# Patient Record
Sex: FEMALE | Race: WHITE | Employment: PART TIME | URBAN - NONMETROPOLITAN AREA
[De-identification: names, ages, dates, MRNs, and addresses within clinical notes are randomized per-mention and may not be internally consistent; named-entity substitution may affect disease eponyms.]

---

## 2017-01-12 RX ORDER — B-COMPLEX WITH VITAMIN C
TABLET ORAL
Qty: 60 TABLET | Refills: 6 | Status: SHIPPED | OUTPATIENT
Start: 2017-01-12 | End: 2017-06-27 | Stop reason: SDUPTHER

## 2017-01-13 ENCOUNTER — TELEPHONE (OUTPATIENT)
Dept: FAMILY MEDICINE CLINIC | Age: 47
End: 2017-01-13

## 2017-01-13 DIAGNOSIS — M25.531 WRIST PAIN, CHRONIC, RIGHT: Primary | ICD-10-CM

## 2017-01-13 DIAGNOSIS — M25.431 WRIST SWELLING, RIGHT: ICD-10-CM

## 2017-01-13 DIAGNOSIS — G89.29 WRIST PAIN, CHRONIC, RIGHT: Primary | ICD-10-CM

## 2017-01-17 ENCOUNTER — TELEPHONE (OUTPATIENT)
Dept: FAMILY MEDICINE CLINIC | Age: 47
End: 2017-01-17

## 2017-02-07 ENCOUNTER — PROCEDURE VISIT (OUTPATIENT)
Dept: PHYSICAL MEDICINE AND REHAB | Age: 47
End: 2017-02-07

## 2017-02-07 DIAGNOSIS — M54.12 CERVICAL RADICULOPATHY: ICD-10-CM

## 2017-02-07 DIAGNOSIS — R20.0 BILATERAL HAND NUMBNESS: ICD-10-CM

## 2017-02-07 DIAGNOSIS — G56.01 RIGHT CARPAL TUNNEL SYNDROME: Primary | ICD-10-CM

## 2017-02-07 PROCEDURE — 95886 MUSC TEST DONE W/N TEST COMP: CPT | Performed by: PSYCHIATRY & NEUROLOGY

## 2017-02-07 PROCEDURE — 95911 NRV CNDJ TEST 9-10 STUDIES: CPT | Performed by: PSYCHIATRY & NEUROLOGY

## 2017-03-30 ENCOUNTER — NURSE TRIAGE (OUTPATIENT)
Dept: ADMINISTRATIVE | Age: 47
End: 2017-03-30

## 2017-05-23 DIAGNOSIS — E78.00 PURE HYPERCHOLESTEROLEMIA: ICD-10-CM

## 2017-05-23 DIAGNOSIS — E03.9 ACQUIRED HYPOTHYROIDISM: ICD-10-CM

## 2017-05-24 RX ORDER — LEVOTHYROXINE SODIUM 0.1 MG/1
TABLET ORAL
Qty: 90 TABLET | Refills: 1 | Status: SHIPPED | OUTPATIENT
Start: 2017-05-24 | End: 2017-06-27 | Stop reason: SDUPTHER

## 2017-05-24 RX ORDER — SIMVASTATIN 40 MG
TABLET ORAL
Qty: 90 TABLET | Refills: 1 | Status: SHIPPED | OUTPATIENT
Start: 2017-05-24 | End: 2017-06-27 | Stop reason: SDUPTHER

## 2017-06-27 DIAGNOSIS — E03.9 ACQUIRED HYPOTHYROIDISM: ICD-10-CM

## 2017-06-27 DIAGNOSIS — E78.00 PURE HYPERCHOLESTEROLEMIA: ICD-10-CM

## 2017-06-27 DIAGNOSIS — D64.9 ANEMIA, UNSPECIFIED TYPE: ICD-10-CM

## 2017-06-27 RX ORDER — SIMVASTATIN 40 MG
TABLET ORAL
Qty: 90 TABLET | Refills: 1 | Status: SHIPPED | OUTPATIENT
Start: 2017-06-27 | End: 2017-12-27 | Stop reason: SDUPTHER

## 2017-06-27 RX ORDER — B-COMPLEX WITH VITAMIN C
TABLET ORAL
Qty: 30 TABLET | Refills: 6 | Status: SHIPPED | OUTPATIENT
Start: 2017-06-27 | End: 2017-12-27 | Stop reason: SDUPTHER

## 2017-06-27 RX ORDER — FERROUS SULFATE 325(65) MG
325 TABLET ORAL DAILY
Qty: 90 TABLET | Refills: 1 | Status: SHIPPED | OUTPATIENT
Start: 2017-06-27 | End: 2018-01-02 | Stop reason: SDUPTHER

## 2017-06-27 RX ORDER — LEVOTHYROXINE SODIUM 0.1 MG/1
TABLET ORAL
Qty: 90 TABLET | Refills: 1 | Status: SHIPPED | OUTPATIENT
Start: 2017-06-27 | End: 2017-12-27 | Stop reason: SDUPTHER

## 2017-07-16 ENCOUNTER — HOSPITAL ENCOUNTER (EMERGENCY)
Age: 47
Discharge: HOME | End: 2017-07-17
Payer: COMMERCIAL

## 2017-07-16 VITALS
RESPIRATION RATE: 15 BRPM | TEMPERATURE: 98.3 F | SYSTOLIC BLOOD PRESSURE: 138 MMHG | WEIGHT: 170 LBS | OXYGEN SATURATION: 99 % | DIASTOLIC BLOOD PRESSURE: 85 MMHG | HEIGHT: 66 IN | BODY MASS INDEX: 27.32 KG/M2 | HEART RATE: 71 BPM

## 2017-07-16 DIAGNOSIS — N39.0 URINARY TRACT INFECTION WITHOUT HEMATURIA, SITE UNSPECIFIED: ICD-10-CM

## 2017-07-16 DIAGNOSIS — M54.10 RADICULOPATHY, UNSPECIFIED SPINAL REGION: Primary | ICD-10-CM

## 2017-07-16 LAB
ALBUMIN SERPL-MCNC: 3.8 G/DL (ref 3.5–5.1)
ALP BLD-CCNC: 74 U/L (ref 38–126)
ALT SERPL-CCNC: 17 U/L (ref 11–66)
ANION GAP SERPL CALCULATED.3IONS-SCNC: 15 MEQ/L (ref 8–16)
ANISOCYTOSIS: ABNORMAL
AST SERPL-CCNC: 27 U/L (ref 5–40)
BACTERIA: ABNORMAL /HPF
BASOPHILS # BLD: 0.6 %
BASOPHILS ABSOLUTE: 0 THOU/MM3 (ref 0–0.1)
BILIRUB SERPL-MCNC: 0.2 MG/DL (ref 0.3–1.2)
BILIRUBIN URINE: NEGATIVE
BLOOD, URINE: NEGATIVE
BUN BLDV-MCNC: 22 MG/DL (ref 7–22)
CALCIUM SERPL-MCNC: 9.3 MG/DL (ref 8.5–10.5)
CASTS 2: ABNORMAL /LPF
CASTS UA: ABNORMAL /LPF
CHARACTER, URINE: ABNORMAL
CHLORIDE BLD-SCNC: 103 MEQ/L (ref 98–111)
CO2: 27 MEQ/L (ref 23–33)
COLOR: YELLOW
CREAT SERPL-MCNC: 1.3 MG/DL (ref 0.4–1.2)
CRYSTALS, UA: ABNORMAL
EOSINOPHIL # BLD: 1.3 %
EOSINOPHILS ABSOLUTE: 0.1 THOU/MM3 (ref 0–0.4)
EPITHELIAL CELLS, UA: ABNORMAL /HPF
GFR SERPL CREATININE-BSD FRML MDRD: 44 ML/MIN/1.73M2
GLUCOSE BLD-MCNC: 121 MG/DL (ref 70–108)
GLUCOSE URINE: NEGATIVE MG/DL
HCT VFR BLD CALC: 33.6 % (ref 37–47)
HEMOGLOBIN: 11.2 GM/DL (ref 12–16)
KETONES, URINE: NEGATIVE
LEUKOCYTE ESTERASE, URINE: ABNORMAL
LIPASE: 39.7 U/L (ref 5.6–51.3)
LYMPHOCYTES # BLD: 29.8 %
LYMPHOCYTES ABSOLUTE: 2.3 THOU/MM3 (ref 1–4.8)
MAGNESIUM: 1.8 MG/DL (ref 1.6–2.4)
MCH RBC QN AUTO: 28.2 PG (ref 27–31)
MCHC RBC AUTO-ENTMCNC: 33.2 GM/DL (ref 33–37)
MCV RBC AUTO: 84.9 FL (ref 81–99)
MISCELLANEOUS 2: ABNORMAL
MONOCYTES # BLD: 8.7 %
MONOCYTES ABSOLUTE: 0.7 THOU/MM3 (ref 0.4–1.3)
NITRITE, URINE: NEGATIVE
NUCLEATED RED BLOOD CELLS: 0 /100 WBC
PDW BLD-RTO: 14.7 % (ref 11.5–14.5)
PH UA: 6
PLATELET # BLD: 126 THOU/MM3 (ref 130–400)
PMV BLD AUTO: 10.4 MCM (ref 7.4–10.4)
POTASSIUM SERPL-SCNC: 4 MEQ/L (ref 3.5–5.2)
PROTEIN UA: 30
RBC # BLD: 3.96 MILL/MM3 (ref 4.2–5.4)
RBC # BLD: NORMAL 10*6/UL
RBC URINE: ABNORMAL /HPF
RENAL EPITHELIAL, UA: ABNORMAL
SEG NEUTROPHILS: 59.6 %
SEGMENTED NEUTROPHILS ABSOLUTE COUNT: 4.6 THOU/MM3 (ref 1.8–7.7)
SODIUM BLD-SCNC: 145 MEQ/L (ref 135–145)
SPECIFIC GRAVITY, URINE: 1.02 (ref 1–1.03)
TOTAL PROTEIN: 7.1 G/DL (ref 6.1–8)
UROBILINOGEN, URINE: 0.2 EU/DL
WBC # BLD: 7.8 THOU/MM3 (ref 4.8–10.8)
WBC UA: > 100 /HPF
YEAST: ABNORMAL

## 2017-07-16 PROCEDURE — 80050 GENERAL HEALTH PANEL: CPT

## 2017-07-16 PROCEDURE — 83735 ASSAY OF MAGNESIUM: CPT

## 2017-07-16 PROCEDURE — 83690 ASSAY OF LIPASE: CPT

## 2017-07-16 PROCEDURE — 99283 EMERGENCY DEPT VISIT LOW MDM: CPT

## 2017-07-16 PROCEDURE — 87086 URINE CULTURE/COLONY COUNT: CPT

## 2017-07-16 PROCEDURE — 81001 URINALYSIS AUTO W/SCOPE: CPT

## 2017-07-16 PROCEDURE — 36415 COLL VENOUS BLD VENIPUNCTURE: CPT

## 2017-07-16 ASSESSMENT — PAIN DESCRIPTION - ORIENTATION: ORIENTATION: LEFT

## 2017-07-16 ASSESSMENT — PAIN DESCRIPTION - LOCATION: LOCATION: ABDOMEN;LEG

## 2017-07-16 ASSESSMENT — PAIN SCALES - GENERAL: PAINLEVEL_OUTOF10: 9

## 2017-07-17 LAB — TSH SERPL DL<=0.05 MIU/L-ACNC: 6.1 UIU/ML (ref 0.4–4.2)

## 2017-07-17 PROCEDURE — 96372 THER/PROPH/DIAG INJ SC/IM: CPT

## 2017-07-17 PROCEDURE — 6360000002 HC RX W HCPCS: Performed by: NURSE PRACTITIONER

## 2017-07-17 RX ORDER — NITROFURANTOIN 25; 75 MG/1; MG/1
100 CAPSULE ORAL 2 TIMES DAILY
Qty: 14 CAPSULE | Refills: 0 | Status: SHIPPED | OUTPATIENT
Start: 2017-07-17 | End: 2017-07-24

## 2017-07-17 RX ORDER — ORPHENADRINE CITRATE 30 MG/ML
60 INJECTION INTRAMUSCULAR; INTRAVENOUS ONCE
Status: COMPLETED | OUTPATIENT
Start: 2017-07-17 | End: 2017-07-17

## 2017-07-17 RX ORDER — KETOROLAC TROMETHAMINE 30 MG/ML
30 INJECTION, SOLUTION INTRAMUSCULAR; INTRAVENOUS ONCE
Status: COMPLETED | OUTPATIENT
Start: 2017-07-17 | End: 2017-07-17

## 2017-07-17 RX ORDER — METHYLPREDNISOLONE 4 MG/1
TABLET ORAL
Qty: 1 KIT | Refills: 0 | Status: SHIPPED | OUTPATIENT
Start: 2017-07-17 | End: 2017-07-23

## 2017-07-17 RX ADMIN — ORPHENADRINE CITRATE 60 MG: 30 INJECTION INTRAMUSCULAR; INTRAVENOUS at 01:17

## 2017-07-17 RX ADMIN — KETOROLAC TROMETHAMINE 30 MG: 30 INJECTION, SOLUTION INTRAMUSCULAR at 01:18

## 2017-07-17 ASSESSMENT — ENCOUNTER SYMPTOMS
SORE THROAT: 0
DIARRHEA: 0
SHORTNESS OF BREATH: 0
BACK PAIN: 0
VOMITING: 0
EYE DISCHARGE: 0
WHEEZING: 0
ABDOMINAL PAIN: 1
COUGH: 0
RHINORRHEA: 0
EYE PAIN: 0
NAUSEA: 0

## 2017-07-17 ASSESSMENT — PAIN SCALES - GENERAL: PAINLEVEL_OUTOF10: 6

## 2017-07-18 LAB
ORGANISM: ABNORMAL
URINE CULTURE REFLEX: ABNORMAL

## 2017-08-30 ENCOUNTER — OFFICE VISIT (OUTPATIENT)
Dept: FAMILY MEDICINE CLINIC | Age: 47
End: 2017-08-30
Payer: COMMERCIAL

## 2017-08-30 VITALS
WEIGHT: 170.8 LBS | TEMPERATURE: 98.1 F | DIASTOLIC BLOOD PRESSURE: 64 MMHG | SYSTOLIC BLOOD PRESSURE: 116 MMHG | HEIGHT: 66 IN | RESPIRATION RATE: 12 BRPM | HEART RATE: 80 BPM | BODY MASS INDEX: 27.45 KG/M2

## 2017-08-30 DIAGNOSIS — M54.2 NECK PAIN: ICD-10-CM

## 2017-08-30 DIAGNOSIS — M54.40 BACK PAIN OF LUMBAR REGION WITH SCIATICA: Primary | ICD-10-CM

## 2017-08-30 DIAGNOSIS — M79.602 LEFT ARM PAIN: ICD-10-CM

## 2017-08-30 DIAGNOSIS — R82.998 LEUKOCYTES IN URINE: ICD-10-CM

## 2017-08-30 LAB
BILIRUBIN, POC: ABNORMAL
BLOOD URINE, POC: ABNORMAL
CLARITY, POC: ABNORMAL
COLOR, POC: YELLOW
GLUCOSE URINE, POC: NEGATIVE
KETONES, POC: ABNORMAL
LEUKOCYTE EST, POC: ABNORMAL
NITRITE, POC: NEGATIVE
PH, POC: 5
PROTEIN, POC: ABNORMAL
SPECIFIC GRAVITY, POC: 1.02
UROBILINOGEN, POC: ABNORMAL

## 2017-08-30 PROCEDURE — 99214 OFFICE O/P EST MOD 30 MIN: CPT | Performed by: NURSE PRACTITIONER

## 2017-08-30 PROCEDURE — 81002 URINALYSIS NONAUTO W/O SCOPE: CPT | Performed by: NURSE PRACTITIONER

## 2017-08-30 RX ORDER — ACETAMINOPHEN 500 MG
1000 TABLET ORAL EVERY 6 HOURS PRN
Qty: 120 TABLET | Refills: 0 | Status: SHIPPED | OUTPATIENT
Start: 2017-08-30 | End: 2017-10-31 | Stop reason: SDUPTHER

## 2017-08-30 RX ORDER — PREDNISONE 20 MG/1
TABLET ORAL
Qty: 18 TABLET | Refills: 0 | Status: SHIPPED | OUTPATIENT
Start: 2017-08-30 | End: 2017-12-27 | Stop reason: ALTCHOICE

## 2017-08-30 ASSESSMENT — PATIENT HEALTH QUESTIONNAIRE - PHQ9
SUM OF ALL RESPONSES TO PHQ9 QUESTIONS 1 & 2: 0
2. FEELING DOWN, DEPRESSED OR HOPELESS: 0
SUM OF ALL RESPONSES TO PHQ QUESTIONS 1-9: 0
1. LITTLE INTEREST OR PLEASURE IN DOING THINGS: 0

## 2017-09-09 ENCOUNTER — HOSPITAL ENCOUNTER (EMERGENCY)
Age: 47
Discharge: HOME | End: 2017-09-09
Payer: COMMERCIAL

## 2017-09-09 VITALS
HEART RATE: 73 BPM | HEIGHT: 66 IN | WEIGHT: 170 LBS | RESPIRATION RATE: 12 BRPM | OXYGEN SATURATION: 97 % | TEMPERATURE: 97.9 F | BODY MASS INDEX: 27.32 KG/M2 | DIASTOLIC BLOOD PRESSURE: 83 MMHG | SYSTOLIC BLOOD PRESSURE: 121 MMHG

## 2017-09-09 DIAGNOSIS — K52.9 GASTROENTERITIS: ICD-10-CM

## 2017-09-09 LAB
ALBUMIN SERPL-MCNC: 3.7 G/DL (ref 3.5–5.1)
ALP BLD-CCNC: 69 U/L (ref 38–126)
ALT SERPL-CCNC: 17 U/L (ref 11–66)
ANION GAP SERPL CALCULATED.3IONS-SCNC: 9 MEQ/L (ref 8–16)
ANISOCYTOSIS: ABNORMAL
AST SERPL-CCNC: 18 U/L (ref 5–40)
BACTERIA: ABNORMAL /HPF
BASOPHILS # BLD: 0.2 %
BASOPHILS ABSOLUTE: 0 THOU/MM3 (ref 0–0.1)
BILIRUB SERPL-MCNC: 0.3 MG/DL (ref 0.3–1.2)
BILIRUBIN URINE: NEGATIVE
BLOOD, URINE: NEGATIVE
BUN BLDV-MCNC: 17 MG/DL (ref 7–22)
CALCIUM SERPL-MCNC: 9.8 MG/DL (ref 8.5–10.5)
CASTS 2: ABNORMAL /LPF
CASTS UA: ABNORMAL /LPF
CHARACTER, URINE: CLEAR
CHLORIDE BLD-SCNC: 105 MEQ/L (ref 98–111)
CO2: 29 MEQ/L (ref 23–33)
COLOR: YELLOW
CREAT SERPL-MCNC: 0.9 MG/DL (ref 0.4–1.2)
CRYSTALS, UA: ABNORMAL
EOSINOPHIL # BLD: 0.3 %
EOSINOPHILS ABSOLUTE: 0 THOU/MM3 (ref 0–0.4)
EPITHELIAL CELLS, UA: ABNORMAL /HPF
GFR SERPL CREATININE-BSD FRML MDRD: 68 ML/MIN/1.73M2
GLUCOSE BLD-MCNC: 100 MG/DL (ref 70–108)
GLUCOSE URINE: NEGATIVE MG/DL
HCT VFR BLD CALC: 33.2 % (ref 37–47)
HEMOGLOBIN: 10.9 GM/DL (ref 12–16)
KETONES, URINE: NEGATIVE
LEUKOCYTE ESTERASE, URINE: ABNORMAL
LYMPHOCYTES # BLD: 6.7 %
LYMPHOCYTES ABSOLUTE: 0.8 THOU/MM3 (ref 1–4.8)
MCH RBC QN AUTO: 28.2 PG (ref 27–31)
MCHC RBC AUTO-ENTMCNC: 32.7 GM/DL (ref 33–37)
MCV RBC AUTO: 86.5 FL (ref 81–99)
MISCELLANEOUS 2: ABNORMAL
MONOCYTES # BLD: 3.6 %
MONOCYTES ABSOLUTE: 0.4 THOU/MM3 (ref 0.4–1.3)
NITRITE, URINE: NEGATIVE
NUCLEATED RED BLOOD CELLS: 0 /100 WBC
OSMOLALITY CALCULATION: 286.6 MOSMOL/KG (ref 275–300)
PDW BLD-RTO: 14.6 % (ref 11.5–14.5)
PH UA: 6
PLATELET # BLD: 143 THOU/MM3 (ref 130–400)
PMV BLD AUTO: 10.6 MCM (ref 7.4–10.4)
POTASSIUM SERPL-SCNC: 4 MEQ/L (ref 3.5–5.2)
PROTEIN UA: 100
RBC # BLD: 3.84 MILL/MM3 (ref 4.2–5.4)
RBC # BLD: NORMAL 10*6/UL
RBC URINE: ABNORMAL /HPF
RENAL EPITHELIAL, UA: ABNORMAL
SEG NEUTROPHILS: 89.2 %
SEGMENTED NEUTROPHILS ABSOLUTE COUNT: 10.5 THOU/MM3 (ref 1.8–7.7)
SODIUM BLD-SCNC: 143 MEQ/L (ref 135–145)
SPECIFIC GRAVITY, URINE: 1.02 (ref 1–1.03)
TOTAL PROTEIN: 6.1 G/DL (ref 6.1–8)
UROBILINOGEN, URINE: 0.2 EU/DL
WBC # BLD: 11.8 THOU/MM3 (ref 4.8–10.8)
WBC UA: ABNORMAL /HPF
YEAST: ABNORMAL

## 2017-09-09 PROCEDURE — 96361 HYDRATE IV INFUSION ADD-ON: CPT

## 2017-09-09 PROCEDURE — 80053 COMPREHEN METABOLIC PANEL: CPT

## 2017-09-09 PROCEDURE — 85025 COMPLETE CBC W/AUTO DIFF WBC: CPT

## 2017-09-09 PROCEDURE — 36415 COLL VENOUS BLD VENIPUNCTURE: CPT

## 2017-09-09 PROCEDURE — 96374 THER/PROPH/DIAG INJ IV PUSH: CPT

## 2017-09-09 PROCEDURE — 87086 URINE CULTURE/COLONY COUNT: CPT

## 2017-09-09 PROCEDURE — 81001 URINALYSIS AUTO W/SCOPE: CPT

## 2017-09-09 PROCEDURE — 2580000003 HC RX 258: Performed by: PHYSICIAN ASSISTANT

## 2017-09-09 PROCEDURE — 6360000002 HC RX W HCPCS: Performed by: PHYSICIAN ASSISTANT

## 2017-09-09 PROCEDURE — 99283 EMERGENCY DEPT VISIT LOW MDM: CPT

## 2017-09-09 RX ORDER — 0.9 % SODIUM CHLORIDE 0.9 %
1000 INTRAVENOUS SOLUTION INTRAVENOUS ONCE
Status: COMPLETED | OUTPATIENT
Start: 2017-09-09 | End: 2017-09-09

## 2017-09-09 RX ORDER — ONDANSETRON 2 MG/ML
4 INJECTION INTRAMUSCULAR; INTRAVENOUS ONCE
Status: COMPLETED | OUTPATIENT
Start: 2017-09-09 | End: 2017-09-09

## 2017-09-09 RX ORDER — ONDANSETRON 4 MG/1
4 TABLET, ORALLY DISINTEGRATING ORAL EVERY 8 HOURS PRN
Qty: 10 TABLET | Refills: 0 | Status: SHIPPED | OUTPATIENT
Start: 2017-09-09 | End: 2017-12-27 | Stop reason: ALTCHOICE

## 2017-09-09 RX ADMIN — SODIUM CHLORIDE 1000 ML: 9 INJECTION, SOLUTION INTRAVENOUS at 15:18

## 2017-09-09 RX ADMIN — ONDANSETRON 4 MG: 2 INJECTION INTRAMUSCULAR; INTRAVENOUS at 15:18

## 2017-09-09 ASSESSMENT — PAIN SCALES - GENERAL
PAINLEVEL_OUTOF10: 6
PAINLEVEL_OUTOF10: 8
PAINLEVEL_OUTOF10: 7

## 2017-09-09 ASSESSMENT — PAIN DESCRIPTION - LOCATION
LOCATION: ABDOMEN

## 2017-09-09 ASSESSMENT — ENCOUNTER SYMPTOMS
SHORTNESS OF BREATH: 0
EYE PAIN: 0
EYE DISCHARGE: 0
ABDOMINAL PAIN: 1
WHEEZING: 0
VOMITING: 1
NAUSEA: 0
RHINORRHEA: 0
COUGH: 0
BACK PAIN: 0
SORE THROAT: 0
DIARRHEA: 1

## 2017-09-09 ASSESSMENT — PAIN DESCRIPTION - DESCRIPTORS
DESCRIPTORS: ACHING;CRAMPING
DESCRIPTORS: ACHING;CRAMPING;DISCOMFORT
DESCRIPTORS: ACHING;CRAMPING;DISCOMFORT

## 2017-09-10 LAB
ORGANISM: ABNORMAL
URINE CULTURE REFLEX: ABNORMAL

## 2017-09-12 ENCOUNTER — HOSPITAL ENCOUNTER (OUTPATIENT)
Dept: MRI IMAGING | Age: 47
Discharge: HOME | End: 2017-09-12
Payer: COMMERCIAL

## 2017-09-12 DIAGNOSIS — M79.602 LEFT ARM PAIN: ICD-10-CM

## 2017-09-12 DIAGNOSIS — M54.2 NECK PAIN: ICD-10-CM

## 2017-09-12 PROCEDURE — 72141 MRI NECK SPINE W/O DYE: CPT

## 2017-09-13 ENCOUNTER — TELEPHONE (OUTPATIENT)
Dept: FAMILY MEDICINE CLINIC | Age: 47
End: 2017-09-13

## 2017-09-13 DIAGNOSIS — G89.29 CHRONIC MIDLINE LOW BACK PAIN WITHOUT SCIATICA: Primary | ICD-10-CM

## 2017-09-13 DIAGNOSIS — M51.36 BULGING LUMBAR DISC: ICD-10-CM

## 2017-09-13 DIAGNOSIS — M54.50 CHRONIC MIDLINE LOW BACK PAIN WITHOUT SCIATICA: Primary | ICD-10-CM

## 2017-09-13 DIAGNOSIS — M48.061 FORAMINAL STENOSIS OF LUMBAR REGION: ICD-10-CM

## 2017-09-27 ENCOUNTER — OFFICE VISIT (OUTPATIENT)
Dept: FAMILY MEDICINE CLINIC | Age: 47
End: 2017-09-27
Payer: COMMERCIAL

## 2017-09-27 ENCOUNTER — TELEPHONE (OUTPATIENT)
Dept: FAMILY MEDICINE CLINIC | Age: 47
End: 2017-09-27

## 2017-09-27 VITALS
TEMPERATURE: 98.1 F | BODY MASS INDEX: 26.87 KG/M2 | SYSTOLIC BLOOD PRESSURE: 124 MMHG | HEIGHT: 66 IN | WEIGHT: 167.2 LBS | RESPIRATION RATE: 12 BRPM | DIASTOLIC BLOOD PRESSURE: 68 MMHG | HEART RATE: 88 BPM

## 2017-09-27 DIAGNOSIS — T14.8XXA BITE BY ANIMAL: ICD-10-CM

## 2017-09-27 DIAGNOSIS — M54.6 CHRONIC MIDLINE THORACIC BACK PAIN: ICD-10-CM

## 2017-09-27 DIAGNOSIS — M48.02 CERVICAL STENOSIS OF SPINAL CANAL: ICD-10-CM

## 2017-09-27 DIAGNOSIS — G89.29 CHRONIC MIDLINE THORACIC BACK PAIN: ICD-10-CM

## 2017-09-27 DIAGNOSIS — M54.50 CHRONIC BILATERAL LOW BACK PAIN WITHOUT SCIATICA: Primary | ICD-10-CM

## 2017-09-27 DIAGNOSIS — L29.9 ITCHING: ICD-10-CM

## 2017-09-27 DIAGNOSIS — G89.29 CHRONIC BILATERAL LOW BACK PAIN WITHOUT SCIATICA: Primary | ICD-10-CM

## 2017-09-27 PROCEDURE — 99214 OFFICE O/P EST MOD 30 MIN: CPT | Performed by: NURSE PRACTITIONER

## 2017-09-27 RX ORDER — HYDROXYZINE HYDROCHLORIDE 25 MG/1
TABLET, FILM COATED ORAL
Qty: 30 TABLET | Refills: 0 | Status: SHIPPED | OUTPATIENT
Start: 2017-09-27 | End: 2019-02-19

## 2017-09-27 RX ORDER — HYDROXYZINE HYDROCHLORIDE 25 MG/1
TABLET, FILM COATED ORAL
Qty: 30 TABLET | Refills: 0 | Status: SHIPPED | OUTPATIENT
Start: 2017-09-27 | End: 2017-09-27 | Stop reason: SDUPTHER

## 2017-09-27 ASSESSMENT — ENCOUNTER SYMPTOMS
RESPIRATORY NEGATIVE: 1
COLOR CHANGE: 1
BACK PAIN: 1

## 2017-10-12 ENCOUNTER — HOSPITAL ENCOUNTER (OUTPATIENT)
Dept: GENERAL RADIOLOGY | Age: 47
Discharge: HOME | End: 2017-10-12
Payer: COMMERCIAL

## 2017-10-12 ENCOUNTER — TELEPHONE (OUTPATIENT)
Dept: FAMILY MEDICINE CLINIC | Age: 47
End: 2017-10-12

## 2017-10-12 ENCOUNTER — HOSPITAL ENCOUNTER (OUTPATIENT)
Age: 47
Discharge: HOME | End: 2017-10-12
Payer: COMMERCIAL

## 2017-10-12 DIAGNOSIS — M54.50 CHRONIC MIDLINE LOW BACK PAIN WITHOUT SCIATICA: ICD-10-CM

## 2017-10-12 DIAGNOSIS — M54.6 ACUTE MIDLINE THORACIC BACK PAIN: Primary | ICD-10-CM

## 2017-10-12 DIAGNOSIS — G89.29 CHRONIC MIDLINE LOW BACK PAIN WITHOUT SCIATICA: ICD-10-CM

## 2017-10-12 DIAGNOSIS — M54.50 ACUTE MIDLINE LOW BACK PAIN WITHOUT SCIATICA: ICD-10-CM

## 2017-10-12 DIAGNOSIS — M54.6 CHRONIC MIDLINE THORACIC BACK PAIN: ICD-10-CM

## 2017-10-12 DIAGNOSIS — M54.6 CHRONIC MIDLINE THORACIC BACK PAIN: Primary | ICD-10-CM

## 2017-10-12 DIAGNOSIS — G89.29 CHRONIC MIDLINE THORACIC BACK PAIN: Primary | ICD-10-CM

## 2017-10-12 DIAGNOSIS — G89.29 CHRONIC MIDLINE THORACIC BACK PAIN: ICD-10-CM

## 2017-10-12 PROCEDURE — 72100 X-RAY EXAM L-S SPINE 2/3 VWS: CPT

## 2017-10-12 PROCEDURE — 72072 X-RAY EXAM THORAC SPINE 3VWS: CPT

## 2017-10-12 NOTE — TELEPHONE ENCOUNTER
Patient informed, verbally understood. Pt states she will try the PT for a week to see if it works for her.

## 2017-10-12 NOTE — TELEPHONE ENCOUNTER
----- Message from Dawson Moreno CNP sent at 10/12/2017  3:06 PM EDT -----  Please let pt know her x-ray identified mild scoliosis and moderate degenerative facet arthropathy, these are new findings since her last x-ray, her thoracic x-ray identified some mild scoliosis also. IF tylenol and motrin are not helping she can start physical therapy to see if the muscle strengthening exercises help improve her pain and if that does not help we can consider an MRI. I will order the therapy if she is interested.

## 2017-10-19 ENCOUNTER — TELEPHONE (OUTPATIENT)
Dept: FAMILY MEDICINE CLINIC | Age: 47
End: 2017-10-19

## 2017-10-19 NOTE — TELEPHONE ENCOUNTER
AMB REFERRAL TO PHYSICAL THERAPY/[facility] PT dept has tried to reach patient three times to schedule for PT. Each time there wasn't an answer to their calls and there isn't any voice mail set up. Called patient's emergency contact (mother Fabby Malin) @ [phone] and left a general message asking her to pass a message on and have the patient call our office. When she calls, please let her know that PT has been trying to reach her and have her call [phone] to schedule if a transfer to them doesn't work. Thank you.

## 2017-10-31 DIAGNOSIS — M54.2 NECK PAIN: ICD-10-CM

## 2017-10-31 DIAGNOSIS — M54.40 BACK PAIN OF LUMBAR REGION WITH SCIATICA: ICD-10-CM

## 2017-10-31 DIAGNOSIS — M79.602 LEFT ARM PAIN: ICD-10-CM

## 2017-10-31 RX ORDER — ACETAMINOPHEN 500 MG
1000 TABLET ORAL EVERY 6 HOURS PRN
Qty: 120 TABLET | Refills: 0 | Status: SHIPPED | OUTPATIENT
Start: 2017-10-31 | End: 2017-12-11 | Stop reason: SDUPTHER

## 2017-10-31 NOTE — TELEPHONE ENCOUNTER
Flvaio Chavez called requesting a refill on the following medications:  Requested Prescriptions     Pending Prescriptions Disp Refills    acetaminophen (APAP EXTRA STRENGTH) 500 MG tablet 120 tablet 0     Sig: Take 2 tablets by mouth every 6 hours as needed for Pain     Pharmacy verified:  .pv      Date of last visit: 9/27/17  Date of next visit (if applicable): 07/02/2337        Date of last fill and quantity (to be completed by clinical staff)  Pharmacy name: [facility] [address]

## 2017-11-15 ENCOUNTER — TELEPHONE (OUTPATIENT)
Dept: FAMILY MEDICINE CLINIC | Age: 47
End: 2017-11-15

## 2017-12-11 DIAGNOSIS — M54.40 BACK PAIN OF LUMBAR REGION WITH SCIATICA: ICD-10-CM

## 2017-12-11 DIAGNOSIS — M54.2 NECK PAIN: ICD-10-CM

## 2017-12-11 DIAGNOSIS — M79.602 LEFT ARM PAIN: ICD-10-CM

## 2017-12-11 NOTE — TELEPHONE ENCOUNTER
Naveed Madrigal called requesting a refill on the following medications:  Requested Prescriptions     Pending Prescriptions Disp Refills    acetaminophen (APAP EXTRA STRENGTH) 500 MG tablet 120 tablet 0     Sig: Take 2 tablets by mouth every 6 hours as needed for Pain     Pharmacy verified:  .pv      Date of last visit: 9/27/17  Date of next visit (if applicable): 51/56/4709

## 2017-12-12 RX ORDER — ACETAMINOPHEN 500 MG
1000 TABLET ORAL EVERY 6 HOURS PRN
Qty: 120 TABLET | Refills: 0 | Status: SHIPPED | OUTPATIENT
Start: 2017-12-12 | End: 2017-12-27 | Stop reason: SDUPTHER

## 2017-12-27 ENCOUNTER — OFFICE VISIT (OUTPATIENT)
Dept: FAMILY MEDICINE CLINIC | Age: 47
End: 2017-12-27
Payer: COMMERCIAL

## 2017-12-27 VITALS
TEMPERATURE: 98.1 F | WEIGHT: 178 LBS | DIASTOLIC BLOOD PRESSURE: 70 MMHG | HEIGHT: 67 IN | BODY MASS INDEX: 27.94 KG/M2 | SYSTOLIC BLOOD PRESSURE: 110 MMHG | RESPIRATION RATE: 14 BRPM | HEART RATE: 53 BPM

## 2017-12-27 DIAGNOSIS — G56.01 CARPAL TUNNEL SYNDROME OF RIGHT WRIST: Primary | ICD-10-CM

## 2017-12-27 DIAGNOSIS — M54.40 BACK PAIN OF LUMBAR REGION WITH SCIATICA: ICD-10-CM

## 2017-12-27 DIAGNOSIS — E03.9 ACQUIRED HYPOTHYROIDISM: ICD-10-CM

## 2017-12-27 DIAGNOSIS — Z23 IMMUNIZATION DUE: ICD-10-CM

## 2017-12-27 DIAGNOSIS — E78.00 PURE HYPERCHOLESTEROLEMIA: ICD-10-CM

## 2017-12-27 DIAGNOSIS — Z23 NEEDS FLU SHOT: ICD-10-CM

## 2017-12-27 PROCEDURE — G8484 FLU IMMUNIZE NO ADMIN: HCPCS | Performed by: NURSE PRACTITIONER

## 2017-12-27 PROCEDURE — 90732 PPSV23 VACC 2 YRS+ SUBQ/IM: CPT | Performed by: NURSE PRACTITIONER

## 2017-12-27 PROCEDURE — 90686 IIV4 VACC NO PRSV 0.5 ML IM: CPT | Performed by: NURSE PRACTITIONER

## 2017-12-27 PROCEDURE — 90472 IMMUNIZATION ADMIN EACH ADD: CPT | Performed by: NURSE PRACTITIONER

## 2017-12-27 PROCEDURE — 90471 IMMUNIZATION ADMIN: CPT | Performed by: NURSE PRACTITIONER

## 2017-12-27 PROCEDURE — G8427 DOCREV CUR MEDS BY ELIG CLIN: HCPCS | Performed by: NURSE PRACTITIONER

## 2017-12-27 PROCEDURE — 1036F TOBACCO NON-USER: CPT | Performed by: NURSE PRACTITIONER

## 2017-12-27 PROCEDURE — G8419 CALC BMI OUT NRM PARAM NOF/U: HCPCS | Performed by: NURSE PRACTITIONER

## 2017-12-27 PROCEDURE — 99214 OFFICE O/P EST MOD 30 MIN: CPT | Performed by: NURSE PRACTITIONER

## 2017-12-27 RX ORDER — SIMVASTATIN 40 MG
TABLET ORAL
Qty: 90 TABLET | Refills: 1 | Status: SHIPPED | OUTPATIENT
Start: 2017-12-27 | End: 2018-06-12 | Stop reason: SDUPTHER

## 2017-12-27 RX ORDER — B-COMPLEX WITH VITAMIN C
TABLET ORAL
Qty: 90 TABLET | Refills: 3 | Status: SHIPPED | OUTPATIENT
Start: 2017-12-27 | End: 2018-06-12 | Stop reason: SDUPTHER

## 2017-12-27 RX ORDER — LEVOTHYROXINE SODIUM 0.1 MG/1
TABLET ORAL
Qty: 90 TABLET | Refills: 1 | Status: SHIPPED | OUTPATIENT
Start: 2017-12-27 | End: 2018-06-12 | Stop reason: SDUPTHER

## 2017-12-27 RX ORDER — ACETAMINOPHEN 500 MG
1000 TABLET ORAL EVERY 6 HOURS PRN
Qty: 120 TABLET | Refills: 0 | Status: SHIPPED | OUTPATIENT
Start: 2017-12-27 | End: 2018-03-29 | Stop reason: SDUPTHER

## 2017-12-27 RX ORDER — GABAPENTIN 100 MG/1
100 CAPSULE ORAL 3 TIMES DAILY
Qty: 90 CAPSULE | Refills: 3 | Status: SHIPPED | OUTPATIENT
Start: 2017-12-27 | End: 2018-02-13 | Stop reason: SDUPTHER

## 2017-12-27 ASSESSMENT — ENCOUNTER SYMPTOMS
ABDOMINAL DISTENTION: 0
RESPIRATORY NEGATIVE: 1
BACK PAIN: 1
ABDOMINAL PAIN: 0

## 2017-12-27 NOTE — PROGRESS NOTES
Visit Information    Have you changed or started any medications since your last visit including any over-the-counter medicines, vitamins, or herbal medicines? no   Are you having any side effects from any of your medications? -  no  Have you stopped taking any of your medications? Is so, why? -  no    Have you seen any other physician or provider since your last visit? Yes - Records Obtained  Have you had any other diagnostic tests since your last visit? No  Have you been seen in the emergency room and/or had an admission to a hospital since we last saw you? No  Have you had your routine dental cleaning in the past 6 months? no    Have you activated your [employer] account? If not, what are your barriers? Yes     Patient Care Team:  Chrystie Harada, CNP as PCP - General (Family Medicine)    Medical History Review  Past Medical, Family, and Social History     Defer to provider.
3 YRS AND OLDER, IM, PF, PREFILL SYR OR SDV, 0.5ML (FLUZONE QUADV, PF)    6. Immunization due    - Pneumococcal polysaccharide vaccine 23-valent greater than or equal to 3yo subcutaneous/IM      Return in about 6 months (around 6/27/2018) for med refill and check up. Reccommended tobacco cessation options including pharmacologic methods, counseled great than 3 minutes during this visit:  Yes  []  No  []       Patient given educational materials - see patient instructions. Discussed use, benefit, and side effects of prescribed medications. All patient questions answered. Pt voiced understanding. Reviewed health maintenance. Instructed to continue current medications, diet and exercise. Patient agreed with treatment plan. Follow up as directed.        Electronically signed by Lizzette Greenberg CNP on 12/27/2017 at 2:15 PM

## 2017-12-27 NOTE — PATIENT INSTRUCTIONS
You may receive a survey about your visit with us today. The feedback from our patients helps us identify what is working well and where the service to all patients can be enhanced. Thank you! Patient Education        Carpal Tunnel Syndrome: Exercises  Your Care Instructions  Here are some examples of typical rehabilitation exercises for your condition. Start each exercise slowly. Ease off the exercise if you start to have pain. Your doctor or your physical or occupational therapist will tell you when you can start these exercises and which ones will work best for you. Warm-up stretches  When you no longer have pain or numbness, you can do exercises to help prevent carpal tunnel syndrome from coming back. Do not do any stretch or movement that is uncomfortable or painful. 1. Rotate your wrist up, down, and from side to side. Repeat 4 times. 2. Stretch your fingers far apart. Relax them, and then stretch them again. Repeat 4 times. 3. Stretch your thumb by pulling it back gently, holding it, and then releasing it. Repeat 4 times. How to do the exercises  Prayer stretch    1. Start with your palms together in front of your chest just below your chin. 2. Slowly lower your hands toward your waistline, keeping your hands close to your stomach and your palms together until you feel a mild to moderate stretch under your forearms. 3. Hold for at least 15 to 30 seconds. Repeat 2 to 4 times. Wrist flexor stretch    1. Extend your arm in front of you with your palm up. 2. Bend your wrist, pointing your hand toward the floor. 3. With your other hand, gently bend your wrist farther until you feel a mild to moderate stretch in your forearm. 4. Hold for at least 15 to 30 seconds. Repeat 2 to 4 times. Wrist extensor stretch    1. Repeat steps 1 through 4 of the stretch above, but begin with your extended hand palm down. Follow-up care is a key part of your treatment and safety.  Be sure to make and go to all appointments, and call your doctor if you are having problems. It's also a good idea to know your test results and keep a list of the medicines you take. Where can you learn more? Go to [email or web address] and sign in to your [employer] account. Enter V077 in the [facility] box to learn more about \"Carpal Tunnel Syndrome: Exercises. \"     If you do not have an account, please click on the \"Sign Up Now\" link. Current as of: March 21, 2017  Content Version: 11.4  © 3470-7158 Healthwise, Incorporated. Care instructions adapted under license by [facility] ([facility]). If you have questions about a medical condition or this instruction, always ask your healthcare professional. [address] any warranty or liability for your use of this information.

## 2018-01-02 DIAGNOSIS — D64.9 ANEMIA, UNSPECIFIED TYPE: ICD-10-CM

## 2018-01-02 RX ORDER — FERROUS SULFATE 325(65) MG
325 TABLET ORAL DAILY
Qty: 90 TABLET | Refills: 1 | Status: SHIPPED | OUTPATIENT
Start: 2018-01-02 | End: 2018-06-12 | Stop reason: SDUPTHER

## 2018-01-03 ENCOUNTER — TELEPHONE (OUTPATIENT)
Dept: FAMILY MEDICINE CLINIC | Age: 48
End: 2018-01-03

## 2018-01-08 ENCOUNTER — TELEPHONE (OUTPATIENT)
Dept: NEPHROLOGY | Age: 48
End: 2018-01-08

## 2018-01-08 ENCOUNTER — TELEPHONE (OUTPATIENT)
Dept: FAMILY MEDICINE CLINIC | Age: 48
End: 2018-01-08

## 2018-01-08 DIAGNOSIS — E03.9 HYPOTHYROIDISM, UNSPECIFIED TYPE: ICD-10-CM

## 2018-01-08 DIAGNOSIS — E78.2 MIXED HYPERLIPIDEMIA: ICD-10-CM

## 2018-01-08 DIAGNOSIS — R80.8 OTHER PROTEINURIA: ICD-10-CM

## 2018-01-08 DIAGNOSIS — E78.5 DYSLIPIDEMIA: ICD-10-CM

## 2018-01-08 DIAGNOSIS — N18.30 CKD (CHRONIC KIDNEY DISEASE), STAGE III (HCC): Primary | ICD-10-CM

## 2018-01-08 RX ORDER — IBUPROFEN 400 MG/1
400 TABLET ORAL 2 TIMES DAILY
Qty: 90 TABLET | Refills: 0 | Status: SHIPPED | OUTPATIENT
Start: 2018-01-08 | End: 2018-05-01

## 2018-01-08 NOTE — TELEPHONE ENCOUNTER
Tamera Eric called today. She said that she moved and forgot about her appointment. She just found her orders from 2016 since she moved. I am going to create new orders she asked for me to fax them to Med lab across the road from [place] [address] [facility]. I created the orders and faxed them for the patient.

## 2018-01-19 LAB
BASOPHILS ABSOLUTE: NORMAL /ΜL
BASOPHILS RELATIVE PERCENT: NORMAL %
BUN BLDV-MCNC: 21 MG/DL
CALCIUM SERPL-MCNC: 10 MG/DL
CHLORIDE BLD-SCNC: 100 MMOL/L
CO2: 27 MMOL/L
CREAT SERPL-MCNC: 1.2 MG/DL
EOSINOPHILS ABSOLUTE: NORMAL /ΜL
EOSINOPHILS RELATIVE PERCENT: NORMAL %
GFR CALCULATED: 49
GLUCOSE BLD-MCNC: 81 MG/DL
HCT VFR BLD CALC: NORMAL % (ref 36–46)
HEMOGLOBIN: NORMAL G/DL (ref 12–16)
LYMPHOCYTES ABSOLUTE: NORMAL /ΜL
LYMPHOCYTES RELATIVE PERCENT: NORMAL %
MCH RBC QN AUTO: NORMAL PG
MCHC RBC AUTO-ENTMCNC: NORMAL G/DL
MCV RBC AUTO: NORMAL FL
MONOCYTES ABSOLUTE: NORMAL /ΜL
MONOCYTES RELATIVE PERCENT: NORMAL %
NEUTROPHILS ABSOLUTE: NORMAL /ΜL
NEUTROPHILS RELATIVE PERCENT: NORMAL %
PLATELET # BLD: NORMAL K/ΜL
PMV BLD AUTO: NORMAL FL
POTASSIUM SERPL-SCNC: 4.9 MMOL/L
RBC # BLD: NORMAL 10^6/ΜL
SODIUM BLD-SCNC: 141 MMOL/L
WBC # BLD: NORMAL 10^3/ML

## 2018-01-30 ENCOUNTER — OFFICE VISIT (OUTPATIENT)
Dept: NEPHROLOGY | Age: 48
End: 2018-01-30
Payer: COMMERCIAL

## 2018-01-30 VITALS — SYSTOLIC BLOOD PRESSURE: 100 MMHG | BODY MASS INDEX: 29.73 KG/M2 | WEIGHT: 187 LBS | DIASTOLIC BLOOD PRESSURE: 76 MMHG

## 2018-01-30 DIAGNOSIS — N18.30 CKD (CHRONIC KIDNEY DISEASE), STAGE III (HCC): Primary | ICD-10-CM

## 2018-01-30 DIAGNOSIS — R80.1 PERSISTENT PROTEINURIA: ICD-10-CM

## 2018-01-30 DIAGNOSIS — M32.19 OTHER SYSTEMIC LUPUS ERYTHEMATOSUS WITH OTHER ORGAN INVOLVEMENT (HCC): ICD-10-CM

## 2018-01-30 PROCEDURE — G8484 FLU IMMUNIZE NO ADMIN: HCPCS | Performed by: INTERNAL MEDICINE

## 2018-01-30 PROCEDURE — G8419 CALC BMI OUT NRM PARAM NOF/U: HCPCS | Performed by: INTERNAL MEDICINE

## 2018-01-30 PROCEDURE — 1036F TOBACCO NON-USER: CPT | Performed by: INTERNAL MEDICINE

## 2018-01-30 PROCEDURE — 99213 OFFICE O/P EST LOW 20 MIN: CPT | Performed by: INTERNAL MEDICINE

## 2018-01-30 PROCEDURE — G8427 DOCREV CUR MEDS BY ELIG CLIN: HCPCS | Performed by: INTERNAL MEDICINE

## 2018-02-13 ENCOUNTER — TELEPHONE (OUTPATIENT)
Dept: FAMILY MEDICINE CLINIC | Age: 48
End: 2018-02-13

## 2018-02-13 DIAGNOSIS — M54.40 BACK PAIN OF LUMBAR REGION WITH SCIATICA: ICD-10-CM

## 2018-02-13 RX ORDER — METHYLPREDNISOLONE 4 MG/1
TABLET ORAL
Qty: 1 KIT | Refills: 0 | Status: SHIPPED | OUTPATIENT
Start: 2018-02-13 | End: 2018-02-19

## 2018-02-13 RX ORDER — GABAPENTIN 300 MG/1
300 CAPSULE ORAL 3 TIMES DAILY
Qty: 90 CAPSULE | Refills: 5 | Status: SHIPPED | OUTPATIENT
Start: 2018-02-13 | End: 2018-06-12 | Stop reason: SDUPTHER

## 2018-02-13 NOTE — TELEPHONE ENCOUNTER
Patient called in asking for a nurse or Gopal to call her back. She said she was seen by Dr Jeyson Denton yesterday and he told her she has arthritis in her back that is causing her leg pain, and he is going to have her get some kind of steroid injection in her back next week sometime. She said the medication that Gopal has her on for her leg pain is not helping. She said she can barely walk 4 blocks to the bus and her legs are hurting her, she is out of breath, and her fingers swell. She wanted to know what else Gopal could advise or recommend for her to do. Please call her at [phone], and if she is not at that number, try [phone].

## 2018-02-13 NOTE — TELEPHONE ENCOUNTER
Pt's  son Temi Alvarado answered the phone (not on signed hipaa) he will let pt know to call our office.

## 2018-02-14 NOTE — TELEPHONE ENCOUNTER
Pt states yes to  [facility]ances[facility]  ( [address]  )  Pt  Made appointment to be evaluated  For  SOB w exercise

## 2018-02-15 RX ORDER — METHYLPREDNISOLONE 4 MG/1
TABLET ORAL
Qty: 1 KIT | Refills: 0 | Status: SHIPPED | OUTPATIENT
Start: 2018-02-15 | End: 2018-02-21

## 2018-02-21 ENCOUNTER — OFFICE VISIT (OUTPATIENT)
Dept: FAMILY MEDICINE CLINIC | Age: 48
End: 2018-02-21
Payer: COMMERCIAL

## 2018-02-21 VITALS
RESPIRATION RATE: 14 BRPM | DIASTOLIC BLOOD PRESSURE: 74 MMHG | OXYGEN SATURATION: 96 % | HEIGHT: 67 IN | SYSTOLIC BLOOD PRESSURE: 96 MMHG | TEMPERATURE: 98.2 F | WEIGHT: 184 LBS | HEART RATE: 72 BPM | BODY MASS INDEX: 28.88 KG/M2

## 2018-02-21 DIAGNOSIS — M54.42 CHRONIC BILATERAL LOW BACK PAIN WITH LEFT-SIDED SCIATICA: ICD-10-CM

## 2018-02-21 DIAGNOSIS — R06.02 SOB (SHORTNESS OF BREATH) ON EXERTION: Primary | ICD-10-CM

## 2018-02-21 DIAGNOSIS — G89.29 CHRONIC BILATERAL LOW BACK PAIN WITH LEFT-SIDED SCIATICA: ICD-10-CM

## 2018-02-21 PROCEDURE — G8419 CALC BMI OUT NRM PARAM NOF/U: HCPCS | Performed by: NURSE PRACTITIONER

## 2018-02-21 PROCEDURE — G8484 FLU IMMUNIZE NO ADMIN: HCPCS | Performed by: NURSE PRACTITIONER

## 2018-02-21 PROCEDURE — G8427 DOCREV CUR MEDS BY ELIG CLIN: HCPCS | Performed by: NURSE PRACTITIONER

## 2018-02-21 PROCEDURE — 99213 OFFICE O/P EST LOW 20 MIN: CPT | Performed by: NURSE PRACTITIONER

## 2018-02-21 PROCEDURE — 1036F TOBACCO NON-USER: CPT | Performed by: NURSE PRACTITIONER

## 2018-02-21 NOTE — PROGRESS NOTES
SUBJECTIVE:  Samson Osuna is a 41 y.o. y/o female that presents with Shortness of Breath (Pt is c/o shortness of breath when she walks. She states that this has been going on for years, but has been getting worse recently. ) and Other (Pt is c/o throbbing pain in both her legs. She sataes that this in now in her arms and hands. Dx with arthritis in her back by Dr. Jesika Remy. )  . Pt also has chronic low back pain with radiation down her left leg and it is currently being treated by orthopedics Jesika Remy and she has been referred to pain management     Shortness of Breath  Quality of Symptoms - pt states when she walks 4 blocks she gets sob  Inciting event prior to symptom onset? No  Symptoms have been present for 3 week(s). Symptoms are intermittent  Known Provocation? Yes - physical activity  Known palliation? Yes - rest  Cough? No  Wheezing? No  Chest Pain, Presycnope or Palpitations? No  Fever or Increased sputum production? No  Treatment tried and response - nothing, she did perform a 6 minute walk test today and her oxygen saturation remained at 96-98%. Previous problems with breathing (asthma, COPD)-  No    Review of Systems  Constitutional:   Negative for  chills, fever and changes in weight  Respiratory ROS: negative for - cough, hemoptysis, pleuritic pain, sputum changes or wheezing  Cardiovascular ROS: negative for - chest pain, edema, irregular heartbeat or palpitations    OBJECTIVE:  BP 96/74 (Site: Right Arm, Position: Sitting)   Pulse 72   Temp 98.2 °F (36.8 °C) (Oral)   Resp 14   Ht 5' 6.5\" (1.689 m)   Wt 184 lb (83.5 kg)   SpO2 96% Comment: This was her sat after doing a 6 minute walk test  BMI 29.25 kg/m²   General appearance: alert, well appearing, and in no distress. CVS exam: normal rate, regular rhythm, normal S1, S2, no murmurs, rubs, clicks or gallops. Chest: clear to auscultation, no wheezes, rales or rhonchi, symmetric air entry.   Abdominal exam: soft, nontender, nondistended, no masses or organomegaly. Extremities:  No clubbing, cyanosis or edema of the lower extremities      ASSESSMENT & PLAN  Tyrese Guzman was seen today for shortness of breath and other. Diagnoses and all orders for this visit:    SOB (shortness of breath) on exertion  -     FULL PFT STUDY WITH PRE AND POST; Future  -     [address]97 - MA NONINVASV OXYGEN SATUR;SINGLE    Chronic bilateral low back pain with left-sided sciatica    Continue with orthopedics and continue with pain management referral that was placed by orthopedics. I do not see any reason due to breathing why pt can not continue to work 2 hours a day. Note given for her to continue work if orthopedics feels she needs to be off work that is up to them to follow up with. Return if symptoms worsen or fail to improve.

## 2018-02-21 NOTE — LETTER
[address]  [address] [address]  Phone: [phone]  Fax: [phone]    Baldemar Dunne CNP        February 21, 2018     Patient: Sania Lawson   YOB: 1970   Date of Visit: 2/21/2018       To Whom It May Concern: It is my medical opinion that Franck Velez may return to full duty immediately with no restrictions. If you have any questions or concerns, please don't hesitate to call.     Sincerely,        Baldemar Dunne CNP

## 2018-02-22 ENCOUNTER — TELEPHONE (OUTPATIENT)
Dept: FAMILY MEDICINE CLINIC | Age: 48
End: 2018-02-22

## 2018-03-12 ENCOUNTER — TELEPHONE (OUTPATIENT)
Dept: FAMILY MEDICINE CLINIC | Age: 48
End: 2018-03-12

## 2018-03-12 NOTE — TELEPHONE ENCOUNTER
Pt was contacted to find out when she had her testing completed & she stated she had it done late in the afternoon on Friday. There are no results in the pt's media tab. The pt was told we've not received the results yet & will call her as soon as we get them from [facility]. Pt expressed understanding of the info given.

## 2018-03-14 ENCOUNTER — HOSPITAL ENCOUNTER (EMERGENCY)
Age: 48
Discharge: HOME | End: 2018-03-14
Payer: COMMERCIAL

## 2018-03-14 VITALS
SYSTOLIC BLOOD PRESSURE: 122 MMHG | WEIGHT: 178 LBS | TEMPERATURE: 98 F | HEART RATE: 62 BPM | DIASTOLIC BLOOD PRESSURE: 68 MMHG | RESPIRATION RATE: 16 BRPM | HEIGHT: 66 IN | BODY MASS INDEX: 28.61 KG/M2 | OXYGEN SATURATION: 99 %

## 2018-03-14 DIAGNOSIS — G89.29 ACUTE EXACERBATION OF CHRONIC LOW BACK PAIN: Primary | ICD-10-CM

## 2018-03-14 DIAGNOSIS — M54.50 ACUTE EXACERBATION OF CHRONIC LOW BACK PAIN: Primary | ICD-10-CM

## 2018-03-14 PROCEDURE — 96372 THER/PROPH/DIAG INJ SC/IM: CPT

## 2018-03-14 PROCEDURE — 6360000002 HC RX W HCPCS: Performed by: NURSE PRACTITIONER

## 2018-03-14 PROCEDURE — 99282 EMERGENCY DEPT VISIT SF MDM: CPT

## 2018-03-14 RX ORDER — ORPHENADRINE CITRATE 30 MG/ML
60 INJECTION INTRAMUSCULAR; INTRAVENOUS ONCE
Status: COMPLETED | OUTPATIENT
Start: 2018-03-14 | End: 2018-03-14

## 2018-03-14 RX ORDER — METHYLPREDNISOLONE 4 MG/1
TABLET ORAL
Qty: 1 KIT | Refills: 0 | Status: SHIPPED | OUTPATIENT
Start: 2018-03-14 | End: 2018-03-20

## 2018-03-14 RX ORDER — KETOROLAC TROMETHAMINE 30 MG/ML
30 INJECTION, SOLUTION INTRAMUSCULAR; INTRAVENOUS ONCE
Status: COMPLETED | OUTPATIENT
Start: 2018-03-14 | End: 2018-03-14

## 2018-03-14 RX ADMIN — KETOROLAC TROMETHAMINE 30 MG: 30 INJECTION, SOLUTION INTRAMUSCULAR at 15:34

## 2018-03-14 RX ADMIN — ORPHENADRINE CITRATE 60 MG: 30 INJECTION INTRAMUSCULAR; INTRAVENOUS at 15:34

## 2018-03-14 ASSESSMENT — PAIN DESCRIPTION - FREQUENCY: FREQUENCY: INTERMITTENT

## 2018-03-14 ASSESSMENT — ENCOUNTER SYMPTOMS
EYE DISCHARGE: 0
DIARRHEA: 0
SHORTNESS OF BREATH: 0
EYE PAIN: 0
NAUSEA: 0
COUGH: 0
BACK PAIN: 1
RHINORRHEA: 0
SORE THROAT: 0
VOMITING: 0
ABDOMINAL PAIN: 0
WHEEZING: 0

## 2018-03-14 ASSESSMENT — PAIN DESCRIPTION - PAIN TYPE: TYPE: ACUTE PAIN

## 2018-03-14 ASSESSMENT — PAIN SCALES - GENERAL: PAINLEVEL_OUTOF10: 9

## 2018-03-14 ASSESSMENT — PAIN DESCRIPTION - ORIENTATION: ORIENTATION: LOWER

## 2018-03-14 ASSESSMENT — PAIN DESCRIPTION - LOCATION: LOCATION: BACK

## 2018-03-14 NOTE — ED PROVIDER NOTES
syncope, weakness, light-headedness and headaches. Hematological: Negative for adenopathy. Psychiatric/Behavioral: Negative for confusion, dysphoric mood and suicidal ideas. The patient is not nervous/anxious. PAST MEDICAL HISTORY    has a past medical history of Anemia; CKD (chronic kidney disease), stage III; Dyslipidemia; Hematuria; Hyperlipidemia; Hypertension; Hyperuricemia; Hypothyroidism; Kidney disease; Lupus; Lupus nephritis ([address] Proteinuria; Seizures ([address] and Systemic lupus erythematosus ([address] SURGICAL HISTORY      has a past surgical history that includes knee surgery. CURRENT MEDICATIONS       Previous Medications    ACETAMINOPHEN (APAP EXTRA STRENGTH) 500 MG TABLET    Take 2 tablets by mouth every 6 hours as needed for Pain    CALCIUM CARBONATE-VITAMIN D (OYSTER SHELL CALCIUM/D) 500-200 MG-UNIT TABS    take 1 tablet once daily    FERROUS SULFATE 325 (65 FE) MG TABLET    Take 1 tablet by mouth daily    GABAPENTIN (NEURONTIN) 300 MG CAPSULE    Take 1 capsule by mouth 3 times daily for 28 days. HYDROXYZINE (ATARAX) 25 MG TABLET    1 po tid Do not take while driving    IBUPROFEN (ADVIL;MOTRIN) 400 MG TABLET    Take 1 tablet by mouth 2 times daily    LEVOTHYROXINE (SYNTHROID) 100 MCG TABLET    take 1 tablet by mouth once daily    MISC. DEVICES MISC    1 each by Does not apply route once as needed (pain) One exercise ball M54.4    SIMVASTATIN (ZOCOR) 40 MG TABLET    take 1 tablet by mouth at bedtime       ALLERGIES     is allergic to codeine. FAMILY HISTORY     indicated that her mother is alive. She indicated that her father is . family history includes Diabetes in her father and mother; Heart Disease in her mother; Thyroid Disease in her mother. SOCIAL HISTORY      reports that she quit smoking about 4 years ago. Her smoking use included Cigarettes. She has never used smokeless tobacco. She reports that she does not drink alcohol or use drugs.     PHYSICAL EXAM INITIAL VITALS:  height is 5' 6\" (1.676 m) and weight is 178 lb (80.7 kg). Her oral temperature is 98 °F (36.7 °C). Her blood pressure is 122/68 and her pulse is 62. Her oxygen saturation is 99%. Physical Exam   Constitutional: She is oriented to person, place, and time. She appears well-developed and well-nourished. HENT:   Head: Normocephalic and atraumatic. Right Ear: External ear normal.   Left Ear: External ear normal.   Eyes: Conjunctivae are normal. Right eye exhibits no discharge. Left eye exhibits no discharge. No scleral icterus. Neck: Normal range of motion. Neck supple. No JVD present. Cardiovascular: Normal rate and regular rhythm. Pulmonary/Chest: Effort normal. No stridor. No respiratory distress. Abdominal: Soft. She exhibits no distension. Musculoskeletal: Normal range of motion. She exhibits no edema. Lumbar back: She exhibits tenderness (paraspinal ) and pain. She exhibits no swelling, no edema, no deformity and no spasm. No decrease sensation in either lower extremity. Neurological: She is alert and oriented to person, place, and time. She exhibits normal muscle tone. GCS eye subscore is 4. GCS verbal subscore is 5. GCS motor subscore is 6. Skin: Skin is warm and dry. She is not diaphoretic. No erythema. Psychiatric: She has a normal mood and affect. Her behavior is normal.   Nursing note and vitals reviewed. DIFFERENTIAL DIAGNOSIS:   Arthritis, chronic back pain, do not suspect spinal hematoma or abscess    DIAGNOSTIC RESULTS     EKG: All EKG's are interpreted by the Emergency Department Physician who either signs or Co-signs this chart in the absence of a cardiologist.  none    RADIOLOGY: non-plain film images(s) such as CT, Ultrasound and MRI are read by the radiologist.  Plain radiographic images are visualized and preliminarily interpreted by the emergency physician unless otherwise stated below.   No orders to display       LABS:   Labs Reviewed - [place], [address] [address], 3/14/18 3:39 PM    Provider:  I personally performed the services described in the documentation, reviewed and edited the documentation which was dictated to the scribe in my presence, and it accurately records my words and actions.     Jalen Brown, APRN 03/14/18 3:39 PM    [address] [place] [address], APRN  03/14/18 [id number]

## 2018-03-23 ENCOUNTER — TELEPHONE (OUTPATIENT)
Dept: FAMILY MEDICINE CLINIC | Age: 48
End: 2018-03-23

## 2018-03-23 NOTE — TELEPHONE ENCOUNTER
Spoke with pt to schedule an appt to be evaluated. Pt states she is not able to come in today to be seen. Pt states she will go to urgent care to evaluated later this evening.

## 2018-03-23 NOTE — TELEPHONE ENCOUNTER
Patient has diarrhea, runny nose and cough. Symptoms started last night. Patient is asking if she can get something or what to take over the counter.     Pharmacy: [facility]

## 2018-03-29 ENCOUNTER — TELEPHONE (OUTPATIENT)
Dept: FAMILY MEDICINE CLINIC | Age: 48
End: 2018-03-29

## 2018-03-29 DIAGNOSIS — M54.40 BACK PAIN OF LUMBAR REGION WITH SCIATICA: ICD-10-CM

## 2018-03-29 RX ORDER — ACETAMINOPHEN 500 MG
1000 TABLET ORAL EVERY 6 HOURS PRN
Qty: 120 TABLET | Refills: 0 | Status: SHIPPED | OUTPATIENT
Start: 2018-03-29 | End: 2018-04-19 | Stop reason: SDUPTHER

## 2018-04-03 ENCOUNTER — OFFICE VISIT (OUTPATIENT)
Dept: PHYSICAL MEDICINE AND REHAB | Age: 48
End: 2018-04-03
Payer: COMMERCIAL

## 2018-04-03 ENCOUNTER — TELEPHONE (OUTPATIENT)
Dept: PHYSICAL MEDICINE AND REHAB | Age: 48
End: 2018-04-03

## 2018-04-03 VITALS
WEIGHT: 187.4 LBS | DIASTOLIC BLOOD PRESSURE: 77 MMHG | BODY MASS INDEX: 30.12 KG/M2 | HEART RATE: 64 BPM | HEIGHT: 66 IN | SYSTOLIC BLOOD PRESSURE: 128 MMHG

## 2018-04-03 DIAGNOSIS — M46.1 SI (SACROILIAC) JOINT INFLAMMATION (HCC): ICD-10-CM

## 2018-04-03 DIAGNOSIS — M47.816 SPONDYLOSIS OF LUMBAR REGION WITHOUT MYELOPATHY OR RADICULOPATHY: Primary | ICD-10-CM

## 2018-04-03 DIAGNOSIS — G89.4 CHRONIC PAIN SYNDROME: ICD-10-CM

## 2018-04-03 PROCEDURE — G8427 DOCREV CUR MEDS BY ELIG CLIN: HCPCS | Performed by: NURSE PRACTITIONER

## 2018-04-03 PROCEDURE — 99244 OFF/OP CNSLTJ NEW/EST MOD 40: CPT | Performed by: NURSE PRACTITIONER

## 2018-04-03 PROCEDURE — G8417 CALC BMI ABV UP PARAM F/U: HCPCS | Performed by: NURSE PRACTITIONER

## 2018-04-03 RX ORDER — TIZANIDINE 2 MG/1
2 TABLET ORAL 2 TIMES DAILY PRN
Qty: 60 TABLET | Refills: 0 | Status: SHIPPED | OUTPATIENT
Start: 2018-04-03 | End: 2018-04-23 | Stop reason: SDUPTHER

## 2018-04-03 RX ORDER — HYDROCODONE BITARTRATE AND ACETAMINOPHEN 5; 325 MG/1; MG/1
1 TABLET ORAL 2 TIMES DAILY PRN
Qty: 30 TABLET | Refills: 0 | Status: SHIPPED | OUTPATIENT
Start: 2018-04-03 | End: 2018-04-16 | Stop reason: SDUPTHER

## 2018-04-03 ASSESSMENT — ENCOUNTER SYMPTOMS
SHORTNESS OF BREATH: 0
ABDOMINAL PAIN: 0
PHOTOPHOBIA: 0
DIARRHEA: 0
CHEST TIGHTNESS: 0
SORE THROAT: 0
VOMITING: 0
CONSTIPATION: 0
COLOR CHANGE: 0
EYE PAIN: 0
BACK PAIN: 1
WHEEZING: 0
NAUSEA: 0
COUGH: 0
SINUS PRESSURE: 0
RHINORRHEA: 0

## 2018-04-04 RX ORDER — TRAMADOL HYDROCHLORIDE 50 MG/1
50 TABLET ORAL EVERY 8 HOURS PRN
Qty: 42 TABLET | Refills: 0 | Status: SHIPPED | OUTPATIENT
Start: 2018-04-04 | End: 2018-04-18

## 2018-04-11 ENCOUNTER — TELEPHONE (OUTPATIENT)
Dept: PHYSICAL MEDICINE AND REHAB | Age: 48
End: 2018-04-11

## 2018-04-16 ENCOUNTER — TELEPHONE (OUTPATIENT)
Dept: PHYSICAL MEDICINE AND REHAB | Age: 48
End: 2018-04-16

## 2018-04-16 ENCOUNTER — OFFICE VISIT (OUTPATIENT)
Dept: FAMILY MEDICINE CLINIC | Age: 48
End: 2018-04-16
Payer: COMMERCIAL

## 2018-04-16 VITALS
BODY MASS INDEX: 30.05 KG/M2 | WEIGHT: 187 LBS | HEART RATE: 72 BPM | RESPIRATION RATE: 16 BRPM | TEMPERATURE: 98.6 F | DIASTOLIC BLOOD PRESSURE: 72 MMHG | HEIGHT: 66 IN | SYSTOLIC BLOOD PRESSURE: 142 MMHG

## 2018-04-16 DIAGNOSIS — M47.816 SPONDYLOSIS OF LUMBAR REGION WITHOUT MYELOPATHY OR RADICULOPATHY: ICD-10-CM

## 2018-04-16 DIAGNOSIS — M25.612 DECREASED ROM OF LEFT SHOULDER: ICD-10-CM

## 2018-04-16 DIAGNOSIS — M46.1 SI (SACROILIAC) JOINT INFLAMMATION (HCC): ICD-10-CM

## 2018-04-16 DIAGNOSIS — G89.4 CHRONIC PAIN SYNDROME: ICD-10-CM

## 2018-04-16 DIAGNOSIS — M79.602 LEFT ARM PAIN: Primary | ICD-10-CM

## 2018-04-16 PROCEDURE — 99213 OFFICE O/P EST LOW 20 MIN: CPT | Performed by: NURSE PRACTITIONER

## 2018-04-16 PROCEDURE — 1036F TOBACCO NON-USER: CPT | Performed by: NURSE PRACTITIONER

## 2018-04-16 PROCEDURE — G8427 DOCREV CUR MEDS BY ELIG CLIN: HCPCS | Performed by: NURSE PRACTITIONER

## 2018-04-16 PROCEDURE — G8417 CALC BMI ABV UP PARAM F/U: HCPCS | Performed by: NURSE PRACTITIONER

## 2018-04-16 RX ORDER — PREDNISONE 20 MG/1
TABLET ORAL
Qty: 18 TABLET | Refills: 0 | Status: SHIPPED | OUTPATIENT
Start: 2018-04-16 | End: 2018-05-01 | Stop reason: ALTCHOICE

## 2018-04-16 RX ORDER — HYDROCODONE BITARTRATE AND ACETAMINOPHEN 5; 325 MG/1; MG/1
1 TABLET ORAL 2 TIMES DAILY PRN
Qty: 60 TABLET | Refills: 0 | Status: SHIPPED | OUTPATIENT
Start: 2018-04-17 | End: 2018-04-17 | Stop reason: SDUPTHER

## 2018-04-17 ENCOUNTER — TELEPHONE (OUTPATIENT)
Dept: PHYSICAL MEDICINE AND REHAB | Age: 48
End: 2018-04-17

## 2018-04-17 DIAGNOSIS — G89.4 CHRONIC PAIN SYNDROME: ICD-10-CM

## 2018-04-17 DIAGNOSIS — M47.816 SPONDYLOSIS OF LUMBAR REGION WITHOUT MYELOPATHY OR RADICULOPATHY: ICD-10-CM

## 2018-04-17 DIAGNOSIS — M46.1 SI (SACROILIAC) JOINT INFLAMMATION (HCC): ICD-10-CM

## 2018-04-17 RX ORDER — HYDROCODONE BITARTRATE AND ACETAMINOPHEN 5; 325 MG/1; MG/1
1 TABLET ORAL 2 TIMES DAILY PRN
Qty: 60 TABLET | Refills: 0 | Status: SHIPPED | OUTPATIENT
Start: 2018-04-17 | End: 2018-05-15 | Stop reason: SDUPTHER

## 2018-04-19 DIAGNOSIS — M54.40 BACK PAIN OF LUMBAR REGION WITH SCIATICA: ICD-10-CM

## 2018-04-20 RX ORDER — ACETAMINOPHEN 500 MG
TABLET ORAL
Qty: 120 TABLET | Refills: 0 | Status: SHIPPED | OUTPATIENT
Start: 2018-04-20 | End: 2018-06-12 | Stop reason: SDUPTHER

## 2018-04-23 DIAGNOSIS — G89.4 CHRONIC PAIN SYNDROME: ICD-10-CM

## 2018-04-23 DIAGNOSIS — M46.1 SI (SACROILIAC) JOINT INFLAMMATION (HCC): ICD-10-CM

## 2018-04-23 DIAGNOSIS — M47.816 SPONDYLOSIS OF LUMBAR REGION WITHOUT MYELOPATHY OR RADICULOPATHY: ICD-10-CM

## 2018-04-24 RX ORDER — TIZANIDINE 2 MG/1
2 TABLET ORAL 2 TIMES DAILY PRN
Qty: 60 TABLET | Refills: 0 | Status: SHIPPED | OUTPATIENT
Start: 2018-04-24 | End: 2018-05-01 | Stop reason: ALTCHOICE

## 2018-04-26 ENCOUNTER — TELEPHONE (OUTPATIENT)
Dept: FAMILY MEDICINE CLINIC | Age: 48
End: 2018-04-26

## 2018-05-07 ENCOUNTER — HOSPITAL ENCOUNTER (OUTPATIENT)
Age: 48
Setting detail: OUTPATIENT SURGERY
Discharge: HOME | End: 2018-05-07
Attending: PAIN MEDICINE | Admitting: PAIN MEDICINE
Payer: COMMERCIAL

## 2018-05-07 ENCOUNTER — APPOINTMENT (OUTPATIENT)
Dept: GENERAL RADIOLOGY | Age: 48
End: 2018-05-07
Attending: PAIN MEDICINE
Payer: COMMERCIAL

## 2018-05-07 ENCOUNTER — ANESTHESIA (OUTPATIENT)
Dept: OPERATING ROOM | Age: 48
End: 2018-05-07
Payer: COMMERCIAL

## 2018-05-07 ENCOUNTER — ANESTHESIA EVENT (OUTPATIENT)
Dept: OPERATING ROOM | Age: 48
End: 2018-05-07
Payer: COMMERCIAL

## 2018-05-07 VITALS
SYSTOLIC BLOOD PRESSURE: 108 MMHG | DIASTOLIC BLOOD PRESSURE: 72 MMHG | HEART RATE: 71 BPM | BODY MASS INDEX: 30.22 KG/M2 | OXYGEN SATURATION: 96 % | TEMPERATURE: 97.5 F | HEIGHT: 66 IN | WEIGHT: 188 LBS | RESPIRATION RATE: 16 BRPM

## 2018-05-07 VITALS
RESPIRATION RATE: 13 BRPM | SYSTOLIC BLOOD PRESSURE: 109 MMHG | DIASTOLIC BLOOD PRESSURE: 67 MMHG | OXYGEN SATURATION: 98 %

## 2018-05-07 PROCEDURE — 3209999900 FLUORO FOR SURGICAL PROCEDURES

## 2018-05-07 PROCEDURE — 6360000002 HC RX W HCPCS: Performed by: PAIN MEDICINE

## 2018-05-07 PROCEDURE — 3700000000 HC ANESTHESIA ATTENDED CARE: Performed by: PAIN MEDICINE

## 2018-05-07 PROCEDURE — 3600000056 HC PAIN LEVEL 4 BASE: Performed by: PAIN MEDICINE

## 2018-05-07 PROCEDURE — 64494 INJ PARAVERT F JNT L/S 2 LEV: CPT | Performed by: PAIN MEDICINE

## 2018-05-07 PROCEDURE — 7100000011 HC PHASE II RECOVERY - ADDTL 15 MIN: Performed by: PAIN MEDICINE

## 2018-05-07 PROCEDURE — 7100000010 HC PHASE II RECOVERY - FIRST 15 MIN: Performed by: PAIN MEDICINE

## 2018-05-07 PROCEDURE — 2500000003 HC RX 250 WO HCPCS: Performed by: PAIN MEDICINE

## 2018-05-07 PROCEDURE — 64493 INJ PARAVERT F JNT L/S 1 LEV: CPT | Performed by: PAIN MEDICINE

## 2018-05-07 RX ORDER — LIDOCAINE HYDROCHLORIDE 10 MG/ML
INJECTION, SOLUTION INFILTRATION; PERINEURAL PRN
Status: DISCONTINUED | OUTPATIENT
Start: 2018-05-07 | End: 2018-05-07 | Stop reason: HOSPADM

## 2018-05-07 RX ORDER — BETAMETHASONE SODIUM PHOSPHATE AND BETAMETHASONE ACETATE 3; 3 MG/ML; MG/ML
INJECTION, SUSPENSION INTRA-ARTICULAR; INTRALESIONAL; INTRAMUSCULAR; SOFT TISSUE PRN
Status: DISCONTINUED | OUTPATIENT
Start: 2018-05-07 | End: 2018-05-07 | Stop reason: HOSPADM

## 2018-05-07 RX ORDER — BUPIVACAINE HYDROCHLORIDE 2.5 MG/ML
INJECTION, SOLUTION EPIDURAL; INFILTRATION; INTRACAUDAL PRN
Status: DISCONTINUED | OUTPATIENT
Start: 2018-05-07 | End: 2018-05-07 | Stop reason: HOSPADM

## 2018-05-07 ASSESSMENT — PULMONARY FUNCTION TESTS
PIF_VALUE: 0

## 2018-05-07 ASSESSMENT — PAIN SCALES - GENERAL: PAINLEVEL_OUTOF10: 0

## 2018-05-07 ASSESSMENT — PAIN - FUNCTIONAL ASSESSMENT: PAIN_FUNCTIONAL_ASSESSMENT: 0-10

## 2018-05-07 ASSESSMENT — PAIN DESCRIPTION - DESCRIPTORS: DESCRIPTORS: ACHING;THROBBING

## 2018-05-08 DIAGNOSIS — G89.4 CHRONIC PAIN SYNDROME: Primary | ICD-10-CM

## 2018-05-08 RX ORDER — TIZANIDINE 2 MG/1
2 TABLET ORAL 2 TIMES DAILY PRN
Qty: 60 TABLET | Refills: 0 | Status: SHIPPED | OUTPATIENT
Start: 2018-05-08 | End: 2018-09-11 | Stop reason: DRUGHIGH

## 2018-05-15 DIAGNOSIS — G89.4 CHRONIC PAIN SYNDROME: ICD-10-CM

## 2018-05-15 DIAGNOSIS — M47.816 SPONDYLOSIS OF LUMBAR REGION WITHOUT MYELOPATHY OR RADICULOPATHY: ICD-10-CM

## 2018-05-15 DIAGNOSIS — M46.1 SI (SACROILIAC) JOINT INFLAMMATION (HCC): ICD-10-CM

## 2018-05-15 RX ORDER — HYDROCODONE BITARTRATE AND ACETAMINOPHEN 5; 325 MG/1; MG/1
1 TABLET ORAL 2 TIMES DAILY PRN
Qty: 60 TABLET | Refills: 0 | Status: SHIPPED | OUTPATIENT
Start: 2018-05-17 | End: 2018-06-12 | Stop reason: SDUPTHER

## 2018-05-15 RX ORDER — HYDROCODONE BITARTRATE AND ACETAMINOPHEN 5; 325 MG/1; MG/1
1 TABLET ORAL 2 TIMES DAILY PRN
Qty: 60 TABLET | Refills: 0 | Status: SHIPPED | OUTPATIENT
Start: 2018-05-17 | End: 2018-05-15 | Stop reason: SDUPTHER

## 2018-05-25 ENCOUNTER — HOSPITAL ENCOUNTER (EMERGENCY)
Age: 48
Discharge: HOME | End: 2018-05-26
Attending: EMERGENCY MEDICINE
Payer: COMMERCIAL

## 2018-05-25 DIAGNOSIS — N28.9 RENAL INSUFFICIENCY: Primary | ICD-10-CM

## 2018-05-25 DIAGNOSIS — N18.30 STAGE 3 CHRONIC KIDNEY DISEASE (HCC): ICD-10-CM

## 2018-05-25 LAB
ANION GAP SERPL CALCULATED.3IONS-SCNC: 15 MEQ/L (ref 8–16)
ANISOCYTOSIS: ABNORMAL
BASOPHILS # BLD: 0.4 %
BASOPHILS ABSOLUTE: 0 THOU/MM3 (ref 0–0.1)
BUN BLDV-MCNC: 25 MG/DL (ref 7–22)
CALCIUM SERPL-MCNC: 9.7 MG/DL (ref 8.5–10.5)
CHLORIDE BLD-SCNC: 103 MEQ/L (ref 98–111)
CO2: 22 MEQ/L (ref 23–33)
CREAT SERPL-MCNC: 2.1 MG/DL (ref 0.4–1.2)
EKG ATRIAL RATE: 69 BPM
EKG P AXIS: 53 DEGREES
EKG P-R INTERVAL: 160 MS
EKG Q-T INTERVAL: 410 MS
EKG QRS DURATION: 84 MS
EKG QTC CALCULATION (BAZETT): 439 MS
EKG R AXIS: 5 DEGREES
EKG T AXIS: 38 DEGREES
EKG VENTRICULAR RATE: 69 BPM
EOSINOPHIL # BLD: 0.4 %
EOSINOPHILS ABSOLUTE: 0 THOU/MM3 (ref 0–0.4)
GFR SERPL CREATININE-BSD FRML MDRD: 25 ML/MIN/1.73M2
GLUCOSE BLD-MCNC: 128 MG/DL (ref 70–108)
HCT VFR BLD CALC: 36.9 % (ref 37–47)
HEMOGLOBIN: 12 GM/DL (ref 12–16)
LYMPHOCYTES # BLD: 15.8 %
LYMPHOCYTES ABSOLUTE: 1.7 THOU/MM3 (ref 1–4.8)
MCH RBC QN AUTO: 28.1 PG (ref 27–31)
MCHC RBC AUTO-ENTMCNC: 32.6 GM/DL (ref 33–37)
MCV RBC AUTO: 86.2 FL (ref 81–99)
MONOCYTES # BLD: 6.8 %
MONOCYTES ABSOLUTE: 0.7 THOU/MM3 (ref 0.4–1.3)
NUCLEATED RED BLOOD CELLS: 0 /100 WBC
OSMOLALITY CALCULATION: 285.4 MOSMOL/KG (ref 275–300)
PDW BLD-RTO: 14.6 % (ref 11.5–14.5)
PLATELET # BLD: 181 THOU/MM3 (ref 130–400)
PMV BLD AUTO: 10 FL (ref 7.4–10.4)
POTASSIUM SERPL-SCNC: 3.9 MEQ/L (ref 3.5–5.2)
RBC # BLD: 4.28 MILL/MM3 (ref 4.2–5.4)
SEG NEUTROPHILS: 76.6 %
SEGMENTED NEUTROPHILS ABSOLUTE COUNT: 8.3 THOU/MM3 (ref 1.8–7.7)
SODIUM BLD-SCNC: 140 MEQ/L (ref 135–145)
TROPONIN T: < 0.01 NG/ML
WBC # BLD: 10.9 THOU/MM3 (ref 4.8–10.8)

## 2018-05-25 PROCEDURE — 99284 EMERGENCY DEPT VISIT MOD MDM: CPT

## 2018-05-25 PROCEDURE — 93005 ELECTROCARDIOGRAM TRACING: CPT | Performed by: EMERGENCY MEDICINE

## 2018-05-25 PROCEDURE — 80048 BASIC METABOLIC PNL TOTAL CA: CPT

## 2018-05-25 PROCEDURE — 2580000003 HC RX 258: Performed by: EMERGENCY MEDICINE

## 2018-05-25 PROCEDURE — 84484 ASSAY OF TROPONIN QUANT: CPT

## 2018-05-25 PROCEDURE — 36415 COLL VENOUS BLD VENIPUNCTURE: CPT

## 2018-05-25 PROCEDURE — 85025 COMPLETE CBC W/AUTO DIFF WBC: CPT

## 2018-05-25 RX ORDER — ONDANSETRON 2 MG/ML
4 INJECTION INTRAMUSCULAR; INTRAVENOUS ONCE
Status: COMPLETED | OUTPATIENT
Start: 2018-05-26 | End: 2018-05-26

## 2018-05-25 RX ORDER — 0.9 % SODIUM CHLORIDE 0.9 %
1000 INTRAVENOUS SOLUTION INTRAVENOUS ONCE
Status: COMPLETED | OUTPATIENT
Start: 2018-05-25 | End: 2018-05-26

## 2018-05-25 RX ADMIN — SODIUM CHLORIDE 1000 ML: 9 INJECTION, SOLUTION INTRAVENOUS at 22:50

## 2018-05-25 ASSESSMENT — PAIN DESCRIPTION - LOCATION
LOCATION: BACK
LOCATION: BACK

## 2018-05-25 ASSESSMENT — PAIN SCALES - GENERAL
PAINLEVEL_OUTOF10: 7
PAINLEVEL_OUTOF10: 8

## 2018-05-25 ASSESSMENT — PAIN DESCRIPTION - ORIENTATION
ORIENTATION: LOWER
ORIENTATION: LOWER

## 2018-05-25 ASSESSMENT — PAIN DESCRIPTION - DESCRIPTORS
DESCRIPTORS: ACHING
DESCRIPTORS: ACHING

## 2018-05-25 ASSESSMENT — PAIN DESCRIPTION - ONSET
ONSET: ON-GOING
ONSET: ON-GOING

## 2018-05-25 ASSESSMENT — PAIN DESCRIPTION - FREQUENCY
FREQUENCY: CONTINUOUS
FREQUENCY: CONTINUOUS

## 2018-05-25 ASSESSMENT — PAIN DESCRIPTION - PROGRESSION
CLINICAL_PROGRESSION: NOT CHANGED
CLINICAL_PROGRESSION: NOT CHANGED

## 2018-05-25 ASSESSMENT — PAIN DESCRIPTION - PAIN TYPE
TYPE: CHRONIC PAIN
TYPE: CHRONIC PAIN

## 2018-05-26 VITALS
OXYGEN SATURATION: 98 % | HEIGHT: 66 IN | BODY MASS INDEX: 27.97 KG/M2 | DIASTOLIC BLOOD PRESSURE: 78 MMHG | SYSTOLIC BLOOD PRESSURE: 121 MMHG | TEMPERATURE: 97.9 F | RESPIRATION RATE: 14 BRPM | WEIGHT: 174 LBS | HEART RATE: 69 BPM

## 2018-05-26 PROCEDURE — 6360000002 HC RX W HCPCS: Performed by: EMERGENCY MEDICINE

## 2018-05-26 PROCEDURE — 96374 THER/PROPH/DIAG INJ IV PUSH: CPT

## 2018-05-26 PROCEDURE — 93010 ELECTROCARDIOGRAM REPORT: CPT | Performed by: INTERNAL MEDICINE

## 2018-05-26 RX ORDER — ONDANSETRON 4 MG/1
4 TABLET, ORALLY DISINTEGRATING ORAL EVERY 8 HOURS PRN
Qty: 20 TABLET | Refills: 0 | Status: SHIPPED | OUTPATIENT
Start: 2018-05-26 | End: 2018-07-27 | Stop reason: SDUPTHER

## 2018-05-26 RX ADMIN — ONDANSETRON 4 MG: 2 INJECTION INTRAMUSCULAR; INTRAVENOUS at 00:28

## 2018-05-26 ASSESSMENT — ENCOUNTER SYMPTOMS
CONSTIPATION: 0
BLOOD IN STOOL: 0
BACK PAIN: 1
CHEST TIGHTNESS: 0
SHORTNESS OF BREATH: 0
SORE THROAT: 0
RHINORRHEA: 0
WHEEZING: 0
NAUSEA: 1
VOMITING: 1
DIARRHEA: 0
ABDOMINAL PAIN: 0
COUGH: 0

## 2018-06-07 ENCOUNTER — TELEPHONE (OUTPATIENT)
Dept: FAMILY MEDICINE CLINIC | Age: 48
End: 2018-06-07

## 2018-06-12 ENCOUNTER — OFFICE VISIT (OUTPATIENT)
Dept: PHYSICAL MEDICINE AND REHAB | Age: 48
End: 2018-06-12
Payer: COMMERCIAL

## 2018-06-12 ENCOUNTER — OFFICE VISIT (OUTPATIENT)
Dept: FAMILY MEDICINE CLINIC | Age: 48
End: 2018-06-12
Payer: COMMERCIAL

## 2018-06-12 VITALS
HEART RATE: 73 BPM | DIASTOLIC BLOOD PRESSURE: 62 MMHG | TEMPERATURE: 98.3 F | HEIGHT: 66 IN | WEIGHT: 187.8 LBS | BODY MASS INDEX: 30.18 KG/M2 | RESPIRATION RATE: 14 BRPM | SYSTOLIC BLOOD PRESSURE: 112 MMHG

## 2018-06-12 VITALS
WEIGHT: 173.94 LBS | HEIGHT: 66 IN | HEART RATE: 66 BPM | SYSTOLIC BLOOD PRESSURE: 108 MMHG | DIASTOLIC BLOOD PRESSURE: 70 MMHG | BODY MASS INDEX: 27.95 KG/M2

## 2018-06-12 DIAGNOSIS — M48.061 SPINAL STENOSIS OF LUMBAR REGION WITHOUT NEUROGENIC CLAUDICATION: ICD-10-CM

## 2018-06-12 DIAGNOSIS — R79.9 ELEVATED BUN: Primary | ICD-10-CM

## 2018-06-12 DIAGNOSIS — M46.1 BILATERAL SACROILIITIS (HCC): ICD-10-CM

## 2018-06-12 DIAGNOSIS — M46.1 SI (SACROILIAC) JOINT INFLAMMATION (HCC): ICD-10-CM

## 2018-06-12 DIAGNOSIS — M54.16 LUMBAR RADICULITIS: Primary | ICD-10-CM

## 2018-06-12 DIAGNOSIS — M54.12 CERVICAL RADICULOPATHY: ICD-10-CM

## 2018-06-12 DIAGNOSIS — G89.4 CHRONIC PAIN SYNDROME: ICD-10-CM

## 2018-06-12 DIAGNOSIS — R79.89 ELEVATED SERUM CREATININE: ICD-10-CM

## 2018-06-12 DIAGNOSIS — D64.9 ANEMIA, UNSPECIFIED TYPE: ICD-10-CM

## 2018-06-12 DIAGNOSIS — M54.40 BACK PAIN OF LUMBAR REGION WITH SCIATICA: ICD-10-CM

## 2018-06-12 DIAGNOSIS — M47.816 SPONDYLOSIS OF LUMBAR REGION WITHOUT MYELOPATHY OR RADICULOPATHY: ICD-10-CM

## 2018-06-12 DIAGNOSIS — E78.00 PURE HYPERCHOLESTEROLEMIA: ICD-10-CM

## 2018-06-12 DIAGNOSIS — E03.9 ACQUIRED HYPOTHYROIDISM: ICD-10-CM

## 2018-06-12 PROCEDURE — G8417 CALC BMI ABV UP PARAM F/U: HCPCS | Performed by: NURSE PRACTITIONER

## 2018-06-12 PROCEDURE — 4004F PT TOBACCO SCREEN RCVD TLK: CPT | Performed by: NURSE PRACTITIONER

## 2018-06-12 PROCEDURE — G8427 DOCREV CUR MEDS BY ELIG CLIN: HCPCS | Performed by: NURSE PRACTITIONER

## 2018-06-12 PROCEDURE — 99213 OFFICE O/P EST LOW 20 MIN: CPT | Performed by: NURSE PRACTITIONER

## 2018-06-12 RX ORDER — SIMVASTATIN 40 MG
TABLET ORAL
Qty: 90 TABLET | Refills: 1 | Status: SHIPPED | OUTPATIENT
Start: 2018-06-12 | End: 2018-09-12 | Stop reason: SDUPTHER

## 2018-06-12 RX ORDER — HYDROXYZINE HYDROCHLORIDE 25 MG/1
TABLET, FILM COATED ORAL
Qty: 30 TABLET | Refills: 0 | Status: CANCELLED | OUTPATIENT
Start: 2018-06-12

## 2018-06-12 RX ORDER — HYDROCODONE BITARTRATE AND ACETAMINOPHEN 5; 325 MG/1; MG/1
1 TABLET ORAL 2 TIMES DAILY PRN
Qty: 60 TABLET | Refills: 0 | Status: SHIPPED | OUTPATIENT
Start: 2018-06-16 | End: 2018-07-16 | Stop reason: SDUPTHER

## 2018-06-12 RX ORDER — LEVOTHYROXINE SODIUM 0.1 MG/1
TABLET ORAL
Qty: 90 TABLET | Refills: 1 | Status: SHIPPED | OUTPATIENT
Start: 2018-06-12 | End: 2018-09-12 | Stop reason: SDUPTHER

## 2018-06-12 RX ORDER — B-COMPLEX WITH VITAMIN C
TABLET ORAL
Qty: 90 TABLET | Refills: 3 | Status: SHIPPED | OUTPATIENT
Start: 2018-06-12 | End: 2018-09-12 | Stop reason: SDUPTHER

## 2018-06-12 RX ORDER — FERROUS SULFATE 325(65) MG
325 TABLET ORAL DAILY
Qty: 90 TABLET | Refills: 1 | Status: SHIPPED | OUTPATIENT
Start: 2018-06-12 | End: 2018-09-12 | Stop reason: SDUPTHER

## 2018-06-12 RX ORDER — GABAPENTIN 300 MG/1
300 CAPSULE ORAL 2 TIMES DAILY
Qty: 60 CAPSULE | Refills: 2 | Status: SHIPPED | OUTPATIENT
Start: 2018-06-12 | End: 2018-09-12 | Stop reason: SDUPTHER

## 2018-06-12 RX ORDER — ACETAMINOPHEN 500 MG
TABLET ORAL
Qty: 120 TABLET | Refills: 2 | Status: SHIPPED | OUTPATIENT
Start: 2018-06-12 | End: 2018-09-28 | Stop reason: SDUPTHER

## 2018-06-12 ASSESSMENT — ENCOUNTER SYMPTOMS
RESPIRATORY NEGATIVE: 1
BACK PAIN: 1
ABDOMINAL DISTENTION: 0
BACK PAIN: 1

## 2018-06-13 ENCOUNTER — TELEPHONE (OUTPATIENT)
Dept: FAMILY MEDICINE CLINIC | Age: 48
End: 2018-06-13

## 2018-06-20 DIAGNOSIS — D64.9 ANEMIA, UNSPECIFIED TYPE: ICD-10-CM

## 2018-06-21 ENCOUNTER — TELEPHONE (OUTPATIENT)
Dept: PHYSICAL MEDICINE AND REHAB | Age: 48
End: 2018-06-21

## 2018-06-21 ENCOUNTER — TELEPHONE (OUTPATIENT)
Dept: FAMILY MEDICINE CLINIC | Age: 48
End: 2018-06-21

## 2018-06-21 LAB
ANION GAP SERPL CALCULATED.3IONS-SCNC: 10 MMOL/L (ref 4–12)
BUN BLDV-MCNC: 18 MG/DL (ref 7–20)
CALCIUM SERPL-MCNC: 9.9 MG/DL (ref 8.8–10.5)
CHLORIDE BLD-SCNC: 106 MEQ/L (ref 101–111)
CO2: 25 MEQ/L (ref 21–32)
CREAT SERPL-MCNC: 1.18 MG/DL (ref 0.6–1.3)
CREATININE CLEARANCE: 50
GLUCOSE: 105 MG/DL (ref 70–110)
POTASSIUM SERPL-SCNC: 4.1 MEQ/L (ref 3.6–5)
SODIUM BLD-SCNC: 141 MEQ/L (ref 135–145)

## 2018-06-21 RX ORDER — LANOLIN ALCOHOL/MO/W.PET/CERES
CREAM (GRAM) TOPICAL
Qty: 90 TABLET | Refills: 1 | OUTPATIENT
Start: 2018-06-21

## 2018-07-02 ENCOUNTER — TELEPHONE (OUTPATIENT)
Dept: OPERATING ROOM | Age: 48
End: 2018-07-02

## 2018-07-06 ENCOUNTER — TELEPHONE (OUTPATIENT)
Dept: FAMILY MEDICINE CLINIC | Age: 48
End: 2018-07-06

## 2018-07-06 ENCOUNTER — OFFICE VISIT (OUTPATIENT)
Dept: FAMILY MEDICINE CLINIC | Age: 48
End: 2018-07-06
Payer: COMMERCIAL

## 2018-07-06 VITALS
RESPIRATION RATE: 16 BRPM | BODY MASS INDEX: 29.77 KG/M2 | TEMPERATURE: 98.2 F | WEIGHT: 185.2 LBS | HEIGHT: 66 IN | HEART RATE: 64 BPM | DIASTOLIC BLOOD PRESSURE: 58 MMHG | SYSTOLIC BLOOD PRESSURE: 94 MMHG

## 2018-07-06 DIAGNOSIS — J01.90 ACUTE RHINOSINUSITIS: Primary | ICD-10-CM

## 2018-07-06 PROCEDURE — G8417 CALC BMI ABV UP PARAM F/U: HCPCS | Performed by: FAMILY MEDICINE

## 2018-07-06 PROCEDURE — 4004F PT TOBACCO SCREEN RCVD TLK: CPT | Performed by: FAMILY MEDICINE

## 2018-07-06 PROCEDURE — 99213 OFFICE O/P EST LOW 20 MIN: CPT | Performed by: FAMILY MEDICINE

## 2018-07-06 PROCEDURE — G8427 DOCREV CUR MEDS BY ELIG CLIN: HCPCS | Performed by: FAMILY MEDICINE

## 2018-07-06 RX ORDER — BENZONATATE 100 MG/1
CAPSULE ORAL
Qty: 60 CAPSULE | Refills: 0 | Status: SHIPPED | OUTPATIENT
Start: 2018-07-06 | End: 2018-07-16

## 2018-07-06 RX ORDER — FLUTICASONE PROPIONATE 50 MCG
1 SPRAY, SUSPENSION (ML) NASAL DAILY
Qty: 1 BOTTLE | Refills: 0 | Status: SHIPPED | OUTPATIENT
Start: 2018-07-06 | End: 2018-10-23

## 2018-07-06 RX ORDER — AMOXICILLIN AND CLAVULANATE POTASSIUM 875; 125 MG/1; MG/1
1 TABLET, FILM COATED ORAL 2 TIMES DAILY
Qty: 20 TABLET | Refills: 0 | Status: SHIPPED | OUTPATIENT
Start: 2018-07-06 | End: 2018-07-16

## 2018-07-06 NOTE — PROGRESS NOTES
Chief Complaint   Patient presents with    URI       History obtained from the patient. SUBJECTIVE:  Cathy Manzo is a 41 y.o. female that presents today for      URI type sxs: 1 wk of runny nose, cough and congestion. No sore throat. Fever - No  Runny nose or congestion -  Yes   Cough -  Yes  Sore throat -  No  Headache, fatigue, joint pains, muscle aches -  No  Double Sickening - Yes  Shortness of breath/Wheezing? -  No  Nausea/Vomiting/Diarrhea? No  Sick contacts - Yes  Maxillary Tooth Pain -  Yes  Treatment tried and response - otc meds, no better      Current Outpatient Prescriptions   Medication Sig Dispense Refill    amoxicillin-clavulanate (AUGMENTIN) 875-125 MG per tablet Take 1 tablet by mouth 2 times daily for 10 days 20 tablet 0    benzonatate (TESSALON PERLES) 100 MG capsule Take 1 to 2 tablets by mouth every 8 hours as needed for cough. 60 capsule 0    fluticasone (FLONASE) 50 MCG/ACT nasal spray 1 spray by Nasal route daily for 14 days 1 Bottle 0    HYDROcodone-acetaminophen (NORCO) 5-325 MG per tablet Take 1 tablet by mouth 2 times daily as needed for Pain for up to 30 days. . 60 tablet 0    simvastatin (ZOCOR) 40 MG tablet take 1 tablet by mouth at bedtime 90 tablet 1    acetaminophen (MAPAP) 500 MG tablet take 2 tablets by mouth every 6 hours if needed for pain 120 tablet 2    levothyroxine (SYNTHROID) 100 MCG tablet take 1 tablet by mouth once daily 90 tablet 1    ferrous sulfate 325 (65 Fe) MG tablet Take 1 tablet by mouth daily 90 tablet 1    Calcium Carbonate-Vitamin D (OYSTER SHELL CALCIUM/D) 500-200 MG-UNIT TABS take 1 tablet once daily 90 tablet 3    gabapentin (NEURONTIN) 300 MG capsule Take 1 capsule by mouth 2 times daily for 90 days. . 60 capsule 2    diclofenac sodium (VOLTAREN) 1 % GEL Apply 4 g topically 4 times daily 5 Tube 0    ondansetron (ZOFRAN ODT) 4 MG disintegrating tablet Take 1 tablet by mouth every 8 hours as needed for Nausea 20 tablet 0   

## 2018-07-06 NOTE — PROGRESS NOTES
Visit Information    Have you changed or started any medications since your last visit including any over-the-counter medicines, vitamins, or herbal medicines? no   Are you having any side effects from any of your medications? -  no  Have you stopped taking any of your medications? Is so, why? -  no    Have you seen any other physician or provider since your last visit? No  Have you had any other diagnostic tests since your last visit? No  Have you been seen in the emergency room and/or had an admission to a hospital since we last saw you? No  Have you had your routine dental cleaning in the past 6 months? no    Have you activated your [employer] account? If not, what are your barriers?  Yes     Patient Care Team:  MACKENZIE Salcido CNP as PCP - General (Family Medicine)  MACKENZIE Salcido CNP as PCP - S Attributed Provider    Medical History Review  Past Medical, Family, and Social History reviewed and does not contribute to the patient presenting condition    Health Maintenance   Topic Date Due    Diabetes screen  09/30/2012    TSH testing  07/16/2018    Flu vaccine (1) 09/01/2018    Cervical cancer screen  12/28/2018    Potassium monitoring  06/21/2019    Creatinine monitoring  06/21/2019    Lipid screen  07/02/2020    DTaP/Tdap/Td vaccine (2 - Td) 07/08/2023    Pneumococcal med risk  Completed    HIV screen  Addressed

## 2018-07-16 DIAGNOSIS — G89.4 CHRONIC PAIN SYNDROME: ICD-10-CM

## 2018-07-16 DIAGNOSIS — M46.1 SI (SACROILIAC) JOINT INFLAMMATION (HCC): ICD-10-CM

## 2018-07-16 DIAGNOSIS — M47.816 SPONDYLOSIS OF LUMBAR REGION WITHOUT MYELOPATHY OR RADICULOPATHY: ICD-10-CM

## 2018-07-16 RX ORDER — HYDROCODONE BITARTRATE AND ACETAMINOPHEN 5; 325 MG/1; MG/1
1 TABLET ORAL 2 TIMES DAILY PRN
Qty: 60 TABLET | Refills: 0 | Status: SHIPPED | OUTPATIENT
Start: 2018-07-16 | End: 2018-08-15

## 2018-07-16 NOTE — TELEPHONE ENCOUNTER
OARRS reviewed. UDS: + Gabapentin and Butalbital. OV note says last dose [place] was night before UDS. Last seen: 4/3/2018.  Follow-up: procedures

## 2018-07-19 ENCOUNTER — ANESTHESIA (OUTPATIENT)
Dept: OPERATING ROOM | Age: 48
End: 2018-07-19
Payer: COMMERCIAL

## 2018-07-19 ENCOUNTER — HOSPITAL ENCOUNTER (OUTPATIENT)
Age: 48
Setting detail: OUTPATIENT SURGERY
Discharge: HOME | End: 2018-07-19
Attending: PAIN MEDICINE | Admitting: PAIN MEDICINE
Payer: COMMERCIAL

## 2018-07-19 ENCOUNTER — APPOINTMENT (OUTPATIENT)
Dept: GENERAL RADIOLOGY | Age: 48
End: 2018-07-19
Attending: PAIN MEDICINE
Payer: COMMERCIAL

## 2018-07-19 ENCOUNTER — ANESTHESIA EVENT (OUTPATIENT)
Dept: OPERATING ROOM | Age: 48
End: 2018-07-19
Payer: COMMERCIAL

## 2018-07-19 VITALS
SYSTOLIC BLOOD PRESSURE: 102 MMHG | WEIGHT: 179.6 LBS | HEART RATE: 71 BPM | DIASTOLIC BLOOD PRESSURE: 61 MMHG | HEIGHT: 66 IN | OXYGEN SATURATION: 94 % | BODY MASS INDEX: 28.87 KG/M2 | RESPIRATION RATE: 15 BRPM | TEMPERATURE: 97.4 F

## 2018-07-19 VITALS
RESPIRATION RATE: 7 BRPM | SYSTOLIC BLOOD PRESSURE: 113 MMHG | DIASTOLIC BLOOD PRESSURE: 54 MMHG | OXYGEN SATURATION: 92 %

## 2018-07-19 LAB — PREGNANCY, URINE: NEGATIVE

## 2018-07-19 PROCEDURE — 3600000054 HC PAIN LEVEL 3 BASE: Performed by: PAIN MEDICINE

## 2018-07-19 PROCEDURE — 6360000004 HC RX CONTRAST MEDICATION: Performed by: PAIN MEDICINE

## 2018-07-19 PROCEDURE — 7100000011 HC PHASE II RECOVERY - ADDTL 15 MIN: Performed by: PAIN MEDICINE

## 2018-07-19 PROCEDURE — 81025 URINE PREGNANCY TEST: CPT

## 2018-07-19 PROCEDURE — 6360000002 HC RX W HCPCS: Performed by: NURSE ANESTHETIST, CERTIFIED REGISTERED

## 2018-07-19 PROCEDURE — 7100000010 HC PHASE II RECOVERY - FIRST 15 MIN: Performed by: PAIN MEDICINE

## 2018-07-19 PROCEDURE — 6360000002 HC RX W HCPCS: Performed by: PAIN MEDICINE

## 2018-07-19 PROCEDURE — 3209999900 FLUORO FOR SURGICAL PROCEDURES

## 2018-07-19 PROCEDURE — 2580000003 HC RX 258: Performed by: PAIN MEDICINE

## 2018-07-19 PROCEDURE — 2500000003 HC RX 250 WO HCPCS: Performed by: PAIN MEDICINE

## 2018-07-19 PROCEDURE — 6360000002 HC RX W HCPCS: Performed by: ANESTHESIOLOGY

## 2018-07-19 PROCEDURE — 2709999900 HC NON-CHARGEABLE SUPPLY: Performed by: PAIN MEDICINE

## 2018-07-19 PROCEDURE — 62323 NJX INTERLAMINAR LMBR/SAC: CPT | Performed by: PAIN MEDICINE

## 2018-07-19 PROCEDURE — 3700000000 HC ANESTHESIA ATTENDED CARE: Performed by: PAIN MEDICINE

## 2018-07-19 RX ORDER — ONDANSETRON 2 MG/ML
4 INJECTION INTRAMUSCULAR; INTRAVENOUS ONCE
Status: COMPLETED | OUTPATIENT
Start: 2018-07-19 | End: 2018-07-19

## 2018-07-19 RX ORDER — DEXAMETHASONE SODIUM PHOSPHATE 4 MG/ML
INJECTION, SOLUTION INTRA-ARTICULAR; INTRALESIONAL; INTRAMUSCULAR; INTRAVENOUS; SOFT TISSUE PRN
Status: DISCONTINUED | OUTPATIENT
Start: 2018-07-19 | End: 2018-07-19 | Stop reason: HOSPADM

## 2018-07-19 RX ORDER — FENTANYL CITRATE 50 UG/ML
INJECTION, SOLUTION INTRAMUSCULAR; INTRAVENOUS PRN
Status: DISCONTINUED | OUTPATIENT
Start: 2018-07-19 | End: 2018-07-19 | Stop reason: SDUPTHER

## 2018-07-19 RX ORDER — PROPOFOL 10 MG/ML
INJECTION, EMULSION INTRAVENOUS PRN
Status: DISCONTINUED | OUTPATIENT
Start: 2018-07-19 | End: 2018-07-19 | Stop reason: SDUPTHER

## 2018-07-19 RX ORDER — 0.9 % SODIUM CHLORIDE 0.9 %
VIAL (ML) INJECTION PRN
Status: DISCONTINUED | OUTPATIENT
Start: 2018-07-19 | End: 2018-07-19 | Stop reason: HOSPADM

## 2018-07-19 RX ORDER — LIDOCAINE HYDROCHLORIDE 10 MG/ML
INJECTION, SOLUTION INFILTRATION; PERINEURAL PRN
Status: DISCONTINUED | OUTPATIENT
Start: 2018-07-19 | End: 2018-07-19 | Stop reason: HOSPADM

## 2018-07-19 RX ADMIN — PROPOFOL 60 MG: 10 INJECTION, EMULSION INTRAVENOUS at 14:05

## 2018-07-19 RX ADMIN — FENTANYL CITRATE 100 MCG: 50 INJECTION INTRAMUSCULAR; INTRAVENOUS at 14:03

## 2018-07-19 RX ADMIN — ONDANSETRON 4 MG: 2 INJECTION INTRAMUSCULAR; INTRAVENOUS at 13:38

## 2018-07-19 ASSESSMENT — PAIN SCALES - GENERAL: PAINLEVEL_OUTOF10: 0

## 2018-07-19 ASSESSMENT — PAIN - FUNCTIONAL ASSESSMENT: PAIN_FUNCTIONAL_ASSESSMENT: 0-10

## 2018-07-19 ASSESSMENT — PAIN DESCRIPTION - DESCRIPTORS: DESCRIPTORS: ACHING

## 2018-07-19 NOTE — PROGRESS NOTES
Resting quietly in recliner with feet elevated and call light in reach. Pt c/o nausea x 1 day-Dr. Ning Bower ordered zofran IV preop.

## 2018-07-19 NOTE — ANESTHESIA PRE PROCEDURE
(pain) One exercise ball M54.4 12/27/17 12/27/17  MACKENZIE Whittington CNP   hydrOXYzine (ATARAX) 25 MG tablet 1 po tid Do not take while driving 2/77/14   MACKENZIE Whittington CNP       Current medications:    Current Facility-Administered Medications   Medication Dose Route Frequency Provider Last Rate Last Dose    ondansetron (ZOFRAN) injection 4 mg  4 mg Intravenous Once Ny Raines MD           Allergies: Allergies   Allergen Reactions    Codeine Hives and Nausea And Vomiting       Problem List:    Patient Active Problem List   Diagnosis Code    Hyperlipidemia E78.5    Hypothyroid E03.9    Systemic lupus erythematosus ([facility] [address] M32.9    Hematuria     Proteinuria R80.9    Anemia D64.9    Lupus nephritis ([facility] [address] M32.14    CKD (chronic kidney disease), stage III N18.3    Dyslipidemia E78.5    Hyperuricemia E79.0    Lumbar spondylosis M47.816       Past Medical History:        Diagnosis Date    Anemia     CKD (chronic kidney disease), stage III     Dyslipidemia     Hematuria     Hyperlipidemia     Hypertension     Hyperuricemia     Hypothyroidism     Kidney disease     as a result from lupus    Lupus     Lupus nephritis (HCC)     Proteinuria     Seizures (HCC)     Systemic lupus erythematosus (HCC)        Past Surgical History:        Procedure Laterality Date    NERVE BLOCK Bilateral     LUMBAR FACET    [place] INJ DX/THER AGNT PARAVERT FACET JOINT, LUMBAR/SAC, 2ND LEVEL Bilateral 5/7/2018    LUMBAR FACET  RAMBO [email or web address] Bilateral performed by Carlene Wilkins MD at [address] [facility]       Social History:    Social History   Substance Use Topics    Smoking status: Current Some Day Smoker     Types: Cigarettes    Smokeless tobacco: Never Used      Comment: 1 a day    Alcohol use 0.0 oz/week      Comment: occasionally, couple times a year                                Ready to quit: Not Answered  Counseling given: Not Answered      Vital Signs (Current):  There were no vitals filed for this visit. BP Readings from Last 3 Encounters:   07/06/18 (!) 94/58   06/12/18 112/62   06/12/18 108/70       NPO Status:                                                                                 BMI:   Wt Readings from Last 3 Encounters:   07/06/18 185 lb 3.2 oz (84 kg)   06/12/18 187 lb 12.8 oz (85.2 kg)   06/12/18 173 lb 15.1 oz (78.9 kg)     There is no height or weight on file to calculate BMI.    CBC:   Lab Results   Component Value Date    WBC 10.9 05/25/2018    RBC 4.28 05/25/2018    HGB 12.0 05/25/2018    HCT 36.9 05/25/2018    MCV 86.2 05/25/2018    RDW 14.6 05/25/2018     05/25/2018       CMP:   Lab Results   Component Value Date     06/21/2018    K 4.1 06/21/2018     06/21/2018    CO2 25 06/21/2018    BUN 18 06/21/2018    CREATININE 1.18 06/21/2018    AGRATIO 1.9 01/18/2015    LABGLOM 25 05/25/2018    GLUCOSE 105 06/21/2018    PROT 6.1 09/09/2017    CALCIUM 9.9 06/21/2018    BILITOT 0.3 09/09/2017    ALKPHOS 69 09/09/2017    AST 18 09/09/2017    ALT 17 09/09/2017       POC Tests: No results for input(s): POCGLU, POCNA, POCK, POCCL, POCBUN, POCHEMO, POCHCT in the last 72 hours.     Coags: No results found for: PROTIME, INR, APTT    HCG (If Applicable):   Lab Results   Component Value Date    PREGSERUM NEGATIVE 11/28/2015        ABGs: No results found for: PHART, PO2ART, RBN3AXD, FYM4XPF, BEART, Q0HXSDOD     Type & Screen (If Applicable):  No results found for: LABABO, LABRH    Anesthesia Evaluation    Airway: Mallampati: II  TM distance: >3 FB   Neck ROM: full  Mouth opening: > = 3 FB Dental:          Pulmonary: breath sounds clear to auscultation                             Cardiovascular:    (+) hypertension:,         Rhythm: regular                      Neuro/Psych:               GI/Hepatic/Renal:             Endo/Other:    (+) hypothyroidism: arthritis:., .                 Abdominal:           Vascular:

## 2018-07-19 NOTE — PROGRESS NOTES
1411 Pt to phase 2 recovery per cart. Pt awake. Denies pain. Bandaid on lower back dry and intact. 1415 Pt taking offered snack. [phone] Pt phoned son to pick her up. Pt fully alert and denies complaints. 1450 IV discontinued. Pt dressed with staff at bedside. Pt alert and steady. 12 Pt's son present. Discharge instructions reviewed with pt and son. Both voice understanding. 1 Pt discharged ambulatory with staff to car driven by son. Pt alert and steady.

## 2018-07-19 NOTE — PROGRESS NOTES
1345 Pt c/o redness and itchiness right upper anterior forearm shortly after IV Zofran. Dr. Soo Hernandez called to bedside. No redness noted at insertion site of IV (which is on hand). Dr. Soo Hernandez ordered IV site to be moved and cold compress to right forearm. Dr. Soo Hernandez suspicious of contact dermatitis. Pt states she has had Zofran in past and has never had adverse reactions. [facility] started. No redness or edema noted at site.

## 2018-07-19 NOTE — H&P
[address] [address]  History and Physical Update    Pt Name: Keaton Macario  MRN: [id number]  YOB: 1970  Date of evaluation: 7/19/2018      I have examined the patient and reviewed the H&P/Consult and there are no changes to the patient or plans.         Electronically signed by Faisal Khan MD on 7/19/2018 at 1:12 PM

## 2018-07-27 ENCOUNTER — OFFICE VISIT (OUTPATIENT)
Dept: FAMILY MEDICINE CLINIC | Age: 48
End: 2018-07-27
Payer: COMMERCIAL

## 2018-07-27 ENCOUNTER — TELEPHONE (OUTPATIENT)
Dept: FAMILY MEDICINE CLINIC | Age: 48
End: 2018-07-27

## 2018-07-27 VITALS
DIASTOLIC BLOOD PRESSURE: 86 MMHG | RESPIRATION RATE: 16 BRPM | HEART RATE: 63 BPM | TEMPERATURE: 98.3 F | WEIGHT: 185 LBS | BODY MASS INDEX: 29.86 KG/M2 | SYSTOLIC BLOOD PRESSURE: 138 MMHG

## 2018-07-27 DIAGNOSIS — K04.7 DENTAL INFECTION: Primary | ICD-10-CM

## 2018-07-27 PROCEDURE — G8427 DOCREV CUR MEDS BY ELIG CLIN: HCPCS | Performed by: NURSE PRACTITIONER

## 2018-07-27 PROCEDURE — 4004F PT TOBACCO SCREEN RCVD TLK: CPT | Performed by: NURSE PRACTITIONER

## 2018-07-27 PROCEDURE — 99213 OFFICE O/P EST LOW 20 MIN: CPT | Performed by: NURSE PRACTITIONER

## 2018-07-27 PROCEDURE — G8417 CALC BMI ABV UP PARAM F/U: HCPCS | Performed by: NURSE PRACTITIONER

## 2018-07-27 RX ORDER — ONDANSETRON 4 MG/1
4 TABLET, FILM COATED ORAL EVERY 8 HOURS PRN
Qty: 15 TABLET | Refills: 0 | Status: SHIPPED | OUTPATIENT
Start: 2018-07-27 | End: 2019-10-22 | Stop reason: SDUPTHER

## 2018-07-27 RX ORDER — ONDANSETRON 4 MG/1
4 TABLET, ORALLY DISINTEGRATING ORAL EVERY 8 HOURS PRN
Qty: 20 TABLET | Refills: 0 | Status: SHIPPED | OUTPATIENT
Start: 2018-07-27 | End: 2019-02-19 | Stop reason: SDUPTHER

## 2018-07-27 RX ORDER — AMOXICILLIN AND CLAVULANATE POTASSIUM 875; 125 MG/1; MG/1
1 TABLET, FILM COATED ORAL 2 TIMES DAILY
Qty: 20 TABLET | Refills: 0 | Status: SHIPPED | OUTPATIENT
Start: 2018-07-27 | End: 2018-08-06

## 2018-07-27 NOTE — PROGRESS NOTES
Sangita Ac is a 41 y.o. female that presents for Dental Pain (C/o lower Left back tooth pain. States she was up all night in pain. Symptoms started last night. Also notes it is making her sick to her stomach. States she feels like her jaw is swollen too.  )        HPI:    Dental Pain  Started last night in the left lower jaw, she denies any fever. She hasn't called her dentist yet. She was up all night in pain, she has tried tylenol/Norco and Oragel which didn't help. She has had some nausea with it as well. I have reviewed the patient's past medical history, past surgical history, allergies, medications, social and family history and I have made updates where appropriate.     Past Medical History:   Diagnosis Date    Anemia     CKD (chronic kidney disease), stage III     Dyslipidemia     Hematuria     Hyperlipidemia     Hypertension     Hyperuricemia     Hypothyroidism     Kidney disease     as a result from lupus    Lupus     Lupus nephritis (HCC)     Proteinuria     Seizures (HCC)     Systemic lupus erythematosus (HCC)        Past Surgical History:   Procedure Laterality Date    NERVE BLOCK Bilateral     LUMBAR FACET    [place] INJ DX/THER AGNT PARAVERT FACET JOINT, LUMBAR/SAC, 2ND LEVEL Bilateral 5/7/2018    LUMBAR FACET  [email or web address] Bilateral performed by Idalia Miranda MD at [address]    IN [address] DX/THER SBST INTRLMNR LMBR/SAC W/IMG GDN N/A 7/19/2018    LUMBAR INTER LAMINAR GIGI LESI @L4 performed by Idalia Miranda MD at [address]       Social History   Substance Use Topics    Smoking status: Current Some Day Smoker     Types: Cigarettes    Smokeless tobacco: Never Used      Comment: 1 a day    Alcohol use 0.0 oz/week      Comment: occasionally, couple times a year       Family History   Problem Relation Age of Onset    Diabetes Mother     Heart Disease Mother     Thyroid Disease Mother     Diabetes Father          ROS  Review of Systems    Constitutional: Negative for Fever, Chills, Fatigue  Cardiovascular:  Negative for Chest Pain, Palpitations  Respiratory:  Negative for Cough, Wheezing, Shortness of breath  Gastrointestinal:  Nausea, Vomiting, Diarrhea, Constipation, Blood in stool  Genitourinary:  Negative for Difficulty or painful urination,Change in frequency, Urgency  Skin:  Negative for Color change, Rash, Itching, Wound  Psychiatric:  Negative for Anxiety, Depression, Suicidal ideation  Musculoskeletal:  Negative for Joint pain, Back pain, Gait problems  Neurological:  Negative for Dizziness, Headaches      PHYSICAL EXAM:  /86 (Site: Right Arm, Position: Sitting, Cuff Size: Medium Adult)   Pulse 63   Temp 98.3 °F (36.8 °C) (Oral)   Resp 16   Wt 185 lb (83.9 kg)   BMI 29.86 kg/m²     General Appearance: well developed and well- nourished, in no acute distress  Head: normocephalic and atraumatic  Eyes: pupils equal, round, and reactive to light,  conjunctivae and eye lids without erythema  ENT: oropharynx normal, dentition is poor,  Molar #17 broken with mild gingival erythema and tenderness, no drainage  Neck: supple and non-tender without mass, no thyromegaly or thyroid nodules, no cervical lymphadenopathy  Pulmonary/Chest: clear to auscultation bilaterally- no wheezes, rales or rhonchi, normal air movement, no respiratory distress or retractions  Cardiovascular: normal rate, regular rhythm, normal S1 and S2, no murmurs, rubs, clicks, or gallops, distal pulses intact  Abdomen: soft, non-tender, non-distended, normal bowel sounds,  no rebound or guarding, no masses or hernias noted, no hepatospleenomegaly  Extremities: no cyanosis, clubbing or edema of the lower extremities  Musculoskeletal: No joint swelling or gross deformity   Neuro:  Alert, 5/5 strength globally and symmetrically  Psych:  Normal affect without evidence of depression or anxiety, insight and judgement are appropriate, memory appears intact  Skin: warm

## 2018-08-06 ENCOUNTER — OFFICE VISIT (OUTPATIENT)
Dept: PHYSICAL MEDICINE AND REHAB | Age: 48
End: 2018-08-06
Payer: COMMERCIAL

## 2018-08-06 VITALS
HEIGHT: 66 IN | HEART RATE: 66 BPM | SYSTOLIC BLOOD PRESSURE: 108 MMHG | BODY MASS INDEX: 28.61 KG/M2 | WEIGHT: 178 LBS | DIASTOLIC BLOOD PRESSURE: 72 MMHG

## 2018-08-06 DIAGNOSIS — G89.4 CHRONIC PAIN SYNDROME: ICD-10-CM

## 2018-08-06 DIAGNOSIS — M46.1 SI (SACROILIAC) JOINT INFLAMMATION (HCC): ICD-10-CM

## 2018-08-06 DIAGNOSIS — M47.816 LUMBAR SPONDYLOSIS: ICD-10-CM

## 2018-08-06 DIAGNOSIS — M48.062 LUMBAR STENOSIS WITH NEUROGENIC CLAUDICATION: Primary | ICD-10-CM

## 2018-08-06 DIAGNOSIS — M54.16 LUMBAR RADICULOPATHY: ICD-10-CM

## 2018-08-06 DIAGNOSIS — M54.16 LUMBAR RADICULITIS: ICD-10-CM

## 2018-08-06 PROCEDURE — 99213 OFFICE O/P EST LOW 20 MIN: CPT | Performed by: NURSE PRACTITIONER

## 2018-08-06 ASSESSMENT — ENCOUNTER SYMPTOMS
NAUSEA: 0
DIARRHEA: 0
VOMITING: 0
CONSTIPATION: 0
BACK PAIN: 1

## 2018-08-06 NOTE — PROGRESS NOTES
[facility]  [address] [facility] [address]  Dept: [phone]  Dept Fax: [phone]  Loc: [phone]    Visit Date: 8/6/2018    Functionality Assessment/Goals Worksheet     On a scale of 0 (Does not Interfere) to 10 (Completely Interferes)     1. Which number describes how during the past week pain has interfered with       the following:  A. General Activity:  6  B. Mood: 8  C. Walking Ability:  4  D. Normal Work (Includes both work outside the home and housework):  8  E. Relations with Other People:   5  F. Sleep:   8  G. Enjoyment of Life:   1    2. Patient Prefers to Take their Pain Medications:     []  On a regular basis   []  Only when necessary    []  Does not take pain medications    3. What are the Patient's Goals/Expectations for Visiting Pain Management? []  Learn about my pain    []  Receive Medication   []  Physical Therapy     []  Treat Depression   []  Receive Injections    []  Treat Sleep   []  Deal with Anxiety and Stress   []  Treat Opoid Dependence/Addiction   [x]  Other:      HPI:   Fernanda Larson is a 41 y.o. female is here today for    Chief Complaint: Low back pain    HPI     Patient here for follow up after ELLIOT L4 on 7/19/18 with Dr. Governor Cruz. Prior to the procedure patient with low back, bilateral buttock pain, and left leg pain. Patient feels that procedure has gotten good relief in the low back from the injection: 50% in the low back but none in the left leg. Patient states that pain mostly bothers her if she bends over or sleeps on her stomach, but overall improved. Discussed with patient about repeating epidural     Medications reviewed. Patient denies  side effects with medications. Patient states she is  taking medications as prescribed. She denies receiving pain medications from other sources. She denies any ER visits since last visit.   Pain scale with out pain medications is 8/10. Pain scale with pain medications is  4/10. Last dose of Norco was today - previous dose prior to today was yesterday  Last dose of gabapentin was last night   Drug screen reviewed from 6/1218 and was not detected of Norco  Pill count was not completed today and was educated on bringing in medication    The patient is allergic to codeine. Past Medical History  Maryjo Ku  has a past medical history of Anemia; CKD (chronic kidney disease), stage III; Dyslipidemia; Hematuria; Hyperlipidemia; Hypertension; Hyperuricemia; Hypothyroidism; Kidney disease; Lupus; Lupus nephritis ([facility] [address] Proteinuria; Seizures ([facility] [address] and Systemic lupus erythematosus ([facility] [address] Past Surgical History  The patient  has a past surgical history that includes Nerve Block (Bilateral); pr inj dx/ther agnt paravert facet joint, lumbar/sac, 2nd level (Bilateral, 5/7/2018); and pr njx dx/ther sbst intrlmnr lmbr/sac w/img gdn (N/A, 7/19/2018). Family History  This patient's family history includes Diabetes in her father and mother; Heart Disease in her mother; Thyroid Disease in her mother. Social History  Maryjo Ku  reports that she has been smoking Cigarettes. She has never used smokeless tobacco. She reports that she drinks alcohol. She reports that she does not use drugs. Medications    Current Outpatient Prescriptions:     ondansetron (ZOFRAN ODT) 4 MG disintegrating tablet, Take 1 tablet by mouth every 8 hours as needed for Nausea, Disp: 20 tablet, Rfl: 0    amoxicillin-clavulanate (AUGMENTIN) 875-125 MG per tablet, Take 1 tablet by mouth 2 times daily for 10 days, Disp: 20 tablet, Rfl: 0    ondansetron (ZOFRAN) 4 MG tablet, Take 1 tablet by mouth every 8 hours as needed for Nausea or Vomiting, Disp: 15 tablet, Rfl: 0    HYDROcodone-acetaminophen (NORCO) 5-325 MG per tablet, Take 1 tablet by mouth 2 times daily as needed for Pain for up to 30 days. ., Disp: 60 tablet, Rfl: 0    simvastatin (ZOCOR) 40 MG tablet, take No murmur heard. Pulmonary/Chest: Breath sounds normal.   Abdominal: Soft. Bowel sounds are normal. She exhibits no distension. There is no tenderness. Musculoskeletal: She exhibits no edema. Left hip: She exhibits tenderness. She exhibits normal range of motion and normal strength. Lumbar back: She exhibits decreased range of motion and tenderness. She exhibits no pain and no spasm. Back:         Legs:  Neurological: She is alert and oriented to person, place, and time. Skin: Skin is warm and dry. No rash noted. She is not diaphoretic. No erythema. Psychiatric: She has a normal mood and affect. Her behavior is normal. Judgment and thought content normal.     VONDA test: positive bilateral  Yeomans test: positive bilateral  Gaenslen test: positive bilateral     Assessment:     1. Lumbar stenosis with neurogenic claudication    2. Chronic pain syndrome    3. Lumbar radiculitis    4. Lumbar radiculopathy    5. Lumbar spondylosis    6. SI (sacroiliac) joint inflammation ([facility] [address]            Plan:      · OARRS reviewed. · Discussed long term side effects of medications. · Discussed tolerance, dependency and addiction. · Previous UDS reviewed  · UDS preformed today for compliance. · Patient told can not receive any pain medications from any other source. · Discussed with patient that they may not be pain free. · No evidence of abuse, diversion or aberrant behavior. Medications and/or procedures to improve function and quality of life- patient understanding with this and that may not be pain free  Testing: None further at this time  Procedures:  TFESI L5 on the LEFT  Discussed with patient about risks with procedure including infection, reaction to medication, increased pain, or bleeding. Medications: will need UDS to detect Norco in order to proceed      Meds. Prescribed:   No orders of the defined types were placed in this encounter.       Return for TFESI L5 on the LEFT or preferred

## 2018-08-13 ENCOUNTER — TELEPHONE (OUTPATIENT)
Dept: PHYSICAL MEDICINE AND REHAB | Age: 48
End: 2018-08-13

## 2018-08-16 NOTE — TELEPHONE ENCOUNTER
Valorie called from [employer])- had cpt code for epidural, office note states TFESI.  for her that is it the TFESI code 40482 that needs authorized.

## 2018-08-17 DIAGNOSIS — M47.816 SPONDYLOSIS OF LUMBAR REGION WITHOUT MYELOPATHY OR RADICULOPATHY: Primary | ICD-10-CM

## 2018-08-17 RX ORDER — HYDROCODONE BITARTRATE AND ACETAMINOPHEN 5; 325 MG/1; MG/1
1 TABLET ORAL 2 TIMES DAILY PRN
Qty: 60 TABLET | Refills: 0 | Status: SHIPPED | OUTPATIENT
Start: 2018-08-17 | End: 2018-09-14 | Stop reason: SDUPTHER

## 2018-08-21 ENCOUNTER — TELEPHONE (OUTPATIENT)
Dept: PHYSICAL MEDICINE AND REHAB | Age: 48
End: 2018-08-21

## 2018-08-24 ENCOUNTER — OFFICE VISIT (OUTPATIENT)
Dept: FAMILY MEDICINE CLINIC | Age: 48
End: 2018-08-24
Payer: COMMERCIAL

## 2018-08-24 VITALS
SYSTOLIC BLOOD PRESSURE: 110 MMHG | TEMPERATURE: 98.4 F | BODY MASS INDEX: 30.47 KG/M2 | HEART RATE: 78 BPM | RESPIRATION RATE: 16 BRPM | WEIGHT: 189.6 LBS | DIASTOLIC BLOOD PRESSURE: 62 MMHG | HEIGHT: 66 IN

## 2018-08-24 DIAGNOSIS — J06.9 ACUTE UPPER RESPIRATORY INFECTION: Primary | ICD-10-CM

## 2018-08-24 PROCEDURE — G8427 DOCREV CUR MEDS BY ELIG CLIN: HCPCS | Performed by: NURSE PRACTITIONER

## 2018-08-24 PROCEDURE — 4004F PT TOBACCO SCREEN RCVD TLK: CPT | Performed by: NURSE PRACTITIONER

## 2018-08-24 PROCEDURE — G8417 CALC BMI ABV UP PARAM F/U: HCPCS | Performed by: NURSE PRACTITIONER

## 2018-08-24 PROCEDURE — 99213 OFFICE O/P EST LOW 20 MIN: CPT | Performed by: NURSE PRACTITIONER

## 2018-08-24 RX ORDER — AMOXICILLIN 875 MG/1
875 TABLET, COATED ORAL 2 TIMES DAILY
Qty: 20 TABLET | Refills: 0 | Status: SHIPPED | OUTPATIENT
Start: 2018-08-24 | End: 2018-09-03

## 2018-08-24 NOTE — PROGRESS NOTES
SUBJECTIVE:  Sirena Flowers is a 41 y.o. y/o female that presents with Cough (nasal congestion, sore throat started yesterday, worse today)      HPI:      Symptoms have been present for 1 day(s). Symptoms are unchanged since they initially started. Fever? No  Runny nose or congestion? Yes   Cough? Yes - tickle in throat, cough is nonproductive  Sore throat? Yes  Headache, fatigue, joint pains, muscle aches? Yes - some headaches  Shortness of breath/Wheezing? No  Nausea/Vomiting/Diarrhea? Yes - post tussive emesis  Double Sickening? No  Sick contacts? Yes - son    Patient has tried nasal spray (flonase) without improvement. Past Medical History:   Diagnosis Date    Anemia     CKD (chronic kidney disease), stage III     Dyslipidemia     Hematuria     Hyperlipidemia     Hypertension     Hyperuricemia     Hypothyroidism     Kidney disease     as a result from lupus    Lupus     Lupus nephritis (HCC)     Proteinuria     Seizures (HCC)     Systemic lupus erythematosus ([facility] [address]        Social History     Social History    Marital status:      Spouse name: N/A    Number of children: N/A    Years of education: N/A     Occupational History    Not on file.      Social History Main Topics    Smoking status: Current Some Day Smoker     Types: Cigarettes    Smokeless tobacco: Never Used      Comment: 1 a day    Alcohol use 0.0 oz/week      Comment: occasionally, couple times a year    Drug use: No    Sexual activity: Not Currently     Other Topics Concern    Not on file     Social History Narrative    No narrative on file       Family History   Problem Relation Age of Onset    Diabetes Mother     Heart Disease Mother     Thyroid Disease Mother     Diabetes Father            OBJECTIVE:  /62   Pulse 78   Temp 98.4 °F (36.9 °C) (Oral)   Resp 16   Ht 5' 6\" (1.676 m)   Wt 189 lb 9.6 oz (86 kg)   BMI 30.60 kg/m²   General appearance: alert, well appearing, and in no

## 2018-08-27 ENCOUNTER — TELEPHONE (OUTPATIENT)
Dept: FAMILY MEDICINE CLINIC | Age: 48
End: 2018-08-27

## 2018-08-27 RX ORDER — PSEUDOEPHEDRINE HYDROCHLORIDE 30 MG/1
30 TABLET ORAL 2 TIMES DAILY
Qty: 30 TABLET | Refills: 1 | Status: SHIPPED | OUTPATIENT
Start: 2018-08-27 | End: 2018-09-11

## 2018-08-29 ENCOUNTER — TELEPHONE (OUTPATIENT)
Dept: FAMILY MEDICINE CLINIC | Age: 48
End: 2018-08-29

## 2018-08-29 DIAGNOSIS — B85.2 LICE INFESTATION: Primary | ICD-10-CM

## 2018-08-29 RX ORDER — PERMETHRIN 1 %-0.25 %
1 COMBINATION PACKAGE (ML) MISCELLANEOUS ONCE
Qty: 325 ML | Refills: 1 | Status: SHIPPED | OUTPATIENT
Start: 2018-08-29 | End: 2018-08-29

## 2018-08-30 ENCOUNTER — TELEPHONE (OUTPATIENT)
Dept: FAMILY MEDICINE CLINIC | Age: 48
End: 2018-08-30

## 2018-08-30 DIAGNOSIS — B85.2 LICE: Primary | ICD-10-CM

## 2018-09-11 ENCOUNTER — TELEPHONE (OUTPATIENT)
Dept: PHYSICAL MEDICINE AND REHAB | Age: 48
End: 2018-09-11

## 2018-09-11 RX ORDER — TIZANIDINE 2 MG/1
2 TABLET ORAL EVERY 8 HOURS PRN
Qty: 90 TABLET | Refills: 0 | Status: SHIPPED | OUTPATIENT
Start: 2018-09-11 | End: 2018-10-11

## 2018-09-11 NOTE — TELEPHONE ENCOUNTER
Sent in Tizanidine 2mg every 8 hours PRN. Also patient can increase her gabapentin to 300mg TID. If she needs a refill for the gabapentin early, we can send that in also.

## 2018-09-11 NOTE — TELEPHONE ENCOUNTER
Pt. Calling stating the pain is shooting into her shoulder blades and neck. She did not do anything to aggravate it that she knows of.  Pt. States in between the Norco 5-325mg BID she is taking Tylenol from PCP. She is out of the Tizanidine 2 mg BID we prescribed in May, never called or asked for refill. She states she doesn't feel that will help with this pain although it did help a little when she took it.  Please advise

## 2018-09-12 ENCOUNTER — NURSE ONLY (OUTPATIENT)
Dept: LAB | Age: 48
End: 2018-09-12

## 2018-09-12 ENCOUNTER — OFFICE VISIT (OUTPATIENT)
Dept: FAMILY MEDICINE CLINIC | Age: 48
End: 2018-09-12
Payer: COMMERCIAL

## 2018-09-12 VITALS
TEMPERATURE: 97.7 F | HEIGHT: 67 IN | HEART RATE: 70 BPM | BODY MASS INDEX: 29.19 KG/M2 | WEIGHT: 186 LBS | SYSTOLIC BLOOD PRESSURE: 118 MMHG | RESPIRATION RATE: 10 BRPM | DIASTOLIC BLOOD PRESSURE: 62 MMHG

## 2018-09-12 DIAGNOSIS — M54.40 BACK PAIN OF LUMBAR REGION WITH SCIATICA: ICD-10-CM

## 2018-09-12 DIAGNOSIS — E03.9 ACQUIRED HYPOTHYROIDISM: ICD-10-CM

## 2018-09-12 DIAGNOSIS — Z23 NEEDS FLU SHOT: ICD-10-CM

## 2018-09-12 DIAGNOSIS — E78.00 PURE HYPERCHOLESTEROLEMIA: Primary | ICD-10-CM

## 2018-09-12 DIAGNOSIS — R25.9 MIXED ACTION AND RESTING TREMOR: ICD-10-CM

## 2018-09-12 DIAGNOSIS — R20.2 RIGHT HAND PARESTHESIA: ICD-10-CM

## 2018-09-12 DIAGNOSIS — M79.641 RIGHT HAND PAIN: ICD-10-CM

## 2018-09-12 DIAGNOSIS — D64.9 ANEMIA, UNSPECIFIED TYPE: ICD-10-CM

## 2018-09-12 LAB
T4 FREE: 1.1 NG/DL (ref 0.93–1.76)
TSH SERPL DL<=0.05 MIU/L-ACNC: 6.89 UIU/ML (ref 0.4–4.2)

## 2018-09-12 PROCEDURE — 90686 IIV4 VACC NO PRSV 0.5 ML IM: CPT | Performed by: NURSE PRACTITIONER

## 2018-09-12 PROCEDURE — G8427 DOCREV CUR MEDS BY ELIG CLIN: HCPCS | Performed by: NURSE PRACTITIONER

## 2018-09-12 PROCEDURE — 4004F PT TOBACCO SCREEN RCVD TLK: CPT | Performed by: NURSE PRACTITIONER

## 2018-09-12 PROCEDURE — 99214 OFFICE O/P EST MOD 30 MIN: CPT | Performed by: NURSE PRACTITIONER

## 2018-09-12 PROCEDURE — G8417 CALC BMI ABV UP PARAM F/U: HCPCS | Performed by: NURSE PRACTITIONER

## 2018-09-12 PROCEDURE — 90471 IMMUNIZATION ADMIN: CPT | Performed by: NURSE PRACTITIONER

## 2018-09-12 RX ORDER — SIMVASTATIN 40 MG
TABLET ORAL
Qty: 90 TABLET | Refills: 1 | Status: SHIPPED | OUTPATIENT
Start: 2018-09-12 | End: 2019-03-12 | Stop reason: SDUPTHER

## 2018-09-12 RX ORDER — B-COMPLEX WITH VITAMIN C
TABLET ORAL
Qty: 90 TABLET | Refills: 3 | Status: SHIPPED | OUTPATIENT
Start: 2018-09-12 | End: 2019-07-08 | Stop reason: SDUPTHER

## 2018-09-12 RX ORDER — LEVOTHYROXINE SODIUM 0.1 MG/1
TABLET ORAL
Qty: 90 TABLET | Refills: 1 | Status: SHIPPED | OUTPATIENT
Start: 2018-09-12 | End: 2018-09-13 | Stop reason: SDUPTHER

## 2018-09-12 RX ORDER — GABAPENTIN 300 MG/1
300 CAPSULE ORAL 3 TIMES DAILY
Qty: 90 CAPSULE | Refills: 2 | Status: SHIPPED | OUTPATIENT
Start: 2018-09-12 | End: 2018-10-02 | Stop reason: SDUPTHER

## 2018-09-12 RX ORDER — FERROUS SULFATE 325(65) MG
325 TABLET ORAL DAILY
Qty: 90 TABLET | Refills: 1 | Status: SHIPPED | OUTPATIENT
Start: 2018-09-12 | End: 2019-03-12 | Stop reason: SDUPTHER

## 2018-09-12 ASSESSMENT — PATIENT HEALTH QUESTIONNAIRE - PHQ9
SUM OF ALL RESPONSES TO PHQ9 QUESTIONS 1 & 2: 0
SUM OF ALL RESPONSES TO PHQ QUESTIONS 1-9: 0
2. FEELING DOWN, DEPRESSED OR HOPELESS: 0
SUM OF ALL RESPONSES TO PHQ QUESTIONS 1-9: 0
1. LITTLE INTEREST OR PLEASURE IN DOING THINGS: 0

## 2018-09-12 NOTE — PROGRESS NOTES
Visit Information    Have you changed or started any medications since your last visit including any over-the-counter medicines, vitamins, or herbal medicines? no   Are you having any side effects from any of your medications? -  no  Have you stopped taking any of your medications? Is so, why? -  no    Have you seen any other physician or provider since your last visit? Yes - Records Obtained  Have you had any other diagnostic tests since your last visit? No  Have you been seen in the emergency room and/or had an admission to a hospital since we last saw you? No  Have you had your routine dental cleaning in the past 6 months? no    Have you activated your [employer] account? If not, what are your barriers?  Yes     Patient Care Team:  MACKENZIE Larose CNP as PCP - General (Family Medicine)  MACKENZIE Larose CNP as PCP - S Attributed Provider    Medical History Review  Past Medical, Family, and Social History reviewed and does contribute to the patient presenting condition    Health Maintenance   Topic Date Due    Diabetes screen  09/30/2012    TSH testing  07/16/2018    Flu vaccine (1) 09/01/2018    Cervical cancer screen  12/28/2018    Potassium monitoring  06/21/2019    Creatinine monitoring  06/21/2019    Lipid screen  07/02/2020    DTaP/Tdap/Td vaccine (2 - Td) 07/08/2023    Pneumococcal med risk  Completed    HIV screen  Addressed

## 2018-09-12 NOTE — PROGRESS NOTES
Zeenat Garcia is a 41 y.o. female that present for   Chief Complaint   Patient presents with    Follow-up     3 month f/u chronic medical conditions, flu vaccine     Arm Pain     R arm throbbing 2-3 days ago, pt thinks that it may be from using  at work, pain also in R wrist, no other sxs     Tremors     pt states that she has been having ongoing hand tremors for years but they were every once in a while, pt states that the tremors are getting worse and more frequent, no specific onset, pts mother has tremors too but no formal diagnosis          Subjective  Patient has chronic pain of the lumbar area and right lower arm. Present for: approximately 6 months  Current Medications:  neurontin 300 mg tid  Pain control: not adequate  Escalation of dosage recently? yes - today we will increase to tid    Evidence for abuse or diversion? no   Tobacco use?: no    Seen specialist or pain clinic?: yes    Pain contract: yes    Denies head tremors, denies ataxic gait or confusion. Continues to take her cholesterol and thyroid meds. She is current with pain management for lumbar pain. I did review her chart she had a cervical MRI in September of 2017 which did identify some left sided cervical stenosis but denies left arm or hand pain, she was referred to physical therapy for this but never went. States her right hand pain will at times wake her up at night. She continues to take iron for her anemia.        OBJECTIVE:  /62   Pulse 70   Temp 97.7 °F (36.5 °C) (Oral)   Resp 10   Ht 5' 6.5\" (1.689 m)   Wt 186 lb (84.4 kg)   BMI 29.57 kg/m²   Physical Examination: General appearance - alert, well appearing, and in no distress  Neck - supple, no significant adenopathy  Chest - clear to auscultation, no wheezes, rales or rhonchi, symmetric air entry  Heart - normal rate, regular rhythm, normal S1, S2, no murmurs, rubs, clicks or gallops  Musculoskeletal - abnormal exam of right hand, reveals wrist tenderness with palpation, pt also had rest tremors after I evaluated her and she was not having any rest or action tremors. When I started asking her about it her hand started to tremor. States this happens to her mother also. Extremities - peripheral pulses normal, no pedal edema, no clubbing or cyanosis, abnormal exam of right hand without + tinnels but + phalens. Right hand grasp strong, rest tremors noted after I started asking her about it otherwise her hand did not tremor at all during the visit. ASSESSMENT & PLAN  Rosa Ko was seen today for follow-up, arm pain and tremors. Diagnoses and all orders for this visit:    Pure hypercholesterolemia  -     simvastatin (ZOCOR) 40 MG tablet; take 1 tablet by mouth at bedtime  Low fat low cholesterol diet  Acquired hypothyroidism  -     levothyroxine (SYNTHROID) 100 MCG tablet; take 1 tablet by mouth once daily  -     TSH; Future  -     T4, Free; Future    Anemia, unspecified type  -     ferrous sulfate 325 (65 Fe) MG tablet; Take 1 tablet by mouth daily    Back pain of lumbar region with sciatica  -     gabapentin (NEURONTIN) 300 MG capsule; Take 1 capsule by mouth 3 times daily for 90 days. .    Right hand pain  -     gabapentin (NEURONTIN) 300 MG capsule; Take 1 capsule by mouth 3 times daily for 90 days. [facility] - Martin Noyola MD    Mixed action and resting tremor  -     [facility] - Martin Noyola MD    Right hand paresthesia  -     [facility] - Martin Noyola MD    Needs flu shot    Other orders  -     Calcium Carbonate-Vitamin D (OYSTER SHELL CALCIUM/D) 500-200 MG-UNIT TABS; take 1 tablet once daily  -     INFLUENZA, QUADV, 3 YRS AND OLDER, IM, PF, PREFILL SYR OR SDV, 0.5ML (FLUZONE QUADV, PF)    Controlled substances monitoring: possible medication side effects, risk of tolerance and/or dependence, and alternative treatments discussed, no signs of potential drug

## 2018-09-13 ENCOUNTER — TELEPHONE (OUTPATIENT)
Dept: FAMILY MEDICINE CLINIC | Age: 48
End: 2018-09-13

## 2018-09-13 DIAGNOSIS — E03.9 ACQUIRED HYPOTHYROIDISM: ICD-10-CM

## 2018-09-13 RX ORDER — LEVOTHYROXINE SODIUM 112 UG/1
TABLET ORAL
Qty: 90 TABLET | Refills: 1 | Status: SHIPPED | OUTPATIENT
Start: 2018-09-13 | End: 2018-12-12 | Stop reason: SDUPTHER

## 2018-09-14 ENCOUNTER — TELEPHONE (OUTPATIENT)
Dept: FAMILY MEDICINE CLINIC | Age: 48
End: 2018-09-14

## 2018-09-14 DIAGNOSIS — M47.816 SPONDYLOSIS OF LUMBAR REGION WITHOUT MYELOPATHY OR RADICULOPATHY: ICD-10-CM

## 2018-09-14 RX ORDER — HYDROCODONE BITARTRATE AND ACETAMINOPHEN 5; 325 MG/1; MG/1
1 TABLET ORAL 2 TIMES DAILY PRN
Qty: 60 TABLET | Refills: 0 | Status: SHIPPED | OUTPATIENT
Start: 2018-09-20 | End: 2018-10-17 | Stop reason: SDUPTHER

## 2018-09-14 NOTE — TELEPHONE ENCOUNTER
Patient was seen by Gopal on 9/12/18, and she said Gopal talked about changing one of her medications (she wasn't sure what) and she wanted to know what it was going to be changed to. Please call her to advise.

## 2018-09-18 ENCOUNTER — NURSE TRIAGE (OUTPATIENT)
Dept: ADMINISTRATIVE | Age: 48
End: 2018-09-18

## 2018-09-19 NOTE — TELEPHONE ENCOUNTER
Message from Ethel Hamilton sent at 9/18/2018  7:55 PM EDT     Summary: tooth pain    Has had a toothache for 3 days.  Clover tooth is cracked and the pain is radiating into ear.  Please advise.

## 2018-09-25 ENCOUNTER — APPOINTMENT (OUTPATIENT)
Dept: GENERAL RADIOLOGY | Age: 48
End: 2018-09-25
Attending: PAIN MEDICINE
Payer: COMMERCIAL

## 2018-09-25 ENCOUNTER — ANESTHESIA (OUTPATIENT)
Dept: OPERATING ROOM | Age: 48
End: 2018-09-25
Payer: COMMERCIAL

## 2018-09-25 ENCOUNTER — ANESTHESIA EVENT (OUTPATIENT)
Dept: OPERATING ROOM | Age: 48
End: 2018-09-25
Payer: COMMERCIAL

## 2018-09-25 ENCOUNTER — HOSPITAL ENCOUNTER (OUTPATIENT)
Age: 48
Setting detail: OUTPATIENT SURGERY
Discharge: HOME | End: 2018-09-25
Attending: PAIN MEDICINE | Admitting: PAIN MEDICINE
Payer: COMMERCIAL

## 2018-09-25 VITALS
OXYGEN SATURATION: 96 % | TEMPERATURE: 97.9 F | HEIGHT: 66 IN | BODY MASS INDEX: 29.73 KG/M2 | WEIGHT: 185 LBS | SYSTOLIC BLOOD PRESSURE: 141 MMHG | RESPIRATION RATE: 16 BRPM | DIASTOLIC BLOOD PRESSURE: 81 MMHG | HEART RATE: 72 BPM

## 2018-09-25 VITALS
OXYGEN SATURATION: 100 % | SYSTOLIC BLOOD PRESSURE: 150 MMHG | RESPIRATION RATE: 14 BRPM | DIASTOLIC BLOOD PRESSURE: 91 MMHG

## 2018-09-25 PROCEDURE — 64483 NJX AA&/STRD TFRM EPI L/S 1: CPT | Performed by: PAIN MEDICINE

## 2018-09-25 PROCEDURE — 2500000003 HC RX 250 WO HCPCS: Performed by: PAIN MEDICINE

## 2018-09-25 PROCEDURE — 7100000010 HC PHASE II RECOVERY - FIRST 15 MIN: Performed by: PAIN MEDICINE

## 2018-09-25 PROCEDURE — 2709999900 HC NON-CHARGEABLE SUPPLY: Performed by: PAIN MEDICINE

## 2018-09-25 PROCEDURE — 6360000002 HC RX W HCPCS: Performed by: PAIN MEDICINE

## 2018-09-25 PROCEDURE — 6360000004 HC RX CONTRAST MEDICATION: Performed by: PAIN MEDICINE

## 2018-09-25 PROCEDURE — 3600000054 HC PAIN LEVEL 3 BASE: Performed by: PAIN MEDICINE

## 2018-09-25 PROCEDURE — 7100000011 HC PHASE II RECOVERY - ADDTL 15 MIN: Performed by: PAIN MEDICINE

## 2018-09-25 PROCEDURE — 3700000000 HC ANESTHESIA ATTENDED CARE: Performed by: PAIN MEDICINE

## 2018-09-25 PROCEDURE — 3209999900 FLUORO FOR SURGICAL PROCEDURES

## 2018-09-25 PROCEDURE — 6360000002 HC RX W HCPCS: Performed by: NURSE ANESTHETIST, CERTIFIED REGISTERED

## 2018-09-25 RX ORDER — LIDOCAINE HYDROCHLORIDE 10 MG/ML
INJECTION, SOLUTION INFILTRATION; PERINEURAL PRN
Status: DISCONTINUED | OUTPATIENT
Start: 2018-09-25 | End: 2018-09-25 | Stop reason: HOSPADM

## 2018-09-25 RX ORDER — FENTANYL CITRATE 50 UG/ML
INJECTION, SOLUTION INTRAMUSCULAR; INTRAVENOUS PRN
Status: DISCONTINUED | OUTPATIENT
Start: 2018-09-25 | End: 2018-09-25 | Stop reason: SDUPTHER

## 2018-09-25 RX ORDER — BUPIVACAINE HYDROCHLORIDE 2.5 MG/ML
INJECTION, SOLUTION EPIDURAL; INFILTRATION; INTRACAUDAL PRN
Status: DISCONTINUED | OUTPATIENT
Start: 2018-09-25 | End: 2018-09-25 | Stop reason: HOSPADM

## 2018-09-25 RX ORDER — DEXAMETHASONE SODIUM PHOSPHATE 4 MG/ML
INJECTION, SOLUTION INTRA-ARTICULAR; INTRALESIONAL; INTRAMUSCULAR; INTRAVENOUS; SOFT TISSUE PRN
Status: DISCONTINUED | OUTPATIENT
Start: 2018-09-25 | End: 2018-09-25 | Stop reason: HOSPADM

## 2018-09-25 RX ADMIN — FENTANYL CITRATE 50 MCG: 50 INJECTION INTRAMUSCULAR; INTRAVENOUS at 14:45

## 2018-09-25 RX ADMIN — FENTANYL CITRATE 50 MCG: 50 INJECTION INTRAMUSCULAR; INTRAVENOUS at 14:50

## 2018-09-25 ASSESSMENT — PAIN - FUNCTIONAL ASSESSMENT: PAIN_FUNCTIONAL_ASSESSMENT: 0-10

## 2018-09-25 ASSESSMENT — PULMONARY FUNCTION TESTS
PIF_VALUE: 0

## 2018-09-25 ASSESSMENT — PAIN SCALES - GENERAL: PAINLEVEL_OUTOF10: 0

## 2018-09-25 NOTE — OP NOTE
Pre-Procedure Note    Patient Name: Darrick Gillette   YOB: 1970  Medical Record Number: [id number]  Date: 8/20/2018       Indication:  Lower back and LLE pain    Consent: On file. Vital Signs:   Vitals:    09/25/18 1422   BP: (!) 152/83   Pulse: 61   Resp: 16   Temp: 97.6 °F (36.4 °C)   SpO2: 98%       Past Medical History:   has a past medical history of Anemia; CKD (chronic kidney disease), stage III; Dyslipidemia; Hematuria; Hyperlipidemia; Hypertension; Hyperuricemia; Hypothyroidism; Kidney disease; Lupus; Lupus nephritis ([facility] [address] Proteinuria; Seizures ([facility] [address] and Systemic lupus erythematosus ([facility] [address] Past Surgical History:   has a past surgical history that includes Nerve Block (Bilateral); pr inj dx/ther agnt paravert facet joint, lumbar/sac, 2nd level (Bilateral, 5/7/2018); and pr njx dx/ther sbst intrlmnr lmbr/sac w/img gdn (N/A, 7/19/2018). Pre-Sedation Documentation and Exam:   Vital signs have been reviewed (see flow sheet for vitals). Sedation/ Anesthesia Plan:   MAC    Patient is an appropriate candidate for plan of sedation: yes    Preoperative Diagnosis: L-radiculopathy, L-spinal stenosis    Post-Op Dx: as above    Procedure Performed:  Transforaminal lumbar epidural steroid injection at the levels of L5 on the Left side under fluoroscopic guidance# 1. Indication for the Procedure: The patient failed conservative management  for pain in the low back radiating to lower extremities. As the patient is not responding to conservative management and pain is interfering with activities of daily living, we decided to proceed with transforaminal lumbar epidural steroid injection as symptoms are mostly following the nerve root distribution. The procedure and the risks were discussed with the patient and informed consent was obtained. Procedure: Left     A meaningful communication was kept up with the patient throughout   the procedure. The patient is placed in prone position.

## 2018-09-25 NOTE — ANESTHESIA PRE PROCEDURE
(ATARAX) 25 MG tablet 1 po tid Do not take while driving 0/85/08   MACKENZIE Larose CNP       Current medications:    No current facility-administered medications for this encounter. Allergies:     Allergies   Allergen Reactions    Codeine Hives and Nausea And Vomiting       Problem List:    Patient Active Problem List   Diagnosis Code    Hyperlipidemia E78.5    Hypothyroid E03.9    Systemic lupus erythematosus ([address] M32.9    Hematuria     Proteinuria R80.9    Anemia D64.9    Lupus nephritis ([facility] [address] M32.14    CKD (chronic kidney disease), stage III N18.3    Dyslipidemia E78.5    Hyperuricemia E79.0    Lumbar spondylosis M47.816    Lumbar radiculitis M54.16    Spinal stenosis of lumbar region with neurogenic claudication M48.062       Past Medical History:        Diagnosis Date    Anemia     CKD (chronic kidney disease), stage III     Dyslipidemia     Hematuria     Hyperlipidemia     Hypertension     Hyperuricemia     Hypothyroidism     Kidney disease     as a result from lupus    Lupus     Lupus nephritis (HCC)     Proteinuria     Seizures (HCC)     Systemic lupus erythematosus (HCC)        Past Surgical History:        Procedure Laterality Date    NERVE BLOCK Bilateral     LUMBAR FACET    CO INJ DX/THER AGNT PARAVERT FACET JOINT, LUMBAR/SAC, 2ND LEVEL Bilateral 5/7/2018    LUMBAR FACET  [email or web address] Bilateral performed by Bonnie Connolly MD at [address] [facility] DX/THER SBST INTRLMNR LMBR/SAC W/IMG GDN N/A 7/19/2018    LUMBAR INTER LAMINAR GIGI LESI @L4 performed by Bonnie Connolly MD at [address] [place] [address]       Social History:    Social History   Substance Use Topics    Smoking status: Current Some Day Smoker     Types: Cigarettes    Smokeless tobacco: Never Used      Comment: 1 a day    Alcohol use 0.0 oz/week      Comment: occasionally, couple times a year                                Ready to quit: Not Answered  Counseling given: Not Answered      Vital Signs (Current):   Vitals:    09/25/18 1422   BP: (!) 152/83   Pulse: 61   Resp: 16   Temp: 97.6 °F (36.4 °C)   TempSrc: Temporal   SpO2: 98%   Weight: 185 lb (83.9 kg)   Height: 5' 6\" (1.676 m)                                              BP Readings from Last 3 Encounters:   09/25/18 (!) 152/83   09/12/18 118/62   08/24/18 110/62       NPO Status: Time of last liquid consumption: 1000 (sip of water with med)                        Time of last solid consumption: 2100                        Date of last liquid consumption: 09/25/18                        Date of last solid food consumption: 09/24/18    BMI:   Wt Readings from Last 3 Encounters:   09/25/18 185 lb (83.9 kg)   09/12/18 186 lb (84.4 kg)   08/24/18 189 lb 9.6 oz (86 kg)     Body mass index is 29.86 kg/m². CBC:   Lab Results   Component Value Date    WBC 10.9 05/25/2018    RBC 4.28 05/25/2018    HGB 12.0 05/25/2018    HCT 36.9 05/25/2018    MCV 86.2 05/25/2018    RDW 14.6 05/25/2018     05/25/2018       CMP:   Lab Results   Component Value Date     06/21/2018    K 4.1 06/21/2018     06/21/2018    CO2 25 06/21/2018    BUN 18 06/21/2018    CREATININE 1.18 06/21/2018    AGRATIO 1.9 01/18/2015    LABGLOM 25 05/25/2018    GLUCOSE 105 06/21/2018    PROT 6.1 09/09/2017    CALCIUM 9.9 06/21/2018    BILITOT 0.3 09/09/2017    ALKPHOS 69 09/09/2017    AST 18 09/09/2017    ALT 17 09/09/2017       POC Tests: No results for input(s): POCGLU, POCNA, POCK, POCCL, POCBUN, POCHEMO, POCHCT in the last 72 hours.     Coags: No results found for: PROTIME, INR, APTT    HCG (If Applicable):   Lab Results   Component Value Date    PREGTESTUR negative 07/19/2018    PREGSERUM NEGATIVE 11/28/2015        ABGs: No results found for: PHART, PO2ART, CXU0VAQ, JME3KQS, BEART, M4KFFAAY     Type & Screen (If Applicable):  No results found for: LABABO, [address]    Anesthesia Evaluation  Patient summary reviewed  Airway: Mallampati: II  TM distance: >3

## 2018-09-28 DIAGNOSIS — M54.40 BACK PAIN OF LUMBAR REGION WITH SCIATICA: ICD-10-CM

## 2018-10-01 ENCOUNTER — TELEPHONE (OUTPATIENT)
Dept: FAMILY MEDICINE CLINIC | Age: 48
End: 2018-10-01

## 2018-10-01 DIAGNOSIS — J30.89 NON-SEASONAL ALLERGIC RHINITIS DUE TO OTHER ALLERGIC TRIGGER: Primary | ICD-10-CM

## 2018-10-01 RX ORDER — LORATADINE 10 MG/1
10 TABLET ORAL DAILY
Qty: 30 TABLET | Refills: 3 | Status: SHIPPED | OUTPATIENT
Start: 2018-10-01 | End: 2018-12-12 | Stop reason: SDUPTHER

## 2018-10-01 RX ORDER — FLUTICASONE PROPIONATE 50 MCG
1 SPRAY, SUSPENSION (ML) NASAL DAILY
Qty: 1 BOTTLE | Refills: 3 | Status: SHIPPED | OUTPATIENT
Start: 2018-10-01 | End: 2018-10-23

## 2018-10-01 RX ORDER — ACETAMINOPHEN 500 MG
TABLET ORAL
Qty: 120 TABLET | Refills: 0 | Status: SHIPPED | OUTPATIENT
Start: 2018-10-01 | End: 2018-11-19 | Stop reason: SDUPTHER

## 2018-10-02 ENCOUNTER — OFFICE VISIT (OUTPATIENT)
Dept: FAMILY MEDICINE CLINIC | Age: 48
End: 2018-10-02
Payer: COMMERCIAL

## 2018-10-02 ENCOUNTER — TELEPHONE (OUTPATIENT)
Dept: FAMILY MEDICINE CLINIC | Age: 48
End: 2018-10-02

## 2018-10-02 VITALS
WEIGHT: 189.6 LBS | HEART RATE: 89 BPM | TEMPERATURE: 98.6 F | SYSTOLIC BLOOD PRESSURE: 124 MMHG | BODY MASS INDEX: 30.47 KG/M2 | RESPIRATION RATE: 16 BRPM | HEIGHT: 66 IN | DIASTOLIC BLOOD PRESSURE: 72 MMHG

## 2018-10-02 DIAGNOSIS — M79.641 RIGHT HAND PAIN: ICD-10-CM

## 2018-10-02 DIAGNOSIS — M54.2 NECK PAIN: Primary | ICD-10-CM

## 2018-10-02 DIAGNOSIS — M54.40 BACK PAIN OF LUMBAR REGION WITH SCIATICA: ICD-10-CM

## 2018-10-02 PROCEDURE — G8417 CALC BMI ABV UP PARAM F/U: HCPCS | Performed by: NURSE PRACTITIONER

## 2018-10-02 PROCEDURE — 4004F PT TOBACCO SCREEN RCVD TLK: CPT | Performed by: NURSE PRACTITIONER

## 2018-10-02 PROCEDURE — 99213 OFFICE O/P EST LOW 20 MIN: CPT | Performed by: NURSE PRACTITIONER

## 2018-10-02 PROCEDURE — G8427 DOCREV CUR MEDS BY ELIG CLIN: HCPCS | Performed by: NURSE PRACTITIONER

## 2018-10-02 PROCEDURE — G8482 FLU IMMUNIZE ORDER/ADMIN: HCPCS | Performed by: NURSE PRACTITIONER

## 2018-10-02 RX ORDER — GABAPENTIN 300 MG/1
300 CAPSULE ORAL 3 TIMES DAILY
Qty: 90 CAPSULE | Refills: 2 | Status: SHIPPED | OUTPATIENT
Start: 2018-10-02 | End: 2019-03-12 | Stop reason: SDUPTHER

## 2018-10-02 RX ORDER — LIDOCAINE 5% 5 G/100G
1 CREAM TOPICAL 2 TIMES DAILY
Qty: 30 G | Refills: 2 | Status: SHIPPED | OUTPATIENT
Start: 2018-10-02 | End: 2018-12-30

## 2018-10-02 NOTE — PROGRESS NOTES
capsule; Take 1 capsule by mouth 3 times daily for 90 days. .  -     Lidocaine 5 % CREA; Apply 1 applicator topically 2 times daily    Right hand pain  -     gabapentin (NEURONTIN) 300 MG capsule; Take 1 capsule by mouth 3 times daily for 90 days. .        Return if symptoms worsen or fail to improve. Bay Teran received counseling on the following healthy behaviors: medication adherence  Reviewed prior labs and health maintenance. Continue current medications, diet and exercise. Discussed use, benefit, and side effects of prescribed medications. Barriers to medication compliance addressed. Patient given educational materials - see patient instructions. All patient questions answered. Patient voiced understanding.

## 2018-10-05 ENCOUNTER — TELEPHONE (OUTPATIENT)
Dept: FAMILY MEDICINE CLINIC | Age: 48
End: 2018-10-05

## 2018-10-05 DIAGNOSIS — M54.2 NECK PAIN: Primary | ICD-10-CM

## 2018-10-05 RX ORDER — DIBUCAINE 0.28 G/28G
OINTMENT TOPICAL
Qty: 56.7 G | Refills: 2 | Status: SHIPPED | OUTPATIENT
Start: 2018-10-05 | End: 2018-10-15

## 2018-10-05 NOTE — TELEPHONE ENCOUNTER
P. A.  DENIAL LIDOCAINE  CREAM 5% :  Not preferred drug , must try and fail at least 2 of the following  LIDOCAINE CREAM ( 3% OR 4% ) , LIDOCAINE-PRILOCAINE CREAM , DIBUCAINE OINTMENT , LIDOCAINE GEL 2% ( generic when possible )

## 2018-10-17 DIAGNOSIS — M47.816 SPONDYLOSIS OF LUMBAR REGION WITHOUT MYELOPATHY OR RADICULOPATHY: ICD-10-CM

## 2018-10-17 RX ORDER — HYDROCODONE BITARTRATE AND ACETAMINOPHEN 5; 325 MG/1; MG/1
1 TABLET ORAL 2 TIMES DAILY PRN
Qty: 60 TABLET | Refills: 0 | Status: SHIPPED | OUTPATIENT
Start: 2018-10-20 | End: 2018-11-19 | Stop reason: SDUPTHER

## 2018-10-23 ENCOUNTER — PROCEDURE VISIT (OUTPATIENT)
Dept: NEUROLOGY | Age: 48
End: 2018-10-23
Payer: COMMERCIAL

## 2018-10-23 ENCOUNTER — OFFICE VISIT (OUTPATIENT)
Dept: PHYSICAL MEDICINE AND REHAB | Age: 48
End: 2018-10-23
Payer: COMMERCIAL

## 2018-10-23 VITALS
WEIGHT: 189.6 LBS | HEIGHT: 66 IN | BODY MASS INDEX: 30.47 KG/M2 | SYSTOLIC BLOOD PRESSURE: 116 MMHG | HEART RATE: 66 BPM | DIASTOLIC BLOOD PRESSURE: 73 MMHG

## 2018-10-23 DIAGNOSIS — M54.16 LUMBAR RADICULOPATHY: Primary | ICD-10-CM

## 2018-10-23 DIAGNOSIS — M48.062 LUMBAR STENOSIS WITH NEUROGENIC CLAUDICATION: ICD-10-CM

## 2018-10-23 DIAGNOSIS — G56.01 RIGHT CARPAL TUNNEL SYNDROME: Primary | ICD-10-CM

## 2018-10-23 DIAGNOSIS — G89.4 CHRONIC PAIN SYNDROME: ICD-10-CM

## 2018-10-23 DIAGNOSIS — R20.0 NUMBNESS OF RIGHT HAND: ICD-10-CM

## 2018-10-23 PROCEDURE — 95909 NRV CNDJ TST 5-6 STUDIES: CPT | Performed by: PSYCHIATRY & NEUROLOGY

## 2018-10-23 PROCEDURE — 99213 OFFICE O/P EST LOW 20 MIN: CPT | Performed by: NURSE PRACTITIONER

## 2018-10-23 PROCEDURE — 95886 MUSC TEST DONE W/N TEST COMP: CPT | Performed by: PSYCHIATRY & NEUROLOGY

## 2018-10-23 ASSESSMENT — ENCOUNTER SYMPTOMS
CONSTIPATION: 0
DIARRHEA: 0
BACK PAIN: 1
VOMITING: 0
NAUSEA: 0

## 2018-10-30 ENCOUNTER — TELEPHONE (OUTPATIENT)
Dept: FAMILY MEDICINE CLINIC | Age: 48
End: 2018-10-30

## 2018-10-30 ENCOUNTER — TELEPHONE (OUTPATIENT)
Dept: PHYSICAL MEDICINE AND REHAB | Age: 48
End: 2018-10-30

## 2018-11-13 ENCOUNTER — TELEPHONE (OUTPATIENT)
Dept: OPERATING ROOM | Age: 48
End: 2018-11-13

## 2018-11-19 DIAGNOSIS — M47.816 SPONDYLOSIS OF LUMBAR REGION WITHOUT MYELOPATHY OR RADICULOPATHY: ICD-10-CM

## 2018-11-19 DIAGNOSIS — M54.40 BACK PAIN OF LUMBAR REGION WITH SCIATICA: ICD-10-CM

## 2018-11-19 RX ORDER — ACETAMINOPHEN 500 MG
TABLET ORAL
Qty: 120 TABLET | Refills: 0 | Status: SHIPPED | OUTPATIENT
Start: 2018-11-19 | End: 2019-01-24 | Stop reason: SDUPTHER

## 2018-11-19 RX ORDER — HYDROCODONE BITARTRATE AND ACETAMINOPHEN 5; 325 MG/1; MG/1
1 TABLET ORAL 2 TIMES DAILY PRN
Qty: 60 TABLET | Refills: 0 | Status: SHIPPED | OUTPATIENT
Start: 2018-11-19 | End: 2018-12-18 | Stop reason: SDUPTHER

## 2018-11-19 RX ORDER — HYDROCODONE BITARTRATE AND ACETAMINOPHEN 5; 325 MG/1; MG/1
1 TABLET ORAL 2 TIMES DAILY PRN
Qty: 60 TABLET | Refills: 0 | Status: SHIPPED | OUTPATIENT
Start: 2018-11-19 | End: 2018-11-19 | Stop reason: SDUPTHER

## 2018-11-19 NOTE — TELEPHONE ENCOUNTER
Rx needs sent to [facility] on [address]. Canceled at [facility].  Thanks

## 2018-11-21 ENCOUNTER — TELEPHONE (OUTPATIENT)
Dept: PHYSICAL MEDICINE AND REHAB | Age: 48
End: 2018-11-21

## 2018-11-26 DIAGNOSIS — M54.40 BACK PAIN OF LUMBAR REGION WITH SCIATICA: ICD-10-CM

## 2018-11-26 RX ORDER — ACETAMINOPHEN 500 MG
TABLET ORAL
Qty: 120 TABLET | Refills: 0 | OUTPATIENT
Start: 2018-11-26

## 2018-11-27 ENCOUNTER — TELEPHONE (OUTPATIENT)
Dept: FAMILY MEDICINE CLINIC | Age: 48
End: 2018-11-27

## 2018-11-27 NOTE — TELEPHONE ENCOUNTER
Patient is wanting to know if you can send a letter to [employer] in regards to Berenice Joy getting her electric turned back on. It needs to say that she would need electricity because she would get sick and possibly end up in the hospital.    Please advise patient.

## 2018-11-28 ENCOUNTER — TELEPHONE (OUTPATIENT)
Dept: FAMILY MEDICINE CLINIC | Age: 48
End: 2018-11-28

## 2018-11-28 DIAGNOSIS — D64.9 ANEMIA, UNSPECIFIED TYPE: ICD-10-CM

## 2018-11-28 RX ORDER — LANOLIN ALCOHOL/MO/W.PET/CERES
CREAM (GRAM) TOPICAL
Qty: 90 TABLET | Refills: 1 | Status: SHIPPED | OUTPATIENT
Start: 2018-11-28 | End: 2019-02-19 | Stop reason: SDUPTHER

## 2018-11-28 NOTE — TELEPHONE ENCOUNTER
She is to be on the 112 mcg dose have her call her pharmacy and cancel the 100 mcg dose so it does not keep coming up as  A refill.

## 2018-12-04 ENCOUNTER — PREP FOR PROCEDURE (OUTPATIENT)
Dept: PHYSICAL MEDICINE AND REHAB | Age: 48
End: 2018-12-04

## 2018-12-04 NOTE — H&P
Sonny Rahman is a 57 y.o. female is here today for     Chief Complaint: Low back pain          The patientis allergic to codeine. Past Medical History  Andrea Pepe  has a past medical history of Anemia; CKD (chronic kidney disease), stage III ([address] Dyslipidemia; Hematuria; Hyperlipidemia; Hypertension; Hyperuricemia; Hypothyroidism; Kidney disease; Lupus; Lupus nephritis ([address] Proteinuria; Seizures ([address] and Systemic lupus erythematosus ([facility] [address] Past Surgical History  The patient  has a past surgical history that includes Nerve Block (Bilateral); pr inj dx/ther agnt paravert facet joint, lumbar/sac, 2nd level (Bilateral, 5/7/2018); pr njx dx/ther sbst intrlmnr lmbr/sac w/img gdn (N/A, 7/19/2018); and pr inject anes/steroid foramen lumbar/sacral w img guide ,1 level (Left, 9/25/2018). Family History  This patient's family history includes Diabetes in her father and mother; Heart Disease in her mother; Parkinsonism in her mother; Thyroid Disease in her mother. Social History  Andrea Pepe  reports that she has been smoking Cigarettes. She has never used smokeless tobacco. She reports that she drinks alcohol. She reports that she does not use drugs. Medications    Current Medication      Current Outpatient Prescriptions:     HYDROcodone-acetaminophen (NORCO) 5-325 MG per tablet, Take 1 tablet by mouth 2 times daily as needed for Pain for up to 30 days. ., Disp: 60 tablet, Rfl: 0    gabapentin (NEURONTIN) 300 MG capsule, Take 1 capsule by mouth 3 times daily for 90 days. ., Disp: 90 capsule, Rfl: 2    Lidocaine 5 % CREA, Apply 1 applicator topically 2 times daily, Disp: 30 g, Rfl: 2    RA ACETAMINOPHEN EX  MG tablet, take 2 tablets by mouth every 6 hours if needed for pain, Disp: 120 tablet, Rfl: 0    loratadine (CLARITIN) 10 MG tablet, Take 1 tablet by mouth daily, Disp: 30 tablet, Rfl: 3    levothyroxine (SYNTHROID) 112 MCG tablet, take 1 tablet by mouth once daily, Disp: 90 tablet, Rfl: 1    simvastatin (ZOCOR) 40 MG tablet, take 1 tablet by mouth at bedtime, Disp: 90 tablet, Rfl: 1    ferrous sulfate 325 (65 Fe) MG tablet, Take 1 tablet by mouth daily, Disp: 90 tablet, Rfl: 1    Calcium Carbonate-Vitamin D (OYSTER SHELL CALCIUM/D) 500-200 MG-UNIT TABS, take 1 tablet once daily, Disp: 90 tablet, Rfl: 3    ondansetron (ZOFRAN ODT) 4 MG disintegrating tablet, Take 1 tablet by mouth every 8 hours as needed for Nausea, Disp: 20 tablet, Rfl: 0    ondansetron (ZOFRAN) 4 MG tablet, Take 1 tablet by mouth every 8 hours as needed for Nausea or Vomiting, Disp: 15 tablet, Rfl: 0    hydrOXYzine (ATARAX) 25 MG tablet, 1 po tid Do not take while driving, Disp: 30 tablet, Rfl: 0    Misc. Devices MISC, 1 each by Does not apply route once as needed (pain) One exercise ball M54.4, Disp: 1 Device, Rfl: 0        Subjective:      Review of Systems   Constitutional: Positive for activity change and fatigue. Negative for diaphoresis and fever. Gastrointestinal: Negative for constipation, diarrhea, nausea and vomiting. Musculoskeletal: Positive for arthralgias, back pain, gait problem and myalgias. Neurological: Positive for seizures. Negative for dizziness, light-headedness and numbness. Objective:      Vitals                              Weight: 189 lb 9.5 oz (86 kg)   Height: 5' 5.98\" (1.676 m)            Physical Exam   Constitutional: She is oriented to person, place, and time. She appears well-developed and well-nourished. No distress. HENT:   Head: Normocephalic and atraumatic. Eyes: Right eye exhibits no discharge. Left eye exhibits no discharge. Neck: Normal range of motion. Neck supple. No tracheal deviation present. No thyromegaly present. Pulmonary/Chest: Effort normal. No respiratory distress. Musculoskeletal: She exhibits no edema. Left hip: She exhibits tenderness. She exhibits normal range of motion and normal strength.         Lumbar back: She exhibits decreased range of motion and tenderness. She exhibits no pain and no spasm. Back:         Legs:  Neurological: She is alert and oriented to person, place, and time. Skin: Skin is warm and dry. No rash noted. She is not diaphoretic. No erythema. Psychiatric: She has a normal mood and affect. Her behavior is normal. Judgment and thought content normal.      Assessment:      1. Lumbar radiculopathy    2. Lumbar stenosis with neurogenic claudication    3.  Chronic pain syndrome        Review of Systems    Physical Exam      Plan:  TFESI L5 LEFT #2      MACKENZIE Yancey - CNP  12/4/2018

## 2018-12-10 ENCOUNTER — TELEPHONE (OUTPATIENT)
Dept: FAMILY MEDICINE CLINIC | Age: 48
End: 2018-12-10

## 2018-12-12 ENCOUNTER — OFFICE VISIT (OUTPATIENT)
Dept: FAMILY MEDICINE CLINIC | Age: 48
End: 2018-12-12
Payer: COMMERCIAL

## 2018-12-12 VITALS
WEIGHT: 189 LBS | DIASTOLIC BLOOD PRESSURE: 66 MMHG | SYSTOLIC BLOOD PRESSURE: 108 MMHG | RESPIRATION RATE: 14 BRPM | TEMPERATURE: 98.1 F | BODY MASS INDEX: 29.66 KG/M2 | HEART RATE: 64 BPM | HEIGHT: 67 IN

## 2018-12-12 DIAGNOSIS — J30.89 NON-SEASONAL ALLERGIC RHINITIS DUE TO OTHER ALLERGIC TRIGGER: ICD-10-CM

## 2018-12-12 DIAGNOSIS — E03.9 ACQUIRED HYPOTHYROIDISM: ICD-10-CM

## 2018-12-12 DIAGNOSIS — M79.641 RIGHT HAND PAIN: ICD-10-CM

## 2018-12-12 DIAGNOSIS — M54.40 BACK PAIN OF LUMBAR REGION WITH SCIATICA: Primary | ICD-10-CM

## 2018-12-12 PROCEDURE — G8417 CALC BMI ABV UP PARAM F/U: HCPCS | Performed by: NURSE PRACTITIONER

## 2018-12-12 PROCEDURE — G8427 DOCREV CUR MEDS BY ELIG CLIN: HCPCS | Performed by: NURSE PRACTITIONER

## 2018-12-12 PROCEDURE — 4004F PT TOBACCO SCREEN RCVD TLK: CPT | Performed by: NURSE PRACTITIONER

## 2018-12-12 PROCEDURE — G8482 FLU IMMUNIZE ORDER/ADMIN: HCPCS | Performed by: NURSE PRACTITIONER

## 2018-12-12 PROCEDURE — 99214 OFFICE O/P EST MOD 30 MIN: CPT | Performed by: NURSE PRACTITIONER

## 2018-12-12 RX ORDER — LEVOTHYROXINE SODIUM 112 UG/1
TABLET ORAL
Qty: 90 TABLET | Refills: 1 | Status: SHIPPED | OUTPATIENT
Start: 2018-12-12 | End: 2019-10-22 | Stop reason: SDUPTHER

## 2018-12-12 RX ORDER — LORATADINE 10 MG/1
10 TABLET ORAL DAILY
Qty: 90 TABLET | Refills: 1 | Status: SHIPPED | OUTPATIENT
Start: 2018-12-12 | End: 2019-05-01 | Stop reason: SDUPTHER

## 2018-12-13 ENCOUNTER — HOSPITAL ENCOUNTER (OUTPATIENT)
Age: 48
Setting detail: OUTPATIENT SURGERY
Discharge: HOME | End: 2018-12-13
Attending: PAIN MEDICINE | Admitting: PAIN MEDICINE
Payer: COMMERCIAL

## 2018-12-13 ENCOUNTER — ANESTHESIA (OUTPATIENT)
Dept: OPERATING ROOM | Age: 48
End: 2018-12-13
Payer: COMMERCIAL

## 2018-12-13 ENCOUNTER — ANESTHESIA EVENT (OUTPATIENT)
Dept: OPERATING ROOM | Age: 48
End: 2018-12-13
Payer: COMMERCIAL

## 2018-12-13 ENCOUNTER — APPOINTMENT (OUTPATIENT)
Dept: GENERAL RADIOLOGY | Age: 48
End: 2018-12-13
Attending: PAIN MEDICINE
Payer: COMMERCIAL

## 2018-12-13 VITALS
TEMPERATURE: 99.6 F | OXYGEN SATURATION: 93 % | RESPIRATION RATE: 16 BRPM | SYSTOLIC BLOOD PRESSURE: 110 MMHG | DIASTOLIC BLOOD PRESSURE: 64 MMHG | HEART RATE: 74 BPM | WEIGHT: 185 LBS | HEIGHT: 66 IN | BODY MASS INDEX: 29.73 KG/M2

## 2018-12-13 VITALS
RESPIRATION RATE: 8 BRPM | SYSTOLIC BLOOD PRESSURE: 129 MMHG | OXYGEN SATURATION: 100 % | DIASTOLIC BLOOD PRESSURE: 77 MMHG

## 2018-12-13 LAB — PREGNANCY, URINE: NEGATIVE

## 2018-12-13 PROCEDURE — 6360000004 HC RX CONTRAST MEDICATION: Performed by: PAIN MEDICINE

## 2018-12-13 PROCEDURE — 7100000011 HC PHASE II RECOVERY - ADDTL 15 MIN: Performed by: PAIN MEDICINE

## 2018-12-13 PROCEDURE — 7100000010 HC PHASE II RECOVERY - FIRST 15 MIN: Performed by: PAIN MEDICINE

## 2018-12-13 PROCEDURE — 2580000003 HC RX 258: Performed by: PAIN MEDICINE

## 2018-12-13 PROCEDURE — 3700000000 HC ANESTHESIA ATTENDED CARE: Performed by: PAIN MEDICINE

## 2018-12-13 PROCEDURE — 64483 NJX AA&/STRD TFRM EPI L/S 1: CPT | Performed by: PAIN MEDICINE

## 2018-12-13 PROCEDURE — 3600000054 HC PAIN LEVEL 3 BASE: Performed by: PAIN MEDICINE

## 2018-12-13 PROCEDURE — 2500000003 HC RX 250 WO HCPCS: Performed by: PAIN MEDICINE

## 2018-12-13 PROCEDURE — 2709999900 HC NON-CHARGEABLE SUPPLY: Performed by: PAIN MEDICINE

## 2018-12-13 PROCEDURE — 6360000002 HC RX W HCPCS: Performed by: NURSE ANESTHETIST, CERTIFIED REGISTERED

## 2018-12-13 PROCEDURE — 6360000002 HC RX W HCPCS: Performed by: PAIN MEDICINE

## 2018-12-13 PROCEDURE — 81025 URINE PREGNANCY TEST: CPT

## 2018-12-13 PROCEDURE — 3209999900 FLUORO FOR SURGICAL PROCEDURES

## 2018-12-13 PROCEDURE — 2580000003 HC RX 258: Performed by: NURSE ANESTHETIST, CERTIFIED REGISTERED

## 2018-12-13 RX ORDER — ROPIVACAINE HYDROCHLORIDE 2 MG/ML
INJECTION, SOLUTION EPIDURAL; INFILTRATION; PERINEURAL PRN
Status: DISCONTINUED | OUTPATIENT
Start: 2018-12-13 | End: 2018-12-13 | Stop reason: HOSPADM

## 2018-12-13 RX ORDER — LIDOCAINE HYDROCHLORIDE 10 MG/ML
INJECTION, SOLUTION INFILTRATION; PERINEURAL PRN
Status: DISCONTINUED | OUTPATIENT
Start: 2018-12-13 | End: 2018-12-13 | Stop reason: HOSPADM

## 2018-12-13 RX ORDER — SODIUM CHLORIDE 9 MG/ML
INJECTION, SOLUTION INTRAVENOUS CONTINUOUS PRN
Status: DISCONTINUED | OUTPATIENT
Start: 2018-12-13 | End: 2018-12-13 | Stop reason: SDUPTHER

## 2018-12-13 RX ORDER — ONDANSETRON 2 MG/ML
INJECTION INTRAMUSCULAR; INTRAVENOUS PRN
Status: DISCONTINUED | OUTPATIENT
Start: 2018-12-13 | End: 2018-12-13 | Stop reason: SDUPTHER

## 2018-12-13 RX ORDER — FENTANYL CITRATE 50 UG/ML
INJECTION, SOLUTION INTRAMUSCULAR; INTRAVENOUS PRN
Status: DISCONTINUED | OUTPATIENT
Start: 2018-12-13 | End: 2018-12-13 | Stop reason: SDUPTHER

## 2018-12-13 RX ORDER — DEXAMETHASONE SODIUM PHOSPHATE 4 MG/ML
INJECTION, SOLUTION INTRA-ARTICULAR; INTRALESIONAL; INTRAMUSCULAR; INTRAVENOUS; SOFT TISSUE PRN
Status: DISCONTINUED | OUTPATIENT
Start: 2018-12-13 | End: 2018-12-13 | Stop reason: HOSPADM

## 2018-12-13 RX ORDER — SODIUM CHLORIDE 9 MG/ML
INJECTION, SOLUTION INTRAVENOUS CONTINUOUS
Status: DISCONTINUED | OUTPATIENT
Start: 2018-12-13 | End: 2018-12-13 | Stop reason: HOSPADM

## 2018-12-13 RX ADMIN — SODIUM CHLORIDE: 9 INJECTION, SOLUTION INTRAVENOUS at 14:58

## 2018-12-13 RX ADMIN — FENTANYL CITRATE 50 MCG: 50 INJECTION INTRAMUSCULAR; INTRAVENOUS at 15:04

## 2018-12-13 RX ADMIN — SODIUM CHLORIDE: 9 INJECTION, SOLUTION INTRAVENOUS at 14:41

## 2018-12-13 RX ADMIN — ONDANSETRON HYDROCHLORIDE 4 MG: 4 INJECTION, SOLUTION INTRAMUSCULAR; INTRAVENOUS at 15:00

## 2018-12-13 RX ADMIN — FENTANYL CITRATE 50 MCG: 50 INJECTION INTRAMUSCULAR; INTRAVENOUS at 15:00

## 2018-12-13 ASSESSMENT — PULMONARY FUNCTION TESTS
PIF_VALUE: 0

## 2018-12-13 ASSESSMENT — PAIN SCALES - GENERAL: PAINLEVEL_OUTOF10: 0

## 2018-12-13 ASSESSMENT — PAIN DESCRIPTION - DESCRIPTORS: DESCRIPTORS: CONSTANT

## 2018-12-13 ASSESSMENT — PAIN - FUNCTIONAL ASSESSMENT: PAIN_FUNCTIONAL_ASSESSMENT: 0-10

## 2018-12-13 NOTE — OP NOTE
up with the patient throughout   the procedure. The patient is placed in prone position. The skin over the back was prepped and draped in sterile manner. Then the skin and deep tissues just above the tip of the transverse process of L-5 vertebra on Left side were infiltrated with about 3 ml of 1% lidocaine. The /#22-gauge, 3.5 inch spinal needle was inserted through the skin wheal  and under fluoroscopy guidance was directed such that the tip of the needle lies in the neuroforamina at about the 6 o'clock position under the pedicle of the L-5 vertebra. This was confirmed with AP and lateral views of the fluoroscopy. Then after negative aspiration a total of 2 ml of Omnipaque-180 was injected through the needle and spread of the contrast in the epidural space as well as along the nerve root was observed. Then after negative aspiration a total of 6 mg of Dexamethasone and 2 ml of 0.2%  Ropivacaine MPF was injected through the needle. The needle is removed and a Band-Aid was placed over the needle  insertion site. The patient's vital signs remained stable and the patient tolerated the procedure well. The patient was discharged home in stable condition and will be followed in the pain clinic in the next few weeks for further planning.       Electronically signed by Jaleel Aguirre MD on 12/13/2018 at 3:07 PM

## 2018-12-13 NOTE — PROGRESS NOTES
1506: Patient to phase 2 recovery room via cart. Patient is awake and alert. Report received from surgical RN, Nisha Diana. Patient's vitals obtained, see charting. 1507: Patient is denying pain and nausea at this time. Patient is also denying numbness/tingling in her lower extremities. 1510: Patient's pedal push/pull is strong and equal bilateral.   1512: Offered drink and snack provided to the patient. Bed is in the lowest position, call light is within reach and patient's belongings returned to the patient. 1518: Patient's friend is at the bedside. 1525: Patient is resting in bed, no needs or complaints at this time. Patient stating she feels like she can't curl her left toes- will continue to monitor. 32 61 16: More orange juice given. Friend remains at the bedside. 1556: Patient is continuing to deny numbness/tingling in her lower extremities. Patient stating she feels like her feet are \"awake\" now and feel back to \"normal\". Patient's pedal push/pull is strong and equal bilateral.   1557: Discharge instructions given and explained to the patient and the patient's friend, both verbalized understanding. 1559: IV removed at this time, no complications and dressing applied. 1604: Redness noted on patient's lower chest when she was getting dressed. It does not itch and no hives noted. Patient educated to keep an eye on it, she verbalized understanding. 1611: Patient discharged home in stable condition with her friend.

## 2018-12-14 ENCOUNTER — TELEPHONE (OUTPATIENT)
Dept: PHYSICAL MEDICINE AND REHAB | Age: 48
End: 2018-12-14

## 2018-12-14 NOTE — TELEPHONE ENCOUNTER
Should reside with time. Does patient have the capability of checking her blood pressure?  If symptoms get worse and are very bother some to the patient, especially over the weekend when we are not in  then She should be seen at an urgent care

## 2018-12-16 ENCOUNTER — HOSPITAL ENCOUNTER (EMERGENCY)
Age: 48
Discharge: HOME | End: 2018-12-16
Payer: COMMERCIAL

## 2018-12-16 VITALS
HEART RATE: 91 BPM | HEIGHT: 66 IN | DIASTOLIC BLOOD PRESSURE: 75 MMHG | OXYGEN SATURATION: 99 % | SYSTOLIC BLOOD PRESSURE: 132 MMHG | WEIGHT: 185 LBS | BODY MASS INDEX: 29.73 KG/M2 | TEMPERATURE: 98.2 F | RESPIRATION RATE: 16 BRPM

## 2018-12-16 DIAGNOSIS — J06.9 VIRAL URI: Primary | ICD-10-CM

## 2018-12-16 LAB
FLU A ANTIGEN: NEGATIVE
FLU B ANTIGEN: NEGATIVE
GROUP A STREP CULTURE, REFLEX: NEGATIVE
REFLEX THROAT C + S: NORMAL

## 2018-12-16 PROCEDURE — 87070 CULTURE OTHR SPECIMN AEROBIC: CPT

## 2018-12-16 PROCEDURE — 99283 EMERGENCY DEPT VISIT LOW MDM: CPT

## 2018-12-16 PROCEDURE — 87804 INFLUENZA ASSAY W/OPTIC: CPT

## 2018-12-16 PROCEDURE — 87880 STREP A ASSAY W/OPTIC: CPT

## 2018-12-16 RX ORDER — BENZONATATE 100 MG/1
100 CAPSULE ORAL 3 TIMES DAILY PRN
Qty: 30 CAPSULE | Refills: 0 | Status: SHIPPED | OUTPATIENT
Start: 2018-12-16 | End: 2018-12-23

## 2018-12-16 ASSESSMENT — ENCOUNTER SYMPTOMS
DIARRHEA: 0
SHORTNESS OF BREATH: 0
COUGH: 0
RHINORRHEA: 1
VOMITING: 0
TROUBLE SWALLOWING: 1
VOICE CHANGE: 1
ABDOMINAL PAIN: 0
EYE PAIN: 0
BACK PAIN: 0
EYE DISCHARGE: 0
WHEEZING: 0
SORE THROAT: 1
NAUSEA: 0

## 2018-12-16 ASSESSMENT — PAIN SCALES - GENERAL: PAINLEVEL_OUTOF10: 7

## 2018-12-16 NOTE — ED PROVIDER NOTES
[facility]  eMERGENCY dEPARTMENT eNCOUnter          [address] [facility]       Chief Complaint   Patient presents with    Pharyngitis    Nasal Congestion    Cough       Nurses Notes reviewed and I agree except as noted in the HPI. HISTORY OF PRESENT ILLNESS    Irene Car is a 57 y.o. female who presents to the Emergency Department for the evaluation of sore throat and cough onset 1 week ago. Patient admits to rhinorrhea, congestion, neck pain, pain with swallowing, voice change, and chills. Denies fever or itching or pain in the ears. She has tried OTC cough medications and drinking hot liquids with little relief. There are no other complaints at this time. Location/Symptom: cough  Timing/Onset: 1 week   Context/Setting: n/a  Quality: n/a  Duration: n/a  Modifying Factors: n/a  Severity: 7/10    REVIEW OF SYSTEMS     Review of Systems   Constitutional: Positive for chills. Negative for appetite change, fatigue and fever. HENT: Positive for congestion, rhinorrhea, sore throat, trouble swallowing (pain) and voice change. Negative for ear pain. Eyes: Negative for pain, discharge and visual disturbance. Respiratory: Negative for cough, shortness of breath and wheezing. Cardiovascular: Negative for chest pain, palpitations and leg swelling. Gastrointestinal: Negative for abdominal pain, diarrhea, nausea and vomiting. Genitourinary: Negative for difficulty urinating, dysuria, hematuria and vaginal discharge. Musculoskeletal: Positive for neck pain. Negative for arthralgias, back pain and joint swelling. Skin: Negative for pallor and rash. Neurological: Negative for dizziness, syncope, weakness, light-headedness, numbness and headaches. Hematological: Negative for adenopathy. Psychiatric/Behavioral: Negative for confusion and suicidal ideas. The patient is not nervous/anxious.          PAST MEDICAL HISTORY    has a past medical history of Anemia; CKD (chronic kidney adenopathy. Neurological: She is alert and oriented to person, place, and time. She exhibits normal muscle tone. Coordination normal.   Skin: Skin is warm and dry. No rash noted. She is not diaphoretic. Nursing note and vitals reviewed. DIFFERENTIAL DIAGNOSIS:   Viral illness, strep, influenza    DIAGNOSTIC RESULTS     EKG: All EKG's are interpreted by the Emergency Department Physician who either signs or Co-signs this chart in the absence of a cardiologist.    None     RADIOLOGY: non-plain film images(s) such as CT, Ultrasound and MRI are readby theradiologist.    No orders to display         LABS:   Labs Reviewed   RAPID INFLUENZA A/B ANTIGENS   THROAT CULTURE    Narrative:     Source: Specimen not received       Site:           Current Antibiotics:   GROUP A STREP, REFLEX       EMERGENCYDEPARTMENTCOURSE:   Vitals:    Vitals:    12/16/18 1738   BP: 132/75   Pulse: 91   Resp: 16   Temp: 98.2 °F (36.8 °C)   TempSrc: Oral   SpO2: 99%   Weight: 185 lb (83.9 kg)   Height: 5' 6\" (1.676 m)     Patient was seen history physical exam was performed. See disposition below    MDM:  The patient was seen within the ED today for the evaluation of cough and sore throat. The patient arrived in no acute distress and in stable condition. Within the department, I observed the patient's vital signs to be within acceptable range. On exam, I appreciated mucosal edema, oropharyngeal erythema, and cervical adenopathy. Laboratory work was reassuring. I observed the patient's condition to remain stable during the duration of her stay. I explained my proposed course of treatment to the patient, who was amenable to my decision, and I answered all questions that were asked. She was discharged home in stable condition with prescriptions for benzonatate, and the patient will return to the ED if her symptoms become more severe in nature or otherwise change. I advised the patient to follow-up with PCP as needed.  I also discussed

## 2018-12-17 ENCOUNTER — TELEPHONE (OUTPATIENT)
Dept: FAMILY MEDICINE CLINIC | Age: 48
End: 2018-12-17

## 2018-12-17 ENCOUNTER — NURSE TRIAGE (OUTPATIENT)
Dept: ADMINISTRATIVE | Age: 48
End: 2018-12-17

## 2018-12-17 NOTE — TELEPHONE ENCOUNTER
your cough is due to asthma or COPD  Reduce swelling of the airways  Will physical activity be limited? Your cough may interfere with some of your activities. What changes to diet are needed? Some people feel milk products worsen their sputum production and worsen their cough. There is no proof that this is true. There is no need to reduce milk intake. Honey has been shown to be as effective as the leading over-the-counter (OTC) drug for calming coughs. Use 1/2 to 1 teaspoon (2.5 mL to 5 mL) of honey as needed. It can thin the secretions and loosen the cough. Never give honey to a child under the age of 3. What can be done to prevent this health problem? Wash your hands often with soap and water for at least 20 seconds, especially after coughing or sneezing. Alcohol-based hand sanitizers also work to kill the virus. If you are sick, cover your mouth and nose with tissue when you cough or sneeze. You can also cough into your elbow. Throw away tissues in the trash and wash your hands after touching used tissues. Clean items and surfaces you most often touch like door handles, remotes, phones, or toys. Wipe them with a disinfectant. This can help reduce the spread of infection. Do not get too close (kissing, hugging) to people who are sick. Do not share towels or hankies with anyone who is sick. Stay away from crowded places. Get a flu shot each year. When do I need to call the doctor? Signs of infection. These include a fever of 100.4°F (38°C) or higher, chills, very bad sore throat, ear or sinus pain, cough, more mucus or change in color of mucus.   Wheezing, chest tightness, lips turn blue with coughing, or other problems with breathing  If you have violent coughing episodes  If you are losing weight (but not trying to) and have night sweats  If you have numbness, tingling, or swelling in your face, lips, tongue, or throat  If you have swelling in your legs  You are not feeling better in 2 to 3

## 2018-12-18 DIAGNOSIS — M47.816 SPONDYLOSIS OF LUMBAR REGION WITHOUT MYELOPATHY OR RADICULOPATHY: ICD-10-CM

## 2018-12-18 LAB — THROAT/NOSE CULTURE: NORMAL

## 2018-12-18 RX ORDER — HYDROCODONE BITARTRATE AND ACETAMINOPHEN 5; 325 MG/1; MG/1
1 TABLET ORAL 2 TIMES DAILY PRN
Qty: 60 TABLET | Refills: 0 | Status: SHIPPED | OUTPATIENT
Start: 2018-12-18 | End: 2018-12-18 | Stop reason: SDUPTHER

## 2018-12-18 NOTE — TELEPHONE ENCOUNTER
Wrong pharmacy, canceled at original. Can you please send to [facility].  Thanks

## 2018-12-19 RX ORDER — HYDROCODONE BITARTRATE AND ACETAMINOPHEN 5; 325 MG/1; MG/1
1 TABLET ORAL 2 TIMES DAILY PRN
Qty: 60 TABLET | Refills: 0 | Status: SHIPPED | OUTPATIENT
Start: 2018-12-19 | End: 2019-01-15 | Stop reason: SDUPTHER

## 2018-12-20 ENCOUNTER — TELEPHONE (OUTPATIENT)
Dept: PHYSICAL MEDICINE AND REHAB | Age: 48
End: 2018-12-20

## 2018-12-30 ENCOUNTER — HOSPITAL ENCOUNTER (EMERGENCY)
Dept: GENERAL RADIOLOGY | Age: 48
Discharge: HOME | End: 2018-12-30
Payer: COMMERCIAL

## 2018-12-30 ENCOUNTER — HOSPITAL ENCOUNTER (EMERGENCY)
Age: 48
Discharge: HOME | End: 2018-12-30
Payer: COMMERCIAL

## 2018-12-30 VITALS
TEMPERATURE: 98.2 F | BODY MASS INDEX: 30.05 KG/M2 | WEIGHT: 187 LBS | DIASTOLIC BLOOD PRESSURE: 71 MMHG | OXYGEN SATURATION: 97 % | SYSTOLIC BLOOD PRESSURE: 128 MMHG | RESPIRATION RATE: 16 BRPM | HEART RATE: 69 BPM | HEIGHT: 66 IN

## 2018-12-30 DIAGNOSIS — S22.31XA CLOSED FRACTURE OF ONE RIB OF RIGHT SIDE, INITIAL ENCOUNTER: ICD-10-CM

## 2018-12-30 DIAGNOSIS — S20.211A CONTUSION OF RIB ON RIGHT SIDE, INITIAL ENCOUNTER: Primary | ICD-10-CM

## 2018-12-30 PROCEDURE — 71100 X-RAY EXAM RIBS UNI 2 VIEWS: CPT

## 2018-12-30 PROCEDURE — 99213 OFFICE O/P EST LOW 20 MIN: CPT

## 2018-12-30 PROCEDURE — 99213 OFFICE O/P EST LOW 20 MIN: CPT | Performed by: NURSE PRACTITIONER

## 2018-12-30 RX ORDER — CYCLOBENZAPRINE HYDROCHLORIDE 7.5 MG/1
7.5 TABLET, FILM COATED ORAL 3 TIMES DAILY PRN
Qty: 30 TABLET | Refills: 0 | Status: SHIPPED | OUTPATIENT
Start: 2018-12-30 | End: 2019-01-09

## 2018-12-30 ASSESSMENT — ENCOUNTER SYMPTOMS: BACK PAIN: 0

## 2018-12-30 ASSESSMENT — PAIN SCALES - GENERAL: PAINLEVEL_OUTOF10: 8

## 2018-12-30 ASSESSMENT — PAIN DESCRIPTION - LOCATION: LOCATION: RIB CAGE

## 2018-12-30 ASSESSMENT — PAIN DESCRIPTION - ORIENTATION: ORIENTATION: RIGHT

## 2019-01-15 ENCOUNTER — OFFICE VISIT (OUTPATIENT)
Dept: PHYSICAL MEDICINE AND REHAB | Age: 49
End: 2019-01-15
Payer: COMMERCIAL

## 2019-01-15 ENCOUNTER — HOSPITAL ENCOUNTER (OUTPATIENT)
Age: 49
Discharge: HOME | End: 2019-01-15
Payer: COMMERCIAL

## 2019-01-15 VITALS
HEART RATE: 67 BPM | HEIGHT: 66 IN | SYSTOLIC BLOOD PRESSURE: 131 MMHG | WEIGHT: 187 LBS | DIASTOLIC BLOOD PRESSURE: 78 MMHG | BODY MASS INDEX: 30.05 KG/M2

## 2019-01-15 DIAGNOSIS — M46.1 BILATERAL SACROILIITIS (HCC): ICD-10-CM

## 2019-01-15 DIAGNOSIS — R80.1 PERSISTENT PROTEINURIA: ICD-10-CM

## 2019-01-15 DIAGNOSIS — G89.4 CHRONIC PAIN SYNDROME: ICD-10-CM

## 2019-01-15 DIAGNOSIS — M54.16 LUMBAR RADICULITIS: ICD-10-CM

## 2019-01-15 DIAGNOSIS — M54.16 LUMBAR RADICULOPATHY: Primary | ICD-10-CM

## 2019-01-15 DIAGNOSIS — M48.062 LUMBAR STENOSIS WITH NEUROGENIC CLAUDICATION: ICD-10-CM

## 2019-01-15 DIAGNOSIS — N18.30 CKD (CHRONIC KIDNEY DISEASE), STAGE III (HCC): ICD-10-CM

## 2019-01-15 DIAGNOSIS — M47.816 SPONDYLOSIS OF LUMBAR REGION WITHOUT MYELOPATHY OR RADICULOPATHY: ICD-10-CM

## 2019-01-15 LAB
ANION GAP SERPL CALCULATED.3IONS-SCNC: 9 MEQ/L (ref 8–16)
BUN BLDV-MCNC: 18 MG/DL (ref 7–22)
CALCIUM SERPL-MCNC: 9.6 MG/DL (ref 8.5–10.5)
CHLORIDE BLD-SCNC: 101 MEQ/L (ref 98–111)
CO2: 28 MEQ/L (ref 23–33)
CREAT SERPL-MCNC: 1.2 MG/DL (ref 0.4–1.2)
CREATININE URINE: 171.4 MG/DL
GFR SERPL CREATININE-BSD FRML MDRD: 48 ML/MIN/1.73M2
GLUCOSE BLD-MCNC: 85 MG/DL (ref 70–108)
POTASSIUM SERPL-SCNC: 4.7 MEQ/L (ref 3.5–5.2)
PROT/CREAT RATIO, UR: 0.74
PROTEIN, URINE: 126.4 MG/DL
SODIUM BLD-SCNC: 138 MEQ/L (ref 135–145)

## 2019-01-15 PROCEDURE — 80048 BASIC METABOLIC PNL TOTAL CA: CPT

## 2019-01-15 PROCEDURE — 82570 ASSAY OF URINE CREATININE: CPT

## 2019-01-15 PROCEDURE — 36415 COLL VENOUS BLD VENIPUNCTURE: CPT

## 2019-01-15 PROCEDURE — 84156 ASSAY OF PROTEIN URINE: CPT

## 2019-01-15 PROCEDURE — 99213 OFFICE O/P EST LOW 20 MIN: CPT | Performed by: NURSE PRACTITIONER

## 2019-01-15 RX ORDER — HYDROCODONE BITARTRATE AND ACETAMINOPHEN 5; 325 MG/1; MG/1
1 TABLET ORAL 2 TIMES DAILY PRN
Qty: 60 TABLET | Refills: 0 | Status: SHIPPED | OUTPATIENT
Start: 2019-01-20 | End: 2019-02-18 | Stop reason: SDUPTHER

## 2019-01-15 ASSESSMENT — ENCOUNTER SYMPTOMS
CONSTIPATION: 0
BACK PAIN: 1
NAUSEA: 0
VOMITING: 0
DIARRHEA: 0

## 2019-01-18 ENCOUNTER — TELEPHONE (OUTPATIENT)
Dept: PHYSICAL MEDICINE AND REHAB | Age: 49
End: 2019-01-18

## 2019-01-24 DIAGNOSIS — M54.40 BACK PAIN OF LUMBAR REGION WITH SCIATICA: ICD-10-CM

## 2019-01-24 RX ORDER — CYCLOBENZAPRINE HCL 5 MG
5 TABLET ORAL EVERY 8 HOURS PRN
OUTPATIENT
Start: 2019-01-24

## 2019-01-24 RX ORDER — ACETAMINOPHEN 500 MG
TABLET ORAL
Qty: 120 TABLET | Refills: 2 | Status: SHIPPED | OUTPATIENT
Start: 2019-01-24 | End: 2019-06-20 | Stop reason: SDUPTHER

## 2019-01-28 ENCOUNTER — TELEPHONE (OUTPATIENT)
Dept: PHYSICAL MEDICINE AND REHAB | Age: 49
End: 2019-01-28

## 2019-02-07 ENCOUNTER — TELEPHONE (OUTPATIENT)
Dept: OPERATING ROOM | Age: 49
End: 2019-02-07

## 2019-02-18 DIAGNOSIS — M47.816 SPONDYLOSIS OF LUMBAR REGION WITHOUT MYELOPATHY OR RADICULOPATHY: ICD-10-CM

## 2019-02-19 ENCOUNTER — TELEPHONE (OUTPATIENT)
Dept: FAMILY MEDICINE CLINIC | Age: 49
End: 2019-02-19

## 2019-02-19 ENCOUNTER — OFFICE VISIT (OUTPATIENT)
Dept: NEPHROLOGY | Age: 49
End: 2019-02-19
Payer: COMMERCIAL

## 2019-02-19 VITALS
DIASTOLIC BLOOD PRESSURE: 88 MMHG | HEART RATE: 67 BPM | BODY MASS INDEX: 30.89 KG/M2 | SYSTOLIC BLOOD PRESSURE: 141 MMHG | WEIGHT: 191.4 LBS | OXYGEN SATURATION: 98 %

## 2019-02-19 DIAGNOSIS — M32.9 SYSTEMIC LUPUS ERYTHEMATOSUS, UNSPECIFIED SLE TYPE, UNSPECIFIED ORGAN INVOLVEMENT STATUS (HCC): Primary | ICD-10-CM

## 2019-02-19 DIAGNOSIS — R80.1 PERSISTENT PROTEINURIA: ICD-10-CM

## 2019-02-19 DIAGNOSIS — N18.30 CKD (CHRONIC KIDNEY DISEASE), STAGE III (HCC): Primary | ICD-10-CM

## 2019-02-19 PROCEDURE — G8427 DOCREV CUR MEDS BY ELIG CLIN: HCPCS | Performed by: INTERNAL MEDICINE

## 2019-02-19 PROCEDURE — 99213 OFFICE O/P EST LOW 20 MIN: CPT | Performed by: INTERNAL MEDICINE

## 2019-02-19 PROCEDURE — G8482 FLU IMMUNIZE ORDER/ADMIN: HCPCS | Performed by: INTERNAL MEDICINE

## 2019-02-19 PROCEDURE — G8417 CALC BMI ABV UP PARAM F/U: HCPCS | Performed by: INTERNAL MEDICINE

## 2019-02-19 PROCEDURE — 1036F TOBACCO NON-USER: CPT | Performed by: INTERNAL MEDICINE

## 2019-02-19 RX ORDER — HYDROCODONE BITARTRATE AND ACETAMINOPHEN 5; 325 MG/1; MG/1
1 TABLET ORAL 2 TIMES DAILY PRN
Qty: 60 TABLET | Refills: 0 | Status: SHIPPED | OUTPATIENT
Start: 2019-02-19 | End: 2019-03-15 | Stop reason: SDUPTHER

## 2019-02-25 ENCOUNTER — PREP FOR PROCEDURE (OUTPATIENT)
Dept: PHYSICAL MEDICINE AND REHAB | Age: 49
End: 2019-02-25

## 2019-03-11 ENCOUNTER — HOSPITAL ENCOUNTER (OUTPATIENT)
Age: 49
Setting detail: OUTPATIENT SURGERY
Discharge: HOME | End: 2019-03-11
Attending: PAIN MEDICINE | Admitting: PAIN MEDICINE
Payer: COMMERCIAL

## 2019-03-11 ENCOUNTER — ANESTHESIA EVENT (OUTPATIENT)
Dept: OPERATING ROOM | Age: 49
End: 2019-03-11
Payer: COMMERCIAL

## 2019-03-11 ENCOUNTER — APPOINTMENT (OUTPATIENT)
Dept: GENERAL RADIOLOGY | Age: 49
End: 2019-03-11
Attending: PAIN MEDICINE
Payer: COMMERCIAL

## 2019-03-11 ENCOUNTER — ANESTHESIA (OUTPATIENT)
Dept: OPERATING ROOM | Age: 49
End: 2019-03-11
Payer: COMMERCIAL

## 2019-03-11 VITALS
RESPIRATION RATE: 15 BRPM | WEIGHT: 191 LBS | HEART RATE: 68 BPM | BODY MASS INDEX: 30.7 KG/M2 | SYSTOLIC BLOOD PRESSURE: 101 MMHG | DIASTOLIC BLOOD PRESSURE: 63 MMHG | HEIGHT: 66 IN | OXYGEN SATURATION: 94 % | TEMPERATURE: 98.5 F

## 2019-03-11 VITALS
RESPIRATION RATE: 9 BRPM | DIASTOLIC BLOOD PRESSURE: 62 MMHG | SYSTOLIC BLOOD PRESSURE: 116 MMHG | OXYGEN SATURATION: 99 %

## 2019-03-11 LAB — PREGNANCY, URINE: NEGATIVE

## 2019-03-11 PROCEDURE — 6360000004 HC RX CONTRAST MEDICATION: Performed by: PAIN MEDICINE

## 2019-03-11 PROCEDURE — 81025 URINE PREGNANCY TEST: CPT

## 2019-03-11 PROCEDURE — 7100000011 HC PHASE II RECOVERY - ADDTL 15 MIN: Performed by: PAIN MEDICINE

## 2019-03-11 PROCEDURE — 7100000010 HC PHASE II RECOVERY - FIRST 15 MIN: Performed by: PAIN MEDICINE

## 2019-03-11 PROCEDURE — 3209999900 FLUORO FOR SURGICAL PROCEDURES

## 2019-03-11 PROCEDURE — 2580000003 HC RX 258: Performed by: PAIN MEDICINE

## 2019-03-11 PROCEDURE — 2709999900 HC NON-CHARGEABLE SUPPLY: Performed by: PAIN MEDICINE

## 2019-03-11 PROCEDURE — 3600000054 HC PAIN LEVEL 3 BASE: Performed by: PAIN MEDICINE

## 2019-03-11 PROCEDURE — 62323 NJX INTERLAMINAR LMBR/SAC: CPT | Performed by: PAIN MEDICINE

## 2019-03-11 PROCEDURE — 6360000002 HC RX W HCPCS: Performed by: NURSE ANESTHETIST, CERTIFIED REGISTERED

## 2019-03-11 PROCEDURE — 3700000000 HC ANESTHESIA ATTENDED CARE: Performed by: PAIN MEDICINE

## 2019-03-11 PROCEDURE — 2500000003 HC RX 250 WO HCPCS: Performed by: PAIN MEDICINE

## 2019-03-11 PROCEDURE — 6360000002 HC RX W HCPCS: Performed by: PAIN MEDICINE

## 2019-03-11 RX ORDER — PROPOFOL 10 MG/ML
INJECTION, EMULSION INTRAVENOUS PRN
Status: DISCONTINUED | OUTPATIENT
Start: 2019-03-11 | End: 2019-03-11 | Stop reason: SDUPTHER

## 2019-03-11 RX ORDER — 0.9 % SODIUM CHLORIDE 0.9 %
VIAL (ML) INJECTION PRN
Status: DISCONTINUED | OUTPATIENT
Start: 2019-03-11 | End: 2019-03-11 | Stop reason: ALTCHOICE

## 2019-03-11 RX ORDER — DEXAMETHASONE SODIUM PHOSPHATE 4 MG/ML
INJECTION, SOLUTION INTRA-ARTICULAR; INTRALESIONAL; INTRAMUSCULAR; INTRAVENOUS; SOFT TISSUE PRN
Status: DISCONTINUED | OUTPATIENT
Start: 2019-03-11 | End: 2019-03-11 | Stop reason: ALTCHOICE

## 2019-03-11 RX ORDER — LIDOCAINE HYDROCHLORIDE 10 MG/ML
INJECTION, SOLUTION INFILTRATION; PERINEURAL PRN
Status: DISCONTINUED | OUTPATIENT
Start: 2019-03-11 | End: 2019-03-11 | Stop reason: ALTCHOICE

## 2019-03-11 RX ADMIN — PROPOFOL 70 MG: 10 INJECTION, EMULSION INTRAVENOUS at 14:13

## 2019-03-11 ASSESSMENT — PULMONARY FUNCTION TESTS
PIF_VALUE: 0

## 2019-03-11 ASSESSMENT — PAIN SCALES - GENERAL: PAINLEVEL_OUTOF10: 0

## 2019-03-11 ASSESSMENT — PAIN DESCRIPTION - DESCRIPTORS: DESCRIPTORS: ACHING;CONSTANT;DISCOMFORT

## 2019-03-11 ASSESSMENT — PAIN - FUNCTIONAL ASSESSMENT: PAIN_FUNCTIONAL_ASSESSMENT: 0-10

## 2019-03-12 ENCOUNTER — OFFICE VISIT (OUTPATIENT)
Dept: FAMILY MEDICINE CLINIC | Age: 49
End: 2019-03-12
Payer: COMMERCIAL

## 2019-03-12 VITALS
HEART RATE: 66 BPM | RESPIRATION RATE: 14 BRPM | BODY MASS INDEX: 31.18 KG/M2 | WEIGHT: 194 LBS | TEMPERATURE: 98.2 F | SYSTOLIC BLOOD PRESSURE: 120 MMHG | DIASTOLIC BLOOD PRESSURE: 80 MMHG | HEIGHT: 66 IN

## 2019-03-12 DIAGNOSIS — E78.00 PURE HYPERCHOLESTEROLEMIA: ICD-10-CM

## 2019-03-12 DIAGNOSIS — D64.9 ANEMIA, UNSPECIFIED TYPE: ICD-10-CM

## 2019-03-12 DIAGNOSIS — M32.19 OTHER SYSTEMIC LUPUS ERYTHEMATOSUS WITH OTHER ORGAN INVOLVEMENT (HCC): ICD-10-CM

## 2019-03-12 DIAGNOSIS — M32.14 LUPUS NEPHRITIS (HCC): ICD-10-CM

## 2019-03-12 DIAGNOSIS — M79.641 RIGHT HAND PAIN: ICD-10-CM

## 2019-03-12 DIAGNOSIS — M54.40 BACK PAIN OF LUMBAR REGION WITH SCIATICA: ICD-10-CM

## 2019-03-12 PROCEDURE — G8417 CALC BMI ABV UP PARAM F/U: HCPCS | Performed by: NURSE PRACTITIONER

## 2019-03-12 PROCEDURE — G8427 DOCREV CUR MEDS BY ELIG CLIN: HCPCS | Performed by: NURSE PRACTITIONER

## 2019-03-12 PROCEDURE — 1036F TOBACCO NON-USER: CPT | Performed by: NURSE PRACTITIONER

## 2019-03-12 PROCEDURE — 99213 OFFICE O/P EST LOW 20 MIN: CPT | Performed by: NURSE PRACTITIONER

## 2019-03-12 PROCEDURE — G8482 FLU IMMUNIZE ORDER/ADMIN: HCPCS | Performed by: NURSE PRACTITIONER

## 2019-03-12 RX ORDER — BENZONATATE 100 MG/1
100-200 CAPSULE ORAL 3 TIMES DAILY PRN
Qty: 60 CAPSULE | Refills: 0 | Status: SHIPPED | OUTPATIENT
Start: 2019-03-12 | End: 2019-03-19

## 2019-03-12 RX ORDER — FERROUS SULFATE 325(65) MG
325 TABLET ORAL DAILY
Qty: 90 TABLET | Refills: 3 | Status: SHIPPED | OUTPATIENT
Start: 2019-03-12 | End: 2019-10-22 | Stop reason: SDUPTHER

## 2019-03-12 RX ORDER — SIMVASTATIN 40 MG
TABLET ORAL
Qty: 90 TABLET | Refills: 3 | Status: SHIPPED | OUTPATIENT
Start: 2019-03-12 | End: 2019-10-22 | Stop reason: SDUPTHER

## 2019-03-12 RX ORDER — GABAPENTIN 300 MG/1
300 CAPSULE ORAL DAILY
Qty: 90 CAPSULE | Refills: 1 | Status: SHIPPED | OUTPATIENT
Start: 2019-03-12 | End: 2019-07-22 | Stop reason: SDUPTHER

## 2019-03-12 ASSESSMENT — ENCOUNTER SYMPTOMS
RHINORRHEA: 1
ABDOMINAL DISTENTION: 0
BACK PAIN: 1
ABDOMINAL PAIN: 0
RESPIRATORY NEGATIVE: 1

## 2019-03-12 ASSESSMENT — PATIENT HEALTH QUESTIONNAIRE - PHQ9
SUM OF ALL RESPONSES TO PHQ9 QUESTIONS 1 & 2: 0
SUM OF ALL RESPONSES TO PHQ QUESTIONS 1-9: 0
SUM OF ALL RESPONSES TO PHQ QUESTIONS 1-9: 0
1. LITTLE INTEREST OR PLEASURE IN DOING THINGS: 0
2. FEELING DOWN, DEPRESSED OR HOPELESS: 0

## 2019-03-15 ENCOUNTER — TELEPHONE (OUTPATIENT)
Dept: FAMILY MEDICINE CLINIC | Age: 49
End: 2019-03-15

## 2019-03-15 DIAGNOSIS — M47.816 SPONDYLOSIS OF LUMBAR REGION WITHOUT MYELOPATHY OR RADICULOPATHY: ICD-10-CM

## 2019-03-15 RX ORDER — HYDROCODONE BITARTRATE AND ACETAMINOPHEN 5; 325 MG/1; MG/1
1 TABLET ORAL 2 TIMES DAILY PRN
Qty: 60 TABLET | Refills: 0 | Status: SHIPPED | OUTPATIENT
Start: 2019-03-21 | End: 2019-04-18 | Stop reason: SDUPTHER

## 2019-03-23 ENCOUNTER — HOSPITAL ENCOUNTER (EMERGENCY)
Age: 49
Discharge: HOME | End: 2019-03-24
Payer: COMMERCIAL

## 2019-03-23 ENCOUNTER — APPOINTMENT (OUTPATIENT)
Dept: GENERAL RADIOLOGY | Age: 49
End: 2019-03-23
Payer: COMMERCIAL

## 2019-03-23 VITALS
RESPIRATION RATE: 18 BRPM | HEART RATE: 90 BPM | TEMPERATURE: 98.4 F | HEIGHT: 66 IN | WEIGHT: 189 LBS | SYSTOLIC BLOOD PRESSURE: 144 MMHG | DIASTOLIC BLOOD PRESSURE: 81 MMHG | BODY MASS INDEX: 30.37 KG/M2 | OXYGEN SATURATION: 95 %

## 2019-03-23 DIAGNOSIS — S93.104A TOE DISLOCATION, RIGHT, INITIAL ENCOUNTER: ICD-10-CM

## 2019-03-23 DIAGNOSIS — S92.524A CLOSED NONDISPLACED FRACTURE OF MIDDLE PHALANX OF LESSER TOE OF RIGHT FOOT, INITIAL ENCOUNTER: Primary | ICD-10-CM

## 2019-03-23 PROCEDURE — 2709999900 HC NON-CHARGEABLE SUPPLY

## 2019-03-23 PROCEDURE — 73660 X-RAY EXAM OF TOE(S): CPT

## 2019-03-23 PROCEDURE — 99283 EMERGENCY DEPT VISIT LOW MDM: CPT

## 2019-03-23 PROCEDURE — 73630 X-RAY EXAM OF FOOT: CPT

## 2019-03-23 PROCEDURE — 6370000000 HC RX 637 (ALT 250 FOR IP): Performed by: NURSE PRACTITIONER

## 2019-03-23 RX ORDER — OXYCODONE HYDROCHLORIDE AND ACETAMINOPHEN 5; 325 MG/1; MG/1
1 TABLET ORAL ONCE
Status: COMPLETED | OUTPATIENT
Start: 2019-03-23 | End: 2019-03-23

## 2019-03-23 RX ADMIN — OXYCODONE AND ACETAMINOPHEN 1 TABLET: 5; 325 TABLET ORAL at 22:06

## 2019-03-23 ASSESSMENT — ENCOUNTER SYMPTOMS
SORE THROAT: 0
NAUSEA: 0
SHORTNESS OF BREATH: 0
EYE DISCHARGE: 0
VOMITING: 0
WHEEZING: 0
EYE PAIN: 0
COUGH: 0
BACK PAIN: 0
ABDOMINAL PAIN: 0
DIARRHEA: 0
RHINORRHEA: 0

## 2019-03-23 ASSESSMENT — PAIN DESCRIPTION - FREQUENCY: FREQUENCY: CONTINUOUS

## 2019-03-23 ASSESSMENT — PAIN DESCRIPTION - PAIN TYPE: TYPE: ACUTE PAIN

## 2019-03-23 ASSESSMENT — PAIN DESCRIPTION - PROGRESSION: CLINICAL_PROGRESSION: NOT CHANGED

## 2019-03-23 ASSESSMENT — PAIN SCALES - GENERAL: PAINLEVEL_OUTOF10: 9

## 2019-03-23 ASSESSMENT — PAIN DESCRIPTION - LOCATION: LOCATION: TOE (COMMENT WHICH ONE)

## 2019-03-23 ASSESSMENT — PAIN DESCRIPTION - ORIENTATION: ORIENTATION: RIGHT

## 2019-03-24 RX ORDER — NAPROXEN 500 MG/1
500 TABLET ORAL 2 TIMES DAILY WITH MEALS
Qty: 60 TABLET | Refills: 0 | Status: SHIPPED | OUTPATIENT
Start: 2019-03-24 | End: 2019-10-22 | Stop reason: ALTCHOICE

## 2019-03-25 ENCOUNTER — TELEPHONE (OUTPATIENT)
Dept: NEPHROLOGY | Age: 49
End: 2019-03-25

## 2019-04-08 ENCOUNTER — OFFICE VISIT (OUTPATIENT)
Dept: PHYSICAL MEDICINE AND REHAB | Age: 49
End: 2019-04-08
Payer: COMMERCIAL

## 2019-04-08 VITALS
HEIGHT: 66 IN | WEIGHT: 189 LBS | HEART RATE: 73 BPM | DIASTOLIC BLOOD PRESSURE: 61 MMHG | SYSTOLIC BLOOD PRESSURE: 116 MMHG | BODY MASS INDEX: 30.37 KG/M2

## 2019-04-08 DIAGNOSIS — M54.16 LUMBAR RADICULOPATHY: ICD-10-CM

## 2019-04-08 DIAGNOSIS — G89.29 CHRONIC BILATERAL THORACIC BACK PAIN: ICD-10-CM

## 2019-04-08 DIAGNOSIS — M54.6 CHRONIC BILATERAL THORACIC BACK PAIN: ICD-10-CM

## 2019-04-08 DIAGNOSIS — M47.816 SPONDYLOSIS OF LUMBAR REGION WITHOUT MYELOPATHY OR RADICULOPATHY: ICD-10-CM

## 2019-04-08 DIAGNOSIS — M54.2 NECK PAIN: ICD-10-CM

## 2019-04-08 DIAGNOSIS — M46.1 SI (SACROILIAC) JOINT INFLAMMATION (HCC): ICD-10-CM

## 2019-04-08 DIAGNOSIS — G89.4 CHRONIC PAIN SYNDROME: ICD-10-CM

## 2019-04-08 DIAGNOSIS — M48.062 LUMBAR STENOSIS WITH NEUROGENIC CLAUDICATION: Primary | ICD-10-CM

## 2019-04-08 DIAGNOSIS — M46.1 BILATERAL SACROILIITIS (HCC): ICD-10-CM

## 2019-04-08 PROCEDURE — G8417 CALC BMI ABV UP PARAM F/U: HCPCS | Performed by: NURSE PRACTITIONER

## 2019-04-08 PROCEDURE — G8427 DOCREV CUR MEDS BY ELIG CLIN: HCPCS | Performed by: NURSE PRACTITIONER

## 2019-04-08 PROCEDURE — 99214 OFFICE O/P EST MOD 30 MIN: CPT | Performed by: NURSE PRACTITIONER

## 2019-04-08 PROCEDURE — 1036F TOBACCO NON-USER: CPT | Performed by: NURSE PRACTITIONER

## 2019-04-08 ASSESSMENT — ENCOUNTER SYMPTOMS
WHEEZING: 0
SHORTNESS OF BREATH: 0
EYE PAIN: 0
SINUS PRESSURE: 0
COLOR CHANGE: 0
VOMITING: 0
PHOTOPHOBIA: 0
RHINORRHEA: 0
CONSTIPATION: 0
ABDOMINAL PAIN: 0
SORE THROAT: 0
COUGH: 0
NAUSEA: 0
BACK PAIN: 1
CHEST TIGHTNESS: 0
DIARRHEA: 0

## 2019-04-08 NOTE — PROGRESS NOTES
[address] [facility]  [address] [facility] [address].  Dept: [phone]  Dept Fax: [phone]: [phone]    Visit Date: 4/8/2019    Functionality Assessment/Goals Worksheet     On a scale of 0 (Does not Interfere) to 10 (Completely Interferes)     1. Which number describes how during the past week pain has interfered with       the following:  A. General Activity:  5  B. Mood: 8  C. Walking Ability:  8  D. Normal Work (Includes both work outside the home and housework):  5  E. Relations with Other People:   6  F. Sleep:   7  G. Enjoyment of Life:   6    2. Patient Prefers to Take their Pain Medications:     [x]  On a regular basis   []  Only when necessary    []  Does not take pain medications    3. What are the Patient's Goals/Expectations for Visiting Pain Management? []  Learn about my pain    []  Receive Medication   []  Physical Therapy     []  Treat Depression   []  Receive Injections    []  Treat Sleep   []  Deal with Anxiety and Stress   []  Treat Opoid Dependence/Addiction   [x]  Other:Find out what is wrong. HPI:   Leeann Timmons is a 57 y.o. female is here today for    Chief Complaint: Low back pain  neck thoracic pain SI pain    HPI   F/U LESI L4 3/11/2019 states mary beth received about 50% pain relief for 3 days only. The low back pain came back with  any blending lifting and long term standing to cook or do dishes. She continues to have low back pain radiates into bilateral SI areas,  States the pain radiates up into her upper lumbar and lower thoracic area, she also has neck pain with headaches. She c/o BUE radicular s/s only at times and only last briefly. She also has Lupus. She has had TFLESI Left L5 states she continues to have 50% pain relief or benefit. Her leg pain is not constant but only about 3-4 times a week and relieves quickly with changing positions or laying down.    She has had Lumbar Facet MBB in past with 10% pain relief. Medications reviewed. Patient denies side effects with medications. Patient states she is taking medications as prescribed. Shedenies receiving pain medications from other sources. She complains of any ER visits since last visit. broken toes right     Pain scale with out pain medications or at its worst is 7/10. Pain scale with pain medications or at its best is 4/10. Last dose of  Collins  was today  Drug screen reviewed from  [facility]  and was appropriate  Pill count was completed today and patient was educated on bringing in medications  Patient does not have naloxone available at home. Patient has required use of naloxone at home since last office visit. The patientis allergic to codeine. Past Medical History  Pennie Leonardo  has a past medical history of Anemia, CKD (chronic kidney disease), stage III ([address] Dyslipidemia, Hematuria, Hyperlipidemia, Hypertension, Hyperuricemia, Hypothyroidism, Kidney disease, Lupus, Lupus nephritis ([address] Proteinuria, Seizures ([address] and Systemic lupus erythematosus ([address] Past Surgical History  The patient  has a past surgical history that includes Nerve Block (Bilateral); pr inj dx/ther agnt paravert facet joint, lumbar/sac, 2nd level (Bilateral, 5/7/2018); pr njx dx/ther sbst intrlmnr lmbr/sac w/img gdn (N/A, 7/19/2018); pr inject anes/steroid foramen lumbar/sacral w img guide ,1 level (Left, 9/25/2018); Lumbar spine surgery (Left, 12/13/2018); and lumbar nerve block (N/A, 3/11/2019). Family History  This patient's family history includes Diabetes in her father and mother; Heart Disease in her mother; Parkinsonism in her mother; Thyroid Disease in her mother. Social History  Pennie Leonardo  reports that she has quit smoking. Her smoking use included cigarettes. She has never used smokeless tobacco. She reports that she drinks alcohol. She reports that she does not use drugs.     Medications    Current Outpatient Medications:     naproxen (NAPROSYN) 500 MG tablet, Take 1 tablet by mouth 2 times daily (with meals), Disp: 60 tablet, Rfl: 0    HYDROcodone-acetaminophen (NORCO) 5-325 MG per tablet, Take 1 tablet by mouth 2 times daily as needed for Pain for up to 30 days. , Disp: 60 tablet, Rfl: 0    simvastatin (ZOCOR) 40 MG tablet, take 1 tablet by mouth at bedtime, Disp: 90 tablet, Rfl: 3    ferrous sulfate 325 (65 Fe) MG tablet, Take 1 tablet by mouth daily, Disp: 90 tablet, Rfl: 3    gabapentin (NEURONTIN) 300 MG capsule, Take 1 capsule by mouth daily for 182 days. , Disp: 90 capsule, Rfl: 1    acetaminophen (RA ACETAMINOPHEN EX ST) 500 MG tablet, take 2 tablets by mouth every 6 hours if needed for pain, Disp: 120 tablet, Rfl: 2    loratadine (CLARITIN) 10 MG tablet, Take 1 tablet by mouth daily, Disp: 90 tablet, Rfl: 1    levothyroxine (SYNTHROID) 112 MCG tablet, take 1 tablet by mouth once daily, Disp: 90 tablet, Rfl: 1    Calcium Carbonate-Vitamin D (OYSTER SHELL CALCIUM/D) 500-200 MG-UNIT TABS, take 1 tablet once daily, Disp: 90 tablet, Rfl: 3    ondansetron (ZOFRAN) 4 MG tablet, Take 1 tablet by mouth every 8 hours as needed for Nausea or Vomiting, Disp: 15 tablet, Rfl: 0    Subjective:      Review of Systems   Constitutional: Negative for activity change, appetite change, chills, diaphoresis, fatigue, fever and unexpected weight change. HENT: Negative for congestion, ear pain, hearing loss, mouth sores, nosebleeds, rhinorrhea, sinus pressure and sore throat. Eyes: Negative for photophobia, pain and visual disturbance. Respiratory: Negative for cough, chest tightness, shortness of breath and wheezing. Cardiovascular: Negative for chest pain and palpitations. HTN CAD   Gastrointestinal: Negative for abdominal pain, constipation, diarrhea, nausea and vomiting. Endocrine: Negative for cold intolerance, heat intolerance, polydipsia, polyphagia and polyuria.         Thyroid issues   Genitourinary: Negative for decreased urine volume, difficulty urinating, frequency and hematuria. CKD   Musculoskeletal: Positive for arthralgias, back pain, myalgias, neck pain and neck stiffness. Negative for gait problem. Skin: Negative for color change and rash. Allergic/Immunologic: Negative for food allergies and immunocompromised state. Neurological: Positive for weakness, numbness and headaches. Negative for dizziness, tremors, seizures, syncope, facial asymmetry, speech difficulty and light-headedness. H/o seizures,no seizures in 16 years. H/O Lupus   Hematological: Does not bruise/bleed easily. Psychiatric/Behavioral: Negative for agitation, behavioral problems, confusion, decreased concentration, dysphoric mood, hallucinations, self-injury, sleep disturbance and suicidal ideas. The patient is not nervous/anxious and is not hyperactive. Objective:     Vitals:    04/08/19 1521   BP: 116/61   Site: Left Upper Arm   Position: Sitting   Cuff Size: Medium Adult   Pulse: 73   Weight: 189 lb (85.7 kg)   Height: 5' 6\" (1.676 m)       Physical Exam   Constitutional: She is oriented to person, place, and time. She appears well-developed and well-nourished. No distress. HENT:   Head: Normocephalic and atraumatic. Right Ear: External ear normal.   Left Ear: External ear normal.   Nose: Nose normal.   Mouth/Throat: Oropharynx is clear and moist. No oropharyngeal exudate. Eyes: Pupils are equal, round, and reactive to light. Conjunctivae and EOM are normal. Right eye exhibits no discharge. Left eye exhibits no discharge. No scleral icterus. Neck: Normal range of motion and full passive range of motion without pain. Neck supple. No muscular tenderness present. No neck rigidity. No edema, no erythema and normal range of motion present. No thyromegaly present. Cardiovascular: Normal rate, regular rhythm, normal heart sounds and intact distal pulses. Exam reveals no gallop and no friction rub. No murmur heard. Pulmonary/Chest: Effort normal and breath sounds normal. No respiratory distress. She has no wheezes. She has no rales. She exhibits no tenderness. Abdominal: Soft. Bowel sounds are normal. She exhibits no distension. There is no tenderness. There is no rebound and no guarding. Musculoskeletal: She exhibits tenderness. Right shoulder: Normal.        Left shoulder: She exhibits tenderness, pain and spasm. Right hip: Normal.        Left hip: Normal.        Right knee: Normal.        Left knee: Normal.        Right ankle: Tenderness. Cervical back: She exhibits decreased range of motion, tenderness, bony tenderness, pain and spasm. Thoracic back: She exhibits tenderness. Lumbar back: She exhibits decreased range of motion, tenderness, bony tenderness, pain and spasm. Back:         Right foot: There is decreased range of motion, tenderness, bony tenderness and swelling. 2 right broken toes has walker shoe on    Neurological: She is alert and oriented to person, place, and time. She has normal strength. She is not disoriented. She displays no atrophy. No cranial nerve deficit or sensory deficit. She exhibits normal muscle tone. She displays a negative Romberg sign. Coordination and gait normal. She displays no Babinski's sign on the right side. Reflex Scores:       Tricep reflexes are 2+ on the right side and 2+ on the left side. Bicep reflexes are 2+ on the right side and 2+ on the left side. Brachioradialis reflexes are 2+ on the right side and 2+ on the left side. Patellar reflexes are 2+ on the right side and 2+ on the left side. Achilles reflexes are 2+ on the right side and 2+ on the left side. BUE 5/5 BUE  5/5   Skin: Skin is warm. No rash noted. She is not diaphoretic. No erythema. No pallor. Psychiatric: She has a normal mood and affect.  Her behavior is normal. Judgment and thought content normal. Her mood appears not anxious. Her affect is not angry, not blunt, not labile and not inappropriate. Her speech is not rapid and/or pressured, not delayed, not tangential and not slurred. She is not agitated, not aggressive, not hyperactive, not slowed, not withdrawn, not actively hallucinating and not combative. Thought content is not paranoid and not delusional. Cognition and memory are not impaired. She does not express impulsivity or inappropriate judgment. She does not exhibit a depressed mood. She expresses no homicidal and no suicidal ideation. She expresses no suicidal plans and no homicidal plans. She is communicative. She exhibits normal recent memory and normal remote memory. She is attentive. Nursing note and vitals reviewed. VONDA test: positive bilateral  Yeomans test: positive bilateral  Gaenslen test:positive bilateral     Assessment:     1. Lumbar stenosis with neurogenic claudication    2. Spondylosis of lumbar region without myelopathy or radiculopathy    3. Lumbar radiculopathy    4. Bilateral sacroiliitis (HCC)    5. SI (sacroiliac) joint inflammation ([address]    6. Chronic bilateral thoracic back pain    7. Neck pain    8. Chronic pain syndrome            Plan:      · OARRS reviewed. Current MED:10  · Patient was offered naloxone for home. · Discussed long term side effects of medications, tolerance, dependency and addiction. · Previous UDS reviewed  · UDS preformed today for compliance. · Patient told can not receive any pain medications from any other source. · No evidence of abuse, diversion or aberrant behavior.  Medications and/or procedures to improve function and quality of life- patient understanding with this and that may not be pain free   Discussed with patient about safe storage of medications at home   Discussed possible weaning of medication dosing dependent on treatment/procedure results.     Testing: reviewed Lumbar MRI Cervical and thoracic   Procedures: LESI@ L3 #1   Discussed with patient about risks with procedure including infection, reaction to medication, increased pain, or bleeding.  Medications:Continue Norco Naproxen Neurontin   Minimal relief from LESI L4 and Lumbar Facet MBB   Continued 50% pain relief from TFLESI L5 left.  Discussed cervical procedures next   Has BLAKE and they don't cover SI RFA, may try SI therapeutic blocks if need      Meds. Prescribed:   No orders of the defined types were placed in this encounter. Return for LESI @L3 #1, Follow up after procedure, Follow up pain medications.          Electronically signed by MACKENZIE Norris CNP on4/8/2019 at 4:12 PM

## 2019-04-11 ENCOUNTER — PREP FOR PROCEDURE (OUTPATIENT)
Dept: PHYSICAL MEDICINE AND REHAB | Age: 49
End: 2019-04-11

## 2019-04-18 DIAGNOSIS — M47.816 SPONDYLOSIS OF LUMBAR REGION WITHOUT MYELOPATHY OR RADICULOPATHY: ICD-10-CM

## 2019-04-18 RX ORDER — HYDROCODONE BITARTRATE AND ACETAMINOPHEN 5; 325 MG/1; MG/1
1 TABLET ORAL 2 TIMES DAILY PRN
Qty: 60 TABLET | Refills: 0 | Status: SHIPPED | OUTPATIENT
Start: 2019-04-20 | End: 2019-05-21 | Stop reason: SDUPTHER

## 2019-04-18 NOTE — TELEPHONE ENCOUNTER
OARRS reviewed. UDS: + for  Gabapentin and norco. Narcan offered: offered  Last seen: 4/8/2019.  Follow-up: 5/13/2019

## 2019-04-22 ENCOUNTER — TELEPHONE (OUTPATIENT)
Dept: FAMILY MEDICINE CLINIC | Age: 49
End: 2019-04-22

## 2019-04-24 ENCOUNTER — TELEPHONE (OUTPATIENT)
Dept: PHYSICAL MEDICINE AND REHAB | Age: 49
End: 2019-04-24

## 2019-04-24 ENCOUNTER — OFFICE VISIT (OUTPATIENT)
Dept: FAMILY MEDICINE CLINIC | Age: 49
End: 2019-04-24
Payer: COMMERCIAL

## 2019-04-24 VITALS
DIASTOLIC BLOOD PRESSURE: 68 MMHG | TEMPERATURE: 98.2 F | HEART RATE: 72 BPM | HEIGHT: 66 IN | BODY MASS INDEX: 30.53 KG/M2 | RESPIRATION RATE: 14 BRPM | WEIGHT: 190 LBS | SYSTOLIC BLOOD PRESSURE: 102 MMHG

## 2019-04-24 DIAGNOSIS — Z12.39 SCREENING FOR BREAST CANCER: ICD-10-CM

## 2019-04-24 DIAGNOSIS — L30.9 DERMATITIS: ICD-10-CM

## 2019-04-24 DIAGNOSIS — Z12.4 PAPANICOLAOU SMEAR: Primary | ICD-10-CM

## 2019-04-24 PROCEDURE — 99396 PREV VISIT EST AGE 40-64: CPT | Performed by: NURSE PRACTITIONER

## 2019-04-24 RX ORDER — DIAPER,BRIEF,INFANT-TODD,DISP
EACH MISCELLANEOUS
Qty: 1 TUBE | Refills: 1 | Status: SHIPPED | OUTPATIENT
Start: 2019-04-24 | End: 2019-05-01

## 2019-04-24 NOTE — PROGRESS NOTES
Marcellus Whittaker is a 57 y.o. female for   Chief Complaint   Patient presents with    Other     PAP        HPI:    No LMP recorded. (Menstrual status: Other - See Notes). Menses occurs every pt is menopausal  Patient Gynecological History No Yes Not Asked comment          History of Abnormal Pap [x]                     []                     []                  Last pap: years ago   Has received HPV vaccine [x]                     []                     []                     Currently sexually active [x]                     []                     []                  post menopausal status   History of Sexually Transmitted Disease [x]            []                     []                     History of Abnormal Mammograms [x]            []                     []                   Last mammo: 2015     Last Dexa Scan: n/a  Last Colonoscopy: n/a  Urinary Incontinence? No  Dysuria? No  Vaginal itching or Dryness? No  Vaginal discharge? No    Pt has noticed three red itchy dots on her chest, it started today. She has not had nay new foods, has never had these before, no plant exposure noted. Social History     Socioeconomic History    Marital status:      Spouse name: Not on file    Number of children: Not on file    Years of education: Not on file    Highest education level: Not on file   Occupational History    Not on file   Social Needs    Financial resource strain: Not on file    Food insecurity:     Worry: Not on file     Inability: Not on file    Transportation needs:     Medical: Not on file     Non-medical: Not on file   Tobacco Use    Smoking status: Former Smoker     Types: Cigarettes    Smokeless tobacco: Never Used    Tobacco comment: 1 a day   Substance and Sexual Activity    Alcohol use:  Yes     Alcohol/week: 0.0 oz     Comment: occasionally, couple times a year    Drug use: No    Sexual activity: Not on file   Lifestyle    Physical activity:     Days per week: Not on file Minutes per session: Not on file    Stress: Not on file   Relationships    Social connections:     Talks on phone: Not on file     Gets together: Not on file     Attends Congregational service: Not on file     Active member of club or organization: Not on file     Attends meetings of clubs or organizations: Not on file     Relationship status: Not on file    Intimate partner violence:     Fear of current or ex partner: Not on file     Emotionally abused: Not on file     Physically abused: Not on file     Forced sexual activity: Not on file   Other Topics Concern    Not on file   Social History Narrative    Not on file     Social History     Substance and Sexual Activity   Sexual Activity Not on file       OBJECTIVE:  /68   Pulse 72   Temp 98.2 °F (36.8 °C) (Oral)   Resp 14   Ht 5' 6\" (1.676 m)   Wt 190 lb (86.2 kg)   BMI 30.67 kg/m²   Physical Examination: General appearance - alert, well appearing, and in no distress  Chest - clear to auscultation, no wheezes, rales or rhonchi, symmetric air entry  Heart - normal rate, regular rhythm, normal S1, S2, no murmurs, rubs, clicks or gallops  Abdomen - soft, nontender, nondistended, no masses or organomegaly  Extremities - peripheral pulses normal, no pedal edema, no clubbing or cyanosis    Chest with three round raised erythemic circles, 3 mm in diameter, no central clearing. pruitic  Psych:  Affect appropriate. Thought process is normal without evidence of depression or psychosis. Good insight and appropriae interaction. Cognition and memory appear to be intact.   Breasts - breasts appear normal, no suspicious masses, no skin or nipple changes or axillary nodes, no axillary lymphadenopathy, risk and benefit of breast self-exam was discussed  Pelvic - normal external genitalia, vulva, vagina, cervix, uterus and adnexa, VULVA: normal appearing vulva with no masses, tenderness or lesions, VAGINA: normal appearing vagina with normal color and discharge, no lesions, PELVIC FLOOR EXAM: no cystocele, rectocele or prolapse noted, CERVIX: normal appearing cervix without discharge or lesions, friable small amount of bleeding noted, no discharge noted, UTERUS: uterus is normal size, shape, consistency and nontender, ADNEXA: normal adnexa in size, nontender and no masses, no masses, PAP: Pap smear done today, thin-prep method      ASSESSMENT & PLAN  Nasra Aldridge was seen today for other. Diagnoses and all orders for this visit:    Papanicolaou smear  -     PAP SMEAR    Screening for breast cancer  -     RAYRAY DIGITAL SCREEN W CAD BILATERAL; Future    Dermatitis    Other orders  -     hydrocortisone (ALA-MARLYS) 1 % cream; Apply topically 2 times daily. Will call with results of pap when done. Return in about 6 months (around 10/24/2019) for check up. Follow-up: Will notify patient of pap smear results by phone. Return PRN or 1 year for annual well woman exam.    Counseling was provided today regarding the following topics: Healthy eating habits, Regular exercise, substance abuse and healthy sleep habits. Breast CA Risk Assessment Calculator - https://www.jimmy.org/  FRAX Calculator - MapSeats.co.uk  Pap Guidelines - [employer]KidsF[employer].co.za. [email or web address].pdf    Nasra Aldridge received counseling on the following healthy behaviors: medication adherence  Reviewed prior labs and health maintenance. Continue current medications, diet and exercise. Discussed use, benefit, and side effects of prescribed medications. Barriers to medication compliance addressed. Patient given educational materials - see patient instructions. All patient questions answered. Patient voiced understanding.

## 2019-04-24 NOTE — PROGRESS NOTES
Have you changed or started any medications since your last visit including any over-the-counter medicines, vitamins, or herbal medicines? no   Are you having any side effects from any of your medications? -  no  Have you stopped taking any of your medications? Is so, why? -  no    Have you seen any other physician or provider since your last visit? No  Have you had any other diagnostic tests since your last visit? No  Have you been seen in the emergency room and/or had an admission to a hospital since we last saw you? No  Have you had your routine dental cleaning in the past 6 months? no    Have you activated your W[employer] account? If not, what are your barriers? Yes     Patient Care Team:  MACKENZIE Ferrari CNP as PCP - General (Family Medicine)  MACKENZIE Ferrari CNP as PCP - S Attributed Provider    Medical History Review  Past Medical, Family, and Social History     Defer to provider.

## 2019-04-24 NOTE — PATIENT INSTRUCTIONS
Patient Education        Pap Test: Care Instructions  Your Care Instructions    The Pap test (also called a Pap smear) is a screening test for cancer of the cervix, which is the lower part of the uterus that opens into the vagina. The test can help your doctor find early changes in the cells that could lead to cancer. The sample of cells taken during your test has been sent to a lab so that an expert can look at the cells. It usually takes a week or two to get the results back. Follow-up care is a key part of your treatment and safety. Be sure to make and go to all appointments, and call your doctor if you are having problems. It's also a good idea to know your test results and keep a list of the medicines you take. What do the results mean? · A normal result means that the test did not find any abnormal cells in the sample. · An abnormal result can mean many things. Most of these are not cancer. The results of your test may be abnormal because:  ? You have an infection of the vagina or cervix, such as a yeast infection. ? You have an IUD (intrauterine device for birth control). ? You have low estrogen levels after menopause that are causing the cells to change. ? You have cell changes that may be a sign of precancer or cancer. The results are ranked based on how serious the changes might be. There are many other reasons why you might not get a normal result. If the results were abnormal, you may need to get another test within a few weeks or months. If the results show changes that could be a sign of cancer, you may need a test called a colposcopy, which provides a more complete view of the cervix. Sometimes the lab cannot use the sample because it does not contain enough cells or was not preserved well. If so, you may need to have the test again. This is not common, but it does happen from time to time. When should you call for help?   Watch closely for changes in your health, and be sure to contact your doctor if:    · You have vaginal bleeding or pain for more than 2 days after the test. It is normal to have a small amount of bleeding for a day or two after the test.   Where can you learn more? Go to [email or web address] and sign in to your [employer] account. Enter E974 in the [employer] box to learn more about \"Pap Test: Care Instructions. \"     If you do not have an account, please click on the \"Sign Up Now\" link. Current as of: March 27, 2018  Content Version: 11.9  © 1250-1195 [employer]. Care instructions adapted under license by [employer] [facility] ([facility]). If you have questions about a medical condition or this instruction, always ask your healthcare professional. [address] any warranty or liability for your use of this information. Patient Education        Well Visit, Ages 25 to 48: Care Instructions  Your Care Instructions    Physical exams can help you stay healthy. Your doctor has checked your overall health and may have suggested ways to take good care of yourself. He or she also may have recommended tests. At home, you can help prevent illness with healthy eating, regular exercise, and other steps. Follow-up care is a key part of your treatment and safety. Be sure to make and go to all appointments, and call your doctor if you are having problems. It's also a good idea to know your test results and keep a list of the medicines you take. How can you care for yourself at home? · Reach and stay at a healthy weight. This will lower your risk for many problems, such as obesity, diabetes, heart disease, and high blood pressure. · Get at least 30 minutes of physical activity on most days of the week. Walking is a good choice. You also may want to do other activities, such as running, swimming, cycling, or playing tennis or team sports. Discuss any changes in your exercise program with your doctor.   · Do not smoke or allow others to factors. Risk factors include whether you already had a precancerous polyp removed from your colon or whether your parent, brother, sister, or child has had colon cancer. For women  · Breast exam and mammogram. Talk to your doctor about when you should have a clinical breast exam and a mammogram. Medical experts differ on whether and how often women under 50 should have these tests. Your doctor can help you decide what is right for you. · Pap test and pelvic exam. Begin Pap tests at age 24. A Pap test is the best way to find cervical cancer. The test often is part of a pelvic exam. Ask how often to have this test.  · Tests for sexually transmitted infections (STIs). Ask whether you should have tests for STIs. You may be at risk if you have sex with more than one person, especially if your partners do not wear condoms. For men  · Tests for sexually transmitted infections (STIs). Ask whether you should have tests for STIs. You may be at risk if you have sex with more than one person, especially if you do not wear a condom. · Testicular cancer exam. Ask your doctor whether you should check your testicles regularly. · Prostate exam. Talk to your doctor about whether you should have a blood test (called a PSA test) for prostate cancer. Experts differ on whether and when men should have this test. Some experts suggest it if you are older than 39 and are -American or have a father or brother who got prostate cancer when he was younger than 72. When should you call for help? Watch closely for changes in your health, and be sure to contact your doctor if you have any problems or symptoms that concern you. Where can you learn more? Go to https://[email or web address].[email or web address] org and sign in to your [employer] account. Enter P072 in the [employer] box to learn more about \"Well Visit, Ages 25 to 48: Care Instructions. \"     If you do not have an account, please click on the \"Sign Up Now\" link.   Current as of: March 28, 2018  Content Version: 11.9  © 1775-4933 [employer]. Care instructions adapted under license by [facility] ([facility]). If you have questions about a medical condition or this instruction, always ask your healthcare professional. [address] any warranty or liability for your use of this information.

## 2019-05-01 ENCOUNTER — TELEPHONE (OUTPATIENT)
Dept: FAMILY MEDICINE CLINIC | Age: 49
End: 2019-05-01

## 2019-05-01 DIAGNOSIS — J30.89 NON-SEASONAL ALLERGIC RHINITIS DUE TO OTHER ALLERGIC TRIGGER: ICD-10-CM

## 2019-05-01 LAB — CYTOLOGY THIN PREP PAP: NORMAL

## 2019-05-01 RX ORDER — LORATADINE 10 MG/1
10 TABLET ORAL DAILY
Qty: 90 TABLET | Refills: 3 | Status: ON HOLD | OUTPATIENT
Start: 2019-05-01 | End: 2019-05-21

## 2019-05-01 RX ORDER — ONDANSETRON 4 MG/1
4 TABLET, FILM COATED ORAL DAILY PRN
Qty: 30 TABLET | Refills: 0 | Status: SHIPPED | OUTPATIENT
Start: 2019-05-01 | End: 2019-06-17

## 2019-05-01 RX ORDER — BENZONATATE 100 MG/1
100-200 CAPSULE ORAL 3 TIMES DAILY PRN
Qty: 60 CAPSULE | Refills: 0 | Status: SHIPPED | OUTPATIENT
Start: 2019-05-01 | End: 2019-05-08

## 2019-05-01 NOTE — TELEPHONE ENCOUNTER
Pt returned our call & states she is completely out of the [address], med has been pended. Pt is also now asking for a \"coughing pill\" she states Gopal had given her in the past but the pt does not know what the name of the med was. Please advise.

## 2019-05-01 NOTE — TELEPHONE ENCOUNTER
Patient is calling stating she is coughing, runny nose, sore throat, this started on 4/30/19, OTC meds nothing at this time. She is asking for something to be phoned in to [facility].      DOLV: 4/24/19    Please advise to patient at [phone]

## 2019-05-02 ENCOUNTER — TELEPHONE (OUTPATIENT)
Dept: FAMILY MEDICINE CLINIC | Age: 49
End: 2019-05-02

## 2019-05-02 NOTE — TELEPHONE ENCOUNTER
----- Message from MACKENZIE Peraza CNP sent at 5/2/2019  3:10 PM EDT -----  Please let pt know her pap was normal it can be repeated in 3 months

## 2019-05-14 ENCOUNTER — PREP FOR PROCEDURE (OUTPATIENT)
Dept: PHYSICAL MEDICINE AND REHAB | Age: 49
End: 2019-05-14

## 2019-05-14 NOTE — H&P (VIEW-ONLY)
Roger Archuleta is an 57 y.o. } female. Chief Complaint: Low back pain leg pain      The patientis allergic to codeine.     Past Medical History  Roque Silverio  has a past medical history of Anemia, CKD (chronic kidney disease), stage III ([address] Dyslipidemia, Hematuria, Hyperlipidemia, Hypertension, Hyperuricemia, Hypothyroidism, Kidney disease, Lupus, Lupus nephritis ([address] Proteinuria, Seizures ([address] and Systemic lupus erythematosus ([address]    Past Surgical History  The patient  has a past surgical history that includes Nerve Block (Bilateral); pr inj dx/ther agnt paravert facet joint, lumbar/sac, 2nd level (Bilateral, 5/7/2018); pr njx dx/ther sbst intrlmnr lmbr/sac w/img gdn (N/A, 7/19/2018); pr inject anes/steroid foramen lumbar/sacral w img guide ,1 level (Left, 9/25/2018); Lumbar spine surgery (Left, 12/13/2018); and lumbar nerve block (N/A, 3/11/2019).    Family History  This patient's family history includes Diabetes in her father and mother; Heart Disease in her mother; Parkinsonism in her mother; Thyroid Disease in her mother.     Social History  Roque Silverio  reports that she has quit smoking. Her smoking use included cigarettes. She has never used smokeless tobacco. She reports that she drinks alcohol. She reports that she does not use drugs.     Medications    Current Medication      Current Outpatient Medications:     naproxen (NAPROSYN) 500 MG tablet, Take 1 tablet by mouth 2 times daily (with meals), Disp: 60 tablet, Rfl: 0    HYDROcodone-acetaminophen (NORCO) 5-325 MG per tablet, Take 1 tablet by mouth 2 times daily as needed for Pain for up to 30 days. , Disp: 60 tablet, Rfl: 0    simvastatin (ZOCOR) 40 MG tablet, take 1 tablet by mouth at bedtime, Disp: 90 tablet, Rfl: 3    ferrous sulfate 325 (65 Fe) MG tablet, Take 1 tablet by mouth daily, Disp: 90 tablet, Rfl: 3    gabapentin (NEURONTIN) 300 MG capsule, Take 1 capsule by mouth daily for 182 days. , Disp: 90 capsule, Rfl: 1    acetaminophen (RA easily. Psychiatric/Behavioral: Negative for agitation, behavioral problems, confusion, decreased concentration, dysphoric mood, hallucinations, self-injury, sleep disturbance and suicidal ideas. The patient is not nervous/anxious and is not hyperactive.          Objective:      Vitals   Weight: 189 lb (85.7 kg)   Height: 5' 6\" (1.676 m)            Physical Exam   Constitutional: She is oriented to person, place, and time. She appears well-developed and well-nourished. No distress. HENT:   Head: Normocephalic and atraumatic. Right Ear: External ear normal.   Left Ear: External ear normal.   Nose: Nose normal.   Mouth/Throat: Oropharynx is clear and moist. No oropharyngeal exudate. Eyes: Pupils are equal, round, and reactive to light. Conjunctivae and EOM are normal. Right eye exhibits no discharge. Left eye exhibits no discharge. No scleral icterus. Neck: Normal range of motion and full passive range of motion without pain. Neck supple. No muscular tenderness present. No neck rigidity. No edema, no erythema and normal range of motion present. No thyromegaly present. Cardiovascular: Normal rate, regular rhythm, normal heart sounds and intact distal pulses. Exam reveals no gallop and no friction rub. No murmur heard. Pulmonary/Chest: Effort normal and breath sounds normal. No respiratory distress. She has no wheezes. She has no rales. She exhibits no tenderness. Abdominal: Soft. Bowel sounds are normal. She exhibits no distension. There is no tenderness. There is no rebound and no guarding. Musculoskeletal: She exhibits tenderness. Right shoulder: Normal.        Left shoulder: She exhibits tenderness, pain and spasm. Right hip: Normal.        Left hip: Normal.        Right knee: Normal.        Left knee: Normal.        Right ankle: Tenderness. Cervical back: She exhibits decreased range of motion, tenderness, bony tenderness, pain and spasm.         Thoracic back: She exhibits tenderness. Lumbar back: She exhibits decreased range of motion, tenderness, bony tenderness, pain and spasm. Back:         Right foot: There is decreased range of motion, tenderness, bony tenderness and swelling. 2 right broken toes has walker shoe on    Neurological: She is alert and oriented to person, place, and time. She has normal strength. She is not disoriented. She displays no atrophy. No cranial nerve deficit or sensory deficit. She exhibits normal muscle tone. She displays a negative Romberg sign. Coordination and gait normal. She displays no Babinski's sign on the right side. Reflex Scores:       Tricep reflexes are 2+ on the right side and 2+ on the left side. Bicep reflexes are 2+ on the right side and 2+ on the left side. Brachioradialis reflexes are 2+ on the right side and 2+ on the left side. Patellar reflexes are 2+ on the right side and 2+ on the left side. Achilles reflexes are 2+ on the right side and 2+ on the left side. BUE 5/5 BUE  5/5   Skin: Skin is warm. No rash noted. She is not diaphoretic. No erythema. No pallor. Psychiatric: She has a normal mood and affect. Her behavior is normal. Judgment and thought content normal. Her mood appears not anxious. Her affect is not angry, not blunt, not labile and not inappropriate. Her speech is not rapid and/or pressured, not delayed, not tangential and not slurred. She is not agitated, not aggressive, not hyperactive, not slowed, not withdrawn, not actively hallucinating and not combative. Thought content is not paranoid and not delusional. Cognition and memory are not impaired. She does not express impulsivity or inappropriate judgment. She does not exhibit a depressed mood. She expresses no homicidal and no suicidal ideation. She expresses no suicidal plans and no homicidal plans. She is communicative. She exhibits normal recent memory and normal remote memory. She is attentive.    Nursing note and vitals reviewed.     VONDA test: positive bilateral  Yeomans test: positive bilateral  Gaenslen test:positive bilateral  Assessment:      1. Lumbar stenosis with neurogenic claudication    2. Spondylosis of lumbar region without myelopathy or radiculopathy    3. Lumbar radiculopathy    4. Bilateral sacroiliitis (HCC)    5. SI (sacroiliac) joint inflammation ([address]    6. Chronic bilateral thoracic back pain    7. Neck pain    8.  Chronic pain syndrome            Review of Systems    Physical Exam        Plan:  JUANITA @ #1,        Chaparro Ash, APRN - CNP  5/14/2019

## 2019-05-20 ENCOUNTER — TELEPHONE (OUTPATIENT)
Dept: OPERATING ROOM | Age: 49
End: 2019-05-20

## 2019-05-20 DIAGNOSIS — M47.816 SPONDYLOSIS OF LUMBAR REGION WITHOUT MYELOPATHY OR RADICULOPATHY: ICD-10-CM

## 2019-05-20 NOTE — TELEPHONE ENCOUNTER
John Bhandari called requesting a refill on the following medications:  Requested Prescriptions     Pending Prescriptions Disp Refills    HYDROcodone-acetaminophen (NORCO) 5-325 MG per tablet 60 tablet 0     Sig: Take 1 tablet by mouth 2 times daily as needed for Pain for up to 30 days.      Pharmacy verified:  .pv    [facility]    Date of last visit: 4/8/19  Date of next visit (if applicable): Visit date not found

## 2019-05-20 NOTE — TELEPHONE ENCOUNTER
Pt called and asking about procedure, called pt back. No answer, Left message for patient to call the office.

## 2019-05-21 ENCOUNTER — APPOINTMENT (OUTPATIENT)
Dept: GENERAL RADIOLOGY | Age: 49
End: 2019-05-21
Attending: PAIN MEDICINE
Payer: COMMERCIAL

## 2019-05-21 ENCOUNTER — ANESTHESIA (OUTPATIENT)
Dept: OPERATING ROOM | Age: 49
End: 2019-05-21
Payer: COMMERCIAL

## 2019-05-21 ENCOUNTER — ANESTHESIA EVENT (OUTPATIENT)
Dept: OPERATING ROOM | Age: 49
End: 2019-05-21
Payer: COMMERCIAL

## 2019-05-21 ENCOUNTER — HOSPITAL ENCOUNTER (OUTPATIENT)
Age: 49
Setting detail: OUTPATIENT SURGERY
Discharge: HOME | End: 2019-05-21
Attending: PAIN MEDICINE | Admitting: PAIN MEDICINE
Payer: COMMERCIAL

## 2019-05-21 VITALS
RESPIRATION RATE: 14 BRPM | DIASTOLIC BLOOD PRESSURE: 59 MMHG | OXYGEN SATURATION: 99 % | SYSTOLIC BLOOD PRESSURE: 108 MMHG

## 2019-05-21 VITALS
HEART RATE: 82 BPM | TEMPERATURE: 98.4 F | HEIGHT: 66 IN | OXYGEN SATURATION: 94 % | RESPIRATION RATE: 16 BRPM | DIASTOLIC BLOOD PRESSURE: 63 MMHG | BODY MASS INDEX: 30.31 KG/M2 | SYSTOLIC BLOOD PRESSURE: 103 MMHG | WEIGHT: 188.6 LBS

## 2019-05-21 PROCEDURE — 2580000003 HC RX 258: Performed by: PAIN MEDICINE

## 2019-05-21 PROCEDURE — 7100000011 HC PHASE II RECOVERY - ADDTL 15 MIN: Performed by: PAIN MEDICINE

## 2019-05-21 PROCEDURE — 2709999900 HC NON-CHARGEABLE SUPPLY: Performed by: PAIN MEDICINE

## 2019-05-21 PROCEDURE — 2500000003 HC RX 250 WO HCPCS: Performed by: PAIN MEDICINE

## 2019-05-21 PROCEDURE — 3700000000 HC ANESTHESIA ATTENDED CARE: Performed by: PAIN MEDICINE

## 2019-05-21 PROCEDURE — 7100000010 HC PHASE II RECOVERY - FIRST 15 MIN: Performed by: PAIN MEDICINE

## 2019-05-21 PROCEDURE — 6360000004 HC RX CONTRAST MEDICATION: Performed by: PAIN MEDICINE

## 2019-05-21 PROCEDURE — 62323 NJX INTERLAMINAR LMBR/SAC: CPT | Performed by: PAIN MEDICINE

## 2019-05-21 PROCEDURE — 6360000002 HC RX W HCPCS: Performed by: PAIN MEDICINE

## 2019-05-21 PROCEDURE — 3600000054 HC PAIN LEVEL 3 BASE: Performed by: PAIN MEDICINE

## 2019-05-21 PROCEDURE — 6360000002 HC RX W HCPCS: Performed by: NURSE ANESTHETIST, CERTIFIED REGISTERED

## 2019-05-21 PROCEDURE — 3209999900 FLUORO FOR SURGICAL PROCEDURES

## 2019-05-21 PROCEDURE — 2500000003 HC RX 250 WO HCPCS: Performed by: NURSE ANESTHETIST, CERTIFIED REGISTERED

## 2019-05-21 RX ORDER — LIDOCAINE HYDROCHLORIDE 10 MG/ML
INJECTION, SOLUTION INFILTRATION; PERINEURAL PRN
Status: DISCONTINUED | OUTPATIENT
Start: 2019-05-21 | End: 2019-05-21 | Stop reason: ALTCHOICE

## 2019-05-21 RX ORDER — LIDOCAINE HYDROCHLORIDE 20 MG/ML
INJECTION, SOLUTION EPIDURAL; INFILTRATION; INTRACAUDAL; PERINEURAL PRN
Status: DISCONTINUED | OUTPATIENT
Start: 2019-05-21 | End: 2019-05-21 | Stop reason: SDUPTHER

## 2019-05-21 RX ORDER — HYDROCODONE BITARTRATE AND ACETAMINOPHEN 5; 325 MG/1; MG/1
1 TABLET ORAL 2 TIMES DAILY PRN
Qty: 60 TABLET | Refills: 0 | Status: SHIPPED | OUTPATIENT
Start: 2019-05-21 | End: 2019-06-17 | Stop reason: SDUPTHER

## 2019-05-21 RX ORDER — DEXAMETHASONE SODIUM PHOSPHATE 4 MG/ML
INJECTION, SOLUTION INTRA-ARTICULAR; INTRALESIONAL; INTRAMUSCULAR; INTRAVENOUS; SOFT TISSUE PRN
Status: DISCONTINUED | OUTPATIENT
Start: 2019-05-21 | End: 2019-05-21 | Stop reason: ALTCHOICE

## 2019-05-21 RX ORDER — 0.9 % SODIUM CHLORIDE 0.9 %
VIAL (ML) INJECTION PRN
Status: DISCONTINUED | OUTPATIENT
Start: 2019-05-21 | End: 2019-05-21 | Stop reason: ALTCHOICE

## 2019-05-21 RX ORDER — PROPOFOL 10 MG/ML
INJECTION, EMULSION INTRAVENOUS PRN
Status: DISCONTINUED | OUTPATIENT
Start: 2019-05-21 | End: 2019-05-21 | Stop reason: SDUPTHER

## 2019-05-21 RX ADMIN — PROPOFOL 70 MG: 10 INJECTION, EMULSION INTRAVENOUS at 13:47

## 2019-05-21 RX ADMIN — PROPOFOL 30 MG: 10 INJECTION, EMULSION INTRAVENOUS at 13:49

## 2019-05-21 RX ADMIN — LIDOCAINE HYDROCHLORIDE 20 MG: 20 INJECTION, SOLUTION EPIDURAL; INFILTRATION; INTRACAUDAL; PERINEURAL at 13:47

## 2019-05-21 ASSESSMENT — PULMONARY FUNCTION TESTS
PIF_VALUE: 0

## 2019-05-21 ASSESSMENT — PAIN - FUNCTIONAL ASSESSMENT: PAIN_FUNCTIONAL_ASSESSMENT: 0-10

## 2019-05-21 ASSESSMENT — PAIN DESCRIPTION - DESCRIPTORS: DESCRIPTORS: ACHING

## 2019-05-21 NOTE — INTERVAL H&P NOTE
H&P Update    Patient's History and Physical from May 14, 2019 was reviewed. Patient examined. There has been no change.     Niesha Lyle

## 2019-05-21 NOTE — ANESTHESIA POSTPROCEDURE EVALUATION
Department of Anesthesiology  Postprocedure Note    Patient: Rylee Low  MRN: [id number]  YOB: 1970  Date of evaluation: 5/21/2019  Time:  2:18 PM     Procedure Summary     Date:  05/21/19 Room / Location:  [facility] OR 03 / [address] [place]    Anesthesia Start:  1343 Anesthesia Stop:  4863    Procedure:  LESI at L3 #1 (N/A ) Diagnosis:  (lumbar stenosis)    Surgeon:  Grayson Hendrix MD Responsible Provider:  Leo Ribeiro DO    Anesthesia Type:  MAC ASA Status:  2          Anesthesia Type: MAC    Paulina Phase I:      Paulina Phase II: Paulina Score: 9    Last vitals: Reviewed and per EMR flowsheets. Anesthesia Post Evaluation    Comments: Patricia Ross 60  POST-ANESTHESIA NOTE       Name:  Rylee Low                                         Age:  57 y.o. MRN:  [id number]      Last Vitals:  /63   Pulse 82   Temp 98.4 °F (36.9 °C) (Temporal)   Resp 16   Ht 5' 6\" (1.676 m)   Wt 188 lb 9.6 oz (85.5 kg)   SpO2 94%   BMI 30.44 kg/m²   Patient Vitals in the past 4 hrs:  05/21/19 1354, BP:103/63, Temp:98.4 °F (36.9 °C), Temp src:Temporal, Pulse:82, Resp:16, SpO2:94 %  05/21/19 1313, BP:130/74, Temp:98.2 °F (36.8 °C), Temp src:Temporal, Pulse:77, Resp:16, SpO2:96 %, Height:5' 6\" (1.676 m), Weight:188 lb 9.6 oz (85.5 kg)    Level of Consciousness:  Awake    Respiratory:  Stable    Oxygen Saturation:  Stable    Cardiovascular:  Stable    Hydration:  Adequate    PONV:  Stable    Post-op Pain:  Adequate analgesia    Post-op Assessment:  No apparent anesthetic complications    Additional Follow-Up / Treatment / Comment:  None    Dequan Pantoja DO  May 21, 2019   2:18 PM

## 2019-05-21 NOTE — ANESTHESIA PRE PROCEDURE
Department of Anesthesiology  Preprocedure Note       Name:  Erwin Zimmerman   Age:  57 y.o.  :  1970                                          MRN:  [id number]         Date:  2019      Surgeon: Colton Clark):  Dl Ashton MD    Procedure: LESI at L3 #1 (N/A )    Medications prior to admission:   Prior to Admission medications    Medication Sig Start Date End Date Taking? Authorizing Provider   ondansetron (ZOFRAN) 4 MG tablet Take 1 tablet by mouth daily as needed for Nausea or Vomiting 19   [place] [place], MACKENZIE - CNP   loratadine (CLARITIN) 10 MG tablet Take 1 tablet by mouth daily 19   [employer] [place], MACKENZIE - CNP   naproxen (NAPROSYN) 500 MG tablet Take 1 tablet by mouth 2 times daily (with meals) 3/24/19   MACKENZIE Moreno - CNP   simvastatin (ZOCOR) 40 MG tablet take 1 tablet by mouth at bedtime 3/12/19   [employer] [place], MACKENZIE - CNP   ferrous sulfate 325 (65 Fe) MG tablet Take 1 tablet by mouth daily 3/12/19   [employer] [place], MACKENZIE - CNP   gabapentin (NEURONTIN) 300 MG capsule Take 1 capsule by mouth daily for 182 days. 3/12/19 9/10/19  [place] CNP   acetaminophen (RA ACETAMINOPHEN EX ST) 500 MG tablet take 2 tablets by mouth every 6 hours if needed for pain 19   [employer] [place], MACKENZIE - CNP   levothyroxine (SYNTHROID) 112 MCG tablet take 1 tablet by mouth once daily 18   [place] CNP   Calcium Carbonate-Vitamin D (OYSTER SHELL CALCIUM/D) 500-200 MG-UNIT TABS take 1 tablet once daily 18   [place] CNP   ondansetron (ZOFRAN) 4 MG tablet Take 1 tablet by mouth every 8 hours as needed for Nausea or Vomiting 18   MACKENZIE Robert CNP       Current medications:    No current facility-administered medications for this encounter. Allergies:     Allergies   Allergen Reactions    Codeine Hives and Nausea And Vomiting       Problem List:    Patient Active Problem List   Diagnosis Code    Hyperlipidemia E78.5    Hypothyroid E03.9    Systemic lupus erythematosus (HCC) M32.9    Hematuria     Proteinuria R80.9    Anemia D64.9    CKD (chronic kidney disease), stage III ([facility]) N18.3    Dyslipidemia E78.5    Hyperuricemia E79.0    Lumbar spondylosis M47.816    Lumbar radiculopathy M54.16    Spinal stenosis of lumbar region with neurogenic claudication M48.062    Spinal stenosis of lumbar region without neurogenic claudication M48.061       Past Medical History:        Diagnosis Date    Anemia     CKD (chronic kidney disease), stage III ([facility])     Dyslipidemia     Hematuria     Hyperlipidemia     Hypertension     Hyperuricemia     Hypothyroidism     Kidney disease     as a result from lupus    Lupus     Lupus nephritis ([facility])     Proteinuria     Se[facility]     Systemic lupus erythematosus ([facility]        Past Surgical History:        Procedure Laterality Date    LUMBAR NERVE BLOCK N/A 3/11/2019    LESI at L4 performed by Deep Leblanc MD at [address] Left 12/13/2018    LUMBAR TRANSFORAMINAL TFESI L5 LEFT #2, performed by Deep Leblanc MD at [address] [place] [address] Bilateral     LUMBAR FACET    GA INJ DX/THER AGNT PARAVERT FACET JOINT, LUMBAR/SAC, 2ND LEVEL Bilateral 5/7/2018    LUMBAR FACET  MBB [email or web address] Bilateral performed by Deep Leblanc MD at [facility]/STEROID FORAMEN LUMBAR/SACRAL W IMG GUIDE ,1 LEVEL Left 9/25/2018    LUMBAR TRANSFORAMINAL TFESI L5 on the LEFT performed by Deep Leblanc MD at [address] [facility] DX/THER SBST INTRLMNR LMBR/SAC W/IMG GDN N/A 7/19/2018    LUMBAR INTER LAMINAR GIGI LESI @L4 performed by Deep Leblanc MD at [address] [place] [address]       Social History:    Social History     Tobacco Use    Smoking status: Former Smoker     Types: Cigarettes    Smokeless tobacco: Never Used    Tobacco comment: 1 a day   Substance Use Topics    Alcohol use:  Yes Alcohol/week: 0.0 oz     Comment: occasionally, couple times a year                                Counseling given: Not Answered  Comment: 1 a day      Vital Signs (Current):   Vitals:    05/21/19 1313   BP: 130/74   Pulse: 77   Resp: 16   Temp: 98.2 °F (36.8 °C)   TempSrc: Temporal   SpO2: 96%   Weight: 188 lb 9.6 oz (85.5 kg)   Height: 5' 6\" (1.676 m)                                              BP Readings from Last 3 Encounters:   05/21/19 130/74   04/24/19 102/68   04/08/19 116/61       NPO Status: Time of last liquid consumption: 1100(sip)                        Time of last solid consumption: 2100                        Date of last liquid consumption: 05/21/19                        Date of last solid food consumption: 05/20/19    BMI:   Wt Readings from Last 3 Encounters:   05/21/19 188 lb 9.6 oz (85.5 kg)   04/24/19 190 lb (86.2 kg)   04/08/19 189 lb (85.7 kg)     Body mass index is 30.44 kg/m². CBC:   Lab Results   Component Value Date    WBC 10.9 05/25/2018    RBC 4.28 05/25/2018    HGB 12.0 05/25/2018    HCT 36.9 05/25/2018    MCV 86.2 05/25/2018    RDW 14.6 05/25/2018     05/25/2018       CMP:   Lab Results   Component Value Date     01/15/2019    K 4.7 01/15/2019     01/15/2019    CO2 28 01/15/2019    BUN 18 01/15/2019    CREATININE 1.2 01/15/2019    AGRATIO 1.9 01/18/2015    LABGLOM 48 01/15/2019    GLUCOSE 85 01/15/2019    GLUCOSE 105 06/21/2018    PROT 6.1 09/09/2017    CALCIUM 9.6 01/15/2019    BILITOT 0.3 09/09/2017    ALKPHOS 69 09/09/2017    AST 18 09/09/2017    ALT 17 09/09/2017       POC Tests: No results for input(s): POCGLU, POCNA, POCK, POCCL, POCBUN, POCHEMO, POCHCT in the last 72 hours.     Coags: No results found for: PROTIME, INR, APTT    HCG (If Applicable):   Lab Results   Component Value Date    PREGTESTUR negative 03/11/2019    PREGSERUM NEGATIVE 11/28/2015        ABGs: No results found for: PHART, PO2ART, LSP6TQJ, EHX7HZO, BEART, M1VAEPNF     Type & Screen (If Applicable):  No results found for: LABABO, [address]    Anesthesia Evaluation  Patient summary reviewed  Airway: Mallampati: III  TM distance: >3 FB   Neck ROM: full  Mouth opening: > = 3 FB Dental:          Pulmonary:                              Cardiovascular:    (+) hypertension:,                   Neuro/Psych:   (+) neuromuscular disease:,             GI/Hepatic/Renal:             Endo/Other:    (+) hypothyroidism: arthritis:., .                 Abdominal:           Vascular:                                        Anesthesia Plan      MAC     ASA 2       Induction: intravenous. Anesthetic plan and risks discussed with patient. Plan discussed with CRNA. Maggi Gates.  DO Scarlett   5/21/2019

## 2019-05-21 NOTE — OP NOTE
Pre-Procedure Note    Patient Name: Fouzia Wright   YOB: 1970  Medical Record Number: [id number]  Date: 05/21/2019    Indication:  Lower back and leg pain  Consent: On file. Vital Signs:   Vitals:    05/21/19 1313   BP: 130/74   Pulse: 77   Resp: 16   Temp: 98.2 °F (36.8 °C)   SpO2: 96%       Past Medical History:   has a past medical history of Anemia, CKD (chronic kidney disease), stage III ([address] Dyslipidemia, Hematuria, Hyperlipidemia, Hypertension, Hyperuricemia, Hypothyroidism, Kidney disease, Lupus, Lupus nephritis ([address] Proteinuria, Seizures ([address] and Systemic lupus erythematosus ([address] Past Surgical History:   has a past surgical history that includes Nerve Block (Bilateral); pr inj dx/ther agnt paravert facet joint, lumbar/sac, 2nd level (Bilateral, 5/7/2018); pr njx dx/ther sbst intrlmnr lmbr/sac w/img gdn (N/A, 7/19/2018); pr inject anes/steroid foramen lumbar/sacral w img guide ,1 level (Left, 9/25/2018); Lumbar spine surgery (Left, 12/13/2018); and lumbar nerve block (N/A, 3/11/2019). Pre-Sedation Documentation and Exam:   Vital signs have been reviewed (see flow sheet for vitals). Sedation/Anesthesia Plan:   MAC    Medications Planned:   Per Anesthesia. Patient is an appropriate candidate for plan of sedation: yes    Preoperative Diagnosis:  L-radiculopathy, L-spinal stenosis    Post-Op Dx: as above    Procedure Performed:  Lumbar epidural steroid injection under fluoroscopy guidance  # 2. Indication for the Procedure: The patient failed conservative management  for pain in the low back radiating to lower extremities. The patient is undergoing lumbar epidural steroid injections and the last procedure gave 50% relief. As the patient is not responding to conservative management and it is interfering with activities of daily living we decided to proceed with lumbar epidural steroid injection.  The procedure and risks were discussed with the patient and an informed consent was obtained    Procedure: The patient is placed in prone position. Skin over the back was prepped and draped in sterile manner. Then using fluoroscopy the L3 interspace was observed and the skin and deep tissues in the  paramedian area were infiltrated with 3 ml of 1% lidocaine. The #20-gauge, 3-1/2 inch Tuohy needle was inserted through the skin wheal and the epidural space was identified using loss of resistance technique . This was confirmed with AP and lateral views using fluoroscopy after injecting about 2 ml of Omnipaque-180 and observing the spread of the contrast in the epidural space. Then after negative aspiration a total of 6 mg of dexamethasone  with 3 ml of normal saline was injected into the epidural space. The needle is removed and a Band-Aid was placed over the needle insertion site. No paresthesia. Patient's vital signs remained stable and the patient tolerated the procedure well. The patient will be discharged home in stable condition and will be followed in the office in the next few weeks for further planning.     Electronically signed by Altaf Live MD

## 2019-05-21 NOTE — PROGRESS NOTES
1354- Pt to PACU sleepy on arrival woke up easily, VSS, pt breathing deeply on room air, snack given family at bedside. 1413- IV removed  1414- Pt given discharge instructions pt verbalized understanding, pt up to get dressed  [phone] 20- Pt discharged home.

## 2019-06-12 ENCOUNTER — TELEPHONE (OUTPATIENT)
Dept: FAMILY MEDICINE CLINIC | Age: 49
End: 2019-06-12

## 2019-06-12 DIAGNOSIS — H61.20 IMPACTED CERUMEN, UNSPECIFIED LATERALITY: Primary | ICD-10-CM

## 2019-06-16 ENCOUNTER — HOSPITAL ENCOUNTER (EMERGENCY)
Age: 49
Discharge: HOME | End: 2019-06-16
Attending: EMERGENCY MEDICINE
Payer: COMMERCIAL

## 2019-06-16 VITALS
TEMPERATURE: 98 F | SYSTOLIC BLOOD PRESSURE: 105 MMHG | RESPIRATION RATE: 20 BRPM | HEART RATE: 68 BPM | DIASTOLIC BLOOD PRESSURE: 79 MMHG | OXYGEN SATURATION: 96 % | BODY MASS INDEX: 31.47 KG/M2 | WEIGHT: 195 LBS

## 2019-06-16 DIAGNOSIS — H61.23 BILATERAL IMPACTED CERUMEN: Primary | ICD-10-CM

## 2019-06-16 PROCEDURE — 99282 EMERGENCY DEPT VISIT SF MDM: CPT

## 2019-06-16 PROCEDURE — 69210 REMOVE IMPACTED EAR WAX UNI: CPT

## 2019-06-16 ASSESSMENT — PAIN DESCRIPTION - PAIN TYPE: TYPE: ACUTE PAIN

## 2019-06-16 ASSESSMENT — ENCOUNTER SYMPTOMS
COUGH: 0
RHINORRHEA: 0
EYE DISCHARGE: 0
WHEEZING: 0
SORE THROAT: 0
EYE PAIN: 0
NAUSEA: 0
VOMITING: 0
BACK PAIN: 0
ABDOMINAL PAIN: 0
SHORTNESS OF BREATH: 0
DIARRHEA: 0

## 2019-06-16 ASSESSMENT — PAIN DESCRIPTION - LOCATION: LOCATION: EAR

## 2019-06-16 ASSESSMENT — PAIN DESCRIPTION - ORIENTATION: ORIENTATION: RIGHT

## 2019-06-16 ASSESSMENT — PAIN SCALES - GENERAL: PAINLEVEL_OUTOF10: 2

## 2019-06-16 NOTE — ED PROVIDER NOTES
[id number] [address]   eMERGENCY dEPARTMENT eNCOUnter        [facility]    Chief Complaint   Patient presents with    Cerumen Impaction     right side       Nurses Notes reviewed and I agree except as noted in the HPI. HPI    Mercedes Silva is a 57 y.o. female who presents for evaluation of a cerumen impaction. She states to have attempted to clean out her ears approximately five days ago when she started to experienced hearing loss causing her to go to her pcp who prescribed her Carbamide peroxide drops. Patient denies any relief and came to the ED for further evaluation. She denies any fever, chills, otalgia, congestion, sinus pressure, sinus pain, or pharyngitis. The patient reports no additional symptoms or complaints at this time. REVIEW OFSYSTEMS    Review of Systems   Constitutional: Negative for appetite change, chills, fatigue and fever. HENT: Positive for hearing loss. Negative for congestion, ear pain, rhinorrhea and sore throat. Cerumen impaction   Eyes: Negative for pain, discharge and visual disturbance. Respiratory: Negative for cough, shortness of breath and wheezing. Cardiovascular: Negative for chest pain, palpitations and leg swelling. Gastrointestinal: Negative for abdominal pain, diarrhea, nausea and vomiting. Genitourinary: Negative for difficulty urinating, dysuria, hematuria and vaginal discharge. Musculoskeletal: Negative for arthralgias, back pain, joint swelling and neck pain. Skin: Negative for pallor and rash. Neurological: Negative for dizziness, syncope, weakness, light-headedness, numbness and headaches. Hematological: Negative for adenopathy. Psychiatric/Behavioral: Negative for confusion and suicidal ideas. The patient is not nervous/anxious.         PASTMEDICAL HISTORY     has a past medical history of Anemia, CKD (chronic kidney disease), stage III ([facility] Dyslipidemia, Hematuria, Hyperlipidemia, R-1Normal      Calcium Carbonate-Vitamin D (OYSTER SHELL CALCIUM/D) 500-200 MG-UNIT TABS take 1 tablet once daily, Disp-90 tablet, R-3Normal      !! ondansetron (ZOFRAN) 4 MG tablet Take 1 tablet by mouth every 8 hours as needed for Nausea or Vomiting, Disp-15 tablet, R-0Normal       !! - Potential duplicate medications found. Please discuss with provider. ALLERGIES    is allergic to codeine. FAMILY HISTORY    indicated that her mother is alive. She indicated that her father is . She indicated that her maternal cousin is alive. family history includes Diabetes in her father and mother; Heart Disease in her mother; Lupus in her maternal cousin; Parkinsonism in her mother; Thyroid Disease in her mother. SOCIALHISTORY     reports that she has quit smoking. Her smoking use included cigarettes. She has never used smokeless tobacco. She reports that she drinks alcohol. She reports that she does not use drugs. PHYSICAL EXAM      INITIAL VITALS: /79   Pulse 68   Temp 98 °F (36.7 °C) (Oral)   Resp 20   Wt 195 lb (88.5 kg)   SpO2 96%   BMI 31.47 kg/m²  Estimated body mass index is 31.47 kg/m² as calculated from the following:    Height as of 19: 5' 6\" (1.676 m). Weight as of this encounter: 195 lb (88.5 kg). Physical Exam   Constitutional: She is oriented to person, place, and time. She appears well-developed and well-nourished. HENT:   Head: Normocephalic and atraumatic. Right Ear: External ear normal.   Left Ear: External ear normal.   Patient presented with bilateral cerumen impactions which was more severe within the right ear. Eyes: Conjunctivae are normal. Right eye exhibits no discharge. Left eye exhibits no discharge. No scleral icterus. Neck: Normal range of motion. Neck supple. No JVD present. Pulmonary/Chest: Effort normal. No stridor. No respiratory distress. Abdominal: Soft. She exhibits no distension. Musculoskeletal: Normal range of motion.  She

## 2019-06-16 NOTE — ED TRIAGE NOTES
Pt to ER with cerumen impaction, more on right side than left. Per pt, Pt had visited her PCP and was prescribed ear drops. However, pt states it does not seem to be helping her and now it has started causing her limited hearing and some discomfort in right ear. Pt resting ion cot, respirations easy and unlabored. Call light within reach.

## 2019-06-17 ENCOUNTER — OFFICE VISIT (OUTPATIENT)
Dept: RHEUMATOLOGY | Age: 49
End: 2019-06-17
Payer: COMMERCIAL

## 2019-06-17 ENCOUNTER — HOSPITAL ENCOUNTER (OUTPATIENT)
Dept: GENERAL RADIOLOGY | Age: 49
Discharge: HOME | End: 2019-06-17
Payer: COMMERCIAL

## 2019-06-17 ENCOUNTER — TELEPHONE (OUTPATIENT)
Dept: PHYSICAL MEDICINE AND REHAB | Age: 49
End: 2019-06-17

## 2019-06-17 ENCOUNTER — TELEPHONE (OUTPATIENT)
Dept: RHEUMATOLOGY | Age: 49
End: 2019-06-17

## 2019-06-17 ENCOUNTER — NURSE ONLY (OUTPATIENT)
Dept: LAB | Age: 49
End: 2019-06-17

## 2019-06-17 ENCOUNTER — HOSPITAL ENCOUNTER (OUTPATIENT)
Age: 49
Discharge: HOME | End: 2019-06-17
Payer: COMMERCIAL

## 2019-06-17 VITALS
DIASTOLIC BLOOD PRESSURE: 74 MMHG | HEIGHT: 66 IN | BODY MASS INDEX: 30.37 KG/M2 | OXYGEN SATURATION: 99 % | WEIGHT: 189 LBS | SYSTOLIC BLOOD PRESSURE: 98 MMHG | HEART RATE: 67 BPM

## 2019-06-17 DIAGNOSIS — M32.14 OTHER SYSTEMIC LUPUS ERYTHEMATOSUS WITH GLOMERULAR DISEASE (HCC): ICD-10-CM

## 2019-06-17 DIAGNOSIS — M25.50 POLYARTHRALGIA: ICD-10-CM

## 2019-06-17 DIAGNOSIS — M32.14 OTHER SYSTEMIC LUPUS ERYTHEMATOSUS WITH GLOMERULAR DISEASE (HCC): Primary | ICD-10-CM

## 2019-06-17 DIAGNOSIS — M47.816 SPONDYLOSIS OF LUMBAR REGION WITHOUT MYELOPATHY OR RADICULOPATHY: ICD-10-CM

## 2019-06-17 DIAGNOSIS — R07.81 PLEURODYNIA: ICD-10-CM

## 2019-06-17 LAB
BACTERIA: ABNORMAL
BILIRUBIN URINE: NEGATIVE
BLOOD, URINE: NEGATIVE
CASTS: ABNORMAL /LPF
CASTS: ABNORMAL /LPF
CHARACTER, URINE: CLEAR
COLOR: YELLOW
CREATININE URINE: 265.4 MG/DL
CRYSTALS: ABNORMAL
EPITHELIAL CELLS, UA: ABNORMAL /HPF
GLUCOSE, URINE: NEGATIVE MG/DL
HAV IGM SER IA-ACNC: NEGATIVE
HEPATITIS B CORE IGM ANTIBODY: NEGATIVE
HEPATITIS B SURFACE ANTIGEN: NEGATIVE
HEPATITIS C ANTIBODY: NEGATIVE
KETONES, URINE: NEGATIVE
LEUKOCYTE ESTERASE, URINE: ABNORMAL
MISCELLANEOUS LAB TEST RESULT: ABNORMAL
NITRITE, URINE: NEGATIVE
PH UA: 5 (ref 5–9)
PROT/CREAT RATIO, UR: 0.56
PROTEIN UA: 100 MG/DL
PROTEIN, URINE: 147.6 MG/DL
RBC URINE: ABNORMAL /HPF
RENAL EPITHELIAL, UA: ABNORMAL
RHEUMATOID FACTOR: 11 IU/ML (ref 0–13)
SPECIFIC GRAVITY UA: 1.02 (ref 1–1.03)
UROBILINOGEN, URINE: 0.2 EU/DL (ref 0–1)
WBC UA: ABNORMAL /HPF
YEAST: ABNORMAL

## 2019-06-17 PROCEDURE — 99244 OFF/OP CNSLTJ NEW/EST MOD 40: CPT | Performed by: INTERNAL MEDICINE

## 2019-06-17 PROCEDURE — G8417 CALC BMI ABV UP PARAM F/U: HCPCS | Performed by: INTERNAL MEDICINE

## 2019-06-17 PROCEDURE — 73130 X-RAY EXAM OF HAND: CPT

## 2019-06-17 PROCEDURE — 71046 X-RAY EXAM CHEST 2 VIEWS: CPT

## 2019-06-17 PROCEDURE — G8427 DOCREV CUR MEDS BY ELIG CLIN: HCPCS | Performed by: INTERNAL MEDICINE

## 2019-06-17 RX ORDER — HYDROCODONE BITARTRATE AND ACETAMINOPHEN 5; 325 MG/1; MG/1
1 TABLET ORAL 2 TIMES DAILY PRN
Qty: 60 TABLET | Refills: 0 | Status: SHIPPED | OUTPATIENT
Start: 2019-06-20 | End: 2019-07-18 | Stop reason: SDUPTHER

## 2019-06-17 RX ORDER — HYDROXYCHLOROQUINE SULFATE 200 MG/1
200 TABLET, FILM COATED ORAL 2 TIMES DAILY
Qty: 60 TABLET | Refills: 2 | Status: SHIPPED | OUTPATIENT
Start: 2019-06-17 | End: 2019-08-28 | Stop reason: SDUPTHER

## 2019-06-17 RX ORDER — PREDNISONE 1 MG/1
TABLET ORAL
Qty: 40 TABLET | Refills: 1 | Status: SHIPPED | OUTPATIENT
Start: 2019-06-17 | End: 2019-07-10 | Stop reason: SDUPTHER

## 2019-06-17 ASSESSMENT — JOINT PAIN
TOTAL NUMBER OF TENDER JOINTS: 10
TOTAL NUMBER OF SWOLLEN JOINTS: 1

## 2019-06-17 ASSESSMENT — ENCOUNTER SYMPTOMS
SHORTNESS OF BREATH: 1
COUGH: 0
VOMITING: 0
NAUSEA: 0
EYE REDNESS: 0
CONSTIPATION: 0
EYE PAIN: 0
DIARRHEA: 0
WHEEZING: 0

## 2019-06-17 NOTE — TELEPHONE ENCOUNTER
Phoned patient and reviewed.  She denies vaginal pain \"its just around my leg there\" advised to monitor for now and follow up at her next visit

## 2019-06-17 NOTE — PROGRESS NOTES
[facility]       Date Of Service: 6/17/2019  Provider: Ce Farrell DO    Name: Cheryl Souza   MRN: [id number]    Chief Complaint(s)      Chief Complaint   Patient presents with   Abigail eKith     systemic lupus        History of Present illness (HPI)    Cheryl Souza   is a(n)48 y.o. female with a hx of Systemic lupus with history of class IV lupus nephritis, chronic kidney disease stage III, chronic low back pain with lumbar radiculopathy and lumbar spinal stenosis with neurogenic claudication, h/o siezures lasting around 2009,  referred by Dr. Naye Live, APRN - CNP  for evaluation of her systemic lupus. 30-year-old white female was diagnosed with SLE in 2001. In November 2003, she underwent kidney biopsy which revealed diffuse proliferative lupus nephritis with some segmental necrotizing glomerular lesions (class IV) and intracapillary foam cells ([id number] from [facility]). She was treated with Cytoxan and was on low dose prednisone maintenance. In October 2007, she had nausea, vomiting, while on ARB and ACE inhibitors. She was started on one dose of Cytoxan then she developed nausea and vomting and her serum creatinine ce to 3.4 and then to 4.0 at the time of the biopsy. C4 was 64, C4: 15.  4+ proteinuria, but her 24 hr urine collection revealed only 1.1 g. 20-29 RBC/HPF. Platelet number 376. BELLE positive, dsDNA positive., ANCA (+p-ANCA , Neg MPO, Pr-3) . H/o joint swelling and pain. Was seeing Rheumatologist Dr. Lara Huitron but  lost to follow up. Report prior tx - prednisone, Cytoxna. Low back pain with rediculopathy managed by pain management currently     The patient reports pain affecting her Rt wrist, Right hand, b/l ankles, back. Pain up to 8/10. Constant in the Back, Rt hand, Rt wrist. + shooting pain down the left legs intermittently occurring. + Rt hand / wrist numbness intermittently occurring. Timing:afternoon-evenings. Aggravating factors: weather. Back-- prolonged bending, forward flexion. Ankles -- wt bearing  Alleviating factors: norco, hot bath, gabapentin 300mg daily. Current therapy: as below. No current joint swelling. AM stiffness of fingers lasting at least 60 min, relief with movement. +  Gelling. intemrittent pleuritic chest pain for years. + shaking of right hand with eating.       -denies Photosenstivity, Rash, dry mouth/dry eyes, oral/nasal sores, Raynaud's, digital ulcerations, skin tightening, renal disease,foamy urination, hematuria, sz's, blood clots,  pre-term labor loss, AIHA,leukpenia/lymphopenia, thrombocytopenia, hair loss,  enthesitis, dactylitis, nail changes, hx of STD,  personal or family history of Psoriatic arthritis, psoriasis, ank spond,       Cancer screening:not up to date   Fmhx: maternal cousin with systemic lupus     Review of Systems    Review of Systems   Constitutional: Positive for fatigue. Negative for diaphoresis and fever. HENT: Negative for congestion, hearing loss, mouth sores and nosebleeds. Eyes: Negative for pain, redness and visual disturbance. Respiratory: Positive for shortness of breath. Negative for cough and wheezing. Cardiovascular: Positive for chest pain (mid-sternal, intermittent, occasional pleuritic). Gastrointestinal: Negative for constipation, diarrhea, nausea and vomiting. Endocrine: Positive for polydipsia and polyuria. Negative for cold intolerance, heat intolerance and polyphagia. Genitourinary: Negative for decreased urine volume, difficulty urinating, dyspareunia, frequency, genital sores, hematuria, vaginal bleeding and vaginal discharge. Musculoskeletal: Positive for myalgias. Skin: Negative for rash. Neurological: Positive for weakness (Rt hand, right wrist) and numbness (Rt hand and wrist. ). Negative for dizziness and headaches. Hematological: Does not bruise/bleed easily. Psychiatric/Behavioral: Negative for sleep disturbance. The patient is not nervous/anxious.               PAST MEDICAL HISTORY      Past Medical History:   Diagnosis Date    Anemia     CKD (chronic [facility] disease), stage III ([facility]     Dyslipidemia     Hematuria     Hyperlipidemia     Hypertension     Hyperuricemia     Hypothyroidism     Kidney disease     as a result from lupus    Lupus ([facility]     Lupus nephritis ([facility]     Proteinuria     Seizures ([facility]     Systemic lupus erythematosus ([facility]        PAST SURGICAL HISTORY      Past Surgical History:   Procedure Laterality Date    LUMBAR NERVE BLOCK N/A 3/11/2019    LESI at L4 performed by Vasile Smith MD at [facility] N/A 5/21/2019    LESI at L3 #1 performed by Vasile Smith MD at [address] Left 12/13/2018    LUMBAR TRANSFORAMINAL TFESI L5 LEFT #2, performed by Vasile Smith MD at [address] [place] [address] Bilateral     LUMBAR FACET    WI INJ DX/THER AGNT PARAVERT FACET JOINT, LUMBAR/SAC, 2ND LEVEL Bilateral 5/7/2018    LUMBAR FACET  [email or web address] [facility]ilateral [facility]EN LUMBAR/SACRAL W IMG GUIDE ,1 LEVEL Left 9/25/2018    LUMBAR TRANSFORAMINAL TFESI L5 on the LEFT performed by Vasile Smith MD at [address] [place] [address] DX/THER SBST INTRLMNR LMBR/SAC W/IMG GDN N/A 7/19/2018    LUMBAR INTER LAMINAR GIGI LESI @L4 performed by Vasile Smith MD at [address] [place] [address]      Family History   Problem Relation Age of Onset    Diabetes Mother     Heart Disease Mother     Thyroid Disease Mother     Parkinsonism Mother     Diabetes Father        SOCIAL HISTORY      Social History     Tobacco History     Smoking Status  Former Smoker Smoking Tobacco Type  Cigarettes    Smokeless Tobacco Use  Never Used    Tobacco Comment  1 a day          Alcohol History     Alcohol Use Status  Yes Drinks/Week  0 Standard drinks or equivalent per week Amount  0.0 oz alcohol/wk Comment  occasionally, couple times a year          Drug Use     Drug Use Status  No          Sexual Activity     Sexually Active  Not Asked                ALLERGIES     Allergies   Allergen Reactions    Codeine Hives and Nausea And Vomiting       CURRENT MEDICATIONS      Current Outpatient Medications   Medication Sig Dispense Refill    HYDROcodone-acetaminophen (NORCO) 5-325 MG per tablet Take 1 tablet by mouth 2 times daily as needed for Pain for up to 30 days. 60 tablet 0    naproxen (NAPROSYN) 500 MG tablet Take 1 tablet by mouth 2 times daily (with meals) 60 tablet 0    simvastatin (ZOCOR) 40 MG tablet take 1 tablet by mouth at bedtime 90 tablet 3    ferrous sulfate 325 (65 Fe) MG tablet Take 1 tablet by mouth daily 90 tablet 3    gabapentin (NEURONTIN) 300 MG capsule Take 1 capsule by mouth daily for 182 days. 90 capsule 1    acetaminophen (RA ACETAMINOPHEN EX ST) 500 MG tablet take 2 tablets by mouth every 6 hours if needed for pain 120 tablet 2    levothyroxine (SYNTHROID) 112 MCG tablet take 1 tablet by mouth once daily 90 tablet 1    Calcium Carbonate-Vitamin D (OYSTER SHELL CALCIUM/D) 500-200 MG-UNIT TABS take 1 tablet once daily 90 tablet 3    ondansetron (ZOFRAN) 4 MG tablet Take 1 tablet by mouth every 8 hours as needed for Nausea or Vomiting 15 tablet 0    carbamide peroxide (DEBROX) 6.5 % otic solution Place 5 drops in ear(s) 2 times daily 1 Bottle 0    levothyroxine (SYNTHROID) 100 MCG tablet take 1 tablet by mouth once daily 90 tablet 3     No current facility-administered medications for this visit.         PHYSICAL EXAMINATION / OBJECTIVE     Objective:  BP 98/74 (Site: Left Upper Arm, Position: Sitting, Cuff Size: Medium Adult)   Pulse 67   Ht 5' 5.98\" (1.676 m)   Wt 189 lb (85.7 kg)   SpO2 99%   BMI 30.52 kg/m²     Physical Exam   Constitutional: She is oriented to person, place, and time. She appears well-developed and well-nourished. No distress. HENT:   Head: Normocephalic and atraumatic. Oral sores on posterior buccal mucosa  Scabbed right nasal septal sore   Eyes: Conjunctivae and EOM are normal. No scleral icterus. Neck: Normal range of motion. Neck supple. Cardiovascular: Normal rate, regular rhythm and normal heart sounds. Pulmonary/Chest: Effort normal and breath sounds normal. No respiratory distress. She has no wheezes. She has no rales. Lymphadenopathy:     She has no cervical adenopathy. Neurological: She is alert and oriented to person, place, and time. Skin: Skin is warm and dry. She is not diaphoretic. Erythematous small sub-cm papular lesions (~ 3)   Elevated red patch along lateral corner of each eye. Mild onycholysis in hands. Psychiatric: She has a normal mood and affect. Her behavior is normal.       Musculoskeletal:    Normal gait. Strength 5/5 in biceps, triceps, hips, knees,      Upper extremities:   Shoulders: Non-tender, No swelling , No Deformities and intact ROM  Elbows:  . Non-tender, No swelling , No Deformities and intact ROM + tinel bilat  Wrists Non-tender, No swelling , No Deformities and intact ROM + tinel bilat. Hands: + tender, + swelling , z-line Deformities bilateral thumbs and intact ROM    Lower extremities:  Hips: Non-tender, No swelling , No Deformities and intact ROM  Knees :Non-tender, No swelling , No Deformities and intact ROM  Ankles: Non-tender, No swelling , No Deformities and intact ROM  Feet:Non-tender, No swelling , No Deformities and intact ROM    Spine:     C-spine: intact ROM, Non-tender, no swelling.    no pain to palpation, negative SLR test, intact ROM    Physical Exam     Tenderness:   Right hand: 1st MCP, 2nd MCP, 3rd MCP, 4th MCP, 5th MCP, 2nd DIP, 3rd DIP, 4th DIP and 5th DIP  Left hand: 1st MCP, 2nd MCP, 3rd MCP, 4th MCP, 5th MCP, 2nd DIP, 3rd DIP, 4th DIP and 5th DIP    Swelling:   Right hand: 3rd PIP    ROBERTO-28 tender joint count: 10  ROBERTO-28 swollen joint count: 1      RAPID3 Composite Score MDHAQ (0-10) + Patient pain VAS (0-10): + Patient global assessment VAS (0-10):     6/17/2019 --- RAPID 3:  +  +  =      Remission: <3  Low Disease Activity: <6  Moderate Disease Activity: >=6 and <=12  High Disease Activity: >12    LABS        CBC  Lab Results   Component Value Date    WBC 10.9 05/25/2018    RBC 4.28 05/25/2018    HGB 12.0 05/25/2018    HCT 36.9 05/25/2018    MCV 86.2 05/25/2018    MCH 28.1 05/25/2018    MCHC 32.6 05/25/2018    RDW 14.6 05/25/2018     05/25/2018       CMP  Lab Results   Component Value Date    CALCIUM 9.6 01/15/2019    LABALBU 3.7 09/09/2017    PROT 6.1 09/09/2017     01/15/2019    K 4.7 01/15/2019    CO2 28 01/15/2019     01/15/2019    BUN 18 01/15/2019    CREATININE 1.2 01/15/2019    ALKPHOS 69 09/09/2017    ALT 17 09/09/2017    AST 18 09/09/2017       HgBA1c: No components found for: HGBA1C    Lab Results   Component Value Date    TSH 6.890 (H) 09/12/2018     No results found for: VITD25      No results found for: ANASCRN  No results found for: SSA  No results found for: SSB  No results found for: ANTI-SMITH  No results found for: DSDNAAB   No results found for: ANTIRNP  No results found for: C3, C4  No results found for: CCPAB  No results found for: RF    No components found for: CANCASCRN, APANCASCRN  Lab Results   Component Value Date    SEDRATE 15 11/28/2015     No results found for: CRP    RADIOLOGY:         ASSESSMENT/PLAN    Assessment   Plan     H/o SLE w/ class IV LN  Pleuritic chest pain. Fatigue   - diagnosed in 2001, s/p cytoxan 2007. C4 was 64, C4: 15.  4+ proteinuria, but her 24 hr urine collection revealed only 1.1 g. 20-29 RBC/HPF. Platelet number 395. BELLE positive, dsDNA positive., ANCA (+p-ANCA , Neg MPO, Pr-3) . Reported inflammatory arthritis. Examination with joint swelling, oral/nasal soers. - outside records summarized above.     -

## 2019-06-17 NOTE — TELEPHONE ENCOUNTER
Patient calling states she forgot to inform Dr Kavita Jansen that she has pain around \"my legs that is on the inside close to my shala\" attempted to confirm this was her groin area but she wasn't sure that was it. She just wanted you to know about this area of pain and will follow up after she takes her medications.  Chart to Dr Kavita Jansen for [facility]

## 2019-06-17 NOTE — TELEPHONE ENCOUNTER
OARRS reviewed. UDS: + for  norco . Narcan offered: yes   Last seen: 4/8/2019.  Follow-up:   Future Appointments   Date Time Provider Justus Dumont   6/24/2019  1:20 PM STR MAMMOGRAPHY RM3 DIG STRZ WOMEN STR Radiolog   6/24/2019  3:30 PM MACKENZIE Daniel - CNP SRPX Pain P [place]   8/28/2019 11:00 AM Ian Gonzalez DO [facility] Rheum P [place]   10/22/2019  2:40 PM Beth Tierney, APRN - [id number] P - [place]   2/18/2020 10:40 AM Claus Gandhi MD ES Kidney P - [place]

## 2019-06-18 LAB
C3 COMPLEMENT: 140 MG/DL (ref 88–201)
C4 COMPLEMENT: 28 MG/DL (ref 10–40)
DSDNA ANTIBODY: NORMAL

## 2019-06-19 ENCOUNTER — TELEPHONE (OUTPATIENT)
Dept: RHEUMATOLOGY | Age: 49
End: 2019-06-19

## 2019-06-19 DIAGNOSIS — Z51.81 MEDICATION MONITORING ENCOUNTER: ICD-10-CM

## 2019-06-19 DIAGNOSIS — M32.14 OTHER SYSTEMIC LUPUS ERYTHEMATOSUS WITH GLOMERULAR DISEASE (HCC): Primary | ICD-10-CM

## 2019-06-19 LAB
ANA SCREEN: DETECTED
ANTI SSA: NORMAL
ANTI SSB: 0 AU/ML (ref 0–40)
ANTI-SMITH: 4 AU/ML (ref 0–40)
COMPLEMENT TOTAL (CH50): 99 CAE UNITS (ref 60–144)
CYCLIC CITRULLIN PEPTIDE AB: 8 UNITS (ref 0–19)

## 2019-06-19 RX ORDER — AZATHIOPRINE 50 MG/1
TABLET ORAL
Qty: 60 TABLET | Refills: 0 | Status: SHIPPED | OUTPATIENT
Start: 2019-06-19 | End: 2019-08-21 | Stop reason: SDUPTHER

## 2019-06-19 NOTE — TELEPHONE ENCOUNTER
Systemic lupus + BELLE, + DS DNA, oral sores, h/o class IV LN s/p cytoxan 2007. C4 was 64, C4: 15.  4+ proteinuria, but her 24 hr urine collection revealed only 1.1 g. 20-29 RBC/HPF.  Platelet number 276. BELLE positive, dsDNA positive., ANCA (+p-ANCA , Neg MPO, Pr-3)     - disucss imuran or MMF  - pt okay with imuran w/ titraiton as needed. - start and titration of imuran to 50mg bid. initially  - - We discussed risks of Azathioprine including liver infection, cancer, blood dyscrasias. increased risk of post transplant lymphoma and hepatosplenic T-cell lymphoma she was specifically instructed to avoid ETOH use. she was instructed to stop Azathiprine at the onset of any infection and instructed to call me if concern for infection. Med monitoring:   - Labs q 4 weeks.    -

## 2019-06-20 ENCOUNTER — TELEPHONE (OUTPATIENT)
Dept: RHEUMATOLOGY | Age: 49
End: 2019-06-20

## 2019-06-20 DIAGNOSIS — M54.40 BACK PAIN OF LUMBAR REGION WITH SCIATICA: ICD-10-CM

## 2019-06-20 LAB
ANA PATTERN: ABNORMAL
ANA TITER: ABNORMAL
ANTINUCLEAR AB INTERPRETIVE COMMENT: ABNORMAL
ANTINUCLEAR ANTIBODY, HEP-2, IGG: DETECTED
DSDNA AB IGG IFA: ABNORMAL

## 2019-06-20 RX ORDER — ACETAMINOPHEN 500 MG
TABLET ORAL
Qty: 120 TABLET | Refills: 2 | Status: SHIPPED | OUTPATIENT
Start: 2019-06-20 | End: 2019-10-22 | Stop reason: SDUPTHER

## 2019-06-20 NOTE — TELEPHONE ENCOUNTER
These symptoms could also be from the prednisone taper. Okay to hold the plaquenil until the prednisone has been completed. If the symptoms recur I would like for her to contact the office.

## 2019-06-20 NOTE — TELEPHONE ENCOUNTER
Patient called office stating that after taking the Plaquenil, she is experiencing dry mouth, excessive thirst and a \"chalky taste\". She is wondering if these could be side effects of the medication? Or if it could be from something else? Please advise, thanks!

## 2019-06-24 ENCOUNTER — HOSPITAL ENCOUNTER (OUTPATIENT)
Dept: WOMENS IMAGING | Age: 49
Discharge: HOME | End: 2019-06-24
Payer: COMMERCIAL

## 2019-06-24 ENCOUNTER — OFFICE VISIT (OUTPATIENT)
Dept: PHYSICAL MEDICINE AND REHAB | Age: 49
End: 2019-06-24
Payer: COMMERCIAL

## 2019-06-24 VITALS
HEART RATE: 72 BPM | HEIGHT: 66 IN | SYSTOLIC BLOOD PRESSURE: 128 MMHG | WEIGHT: 189 LBS | BODY MASS INDEX: 30.37 KG/M2 | DIASTOLIC BLOOD PRESSURE: 76 MMHG

## 2019-06-24 DIAGNOSIS — G89.29 CHRONIC BILATERAL THORACIC BACK PAIN: ICD-10-CM

## 2019-06-24 DIAGNOSIS — M47.814 SPONDYLOSIS OF THORACIC REGION WITHOUT MYELOPATHY OR RADICULOPATHY: Primary | ICD-10-CM

## 2019-06-24 DIAGNOSIS — Z12.39 SCREENING FOR BREAST CANCER: ICD-10-CM

## 2019-06-24 DIAGNOSIS — M54.6 CHRONIC BILATERAL THORACIC BACK PAIN: ICD-10-CM

## 2019-06-24 DIAGNOSIS — M54.16 LUMBAR RADICULITIS: ICD-10-CM

## 2019-06-24 DIAGNOSIS — M47.816 SPONDYLOSIS OF LUMBAR REGION WITHOUT MYELOPATHY OR RADICULOPATHY: ICD-10-CM

## 2019-06-24 DIAGNOSIS — M54.2 NECK PAIN: ICD-10-CM

## 2019-06-24 DIAGNOSIS — M48.062 LUMBAR STENOSIS WITH NEUROGENIC CLAUDICATION: ICD-10-CM

## 2019-06-24 DIAGNOSIS — G89.4 CHRONIC PAIN SYNDROME: ICD-10-CM

## 2019-06-24 DIAGNOSIS — M46.1 SI (SACROILIAC) JOINT INFLAMMATION (HCC): ICD-10-CM

## 2019-06-24 PROCEDURE — 99214 OFFICE O/P EST MOD 30 MIN: CPT | Performed by: NURSE PRACTITIONER

## 2019-06-24 PROCEDURE — G8417 CALC BMI ABV UP PARAM F/U: HCPCS | Performed by: NURSE PRACTITIONER

## 2019-06-24 PROCEDURE — G8427 DOCREV CUR MEDS BY ELIG CLIN: HCPCS | Performed by: NURSE PRACTITIONER

## 2019-06-24 PROCEDURE — 1036F TOBACCO NON-USER: CPT | Performed by: NURSE PRACTITIONER

## 2019-06-24 PROCEDURE — 77063 BREAST TOMOSYNTHESIS BI: CPT

## 2019-06-24 ASSESSMENT — ENCOUNTER SYMPTOMS
SHORTNESS OF BREATH: 0
CHEST TIGHTNESS: 0
COLOR CHANGE: 0
SORE THROAT: 0
COUGH: 0
NAUSEA: 0
RHINORRHEA: 0
WHEEZING: 0
EYE PAIN: 0
CONSTIPATION: 0
BACK PAIN: 1
ABDOMINAL PAIN: 0
DIARRHEA: 0
PHOTOPHOBIA: 0
SINUS PRESSURE: 0
VOMITING: 0

## 2019-06-24 NOTE — TELEPHONE ENCOUNTER
I would like for her to recheck the Plaquenil at one tablet daily. If the symptoms recur we could pursue either methotrexate or azathioprine. Both medications can help with skin manifestations of lupus and joint pains. The do require monthly blood testing to ensure there is no potential liver toxicity, low blood counts, kidney injury. Allergic reaction , upset stomach, nausea, vomiting, can occur.

## 2019-06-24 NOTE — PROGRESS NOTES
[facility]  [address] [facility] [address].  Dept: [phone]  Dept Fax: [phone]: [phone]    Visit Date: 6/24/2019    Functionality Assessment/Goals Worksheet     On a scale of 0 (Does not Interfere) to 10 (Completely Interferes)     1. Which number describes how during the past week pain has interfered with       the following:  A. General Activity:  7  B. Mood: 9  C. Walking Ability:  6  D. Normal Work (Includes both work outside the home and housework):  5  E. Relations with Other People:   9  F. Sleep:   8  G. Enjoyment of Life:   9    2. Patient Prefers to Take their Pain Medications:     [x]  On a regular basis   [x]  Only when necessary    []  Does not take pain medications    3. What are the Patient's Goals/Expectations for Visiting Pain Management? []  Learn about my pain    [x]  Receive Medication   []  Physical Therapy     []  Treat Depression   []  Receive Injections    []  Treat Sleep   []  Deal with Anxiety and Stress   []  Treat Opoid Dependence/Addiction   []  Other:    HPI:   Herminia Baeza is a 57 y.o. female is here today for    Chief Complaint: Low back pain, Mid back pain and Neck pain  SI pain   HPI   F/U LESI L3 #2 5/21/2019 states she revived about 25% pain relief or benefit for 10 days. Has now worn off. She has had Lumbar Facet MBB in past and LESI L4 without relief. Her main pain complaint is her mid to lower thoracic area that radiates up into neck and lower to lumbar at times. . She has been having right wrist pain limited ROM and weakness. She states she cant work with that pain. Pt is unable to bend and touch toes, twist torso and neck is limited ROM when turning side to side.   She has had TFLESI L5 left  12/13/2018 states continues to receive about 50% pain relief or benefit to right leg only no back pain relief, she has increased right groin pain with walking. Thoracic XR reviewed   TECHNIQUE: Standard AP, lateral, and swimmer's views of the thoracic spine were obtained.           Impression   1. Slight upper thoracic scoliosis. Mild mid thoracic dextro scoliosis, mild lower thoracic levoscoliosis. Cannot exclude muscle spasm. 2. Minimal degenerative spondylosis scattered in the thoracic spine. Mild demineralization of the bones, stressing osteoporosis. 3. No fracture is seen     She continues to take Norco Naproxen Neurontin. Medications reviewed. Patient denies side effects with medications. Patient states she is taking medications as prescribed. Shedenies receiving pain medications from other sources. She  complains any ER visits since last visit. Ear wax build up [place]    Pain scale with out pain medications or at its worst is 8/10. Pain scale with pain medications or at its best is 5/10. Last dose of [place]  was today  Drug screen reviewed from 4/9/2019 and was appropriate  Pill count was completed today and was appropriate  Patient does not have naloxone available at home. Patient has not required use of naloxone at home since last office visit. The patientis allergic to codeine. Past Medical History  Jabari Pereira  has a past medical history of Anemia, CKD (chronic kidney disease), stage III ([address] Dyslipidemia, Hematuria, Hyperlipidemia, Hypertension, Hyperuricemia, Hypothyroidism, Kidney disease, Lupus ([address] Lupus nephritis ([address] Proteinuria, Seizures ([address] and Systemic lupus erythematosus ([address] Past Surgical History  The patient  has a past surgical history that includes Nerve Block (Bilateral); pr inj dx/ther agnt paravert facet joint, lumbar/sac, 2nd level (Bilateral, 5/7/2018); pr njx dx/ther sbst intrlmnr lmbr/sac w/img gdn (N/A, 7/19/2018); pr inject anes/steroid foramen lumbar/sacral w img guide ,1 level (Left, 9/25/2018);  Lumbar spine surgery (Left, 12/13/2018); lumbar nerve block (N/A, 3/11/2019); and lumbar nerve block (N/A, 5/21/2019). Family History  This patient's family history includes Diabetes in her father and mother; Heart Disease in her mother; Lupus in her maternal cousin; Parkinsonism in her mother; Thyroid Disease in her mother. Social History  Osman Oliveira  reports that she has quit smoking. Her smoking use included cigarettes. She has never used smokeless tobacco. She reports that she drinks alcohol. She reports that she does not use drugs. Medications    Current Outpatient Medications:     acetaminophen (RA ACETAMINOPHEN EX ST) 500 MG tablet, take 2 tablets by mouth every 6 hours if needed for pain, Disp: 120 tablet, Rfl: 2    azaTHIOprine (IMURAN) 50 MG tablet, Take 1 tablet daily x 7 days then increase to 1 tablet twice daily, Disp: 60 tablet, Rfl: 0    hydroxychloroquine (PLAQUENIL) 200 MG tablet, Take 1 tablet by mouth 2 times daily, Disp: 60 tablet, Rfl: 2    predniSONE (DELTASONE) 5 MG tablet, Take 4 tables daily x 4 days then  3 tablets daily x 4 days then 2 tablets daily x 4 days then 1 tablet daily x 4 days then stop., Disp: 40 tablet, Rfl: 1    HYDROcodone-acetaminophen (NORCO) 5-325 MG per tablet, Take 1 tablet by mouth 2 times daily as needed for Pain for up to 30 days. , Disp: 60 tablet, Rfl: 0    carbamide peroxide (DEBROX) 6.5 % otic solution, Place 5 drops in ear(s) 2 times daily, Disp: 1 Bottle, Rfl: 0    levothyroxine (SYNTHROID) 100 MCG tablet, take 1 tablet by mouth once daily, Disp: 90 tablet, Rfl: 3    naproxen (NAPROSYN) 500 MG tablet, Take 1 tablet by mouth 2 times daily (with meals), Disp: 60 tablet, Rfl: 0    simvastatin (ZOCOR) 40 MG tablet, take 1 tablet by mouth at bedtime, Disp: 90 tablet, Rfl: 3    ferrous sulfate 325 (65 Fe) MG tablet, Take 1 tablet by mouth daily, Disp: 90 tablet, Rfl: 3    gabapentin (NEURONTIN) 300 MG capsule, Take 1 capsule by mouth daily for 182 days. , Disp: 90 capsule, Rfl: 1    levothyroxine (SYNTHROID) 112 MCG tablet, take 1 tablet by mouth once 06/24/19 1415   BP: 128/76   Site: Right Upper Arm   Position: Sitting   Cuff Size: Medium Adult   Pulse: 72   Weight: 189 lb (85.7 kg)   Height: 5' 6\" (1.676 m)       Physical Exam   Constitutional: She is oriented to person, place, and time. She appears well-developed and well-nourished. No distress. HENT:   Head: Normocephalic and atraumatic. Right Ear: External ear normal.   Left Ear: External ear normal.   Nose: Nose normal.   Mouth/Throat: Oropharynx is clear and moist. No oropharyngeal exudate. Eyes: Pupils are equal, round, and reactive to light. Conjunctivae and EOM are normal. Right eye exhibits no discharge. Left eye exhibits no discharge. No scleral icterus. Neck: Neck supple. No muscular tenderness present. No neck rigidity. No edema, no erythema and normal range of motion present. No thyromegaly present. Cardiovascular: Normal rate, regular rhythm, normal heart sounds and intact distal pulses. Exam reveals no gallop and no friction rub. No murmur heard. Pulmonary/Chest: Effort normal and breath sounds normal. No respiratory distress. She has no wheezes. She has no rales. She exhibits no tenderness. Abdominal: Soft. Bowel sounds are normal. She exhibits no distension. There is no tenderness. There is no rebound and no guarding. Musculoskeletal: She exhibits tenderness. Right shoulder: Normal.        Left shoulder: She exhibits tenderness, pain and spasm. Right hip: Normal.        Left hip: Normal.        Right knee: Normal.        Left knee: Normal.        Right ankle: Tenderness. Cervical back: She exhibits decreased range of motion, tenderness, bony tenderness, pain and spasm. Thoracic back: She exhibits decreased range of motion, tenderness, bony tenderness, pain and spasm. Lumbar back: She exhibits decreased range of motion, tenderness, bony tenderness, pain and spasm.         Back:         Right foot: There is decreased range of motion, tenderness, bony tenderness and swelling. 2 right broken toes has walker shoe on    Neurological: She is alert and oriented to person, place, and time. She has normal strength. She is not disoriented. She displays no atrophy. No cranial nerve deficit or sensory deficit. She exhibits normal muscle tone. She displays a negative Romberg sign. Coordination and gait normal. She displays no Babinski's sign on the right side. Reflex Scores:       Tricep reflexes are 2+ on the right side and 2+ on the left side. Bicep reflexes are 2+ on the right side and 2+ on the left side. Brachioradialis reflexes are 2+ on the right side and 2+ on the left side. Patellar reflexes are 2+ on the right side and 2+ on the left side. Achilles reflexes are 2+ on the right side and 2+ on the left side. BUE 5/5 BUE  5/5   Skin: Skin is warm. No rash noted. She is not diaphoretic. No erythema. No pallor. Psychiatric: She has a normal mood and affect. Her behavior is normal. Judgment and thought content normal. Her mood appears not anxious. Her affect is not angry, not blunt, not labile and not inappropriate. Her speech is not rapid and/or pressured, not delayed, not tangential and not slurred. She is not agitated, not aggressive, not hyperactive, not slowed, not withdrawn, not actively hallucinating and not combative. Thought content is not paranoid and not delusional. Cognition and memory are not impaired. She does not express impulsivity or inappropriate judgment. She does not exhibit a depressed mood. She expresses no homicidal and no suicidal ideation. She expresses no suicidal plans and no homicidal plans. She is communicative. She exhibits normal recent memory and normal remote memory. She is attentive. Nursing note and vitals reviewed. VONDA test:positive right  Yeomans test: positive right  Gaenslen test: positive right     Assessment:     1. Spondylosis of thoracic region without myelopathy or radiculopathy    2. Spondylosis of lumbar region without myelopathy or radiculopathy    3. SI (sacroiliac) joint inflammation (HCC)    4. Lumbar stenosis with neurogenic claudication    5. Lumbar radiculitis    6. Chronic bilateral thoracic back pain    7. Neck pain    8. Chronic pain syndrome            Plan:      · OARRS reviewed. Current MED:10  · Patient was not offered naloxone for home. · Discussed long term side effects of medications, tolerance, dependency and addiction. · Previous UDS reviewed  · UDS preformed today for compliance. · Patient told can not receive any pain medications from any other source. · No evidence of abuse, diversion or aberrant behavior.  Medications and/or procedures to improve function and quality of life- patient understanding with this and that may not be pain free   Discussed with patient about safe storage of medications at home   Discussed possible weaning of medication dosing dependent on treatment/procedure results.  Testing: reviewed Thoracic XR   Procedures: Thoracic facet MBB #1@ [id number] Bilateral   Discussed with patient about risks with procedure including infection, reaction to medication, increased pain, or bleeding.  Medications:Continue Norco Naproxen Neurontin    Minimal to no relief from LESI L4 and Lumbar facet MBB   Continues 50% pain relief from TFLESI L5 left for radicular s/s only. Meds. Prescribed:   No orders of the defined types were placed in this encounter. Return for Thoracic Facet MBB @T7-8,8-9,9-10 Bilateral, Follow up after procedure.          Electronically signed by MACKENZIE Hummel CNP on6/24/2019 at 2:47 PM

## 2019-06-24 NOTE — TELEPHONE ENCOUNTER
Patient was seen by pain management today and had some questions regarding her medication. She stated that since stopping the plaquenil she has not experienced the \"chalky taste\" as previously described. She is wondering if she is to continue the Plaquenil or if she could be prescribed something else because the taste is so bad? Please advise, thanks!

## 2019-06-27 ENCOUNTER — HOSPITAL ENCOUNTER (OUTPATIENT)
Dept: WOMENS IMAGING | Age: 49
Discharge: HOME | End: 2019-06-27
Payer: COMMERCIAL

## 2019-06-27 DIAGNOSIS — R92.1 BREAST CALCIFICATIONS: ICD-10-CM

## 2019-06-27 PROCEDURE — G0279 TOMOSYNTHESIS, MAMMO: HCPCS

## 2019-07-08 RX ORDER — B-COMPLEX WITH VITAMIN C
TABLET ORAL
Qty: 90 TABLET | Refills: 3 | Status: SHIPPED | OUTPATIENT
Start: 2019-07-08 | End: 2019-10-22 | Stop reason: SDUPTHER

## 2019-07-10 ENCOUNTER — TELEPHONE (OUTPATIENT)
Dept: RHEUMATOLOGY | Age: 49
End: 2019-07-10

## 2019-07-10 DIAGNOSIS — R20.0 RIGHT ARM NUMBNESS: Primary | ICD-10-CM

## 2019-07-10 DIAGNOSIS — M54.40 BACK PAIN OF LUMBAR REGION WITH SCIATICA: ICD-10-CM

## 2019-07-10 DIAGNOSIS — R20.0 RIGHT LEG NUMBNESS: ICD-10-CM

## 2019-07-10 DIAGNOSIS — M79.641 RIGHT HAND PAIN: ICD-10-CM

## 2019-07-10 RX ORDER — PREDNISONE 1 MG/1
TABLET ORAL
Qty: 90 TABLET | Refills: 0 | Status: SHIPPED | OUTPATIENT
Start: 2019-07-10 | End: 2019-08-21 | Stop reason: SDUPTHER

## 2019-07-16 ENCOUNTER — PREP FOR PROCEDURE (OUTPATIENT)
Dept: PHYSICAL MEDICINE AND REHAB | Age: 49
End: 2019-07-16

## 2019-07-18 DIAGNOSIS — M47.816 SPONDYLOSIS OF LUMBAR REGION WITHOUT MYELOPATHY OR RADICULOPATHY: ICD-10-CM

## 2019-07-18 RX ORDER — HYDROCODONE BITARTRATE AND ACETAMINOPHEN 5; 325 MG/1; MG/1
1 TABLET ORAL 2 TIMES DAILY PRN
Qty: 60 TABLET | Refills: 0 | Status: SHIPPED | OUTPATIENT
Start: 2019-07-20 | End: 2019-08-19 | Stop reason: SDUPTHER

## 2019-07-18 NOTE — TELEPHONE ENCOUNTER
Estevan Bennett called requesting a refill on the following medications:  Requested Prescriptions     Pending Prescriptions Disp Refills    HYDROcodone-acetaminophen (NORCO) 5-325 MG per tablet 60 tablet 0     Sig: Take 1 tablet by mouth 2 times daily as needed for Pain for up to 30 days. Pharmacy verified: [facility]      Date of last visit: 6/24/19  Date of next visit (if applicable): Visit date not found      Is there anything else besides the epidurals to subside the pain? States it wears off?  Please advise [phone]

## 2019-07-22 ENCOUNTER — TELEPHONE (OUTPATIENT)
Dept: PHYSICAL MEDICINE AND REHAB | Age: 49
End: 2019-07-22

## 2019-07-22 DIAGNOSIS — M47.814 SPONDYLOSIS OF THORACIC REGION WITHOUT MYELOPATHY OR RADICULOPATHY: Primary | ICD-10-CM

## 2019-07-22 RX ORDER — GABAPENTIN 300 MG/1
300 CAPSULE ORAL 2 TIMES DAILY
Qty: 90 CAPSULE | Refills: 1 | Status: SHIPPED | OUTPATIENT
Start: 2019-07-22 | End: 2019-07-22 | Stop reason: SDUPTHER

## 2019-07-22 RX ORDER — GABAPENTIN 300 MG/1
300 CAPSULE ORAL 2 TIMES DAILY
Qty: 180 CAPSULE | Refills: 1 | Status: SHIPPED | OUTPATIENT
Start: 2019-07-22 | End: 2019-08-28

## 2019-07-22 NOTE — TELEPHONE ENCOUNTER
Right arm + right leg pain started late June 2019 -- described as localized sharp to dull pain, intermittently occurring and lasting up to a couple days on occasional.  + tingling sx's in both the arm and leg.       Right arm and right leg pain & numbness  - increase gabapentin to 300mg bid   - EMG / NCV ordered b/c of the numbness and pain in the Rt arm and leg.   - xr c-spine to evaluate for abnormalities

## 2019-07-22 NOTE — TELEPHONE ENCOUNTER
Patient called office stating that she is not having a current flare, but the pain on her right side still hasn't gone away even while taking the prednisone. She is unsure of what she is supposed to do, since the prednisone initially helped. Please advise, thanks!

## 2019-07-26 DIAGNOSIS — M32.14 OTHER SYSTEMIC LUPUS ERYTHEMATOSUS WITH GLOMERULAR DISEASE (HCC): ICD-10-CM

## 2019-07-26 DIAGNOSIS — Z51.81 MEDICATION MONITORING ENCOUNTER: ICD-10-CM

## 2019-07-29 RX ORDER — AZATHIOPRINE 50 MG/1
TABLET ORAL
Qty: 60 TABLET | Refills: 0 | OUTPATIENT
Start: 2019-07-29

## 2019-08-01 ENCOUNTER — HOSPITAL ENCOUNTER (OUTPATIENT)
Dept: GENERAL RADIOLOGY | Age: 49
Discharge: HOME | End: 2019-08-01
Payer: COMMERCIAL

## 2019-08-01 ENCOUNTER — HOSPITAL ENCOUNTER (OUTPATIENT)
Dept: PHYSICAL THERAPY | Age: 49
Setting detail: THERAPIES SERIES
Discharge: HOME | End: 2019-08-01
Payer: COMMERCIAL

## 2019-08-01 ENCOUNTER — HOSPITAL ENCOUNTER (OUTPATIENT)
Age: 49
Discharge: HOME | End: 2019-08-01
Payer: COMMERCIAL

## 2019-08-01 DIAGNOSIS — R80.1 PERSISTENT PROTEINURIA: ICD-10-CM

## 2019-08-01 DIAGNOSIS — Z51.81 MEDICATION MONITORING ENCOUNTER: ICD-10-CM

## 2019-08-01 DIAGNOSIS — M32.14 OTHER SYSTEMIC LUPUS ERYTHEMATOSUS WITH GLOMERULAR DISEASE (HCC): Primary | ICD-10-CM

## 2019-08-01 DIAGNOSIS — M79.641 RIGHT HAND PAIN: ICD-10-CM

## 2019-08-01 DIAGNOSIS — M54.40 BACK PAIN OF LUMBAR REGION WITH SCIATICA: ICD-10-CM

## 2019-08-01 DIAGNOSIS — M32.14 OTHER SYSTEMIC LUPUS ERYTHEMATOSUS WITH GLOMERULAR DISEASE (HCC): ICD-10-CM

## 2019-08-01 LAB
ALBUMIN SERPL-MCNC: 4 G/DL (ref 3.5–5.1)
ALP BLD-CCNC: 92 U/L (ref 38–126)
ALT SERPL-CCNC: 13 U/L (ref 11–66)
ANION GAP SERPL CALCULATED.3IONS-SCNC: 13 MEQ/L (ref 8–16)
AST SERPL-CCNC: 19 U/L (ref 5–40)
BASOPHILS # BLD: 0.7 %
BASOPHILS ABSOLUTE: 0 THOU/MM3 (ref 0–0.1)
BILIRUB SERPL-MCNC: 0.2 MG/DL (ref 0.3–1.2)
BUN BLDV-MCNC: 18 MG/DL (ref 7–22)
C-REACTIVE PROTEIN: 0.46 MG/DL (ref 0–1)
CALCIUM SERPL-MCNC: 9.9 MG/DL (ref 8.5–10.5)
CHLORIDE BLD-SCNC: 106 MEQ/L (ref 98–111)
CO2: 23 MEQ/L (ref 23–33)
CREAT SERPL-MCNC: 1.3 MG/DL (ref 0.4–1.2)
EOSINOPHIL # BLD: 0.5 %
EOSINOPHILS ABSOLUTE: 0 THOU/MM3 (ref 0–0.4)
ERYTHROCYTE [DISTWIDTH] IN BLOOD BY AUTOMATED COUNT: 15.2 % (ref 11.5–14.5)
ERYTHROCYTE [DISTWIDTH] IN BLOOD BY AUTOMATED COUNT: 52.5 FL (ref 35–45)
GFR SERPL CREATININE-BSD FRML MDRD: 44 ML/MIN/1.73M2
GLUCOSE BLD-MCNC: 92 MG/DL (ref 70–108)
HCT VFR BLD CALC: 42.6 % (ref 37–47)
HEMOGLOBIN: 12.5 GM/DL (ref 12–16)
IMMATURE GRANS (ABS): 0.01 THOU/MM3 (ref 0–0.07)
IMMATURE GRANULOCYTES: 0 %
LYMPHOCYTES # BLD: 29 %
LYMPHOCYTES ABSOLUTE: 1.7 THOU/MM3 (ref 1–4.8)
MCH RBC QN AUTO: 27.4 PG (ref 26–33)
MCHC RBC AUTO-ENTMCNC: 29.3 GM/DL (ref 32.2–35.5)
MCV RBC AUTO: 93.4 FL (ref 81–99)
MONOCYTES # BLD: 6.7 %
MONOCYTES ABSOLUTE: 0.4 THOU/MM3 (ref 0.4–1.3)
NUCLEATED RED BLOOD CELLS: 0 /100 WBC
PLATELET # BLD: 145 THOU/MM3 (ref 130–400)
PMV BLD AUTO: 12.1 FL (ref 9.4–12.4)
POTASSIUM SERPL-SCNC: 4.4 MEQ/L (ref 3.5–5.2)
RBC # BLD: 4.56 MILL/MM3 (ref 4.2–5.4)
SEDIMENTATION RATE, ERYTHROCYTE: 8 MM/HR (ref 0–20)
SEG NEUTROPHILS: 62.9 %
SEGMENTED NEUTROPHILS ABSOLUTE COUNT: 3.6 THOU/MM3 (ref 1.8–7.7)
SODIUM BLD-SCNC: 142 MEQ/L (ref 135–145)
TOTAL PROTEIN: 7.1 G/DL (ref 6.1–8)
WBC # BLD: 5.7 THOU/MM3 (ref 4.8–10.8)

## 2019-08-01 PROCEDURE — 80053 COMPREHEN METABOLIC PANEL: CPT

## 2019-08-01 PROCEDURE — 86140 C-REACTIVE PROTEIN: CPT

## 2019-08-01 PROCEDURE — 85025 COMPLETE CBC W/AUTO DIFF WBC: CPT

## 2019-08-01 PROCEDURE — 97110 THERAPEUTIC EXERCISES: CPT

## 2019-08-01 PROCEDURE — 36415 COLL VENOUS BLD VENIPUNCTURE: CPT

## 2019-08-01 PROCEDURE — 72040 X-RAY EXAM NECK SPINE 2-3 VW: CPT

## 2019-08-01 PROCEDURE — 85651 RBC SED RATE NONAUTOMATED: CPT

## 2019-08-01 PROCEDURE — 97161 PT EVAL LOW COMPLEX 20 MIN: CPT

## 2019-08-01 ASSESSMENT — PAIN DESCRIPTION - PAIN TYPE: TYPE: CHRONIC PAIN

## 2019-08-01 ASSESSMENT — PAIN SCALES - GENERAL: PAINLEVEL_OUTOF10: 8

## 2019-08-01 ASSESSMENT — PAIN DESCRIPTION - LOCATION: LOCATION: BACK

## 2019-08-01 NOTE — PROGRESS NOTES
lumbar flexion severely limited 0-25 deg, trunk extension [facility] feels tight    Strength RLE: Exception  Comment: Hip flex 4/5, knee ext 4-/5, hip abd 4+/5, hip add 5/5, bridge WFL    Strength LLE: Exception  Comment: Hip flex 4+/5, knee ext 5/5, hip abd 4+/5, hip add 5/5, bridge WFL    Overall Sensation Status: WNL           Ambulation 1  Surface: carpet  Device: No Device  Assistance: Independent  Quality of Gait: Normal gait mechanics, no antalgia noted  Distance: 200 ft  Comments: Observed standing squat for review of lifting techiques. Demonstrates understanding but limited depth of squat     Balance  Standing - Static: Good  Standing - Dynamic: Good           Exercises  Exercise 1: HEP handout provided - abd bracing, knee to chest stretch, knee to opp shoulder stretch, hamstring stretch. Exercise 2: HEP handout - body mechanics for squat technique, sleeping positioning with pillow between knees          [place] Low Back Pain Disability Questionnaire    Instructions: This questionnaire has been designed to give us information as to how your back or leg pain is affecting your ability to manage in everday life. Please answer by checking ONE box in each section for the statement that best applies to you. We realize you may consider that two or more statements in any one section apply but please just check the spot that indicates the statement which most clearly describes your problem.     Section 1 - Pain Intensity The pain is moderate at the moment = 2   Section 2 - Personal Care (Washing, dressing, etc) It is painful to look after myself and I am slow and careful = 2   Section 3 - Lifting Pain prevents me from lifting heavy weights, but I can manage light to medium weights if they are conveniently positioned = 3   Section 4 - Walking Pain prevents me from walking more than 1 mile = 1     Section 5 - Sitting   Pain prevents me sitting more than one hour = 2   Section 6 - Standing Pain prevents me from standing

## 2019-08-06 ENCOUNTER — HOSPITAL ENCOUNTER (OUTPATIENT)
Dept: PHYSICAL THERAPY | Age: 49
Setting detail: THERAPIES SERIES
Discharge: HOME | End: 2019-08-06
Payer: COMMERCIAL

## 2019-08-06 PROCEDURE — 97113 AQUATIC THERAPY/EXERCISES: CPT

## 2019-08-06 ASSESSMENT — PAIN DESCRIPTION - PAIN TYPE: TYPE: CHRONIC PAIN

## 2019-08-06 ASSESSMENT — PAIN SCALES - GENERAL: PAINLEVEL_OUTOF10: 8

## 2019-08-06 ASSESSMENT — PAIN DESCRIPTION - ORIENTATION: ORIENTATION: LOWER;MID

## 2019-08-06 ASSESSMENT — PAIN DESCRIPTION - LOCATION: LOCATION: BACK

## 2019-08-08 ENCOUNTER — HOSPITAL ENCOUNTER (OUTPATIENT)
Dept: PHYSICAL THERAPY | Age: 49
Setting detail: THERAPIES SERIES
Discharge: HOME | End: 2019-08-08
Payer: COMMERCIAL

## 2019-08-08 PROCEDURE — 97113 AQUATIC THERAPY/EXERCISES: CPT

## 2019-08-08 ASSESSMENT — PAIN SCALES - GENERAL: PAINLEVEL_OUTOF10: 7

## 2019-08-08 ASSESSMENT — PAIN DESCRIPTION - LOCATION: LOCATION: BACK

## 2019-08-08 ASSESSMENT — PAIN DESCRIPTION - PAIN TYPE: TYPE: CHRONIC PAIN

## 2019-08-13 ENCOUNTER — HOSPITAL ENCOUNTER (OUTPATIENT)
Dept: PHYSICAL THERAPY | Age: 49
Setting detail: THERAPIES SERIES
Discharge: HOME | End: 2019-08-13
Payer: COMMERCIAL

## 2019-08-13 PROCEDURE — 97113 AQUATIC THERAPY/EXERCISES: CPT

## 2019-08-13 ASSESSMENT — PAIN DESCRIPTION - LOCATION: LOCATION: BACK

## 2019-08-13 ASSESSMENT — PAIN DESCRIPTION - ORIENTATION: ORIENTATION: MID;LOWER

## 2019-08-13 ASSESSMENT — PAIN SCALES - GENERAL: PAINLEVEL_OUTOF10: 7

## 2019-08-13 ASSESSMENT — PAIN DESCRIPTION - PAIN TYPE: TYPE: CHRONIC PAIN

## 2019-08-13 NOTE — PROGRESS NOTES
[facility]     Time In: 8005  Time Out: 1115  Minutes: 45  Timed Code Treatment Minutes: 45 Minutes       Date: 2019  Patient Name: Wendy Sanchez,  Gender:  female        CSN: [id number]   : 1970  (57 y.o.)  Referral Date : 19    Referring Practitioner: Soheila Mitchell CNP      Diagnosis: M47.814 (ICD-10-CM) - Spondylosis of thoracic region without myelopathy or radiculopathy  Treatment Diagnosis: Lumbago, thoracic spine pain, RLE radicular pain, B hip stiffness, muscular weakness, R arm radicular pain. Additional Pertinent Hx: Tried epidurals, nerve ablations but the pain returns. C/o chest pain and SOB at times, breathing test negative. Hx of Lupus, seizure disorder, ankle joint swelling, R hand joints carpal tunnel. General:  PT Visit Information  Onset Date: 19  PT Insurance Information: [id number] [place] [address] Medicaid - PT/OT/ST 30 visits each per kaycee yr, aquatic and modalities covered, no ionto, no heat/cold  Total # of Visits Approved: 30  Total # of Visits to Date: 4  Plan of Care/Certification Expiration Date: 19  Progress Note Counter: 4/10               Subjective:  Comments: Cervical spine XR ordered. Dr. Bryant Members following for pain management, Dr. Mendel Brown rheumatology, Dr. Bruno Agustin for kidneys. Javier Flatness PCP     Subjective: Patient c/o pain in lower thoracic and lower back 7/10. So far feels the water helps her pain but it returns as soon as she gets out.       Pain:  Patient Currently in Pain: Yes  Pain Assessment: 0-10  Pain Level: 7  Pain Type: Chronic pain  Pain Location: Back  Pain Orientation: Mid, Lower      Objective    UE Stretches: Corner stretch, posterior capsule stretch 3x 10 sec   LE Warm Up: Ambulation (F,L,R): x 2 lap with bracing and good posture  LE Streches: hamstring and pirformis stretch at bench 15 seconds x 3 bilat. heel cord stretch off step 15 seconds x

## 2019-08-15 ENCOUNTER — HOSPITAL ENCOUNTER (OUTPATIENT)
Dept: PHYSICAL THERAPY | Age: 49
Setting detail: THERAPIES SERIES
Discharge: HOME | End: 2019-08-15
Payer: COMMERCIAL

## 2019-08-15 PROCEDURE — 97113 AQUATIC THERAPY/EXERCISES: CPT

## 2019-08-15 ASSESSMENT — PAIN DESCRIPTION - PAIN TYPE: TYPE: CHRONIC PAIN

## 2019-08-15 ASSESSMENT — PAIN SCALES - GENERAL: PAINLEVEL_OUTOF10: 5

## 2019-08-15 ASSESSMENT — PAIN DESCRIPTION - ORIENTATION: ORIENTATION: MID;LOWER

## 2019-08-15 ASSESSMENT — PAIN DESCRIPTION - LOCATION: LOCATION: BACK

## 2019-08-16 DIAGNOSIS — M47.816 SPONDYLOSIS OF LUMBAR REGION WITHOUT MYELOPATHY OR RADICULOPATHY: ICD-10-CM

## 2019-08-19 RX ORDER — HYDROCODONE BITARTRATE AND ACETAMINOPHEN 5; 325 MG/1; MG/1
1 TABLET ORAL 2 TIMES DAILY PRN
Qty: 60 TABLET | Refills: 0 | Status: SHIPPED | OUTPATIENT
Start: 2019-08-19 | End: 2019-09-16 | Stop reason: SDUPTHER

## 2019-08-20 ENCOUNTER — HOSPITAL ENCOUNTER (OUTPATIENT)
Dept: PHYSICAL THERAPY | Age: 49
Setting detail: THERAPIES SERIES
Discharge: HOME | End: 2019-08-20
Payer: COMMERCIAL

## 2019-08-20 PROCEDURE — 97113 AQUATIC THERAPY/EXERCISES: CPT

## 2019-08-20 ASSESSMENT — PAIN DESCRIPTION - ORIENTATION: ORIENTATION: MID;LOWER

## 2019-08-20 ASSESSMENT — PAIN DESCRIPTION - PAIN TYPE: TYPE: CHRONIC PAIN

## 2019-08-20 ASSESSMENT — PAIN DESCRIPTION - LOCATION: LOCATION: BACK;NECK

## 2019-08-20 ASSESSMENT — PAIN SCALES - GENERAL: PAINLEVEL_OUTOF10: 7

## 2019-08-21 ENCOUNTER — TELEPHONE (OUTPATIENT)
Dept: RHEUMATOLOGY | Age: 49
End: 2019-08-21

## 2019-08-21 DIAGNOSIS — Z51.81 MEDICATION MONITORING ENCOUNTER: ICD-10-CM

## 2019-08-21 DIAGNOSIS — M32.14 OTHER SYSTEMIC LUPUS ERYTHEMATOSUS WITH GLOMERULAR DISEASE (HCC): ICD-10-CM

## 2019-08-21 RX ORDER — AZATHIOPRINE 50 MG/1
75 TABLET ORAL 2 TIMES DAILY
Qty: 90 TABLET | Refills: 0 | Status: SHIPPED | OUTPATIENT
Start: 2019-08-21 | End: 2019-10-09 | Stop reason: SDUPTHER

## 2019-08-21 RX ORDER — PREDNISONE 1 MG/1
TABLET ORAL
Qty: 66 TABLET | Refills: 0 | Status: SHIPPED | OUTPATIENT
Start: 2019-08-21 | End: 2019-09-27 | Stop reason: SDUPTHER

## 2019-08-22 ENCOUNTER — HOSPITAL ENCOUNTER (OUTPATIENT)
Dept: PHYSICAL THERAPY | Age: 49
Setting detail: THERAPIES SERIES
Discharge: HOME | End: 2019-08-22
Payer: COMMERCIAL

## 2019-08-27 ENCOUNTER — HOSPITAL ENCOUNTER (OUTPATIENT)
Dept: PHYSICAL THERAPY | Age: 49
Setting detail: THERAPIES SERIES
Discharge: HOME | End: 2019-08-27
Payer: COMMERCIAL

## 2019-08-27 PROCEDURE — 97113 AQUATIC THERAPY/EXERCISES: CPT

## 2019-08-27 ASSESSMENT — PAIN DESCRIPTION - LOCATION: LOCATION: BACK

## 2019-08-27 ASSESSMENT — PAIN DESCRIPTION - ORIENTATION: ORIENTATION: MID;LOWER

## 2019-08-27 ASSESSMENT — PAIN DESCRIPTION - PAIN TYPE: TYPE: CHRONIC PAIN

## 2019-08-27 ASSESSMENT — PAIN SCALES - GENERAL: PAINLEVEL_OUTOF10: 6

## 2019-08-28 ENCOUNTER — OFFICE VISIT (OUTPATIENT)
Dept: RHEUMATOLOGY | Age: 49
End: 2019-08-28
Payer: COMMERCIAL

## 2019-08-28 VITALS
SYSTOLIC BLOOD PRESSURE: 118 MMHG | HEART RATE: 57 BPM | BODY MASS INDEX: 29.63 KG/M2 | WEIGHT: 184.4 LBS | HEIGHT: 66 IN | DIASTOLIC BLOOD PRESSURE: 70 MMHG | OXYGEN SATURATION: 96 %

## 2019-08-28 DIAGNOSIS — M79.641 RIGHT HAND PAIN: ICD-10-CM

## 2019-08-28 DIAGNOSIS — M54.40 BACK PAIN OF LUMBAR REGION WITH SCIATICA: ICD-10-CM

## 2019-08-28 DIAGNOSIS — M32.14 OTHER SYSTEMIC LUPUS ERYTHEMATOSUS WITH GLOMERULAR DISEASE (HCC): ICD-10-CM

## 2019-08-28 DIAGNOSIS — R07.81 PLEURODYNIA: ICD-10-CM

## 2019-08-28 DIAGNOSIS — M25.50 POLYARTHRALGIA: ICD-10-CM

## 2019-08-28 PROCEDURE — 1036F TOBACCO NON-USER: CPT | Performed by: INTERNAL MEDICINE

## 2019-08-28 PROCEDURE — 99214 OFFICE O/P EST MOD 30 MIN: CPT | Performed by: INTERNAL MEDICINE

## 2019-08-28 PROCEDURE — G8417 CALC BMI ABV UP PARAM F/U: HCPCS | Performed by: INTERNAL MEDICINE

## 2019-08-28 PROCEDURE — G8427 DOCREV CUR MEDS BY ELIG CLIN: HCPCS | Performed by: INTERNAL MEDICINE

## 2019-08-28 RX ORDER — GABAPENTIN 400 MG/1
400 CAPSULE ORAL 2 TIMES DAILY
Qty: 180 CAPSULE | Refills: 1 | Status: SHIPPED | OUTPATIENT
Start: 2019-08-28 | End: 2019-12-10 | Stop reason: SDUPTHER

## 2019-08-28 RX ORDER — HYDROXYCHLOROQUINE SULFATE 200 MG/1
200 TABLET, FILM COATED ORAL 2 TIMES DAILY
Qty: 60 TABLET | Refills: 2 | Status: SHIPPED | OUTPATIENT
Start: 2019-08-28 | End: 2019-12-10 | Stop reason: SDUPTHER

## 2019-08-28 ASSESSMENT — ENCOUNTER SYMPTOMS
CONSTIPATION: 0
EYE PAIN: 0
NAUSEA: 0
SHORTNESS OF BREATH: 1
COUGH: 0
WHEEZING: 0
EYE REDNESS: 0
DIARRHEA: 0
VOMITING: 0

## 2019-08-28 NOTE — PROGRESS NOTES
Chris       Date Of Service: 8/28/2019  Provider: Patrice Chen DO    Name: Edgar Mayberry   MRN: [id number]    Chief Complaint(s)      Chief Complaint   Patient presents with    Follow-up     2 months- LUPUS         History of Present illness (HPI)    Edgar Mayberry   is a(n)48 y.o. female with a hx of Systemic lupus with history of class IV lupus nephritis, chronic kidney disease stage III, chronic low back pain with lumbar radiculopathy and lumbar spinal stenosis with neurogenic claudication, h/o siezures lasting around 2009,  referred bySystemic lupus + BELLE, + DS DNA, oral sores, h/o class IV LN s/p cytoxan 2007. C4 was 64, C4: 15.  4+ proteinuria, but her 24 hr urine collection revealed only 1.1 g. 20-29 RBC/HPF.  Platelet number 370. BELLE positive, dsDNA positive., ANCA (+p-ANCA , Neg MPO, Pr-3)     The patient reports pain affecting  Fingers, wrists, elbows, shoulder, hips, knees, ankles, toes, negka and back. Pain up to 8.8/10. Rt hand, Rt wrist. + shooting pain down the left legs intermittently occurring. + Rt hand / wrist numbness intermittently occurring. Timing:afternoon-evenings. Aggravating factors: weather. Back-- prolonged bending, forward flexion. Ankles -- wt bearing  Alleviating factors: norco, hot bath, gabapentin 300mg daily. Pain down the entire right leg occsaionally at night -- described as shooting pains. Continued right hand and wrist swelling. No longer having pleuritic chest pain.  Intermittent Chest pain and ZAMORANO with prolonged walking and heavy lifting      -denies Photosenstivity, Rash, dry mouth/dry eyes, oral/nasal sores, Raynaud's, digital ulcerations, skin tightening, renal disease,foamy urination, hematuria, sz's, blood clots,  pre-term labor loss, AIHA,leukpenia/lymphopenia, thrombocytopenia, hair loss,  enthesitis, dactylitis, nail changes, hx of STD,  personal or family history of Psoriatic arthritis, psoriasis, ank spond,       Cancer wheezes. She has no rales. Lymphadenopathy:     She has no cervical adenopathy. Neurological: She is alert and oriented to person, place, and time. Skin: Skin is warm and dry. She is not diaphoretic. Erythematous small flat lesions along the anterior chest   Mild onycholysis in hands. Psychiatric: She has a normal mood and affect. Her behavior is normal.       Musculoskeletal:    Normal gait. Strength 5/5 in biceps, triceps, hips, knees,      Upper extremities:   Shoulders: Non-tender, No swelling , No Deformities and intact ROM  Elbows:  . Non-tender, No swelling , No Deformities and intact ROM + tinel bilat  Wrists Non-tender, No swelling , No Deformities and intact ROM + tinel bilat. Hands: + tender, + swelling -- left 1st MCP , z-line Deformities bilateral thumbs and intact ROM    Lower extremities:  Hips: Non-tender, No swelling , No Deformities and intact ROM  Knees :Non-tender, No swelling , No Deformities and intact ROM  Ankles: Non-tender, No swelling , No Deformities and intact ROM  Feet:Non-tender, No swelling , No Deformities and intact ROM    Spine:     C-spine: intact ROM, Non-tender, no swelling.    no pain to palpation, negative SLR test, intact ROM    Physical Exam    RAPID3 Composite Score MDHAQ (0-10) + Patient pain VAS (0-10): + Patient global assessment VAS (0-10):     8/28/2019 --- RAPID 3:  +  +  =      Remission: <3  Low Disease Activity: <6  Moderate Disease Activity: >=6 and <=12  High Disease Activity: >12    LABS        CBC  Lab Results   Component Value Date    WBC 5.7 08/01/2019    RBC 4.56 08/01/2019    HGB 12.5 08/01/2019    HCT 42.6 08/01/2019    MCV 93.4 08/01/2019    MCH 27.4 08/01/2019    MCHC 29.3 08/01/2019    RDW 14.6 05/25/2018     08/01/2019       CMP  Lab Results   Component Value Date    CALCIUM 9.9 08/01/2019    LABALBU 4.0 08/01/2019    PROT 7.1 08/01/2019     08/01/2019    K 4.4 08/01/2019    CO2 23 08/01/2019     08/01/2019    BUN 18

## 2019-08-29 ENCOUNTER — HOSPITAL ENCOUNTER (OUTPATIENT)
Dept: PHYSICAL THERAPY | Age: 49
Setting detail: THERAPIES SERIES
Discharge: HOME | End: 2019-08-29
Payer: COMMERCIAL

## 2019-08-29 PROCEDURE — 97113 AQUATIC THERAPY/EXERCISES: CPT

## 2019-08-29 ASSESSMENT — PAIN DESCRIPTION - PAIN TYPE: TYPE: CHRONIC PAIN

## 2019-08-29 ASSESSMENT — PAIN DESCRIPTION - ORIENTATION: ORIENTATION: RIGHT

## 2019-08-29 ASSESSMENT — PAIN SCALES - GENERAL: PAINLEVEL_OUTOF10: 5

## 2019-08-29 ASSESSMENT — PAIN DESCRIPTION - LOCATION: LOCATION: BACK;LEG

## 2019-08-29 NOTE — PROGRESS NOTES
Patricia Ross 62  OUTPATIENT PHYSICAL THERAPY  PROGRESS NOTE/ [address]     Time In: 1030  Time Out: [address]  Minutes: 55  Timed Code Treatment Minutes: 55 Minutes       Date: 2019  Patient Name: Cintia Moreno,  Gender:  female        CSN: [id number]   : 1970  (57 y.o.)  Referral Date : 19    Referring Practitioner: Jessica Mckay CNP      Diagnosis: M47.814 (ICD-10-CM) - Spondylosis of thoracic region without myelopathy or radiculopathy  Treatment Diagnosis: Lumbago, thoracic spine pain, RLE radicular pain, B hip stiffness, muscular weakness, R arm radicular pain. Additional Pertinent Hx: Tried epidurals, nerve ablations but the pain returns. C/o chest pain and SOB at times, breathing test negative. Hx of Lupus, seizure disorder, ankle joint swelling, R hand joints carpal tunnel. General:  PT Visit Information  Onset Date: 19  PT Insurance Information: [employer] - PT/OT/ST 30 visits each per kaycee yr, aquatic and modalities covered, no ionto, no heat/cold  Total # of Visits Approved: 30  Total # of Visits to Date: 8  Plan of Care/Certification Expiration Date: 19  Progress Note Counter: 0/10 for PN               Subjective:  Comments: Cervical spine XR ordered. Dr. Patricia Amador following for pain management, Dr. Vladimir Hale rheumatology, Dr. James Waters for kidneys. Nestor Cedillo PCP     Subjective: Patient feels that pool therapy helps while she is in the water, easier to move and less pain. Not much difference at home yet, especially has trouble with lighter household tasks but she is able to squat to protect neutral spine while lifting. Saw rheumatologist yesterday and planning to increase gabapentin, especially for RLE radicular pain down past the knee into foot. Lots of groin pain in RLE with stair climbing.       Pain:  Patient Currently in Pain: Yes  Pain Assessment: 0-10  Pain Level: 5  Pain Type: Chronic pain  Pain Location: Back, than 1/2 mile = 2     Section 5 - Sitting   Pain prevents me from sitting more than 30 minutes = 3   Section 6 - Standing I can stand as long as I want but it gives me extra pain = 1   Section 7 - Sleeping Because of pain I have less than 4 hours sleep = 3   Section 8 - Sex Life (if applicable) Not applicable   Section 9 - Social Life Pain has restricted my social life and I do not go out as often = 3     Section 10 - Travelling Pain is bad but I manage journeys over two hours = 2   Total Score  22/45     Total score/total possible score   X 100  49%       Score Interpretation:  0%-20%: minimal disability The patient can cope with most living activities. Usually no treatment is indicated apart from advice on lifting sitting and exercise. 21%-40%: moderate disability The patient experiences more pain and difficulty with sitting, lifting and standing. Travel and social life are more difficult and they may be disabled from work. Personal care, sexual activity and sleeping are not grossly affected and the patient can usually be managed by conservative means. 41%-60%: severe disability Pain remains themain problem with this group but activities of daily living are affected. These patient require a detailed investigation. 61%-80%: crippled Back pain impinges on all aspects of the patient's life. Positive intervention is required. 81%-100%: These patients are either bed-bound or exaggerating their symptoms. GEMINI EsparzaT and Jeanette KELLY (2000)  The Oswestry Disability Index. Spine, 72(20):[phone]. Activity Tolerance:  Activity Tolerance: Patient Tolerated treatment well    Assessment:  Assessment: Patient has made progress toward therapy goals with aquatic PT. Demonstrates improved hamstring and hip flexibility, performing stretches at home. She has good relief of symptoms while in the water, but the pain returns after a few hours.  Still having lower back pain and RLE radicular pain, bilateral

## 2019-09-05 ENCOUNTER — HOSPITAL ENCOUNTER (OUTPATIENT)
Dept: PHYSICAL THERAPY | Age: 49
Setting detail: THERAPIES SERIES
Discharge: HOME | End: 2019-09-05
Payer: COMMERCIAL

## 2019-09-16 DIAGNOSIS — M47.816 SPONDYLOSIS OF LUMBAR REGION WITHOUT MYELOPATHY OR RADICULOPATHY: ICD-10-CM

## 2019-09-16 RX ORDER — HYDROCODONE BITARTRATE AND ACETAMINOPHEN 5; 325 MG/1; MG/1
1 TABLET ORAL 2 TIMES DAILY PRN
Qty: 60 TABLET | Refills: 0 | Status: SHIPPED | OUTPATIENT
Start: 2019-09-18 | End: 2019-10-17 | Stop reason: SDUPTHER

## 2019-09-17 NOTE — DISCHARGE SUMMARY
[facility]    Patient Name: Johnny Young        CSN: [id number]   YOB: 1970  Gender: female  Referring Practitioner: Rosalina Trevino CNP  Diagnosis: L03.298 (ICD-10-CM) - Spondylosis of thoracic region without myelopathy or radiculopathy    Patient is discharged from Physical Therapy services at this time. See last note for details related to results of therapy and goal achievement. Date 9/17/19    Reason for discharge:  Patient was last seen for PT on 8/29/19 for a reassessment. She felt pool therapy was helping her pain while in the water. Planned to continue PT but cancelled her appointments and has not returned our calls to reschedule. Left voicemails for her on 9/6, 9/10, and 9/13.  Discharged from [address] [place] [address], PT, DPT

## 2019-09-26 ENCOUNTER — TELEPHONE (OUTPATIENT)
Dept: RHEUMATOLOGY | Age: 49
End: 2019-09-26

## 2019-09-26 DIAGNOSIS — M32.14 OTHER SYSTEMIC LUPUS ERYTHEMATOSUS WITH GLOMERULAR DISEASE (HCC): ICD-10-CM

## 2019-09-26 DIAGNOSIS — Z51.81 MEDICATION MONITORING ENCOUNTER: Primary | ICD-10-CM

## 2019-09-26 DIAGNOSIS — R07.81 PLEURODYNIA: ICD-10-CM

## 2019-09-26 DIAGNOSIS — Z51.81 MEDICATION MONITORING ENCOUNTER: ICD-10-CM

## 2019-09-26 DIAGNOSIS — M25.50 POLYARTHRALGIA: ICD-10-CM

## 2019-09-26 RX ORDER — AZATHIOPRINE 50 MG/1
75 TABLET ORAL 2 TIMES DAILY
Qty: 90 TABLET | Refills: 0 | Status: CANCELLED | OUTPATIENT
Start: 2019-09-26

## 2019-09-27 RX ORDER — PREDNISONE 1 MG/1
5 TABLET ORAL DAILY
Qty: 30 TABLET | Refills: 1 | Status: SHIPPED | OUTPATIENT
Start: 2019-09-27 | End: 2019-10-27

## 2019-10-09 DIAGNOSIS — Z51.81 MEDICATION MONITORING ENCOUNTER: ICD-10-CM

## 2019-10-09 DIAGNOSIS — M32.14 OTHER SYSTEMIC LUPUS ERYTHEMATOSUS WITH GLOMERULAR DISEASE (HCC): ICD-10-CM

## 2019-10-09 LAB
A/G RATIO: 1.5 (ref 1.5–2.5)
ABSOLUTE BASO #: 0 /CMM (ref 0–200)
ABSOLUTE EOS #: 0 /CMM (ref 0–500)
ABSOLUTE LYMPH #: 1800 /CMM (ref 1000–4800)
ABSOLUTE MONO #: 400 /CMM (ref 0–800)
ABSOLUTE NEUT #: 4000 /CMM (ref 1800–7700)
ALBUMIN SERPL-MCNC: 4.2 GM/DL (ref 3.5–5)
ALP BLD-CCNC: 70 IU/L (ref 39–118)
ALT SERPL-CCNC: 8 IU/L (ref 10–40)
ANION GAP SERPL CALCULATED.3IONS-SCNC: 6 MMOL/L (ref 4–12)
AST SERPL-CCNC: 13 IU/L (ref 15–41)
BASOPHILS RELATIVE PERCENT: 0.5 % (ref 0–2)
BILIRUB SERPL-MCNC: 0.3 MG/DL (ref 0.2–1)
BUN BLDV-MCNC: 19 MG/DL (ref 7–20)
C-REACTIVE PROTEIN: < 0.5 MG/DL
CALCIUM SERPL-MCNC: 9.8 MG/DL (ref 8.8–10.5)
CHLORIDE BLD-SCNC: 106 MEQ/L (ref 101–111)
CO2: 29 MEQ/L (ref 21–32)
CREAT SERPL-MCNC: 1.33 MG/DL (ref 0.6–1.3)
CREATININE CLEARANCE: 43
EOSINOPHILS RELATIVE PERCENT: 0.8 % (ref 0–6)
GLUCOSE: 88 MG/DL (ref 70–110)
HCT VFR BLD CALC: 37.4 % (ref 35–44)
HEMOGLOBIN: 12.1 GM/DL (ref 12–15)
LYMPHOCYTES RELATIVE PERCENT: 28.4 % (ref 15–45)
MCH RBC QN AUTO: 27.8 PG (ref 27.5–33)
MCHC RBC AUTO-ENTMCNC: 32.3 GM/DL (ref 33–36)
MCV RBC AUTO: 85.9 CU MIC (ref 80–97)
MONOCYTES RELATIVE PERCENT: 6.1 % (ref 2–10)
NEUTROPHILS RELATIVE PERCENT: 64.2 % (ref 40–70)
NUCLEATED RBCS: 0.1 /100 WBC
PDW BLD-RTO: 15.7 % (ref 12–16)
PLATELET # BLD: 178 TH/CMM (ref 150–400)
POTASSIUM SERPL-SCNC: 4 MEQ/L (ref 3.6–5)
RBC # BLD: 4.35 MIL/CMM (ref 4–5.1)
SEDIMENTATION RATE, ERYTHROCYTE: 29 MM/HR
SODIUM BLD-SCNC: 141 MEQ/L (ref 135–145)
TOTAL PROTEIN: 7 G/DL (ref 6.2–8)
WBC # BLD: 6.3 TH/CMM (ref 4.4–10.5)

## 2019-10-09 RX ORDER — AZATHIOPRINE 50 MG/1
75 TABLET ORAL 2 TIMES DAILY
Qty: 90 TABLET | Refills: 0 | Status: SHIPPED | OUTPATIENT
Start: 2019-10-09 | End: 2019-12-10 | Stop reason: SDUPTHER

## 2019-10-17 DIAGNOSIS — M47.816 SPONDYLOSIS OF LUMBAR REGION WITHOUT MYELOPATHY OR RADICULOPATHY: ICD-10-CM

## 2019-10-18 RX ORDER — HYDROCODONE BITARTRATE AND ACETAMINOPHEN 5; 325 MG/1; MG/1
1 TABLET ORAL 2 TIMES DAILY PRN
Qty: 28 TABLET | Refills: 0 | Status: SHIPPED | OUTPATIENT
Start: 2019-10-18 | End: 2019-11-01

## 2019-10-21 ENCOUNTER — TELEPHONE (OUTPATIENT)
Dept: RHEUMATOLOGY | Age: 49
End: 2019-10-21

## 2019-10-21 DIAGNOSIS — M32.14 OTHER SYSTEMIC LUPUS ERYTHEMATOSUS WITH GLOMERULAR DISEASE (HCC): Primary | ICD-10-CM

## 2019-10-22 ENCOUNTER — OFFICE VISIT (OUTPATIENT)
Dept: FAMILY MEDICINE CLINIC | Age: 49
End: 2019-10-22
Payer: COMMERCIAL

## 2019-10-22 ENCOUNTER — NURSE ONLY (OUTPATIENT)
Dept: LAB | Age: 49
End: 2019-10-22

## 2019-10-22 VITALS
HEART RATE: 72 BPM | WEIGHT: 188 LBS | DIASTOLIC BLOOD PRESSURE: 52 MMHG | RESPIRATION RATE: 10 BRPM | BODY MASS INDEX: 30.22 KG/M2 | SYSTOLIC BLOOD PRESSURE: 96 MMHG | TEMPERATURE: 98 F | HEIGHT: 66 IN

## 2019-10-22 DIAGNOSIS — E03.9 HYPOTHYROIDISM, UNSPECIFIED TYPE: ICD-10-CM

## 2019-10-22 DIAGNOSIS — M54.40 BACK PAIN OF LUMBAR REGION WITH SCIATICA: ICD-10-CM

## 2019-10-22 DIAGNOSIS — E78.5 HYPERLIPIDEMIA, UNSPECIFIED HYPERLIPIDEMIA TYPE: ICD-10-CM

## 2019-10-22 DIAGNOSIS — E78.5 HYPERLIPIDEMIA, UNSPECIFIED HYPERLIPIDEMIA TYPE: Primary | ICD-10-CM

## 2019-10-22 DIAGNOSIS — K04.7 DENTAL INFECTION: ICD-10-CM

## 2019-10-22 DIAGNOSIS — E03.9 ACQUIRED HYPOTHYROIDISM: ICD-10-CM

## 2019-10-22 LAB
CHOLESTEROL, TOTAL: 163 MG/DL (ref 100–199)
HDLC SERPL-MCNC: 36 MG/DL
LDL CHOLESTEROL CALCULATED: 83 MG/DL
T4 FREE: 0.59 NG/DL (ref 0.93–1.76)
TRIGL SERPL-MCNC: 221 MG/DL (ref 0–199)
TSH SERPL DL<=0.05 MIU/L-ACNC: 27.19 UIU/ML (ref 0.4–4.2)

## 2019-10-22 PROCEDURE — G8417 CALC BMI ABV UP PARAM F/U: HCPCS | Performed by: NURSE PRACTITIONER

## 2019-10-22 PROCEDURE — 90471 IMMUNIZATION ADMIN: CPT | Performed by: NURSE PRACTITIONER

## 2019-10-22 PROCEDURE — G8482 FLU IMMUNIZE ORDER/ADMIN: HCPCS | Performed by: NURSE PRACTITIONER

## 2019-10-22 PROCEDURE — 99213 OFFICE O/P EST LOW 20 MIN: CPT | Performed by: NURSE PRACTITIONER

## 2019-10-22 PROCEDURE — 90686 IIV4 VACC NO PRSV 0.5 ML IM: CPT | Performed by: NURSE PRACTITIONER

## 2019-10-22 PROCEDURE — G8427 DOCREV CUR MEDS BY ELIG CLIN: HCPCS | Performed by: NURSE PRACTITIONER

## 2019-10-22 PROCEDURE — 1036F TOBACCO NON-USER: CPT | Performed by: NURSE PRACTITIONER

## 2019-10-22 RX ORDER — ONDANSETRON 4 MG/1
4 TABLET, FILM COATED ORAL EVERY 8 HOURS PRN
Qty: 15 TABLET | Refills: 0 | Status: SHIPPED | OUTPATIENT
Start: 2019-10-22 | End: 2019-11-22 | Stop reason: SDUPTHER

## 2019-10-22 RX ORDER — LEVOTHYROXINE SODIUM 112 UG/1
TABLET ORAL
Qty: 90 TABLET | Refills: 1 | Status: SHIPPED | OUTPATIENT
Start: 2019-10-22 | End: 2020-03-18 | Stop reason: SDUPTHER

## 2019-10-22 RX ORDER — SIMVASTATIN 40 MG
TABLET ORAL
Qty: 90 TABLET | Refills: 3 | Status: SHIPPED | OUTPATIENT
Start: 2019-10-22 | End: 2020-03-18 | Stop reason: SDUPTHER

## 2019-10-22 RX ORDER — B-COMPLEX WITH VITAMIN C
TABLET ORAL
Qty: 90 TABLET | Refills: 3 | Status: SHIPPED | OUTPATIENT
Start: 2019-10-22 | End: 2020-03-18 | Stop reason: SDUPTHER

## 2019-10-22 RX ORDER — FERROUS SULFATE 325(65) MG
325 TABLET ORAL DAILY
Qty: 90 TABLET | Refills: 3 | Status: SHIPPED | OUTPATIENT
Start: 2019-10-22 | End: 2020-10-20

## 2019-10-22 RX ORDER — PREDNISONE 10 MG/1
TABLET ORAL
Qty: 15 TABLET | Refills: 0 | Status: SHIPPED | OUTPATIENT
Start: 2019-10-22 | End: 2019-11-01

## 2019-10-22 RX ORDER — ACETAMINOPHEN 500 MG
TABLET ORAL
Qty: 120 TABLET | Refills: 2 | Status: SHIPPED | OUTPATIENT
Start: 2019-10-22 | End: 2020-04-15

## 2019-10-22 ASSESSMENT — ENCOUNTER SYMPTOMS
RESPIRATORY NEGATIVE: 1
ABDOMINAL DISTENTION: 0
ABDOMINAL PAIN: 0
BACK PAIN: 0

## 2019-10-23 ENCOUNTER — TELEPHONE (OUTPATIENT)
Dept: FAMILY MEDICINE CLINIC | Age: 49
End: 2019-10-23

## 2019-10-23 DIAGNOSIS — E03.9 HYPOTHYROIDISM, UNSPECIFIED TYPE: Primary | ICD-10-CM

## 2019-11-01 ENCOUNTER — TELEPHONE (OUTPATIENT)
Dept: RHEUMATOLOGY | Age: 49
End: 2019-11-01

## 2019-11-06 ENCOUNTER — OFFICE VISIT (OUTPATIENT)
Dept: PHYSICAL MEDICINE AND REHAB | Age: 49
End: 2019-11-06
Payer: COMMERCIAL

## 2019-11-06 ENCOUNTER — TELEPHONE (OUTPATIENT)
Dept: RHEUMATOLOGY | Age: 49
End: 2019-11-06

## 2019-11-06 VITALS
HEART RATE: 88 BPM | DIASTOLIC BLOOD PRESSURE: 82 MMHG | WEIGHT: 188.05 LBS | HEIGHT: 66 IN | SYSTOLIC BLOOD PRESSURE: 116 MMHG | BODY MASS INDEX: 30.22 KG/M2

## 2019-11-06 DIAGNOSIS — M54.6 CHRONIC BILATERAL THORACIC BACK PAIN: ICD-10-CM

## 2019-11-06 DIAGNOSIS — M47.816 SPONDYLOSIS OF LUMBAR REGION WITHOUT MYELOPATHY OR RADICULOPATHY: ICD-10-CM

## 2019-11-06 DIAGNOSIS — M54.16 LUMBAR RADICULITIS: ICD-10-CM

## 2019-11-06 DIAGNOSIS — M46.1 SI (SACROILIAC) JOINT INFLAMMATION (HCC): ICD-10-CM

## 2019-11-06 DIAGNOSIS — M32.14 OTHER SYSTEMIC LUPUS ERYTHEMATOSUS WITH GLOMERULAR DISEASE (HCC): Primary | ICD-10-CM

## 2019-11-06 DIAGNOSIS — M48.062 LUMBAR STENOSIS WITH NEUROGENIC CLAUDICATION: ICD-10-CM

## 2019-11-06 DIAGNOSIS — G89.4 CHRONIC PAIN SYNDROME: ICD-10-CM

## 2019-11-06 DIAGNOSIS — M47.814 SPONDYLOSIS OF THORACIC REGION WITHOUT MYELOPATHY OR RADICULOPATHY: Primary | ICD-10-CM

## 2019-11-06 DIAGNOSIS — M54.16 LUMBAR RADICULOPATHY: ICD-10-CM

## 2019-11-06 DIAGNOSIS — G89.29 CHRONIC BILATERAL THORACIC BACK PAIN: ICD-10-CM

## 2019-11-06 DIAGNOSIS — M46.1 BILATERAL SACROILIITIS (HCC): ICD-10-CM

## 2019-11-06 PROCEDURE — G8417 CALC BMI ABV UP PARAM F/U: HCPCS | Performed by: NURSE PRACTITIONER

## 2019-11-06 PROCEDURE — G8427 DOCREV CUR MEDS BY ELIG CLIN: HCPCS | Performed by: NURSE PRACTITIONER

## 2019-11-06 PROCEDURE — G8482 FLU IMMUNIZE ORDER/ADMIN: HCPCS | Performed by: NURSE PRACTITIONER

## 2019-11-06 PROCEDURE — 99213 OFFICE O/P EST LOW 20 MIN: CPT | Performed by: NURSE PRACTITIONER

## 2019-11-06 PROCEDURE — 1036F TOBACCO NON-USER: CPT | Performed by: NURSE PRACTITIONER

## 2019-11-06 RX ORDER — HYDROCODONE BITARTRATE AND ACETAMINOPHEN 5; 325 MG/1; MG/1
1 TABLET ORAL EVERY 8 HOURS PRN
Qty: 90 TABLET | Refills: 0 | Status: SHIPPED | OUTPATIENT
Start: 2019-11-06 | End: 2019-12-05 | Stop reason: SDUPTHER

## 2019-11-06 RX ORDER — PREDNISONE 1 MG/1
TABLET ORAL
Qty: 40 TABLET | Refills: 0 | Status: SHIPPED | OUTPATIENT
Start: 2019-11-06 | End: 2019-11-06 | Stop reason: SDUPTHER

## 2019-11-06 RX ORDER — PREDNISONE 1 MG/1
TABLET ORAL
Qty: 40 TABLET | Refills: 0 | Status: SHIPPED | OUTPATIENT
Start: 2019-11-06 | End: 2019-11-18

## 2019-11-06 ASSESSMENT — ENCOUNTER SYMPTOMS
COLOR CHANGE: 0
NAUSEA: 0
COUGH: 0
SHORTNESS OF BREATH: 0
RHINORRHEA: 0
CONSTIPATION: 0
CHEST TIGHTNESS: 0
SORE THROAT: 0
VOMITING: 0
PHOTOPHOBIA: 0
WHEEZING: 0
BACK PAIN: 1
EYE PAIN: 0
DIARRHEA: 0
SINUS PRESSURE: 0
ABDOMINAL PAIN: 0

## 2019-11-20 ENCOUNTER — TELEPHONE (OUTPATIENT)
Dept: NEPHROLOGY | Age: 49
End: 2019-11-20

## 2019-11-21 ENCOUNTER — TELEPHONE (OUTPATIENT)
Dept: RHEUMATOLOGY | Age: 49
End: 2019-11-21

## 2019-11-21 RX ORDER — PREDNISONE 1 MG/1
5 TABLET ORAL DAILY
Qty: 30 TABLET | Refills: 1 | Status: SHIPPED | OUTPATIENT
Start: 2019-11-21 | End: 2019-12-01

## 2019-11-22 DIAGNOSIS — K04.7 DENTAL INFECTION: ICD-10-CM

## 2019-11-22 RX ORDER — TOLTERODINE 4 MG/1
4 CAPSULE, EXTENDED RELEASE ORAL DAILY
Qty: 30 CAPSULE | Refills: 3 | Status: SHIPPED | OUTPATIENT
Start: 2019-11-22 | End: 2020-02-18

## 2019-11-22 RX ORDER — ONDANSETRON 4 MG/1
4 TABLET, FILM COATED ORAL EVERY 8 HOURS PRN
Qty: 30 TABLET | Refills: 1 | Status: SHIPPED | OUTPATIENT
Start: 2019-11-22 | End: 2019-11-26 | Stop reason: ALTCHOICE

## 2019-11-26 ENCOUNTER — HOSPITAL ENCOUNTER (EMERGENCY)
Age: 49
Discharge: HOME | End: 2019-11-26
Payer: COMMERCIAL

## 2019-11-26 VITALS
RESPIRATION RATE: 16 BRPM | TEMPERATURE: 97.7 F | DIASTOLIC BLOOD PRESSURE: 69 MMHG | WEIGHT: 178 LBS | OXYGEN SATURATION: 97 % | SYSTOLIC BLOOD PRESSURE: 124 MMHG | BODY MASS INDEX: 28.74 KG/M2 | HEART RATE: 75 BPM

## 2019-11-26 DIAGNOSIS — J34.89 SORE IN NOSE: ICD-10-CM

## 2019-11-26 DIAGNOSIS — J06.9 ACUTE UPPER RESPIRATORY INFECTION: Primary | ICD-10-CM

## 2019-11-26 PROCEDURE — 99214 OFFICE O/P EST MOD 30 MIN: CPT | Performed by: NURSE PRACTITIONER

## 2019-11-26 PROCEDURE — 99212 OFFICE O/P EST SF 10 MIN: CPT

## 2019-11-26 RX ORDER — FLUTICASONE PROPIONATE 50 MCG
2 SPRAY, SUSPENSION (ML) NASAL DAILY
Qty: 1 BOTTLE | Refills: 0 | Status: SHIPPED | OUTPATIENT
Start: 2019-11-26 | End: 2021-07-22

## 2019-11-26 RX ORDER — BROMPHENIRAMINE MALEATE, PSEUDOEPHEDRINE HYDROCHLORIDE, AND DEXTROMETHORPHAN HYDROBROMIDE 2; 30; 10 MG/5ML; MG/5ML; MG/5ML
10 SYRUP ORAL 4 TIMES DAILY PRN
Qty: 200 ML | Refills: 0 | Status: SHIPPED | OUTPATIENT
Start: 2019-11-26 | End: 2020-02-18 | Stop reason: ALTCHOICE

## 2019-11-26 RX ORDER — ONDANSETRON 4 MG/1
4 TABLET, ORALLY DISINTEGRATING ORAL EVERY 8 HOURS PRN
Qty: 20 TABLET | Refills: 0 | Status: SHIPPED | OUTPATIENT
Start: 2019-11-26 | End: 2020-03-18 | Stop reason: SDUPTHER

## 2019-11-26 RX ORDER — AMOXICILLIN 875 MG/1
875 TABLET, COATED ORAL 2 TIMES DAILY
Qty: 20 TABLET | Refills: 0 | Status: SHIPPED | OUTPATIENT
Start: 2019-11-26 | End: 2019-12-06

## 2019-11-26 ASSESSMENT — PAIN DESCRIPTION - FREQUENCY: FREQUENCY: CONTINUOUS

## 2019-11-26 ASSESSMENT — ENCOUNTER SYMPTOMS
SHORTNESS OF BREATH: 0
NAUSEA: 0
RHINORRHEA: 1
ABDOMINAL PAIN: 0
SINUS PRESSURE: 0
CHEST TIGHTNESS: 0
FACIAL SWELLING: 0
VOICE CHANGE: 0
VOMITING: 0
SINUS PAIN: 0
TROUBLE SWALLOWING: 0
ALLERGIC/IMMUNOLOGIC NEGATIVE: 1
COUGH: 1
SORE THROAT: 1

## 2019-11-26 ASSESSMENT — PAIN DESCRIPTION - ONSET: ONSET: PROGRESSIVE

## 2019-11-26 ASSESSMENT — PAIN DESCRIPTION - LOCATION: LOCATION: THROAT

## 2019-11-26 ASSESSMENT — PAIN DESCRIPTION - PROGRESSION: CLINICAL_PROGRESSION: GRADUALLY WORSENING

## 2019-11-26 ASSESSMENT — PAIN SCALES - GENERAL: PAINLEVEL_OUTOF10: 7

## 2019-11-26 ASSESSMENT — PAIN - FUNCTIONAL ASSESSMENT: PAIN_FUNCTIONAL_ASSESSMENT: ACTIVITIES ARE NOT PREVENTED

## 2019-11-26 ASSESSMENT — PAIN DESCRIPTION - DESCRIPTORS: DESCRIPTORS: ACHING;SHARP

## 2019-11-26 ASSESSMENT — PAIN DESCRIPTION - PAIN TYPE: TYPE: ACUTE PAIN

## 2019-12-02 ENCOUNTER — TELEPHONE (OUTPATIENT)
Dept: PHYSICAL MEDICINE AND REHAB | Age: 49
End: 2019-12-02

## 2019-12-05 DIAGNOSIS — M47.816 SPONDYLOSIS OF LUMBAR REGION WITHOUT MYELOPATHY OR RADICULOPATHY: ICD-10-CM

## 2019-12-05 DIAGNOSIS — M54.16 LUMBAR RADICULOPATHY: ICD-10-CM

## 2019-12-05 DIAGNOSIS — M47.814 SPONDYLOSIS OF THORACIC REGION WITHOUT MYELOPATHY OR RADICULOPATHY: ICD-10-CM

## 2019-12-05 DIAGNOSIS — M48.062 LUMBAR STENOSIS WITH NEUROGENIC CLAUDICATION: ICD-10-CM

## 2019-12-05 DIAGNOSIS — M54.16 LUMBAR RADICULITIS: ICD-10-CM

## 2019-12-05 DIAGNOSIS — G89.4 CHRONIC PAIN SYNDROME: ICD-10-CM

## 2019-12-05 DIAGNOSIS — G89.29 CHRONIC BILATERAL THORACIC BACK PAIN: ICD-10-CM

## 2019-12-05 DIAGNOSIS — M54.6 CHRONIC BILATERAL THORACIC BACK PAIN: ICD-10-CM

## 2019-12-05 DIAGNOSIS — M46.1 BILATERAL SACROILIITIS (HCC): ICD-10-CM

## 2019-12-05 DIAGNOSIS — M46.1 SI (SACROILIAC) JOINT INFLAMMATION (HCC): ICD-10-CM

## 2019-12-05 RX ORDER — HYDROCODONE BITARTRATE AND ACETAMINOPHEN 5; 325 MG/1; MG/1
1 TABLET ORAL EVERY 8 HOURS PRN
Qty: 90 TABLET | Refills: 0 | Status: SHIPPED | OUTPATIENT
Start: 2019-12-06 | End: 2019-12-31 | Stop reason: SDUPTHER

## 2019-12-10 ENCOUNTER — NURSE ONLY (OUTPATIENT)
Dept: LAB | Age: 49
End: 2019-12-10

## 2019-12-10 ENCOUNTER — OFFICE VISIT (OUTPATIENT)
Dept: RHEUMATOLOGY | Age: 49
End: 2019-12-10
Payer: COMMERCIAL

## 2019-12-10 VITALS
HEIGHT: 66 IN | WEIGHT: 177.91 LBS | BODY MASS INDEX: 28.59 KG/M2 | HEART RATE: 56 BPM | DIASTOLIC BLOOD PRESSURE: 68 MMHG | SYSTOLIC BLOOD PRESSURE: 116 MMHG | OXYGEN SATURATION: 96 %

## 2019-12-10 DIAGNOSIS — M54.40 BACK PAIN OF LUMBAR REGION WITH SCIATICA: ICD-10-CM

## 2019-12-10 DIAGNOSIS — R20.0 NUMBNESS OF UPPER EXTREMITY: ICD-10-CM

## 2019-12-10 DIAGNOSIS — Z51.81 MEDICATION MONITORING ENCOUNTER: ICD-10-CM

## 2019-12-10 DIAGNOSIS — M32.14 OTHER SYSTEMIC LUPUS ERYTHEMATOSUS WITH GLOMERULAR DISEASE (HCC): ICD-10-CM

## 2019-12-10 DIAGNOSIS — M32.14 OTHER SYSTEMIC LUPUS ERYTHEMATOSUS WITH GLOMERULAR DISEASE (HCC): Primary | ICD-10-CM

## 2019-12-10 DIAGNOSIS — E03.9 HYPOTHYROIDISM, UNSPECIFIED TYPE: ICD-10-CM

## 2019-12-10 DIAGNOSIS — R10.9 ABDOMINAL PAIN, UNSPECIFIED ABDOMINAL LOCATION: ICD-10-CM

## 2019-12-10 DIAGNOSIS — M25.50 POLYARTHRALGIA: ICD-10-CM

## 2019-12-10 DIAGNOSIS — M79.641 RIGHT HAND PAIN: ICD-10-CM

## 2019-12-10 DIAGNOSIS — R07.81 PLEURODYNIA: ICD-10-CM

## 2019-12-10 LAB
BACTERIA: ABNORMAL
BILIRUBIN URINE: NEGATIVE
BLOOD, URINE: NEGATIVE
CASTS: ABNORMAL /LPF
CASTS: ABNORMAL /LPF
CHARACTER, URINE: CLEAR
COLOR: YELLOW
CREATININE URINE: 161.8 MG/DL
CRYSTALS: ABNORMAL
EPITHELIAL CELLS, UA: ABNORMAL /HPF
GLUCOSE, URINE: NEGATIVE MG/DL
KETONES, URINE: NEGATIVE
LEUKOCYTE EST, POC: NEGATIVE
MISCELLANEOUS LAB TEST RESULT: ABNORMAL
NITRITE, URINE: NEGATIVE
PH UA: 5 (ref 5–9)
PROT/CREAT RATIO, UR: 0.51
PROTEIN UA: 100 MG/DL
PROTEIN, URINE: 82.6 MG/DL
RBC URINE: ABNORMAL /HPF
RENAL EPITHELIAL, UA: ABNORMAL
SPECIFIC GRAVITY UA: 1.02 (ref 1–1.03)
TSH SERPL DL<=0.05 MIU/L-ACNC: 3.59 UIU/ML (ref 0.4–4.2)
UROBILINOGEN, URINE: 0.2 EU/DL (ref 0–1)
WBC UA: ABNORMAL /HPF
YEAST: ABNORMAL

## 2019-12-10 PROCEDURE — G8427 DOCREV CUR MEDS BY ELIG CLIN: HCPCS | Performed by: INTERNAL MEDICINE

## 2019-12-10 PROCEDURE — G8417 CALC BMI ABV UP PARAM F/U: HCPCS | Performed by: INTERNAL MEDICINE

## 2019-12-10 PROCEDURE — G8482 FLU IMMUNIZE ORDER/ADMIN: HCPCS | Performed by: INTERNAL MEDICINE

## 2019-12-10 PROCEDURE — 99214 OFFICE O/P EST MOD 30 MIN: CPT | Performed by: INTERNAL MEDICINE

## 2019-12-10 PROCEDURE — 1036F TOBACCO NON-USER: CPT | Performed by: INTERNAL MEDICINE

## 2019-12-10 RX ORDER — PREDNISONE 2.5 MG
3.75 TABLET ORAL DAILY
Qty: 45 TABLET | Refills: 3 | Status: SHIPPED | OUTPATIENT
Start: 2019-12-10 | End: 2020-10-05 | Stop reason: SDUPTHER

## 2019-12-10 RX ORDER — AZATHIOPRINE 50 MG/1
TABLET ORAL
Qty: 105 TABLET | Refills: 0 | Status: SHIPPED | OUTPATIENT
Start: 2019-12-10 | End: 2020-02-12 | Stop reason: SDUPTHER

## 2019-12-10 RX ORDER — GABAPENTIN 400 MG/1
400 CAPSULE ORAL 2 TIMES DAILY
Qty: 180 CAPSULE | Refills: 1 | Status: SHIPPED | OUTPATIENT
Start: 2019-12-10 | End: 2020-04-01 | Stop reason: SDUPTHER

## 2019-12-10 RX ORDER — HYDROXYCHLOROQUINE SULFATE 200 MG/1
200 TABLET, FILM COATED ORAL 2 TIMES DAILY
Qty: 60 TABLET | Refills: 2 | Status: SHIPPED | OUTPATIENT
Start: 2019-12-10 | End: 2020-04-01 | Stop reason: SDUPTHER

## 2019-12-10 RX ORDER — OMEPRAZOLE 20 MG/1
20 CAPSULE, DELAYED RELEASE ORAL DAILY
Qty: 30 CAPSULE | Refills: 3 | Status: SHIPPED | OUTPATIENT
Start: 2019-12-10 | End: 2020-04-01 | Stop reason: SDUPTHER

## 2019-12-10 ASSESSMENT — ENCOUNTER SYMPTOMS
SHORTNESS OF BREATH: 0
CONSTIPATION: 0
COUGH: 0
VOMITING: 0
WHEEZING: 0
DIARRHEA: 0
ABDOMINAL PAIN: 1
EYE PAIN: 0
EYE REDNESS: 0
NAUSEA: 0

## 2019-12-12 ENCOUNTER — TELEPHONE (OUTPATIENT)
Dept: FAMILY MEDICINE CLINIC | Age: 49
End: 2019-12-12

## 2019-12-13 LAB
C3 COMPLEMENT: 112 MG/DL (ref 88–201)
C4 COMPLEMENT: 29 MG/DL (ref 10–40)

## 2019-12-31 ENCOUNTER — PROCEDURE VISIT (OUTPATIENT)
Dept: NEUROLOGY | Age: 49
End: 2019-12-31
Payer: COMMERCIAL

## 2019-12-31 DIAGNOSIS — M54.2 NECK PAIN: ICD-10-CM

## 2019-12-31 DIAGNOSIS — M54.6 CHRONIC BILATERAL THORACIC BACK PAIN: ICD-10-CM

## 2019-12-31 DIAGNOSIS — M48.062 LUMBAR STENOSIS WITH NEUROGENIC CLAUDICATION: ICD-10-CM

## 2019-12-31 DIAGNOSIS — M47.814 SPONDYLOSIS OF THORACIC REGION WITHOUT MYELOPATHY OR RADICULOPATHY: ICD-10-CM

## 2019-12-31 DIAGNOSIS — M46.1 SI (SACROILIAC) JOINT INFLAMMATION (HCC): ICD-10-CM

## 2019-12-31 DIAGNOSIS — M79.602 BILATERAL ARM PAIN: ICD-10-CM

## 2019-12-31 DIAGNOSIS — G89.4 CHRONIC PAIN SYNDROME: ICD-10-CM

## 2019-12-31 DIAGNOSIS — M47.816 SPONDYLOSIS OF LUMBAR REGION WITHOUT MYELOPATHY OR RADICULOPATHY: ICD-10-CM

## 2019-12-31 DIAGNOSIS — G89.29 CHRONIC BILATERAL THORACIC BACK PAIN: ICD-10-CM

## 2019-12-31 DIAGNOSIS — M54.16 LUMBAR RADICULITIS: ICD-10-CM

## 2019-12-31 DIAGNOSIS — M79.601 BILATERAL ARM PAIN: ICD-10-CM

## 2019-12-31 DIAGNOSIS — M54.12 CERVICAL RADICULOPATHY: Primary | ICD-10-CM

## 2019-12-31 DIAGNOSIS — M46.1 BILATERAL SACROILIITIS (HCC): ICD-10-CM

## 2019-12-31 DIAGNOSIS — M54.16 LUMBAR RADICULOPATHY: ICD-10-CM

## 2019-12-31 PROCEDURE — 95911 NRV CNDJ TEST 9-10 STUDIES: CPT | Performed by: PSYCHIATRY & NEUROLOGY

## 2019-12-31 PROCEDURE — 95886 MUSC TEST DONE W/N TEST COMP: CPT | Performed by: PSYCHIATRY & NEUROLOGY

## 2019-12-31 RX ORDER — HYDROCODONE BITARTRATE AND ACETAMINOPHEN 5; 325 MG/1; MG/1
1 TABLET ORAL EVERY 8 HOURS PRN
Qty: 90 TABLET | Refills: 0 | Status: SHIPPED | OUTPATIENT
Start: 2020-01-04 | End: 2020-02-05 | Stop reason: SDUPTHER

## 2020-01-06 ENCOUNTER — PREP FOR PROCEDURE (OUTPATIENT)
Dept: PHYSICAL MEDICINE AND REHAB | Age: 50
End: 2020-01-06

## 2020-01-06 NOTE — H&P
Matthew Gordon is a 54 y.o. female is here today for     Chief Complaint:  Mid back pain       The patientis allergic to codeine.     Past Medical History  Roel Henry  has a past medical history of Anemia, CKD (chronic kidney disease), stage III ([address] Dyslipidemia, Hematuria, Hyperlipidemia, Hypertension, Hyperuricemia, Hypothyroidism, Kidney disease, Lupus ([address] Lupus nephritis ([address] Proteinuria, Seizures ([address] and Systemic lupus erythematosus ([address]    Past Surgical History  The patient  has a past surgical history that includes Nerve Block (Bilateral); pr inj dx/ther agnt paravert facet joint, lumbar/sac, 2nd level (Bilateral, 5/7/2018); pr njx dx/ther sbst intrlmnr lmbr/sac w/img gdn (N/A, 7/19/2018); pr inject anes/steroid foramen lumbar/sacral w img guide ,1 level (Left, 9/25/2018); Lumbar spine surgery (Left, 12/13/2018); lumbar nerve block (N/A, 3/11/2019); and lumbar nerve block (N/A, 5/21/2019).    Family History  This patient's family history includes Diabetes in her father and mother; Heart Disease in her mother; Lupus in her maternal cousin; Parkinsonism in her mother; Thyroid Disease in her mother.     Social History  Roel Henry  reports that she has quit smoking. Her smoking use included cigarettes. She has never used smokeless tobacco. She reports that she drinks alcohol. She reports that she does not use drugs.     Medications    Current Medication      Current Outpatient Medications:     predniSONE (DELTASONE) 5 MG tablet, Take 4 tablets by mouth daily for 4 days, THEN 3 tablets daily for 4 days, THEN 2 tablets daily for 4 days. , Disp: 40 tablet, Rfl: 0    HYDROcodone-acetaminophen (NORCO) 5-325 MG per tablet, Take 1 tablet by mouth every 8 hours as needed for Pain for up to 30 days. , Disp: 90 tablet, Rfl: 0    simvastatin (ZOCOR) 40 MG tablet, take 1 tablet by mouth at bedtime, Disp: 90 tablet, Rfl: 3    ferrous sulfate 325 (65 Fe) MG tablet, Take 1 tablet by mouth daily, Disp: 90 tablet, Left hip: Normal.        Right knee: Normal.        Left knee: Normal.        Right ankle: Tenderness. Cervical back: She exhibits decreased range of motion, tenderness, bony tenderness, pain and spasm. Thoracic back: She exhibits decreased range of motion, tenderness, bony tenderness, pain and spasm. Lumbar back: She exhibits decreased range of motion, tenderness, bony tenderness, pain and spasm. Back:         Right foot: There is decreased range of motion, tenderness, bony tenderness and swelling. 2 right broken toes has walker shoe on    Neurological: She is alert and oriented to person, place, and time. She has normal strength. She is not disoriented. She displays no atrophy. No cranial nerve deficit or sensory deficit. She exhibits normal muscle tone. She displays a negative Romberg sign. Coordination and gait normal. She displays no Babinski's sign on the right side. Reflex Scores:       Tricep reflexes are 2+ on the right side and 2+ on the left side. Bicep reflexes are 2+ on the right side and 2+ on the left side. Brachioradialis reflexes are 2+ on the right side and 2+ on the left side. Patellar reflexes are 2+ on the right side and 2+ on the left side. Achilles reflexes are 2+ on the right side and 2+ on the left side. BUE 5/5 BUE  5/5   Skin: Skin is warm. No rash noted. She is not diaphoretic. No erythema. No pallor. Psychiatric: She has a normal mood and affect. Her behavior is normal. Judgment and thought content normal. Her mood appears not anxious. Her affect is not angry, not blunt, not labile and not inappropriate. Her speech is not rapid and/or pressured, not delayed, not tangential and not slurred. She is not agitated, not aggressive, not hyperactive, not slowed, not withdrawn, not actively hallucinating and not combative. Thought content is not paranoid and not delusional. Cognition and memory are not impaired.  She does not

## 2020-01-08 ENCOUNTER — TELEPHONE (OUTPATIENT)
Dept: PHYSICAL MEDICINE AND REHAB | Age: 50
End: 2020-01-08

## 2020-01-13 ENCOUNTER — TELEPHONE (OUTPATIENT)
Dept: PHYSICAL MEDICINE AND REHAB | Age: 50
End: 2020-01-13

## 2020-01-13 NOTE — TELEPHONE ENCOUNTER
----- Message from David Knox sent at 1/10/2020 12:02 PM EST -----  Regarding: Auth denied  Auth denied per fax from [address] [place] [address] [employer], sending the letter     Pt. Notified, Procedure rescheduled to 2/11/2020 @ 8:25, arrive at 7:25am. Follow up 2/26/2020. Pt. To come in office to fill out DANISH for possible appeal. Verbalized understanding.

## 2020-01-20 NOTE — TELEPHONE ENCOUNTER
Appeal mailed to:  [address]. Med.  Management Appeals Dept.  [address], [address] [place] [address], [address] [place] [address]

## 2020-01-30 ENCOUNTER — TELEPHONE (OUTPATIENT)
Dept: PHYSICAL MEDICINE AND REHAB | Age: 50
End: 2020-01-30

## 2020-02-05 RX ORDER — HYDROCODONE BITARTRATE AND ACETAMINOPHEN 5; 325 MG/1; MG/1
1 TABLET ORAL EVERY 8 HOURS PRN
Qty: 90 TABLET | Refills: 0 | Status: SHIPPED | OUTPATIENT
Start: 2020-02-05 | End: 2020-03-06 | Stop reason: SDUPTHER

## 2020-02-05 NOTE — TELEPHONE ENCOUNTER
OARRS reviewed. UDS: + for  norco - consistent     Narcan offered: no       Last seen: 11/6/2019.        Follow-up:   Future Appointments   Date Time Provider Justus Burciagai   2/11/2020  2:30 PM MACKENZIE Guillory - CNP [facility] [place]   2/18/2020 10:40 AM Katheryn Vu MD ES Kidney [facility] [place]   3/18/2020  9:45 AM Ochoa Villanueva, DO [facility] Rheum [facility] [place]

## 2020-02-11 ENCOUNTER — NURSE ONLY (OUTPATIENT)
Dept: LAB | Age: 50
End: 2020-02-11

## 2020-02-11 ENCOUNTER — OFFICE VISIT (OUTPATIENT)
Dept: PHYSICAL MEDICINE AND REHAB | Age: 50
End: 2020-02-11
Payer: COMMERCIAL

## 2020-02-11 VITALS
HEIGHT: 66 IN | HEART RATE: 66 BPM | WEIGHT: 178 LBS | SYSTOLIC BLOOD PRESSURE: 104 MMHG | DIASTOLIC BLOOD PRESSURE: 78 MMHG | BODY MASS INDEX: 28.61 KG/M2

## 2020-02-11 LAB
ANION GAP SERPL CALCULATED.3IONS-SCNC: 12 MEQ/L (ref 8–16)
BUN BLDV-MCNC: 14 MG/DL (ref 7–22)
CALCIUM SERPL-MCNC: 9.5 MG/DL (ref 8.5–10.5)
CHLORIDE BLD-SCNC: 104 MEQ/L (ref 98–111)
CO2: 25 MEQ/L (ref 23–33)
CREAT SERPL-MCNC: 1.7 MG/DL (ref 0.4–1.2)
CREATININE URINE: 525.2 MG/DL
GFR SERPL CREATININE-BSD FRML MDRD: 32 ML/MIN/1.73M2
GLUCOSE BLD-MCNC: 89 MG/DL (ref 70–108)
POTASSIUM SERPL-SCNC: 3.9 MEQ/L (ref 3.5–5.2)
PROT/CREAT RATIO, UR: 0.56
PROTEIN, URINE: 292.5 MG/DL
SODIUM BLD-SCNC: 141 MEQ/L (ref 135–145)

## 2020-02-11 PROCEDURE — 99214 OFFICE O/P EST MOD 30 MIN: CPT | Performed by: NURSE PRACTITIONER

## 2020-02-11 PROCEDURE — 1036F TOBACCO NON-USER: CPT | Performed by: NURSE PRACTITIONER

## 2020-02-11 PROCEDURE — G8482 FLU IMMUNIZE ORDER/ADMIN: HCPCS | Performed by: NURSE PRACTITIONER

## 2020-02-11 PROCEDURE — G8417 CALC BMI ABV UP PARAM F/U: HCPCS | Performed by: NURSE PRACTITIONER

## 2020-02-11 PROCEDURE — G8427 DOCREV CUR MEDS BY ELIG CLIN: HCPCS | Performed by: NURSE PRACTITIONER

## 2020-02-11 ASSESSMENT — ENCOUNTER SYMPTOMS
SINUS PRESSURE: 0
SHORTNESS OF BREATH: 0
RHINORRHEA: 0
CHEST TIGHTNESS: 0
BACK PAIN: 1
VOMITING: 0
CONSTIPATION: 0
COLOR CHANGE: 0
DIARRHEA: 0
WHEEZING: 0
ABDOMINAL PAIN: 0
NAUSEA: 0
SORE THROAT: 0
PHOTOPHOBIA: 0
EYE PAIN: 0
COUGH: 0

## 2020-02-11 NOTE — PROGRESS NOTES
daily for 10 days, Disp: 1 Bottle, Rfl: 0    brompheniramine-pseudoephedrine-DM 2-30-10 MG/5ML syrup, Take 10 mLs by mouth 4 times daily as needed for Congestion or Cough, Disp: 200 mL, Rfl: 0    tolterodine (DETROL LA) 4 MG extended release capsule, Take 1 capsule by mouth daily, Disp: 30 capsule, Rfl: 3    simvastatin (ZOCOR) 40 MG tablet, take 1 tablet by mouth at bedtime, Disp: 90 tablet, Rfl: 3    ferrous sulfate 325 (65 Fe) MG tablet, Take 1 tablet by mouth daily, Disp: 90 tablet, Rfl: 3    levothyroxine (SYNTHROID) 112 MCG tablet, take 1 tablet by mouth once daily, Disp: 90 tablet, Rfl: 1    Calcium Carbonate-Vitamin D (OYSTER SHELL CALCIUM/D) 500-200 MG-UNIT TABS, take 1 tablet by mouth once daily, Disp: 90 tablet, Rfl: 3    acetaminophen (RA ACETAMINOPHEN EX ST) 500 MG tablet, take 2 tablets by mouth every 6 hours if needed for pain, Disp: 120 tablet, Rfl: 2    Subjective:      Review of Systems   Constitutional: Negative for activity change, appetite change, chills, diaphoresis, fatigue, fever and unexpected weight change. HENT: Negative for congestion, ear pain, hearing loss, mouth sores, nosebleeds, rhinorrhea, sinus pressure and sore throat. Eyes: Negative for photophobia, pain and visual disturbance. Respiratory: Negative for cough, chest tightness, shortness of breath and wheezing. Cardiovascular: Negative for chest pain and palpitations. HTN CAD   Gastrointestinal: Negative for abdominal pain, constipation, diarrhea, nausea and vomiting. Endocrine: Negative for cold intolerance, heat intolerance, polydipsia, polyphagia and polyuria. Thyroid issues   Genitourinary: Negative for decreased urine volume, difficulty urinating, frequency and hematuria. CKD   Musculoskeletal: Positive for arthralgias, back pain, myalgias, neck pain and neck stiffness. Negative for gait problem. Skin: Negative for color change and rash.    Allergic/Immunologic: Negative for food allergies and immunocompromised state. Neurological: Positive for weakness, numbness and headaches. Negative for dizziness, tremors, seizures, syncope, facial asymmetry, speech difficulty and light-headedness. H/o seizures,no seizures in 16 years. H/O Lupus   Hematological: Does not bruise/bleed easily. Psychiatric/Behavioral: Negative for agitation, behavioral problems, confusion, decreased concentration, dysphoric mood, hallucinations, self-injury, sleep disturbance and suicidal ideas. The patient is not nervous/anxious and is not hyperactive. Objective:     Vitals:    02/11/20 1423   BP: 104/78   Site: Left Upper Arm   Position: Sitting   Cuff Size: Large Adult   Pulse: 66   Weight: 178 lb (80.7 kg)   Height: 5' 6\" (1.676 m)       Physical Exam  Vitals signs and nursing note reviewed. Constitutional:       General: She is not in acute distress. Appearance: She is well-developed. She is not diaphoretic. HENT:      Head: Normocephalic and atraumatic. Right Ear: External ear normal.      Left Ear: External ear normal.      Nose: Nose normal.      Mouth/Throat:      Pharynx: No oropharyngeal exudate. Eyes:      General: No scleral icterus. Right eye: No discharge. Left eye: No discharge. Conjunctiva/sclera: Conjunctivae normal.      Pupils: Pupils are equal, round, and reactive to light. Neck:      Musculoskeletal: Neck supple. Decreased range of motion. Muscular tenderness present. No edema, erythema or neck rigidity. Thyroid: No thyromegaly. Cardiovascular:      Rate and Rhythm: Normal rate and regular rhythm. Heart sounds: Normal heart sounds. No murmur. No friction rub. No gallop. Pulmonary:      Effort: Pulmonary effort is normal. No respiratory distress. Breath sounds: Normal breath sounds. No wheezing or rales. Chest:      Chest wall: No tenderness. Abdominal:      General: Bowel sounds are normal. There is no distension.

## 2020-02-12 ENCOUNTER — TELEPHONE (OUTPATIENT)
Dept: RHEUMATOLOGY | Age: 50
End: 2020-02-12

## 2020-02-12 RX ORDER — AZATHIOPRINE 50 MG/1
75 TABLET ORAL 2 TIMES DAILY
Qty: 90 TABLET | Refills: 0 | Status: SHIPPED | OUTPATIENT
Start: 2020-02-12 | End: 2020-04-01 | Stop reason: SDUPTHER

## 2020-02-12 NOTE — TELEPHONE ENCOUNTER
Pt called reports was called and reported having been off the medications for the past 2 weeks. The last prescription per the pharmacy was filled on Dec 10, 2020. Chronic kidney disease  Proteinuria  - ? Medication vs SLE vs medication non-compliance as Rx was only over a month and prescribed on Gopal 10, 2019   - renal function worsening over the past 4 evaluation    - imuran increased Dec. 2019 from 150 mg qd to 175mg qd -- decrease back to 150mg daily b/c renal function tests. - repeat bmp in 2-3 weeks. - checking imuran metabolite given concern of medication compliance    Systemic lupus    - mild ESR elevation, normal complements.

## 2020-02-13 ENCOUNTER — HOSPITAL ENCOUNTER (OUTPATIENT)
Dept: MRI IMAGING | Age: 50
Discharge: HOME | End: 2020-02-13
Payer: COMMERCIAL

## 2020-02-13 PROCEDURE — 72141 MRI NECK SPINE W/O DYE: CPT

## 2020-02-18 ENCOUNTER — OFFICE VISIT (OUTPATIENT)
Dept: NEPHROLOGY | Age: 50
End: 2020-02-18
Payer: COMMERCIAL

## 2020-02-18 VITALS
BODY MASS INDEX: 28.34 KG/M2 | DIASTOLIC BLOOD PRESSURE: 88 MMHG | SYSTOLIC BLOOD PRESSURE: 124 MMHG | WEIGHT: 175.6 LBS | OXYGEN SATURATION: 99 % | HEART RATE: 76 BPM

## 2020-02-18 PROCEDURE — G8427 DOCREV CUR MEDS BY ELIG CLIN: HCPCS | Performed by: INTERNAL MEDICINE

## 2020-02-18 PROCEDURE — 99213 OFFICE O/P EST LOW 20 MIN: CPT | Performed by: INTERNAL MEDICINE

## 2020-02-18 PROCEDURE — G8482 FLU IMMUNIZE ORDER/ADMIN: HCPCS | Performed by: INTERNAL MEDICINE

## 2020-02-18 PROCEDURE — G8417 CALC BMI ABV UP PARAM F/U: HCPCS | Performed by: INTERNAL MEDICINE

## 2020-02-18 PROCEDURE — 1036F TOBACCO NON-USER: CPT | Performed by: INTERNAL MEDICINE

## 2020-02-18 NOTE — PATIENT INSTRUCTIONS
Drugs to Avoid with Chronic Kidney Disease    Non-steroidal anti-inflammatory drugs (NSAIDS)    Potential complication and side effects of NSAIDS include:   Kidney injury and worsening kidney function    Risk of stomach ulcer and intestinal bleeding   Fluid retention and edema   Increased Blood Pressure    Non-Steroidal Anti-Inflammatory Drugs     Aspirin (Anacin, Ascriptin, ingrid, Bufferin, Ecotrin, Excedrin)   Celecoxib (Celebrex)   Choline and magnesium salicylates (CMT, Trisosal, Trilisate)   Choline salicylate (Arthropan)   Diclofenac (Voltaren, Arthrotec, Cataflam, Cambia, Voltaren-XR, Zipsor)   Diflunisal (Dolobid)   Etodolac (Lodine XL, Lodine)   Famotidine + Ibuprofen (Duexis)   Fenoprofen (Nalfon, Nalfon 200)   Furbiprofen (Asaid)   Ibuprofen (Advil, Motrin, Midol, Nuprin, Genpril, Dolgesic,Profen,, Vicoprofen,Combunox, Actiprofen, Addaprin, Caldolor, Haltran, Q-Profen,Ibren, Menadol, Rufen,Saleto-200, [place],Ultraprin, Uni-Pro,Wal-Profen)   Indomethacin (Indocin, Indomethegan, Indo-Danny)    Ketoprofen (Orudis  KT, Oruvail, Actron)   Ketorolac (Toradol, Sprix)   Magnesium Sulfate (Arthritab, Ingrid Select, Braulio's pills, Mj, Mobidin, Mobogesic)   Meclofenamate Sodium (Ponstel)   Meloxicam (Mobic)   Naproxen (Aleve, Anaprox, Naprosyn, EC-Naprosyn, Naprelan, All Day Pain Relief, Aflaxen, Anaprox-DS, Midol Extended Relief, Naprelan,Prevacid NapraPac, Naprapac, Vimovo)   Nabumetone (Relafen)   Oxaprozin (Daypro)   Piroxicam (Feldene)   Rofecoxib (Vioxx)   Salsalate (Amigesic, Anaflex 750, Disalcid, Marthritic, Mono-gesic, Salflex, Salsitab)   Sodium salicylate (various generics)   Sulindac (Clinoril)   Tolmetin (Tolectin)   Valdecoxib (Bextra)   Mefenamic Acid (Ponstel)   Famotidine and Ibuprofen (Duexis)   Meclofenamate (Meclomen)    Antibiotics   Bactrim   Gentamycin   Tobramycin   Vancomycin   Tetracycline   Macrobid     Other                  IV contrast dye

## 2020-03-06 RX ORDER — HYDROCODONE BITARTRATE AND ACETAMINOPHEN 5; 325 MG/1; MG/1
1 TABLET ORAL EVERY 8 HOURS PRN
Qty: 90 TABLET | Refills: 0 | Status: SHIPPED | OUTPATIENT
Start: 2020-03-06 | End: 2020-04-02 | Stop reason: SDUPTHER

## 2020-03-18 ENCOUNTER — TELEPHONE (OUTPATIENT)
Dept: RHEUMATOLOGY | Age: 50
End: 2020-03-18

## 2020-03-18 RX ORDER — SIMVASTATIN 40 MG
TABLET ORAL
Qty: 90 TABLET | Refills: 3 | Status: SHIPPED | OUTPATIENT
Start: 2020-03-18 | End: 2021-02-24 | Stop reason: SDUPTHER

## 2020-03-18 RX ORDER — LEVOTHYROXINE SODIUM 112 UG/1
TABLET ORAL
Qty: 90 TABLET | Refills: 1 | Status: SHIPPED | OUTPATIENT
Start: 2020-03-18 | End: 2020-12-03 | Stop reason: SDUPTHER

## 2020-03-18 RX ORDER — ONDANSETRON 4 MG/1
4 TABLET, ORALLY DISINTEGRATING ORAL EVERY 8 HOURS PRN
Qty: 20 TABLET | Refills: 0 | Status: SHIPPED | OUTPATIENT
Start: 2020-03-18 | End: 2020-03-25

## 2020-03-18 RX ORDER — LORATADINE 10 MG/1
10 TABLET ORAL DAILY
Qty: 90 TABLET | Refills: 3 | Status: SHIPPED | OUTPATIENT
Start: 2020-03-18 | End: 2021-02-24 | Stop reason: SDUPTHER

## 2020-03-18 RX ORDER — B-COMPLEX WITH VITAMIN C
TABLET ORAL
Qty: 90 TABLET | Refills: 3 | Status: SHIPPED | OUTPATIENT
Start: 2020-03-18 | End: 2021-02-24 | Stop reason: SDUPTHER

## 2020-03-18 NOTE — TELEPHONE ENCOUNTER
Naa Mcnamara called requesting a refill on the following medications:  Requested Prescriptions     Pending Prescriptions Disp Refills    ondansetron (ZOFRAN ODT) 4 MG disintegrating tablet 20 tablet 0     Sig: Place 1 tablet under the tongue every 8 hours as needed for Nausea or Vomiting May Sub regular tablet (non-ODT) if insurance does not cover ODT.  loratadine (CLARITIN) 10 MG tablet 90 tablet 3     Sig: Take 1 tablet by mouth daily    Calcium Carbonate-Vitamin D (OYSTER SHELL CALCIUM/D) 500-200 MG-UNIT TABS 90 tablet 3     Sig: take 1 tablet by mouth once daily    levothyroxine (SYNTHROID) 112 MCG tablet 90 tablet 1     Sig: take 1 tablet by mouth once daily    simvastatin (ZOCOR) 40 MG tablet 90 tablet 3     Sig: take 1 tablet by mouth at bedtime     Pharm[facility] verified:[facility]  . pv      Date of last visit: 10/22/19Date of next visit (if applicable): Visit date not found

## 2020-03-23 ENCOUNTER — TELEPHONE (OUTPATIENT)
Dept: PHYSICAL MEDICINE AND REHAB | Age: 50
End: 2020-03-23

## 2020-03-23 NOTE — TELEPHONE ENCOUNTER
Patient requesting lab orders be faxed to [facility] - future order for BMP and misc send out. Will she also need the CBC, Sed rate and CRP? Please advise then I will fax.

## 2020-03-24 LAB
A/G RATIO: 1.7 (ref 1.5–2.5)
ABSOLUTE BASO #: 100 /CMM (ref 0–200)
ABSOLUTE EOS #: 100 /CMM (ref 0–500)
ABSOLUTE LYMPH #: 1300 /CMM (ref 1000–4800)
ABSOLUTE MONO #: 300 /CMM (ref 0–800)
ABSOLUTE NEUT #: 2600 /CMM (ref 1800–7700)
ALBUMIN SERPL-MCNC: 4.3 GM/DL (ref 3.5–5)
ALP BLD-CCNC: 50 IU/L (ref 39–118)
ALT SERPL-CCNC: 18 IU/L (ref 10–40)
ANION GAP SERPL CALCULATED.3IONS-SCNC: 7 MMOL/L (ref 4–12)
ANISOCYTOSIS: ABNORMAL
AST SERPL-CCNC: 32 IU/L (ref 15–41)
BASOPHILS RELATIVE PERCENT: 1.2 % (ref 0–2)
BILIRUB SERPL-MCNC: 0.3 MG/DL (ref 0.2–1)
BUN BLDV-MCNC: 18 MG/DL (ref 7–20)
C-REACTIVE PROTEIN: < 0.5 MG/DL
CALCIUM SERPL-MCNC: 9.8 MG/DL (ref 8.8–10.5)
CHLORIDE BLD-SCNC: 105 MEQ/L (ref 101–111)
CO2: 27 MEQ/L (ref 21–32)
CREAT SERPL-MCNC: 1.71 MG/DL (ref 0.6–1.3)
CREATININE CLEARANCE: 32
EOSINOPHILS RELATIVE PERCENT: 2 % (ref 0–6)
GLUCOSE: 111 MG/DL (ref 70–110)
HCT VFR BLD CALC: 34.9 % (ref 35–44)
HEMOGLOBIN: 11.6 GM/DL (ref 12–15)
LYMPHOCYTES RELATIVE PERCENT: 30.1 % (ref 15–45)
MCH RBC QN AUTO: 29.1 PG (ref 27.5–33)
MCHC RBC AUTO-ENTMCNC: 33.2 GM/DL (ref 33–36)
MCV RBC AUTO: 87.6 CU MIC (ref 80–97)
MONOCYTES RELATIVE PERCENT: 6 % (ref 2–10)
NEUTROPHILS RELATIVE PERCENT: 60.7 % (ref 40–70)
NUCLEATED RBCS: 0.1 /100 WBC
PDW BLD-RTO: 18.6 % (ref 12–16)
PLATELET # BLD: 174 TH/CMM (ref 150–400)
POTASSIUM SERPL-SCNC: 3.5 MEQ/L (ref 3.6–5)
RBC # BLD: 3.99 MIL/CMM (ref 4–5.1)
SEDIMENTATION RATE, ERYTHROCYTE: 5 MM/HR
SODIUM BLD-SCNC: 139 MEQ/L (ref 135–145)
TOTAL PROTEIN: 6.8 G/DL (ref 6.2–8)
WBC # BLD: 4.3 TH/CMM (ref 4.4–10.5)

## 2020-03-25 ENCOUNTER — TELEPHONE (OUTPATIENT)
Dept: RHEUMATOLOGY | Age: 50
End: 2020-03-25

## 2020-03-25 RX ORDER — PREDNISONE 10 MG/1
TABLET ORAL
Qty: 30 TABLET | Refills: 0 | Status: SHIPPED | OUTPATIENT
Start: 2020-03-25 | End: 2020-04-09

## 2020-03-25 NOTE — TELEPHONE ENCOUNTER
Systemic lupus  Chronic kidney disease -- worsened   Proteinuria - stable. --- repeat C3, C4  -- - prednisone challenge x 2 weeks to evaluate for improvement of renal function. --- ? Renal biopsy given worsening Creatinine despite stable proteinuria. Vs BP related vs progression of CKD vs other. -- imuran was started but prednisone weaned during this time period.

## 2020-03-30 ENCOUNTER — TELEPHONE (OUTPATIENT)
Dept: RHEUMATOLOGY | Age: 50
End: 2020-03-30

## 2020-04-01 RX ORDER — GABAPENTIN 400 MG/1
400 CAPSULE ORAL 2 TIMES DAILY
Qty: 180 CAPSULE | Refills: 1 | Status: SHIPPED | OUTPATIENT
Start: 2020-04-01 | End: 2020-10-05 | Stop reason: SDUPTHER

## 2020-04-01 RX ORDER — HYDROXYCHLOROQUINE SULFATE 200 MG/1
200 TABLET, FILM COATED ORAL 2 TIMES DAILY
Qty: 60 TABLET | Refills: 2 | Status: SHIPPED | OUTPATIENT
Start: 2020-04-01 | End: 2020-10-05 | Stop reason: SDUPTHER

## 2020-04-01 RX ORDER — AZATHIOPRINE 50 MG/1
75 TABLET ORAL 2 TIMES DAILY
Qty: 90 TABLET | Refills: 0 | Status: SHIPPED | OUTPATIENT
Start: 2020-04-01 | End: 2020-10-05 | Stop reason: SDUPTHER

## 2020-04-01 RX ORDER — OMEPRAZOLE 20 MG/1
20 CAPSULE, DELAYED RELEASE ORAL DAILY
Qty: 30 CAPSULE | Refills: 3 | Status: SHIPPED | OUTPATIENT
Start: 2020-04-01 | End: 2020-10-05 | Stop reason: SDUPTHER

## 2020-04-02 RX ORDER — HYDROCODONE BITARTRATE AND ACETAMINOPHEN 5; 325 MG/1; MG/1
1 TABLET ORAL EVERY 8 HOURS PRN
Qty: 90 TABLET | Refills: 0 | Status: SHIPPED | OUTPATIENT
Start: 2020-04-05 | End: 2020-05-04 | Stop reason: SDUPTHER

## 2020-04-07 ENCOUNTER — TELEMEDICINE (OUTPATIENT)
Dept: PHYSICAL MEDICINE AND REHAB | Age: 50
End: 2020-04-07
Payer: COMMERCIAL

## 2020-04-07 PROCEDURE — G8428 CUR MEDS NOT DOCUMENT: HCPCS | Performed by: NURSE PRACTITIONER

## 2020-04-07 PROCEDURE — 99214 OFFICE O/P EST MOD 30 MIN: CPT | Performed by: NURSE PRACTITIONER

## 2020-04-07 RX ORDER — PENICILLIN V POTASSIUM 500 MG/1
500 TABLET ORAL 4 TIMES DAILY
Qty: 40 TABLET | Refills: 0 | Status: SHIPPED | OUTPATIENT
Start: 2020-04-07 | End: 2020-04-17

## 2020-04-07 ASSESSMENT — ENCOUNTER SYMPTOMS
DIARRHEA: 0
SHORTNESS OF BREATH: 0
WHEEZING: 0
CHEST TIGHTNESS: 0
CONSTIPATION: 0
COUGH: 0
COLOR CHANGE: 0
BACK PAIN: 1
ABDOMINAL PAIN: 0
NAUSEA: 0
FACIAL SWELLING: 1
RHINORRHEA: 0
PHOTOPHOBIA: 0
EYE PAIN: 0
SORE THROAT: 0
VOMITING: 0
SINUS PRESSURE: 0

## 2020-04-07 NOTE — PROGRESS NOTES
tablets by mouth daily, Disp: 45 tablet, Rfl: 3    fluticasone (FLONASE) 50 MCG/ACT nasal spray, 2 sprays by Nasal route daily for 10 days, Disp: 1 Bottle, Rfl: 0    ferrous sulfate 325 (65 Fe) MG tablet, Take 1 tablet by mouth daily, Disp: 90 tablet, Rfl: 3    acetaminophen (RA ACETAMINOPHEN EX ST) 500 MG tablet, take 2 tablets by mouth every 6 hours if needed for pain, Disp: 120 tablet, Rfl: 2    Subjective:      Review of Systems   Constitutional: Negative for activity change, appetite change, chills, diaphoresis, fatigue, fever and unexpected weight change. HENT: Positive for dental problem and facial swelling. Negative for congestion, ear pain, hearing loss, mouth sores, nosebleeds, rhinorrhea, sinus pressure and sore throat. Abscess tooth   Eyes: Negative for photophobia, pain and visual disturbance. Respiratory: Negative for cough, chest tightness, shortness of breath and wheezing. Cardiovascular: Negative for chest pain and palpitations. HTN CAD   Gastrointestinal: Negative for abdominal pain, constipation, diarrhea, nausea and vomiting. Endocrine: Negative for cold intolerance, heat intolerance, polydipsia, polyphagia and polyuria. Thyroid issues   Genitourinary: Negative for decreased urine volume, difficulty urinating, frequency and hematuria. CKD   Musculoskeletal: Positive for arthralgias, back pain, myalgias, neck pain and neck stiffness. Negative for gait problem. Skin: Negative for color change and rash. Allergic/Immunologic: Negative for food allergies and immunocompromised state. Neurological: Positive for weakness, numbness and headaches. Negative for dizziness, tremors, seizures, syncope, facial asymmetry, speech difficulty and light-headedness. H/o seizures,no seizures in 16 years. H/O Lupus   Hematological: Does not bruise/bleed easily.    Psychiatric/Behavioral: Negative for agitation, behavioral problems, confusion, decreased

## 2020-04-15 ENCOUNTER — TELEMEDICINE (OUTPATIENT)
Dept: FAMILY MEDICINE CLINIC | Age: 50
End: 2020-04-15
Payer: COMMERCIAL

## 2020-04-15 VITALS — RESPIRATION RATE: 18 BRPM

## 2020-04-15 PROCEDURE — 99213 OFFICE O/P EST LOW 20 MIN: CPT | Performed by: NURSE PRACTITIONER

## 2020-04-15 PROCEDURE — G8427 DOCREV CUR MEDS BY ELIG CLIN: HCPCS | Performed by: NURSE PRACTITIONER

## 2020-04-15 RX ORDER — ACETAMINOPHEN 500 MG
1000 TABLET ORAL EVERY 6 HOURS PRN
Qty: 120 TABLET | Refills: 3 | Status: SHIPPED | OUTPATIENT
Start: 2020-04-15 | End: 2021-02-24 | Stop reason: SDUPTHER

## 2020-04-15 RX ORDER — BENZONATATE 100 MG/1
100-200 CAPSULE ORAL 3 TIMES DAILY PRN
Qty: 42 CAPSULE | Refills: 0 | Status: SHIPPED | OUTPATIENT
Start: 2020-04-15 | End: 2020-04-29

## 2020-04-15 ASSESSMENT — ENCOUNTER SYMPTOMS
TROUBLE SWALLOWING: 0
CHOKING: 0
RHINORRHEA: 1
SINUS PRESSURE: 0
SHORTNESS OF BREATH: 0
WHEEZING: 0
COUGH: 1
CHEST TIGHTNESS: 0
SINUS PAIN: 0
SORE THROAT: 1

## 2020-04-15 NOTE — PROGRESS NOTES
by mouth 2 times daily  MACKENZIE Clarke CNP   omeprazole (PRILOSEC) 20 MG delayed release capsule Take 1 capsule by mouth Daily  MACKENZIE Clarke CNP   loratadine (CLARITIN) 10 MG tablet Take 1 tablet by mouth daily  MACKENZIE Leiva CNP   Calcium Carbonate-Vitamin D (OYSTER SHELL CALCIUM/D) 500-200 MG-UNIT TABS take 1 tablet by mouth once daily  MACKENZIE Miller CNP   levothyroxine (SYNTHROID) 112 MCG tablet take 1 tablet by mouth once daily  MACKENZIE Miller CNP   simvastatin (ZOCOR) 40 MG tablet take 1 tablet by mouth at bedtime  MACKENZIE Leiva CNP   predniSONE (DELTASONE) 2.5 MG tablet Take 1.5 tablets by mouth daily  Trajosep Rodriguez DO   fluticasone (FLONASE) 50 MCG/ACT nasal spray 2 sprays by Nasal route daily for 10 days  MACKENZIE Pena CNP   ferrous sulfate 325 (65 Fe) MG tablet Take 1 tablet by mouth daily  MACKENZIE Leiva CNP       Social History     Tobacco Use    Smoking status: Former Smoker     Types: Cigarettes    Smokeless tobacco: Never Used    Tobacco comment: 1 a day   Substance Use Topics    Alcohol use:  Yes     Alcohol/week: 0.0 standard drinks     Comment: occasionally, couple times a year    Drug use: No        Allergies   Allergen Reactions    Codeine Hives and Nausea And Vomiting   ,   Past Medical History:   Diagnosis Date    Anemia     CKD (chronic kidney disease), stage III (HCC)     Dyslipidemia     Hematuria     Hyperlipidemia     Hypertension     Hyperuricemia     Hypothyroidism     Kidney disease     as a result from lupus    [facility]     Lupus nephritis ([facility])     Proteinuria     Seizures ([facility]     Systemic lupus erythematosus ([facility]    ,   Past Surgical History:   Procedure Laterality Date    LUMBAR NERVE BLOCK N/A 3/11/2019    LESI at L4 performed by Shashi Turner MD at [facility] N/A 5/21/2019    LESI at L3 #1 performed by Sahshi Turner MD at [place] OR    LUMBAR SPINE SURGERY Left 12/13/2018    LUMBAR TRANSFORAMINAL TFESI L5 LEFT #2, performed by Hua Peterson MD at [address] Bilateral     LUMBAR FACET    VT INJ DX/THER AGNT PARAVERT FACET JOINT, LUMBAR/SAC, 2ND LEVEL Bilateral 5/7/2018    LUMBAR FACET  [email or web address] Attached Parents Bilateral performed by Hua Peterson MD at [place] ANES/STEROID FORAMEN LUMBAR/SACRAL W IMG GUIDE ,1 LEVEL Left 9/25/2018    LUMBAR TRANSFORAMINAL TFESI L5 on the LEFT performed by Hua Peterson MD at [address] [facility] DX/THER SBST INTRLMNR LMBR/SAC W/IMG GDN N/A 7/19/2018    LUMBAR INTER LAMINAR GIGI LESI @L4 performed by Hua Peterson MD at [address] [facility]   ,   Social History     Tobacco Use    Smoking status: Former Smoker     Types: Cigarettes    Smokeless tobacco: Never Used    Tobacco comment: 1 a day   Substance Use Topics    Alcohol use:  Yes     Alcohol/week: 0.0 standard drinks     Comment: occasionally, couple times a year    Drug use: No   ,   Family History   Problem Relation Age of Onset    Diabetes Mother     Heart Disease Mother     Thyroid Disease Mother     Parkinsonism Mother     Diabetes Father     Lupus Maternal Cousin    ,   Immunization History   Administered Date(s) Administered    Influenza 10/11/2012    Influenza Virus Vaccine 11/30/2015    Influenza, Erle Anshul, IM, (6 mo and older Fluzone, Flulaval, Fluarix and 3 yrs and older Afluria) 12/06/2016    Influenza, Quadv, IM, PF (6 mo and older Fluzone, Flulaval, Fluarix, and 3 yrs and older Afluria) 09/13/2015, 12/27/2017, 09/12/2018, 10/22/2019    Pneumococcal Polysaccharide ([id number]) 12/27/2017    Tdap (Boostrix, Adacel) 07/08/2013   ,   Health Maintenance   Topic Date Due    Diabetes screen  09/30/2012    Lipid screen  10/22/2020    TSH testing  12/10/2020    Potassium monitoring  03/24/2021    Creatinine monitoring Vitals/Constitutional/EENT/Resp/CV/GI//MS/Neuro/Skin/Heme-Lymph-Imm. Pursuant to the emergency declaration under the [address] [place] [address], [address] [place] [address] and the [employer] Act, this Virtual Visit was conducted with patient's (and/or legal guardian's) consent, to reduce the patient's risk of exposure to COVID-19 and provide necessary medical care. The patient (and/or legal guardian) has also been advised to contact this office for worsening conditions or problems, and seek emergency medical treatment and/or call 911 if deemed necessary. Services were provided through a video synchronous discussion virtually to substitute for in-person clinic visit. Patient and provider were located at their individual homes. --MACKENZIE Barraza CNP on 4/15/2020 at 9:39 AM    An electronic signature was used to authenticate this note.

## 2020-05-02 ENCOUNTER — PATIENT MESSAGE (OUTPATIENT)
Dept: PHYSICAL MEDICINE AND REHAB | Age: 50
End: 2020-05-02

## 2020-05-04 RX ORDER — HYDROCODONE BITARTRATE AND ACETAMINOPHEN 5; 325 MG/1; MG/1
1 TABLET ORAL EVERY 8 HOURS PRN
Qty: 90 TABLET | Refills: 0 | Status: SHIPPED | OUTPATIENT
Start: 2020-05-04 | End: 2020-05-06 | Stop reason: SDUPTHER

## 2020-05-06 ENCOUNTER — TELEPHONE (OUTPATIENT)
Dept: PHYSICAL MEDICINE AND REHAB | Age: 50
End: 2020-05-06

## 2020-05-06 RX ORDER — HYDROCODONE BITARTRATE AND ACETAMINOPHEN 5; 325 MG/1; MG/1
1 TABLET ORAL EVERY 8 HOURS PRN
Qty: 90 TABLET | Refills: 0 | Status: SHIPPED | OUTPATIENT
Start: 2020-05-06 | End: 2020-06-04 | Stop reason: SDUPTHER

## 2020-05-24 ENCOUNTER — PATIENT MESSAGE (OUTPATIENT)
Dept: RHEUMATOLOGY | Age: 50
End: 2020-05-24

## 2020-05-26 RX ORDER — PREDNISONE 10 MG/1
TABLET ORAL
Qty: 15 TABLET | Refills: 0 | Status: SHIPPED | OUTPATIENT
Start: 2020-05-26 | End: 2020-06-05

## 2020-06-04 RX ORDER — HYDROCODONE BITARTRATE AND ACETAMINOPHEN 5; 325 MG/1; MG/1
1 TABLET ORAL EVERY 8 HOURS PRN
Qty: 90 TABLET | Refills: 0 | Status: SHIPPED | OUTPATIENT
Start: 2020-06-04 | End: 2020-07-04 | Stop reason: SDUPTHER

## 2020-06-23 ENCOUNTER — OFFICE VISIT (OUTPATIENT)
Dept: PHYSICAL MEDICINE AND REHAB | Age: 50
End: 2020-06-23
Payer: COMMERCIAL

## 2020-06-23 VITALS
SYSTOLIC BLOOD PRESSURE: 110 MMHG | TEMPERATURE: 97.4 F | BODY MASS INDEX: 28.12 KG/M2 | DIASTOLIC BLOOD PRESSURE: 64 MMHG | HEIGHT: 66 IN | WEIGHT: 175 LBS

## 2020-06-23 PROCEDURE — G8417 CALC BMI ABV UP PARAM F/U: HCPCS | Performed by: NURSE PRACTITIONER

## 2020-06-23 PROCEDURE — G8427 DOCREV CUR MEDS BY ELIG CLIN: HCPCS | Performed by: NURSE PRACTITIONER

## 2020-06-23 PROCEDURE — 99214 OFFICE O/P EST MOD 30 MIN: CPT | Performed by: NURSE PRACTITIONER

## 2020-06-23 PROCEDURE — 1036F TOBACCO NON-USER: CPT | Performed by: NURSE PRACTITIONER

## 2020-06-23 ASSESSMENT — ENCOUNTER SYMPTOMS
PHOTOPHOBIA: 0
RHINORRHEA: 0
SINUS PRESSURE: 0
SORE THROAT: 0
CONSTIPATION: 0
NAUSEA: 0
WHEEZING: 0
BACK PAIN: 1
VOMITING: 0
CHEST TIGHTNESS: 0
COLOR CHANGE: 0
SHORTNESS OF BREATH: 0
EYE PAIN: 0
COUGH: 0
DIARRHEA: 0
ABDOMINAL PAIN: 0

## 2020-06-23 NOTE — PROGRESS NOTES
[facility]  [address]  Dept: [phone]  Dept Fax: [phone]: [phone]    Visit Date: 6/23/2020    Functionality Assessment/Goals Worksheet     On a scale of 0 (Does not Interfere) to 10 (Completely Interferes)     1. Which number describes how during the past week pain has interfered with       the following:  A. General Activity:  6  B. Mood: 6  C. Walking Ability:  5  D. Normal Work (Includes both work outside the home and housework):  8  E. Relations with Other People:   6  F. Sleep:   6  G. Enjoyment of Life:   6    2. Patient Prefers to Take their Pain Medications:     [x]  On a regular basis   []  Only when necessary    []  Does not take pain medications    3. What are the Patient's Goals/Expectations for Visiting Pain Management? []  Learn about my pain    []  Receive Medication   []  Physical Therapy     []  Treat Depression   []  Receive Injections    []  Treat Sleep   []  Deal with Anxiety and Stress   []  Treat Opoid Dependence/Addiction   [x]  Other: to find out why I have all this pain     HPI:   Quintin Moore is a 54 y.o. female is here today for    Chief Complaint: Low back pain, Mid back pain, Neck pain, Right leg pain, Left leg pain and Hip pain    HPI   F/U has not been able to start PT for thoracic back pain, was cancelled due to [place]. Encouraged to call PT to get rescheduled. Insurance denied thoracic facet MBB due to no current PT. She  Has had TFLESI L5 left 2018 with continued 35% pain relief, she was getting 50% pain relief for 18 months. She has had LESI L3 and LESI L4 in past without releif. She conitnues to have neck pain RUE pain into lower arm hand, mid back low back Bilateral hip Si BLE pain. She continues to take Neurontin Norco.   Her main pain complaint is her neck pain with RUE radicular pain.  Her neck paiin and RUE pain increases with lifting reaching pushing pulling turning head extended head. She has sharp pains down her arm and constant throbbing, she has weakness to RUE    Medications reviewed. Patient denies side effects with medications. Patient states she is taking medications as prescribed. Shedenies receiving pain medications from other sources. She denies any ER visits since last visit. Pain scale with out pain medications or at its worst is 6/10. Pain scale with pain medications or at its best is 4/10. Last dose of [place]  was today  Drug screen reviewed from 2/11/2020 and was appropriate  Pill count was completed today and was appropriate  Patient does have naloxone available at home. Patient has not required use of naloxone at home since last office visit. EMG reviewed 12/2019    Cervical MRI reviewed 2/2020  There is 3 mm of anterolisthesis of C7 on T1. There is loss of disc height and there is degenerative marrow edema in the endplates at the J3-9 level. This is also present at the C6-7 level. No suspicious osseous lesions are present. The facet joints are    normally aligned.       The cervical spinal cord is of normal caliber and signal intensity.  The visualized aspects of the posterior fossa are normal.       On the axial images, at C2-C3, there is a small posterior disc osteophyte complex. There are bilateral facet degenerative changes. There is no spinal canal stenosis. There is mild right foraminal stenosis.       At C3-C4, there is a posterior disc osteophyte complex. There are bilateral facet degenerative changes. There is moderate severity spinal canal stenosis. There is moderate to severe bilateral foraminal stenosis.       At C4-C5, there is a small posterior disc osteophyte complex. There are facet degenerative changes. There is mild spinal canal stenosis. There is no foraminal stenosis.       At C5-C6, there is a small posterior disc osteophyte complex. There is mild spinal canal stenosis.  There Outpatient Medications:     HYDROcodone-acetaminophen (NORCO) 5-325 MG per tablet, Take 1 tablet by mouth every 8 hours as needed for Pain for up to 30 days. , Disp: 90 tablet, Rfl: 0    acetaminophen (APAP EXTRA STRENGTH) 500 MG tablet, Take 2 tablets by mouth every 6 hours as needed for Pain, Disp: 120 tablet, Rfl: 3    gabapentin (NEURONTIN) 400 MG capsule, Take 1 capsule by mouth 2 times daily for 90 days. , Disp: 180 capsule, Rfl: 1    hydroxychloroquine (PLAQUENIL) 200 MG tablet, Take 1 tablet by mouth 2 times daily, Disp: 60 tablet, Rfl: 2    omeprazole (PRILOSEC) 20 MG delayed release capsule, Take 1 capsule by mouth Daily, Disp: 30 capsule, Rfl: 3    loratadine (CLARITIN) 10 MG tablet, Take 1 tablet by mouth daily, Disp: 90 tablet, Rfl: 3    Calcium Carbonate-Vitamin D (OYSTER SHELL CALCIUM/D) 500-200 MG-UNIT TABS, take 1 tablet by mouth once daily, Disp: 90 tablet, Rfl: 3    levothyroxine (SYNTHROID) 112 MCG tablet, take 1 tablet by mouth once daily, Disp: 90 tablet, Rfl: 1    simvastatin (ZOCOR) 40 MG tablet, take 1 tablet by mouth at bedtime, Disp: 90 tablet, Rfl: 3    predniSONE (DELTASONE) 2.5 MG tablet, Take 1.5 tablets by mouth daily, Disp: 45 tablet, Rfl: 3    ferrous sulfate 325 (65 Fe) MG tablet, Take 1 tablet by mouth daily, Disp: 90 tablet, Rfl: 3    azaTHIOprine (IMURAN) 50 MG tablet, Take 1.5 tablets by mouth 2 times daily, Disp: 90 tablet, Rfl: 0    fluticasone (FLONASE) 50 MCG/ACT nasal spray, 2 sprays by Nasal route daily for 10 days, Disp: 1 Bottle, Rfl: 0    Subjective:      Review of Systems   Constitutional: Negative for activity change, appetite change, chills, diaphoresis, fatigue, fever and unexpected weight change. HENT: Positive for dental problem. Negative for congestion, ear pain, hearing loss, mouth sores, nosebleeds, rhinorrhea, sinus pressure and sore throat. Dental infection recently   Eyes: Negative for photophobia, pain and visual disturbance. Respiratory: Negative for cough, chest tightness, shortness of breath and wheezing. Cardiovascular: Negative for chest pain and palpitations. HTN CAD   Gastrointestinal: Negative for abdominal pain, constipation, diarrhea, nausea and vomiting. Endocrine: Negative for cold intolerance, heat intolerance, polydipsia, polyphagia and polyuria. Thyroid issues   Genitourinary: Negative for decreased urine volume, difficulty urinating, frequency and hematuria. CKD   Musculoskeletal: Positive for arthralgias, back pain, myalgias, neck pain and neck stiffness. Negative for gait problem. Skin: Negative for color change and rash. Allergic/Immunologic: Negative for food allergies and immunocompromised state. Neurological: Positive for weakness, numbness and headaches. Negative for dizziness, tremors, seizures, syncope, facial asymmetry, speech difficulty and light-headedness. H/o seizures,no seizures in 16 years. H/O Lupus   Hematological: Does not bruise/bleed easily. Psychiatric/Behavioral: Negative for agitation, behavioral problems, confusion, decreased concentration, dysphoric mood, hallucinations, self-injury, sleep disturbance and suicidal ideas. The patient is not nervous/anxious and is not hyperactive. Objective:     Vitals:    06/23/20 1456   BP: 110/64   Site: Left Upper Arm   Position: Sitting   Cuff Size: Medium Adult   Temp: 97.4 °F (36.3 °C)   Weight: 175 lb (79.4 kg)   Height: 5' 6\" (1.676 m)       Physical Exam  Vitals signs and nursing note reviewed. Constitutional:       General: She is not in acute distress. Appearance: She is well-developed. She is not diaphoretic. HENT:      Head: Normocephalic and atraumatic. Right Ear: External ear normal.      Left Ear: External ear normal.      Nose: Nose normal.      Mouth/Throat:      Pharynx: No oropharyngeal exudate. Eyes:      General: No scleral icterus. Right eye: No discharge.          Left General: Skin is warm. Coloration: Skin is not pale. Findings: No erythema or rash. Neurological:      Mental Status: She is alert and oriented to person, place, and time. She is not disoriented. Cranial Nerves: Cranial nerves are intact. No cranial nerve deficit. Sensory: Sensation is intact. No sensory deficit. Motor: Motor function is intact. No atrophy or abnormal muscle tone. Coordination: Coordination is intact. Coordination normal.      Gait: Gait abnormal.      Deep Tendon Reflexes: Babinski sign absent on the right side. Reflex Scores:       Tricep reflexes are 2+ on the right side and 2+ on the left side. Bicep reflexes are 2+ on the right side and 2+ on the left side. Brachioradialis reflexes are 2+ on the right side and 2+ on the left side. Patellar reflexes are 2+ on the right side and 2+ on the left side. Achilles reflexes are 2+ on the right side and 2+ on the left side. Comments: Motor  4/5 RUE  5/5 BLE   Psychiatric:         Attention and Perception: Attention normal. She is attentive. Mood and Affect: Mood normal. Mood is not anxious or depressed. Affect is not labile, blunt, angry or inappropriate. Speech: Speech normal. She is communicative. Speech is not rapid and pressured, delayed, slurred or tangential.         Behavior: Behavior normal. Behavior is not agitated, slowed, aggressive, withdrawn, hyperactive or combative. Thought Content: Thought content normal. Thought content is not paranoid or delusional. Thought content does not include homicidal or suicidal ideation. Thought content does not include homicidal or suicidal plan. Cognition and Memory: Cognition normal. Memory is not impaired. She does not exhibit impaired recent memory or impaired remote memory. Judgment: Judgment normal. Judgment is not impulsive or inappropriate.        VONDA test: +  [place]ans test: +  [place] test:+ patient'scare.       Electronically signed by Coralyn Scheuermann, APRN - CNP on6/23/2020 at 3:22 PM

## 2020-07-02 ENCOUNTER — TELEPHONE (OUTPATIENT)
Dept: FAMILY MEDICINE CLINIC | Age: 50
End: 2020-07-02

## 2020-07-02 NOTE — TELEPHONE ENCOUNTER
It sounds like she needs evalutated, I do not have any openings today she may want to consider Urgent care thanks

## 2020-07-06 ENCOUNTER — HOSPITAL ENCOUNTER (OUTPATIENT)
Age: 50
Discharge: HOME | End: 2020-07-06
Payer: COMMERCIAL

## 2020-07-06 ENCOUNTER — TELEPHONE (OUTPATIENT)
Dept: PHYSICAL MEDICINE AND REHAB | Age: 50
End: 2020-07-06

## 2020-07-06 PROCEDURE — U0002 COVID-19 LAB TEST NON-CDC: HCPCS

## 2020-07-06 RX ORDER — HYDROCODONE BITARTRATE AND ACETAMINOPHEN 5; 325 MG/1; MG/1
1 TABLET ORAL EVERY 8 HOURS PRN
Qty: 90 TABLET | Refills: 0 | Status: SHIPPED | OUTPATIENT
Start: 2020-07-06 | End: 2020-08-04 | Stop reason: SDUPTHER

## 2020-07-06 NOTE — TELEPHONE ENCOUNTER
Pt LM stating she was at [facility] to get covid 19 swab done for procedure and they will not do it because they do not have an order; pt wanted order sent over. Called pt back and had to leave message. Stated she is to get the covid swab at [address] [place] [address] in order for us to have the results in time of her procedure. Pt returned call as I was typing this message. Spoke with her and explained it cannot be done at [facility] because it is a time sensitive test and needs time to result and expires after 7 days. Pt expressed understanding and said she would head to [facility] express to get test done. Instructions on where to go for swab given to patient and scanned into media tab.

## 2020-07-09 ENCOUNTER — TELEPHONE (OUTPATIENT)
Dept: PHYSICAL MEDICINE AND REHAB | Age: 50
End: 2020-07-09

## 2020-07-09 NOTE — TELEPHONE ENCOUNTER
----- Message from Arora Anastasia sent at 7/9/2020  8:57 AM EDT -----  Regarding: pending  Auth is still pending per call with sean    Pt. Notified. Procedure rescheduled to  7/20/2020 @ 3:15, arrive at 2:15pm. Covid test 7/14-7/15/2020. Advised pt. To call prior to getting test to make sure Insurance has been authorized. Verbalized understanding.

## 2020-07-13 ENCOUNTER — TELEPHONE (OUTPATIENT)
Dept: PHYSICAL MEDICINE AND REHAB | Age: 50
End: 2020-07-13

## 2020-07-13 NOTE — TELEPHONE ENCOUNTER
----- Message from Kimberly Peguero sent at 7/10/2020 12:16 PM EDT -----  Regarding: Auth denied  Auth denied and letter emailed-(scanned into media)    Per Denial pt. Needs Physical Therapy prior to have Epidural Steroid Injections. Procedure and follow up cancelled. Pt. Notified. Procedure and follow up cancelled.  Pt. Prefers [facility] therapy  Please advise how to proceed, Thanks

## 2020-07-23 LAB
PERFORMING LAB: NORMAL
REPORT: NORMAL
SARS-COV-2: NOT DETECTED

## 2020-07-27 RX ORDER — PREDNISONE 10 MG/1
TABLET ORAL
Qty: 30 TABLET | Refills: 0 | Status: SHIPPED | OUTPATIENT
Start: 2020-07-27 | End: 2021-06-16

## 2020-08-03 NOTE — TELEPHONE ENCOUNTER
Bailee Bernard called requesting a refill on the following medications:  Requested Prescriptions     Pending Prescriptions Disp Refills    HYDROcodone-acetaminophen (NORCO) 5-325 MG per tablet 90 tablet 0     Sig: Take 1 tablet by mouth every 8 hours as needed for Pain for up to 30 days. Pharmacy verified:[facility]  . pv      Date of last visit: 06/23/20  Date of next visit (if applicable): Visit date not found

## 2020-08-04 RX ORDER — HYDROCODONE BITARTRATE AND ACETAMINOPHEN 5; 325 MG/1; MG/1
1 TABLET ORAL EVERY 8 HOURS PRN
Qty: 90 TABLET | Refills: 0 | Status: SHIPPED | OUTPATIENT
Start: 2020-08-05 | End: 2020-09-01 | Stop reason: SDUPTHER

## 2020-08-04 NOTE — TELEPHONE ENCOUNTER
OARRS reviewed. UDS: + for  hydrocodone consistent. Narcan offered: yes  Last seen: 6/23/2020.  Follow-up:   Future Appointments   Date Time Provider Justus Julia   8/18/2020 10:20 AM Breanna Caraballo MD [facility] [place] [email or web address]

## 2020-08-12 ENCOUNTER — HOSPITAL ENCOUNTER (OUTPATIENT)
Age: 50
Discharge: HOME | End: 2020-08-12
Payer: COMMERCIAL

## 2020-08-12 DIAGNOSIS — N18.30 CKD (CHRONIC KIDNEY DISEASE), STAGE III (HCC): ICD-10-CM

## 2020-08-12 LAB
ANION GAP SERPL CALCULATED.3IONS-SCNC: 13 MEQ/L (ref 8–16)
BUN BLDV-MCNC: 22 MG/DL (ref 7–22)
CALCIUM SERPL-MCNC: 9.7 MG/DL (ref 8.5–10.5)
CHLORIDE BLD-SCNC: 103 MEQ/L (ref 98–111)
CO2: 23 MEQ/L (ref 23–33)
CREAT SERPL-MCNC: 1.5 MG/DL (ref 0.4–1.2)
GFR SERPL CREATININE-BSD FRML MDRD: 37 ML/MIN/1.73M2
GLUCOSE BLD-MCNC: 104 MG/DL (ref 70–108)
POTASSIUM SERPL-SCNC: 3.7 MEQ/L (ref 3.5–5.2)
SODIUM BLD-SCNC: 139 MEQ/L (ref 135–145)

## 2020-08-12 PROCEDURE — 80048 BASIC METABOLIC PNL TOTAL CA: CPT

## 2020-08-12 PROCEDURE — 84156 ASSAY OF PROTEIN URINE: CPT

## 2020-08-12 PROCEDURE — 82306 VITAMIN D 25 HYDROXY: CPT

## 2020-08-12 PROCEDURE — 36415 COLL VENOUS BLD VENIPUNCTURE: CPT

## 2020-08-12 PROCEDURE — 83970 ASSAY OF PARATHORMONE: CPT

## 2020-08-12 PROCEDURE — 82570 ASSAY OF URINE CREATININE: CPT

## 2020-08-13 LAB
CREATININE URINE: 268.9 MG/DL
PROT/CREAT RATIO, UR: 1.67
PROTEIN, URINE: 448.1 MG/DL
PTH INTACT: 58 PG/ML (ref 15–65)
VITAMIN D 25-HYDROXY: 15 NG/ML (ref 30–100)

## 2020-08-17 ENCOUNTER — TELEMEDICINE (OUTPATIENT)
Dept: FAMILY MEDICINE CLINIC | Age: 50
End: 2020-08-17
Payer: COMMERCIAL

## 2020-08-17 PROCEDURE — G8428 CUR MEDS NOT DOCUMENT: HCPCS | Performed by: NURSE PRACTITIONER

## 2020-08-17 PROCEDURE — 99213 OFFICE O/P EST LOW 20 MIN: CPT | Performed by: NURSE PRACTITIONER

## 2020-08-17 RX ORDER — AMOXICILLIN 500 MG/1
500 CAPSULE ORAL 3 TIMES DAILY
Qty: 30 CAPSULE | Refills: 0 | Status: SHIPPED | OUTPATIENT
Start: 2020-08-17 | End: 2020-08-27

## 2020-08-17 RX ORDER — BENZONATATE 100 MG/1
100-200 CAPSULE ORAL 3 TIMES DAILY PRN
Qty: 60 CAPSULE | Refills: 1 | Status: SHIPPED | OUTPATIENT
Start: 2020-08-17 | End: 2020-08-24

## 2020-08-17 RX ORDER — MOMETASONE FUROATE 50 UG/1
2 SPRAY, METERED NASAL DAILY
Qty: 1 INHALER | Refills: 3 | Status: SHIPPED | OUTPATIENT
Start: 2020-08-17 | End: 2021-07-22

## 2020-08-17 ASSESSMENT — ENCOUNTER SYMPTOMS
RHINORRHEA: 1
TROUBLE SWALLOWING: 0
SINUS PRESSURE: 1
SORE THROAT: 1
RESPIRATORY NEGATIVE: 1
SINUS PAIN: 1

## 2020-08-17 NOTE — PROGRESS NOTES
2020    TELEHEALTH EVALUATION -- Audio/Visual (During HP[facility]-34 public health emergency)    HPI:  Visit conducted with pt at home and provider Coco Iniguez CNP in office. [address] [place] [address] (:  1970) has requested an audio/video evaluation for the following concern(s):    URI Symptoms    HPI:      Symptoms have been present for 1 week(s). Symptoms are unchanged since they initially started. Fever? No  Runny nose or congestion? Yes   Cough? Yes  Sore throat? Yes - from coughing  Headache, fatigue, joint pains, muscle aches? She has a headaCHE BUT NO MUSCLE OR JOINT ACHES OR FATIGUE  Shortness of breath/Wheezing? No  Nausea/Vomiting/Diarrhea? No  Double Sickening? No  Sick contacts? No  , no known covid exposures, no travel   Patient has tried tessalon perles with some improvement. Review of Systems   HENT: Positive for congestion (has been suing flonase, but not working, at night when she lays down her nose gets congested again), rhinorrhea, sinus pressure, sinus pain and sore throat. Negative for sneezing and trouble swallowing. Respiratory: Negative. Cardiovascular: Negative. Musculoskeletal: Negative for arthralgias and myalgias. Neurological: Positive for headaches. Negative for dizziness. Prior to Visit Medications    Medication Sig Taking? Authorizing Provider   amoxicillin (AMOXIL) 500 MG capsule Take 1 capsule by mouth 3 times daily for 10 days Yes Stan Homans, APRN - CNP   mometasone (NASONEX) 50 MCG/ACT nasal spray 2 sprays by Nasal route daily Yes Stan Homans, APRN - CNP   benzonatate (TESSALON) 100 MG capsule Take 1-2 capsules by mouth 3 times daily as needed for Cough Yes Stan Homans, APRN - CNP   HYDROcodone-acetaminophen (NORCO) 5-325 MG per tablet Take 1 tablet by mouth every 8 hours as needed for Pain for up to 30 days.   MACKENZIE Mayer CNP   acetaminophen (APAP EXTRA STRENGTH) 500 MG tablet Take 2 tablets by mouth every 6 hours as needed for Pain  MACKENZIE Núñez CNP   azaTHIOprine (IMURAN) 50 MG tablet Take 1.5 tablets by mouth 2 times daily  MACKENZIE Major CNP   gabapentin (NEURONTIN) 400 MG capsule Take 1 capsule by mouth 2 times daily for 90 days. MACKENZIE Major CNP   hydroxychloroquine (PLAQUENIL) 200 MG tablet Take 1 tablet by mouth 2 times daily  MACKENZIE Major CNP   omeprazole (PRILOSEC) 20 MG delayed release capsule Take 1 capsule by mouth Daily  MACKENZIE Major CNP   loratadine (CLARITIN) 10 MG tablet Take 1 tablet by mouth daily  MACKENZIE Núñez CNP   Calcium Carbonate-Vitamin D (OYSTER SHELL CALCIUM/D) 500-200 MG-UNIT TABS take 1 tablet by mouth once daily  MACKENZIE Miller CNP   levothyroxine (SYNTHROID) 112 MCG tablet take 1 tablet by mouth once daily  MACKENZIE Miller CNP   simvastatin (ZOCOR) 40 MG tablet take 1 tablet by mouth at bedtime  MACKENZIE Núñez CNP   predniSONE (DELTASONE) 2.5 MG tablet Take 1.5 tablets by mouth daily  Jareth Rodriguez DO   fluticasone (FLONASE) 50 MCG/ACT nasal spray 2 sprays by Nasal route daily for 10 days  MACKENZIE Huffman CNP   ferrous sulfate 325 (65 Fe) MG tablet Take 1 tablet by mouth daily  MACKENZIE Núñez CNP       Social History     Tobacco Use    Smoking status: Former Smoker     Types: Cigarettes    Smokeless tobacco: Never Used    Tobacco comment: 1 a day   Substance Use Topics    Alcohol use:  Yes     Alcohol/week: 0.0 standard drinks     Comment: occasionally, couple times a year    Drug use: No        Allergies   Allergen Reactions    Codeine Hives and Nausea And Vomiting   ,   Past Medical History:   Diagnosis Date    Anemia     CKD (chronic kidney disease), stage III (HCC)     Dyslipidemia     Hematuria     Hyperlipidemia     Hypertension     Hyperuricemia     Hypothyroidism     Kidney disease     as a result from lupus    [facility] [address]     Lupus nephritis (HCC)     Proteinuria 09/13/2015, 12/27/2017, 09/12/2018, 10/22/2019    Pneumococcal Polysaccharide ([id number]) 12/27/2017    Tdap (Boostrix, Adacel) 07/08/2013   ,   Health Maintenance   Topic Date Due    Diabetes screen  09/30/2012    Flu vaccine (1) 09/01/2020    Lipid screen  10/22/2020    TSH testing  12/10/2020    Potassium monitoring  08/12/2021    Creatinine monitoring  08/12/2021    Cervical cancer screen  04/29/2022    DTaP/Tdap/Td vaccine (2 - Td) 07/08/2023    HIV screen  Addressed    Hepatitis A vaccine  Aged Out    Hepatitis B vaccine  Aged Out    Hib vaccine  Aged Out    Meningococcal (ACWY) vaccine  Aged Out    Pneumococcal 0-64 years Vaccine  Aged Out       PHYSICAL EXAMINATION:  [ INSTRUCTIONS:  \"[x]\" Indicates a positive item  \"[]\" Indicates a negative item  -- DELETE ALL ITEMS NOT EXAMINED]  Vital Signs: (As obtained by patient/caregiver or practitioner observation)    Blood pressure-  Heart rate-    Respiratory rate-    Temperature-  Pulse oximetry-     Constitutional: [x] Appears well-developed and well-nourished [x] No apparent distress      [] [x] Alert and awake  []   Eyes:  EOM    []  Normal  [] Abnormal-  Sclera  [x]  Normal  [] Abnormal -         Discharge [x]  None visible  [] Abnormal -    HENT:   [] Normocephalic, atraumatic. [x] Abnormal, nasal congestion noted, pt palpated frontal sinus and had pain with this    [] Mouth/Throat: Mucous membranes are moist.     External Ears [x] Normal  [] Abnormal-     Neck: [x] No visualized mass     Pulmonary/Chest: [x] Respiratory effort normal.  [x] No visualized signs of difficulty breathing or respiratory distress        []              Psychiatric:       [] Normal Affect [] No Hallucinations        [] Abnormal-     Other pertinent observable physical exam findings-     ASSESSMENT/PLAN:  1. Cough    - benzonatate (TESSALON) 100 MG capsule; Take 1-2 capsules by mouth 3 times daily as needed for Cough  Dispense: 60 capsule; Refill: 1    2.  Nasal congestion  Stop flonase and change over to nasonex  - benzonatate (TESSALON) 100 MG capsule; Take 1-2 capsules by mouth 3 times daily as needed for Cough  Dispense: 60 capsule; Refill: 1  3. Sinusitis  Amoxicillin ordered  Increase water intake, may take tylenol or motrin if needed  Call if condition worsens. Pt in agreement with plan. Return if symptoms worsen or fail to improve. Michael Morales is a 54 y.o. female being evaluated by a Virtual Visit (video visit) encounter to address concerns as mentioned above. A caregiver was present when appropriate. Due to this being a TeleHealth encounter (During [facility] public health emergency), evaluation of the following organ systems was limited: Vitals/Constitutional/EENT/Resp/CV/GI//MS/Neuro/Skin/Heme-Lymph-Imm. Pursuant to the emergency declaration under the [address] [place] [address], [address] [place] [address] authority and the [employer] and [employer] Act, this Virtual Visit was conducted with patient's (and/or legal guardian's) consent, to reduce the patient's risk of exposure to COVID-19 and provide necessary medical care. The patient (and/or legal guardian) has also been advised to contact this office for worsening conditions or problems, and seek emergency medical treatment and/or call 911 if deemed necessary. Patient identification was verified at the start of the visit: Yes    Total time spent on this encounter: Not billed by time    Services were provided through a video synchronous discussion virtually to substitute for in-person clinic visit. Patient and provider were located at their individual homes. --MACKENZIE Ballard CNP on 8/17/2020 at 12:29 PM    An electronic signature was used to authenticate this note.

## 2020-08-18 ENCOUNTER — OFFICE VISIT (OUTPATIENT)
Dept: NEPHROLOGY | Age: 50
End: 2020-08-18
Payer: COMMERCIAL

## 2020-08-18 VITALS
DIASTOLIC BLOOD PRESSURE: 79 MMHG | BODY MASS INDEX: 24.86 KG/M2 | OXYGEN SATURATION: 98 % | WEIGHT: 154 LBS | SYSTOLIC BLOOD PRESSURE: 114 MMHG | HEART RATE: 73 BPM

## 2020-08-18 PROCEDURE — G8420 CALC BMI NORM PARAMETERS: HCPCS | Performed by: INTERNAL MEDICINE

## 2020-08-18 PROCEDURE — 1036F TOBACCO NON-USER: CPT | Performed by: INTERNAL MEDICINE

## 2020-08-18 PROCEDURE — G8427 DOCREV CUR MEDS BY ELIG CLIN: HCPCS | Performed by: INTERNAL MEDICINE

## 2020-08-18 PROCEDURE — 99213 OFFICE O/P EST LOW 20 MIN: CPT | Performed by: INTERNAL MEDICINE

## 2020-08-18 NOTE — PROGRESS NOTES
daily for 10 days 30 capsule 0    benzonatate (TESSALON) 100 MG capsule Take 1-2 capsules by mouth 3 times daily as needed for Cough 60 capsule 1    HYDROcodone-acetaminophen (NORCO) 5-325 MG per tablet Take 1 tablet by mouth every 8 hours as needed for Pain for up to 30 days. 90 tablet 0    acetaminophen (APAP EXTRA STRENGTH) 500 MG tablet Take 2 tablets by mouth every 6 hours as needed for Pain 120 tablet 3    azaTHIOprine (IMURAN) 50 MG tablet Take 1.5 tablets by mouth 2 times daily 90 tablet 0    gabapentin (NEURONTIN) 400 MG capsule Take 1 capsule by mouth 2 times daily for 90 days. 180 capsule 1    hydroxychloroquine (PLAQUENIL) 200 MG tablet Take 1 tablet by mouth 2 times daily 60 tablet 2    omeprazole (PRILOSEC) 20 MG delayed release capsule Take 1 capsule by mouth Daily 30 capsule 3    loratadine (CLARITIN) 10 MG tablet Take 1 tablet by mouth daily 90 tablet 3    Calcium Carbonate-Vitamin D (OYSTER SHELL CALCIUM/D) 500-200 MG-UNIT TABS take 1 tablet by mouth once daily 90 tablet 3    levothyroxine (SYNTHROID) 112 MCG tablet take 1 tablet by mouth once daily 90 tablet 1    simvastatin (ZOCOR) 40 MG tablet take 1 tablet by mouth at bedtime 90 tablet 3    predniSONE (DELTASONE) 2.5 MG tablet Take 1.5 tablets by mouth daily 45 tablet 3    fluticasone (FLONASE) 50 MCG/ACT nasal spray 2 sprays by Nasal route daily for 10 days 1 Bottle 0    ferrous sulfate 325 (65 Fe) MG tablet Take 1 tablet by mouth daily 90 tablet 3    mometasone (NASONEX) 50 MCG/ACT nasal spray 2 sprays by Nasal route daily (Patient not taking: Reported on 8/18/2020) 1 Inhaler 3     No current facility-administered medications for this visit.         Lab Results:    CBC:   Lab Results   Component Value Date    WBC 4.3 (L) 03/24/2020    HGB 11.6 (L) 03/24/2020    HCT 34.9 (L) 03/24/2020    MCV 87.6 03/24/2020     03/24/2020     BMP:    Lab Results   Component Value Date     08/12/2020     03/24/2020     02/11/2020    K 3.7 08/12/2020    K 3.5 (L) 03/24/2020    K 3.9 02/11/2020     08/12/2020     03/24/2020     02/11/2020    CO2 23 08/12/2020    CO2 27 03/24/2020    CO2 25 02/11/2020    BUN 22 08/12/2020    BUN 18 03/24/2020    BUN 14 02/11/2020    CREATININE 1.5 (H) 08/12/2020    CREATININE 1.71 (H) 03/24/2020    CREATININE 1.7 (H) 02/11/2020    GLUCOSE 104 08/12/2020    GLUCOSE 111 (H) 03/24/2020    GLUCOSE 89 02/11/2020      Hepatic:   Lab Results   Component Value Date    AST 32 03/24/2020    AST 14 (L) 12/17/2019    AST 13 (L) 10/09/2019    ALT 18 03/24/2020    ALT 9 (L) 12/17/2019    ALT 8 (L) 10/09/2019    BILITOT 0.3 03/24/2020    BILITOT 0.6 12/17/2019    BILITOT 0.3 10/09/2019    ALKPHOS 50 03/24/2020    ALKPHOS 67 12/17/2019    ALKPHOS 70 10/09/2019     BNP: No results found for: BNP  Lipids:   Lab Results   Component Value Date    CHOL 163 10/22/2019    HDL 36 10/22/2019     INR: No results found for: INR  URINE:   Lab Results   Component Value Date    PROTUR 448.1 08/12/2020     Lab Results   Component Value Date    NITRU Negative 12/17/2019    COLORU Yellow 12/17/2019    PHUR 5.0 12/10/2019    LABCAST NONE SEEN 12/10/2019    LABCAST NONE SEEN 12/10/2019    WBCUA 0-5 12/17/2019    RBCUA 0-2 12/17/2019    MUCUS Present 12/17/2019    YEAST NONE SEEN 12/10/2019    BACTERIA NONE SEEN 12/10/2019    CLARITYU slightly cloudy 08/30/2017    SPECGRAV 1.020 12/10/2019    LEUKOCYTESUR Trace 12/17/2019    LEUKOCYTESUR NEGATIVE 12/10/2019    LEUKOCYTESUR TRACE 06/17/2019    UROBILINOGEN <2.0 E.U./dL 12/17/2019    BILIRUBINUR Negative 12/17/2019    BILIRUBINUR small 08/30/2017    BILIRUBINUR Negative 10/18/2016    BLOODU NEGATIVE 12/10/2019    GLUCOSEU Negative 12/17/2019    KETUA Negative 12/17/2019      Microalbumen/Creatinine ratio:  No components found for: RUCREAT    Objective:   Vitals: /79 (Site: Right Upper Arm, Position: Sitting, Cuff Size: Medium Adult)   Pulse 73   Wt 154 lb (69.9 kg)   SpO2 98%   BMI 24.86 kg/m²      Constitutional:  Alert, awake, no apparent distress  Skin:normal with no rash or any lesions  HEENT:Pupils are reactive . Throat is clear. Oral mucosa is moist.  Neck:supple with no thyromegaly or bruit   Cardiovascular:  S1, S2 without murmur   Respiratory:  Clear to auscultation with no wheezes or rales  Abdomen: +bowel sound, soft, non tender and no bruit  Ext: No LE edema  Musculoskeletal:Intact  Neuro:Alert, awake and oriented with no obvious focal deficit.   Speech is normal.    Electronically signed by Olga Coronel MD on 8/18/2020 at 10:47 AM

## 2020-08-31 ENCOUNTER — TELEPHONE (OUTPATIENT)
Dept: RHEUMATOLOGY | Age: 50
End: 2020-08-31

## 2020-08-31 ENCOUNTER — TELEPHONE (OUTPATIENT)
Dept: FAMILY MEDICINE CLINIC | Age: 50
End: 2020-08-31

## 2020-08-31 NOTE — TELEPHONE ENCOUNTER
Received call- unable to understand who from. Stated that they need medical records for patient from Feb 2020 as she has a hearing on 09/03/20. Called phone number provided and left detailed VM.

## 2020-09-01 ENCOUNTER — PATIENT MESSAGE (OUTPATIENT)
Dept: PHYSICAL MEDICINE AND REHAB | Age: 50
End: 2020-09-01

## 2020-09-01 ENCOUNTER — OFFICE VISIT (OUTPATIENT)
Dept: FAMILY MEDICINE CLINIC | Age: 50
End: 2020-09-01
Payer: COMMERCIAL

## 2020-09-01 VITALS
WEIGHT: 153.4 LBS | OXYGEN SATURATION: 97 % | DIASTOLIC BLOOD PRESSURE: 76 MMHG | HEART RATE: 71 BPM | TEMPERATURE: 98.4 F | SYSTOLIC BLOOD PRESSURE: 118 MMHG | HEIGHT: 66 IN | BODY MASS INDEX: 24.65 KG/M2 | RESPIRATION RATE: 10 BRPM

## 2020-09-01 LAB — HBA1C MFR BLD: 5.9 % (ref 4.3–5.7)

## 2020-09-01 PROCEDURE — G8427 DOCREV CUR MEDS BY ELIG CLIN: HCPCS | Performed by: NURSE PRACTITIONER

## 2020-09-01 PROCEDURE — 2022F DILAT RTA XM EVC RTNOPTHY: CPT | Performed by: NURSE PRACTITIONER

## 2020-09-01 PROCEDURE — 1036F TOBACCO NON-USER: CPT | Performed by: NURSE PRACTITIONER

## 2020-09-01 PROCEDURE — 99213 OFFICE O/P EST LOW 20 MIN: CPT | Performed by: NURSE PRACTITIONER

## 2020-09-01 PROCEDURE — G8420 CALC BMI NORM PARAMETERS: HCPCS | Performed by: NURSE PRACTITIONER

## 2020-09-01 PROCEDURE — 3044F HG A1C LEVEL LT 7.0%: CPT | Performed by: NURSE PRACTITIONER

## 2020-09-01 RX ORDER — HYDROCODONE BITARTRATE AND ACETAMINOPHEN 5; 325 MG/1; MG/1
1 TABLET ORAL EVERY 8 HOURS PRN
Qty: 90 TABLET | Refills: 0 | Status: SHIPPED | OUTPATIENT
Start: 2020-09-03 | End: 2020-09-02 | Stop reason: SDUPTHER

## 2020-09-01 ASSESSMENT — PATIENT HEALTH QUESTIONNAIRE - PHQ9
1. LITTLE INTEREST OR PLEASURE IN DOING THINGS: 0
SUM OF ALL RESPONSES TO PHQ9 QUESTIONS 1 & 2: 0
SUM OF ALL RESPONSES TO PHQ QUESTIONS 1-9: 0
SUM OF ALL RESPONSES TO PHQ QUESTIONS 1-9: 0
2. FEELING DOWN, DEPRESSED OR HOPELESS: 0

## 2020-09-01 ASSESSMENT — ENCOUNTER SYMPTOMS
COLOR CHANGE: 1
RESPIRATORY NEGATIVE: 1

## 2020-09-01 NOTE — TELEPHONE ENCOUNTER
OARRS reviewed. UDS: + for  Norco consistent. Narcan offered: Offered  Last seen: 6/23/2020.  Follow-up: no follow up
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
## 2020-09-01 NOTE — TELEPHONE ENCOUNTER
From: Annie Barraza  To:  MACKENZIE Solano - CNP  Sent: 9/1/2020 2:25 AM EDT  Subject: Prescription Question    Can u order me some of my Vicodin 5-325 please and thanks at [address] [facility] on [address] [facility]

## 2020-09-02 NOTE — TELEPHONE ENCOUNTER
Juan Shannon is calling she wants her Norco called into [facility], it was called into to [facility] yesterday, she wants the office to call it to Leia Hooper, she didn't want to do it.

## 2020-09-03 ENCOUNTER — TELEPHONE (OUTPATIENT)
Dept: FAMILY MEDICINE CLINIC | Age: 50
End: 2020-09-03

## 2020-09-03 RX ORDER — HYDROCODONE BITARTRATE AND ACETAMINOPHEN 5; 325 MG/1; MG/1
1 TABLET ORAL EVERY 8 HOURS PRN
Qty: 90 TABLET | Refills: 0 | Status: SHIPPED | OUTPATIENT
Start: 2020-09-03 | End: 2020-10-02 | Stop reason: SDUPTHER

## 2020-09-03 RX ORDER — FLUNISOLIDE 0.25 MG/ML
2 SOLUTION NASAL EVERY 12 HOURS
Qty: 25 ML | Refills: 2 | Status: SHIPPED | OUTPATIENT
Start: 2020-09-03 | End: 2020-11-23

## 2020-09-03 NOTE — TELEPHONE ENCOUNTER
Insurance is aware of the failure Fluticasone and they have noted this, however they do want pt to trial the 2nd alternative : Flunisolide nasal spray

## 2020-09-03 NOTE — TELEPHONE ENCOUNTER
It is documented in my appointment notes for the day that she did fail fluticasone. Please resubmit with the appropriate information.

## 2020-09-03 NOTE — TELEPHONE ENCOUNTER
BILL KEANE NASONEX [employer] 09/03/20.    Pt must try alternatives : flunisolide nasal spray,     ** trial of fluticasone noted by plan **     See attached for details

## 2020-09-10 ENCOUNTER — OFFICE VISIT (OUTPATIENT)
Dept: FAMILY MEDICINE CLINIC | Age: 50
End: 2020-09-10
Payer: COMMERCIAL

## 2020-09-10 VITALS
DIASTOLIC BLOOD PRESSURE: 82 MMHG | BODY MASS INDEX: 26.33 KG/M2 | WEIGHT: 158 LBS | HEIGHT: 65 IN | OXYGEN SATURATION: 98 % | HEART RATE: 77 BPM | TEMPERATURE: 97.9 F | RESPIRATION RATE: 16 BRPM | SYSTOLIC BLOOD PRESSURE: 124 MMHG

## 2020-09-10 PROCEDURE — 1036F TOBACCO NON-USER: CPT | Performed by: NURSE PRACTITIONER

## 2020-09-10 PROCEDURE — 99214 OFFICE O/P EST MOD 30 MIN: CPT | Performed by: NURSE PRACTITIONER

## 2020-09-10 PROCEDURE — G8417 CALC BMI ABV UP PARAM F/U: HCPCS | Performed by: NURSE PRACTITIONER

## 2020-09-10 PROCEDURE — G8428 CUR MEDS NOT DOCUMENT: HCPCS | Performed by: NURSE PRACTITIONER

## 2020-09-10 RX ORDER — BACITRACIN, NEOMYCIN, POLYMYXIN B 400; 3.5; 5 [USP'U]/G; MG/G; [USP'U]/G
OINTMENT TOPICAL
Qty: 1 TUBE | Refills: 3 | Status: SHIPPED | OUTPATIENT
Start: 2020-09-10 | End: 2020-09-20

## 2020-09-10 NOTE — PATIENT INSTRUCTIONS
Needs referral to wound clinic asap      Patient Education        Cardona: Care Instructions  Your Care Instructions     Cardona--even minor ones--can be very painful. A minor burn may heal within several days, while a more serious burn may take weeks or even months to heal completely. You may notice that the burned area feels tight and hard while it is healing. It is important to continue to move the area as the burn heals to prevent loss of motion or loss of function in the area. When your skin is damaged by a burn, you have a greater risk of infection. Keep the wound clean and change the bandages regularly to prevent infection and help the burn heal.  Burns can leave permanent scars. Taking good care of the burn as it heals may help prevent bad scars. The doctor has checked you carefully, but problems can develop later. If you notice any problems or new symptoms, get medical treatment right away. Follow-up care is a key part of your treatment and safety. Be sure to make and go to all appointments, and call your doctor if you are having problems. It's also a good idea to know your test results and keep a list of the medicines you take. How can you care for yourself at home? · If your doctor told you how to care for your burn, follow your doctor's instructions. If you did not get instructions, follow this general advice:  ? Wash the burn with clean water 2 times a day. Don't use hydrogen peroxide or alcohol, which can slow healing. ? Gently pat the burn dry after you wash it.  ? You may cover the burn with a thin layer of petroleum jelly, such as Vaseline, and a nonstick bandage. ? Apply more petroleum jelly and replace the bandage as needed. · Protect your burn while it is healing. Cover your burn if you are going out in the cold or the sun. ? Wear long sleeves if the burn is on your hands or arms. ? Wear a hat if the burn is on your face. ? Wear socks and shoes if the burn is on your feet.   · Do not break blisters open. This increases the chance of infection. If a blister breaks open by itself, blot up the liquid, and leave the skin that covered the blister. This helps protect the new skin. · If your doctor prescribed antibiotics, take them as directed. Do not stop taking them just because you feel better. You need to take the full course of antibiotics. For pain and itching  · Take pain medicines exactly as directed. ? If the doctor gave you a prescription medicine for pain, take it as prescribed. ? If you are not taking a prescription pain medicine, ask your doctor if you can take an over-the-counter medicine. · If the burn itches, try not to scratch it. Try an over-the-counter antihistamine such as diphenhydramine (Benadryl) or loratadine (Claritin). Read and follow all instructions on the label. When should you call for help? Call your doctor now or seek immediate medical care if:  · Your pain gets worse. · You have symptoms of infection, such as:  ? Increased pain, swelling, warmth, or redness near the burn. ? Red streaks leading from the burn. ? Pus draining from the burn. ? A fever. Watch closely for changes in your health, and be sure to contact your doctor if:  · You do not get better as expected. Where can you learn more? Go to [email or web address]://[email or web address].it. org and sign in to your [employer] account. Enter P610 in the [facility] box to learn more about \"Burns: Care Instructions. \"     If you do not have an account, please click on the \"Sign Up Now\" link. Current as of: June 26, 2019               Content Version: 12.5  © 8882-9473 Healthwise, Incorporated. Care instructions adapted under license by [facility] ([facility]). If you have questions about a medical condition or this instruction, always ask your healthcare professional. [address] any warranty or liability for your use of this information.
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
## 2020-09-16 ENCOUNTER — TELEPHONE (OUTPATIENT)
Dept: FAMILY MEDICINE CLINIC | Age: 50
End: 2020-09-16

## 2020-09-16 ENCOUNTER — TELEPHONE (OUTPATIENT)
Dept: WOUND CARE | Age: 50
End: 2020-09-16

## 2020-09-16 NOTE — TELEPHONE ENCOUNTER
Justus Rodriguez @ [facility] wound clinic wanted to let you know they have made 2 attempts to reach pt and have been unsuccessful.

## 2020-09-16 NOTE — LETTER
[address] [place] [address]. [facility] [address]  Phone: [phone]  Fax: [phone]    Maximus Ross LPN        September 17, 2020    [address] [place] [address]  [address] [place] [address] [address] [address]      Dear Ayaka Segovia: We have made several attempts to contact you by phone and have   been unsuccessful. Please call our office at your earliest convenience  At [phone] opt 2. The wound clinic at [address] [facility] [place] is also trying to contact you.  # [phone]    Thank you. If you have any questions or concerns, please don't hesitate to call.     Sincerely,        Maximus Ross LPN

## 2020-09-17 NOTE — TELEPHONE ENCOUNTER
Please send a letter to the pt asking her to call the wound clinic to schedule her appt. Include the wound clinic phone number in the letter. I did try the contact phone numbers we have listed for the pt and none of them worked so her phone may be out.

## 2020-09-17 NOTE — TELEPHONE ENCOUNTER
My chart msg sent  We need to verify pt's phone number  We have been unable to reach this patient by phone a letter is being sent.

## 2020-09-17 NOTE — TELEPHONE ENCOUNTER
Pt returned our call her number is a working number Pt was given the number for the wound clinic. She will give them a call.   [phone]

## 2020-09-18 ENCOUNTER — HOSPITAL ENCOUNTER (OUTPATIENT)
Dept: WOUND CARE | Age: 50
Discharge: HOME | End: 2020-09-18
Payer: COMMERCIAL

## 2020-09-18 VITALS
TEMPERATURE: 97.3 F | DIASTOLIC BLOOD PRESSURE: 79 MMHG | HEART RATE: 73 BPM | OXYGEN SATURATION: 97 % | RESPIRATION RATE: 16 BRPM | SYSTOLIC BLOOD PRESSURE: 123 MMHG

## 2020-09-18 PROBLEM — T24.112A: Status: ACTIVE | Noted: 2020-09-18

## 2020-09-18 PROBLEM — T24.212A: Status: ACTIVE | Noted: 2020-09-18

## 2020-09-18 PROCEDURE — 6370000000 HC RX 637 (ALT 250 FOR IP): Performed by: NURSE PRACTITIONER

## 2020-09-18 PROCEDURE — 99212 OFFICE O/P EST SF 10 MIN: CPT

## 2020-09-18 PROCEDURE — 99213 OFFICE O/P EST LOW 20 MIN: CPT | Performed by: NURSE PRACTITIONER

## 2020-09-18 RX ORDER — GINSENG 100 MG
CAPSULE ORAL ONCE
Status: COMPLETED | OUTPATIENT
Start: 2020-09-18 | End: 2020-09-18

## 2020-09-18 RX ADMIN — BACITRACIN: 500 OINTMENT TOPICAL at 14:41

## 2020-09-18 ASSESSMENT — PAIN SCALES - GENERAL: PAINLEVEL_OUTOF10: 0
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
## 2020-09-18 NOTE — PLAN OF CARE
Problem: Wound:  Goal: Will show signs of wound healing; wound closure and no evidence of infection  Description: Will show signs of wound healing; wound closure and no evidence of infection  Outcome: Ongoing     Patient presents to wound clinic for left thigh wound. No s/s of infection noted. See AVS for discharge instructions. Follow up visit:   2 Weeks on Friday October 2nd at 1:30 pm     Care plan reviewed with patient. Patient verbalize understanding of the plan of care and contribute to goal setting.
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
## 2020-09-23 ENCOUNTER — TELEPHONE (OUTPATIENT)
Dept: WOUND CARE | Age: 50
End: 2020-09-23

## 2020-09-23 NOTE — TELEPHONE ENCOUNTER
----- Message from Dora Grove RN sent at 9/23/2020  9:21 AM EDT -----  Regarding: call  Unable to understand voicemail please call pt.  At [phone]

## 2020-10-01 ENCOUNTER — PATIENT MESSAGE (OUTPATIENT)
Dept: PHYSICAL MEDICINE AND REHAB | Age: 50
End: 2020-10-01

## 2020-10-01 NOTE — TELEPHONE ENCOUNTER
From: Ariadne Cr  To:  MACKENZIE Cintron - CNP  Sent: 10/1/2020 6:24 AM EDT  Subject: Non-Urgent Medical Question    Hey I need to see if u can order me so more of my Vicodin 5-325 and have them sent to [address]

## 2020-10-02 RX ORDER — HYDROCODONE BITARTRATE AND ACETAMINOPHEN 5; 325 MG/1; MG/1
1 TABLET ORAL EVERY 8 HOURS PRN
Qty: 42 TABLET | Refills: 0 | Status: SHIPPED | OUTPATIENT
Start: 2020-10-02 | End: 2020-10-16

## 2020-10-05 ENCOUNTER — NURSE ONLY (OUTPATIENT)
Dept: LAB | Age: 50
End: 2020-10-05

## 2020-10-05 ENCOUNTER — OFFICE VISIT (OUTPATIENT)
Dept: RHEUMATOLOGY | Age: 50
End: 2020-10-05
Payer: COMMERCIAL

## 2020-10-05 ENCOUNTER — TELEPHONE (OUTPATIENT)
Dept: FAMILY MEDICINE CLINIC | Age: 50
End: 2020-10-05

## 2020-10-05 VITALS
SYSTOLIC BLOOD PRESSURE: 108 MMHG | HEIGHT: 65 IN | DIASTOLIC BLOOD PRESSURE: 80 MMHG | OXYGEN SATURATION: 97 % | BODY MASS INDEX: 26.16 KG/M2 | HEART RATE: 75 BPM | WEIGHT: 157 LBS

## 2020-10-05 LAB
ALBUMIN SERPL-MCNC: 4.3 G/DL (ref 3.5–5.1)
ALP BLD-CCNC: 62 U/L (ref 38–126)
ALT SERPL-CCNC: 45 U/L (ref 11–66)
ANION GAP SERPL CALCULATED.3IONS-SCNC: 12 MEQ/L (ref 8–16)
AST SERPL-CCNC: 44 U/L (ref 5–40)
BACTERIA: ABNORMAL
BASOPHILS # BLD: 0.8 %
BASOPHILS ABSOLUTE: 0 THOU/MM3 (ref 0–0.1)
BILIRUB SERPL-MCNC: 0.3 MG/DL (ref 0.3–1.2)
BILIRUBIN URINE: NEGATIVE
BLOOD, URINE: NEGATIVE
BUN BLDV-MCNC: 23 MG/DL (ref 7–22)
C-REACTIVE PROTEIN: 0.09 MG/DL (ref 0–1)
CALCIUM SERPL-MCNC: 10 MG/DL (ref 8.5–10.5)
CASTS: ABNORMAL /LPF
CASTS: ABNORMAL /LPF
CHARACTER, URINE: CLEAR
CHLORIDE BLD-SCNC: 104 MEQ/L (ref 98–111)
CO2: 27 MEQ/L (ref 23–33)
COLOR: YELLOW
CREAT SERPL-MCNC: 1.4 MG/DL (ref 0.4–1.2)
CREATININE URINE: 188.4 MG/DL
CRYSTALS: ABNORMAL
EOSINOPHIL # BLD: 3.1 %
EOSINOPHILS ABSOLUTE: 0.2 THOU/MM3 (ref 0–0.4)
EPITHELIAL CELLS, UA: ABNORMAL /HPF
ERYTHROCYTE [DISTWIDTH] IN BLOOD BY AUTOMATED COUNT: 16.3 % (ref 11.5–14.5)
ERYTHROCYTE [DISTWIDTH] IN BLOOD BY AUTOMATED COUNT: 58.5 FL (ref 35–45)
GFR SERPL CREATININE-BSD FRML MDRD: 40 ML/MIN/1.73M2
GLUCOSE BLD-MCNC: 89 MG/DL (ref 70–108)
GLUCOSE, URINE: NEGATIVE MG/DL
HCT VFR BLD CALC: 33 % (ref 37–47)
HEMOGLOBIN: 10.1 GM/DL (ref 12–16)
IMMATURE GRANS (ABS): 0.02 THOU/MM3 (ref 0–0.07)
IMMATURE GRANULOCYTES: 0.4 %
KETONES, URINE: NEGATIVE
LEUKOCYTE EST, POC: NEGATIVE
LYMPHOCYTES # BLD: 25.5 %
LYMPHOCYTES ABSOLUTE: 1.2 THOU/MM3 (ref 1–4.8)
MCH RBC QN AUTO: 30 PG (ref 26–33)
MCHC RBC AUTO-ENTMCNC: 30.6 GM/DL (ref 32.2–35.5)
MCV RBC AUTO: 97.9 FL (ref 81–99)
MISCELLANEOUS LAB TEST RESULT: ABNORMAL
MONOCYTES # BLD: 5.9 %
MONOCYTES ABSOLUTE: 0.3 THOU/MM3 (ref 0.4–1.3)
NITRITE, URINE: NEGATIVE
NUCLEATED RED BLOOD CELLS: 0 /100 WBC
PH UA: 5.5 (ref 5–9)
PLATELET # BLD: 149 THOU/MM3 (ref 130–400)
PMV BLD AUTO: 12.4 FL (ref 9.4–12.4)
POTASSIUM SERPL-SCNC: 3.8 MEQ/L (ref 3.5–5.2)
PROT/CREAT RATIO, UR: 1.84
PROTEIN UA: 300 MG/DL
PROTEIN, URINE: 347.3 MG/DL
RBC # BLD: 3.37 MILL/MM3 (ref 4.2–5.4)
RBC URINE: ABNORMAL /HPF
RENAL EPITHELIAL, UA: ABNORMAL
SEDIMENTATION RATE, ERYTHROCYTE: 16 MM/HR (ref 0–20)
SEG NEUTROPHILS: 64.3 %
SEGMENTED NEUTROPHILS ABSOLUTE COUNT: 3.2 THOU/MM3 (ref 1.8–7.7)
SODIUM BLD-SCNC: 143 MEQ/L (ref 135–145)
SPECIFIC GRAVITY UA: 1.02 (ref 1–1.03)
TOTAL PROTEIN: 7.1 G/DL (ref 6.1–8)
UROBILINOGEN, URINE: 1 EU/DL (ref 0–1)
WBC # BLD: 4.9 THOU/MM3 (ref 4.8–10.8)
WBC UA: ABNORMAL /HPF
YEAST: ABNORMAL

## 2020-10-05 PROCEDURE — G8484 FLU IMMUNIZE NO ADMIN: HCPCS | Performed by: INTERNAL MEDICINE

## 2020-10-05 PROCEDURE — G8417 CALC BMI ABV UP PARAM F/U: HCPCS | Performed by: INTERNAL MEDICINE

## 2020-10-05 PROCEDURE — 99214 OFFICE O/P EST MOD 30 MIN: CPT | Performed by: INTERNAL MEDICINE

## 2020-10-05 PROCEDURE — 1036F TOBACCO NON-USER: CPT | Performed by: INTERNAL MEDICINE

## 2020-10-05 PROCEDURE — G8427 DOCREV CUR MEDS BY ELIG CLIN: HCPCS | Performed by: INTERNAL MEDICINE

## 2020-10-05 RX ORDER — HYDROXYCHLOROQUINE SULFATE 200 MG/1
200 TABLET, FILM COATED ORAL 2 TIMES DAILY
Qty: 60 TABLET | Refills: 2 | Status: CANCELLED | OUTPATIENT
Start: 2020-10-05

## 2020-10-05 RX ORDER — OMEPRAZOLE 20 MG/1
20 CAPSULE, DELAYED RELEASE ORAL DAILY
Qty: 30 CAPSULE | Refills: 3 | Status: SHIPPED | OUTPATIENT
Start: 2020-10-05 | End: 2021-08-30

## 2020-10-05 RX ORDER — AZATHIOPRINE 50 MG/1
75 TABLET ORAL 2 TIMES DAILY
Qty: 90 TABLET | Refills: 0 | Status: SHIPPED | OUTPATIENT
Start: 2020-10-05 | End: 2020-11-23 | Stop reason: SDUPTHER

## 2020-10-05 RX ORDER — HYDROCODONE BITARTRATE AND ACETAMINOPHEN 5; 325 MG/1; MG/1
1 TABLET ORAL EVERY 8 HOURS PRN
Qty: 42 TABLET | Refills: 0 | Status: SHIPPED | OUTPATIENT
Start: 2020-10-05 | End: 2020-10-19 | Stop reason: SDUPTHER

## 2020-10-05 RX ORDER — PREDNISONE 1 MG/1
TABLET ORAL
Qty: 40 TABLET | Refills: 0 | Status: SHIPPED | OUTPATIENT
Start: 2020-10-05 | End: 2020-10-21

## 2020-10-05 RX ORDER — GABAPENTIN 400 MG/1
400 CAPSULE ORAL 2 TIMES DAILY
Qty: 180 CAPSULE | Refills: 1 | Status: SHIPPED | OUTPATIENT
Start: 2020-10-05 | End: 2020-10-19 | Stop reason: SDUPTHER

## 2020-10-05 RX ORDER — AZATHIOPRINE 50 MG/1
75 TABLET ORAL 2 TIMES DAILY
Qty: 90 TABLET | Refills: 0 | Status: CANCELLED | OUTPATIENT
Start: 2020-10-05 | End: 2020-11-04

## 2020-10-05 RX ORDER — ONDANSETRON 4 MG/1
4 TABLET, FILM COATED ORAL 3 TIMES DAILY PRN
Qty: 30 TABLET | Refills: 0 | Status: SHIPPED | OUTPATIENT
Start: 2020-10-05 | End: 2021-04-07 | Stop reason: SDUPTHER

## 2020-10-05 RX ORDER — HYDROXYCHLOROQUINE SULFATE 200 MG/1
200 TABLET, FILM COATED ORAL 2 TIMES DAILY
Qty: 60 TABLET | Refills: 2 | Status: SHIPPED | OUTPATIENT
Start: 2020-10-05 | End: 2021-01-04 | Stop reason: SDUPTHER

## 2020-10-05 RX ORDER — PREDNISONE 2.5 MG
3.75 TABLET ORAL DAILY
Qty: 45 TABLET | Refills: 3 | Status: SHIPPED | OUTPATIENT
Start: 2020-10-05 | End: 2021-01-28 | Stop reason: ALTCHOICE

## 2020-10-05 ASSESSMENT — ENCOUNTER SYMPTOMS
COUGH: 0
NAUSEA: 0
SHORTNESS OF BREATH: 1
EYE REDNESS: 0
EYE PAIN: 0
DIARRHEA: 0
VOMITING: 0
CONSTIPATION: 0
EYES NEGATIVE: 1
WHEEZING: 0

## 2020-10-05 NOTE — TELEPHONE ENCOUNTER
Patient needing script sent to [facility] on [address] instead. Cancelled at [facility] (formerly [facility])[3]. Please sign.

## 2020-10-05 NOTE — PROGRESS NOTES
[facility] RHEUMATOLOGY FOLLOW UP NOTE       Date Of Service: 10/5/2020  Provider: César Dai DO    Name: Ross Hudson   MRN: [id number]    Chief Complaint(s)     Chief Complaint   Patient presents with    Follow-up     systemic lupus        History of Present Illness (HPI)     Ross Code  is a(n)50 y.o. female with h/o  Systemic lupus with history of class IV lupus nephritis, chronic kidney disease stage III, chronic low back pain with lumbar radiculopathy and lumbar spinal stenosis with neurogenic claudication, h/o siezures lasting around 2009,  referred bySystemic lupus + BELLE, + DS DNA, oral sores, h/o class IV LN s/p cytoxan 2007. C4 was 64, C4: 15.  4+ proteinuria, but her 24 hr urine collection revealed only 1.1 g. 20-29 RBC/HPF.  Platelet number 837. BELLE positive, dsDNA positive., ANCA (+p-ANCA , Neg MPO, Pr-3)       Systemic lupus - off imuran for the psat week. Cont. Arthralgia,   Paresthesia of hands - reported to be worsened since the last evaluation. Polyarthralgia  Rt wrist, Rt elbows, Rt shoulder, Rt hips, Rt knee, Rt ankle, Rt toe , lower back. Pain up to 9/10 over the past week. Constant right arm, hand, wrists. Intermittent shooting pain down the Right legs. Intermittent sharp, burning, /tingling pain in the right hands. Timing: morning and evenings. Occasional swelling in the Right hand and wrist.  Aggravating factors: weather. Back-- prolonged bending, forward flexion. Right hand and arm - increased use. Alleviating factors: norco, hot bath, gabapentin 300mg daily. + reported nasal sores,   + intermittent chest pain that is aggravated with exertion, + associated SOB, relief with rest.   Denies rash, orasl sores,             REVIEW OF SYSTEMS: (ROS)    Review of Systems   Constitutional: Positive for fatigue. Negative for diaphoresis and fever. HENT: Negative for congestion, hearing loss and nosebleeds. Eyes: Negative. Negative for pain and redness.    Respiratory: Positive for shortness of breath. Negative for cough and wheezing. Cardiovascular: Positive for chest pain. Negative for leg swelling. Gastrointestinal: Negative for constipation, diarrhea, nausea and vomiting. Genitourinary: Negative for difficulty urinating, frequency and hematuria. Musculoskeletal: Positive for arthralgias and myalgias. Skin: Negative for rash. Neurological: Positive for numbness (right > left arms). Negative for dizziness, weakness and headaches. Hematological: Does not bruise/bleed easily. Psychiatric/Behavioral: Negative for sleep disturbance. The patient is not nervous/anxious. .ros    PAST MEDICAL HISTORY     has a past medical history of Anemia, CKD (chronic kidney disease), stage III, Dyslipidemia, Hematuria, Hyperlipidemia, Hypertension, Hyperuricemia, Hypothyroidism, Kidney disease, Lupus ([address] Lupus nephritis ([address] Proteinuria, Seizures ([address] and Systemic lupus erythematosus ([address] PAST SURGICAL HISTORY     has a past surgical history that includes Nerve Block (Bilateral); pr inj dx/ther agnt paravert facet joint, lumbar/sac, 2nd level (Bilateral, 5/7/2018); pr njx dx/ther sbst intrlmnr lmbr/sac w/img gdn (N/A, 7/19/2018); pr inject anes/steroid foramen lumbar/sacral w img guide ,1 level (Left, 9/25/2018); Lumbar spine surgery (Left, 12/13/2018); lumbar nerve block (N/A, 3/11/2019); and lumbar nerve block (N/A, 5/21/2019). Luke Bustillos FAMILY HISTORY      Family History   Problem Relation Age of Onset    Diabetes Mother     Heart Disease Mother     Thyroid Disease Mother     Parkinsonism Mother     Diabetes Father     Lupus Maternal Cousin        SOCIAL HISTORY     reports that she has quit smoking. Her smoking use included cigarettes. She has never used smokeless tobacco. She reports current alcohol use. She reports that she does not use drugs.     ALLERGIES     Allergies   Allergen Reactions    Codeine Hives and Nausea And Vomiting       CURRENT MEDICATIONS Current Outpatient Medications:     HYDROcodone-acetaminophen (NORCO) 5-325 MG per tablet, Take 1 tablet by mouth every 8 hours as needed for Pain for up to 14 days. , Disp: 42 tablet, Rfl: 0    silver sulfADIAZINE (SILVADENE) 1 % cream, Apply topically daily BID, Disp: 50 g, Rfl: 1    flunisolide (NASALIDE) 25 MCG/ACT (0.025%) SOLN, Inhale 2 sprays into the lungs every 12 hours, Disp: 25 mL, Rfl: 2    mometasone (NASONEX) 50 MCG/ACT nasal spray, 2 sprays by Nasal route daily, Disp: 1 Inhaler, Rfl: 3    acetaminophen (APAP EXTRA STRENGTH) 500 MG tablet, Take 2 tablets by mouth every 6 hours as needed for Pain, Disp: 120 tablet, Rfl: 3    hydroxychloroquine (PLAQUENIL) 200 MG tablet, Take 1 tablet by mouth 2 times daily, Disp: 60 tablet, Rfl: 2    omeprazole (PRILOSEC) 20 MG delayed release capsule, Take 1 capsule by mouth Daily, Disp: 30 capsule, Rfl: 3    loratadine (CLARITIN) 10 MG tablet, Take 1 tablet by mouth daily, Disp: 90 tablet, Rfl: 3    Calcium Carbonate-Vitamin D (OYSTER SHELL CALCIUM/D) 500-200 MG-UNIT TABS, take 1 tablet by mouth once daily, Disp: 90 tablet, Rfl: 3    levothyroxine (SYNTHROID) 112 MCG tablet, take 1 tablet by mouth once daily, Disp: 90 tablet, Rfl: 1    simvastatin (ZOCOR) 40 MG tablet, take 1 tablet by mouth at bedtime, Disp: 90 tablet, Rfl: 3    predniSONE (DELTASONE) 2.5 MG tablet, Take 1.5 tablets by mouth daily, Disp: 45 tablet, Rfl: 3    ferrous sulfate 325 (65 Fe) MG tablet, Take 1 tablet by mouth daily, Disp: 90 tablet, Rfl: 3    azaTHIOprine (IMURAN) 50 MG tablet, Take 1.5 tablets by mouth 2 times daily, Disp: 90 tablet, Rfl: 0    gabapentin (NEURONTIN) 400 MG capsule, Take 1 capsule by mouth 2 times daily for 90 days. , Disp: 180 capsule, Rfl: 1    fluticasone (FLONASE) 50 MCG/ACT nasal spray, 2 sprays by Nasal route daily for 10 days, Disp: 1 Bottle, Rfl: 0    PHYSICAL EXAMINATION / OBJECTIVE     Objective:  /80 (Site: Left Upper Arm, Position: Sitting, Cuff Size: Medium Adult)   Pulse 75   Ht 5' 5\" (1.651 m)   Wt 157 lb (71.2 kg)   SpO2 97%   BMI 26.13 kg/m²     General Appearance: General appearance:  AAO x 3 ,  well-developed and well nourished  Head: normocephalic and atraumatic  Eyes: No gross abnormalities. ,  Sclera non-icteric  Ears / nose:  normal external appearance of left and right ear and nose. No active drainage from nose. - sore along the left medial ala.   mouth:  MMM, ears w/o deformities  Neck: No jugular venous distention, appears symmetric, good ROM  Pulmonary/Chest: CTA bilateral ,  symmetric chest expansion. Cardiovascular: Normal S1 and S2, NO murmur, rub, gallop  : Deferred   Abd/GI: Deferred   Neurologic:  speech normal,   no facial drooping, no asterixis  Skin:  No rash on exposed extremities. - sore along the left medial ala. Extremities: no cyanoisis. + scabbed lesions along the distal finger tips around the nails. Musculoskeletal:  Upper extremities:  ELBOWS tender bilat ,  + tinel right. WRISTS  + tinel , Phalen bilat. ,  HANDS/FINGERS MCPs - PIPs tender right 3,4 left 3,4  . Boggy == right 3,4  Left 3-4. Lower extremities:  HIPS , KNEES , ANKLES , FEET : non-tender, no swelling , intact ROM. Exam KEY:   Tender :  T    Swelling: S,   Deformities: D,   Non-tender : NT  ,  No swelling: NS       RAPID 3:   10/5/2020 --- RAPID 3: 3.3 + 9 + 8.5 = 20.8       Remission: <3  Low Disease Activity: <6  Moderate Disease Activity: >=6 and <=12  High Disease Activity: >12     LABS      CBC  Lab Results   Component Value Date    WBC 4.3 03/24/2020    WBC 4.7 12/17/2019    WBC 6.3 10/09/2019    RBC 3.99 03/24/2020    HGB 11.6 03/24/2020    HGB 11.7 12/17/2019    HGB 12.1 10/09/2019    HCT 34.9 03/24/2020    MCV 87.6 03/24/2020    RDW 18.6 03/24/2020     03/24/2020     12/17/2019     10/09/2019       CMP  Lab Results   Component Value Date    CALCIUM 9.7 08/12/2020    LABALBU 4.3 03/24/2020    PROT 6.8 03/24/2020     08/12/2020    K 3.7 08/12/2020    CO2 23 08/12/2020     08/12/2020    BUN 22 08/12/2020    CREATININE 1.5 08/12/2020    CREATININE 1.71 03/24/2020    CREATININE 1.7 02/11/2020    ALKPHOS 50 03/24/2020    ALT 18 03/24/2020    ALT 9 12/17/2019    ALT 8 10/09/2019    AST 32 03/24/2020    AST 14 12/17/2019    AST 13 10/09/2019       HgBA1c: No components found for: HGBA1C    Lab Results   Component Value Date    VITD25 15 08/12/2020       Lab Results   Component Value Date    ANASCRN Detected (A) 06/17/2019     Lab Results   Component Value Date    SSA SEE BELOW 06/17/2019     Lab Results   Component Value Date    SSB 0 06/17/2019     Lab Results   Component Value Date    ANTI-SMITH 4 06/17/2019     Lab Results   Component Value Date    DSDNAAB SEE BELOW 06/17/2019      No results found for: ANTIRNP  Lab Results   Component Value Date    C3 112 12/10/2019    C4 29 12/10/2019     Lab Results   Component Value Date    CCPAB 8 06/17/2019     Lab Results   Component Value Date    RF 11 06/17/2019       No components found for: CANCASCRN, APANCASCRN  Lab Results   Component Value Date    SEDRATE 5 03/24/2020     Lab Results   Component Value Date    CRP < 0.5 03/24/2020         RADIOLOGY / PROCEDURES:         ASSESSMENT/PLAN    Assessment   Plan     1. Back pain of lumbar region with sciatica    2. Right hand pain    3. Other systemic lupus erythematosus with glomerular disease ([facility]    4. Medication monitoring encounter    5. Pleurodynia    6. Polyarthralgia    7. Abdominal pain, unspecified abdominal location        SLE w/ h/o  class IV LN - active inflammatory arthritis, nasal sore   - diagnosed in 2001, s/p cytoxan 2007.  C4 was 64, C4: 15.  4+ proteinuria, but her 24 hr urine collection revealed only 1.1 g. 20-29 RBC/HPF.  Platelet number 327. BELLE positive, dsDNA positive., ANCA (+p-ANCA , Neg MPO, Pr-3) . Reported inflammatory arthritis.  Examination with joint swelling, oral/nasal soers. - outside records summarized above. -  plaquenil 200mg bid   -  restart imuran 100qAM and 75mg qhs. (increased 8/21/19) -- if labs stable. -  prednisone 5mg daily  After taper.   -  Active evidence of inflammatory arthritis. Chest pain - reported chest pain with exertion and improved with rest. (anginal type pain)    - EKG ordered. - referred to Cardiology for further evaluation given the risk of CAD in patient with prolonged history of systemic lupus      Carpal tunnel:  bilateral  Bilateral cubital tunnel   - bilat tinel at wrist and elbows. intermittent numbness/tingling                   - EMG/NCV evaluation  -- with cevrvical radiculopathy Dec. 2019.                   - gabapentin to 400mg bid                      abdominal pain --- omeprazole 20mg daily      Lower back pain w/ radiculopathy                   - gabapentin to 400mg bid .      Anterior chest wall rash -- resolved with Tx of # 1.   - no recurrence.      Medication monitoring               - q 4 week labs w/ imuran titration                - need plaquenil eye exam. -- will hold plaquenil if not completed by the next visit. No follow-ups on file. Electronically signed by Tammy Blizzard, DO on 10/5/2020 at 10:50 AM    New Prescriptions    No medications on file       Thank you for allowing me to participate in the care of this patient. Please call if there are any questions.

## 2020-10-05 NOTE — PROGRESS NOTES
Systemic lupus   H/o Lupus nephritis   Proteinuria - worsening.   - worsening proteinuria   ? Medication compliance. And lupus nephritis flare. -- pharmacy contacted. -- plaquenil & azathioprine last filled Dec. 2019 per pharmacy ([facility] on [address]). - worsening synovitis on examination.     - ? Repeat Renal biopsy to evaluate for active dz. / lupus nephritis

## 2020-10-05 NOTE — Clinical Note
? Patient complaince with medications. Last filled imuran and plaquenil at local pharmacy around Dec 2019. PT now with worsening proteinuria over the past 2 months. Would like to discuss  Repeat renal biopsy as her GFR is stable but proteinuria has almost trippled from 0.5 to 1.8 gm over the past several months.

## 2020-10-06 LAB
C3 COMPLEMENT: 102 MG/DL (ref 90–180)
COMPLEMENT C4: 14 MG/DL (ref 10–40)

## 2020-10-12 ENCOUNTER — TELEPHONE (OUTPATIENT)
Dept: RHEUMATOLOGY | Age: 50
End: 2020-10-12

## 2020-10-12 NOTE — TELEPHONE ENCOUNTER
Phoned her and reviewed. She voiced understanding and is not sure if she is still taking that or not and will check and let us know.

## 2020-10-12 NOTE — TELEPHONE ENCOUNTER
----- Message from Zhou Patel DO sent at 10/9/2020 10:21 AM EDT -----  Blood testing and urine test revealed stable kidney function test but worsening of the amount of protein in the urine. Please asked patient if she has started taking the medication such as azathioprine. Repeat blood testing and urine test needed in 4 weeks to evaluate for any improvements.

## 2020-10-13 NOTE — RESULT ENCOUNTER NOTE
Patient informed of the elevated proteinuria noted on recent laboratory evaluation. Informed of the importance of compliance with medications as there is concern for recurrence of the lupus nephritis given the increased proteinuria noted on recent evaluation. Restart azathioprine and continue Plaquenil at previously tolerated dosing with reevaluation of labs in 4 weeks. She reported understanding and had no further questions.

## 2020-10-16 ENCOUNTER — HOSPITAL ENCOUNTER (OUTPATIENT)
Age: 50
Discharge: HOME | End: 2020-10-16
Payer: COMMERCIAL

## 2020-10-16 LAB
EKG ATRIAL RATE: 71 BPM
EKG P AXIS: 59 DEGREES
EKG P-R INTERVAL: 166 MS
EKG Q-T INTERVAL: 412 MS
EKG QRS DURATION: 80 MS
EKG QTC CALCULATION (BAZETT): 447 MS
EKG R AXIS: 48 DEGREES
EKG T AXIS: 56 DEGREES
EKG VENTRICULAR RATE: 71 BPM

## 2020-10-16 PROCEDURE — 93005 ELECTROCARDIOGRAM TRACING: CPT

## 2020-10-19 ENCOUNTER — TELEPHONE (OUTPATIENT)
Dept: RHEUMATOLOGY | Age: 50
End: 2020-10-19

## 2020-10-19 ENCOUNTER — OFFICE VISIT (OUTPATIENT)
Dept: PHYSICAL MEDICINE AND REHAB | Age: 50
End: 2020-10-19
Payer: COMMERCIAL

## 2020-10-19 VITALS
BODY MASS INDEX: 26.16 KG/M2 | SYSTOLIC BLOOD PRESSURE: 112 MMHG | WEIGHT: 157 LBS | TEMPERATURE: 97.5 F | HEART RATE: 60 BPM | HEIGHT: 65 IN | DIASTOLIC BLOOD PRESSURE: 62 MMHG

## 2020-10-19 PROCEDURE — G8427 DOCREV CUR MEDS BY ELIG CLIN: HCPCS | Performed by: NURSE PRACTITIONER

## 2020-10-19 PROCEDURE — G8417 CALC BMI ABV UP PARAM F/U: HCPCS | Performed by: NURSE PRACTITIONER

## 2020-10-19 PROCEDURE — G8484 FLU IMMUNIZE NO ADMIN: HCPCS | Performed by: NURSE PRACTITIONER

## 2020-10-19 PROCEDURE — 1036F TOBACCO NON-USER: CPT | Performed by: NURSE PRACTITIONER

## 2020-10-19 PROCEDURE — 99213 OFFICE O/P EST LOW 20 MIN: CPT | Performed by: NURSE PRACTITIONER

## 2020-10-19 RX ORDER — HYDROCODONE BITARTRATE AND ACETAMINOPHEN 5; 325 MG/1; MG/1
1 TABLET ORAL EVERY 8 HOURS PRN
Qty: 90 TABLET | Refills: 0 | Status: SHIPPED | OUTPATIENT
Start: 2020-10-19 | End: 2020-10-19 | Stop reason: SDUPTHER

## 2020-10-19 RX ORDER — GABAPENTIN 400 MG/1
400 CAPSULE ORAL 2 TIMES DAILY
Qty: 180 CAPSULE | Refills: 1 | Status: SHIPPED | OUTPATIENT
Start: 2020-10-19 | End: 2020-10-19 | Stop reason: SDUPTHER

## 2020-10-19 ASSESSMENT — ENCOUNTER SYMPTOMS
CHEST TIGHTNESS: 0
SHORTNESS OF BREATH: 0
PHOTOPHOBIA: 0
DIARRHEA: 0
SORE THROAT: 0
COLOR CHANGE: 0
VOMITING: 0
NAUSEA: 0
EYE PAIN: 0
CONSTIPATION: 0
SINUS PRESSURE: 0
WHEEZING: 0
BACK PAIN: 1
RHINORRHEA: 0
ABDOMINAL PAIN: 0
COUGH: 0

## 2020-10-19 NOTE — PROGRESS NOTES
Medications reviewed. Patient denies side effects with medications. Patient states she is taking medications as prescribed. Shedenies receiving pain medications from other sources. She complains of any ER visits since last visit. ER burn right leg  Pain scale with out pain medications or at its worst is 8/10. Pain scale with pain medications or at its best is 510. Last dose of  Norco  was today  Drug screen reviewed from 6/2020and was appropriate  Pill count was completed today and was appropriate 5  Patient does have naloxone available at home. Patient has not required use of naloxone at home since last office visit. The patientis allergic to codeine. Past Medical History  Daisy Encinas  has a past medical history of Anemia, CKD (chronic kidney disease), stage III, Dyslipidemia, Hematuria, Hyperlipidemia, Hypertension, Hyperuricemia, Hypothyroidism, Kidney disease, Lupus ([address] Lupus nephritis ([address] Proteinuria, Seizures ([address] and Systemic lupus erythematosus ([address] Past Surgical History  The patient  has a past surgical history that includes Nerve Block (Bilateral); pr inj dx/ther agnt paravert facet joint, lumbar/sac, 2nd level (Bilateral, 5/7/2018); pr njx dx/ther sbst intrlmnr lmbr/sac w/img gdn (N/A, 7/19/2018); pr inject anes/steroid foramen lumbar/sacral w img guide ,1 level (Left, 9/25/2018); Lumbar spine surgery (Left, 12/13/2018); lumbar nerve block (N/A, 3/11/2019); and lumbar nerve block (N/A, 5/21/2019). Family History  This patient's family history includes Diabetes in her father and mother; Heart Disease in her mother; Lupus in her maternal cousin; Parkinsonism in her mother; Thyroid Disease in her mother. Social History  Daisy Encinas  reports that she has quit smoking. Her smoking use included cigarettes. She has never used smokeless tobacco. She reports current alcohol use. She reports that she does not use drugs.     Medications    Current Outpatient Medications:    HYDROcodone-acetaminophen (NORCO) 5-325 MG per tablet, Take 1 tablet by mouth every 8 hours as needed for Pain for up to 30 days. , Disp: 90 tablet, Rfl: 0    gabapentin (NEURONTIN) 400 MG capsule, Take 1 capsule by mouth 2 times daily for 90 days. , Disp: 180 capsule, Rfl: 1    omeprazole (PRILOSEC) 20 MG delayed release capsule, Take 1 capsule by mouth Daily, Disp: 30 capsule, Rfl: 3    predniSONE (DELTASONE) 2.5 MG tablet, Take 1.5 tablets by mouth daily, Disp: 45 tablet, Rfl: 3    predniSONE (DELTASONE) 5 MG tablet, Take 4 tablets by mouth daily for 4 days, THEN 3 tablets daily for 4 days, THEN 2 tablets daily for 4 days, THEN 1 tablet daily for 4 days. , Disp: 40 tablet, Rfl: 0    ondansetron (ZOFRAN) 4 MG tablet, Take 1 tablet by mouth 3 times daily as needed for Nausea or Vomiting, Disp: 30 tablet, Rfl: 0    hydroxychloroquine (PLAQUENIL) 200 MG tablet, Take 1 tablet by mouth 2 times daily, Disp: 60 tablet, Rfl: 2    azaTHIOprine (IMURAN) 50 MG tablet, Take 1.5 tablets by mouth 2 times daily, Disp: 90 tablet, Rfl: 0    silver sulfADIAZINE (SILVADENE) 1 % cream, Apply topically daily BID, Disp: 50 g, Rfl: 1    flunisolide (NASALIDE) 25 MCG/ACT (0.025%) SOLN, Inhale 2 sprays into the lungs every 12 hours, Disp: 25 mL, Rfl: 2    mometasone (NASONEX) 50 MCG/ACT nasal spray, 2 sprays by Nasal route daily, Disp: 1 Inhaler, Rfl: 3    acetaminophen (APAP EXTRA STRENGTH) 500 MG tablet, Take 2 tablets by mouth every 6 hours as needed for Pain, Disp: 120 tablet, Rfl: 3    loratadine (CLARITIN) 10 MG tablet, Take 1 tablet by mouth daily, Disp: 90 tablet, Rfl: 3    Calcium Carbonate-Vitamin D (OYSTER SHELL CALCIUM/D) 500-200 MG-UNIT TABS, take 1 tablet by mouth once daily, Disp: 90 tablet, Rfl: 3    levothyroxine (SYNTHROID) 112 MCG tablet, take 1 tablet by mouth once daily, Disp: 90 tablet, Rfl: 1    simvastatin (ZOCOR) 40 MG tablet, take 1 tablet by mouth at bedtime, Disp: 90 tablet, Rfl: 3    fluticasone (FLONASE) 50 MCG/ACT nasal spray, 2 sprays by Nasal route daily for 10 days, Disp: 1 Bottle, Rfl: 0    ferrous sulfate 325 (65 Fe) MG tablet, Take 1 tablet by mouth daily, Disp: 90 tablet, Rfl: 3    Subjective:      Review of Systems   Constitutional: Negative for activity change, appetite change, chills, diaphoresis, fatigue, fever and unexpected weight change. HENT: Positive for dental problem. Negative for congestion, ear pain, hearing loss, mouth sores, nosebleeds, rhinorrhea, sinus pressure and sore throat. Dental infection recently   Eyes: Negative for photophobia, pain and visual disturbance. Respiratory: Negative for cough, chest tightness, shortness of breath and wheezing. Cardiovascular: Negative for chest pain and palpitations. HTN CAD   Gastrointestinal: Negative for abdominal pain, constipation, diarrhea, nausea and vomiting. Endocrine: Negative for cold intolerance, heat intolerance, polydipsia, polyphagia and polyuria. Thyroid issues   Genitourinary: Negative for decreased urine volume, difficulty urinating, frequency and hematuria. CKD   Musculoskeletal: Positive for arthralgias, back pain, myalgias, neck pain and neck stiffness. Negative for gait problem. Skin: Negative for color change and rash. Hot water burn  8/31/2020 to back of left thigh healing no open areas   Allergic/Immunologic: Negative for food allergies and immunocompromised state. Neurological: Positive for weakness, numbness and headaches. Negative for dizziness, tremors, seizures, syncope, facial asymmetry, speech difficulty and light-headedness. H/o seizures,no seizures in 16 years. H/O Lupus   Hematological: Does not bruise/bleed easily. Psychiatric/Behavioral: Negative for agitation, behavioral problems, confusion, decreased concentration, dysphoric mood, hallucinations, self-injury, sleep disturbance and suicidal ideas.  The patient is not nervous/anxious and is not hyperactive. Objective:     Vitals:    10/19/20 1544   BP: 112/62   Site: Left Upper Arm   Position: Sitting   Cuff Size: Medium Adult   Pulse: 60   Temp: 97.5 °F (36.4 °C)   TempSrc: Temporal   Weight: 157 lb (71.2 kg)   Height: 5' 5\" (1.651 m)       Physical Exam  Vitals signs and nursing note reviewed. Constitutional:       General: She is not in acute distress. Appearance: She is well-developed. She is not diaphoretic. HENT:      Head: Normocephalic and atraumatic. Right Ear: External ear normal.      Left Ear: External ear normal.      Nose: Nose normal.      Mouth/Throat:      Pharynx: No oropharyngeal exudate. Eyes:      General: No scleral icterus. Right eye: No discharge. Left eye: No discharge. Conjunctiva/sclera: Conjunctivae normal.      Pupils: Pupils are equal, round, and reactive to light. Neck:      Musculoskeletal: Neck supple. Decreased range of motion. Muscular tenderness present. No edema, erythema or neck rigidity. Thyroid: No thyromegaly. Cardiovascular:      Rate and Rhythm: Normal rate and regular rhythm. Heart sounds: Normal heart sounds. No murmur. No friction rub. No gallop. Pulmonary:      Effort: Pulmonary effort is normal. No respiratory distress. Breath sounds: Normal breath sounds. No wheezing or rales. Chest:      Chest wall: No tenderness. Abdominal:      General: Bowel sounds are normal. There is no distension. Palpations: Abdomen is soft. Tenderness: There is no abdominal tenderness. There is no guarding or rebound. Musculoskeletal:         General: Tenderness present. Right shoulder: She exhibits decreased range of motion, tenderness, pain, spasm and decreased strength. Left shoulder: She exhibits tenderness, pain and spasm. Right hip: She exhibits tenderness. Left hip: Normal.      Right knee: Normal.      Left knee: Normal.      Right ankle: Tenderness.       Cervical back: She exhibits decreased range of motion, tenderness, bony tenderness, pain and spasm. Thoracic back: She exhibits decreased range of motion, tenderness, bony tenderness, pain and spasm. Lumbar back: She exhibits decreased range of motion, tenderness, bony tenderness, pain and spasm. Back:       Right upper arm: She exhibits tenderness. Right forearm: She exhibits tenderness. Right hand: She exhibits decreased range of motion and tenderness. Decreased sensation noted. Decreased strength noted. Right upper leg: She exhibits tenderness. Right foot: Decreased range of motion. Tenderness, bony tenderness and swelling present. Comments: 2 right broken toes has walker shoe on    Skin:     General: Skin is warm. Coloration: Skin is not pale. Findings: No erythema or rash. Neurological:      Mental Status: She is alert and oriented to person, place, and time. She is not disoriented. Cranial Nerves: Cranial nerves are intact. No cranial nerve deficit. Sensory: Sensation is intact. No sensory deficit. Motor: Motor function is intact. No atrophy or abnormal muscle tone. Coordination: Coordination is intact. Coordination normal.      Gait: Gait abnormal.      Deep Tendon Reflexes: Babinski sign absent on the right side. Reflex Scores:       Tricep reflexes are 2+ on the right side and 2+ on the left side. Bicep reflexes are 2+ on the right side and 2+ on the left side. Brachioradialis reflexes are 2+ on the right side and 2+ on the left side. Patellar reflexes are 2+ on the right side and 2+ on the left side. Achilles reflexes are 2+ on the right side and 2+ on the left side. Comments: Motor  4/5 RUE  5/5 BLE   Psychiatric:         Attention and Perception: Attention and perception normal. She is attentive. Mood and Affect: Mood and affect normal. Mood is not anxious or depressed.  Affect is not labile, blunt, angry or inappropriate. Speech: Speech normal. She is communicative. Speech is not rapid and pressured, delayed, slurred or tangential.         Behavior: Behavior normal. Behavior is not agitated, slowed, aggressive, withdrawn, hyperactive or combative. Behavior is cooperative. Thought Content: Thought content normal. Thought content is not paranoid or delusional. Thought content does not include homicidal or suicidal ideation. Thought content does not include homicidal or suicidal plan. Cognition and Memory: Cognition and memory normal. Memory is not impaired. She does not exhibit impaired recent memory or impaired remote memory. Judgment: Judgment normal. Judgment is not impulsive or inappropriate. VONDA test:+  Yeomans test+  Gaenslen test: +     Assessment:     1. Spondylosis of thoracic region without myelopathy or radiculopathy    2. Chronic pain syndrome    3. Back pain of lumbar region with sciatica    4. Right hand pain    5. Spondylosis of lumbar region without myelopathy or radiculopathy    6. SI (sacroiliac) joint inflammation (HCC)    7. Lumbar stenosis with neurogenic claudication    8. Lumbar radiculopathy    9. Lumbar radiculitis    10. Bilateral sacroiliitis ([address]    11. Cervical spinal stenosis    12. Chronic bilateral thoracic back pain            Plan:      · OARRS reviewed. Current MED: 15  · Patient was not offered naloxone for home. already hasDiscussed long term side effects of medications, tolerance, dependency and addiction. · Previous UDS reviewed  · UDS preformed today for compliance. · Patient told can not receive any pain medications from any other source. · No evidence of abuse, diversion or aberrant behavior.    Medications and/or procedures to improve function and quality of life- patient understanding with this and that may not be pain free   Discussed with patient about safe storage of medications at home   Discussed possible weaning of medication dosing dependent on treatment/procedure results.  Testing: none   Procedures: needs PT, insurance denied due to no recent PT for neck and thoracic injections    Discussed with patient about risks with procedure including infection, reaction to medication, increased pain, or bleeding.  Medications:Norco Neurontin    Encouraged to Start PT      Meds. Prescribed:   Orders Placed This Encounter   Medications    HYDROcodone-acetaminophen (NORCO) 5-325 MG per tablet     Sig: Take 1 tablet by mouth every 8 hours as needed for Pain for up to 30 days. Dispense:  90 tablet     Refill:  0     Reduce doses taken as pain becomes manageable    gabapentin (NEURONTIN) 400 MG capsule     Sig: Take 1 capsule by mouth 2 times daily for 90 days. Dispense:  180 capsule     Refill:  1       Return in about 2 months (around 12/19/2020) for Follow up pain medications. Time spent with patient was 15 minutes, more than 50% was spent Counseling/coordinated the patient'scare.       Electronically signed by MACKENZIE Griffin CNP on10/19/2020 at 4:03 PM

## 2020-10-19 NOTE — TELEPHONE ENCOUNTER
----- Message from Ashley Apple DO sent at 10/16/2020  2:38 PM EDT -----  The EKG revealed no abnormalities.

## 2020-10-20 RX ORDER — FERROUS SULFATE 325(65) MG
TABLET ORAL
Qty: 90 TABLET | Refills: 3 | Status: SHIPPED | OUTPATIENT
Start: 2020-10-20 | End: 2021-02-24 | Stop reason: SDUPTHER

## 2020-10-20 RX ORDER — GABAPENTIN 400 MG/1
400 CAPSULE ORAL 2 TIMES DAILY
Qty: 180 CAPSULE | Refills: 1 | Status: SHIPPED | OUTPATIENT
Start: 2020-10-20 | End: 2021-02-24 | Stop reason: SDUPTHER

## 2020-10-20 RX ORDER — HYDROCODONE BITARTRATE AND ACETAMINOPHEN 5; 325 MG/1; MG/1
1 TABLET ORAL EVERY 8 HOURS PRN
Qty: 90 TABLET | Refills: 0 | Status: SHIPPED | OUTPATIENT
Start: 2020-10-20 | End: 2020-11-17 | Stop reason: SDUPTHER

## 2020-10-20 NOTE — TELEPHONE ENCOUNTER
Recent Visits  Date Type Provider Dept   09/01/20 Office Visit Adarsh Kenzie, APRN - CNP [facility]   10/22/19 Office Visit Altamirano Kenzie, APRN - CNP [facility]   Showing recent visits within past 540 days with a meds authorizing provider and meeting all other requirements     Future Appointments  No visits were found meeting these conditions.    Showing future appointments within next 150 days with a meds authorizing provider and meeting all other requirements     Future Appointments   Date Time Provider Justus Dumont   11/23/2020 11:00 AM MACKENZIE Truong - CNP [facility] [place]   12/21/2020  3:40 PM MACKENZIE Cintron - CNP [facility] [place]   2/16/2021 10:20 AM Sunny Contreras MD [facility] [place]

## 2020-10-20 NOTE — TELEPHONE ENCOUNTER
OARRS reviewed. UDS: + for  Hydrocodone consistent. Last seen: 10/19/2020.  Follow-up:   Future Appointments   Date Time Provider Justus Dumotn   11/23/2020 11:00 AM MACKENZIE Soto - CNP [facility]   12/21/2020  3:40 PM MACKENZIE Chambers - CNP [facility]   2/16/2021 10:20 AM Titi Carpenter MD [facility]

## 2020-11-03 ENCOUNTER — PATIENT MESSAGE (OUTPATIENT)
Dept: PHYSICAL MEDICINE AND REHAB | Age: 50
End: 2020-11-03

## 2020-11-03 NOTE — TELEPHONE ENCOUNTER
From: Wallace Robertson  To:  MACKENZIE Anders - CNP  Sent: 11/3/2020 6:29 AM EST  Subject: Prescription Question    Hey do I get my refill on the 6th of this month or the 19th if i get them on the 6th can u fill them for me at [address] they r my Vicodin 5325

## 2020-11-17 ENCOUNTER — PATIENT MESSAGE (OUTPATIENT)
Dept: PHYSICAL MEDICINE AND REHAB | Age: 50
End: 2020-11-17

## 2020-11-17 RX ORDER — HYDROCODONE BITARTRATE AND ACETAMINOPHEN 5; 325 MG/1; MG/1
1 TABLET ORAL EVERY 8 HOURS PRN
Qty: 90 TABLET | Refills: 0 | Status: SHIPPED | OUTPATIENT
Start: 2020-11-18 | End: 2020-12-16 | Stop reason: SDUPTHER

## 2020-11-17 NOTE — TELEPHONE ENCOUNTER
From: Kamilla Sevilla  To:  MACKENZIE Reynolds - CNP  Sent: 11/17/2020 9:23 AM EST  Subject: Prescription Question    Hey can u refill my Vicodin 5-325 and send them to [address]

## 2020-11-23 ENCOUNTER — NURSE ONLY (OUTPATIENT)
Dept: LAB | Age: 50
End: 2020-11-23

## 2020-11-23 ENCOUNTER — OFFICE VISIT (OUTPATIENT)
Dept: RHEUMATOLOGY | Age: 50
End: 2020-11-23
Payer: COMMERCIAL

## 2020-11-23 VITALS
SYSTOLIC BLOOD PRESSURE: 110 MMHG | BODY MASS INDEX: 26.15 KG/M2 | HEIGHT: 65 IN | TEMPERATURE: 97.5 F | OXYGEN SATURATION: 97 % | HEART RATE: 64 BPM | WEIGHT: 156.97 LBS | DIASTOLIC BLOOD PRESSURE: 70 MMHG

## 2020-11-23 LAB
ALBUMIN SERPL-MCNC: 4.3 G/DL (ref 3.5–5.1)
ALP BLD-CCNC: 52 U/L (ref 38–126)
ALT SERPL-CCNC: 24 U/L (ref 11–66)
ANION GAP SERPL CALCULATED.3IONS-SCNC: 11 MEQ/L (ref 8–16)
AST SERPL-CCNC: 30 U/L (ref 5–40)
BASOPHILS # BLD: 0.9 %
BASOPHILS ABSOLUTE: 0 THOU/MM3 (ref 0–0.1)
BILIRUB SERPL-MCNC: 0.2 MG/DL (ref 0.3–1.2)
BUN BLDV-MCNC: 14 MG/DL (ref 7–22)
C-REACTIVE PROTEIN: < 0.03 MG/DL (ref 0–1)
CALCIUM SERPL-MCNC: 10 MG/DL (ref 8.5–10.5)
CHLORIDE BLD-SCNC: 105 MEQ/L (ref 98–111)
CO2: 25 MEQ/L (ref 23–33)
CREAT SERPL-MCNC: 1.5 MG/DL (ref 0.4–1.2)
EOSINOPHIL # BLD: 2.1 %
EOSINOPHILS ABSOLUTE: 0.1 THOU/MM3 (ref 0–0.4)
ERYTHROCYTE [DISTWIDTH] IN BLOOD BY AUTOMATED COUNT: 15.4 % (ref 11.5–14.5)
ERYTHROCYTE [DISTWIDTH] IN BLOOD BY AUTOMATED COUNT: 55.6 FL (ref 35–45)
GFR SERPL CREATININE-BSD FRML MDRD: 37 ML/MIN/1.73M2
GLUCOSE BLD-MCNC: 84 MG/DL (ref 70–108)
HCT VFR BLD CALC: 37.3 % (ref 37–47)
HEMOGLOBIN: 11.3 GM/DL (ref 12–16)
IMMATURE GRANS (ABS): 0.01 THOU/MM3 (ref 0–0.07)
IMMATURE GRANULOCYTES: 0.2 %
LYMPHOCYTES # BLD: 30 %
LYMPHOCYTES ABSOLUTE: 1.4 THOU/MM3 (ref 1–4.8)
MCH RBC QN AUTO: 29.7 PG (ref 26–33)
MCHC RBC AUTO-ENTMCNC: 30.3 GM/DL (ref 32.2–35.5)
MCV RBC AUTO: 98.2 FL (ref 81–99)
MONOCYTES # BLD: 6.4 %
MONOCYTES ABSOLUTE: 0.3 THOU/MM3 (ref 0.4–1.3)
NUCLEATED RED BLOOD CELLS: 0 /100 WBC
PLATELET # BLD: 130 THOU/MM3 (ref 130–400)
PMV BLD AUTO: 12.9 FL (ref 9.4–12.4)
POTASSIUM SERPL-SCNC: 3.9 MEQ/L (ref 3.5–5.2)
RBC # BLD: 3.8 MILL/MM3 (ref 4.2–5.4)
SEDIMENTATION RATE, ERYTHROCYTE: 10 MM/HR (ref 0–20)
SEG NEUTROPHILS: 60.4 %
SEGMENTED NEUTROPHILS ABSOLUTE COUNT: 2.8 THOU/MM3 (ref 1.8–7.7)
SODIUM BLD-SCNC: 141 MEQ/L (ref 135–145)
TOTAL PROTEIN: 6.8 G/DL (ref 6.1–8)
WBC # BLD: 4.7 THOU/MM3 (ref 4.8–10.8)

## 2020-11-23 PROCEDURE — 99214 OFFICE O/P EST MOD 30 MIN: CPT | Performed by: NURSE PRACTITIONER

## 2020-11-23 PROCEDURE — 1036F TOBACCO NON-USER: CPT | Performed by: NURSE PRACTITIONER

## 2020-11-23 PROCEDURE — G8484 FLU IMMUNIZE NO ADMIN: HCPCS | Performed by: NURSE PRACTITIONER

## 2020-11-23 PROCEDURE — G8417 CALC BMI ABV UP PARAM F/U: HCPCS | Performed by: NURSE PRACTITIONER

## 2020-11-23 PROCEDURE — G8427 DOCREV CUR MEDS BY ELIG CLIN: HCPCS | Performed by: NURSE PRACTITIONER

## 2020-11-23 RX ORDER — AZATHIOPRINE 50 MG/1
TABLET ORAL
Qty: 105 TABLET | Refills: 0 | Status: SHIPPED | OUTPATIENT
Start: 2020-11-23 | End: 2020-12-04 | Stop reason: SDUPTHER

## 2020-11-23 ASSESSMENT — ENCOUNTER SYMPTOMS
SHORTNESS OF BREATH: 1
BACK PAIN: 1
TROUBLE SWALLOWING: 0
NAUSEA: 0
COUGH: 0
EYE PAIN: 0
ABDOMINAL PAIN: 1
DIARRHEA: 0
EYE ITCHING: 0
CONSTIPATION: 0

## 2020-11-23 NOTE — PROGRESS NOTES
[facility] RHEUMATOLOGY FOLLOW UP NOTE       Date Of Service: 11/23/2020  Provider: MACKENZIE Ken - CNP    Name: Kamilla Sevilla   MRN: [id number]    Chief Complaint(s)     Chief Complaint   Patient presents with    Follow-up     6 weeks SLE        History of Present Illness (HPI)     Kamilla Sevilla  is a(n)50 y.o. female with a hx of systemic lupus with hiistory of class IV lupus nephritis, CKD stage III, chronic low back pain w/ radiculopathy, lumbar stenosis, h/o seizures here for the f/u evaluation of systemic lupus     Interval hx:    - has been off imuran for ? 3 weeks. pain affecting the fingers, wrists, elbows, shoulders, hips, knees, ankles, toes, neck, back  Pain on a scale 0-10: 8.5/10  Type of pain: constant  Timing: mornings and evenings  Aggravating factors: weather, back: prolonged bending, forward flexion. Right hand and arm: increases use  Alleviating factors: norco, hot back, gabapentin    Associated symptoms:  + swelling/  Redness/ warmth (ankles and wrists), + AM stiffness lasting ~ 45 minutes    Chest pain and associated SOB continued. Did not see cardiology yet. EKG was normal.     REVIEW OF SYSTEMS: (ROS)    Review of Systems   Constitutional: Positive for appetite change and unexpected weight change. Negative for fatigue and fever. HENT: Positive for congestion. Negative for trouble swallowing. Hair loss   Eyes: Negative for pain and itching. Respiratory: Positive for shortness of breath. Negative for cough. Cardiovascular: Positive for chest pain. Negative for leg swelling. Gastrointestinal: Positive for abdominal pain. Negative for constipation, diarrhea and nausea. Endocrine: Negative for cold intolerance and heat intolerance. Genitourinary: Negative for difficulty urinating, frequency and urgency. Musculoskeletal: Positive for arthralgias, back pain, joint swelling, myalgias and neck pain. Skin: Negative for rash.    Neurological: Positive for numbness and headaches. Negative for dizziness and weakness. Psychiatric/Behavioral: Positive for sleep disturbance. The patient is not nervous/anxious.         PAST MEDICAL HISTORY      Past Medical History:   Diagnosis Date    Anemia     CKD (chronic kidney disease), stage III     Dyslipidemia     Hematuria     Hyperlipidemia     Hypertension     Hyperuricemia     Hypothyroidism     Kidney disease     as a result from lupus    [facility]     Lupus nephritis (HCC)     Proteinuria     Seizures ([facility]     Systemic lupus erythematosus ([facility]        PAST SURGICAL HISTORY      Past Surgical History:   Procedure Laterality Date    LUMBAR NERVE BLOCK N/A 3/11/2019    LESI at L4 performed by Tiffany Sterling MD at [facility] N/A 5/21/2019    LESI at L3 #1 performed by Tiffany Sterling MD at [address] Left 12/13/2018    LUMBAR TRANSFORAMINAL TFESI L5 LEFT #2, performed by Tiffany Sterling MD at [address] [place] [address], [address] [place] [address] Bilateral     LUMBAR FACET    [place] INJ DX/THER AGNT PARAVERT FACET JOINT, LUMBAR/SAC, 2ND LEVEL Bilateral 5/7/2018    LUMBAR FACET  [email or web address] Bilateral performed by Tiffany Sterling MD at [facility] ANES/STEROID FORAMEN LUMBAR/SACRAL W IMG GUIDE ,1 LEVEL Left 9/25/2018    LUMBAR TRANSFORAMINAL TFESI L5 on the LEFT performed by Tiffany Sterling MD at [address] [place] [address] DX/THER SBST INTRLMNR LMBR/SAC W/IMG GDN N/A 7/19/2018    LUMBAR INTER LAMINAR GIGI LESI @L4 performed by Tiffany Sterling MD at [address] [place] [address]      Family History   Problem Relation Age of Onset    Diabetes Mother     Heart Disease Mother     Thyroid Disease Mother     Parkinsonism Mother     Diabetes Father     Lupus Maternal Cousin        SOCIAL HISTORY      Social History     Tobacco History     Smoking Status  Former Smoker Smoking Tobacco Type  Cigarettes    Smokeless Tobacco Use  Never Used    Tobacco Comment  1 a day          Alcohol History     Alcohol Use Status  Yes Drinks/Week  0 Standard drinks or equivalent per week Amount  0.0 standard drinks of alcohol/wk Comment  occasionally, couple times a year          Drug Use     Drug Use Status  No          Sexual Activity     Sexually Active  Not Asked                ALLERGIES     Allergies   Allergen Reactions    Codeine Hives and Nausea And Vomiting       CURRENT MEDICATIONS      Current Outpatient Medications   Medication Sig Dispense Refill    azaTHIOprine (IMURAN) 50 MG tablet Take 2 tablets by mouth every morning AND 1.5 tablets every evening. 105 tablet 0    HYDROcodone-acetaminophen (NORCO) 5-325 MG per tablet Take 1 tablet by mouth every 8 hours as needed for Pain for up to 30 days. 90 tablet 0    gabapentin (NEURONTIN) 400 MG capsule Take 1 capsule by mouth 2 times daily for 90 days.  180 capsule 1    ferrous sulfate (IRON 325) 325 (65 Fe) MG tablet take 1 tablet by mouth once daily 90 tablet 3    omeprazole (PRILOSEC) 20 MG delayed release capsule Take 1 capsule by mouth Daily 30 capsule 3    predniSONE (DELTASONE) 2.5 MG tablet Take 1.5 tablets by mouth daily 45 tablet 3    ondansetron (ZOFRAN) 4 MG tablet Take 1 tablet by mouth 3 times daily as needed for Nausea or Vomiting 30 tablet 0    hydroxychloroquine (PLAQUENIL) 200 MG tablet Take 1 tablet by mouth 2 times daily 60 tablet 2    silver sulfADIAZINE (SILVADENE) 1 % cream Apply topically daily BID 50 g 1    mometasone (NASONEX) 50 MCG/ACT nasal spray 2 sprays by Nasal route daily 1 Inhaler 3    acetaminophen (APAP EXTRA STRENGTH) 500 MG tablet Take 2 tablets by mouth every 6 hours as needed for Pain 120 tablet 3    loratadine (CLARITIN) 10 MG tablet Take 1 tablet by mouth daily 90 tablet 3    Calcium Carbonate-Vitamin D (OYSTER SHELL CALCIUM/D) 500-200 MG-UNIT TABS take 1 tablet by mouth once daily 90 Activity: >12     LABS    CBC  Lab Results   Component Value Date    WBC 4.9 10/05/2020    RBC 3.37 10/05/2020    HGB 10.1 10/05/2020    HCT 33.0 10/05/2020    MCV 97.9 10/05/2020    MCH 30.0 10/05/2020    MCHC 30.6 10/05/2020    RDW 18.6 03/24/2020     10/05/2020       CMP  Lab Results   Component Value Date    CALCIUM 10.0 10/05/2020    LABALBU 4.3 10/05/2020    PROT 7.1 10/05/2020     10/05/2020    K 3.8 10/05/2020    CO2 27 10/05/2020     10/05/2020    BUN 23 10/05/2020    CREATININE 1.4 10/05/2020    ALKPHOS 62 10/05/2020    ALT 45 10/05/2020    AST 44 10/05/2020       HgBA1c: No components found for: HGBA1C    Lab Results   Component Value Date    VITD25 15 08/12/2020         Lab Results   Component Value Date    ANASCRN Detected (A) 06/17/2019     Lab Results   Component Value Date    SSA SEE BELOW 06/17/2019     Lab Results   Component Value Date    SSB 0 06/17/2019     Lab Results   Component Value Date    ANTI-SMITH 4 06/17/2019     Lab Results   Component Value Date    DSDNAAB SEE BELOW 06/17/2019      No results found for: ANTIRNP  Lab Results   Component Value Date    C3 102 10/05/2020    C4 14 10/05/2020     Lab Results   Component Value Date    CCPAB 8 06/17/2019     Lab Results   Component Value Date    RF 11 06/17/2019       No components found for: CANCASCRN, APANCASCRN  Lab Results   Component Value Date    SEDRATE 16 10/05/2020     Lab Results   Component Value Date    CRP 0.09 10/05/2020       RADIOLOGY:         ASSESSMENT/PLAN    Assessment   Plan     SLE w/ h/o class IV LN-   - diagnosed in 2001, s/p cytoxan 2007. C4 was 64, 4+ proteinuria, but 24 urine w/ only 1.1 g. 20-29 RBC/HPF. Platelet numbness 685. + BELLE, dsDNA, ANCA (+p-ANCA, neg MPO, ME-3), reported inflammatory arthritis. Exam w/ joint swelling, oral/nasal sores.     - plaquenil 200 mg BID   - restart imuran 100 mg qAM and 75 mg QHS- patient with poor medication compliance- [facility] to again verify refills, has not filled azathioprine in several months and plaquenil was last for 2 weeks in september   - prednisone 5 mg daily    H/o lupus nephritis  Proteinura - worsening  - repeat labs now  - poor medication compliance  - ? Repeat renal biopsy    Chest pain- w/ exertion and improved with rest  - EKG normal   - referral to cardiology- referral was cancelled because patient did not call back or respond to messages left to schedule- reordered referral    Carpal tunnel: bilateral  Bilateral cubital tunnel  - bilat tinel at wrists and elbows. Intermittent numbness/tinlging  - EMG/NCV (Dec 2019) w/ cervical radiculopathy    - gabapentin to 400 mg BID    Abdominal pain   - omeprazole 400 mg BID    Low back pain w/ radiculopathy   - gabapentin 400 mg BID    Anterior chest wall rash- resolved with tx of #1    Medication monitoring   - CBC, CMP, sed rate, CRP, u/a q 4 weeks    - plaquenil eye exam- will hold if not completed- patient states she had it done a couple months ago by Dr. Audi Durham- will get records      No follow-ups on file. Electronically signed by MACKENZIE Truong - CNP on 11/23/2020 at 11:29 AM    New Prescriptions    No medications on file       Thank you for allowing me to participate in the care of this patient. Please call if there are any questions.

## 2020-11-24 ENCOUNTER — TELEPHONE (OUTPATIENT)
Dept: RHEUMATOLOGY | Age: 50
End: 2020-11-24

## 2020-12-02 ENCOUNTER — TELEPHONE (OUTPATIENT)
Dept: FAMILY MEDICINE CLINIC | Age: 50
End: 2020-12-02

## 2020-12-02 NOTE — TELEPHONE ENCOUNTER
Pt called with sxs of abdominal pain after eating, nausea, and diarrhea for 3 days. Pt has no fever, cough, SOB, or loss of taste&smell.     Please advise      [facility]

## 2020-12-02 NOTE — TELEPHONE ENCOUNTER
Pt notified and went over protocol and she will call us when she arrives tomorrow. Told pt to come to back of building and stay in car until she gets a hold of us or we get a hold of her.

## 2020-12-03 ENCOUNTER — HOSPITAL ENCOUNTER (OUTPATIENT)
Age: 50
Discharge: HOME | End: 2020-12-03
Payer: COMMERCIAL

## 2020-12-03 ENCOUNTER — OFFICE VISIT (OUTPATIENT)
Dept: FAMILY MEDICINE CLINIC | Age: 50
End: 2020-12-03
Payer: COMMERCIAL

## 2020-12-03 VITALS
TEMPERATURE: 98.5 F | HEIGHT: 65 IN | OXYGEN SATURATION: 98 % | HEART RATE: 74 BPM | BODY MASS INDEX: 24.69 KG/M2 | WEIGHT: 148.2 LBS | RESPIRATION RATE: 12 BRPM | SYSTOLIC BLOOD PRESSURE: 96 MMHG | DIASTOLIC BLOOD PRESSURE: 68 MMHG

## 2020-12-03 PROBLEM — T24.212A: Status: RESOLVED | Noted: 2020-09-18 | Resolved: 2020-12-03

## 2020-12-03 PROBLEM — T24.112A: Status: RESOLVED | Noted: 2020-09-18 | Resolved: 2020-12-03

## 2020-12-03 PROCEDURE — G8427 DOCREV CUR MEDS BY ELIG CLIN: HCPCS | Performed by: NURSE PRACTITIONER

## 2020-12-03 PROCEDURE — 1036F TOBACCO NON-USER: CPT | Performed by: NURSE PRACTITIONER

## 2020-12-03 PROCEDURE — G8420 CALC BMI NORM PARAMETERS: HCPCS | Performed by: NURSE PRACTITIONER

## 2020-12-03 PROCEDURE — U0003 INFECTIOUS AGENT DETECTION BY NUCLEIC ACID (DNA OR RNA); SEVERE ACUTE RESPIRATORY SYNDROME CORONAVIRUS 2 (SARS-COV-2) (CORONAVIRUS DISEASE [COVID-19]), AMPLIFIED PROBE TECHNIQUE, MAKING USE OF HIGH THROUGHPUT TECHNOLOGIES AS DESCRIBED BY CMS-2020-01-R: HCPCS

## 2020-12-03 PROCEDURE — 99214 OFFICE O/P EST MOD 30 MIN: CPT | Performed by: NURSE PRACTITIONER

## 2020-12-03 PROCEDURE — G8484 FLU IMMUNIZE NO ADMIN: HCPCS | Performed by: NURSE PRACTITIONER

## 2020-12-03 RX ORDER — LEVOTHYROXINE SODIUM 112 UG/1
TABLET ORAL
Qty: 90 TABLET | Refills: 1 | Status: SHIPPED | OUTPATIENT
Start: 2020-12-03 | End: 2021-02-24 | Stop reason: SDUPTHER

## 2020-12-03 RX ORDER — METRONIDAZOLE 500 MG/1
500 TABLET ORAL 3 TIMES DAILY
Qty: 10 TABLET | Refills: 0 | Status: SHIPPED | OUTPATIENT
Start: 2020-12-03 | End: 2020-12-04 | Stop reason: SDUPTHER

## 2020-12-03 RX ORDER — ONDANSETRON 4 MG/1
4 TABLET, FILM COATED ORAL 3 TIMES DAILY PRN
Qty: 30 TABLET | Refills: 0 | Status: SHIPPED | OUTPATIENT
Start: 2020-12-03 | End: 2021-01-06

## 2020-12-03 RX ORDER — CIPROFLOXACIN 500 MG/1
500 TABLET, FILM COATED ORAL 2 TIMES DAILY
Qty: 10 TABLET | Refills: 0 | Status: SHIPPED | OUTPATIENT
Start: 2020-12-03 | End: 2020-12-08

## 2020-12-03 ASSESSMENT — ENCOUNTER SYMPTOMS
DIARRHEA: 1
ABDOMINAL DISTENTION: 0
CHEST TIGHTNESS: 0
SHORTNESS OF BREATH: 0
NAUSEA: 1
CHOKING: 0
ABDOMINAL PAIN: 1
VOMITING: 0
COUGH: 1
WHEEZING: 0

## 2020-12-03 NOTE — PROGRESS NOTES
[facility] [place] [address] MEDICINE   [address]azar [address]  Dept: [phone]  Dept Fax: [phone]  Loc: Tash Lawton is a 52 y.o. Diandra  presents today for her medical conditions/complaints as noted below. Talya MAYER Crideris c/o of Nausea (Pt presents c/o nausea and diarrhea that started 3 days ago. She thinks it may be from some bad meat she had. She denies Vomiting. She has also had a non-productive cough that started 2 days ago, nasal drainage, and HA. She denies SOB or a sore throat. She has not tried or taken anything. )      HPI:      Pt states she ate a ham at a friends house that tasted bad and two days later she started having nausea, stomach upset with some diarrhea. Denies a fever, has not been eating much for the last two days, does not feel like it. Thinks the ham was spoiled. She also has a runny nose and cough but this is normal for her. Has not lost taste or smell, but does feel weak. Hypothyroidism    HPI:  Currently treated for Hypothyroidism? Yes  Fatigue? Yes  Recent change in weight? No  Cold/Heat intolerance? No  Diarrhea/Constipation? Yes  Diaphoresis? No  Anxiety? No  Palpitations? No   Hair Loss? No           Current Outpatient Medications   Medication Sig Dispense Refill    levothyroxine (SYNTHROID) 112 MCG tablet take 1 tablet by mouth once daily 90 tablet 1    metroNIDAZOLE (FLAGYL) 500 MG tablet Take 1 tablet by mouth 3 times daily for 5 days 10 tablet 0    ciprofloxacin (CIPRO) 500 MG tablet Take 1 tablet by mouth 2 times daily for 5 days 10 tablet 0    ondansetron (ZOFRAN) 4 MG tablet Take 1 tablet by mouth 3 times daily as needed for Nausea or Vomiting 30 tablet 0    azaTHIOprine (IMURAN) 50 MG tablet Take 2 tablets by mouth every morning AND 1.5 tablets every evening.  105 tablet 0    HYDROcodone-acetaminophen (NORCO) 5-325 MG per tablet Take 1 tablet by mouth every 8 hours as needed for Pain for up to 30 days. 90 tablet 0    gabapentin (NEURONTIN) 400 MG capsule Take 1 capsule by mouth 2 times daily for 90 days. 180 capsule 1    ferrous sulfate (IRON 325) 325 (65 Fe) MG tablet take 1 tablet by mouth once daily 90 tablet 3    omeprazole (PRILOSEC) 20 MG delayed release capsule Take 1 capsule by mouth Daily 30 capsule 3    predniSONE (DELTASONE) 2.5 MG tablet Take 1.5 tablets by mouth daily 45 tablet 3    ondansetron (ZOFRAN) 4 MG tablet Take 1 tablet by mouth 3 times daily as needed for Nausea or Vomiting 30 tablet 0    hydroxychloroquine (PLAQUENIL) 200 MG tablet Take 1 tablet by mouth 2 times daily 60 tablet 2    silver sulfADIAZINE (SILVADENE) 1 % cream Apply topically daily BID 50 g 1    mometasone (NASONEX) 50 MCG/ACT nasal spray 2 sprays by Nasal route daily 1 Inhaler 3    acetaminophen (APAP EXTRA STRENGTH) 500 MG tablet Take 2 tablets by mouth every 6 hours as needed for Pain 120 tablet 3    loratadine (CLARITIN) 10 MG tablet Take 1 tablet by mouth daily 90 tablet 3    Calcium Carbonate-Vitamin D (OYSTER SHELL CALCIUM/D) 500-200 MG-UNIT TABS take 1 tablet by mouth once daily 90 tablet 3    simvastatin (ZOCOR) 40 MG tablet take 1 tablet by mouth at bedtime 90 tablet 3    fluticasone (FLONASE) 50 MCG/ACT nasal spray 2 sprays by Nasal route daily for 10 days 1 Bottle 0     No current facility-administered medications for this visit.            Past Medical History:   Diagnosis Date    Anemia     CKD (chronic kidney disease), stage III     Dyslipidemia     Hematuria     Hyperlipidemia     Hypertension     Hyperuricemia     Hypothyroidism     Kidney disease     as a result from lupus    [facility]     Lupus nephritis (HCC)     Proteinuria     Seizures (HCC)     Systemic lupus erythematosus ([facility]       Past Surgical History:   Procedure Laterality Date    LUMBAR NERVE BLOCK N/A 3/11/2019    LESI at L4 performed by Ronna Dailey MD at [address] [place] [address] LUMBAR NERVE BLOCK N/A 5/21/2019    LESI at L3 #1 performed by Faizan Fitzpatrick MD at [address] Left 12/13/2018    LUMBAR TRANSFORAMINAL TFESI L5 LEFT #2, performed by Faizan Fitzpatrick MD at [address] Bilateral     LUMBAR FACET    TN INJ DX/THER AGNT PARAVERT FACET JOINT, LUMBAR/SAC, 2ND LEVEL Bilateral 5/7/2018    LUMBAR FACET  [email or web address] Bilateral performed by Faizan Fitzpatrick MD at [place] ANES/STEROID FORAMEN LUMBAR/SACRAL W IMG GUIDE ,1 LEVEL Left 9/25/2018    LUMBAR TRANSFORAMINAL TFESI L5 on the LEFT performed by Faizan Fitzpatrick MD at [address] DX/THER SBST INTRLMNR LMBR/SAC W/IMG GDN N/A 7/19/2018    LUMBAR INTER LAMINAR GIGI LESI @L4 performed by Faizan Fitzpatrick MD at [address] [facility]     Family History   Problem Relation Age of Onset    Diabetes Mother     Heart Disease Mother     Thyroid Disease Mother     Parkinsonism Mother     Diabetes Father     Lupus Maternal Cousin      Social History     Tobacco Use    Smoking status: Former Smoker     Types: Cigarettes    Smokeless tobacco: Never Used    Tobacco comment: 1 a day   Substance Use Topics    Alcohol use:  Yes     Alcohol/week: 0.0 standard drinks     Comment: occasionally, couple times a year        Allergies   Allergen Reactions    Codeine Hives and Nausea And Vomiting       Health Maintenance   Topic Date Due    Flu vaccine (1) 09/01/2020    Lipid screen  10/22/2020    TSH testing  12/10/2020    A1C test (Diabetic or Prediabetic)  09/01/2021    Potassium monitoring  11/23/2021    Creatinine monitoring  11/23/2021    Cervical cancer screen  04/29/2022    DTaP/Tdap/Td vaccine (3 - Td) 08/30/2030    HIV screen  Addressed    Hepatitis A vaccine  Aged Out    Hepatitis B vaccine  Aged Out    Hib vaccine  Aged Out    Meningococcal (ACWY) vaccine  Aged Out    Pneumococcal 0-64 years Vaccine  Aged Out       Subjective:      Review of Systems   Constitutional: Positive for fatigue. Negative for chills and fever. Respiratory: Positive for cough. Negative for choking, chest tightness, shortness of breath and wheezing. Cardiovascular: Negative. Gastrointestinal: Positive for abdominal pain, diarrhea and nausea. Negative for abdominal distention and vomiting. Skin: Negative. Neurological: Positive for weakness. Objective:      BP 96/68   Pulse 74   Temp 98.5 °F (36.9 °C)   Resp 12   Ht 5' 5\" (1.651 m)   Wt 148 lb 3.2 oz (67.2 kg)   SpO2 98%   BMI 24.66 kg/m²      Physical Exam  Vitals signs and nursing note reviewed. Constitutional:       Appearance: She is not ill-appearing. Cardiovascular:      Rate and Rhythm: Normal rate and regular rhythm. Pulses: Normal pulses. Heart sounds: Normal heart sounds. No murmur. Pulmonary:      Effort: Pulmonary effort is normal. No respiratory distress. Breath sounds: Normal breath sounds. No wheezing. Abdominal:      General: Abdomen is flat. Bowel sounds are increased. Tenderness: There is generalized abdominal tenderness. Negative signs include Ospina's sign, Rovsing's sign, McBurney's sign and psoas sign. Comments: Denies RUQ pain   Neurological:      Mental Status: She is alert. Assessment/Plan:           1. Food poisoning  Treat for food poisoning  Advance diet as tolerated    - metroNIDAZOLE (FLAGYL) 500 MG tablet; Take 1 tablet by mouth 3 times daily for 5 days  Dispense: 10 tablet; Refill: 0  - ciprofloxacin (CIPRO) 500 MG tablet; Take 1 tablet by mouth 2 times daily for 5 days  Dispense: 10 tablet; Refill: 0    2. Acquired hypothyroidism  Stable, continue current meds  - levothyroxine (SYNTHROID) 112 MCG tablet; take 1 tablet by mouth once daily  Dispense: 90 tablet; Refill: 1    3.  Encounter for screening mammogram for malignant neoplasm of breast    - RAY[facility] DIGITAL SCREEN W OR WO CAD BILATERAL; Future    4. Diarrhea, unspecified type  Probiotics  Yogurt  If it continues contact office  - COVID-19 Ambulatory; Future    5. Cough    - COVID-19 Ambulatory; Future  Pt in agreement with plan. Return if symptoms worsen or fail to improve. Reccommended tobaccocessation options including pharmacologic methods, counseled great than 3 minutesduring this visit:  Yes[]  No  []       Patient given educational materials -see patient instructions. Discussed use, benefit, and side effects of prescribedmedications. All patient questions answered. Pt voiced understanding. Reviewedhealth maintenance. Instructed to continue current medications, diet and exercise. Patient agreed with treatment plan. Follow up as directed.        Electronicallysigned by MACKENZIE Zhong CNP on 12/3/2020 at 12:47 PM

## 2020-12-04 ENCOUNTER — TELEPHONE (OUTPATIENT)
Dept: FAMILY MEDICINE CLINIC | Age: 50
End: 2020-12-04

## 2020-12-04 RX ORDER — METRONIDAZOLE 500 MG/1
500 TABLET ORAL 2 TIMES DAILY
Qty: 10 TABLET | Refills: 0 | Status: SHIPPED | OUTPATIENT
Start: 2020-12-04 | End: 2020-12-09

## 2020-12-04 RX ORDER — AZATHIOPRINE 50 MG/1
TABLET ORAL
Qty: 90 TABLET | Refills: 0 | Status: SHIPPED | OUTPATIENT
Start: 2020-12-04 | End: 2021-02-04

## 2020-12-04 NOTE — TELEPHONE ENCOUNTER
Insurance declined azathioprine 175 mg daily for quantity limit. Will decrease dose down to 2 mg/kg, 150 mg daily. RX resent. Patient has been non compliant with medications in the past. Called pharmacy after last visit to verify refills. Please let patient know. Also please stress the importance of medication compliance with her.

## 2020-12-05 LAB — SARS-COV-2: DETECTED

## 2020-12-07 ENCOUNTER — TELEPHONE (OUTPATIENT)
Dept: FAMILY MEDICINE CLINIC | Age: 50
End: 2020-12-07

## 2020-12-07 NOTE — TELEPHONE ENCOUNTER
----- Message from MACKENZIE Govea CNP sent at 12/6/2020  1:35 PM EST -----  Let pt know her covid test is positive, ask how she is feeling, she needs to quarantine for 10 days from symptom onset and [facility] Dept will also likely be contacting her.

## 2020-12-07 NOTE — TELEPHONE ENCOUNTER
Pt notified , states she feels about the same ,definitay not worse . pt agreeable ,She will call with any worsening Sx.

## 2020-12-09 ENCOUNTER — TELEPHONE (OUTPATIENT)
Dept: FAMILY MEDICINE CLINIC | Age: 50
End: 2020-12-09

## 2020-12-09 RX ORDER — DEXTROMETHORPHAN POLISTIREX 30 MG/5ML
60 SUSPENSION ORAL 2 TIMES DAILY PRN
Qty: 1 BOTTLE | Refills: 2 | Status: SHIPPED | OUTPATIENT
Start: 2020-12-09 | End: 2020-12-19

## 2020-12-17 RX ORDER — HYDROCODONE BITARTRATE AND ACETAMINOPHEN 5; 325 MG/1; MG/1
1 TABLET ORAL EVERY 8 HOURS PRN
Qty: 90 TABLET | Refills: 0 | Status: SHIPPED | OUTPATIENT
Start: 2020-12-18 | End: 2021-01-18 | Stop reason: SDUPTHER

## 2020-12-21 ENCOUNTER — VIRTUAL VISIT (OUTPATIENT)
Dept: PHYSICAL MEDICINE AND REHAB | Age: 50
End: 2020-12-21
Payer: COMMERCIAL

## 2020-12-21 PROCEDURE — G8428 CUR MEDS NOT DOCUMENT: HCPCS | Performed by: NURSE PRACTITIONER

## 2020-12-21 PROCEDURE — 99213 OFFICE O/P EST LOW 20 MIN: CPT | Performed by: NURSE PRACTITIONER

## 2020-12-21 ASSESSMENT — ENCOUNTER SYMPTOMS
SINUS PRESSURE: 0
CONSTIPATION: 0
RHINORRHEA: 0
EYE PAIN: 0
CHEST TIGHTNESS: 0
NAUSEA: 0
BACK PAIN: 1
ABDOMINAL PAIN: 0
VOMITING: 0
WHEEZING: 0
COUGH: 0
COLOR CHANGE: 0
SHORTNESS OF BREATH: 0
PHOTOPHOBIA: 0
DIARRHEA: 0
SORE THROAT: 0

## 2020-12-21 NOTE — PROGRESS NOTES
provide necessary medical care. This Telehealth audio/visual visit was conducted using D[employer]. me. Patient identification was verified at the start of this visit: yes      HPI   F/U continues to have neck mid back low back  hip pain. She has not been able to complete PT. Injections were denied until finishes PT. She continues to work. She continues to take Norco Neurontin. She has had TFLESI L5 left 2018 with 50% pain relief for 18 months. She had LESI L3 and L4 in 2019 without relief. We have discussed  ELLIOT@ C6 TFCESI C5-6 6-7 right or C5-6 left per EMG. We have also discussed Thoracic facet MBB if need. She has increased pain with walking standing bending lifting twisting turning long term. She follows Rheumatology for  RA. Her LLE burn is healed now. Medications reviewed. Patient denies side effects with medications. Patient states she is taking medications as prescribed. Shedenies receiving pain medications from other sources. She denies any ER visits since last visit. Pain scale with out pain medications or at its worst is 7/10. Pain scale with pain medications or at its best is 2/10. Lorean Snare LAST UDS 10/2020 WNL    The patientis allergic to codeine. Past Medical History  Nya Braswell  has a past medical history of Anemia, CKD (chronic kidney disease), stage III, Dyslipidemia, Hematuria, Hyperlipidemia, Hypertension, Hyperuricemia, Hypothyroidism, Kidney disease, Lupus ([address] Lupus nephritis ([address] Proteinuria, Seizures ([address] and Systemic lupus erythematosus ([address] Past Surgical History  The patient  has a past surgical history that includes Nerve Block (Bilateral); pr inj dx/ther agnt paravert facet joint, lumbar/sac, 2nd level (Bilateral, 5/7/2018); pr njx dx/ther sbst intrlmnr lmbr/sac w/img gdn (N/A, 7/19/2018); pr njx aa&/strd tfrml epi lumbar/sacral 1 level (Left, 9/25/2018); Lumbar spine surgery (Left, 12/13/2018); lumbar nerve block (N/A, 3/11/2019); and lumbar nerve block (N/A, 5/21/2019). Family History  This patient's family history includes Diabetes in her father and mother; Heart Disease in her mother; Lupus in her maternal cousin; Parkinsonism in her mother; Thyroid Disease in her mother. Social History  Schuyler Shay  reports that she has quit smoking. Her smoking use included cigarettes. She has never used smokeless tobacco. She reports current alcohol use. She reports that she does not use drugs. Medications    Current Outpatient Medications:     HYDROcodone-acetaminophen (NORCO) 5-325 MG per tablet, Take 1 tablet by mouth every 8 hours as needed for Pain for up to 30 days. , Disp: 90 tablet, Rfl: 0    azaTHIOprine (IMURAN) 50 MG tablet, Take 2 tablets by mouth every morning AND 1 tablet every evening., Disp: 90 tablet, Rfl: 0    levothyroxine (SYNTHROID) 112 MCG tablet, take 1 tablet by mouth once daily, Disp: 90 tablet, Rfl: 1    ondansetron (ZOFRAN) 4 MG tablet, Take 1 tablet by mouth 3 times daily as needed for Nausea or Vomiting, Disp: 30 tablet, Rfl: 0    gabapentin (NEURONTIN) 400 MG capsule, Take 1 capsule by mouth 2 times daily for 90 days. , Disp: 180 capsule, Rfl: 1    ferrous sulfate (IRON 325) 325 (65 Fe) MG tablet, take 1 tablet by mouth once daily, Disp: 90 tablet, Rfl: 3    omeprazole (PRILOSEC) 20 MG delayed release capsule, Take 1 capsule by mouth Daily, Disp: 30 capsule, Rfl: 3    predniSONE (DELTASONE) 2.5 MG tablet, Take 1.5 tablets by mouth daily, Disp: 45 tablet, Rfl: 3    ondansetron (ZOFRAN) 4 MG tablet, Take 1 tablet by mouth 3 times daily as needed for Nausea or Vomiting, Disp: 30 tablet, Rfl: 0    hydroxychloroquine (PLAQUENIL) 200 MG tablet, Take 1 tablet by mouth 2 times daily, Disp: 60 tablet, Rfl: 2    silver sulfADIAZINE (SILVADENE) 1 % cream, Apply topically daily BID, Disp: 50 g, Rfl: 1    mometasone (NASONEX) 50 MCG/ACT nasal spray, 2 sprays by Nasal route daily, Disp: 1 Inhaler, Rfl: 3    acetaminophen (APAP EXTRA STRENGTH) 500 MG tablet, Take 2 tablets by mouth every 6 hours as needed for Pain, Disp: 120 tablet, Rfl: 3    loratadine (CLARITIN) 10 MG tablet, Take 1 tablet by mouth daily, Disp: 90 tablet, Rfl: 3    Calcium Carbonate-Vitamin D (OYSTER SHELL CALCIUM/D) 500-200 MG-UNIT TABS, take 1 tablet by mouth once daily, Disp: 90 tablet, Rfl: 3    simvastatin (ZOCOR) 40 MG tablet, take 1 tablet by mouth at bedtime, Disp: 90 tablet, Rfl: 3    fluticasone (FLONASE) 50 MCG/ACT nasal spray, 2 sprays by Nasal route daily for 10 days, Disp: 1 Bottle, Rfl: 0    Subjective:      Review of Systems   Constitutional: Negative for activity change, appetite change, chills, diaphoresis, fatigue, fever and unexpected weight change. HENT: Positive for dental problem. Negative for congestion, ear pain, hearing loss, mouth sores, nosebleeds, rhinorrhea, sinus pressure and sore throat. Dental infection recently   Eyes: Negative for photophobia, pain and visual disturbance. Respiratory: Negative for cough, chest tightness, shortness of breath and wheezing. COVID     Cardiovascular: Negative for chest pain and palpitations. HTN CAD   Gastrointestinal: Negative for abdominal pain, constipation, diarrhea, nausea and vomiting. Endocrine: Negative for cold intolerance, heat intolerance, polydipsia, polyphagia and polyuria. Thyroid issues   Genitourinary: Negative for decreased urine volume, difficulty urinating, frequency and hematuria. CKD   Musculoskeletal: Positive for arthralgias, back pain, myalgias, neck pain and neck stiffness. Negative for gait problem. Skin: Negative for color change and rash. Hot water burn  8/31/2020 . healed   Allergic/Immunologic: Negative for food allergies and immunocompromised state. Neurological: Positive for weakness, numbness and headaches. Negative for dizziness, tremors, seizures, syncope, facial asymmetry, speech difficulty and light-headedness.         H/o seizures,no seizures in 12 years.   H/O Lupus   Hematological: Does not bruise/bleed easily. Psychiatric/Behavioral: Negative for agitation, behavioral problems, confusion, decreased concentration, dysphoric mood, hallucinations, self-injury, sleep disturbance and suicidal ideas. The patient is not nervous/anxious and is not hyperactive. Objective: There were no vitals filed for this visit. .     Assessment:     1. Spondylosis of lumbar region without myelopathy or radiculopathy    2. SI (sacroiliac) joint inflammation (HCC)    3. Lumbar stenosis with neurogenic claudication    4. Spondylosis of thoracic region without myelopathy or radiculopathy    5. Lumbar radiculitis    6. Bilateral sacroiliitis (HCC)    7. Cervical spinal stenosis    8. Chronic pain syndrome    9. Lumbar radiculopathy            Plan:      · OARRS reviewed. Current MED: 15  · Patient was not offered naloxone for home. · Discussed long term side effects of medications, tolerance, dependency and addiction. · Previous UDS reviewed  · UDS preformed today for compliance. · Patient told can not receive any pain medications from any other source. · No evidence of abuse, diversion or aberrant behavior.  Medications and/or procedures to improve function and quality of life- patient understanding with this and that may not be pain free   Discussed with patient about safe storage of medications at home   Discussed possible weaning of medication dosing dependent on treatment/procedure results.  Testing: none:    Procedures: needs to finish PT will try this winter, will call the office for new order when able   Discussed with patient about risks with procedure including infection, reaction to medication, increased pain, or bleeding.  Medications:Norco Neurontin      Meds. Prescribed:   No orders of the defined types were placed in this encounter. Return in about 3 months (around 3/21/2021) for Follow up pain medications.          Time spent with

## 2021-01-04 ENCOUNTER — OFFICE VISIT (OUTPATIENT)
Dept: RHEUMATOLOGY | Age: 51
End: 2021-01-04
Payer: COMMERCIAL

## 2021-01-04 ENCOUNTER — TELEPHONE (OUTPATIENT)
Dept: RHEUMATOLOGY | Age: 51
End: 2021-01-04

## 2021-01-04 ENCOUNTER — NURSE ONLY (OUTPATIENT)
Dept: LAB | Age: 51
End: 2021-01-04

## 2021-01-04 VITALS
HEIGHT: 65 IN | HEART RATE: 67 BPM | WEIGHT: 153 LBS | SYSTOLIC BLOOD PRESSURE: 118 MMHG | BODY MASS INDEX: 25.49 KG/M2 | TEMPERATURE: 97.9 F | DIASTOLIC BLOOD PRESSURE: 72 MMHG | OXYGEN SATURATION: 95 %

## 2021-01-04 DIAGNOSIS — M32.14 OTHER SYSTEMIC LUPUS ERYTHEMATOSUS WITH GLOMERULAR DISEASE (HCC): Primary | ICD-10-CM

## 2021-01-04 DIAGNOSIS — R07.9 CHEST PAIN, UNSPECIFIED TYPE: ICD-10-CM

## 2021-01-04 DIAGNOSIS — M32.14 OTHER SYSTEMIC LUPUS ERYTHEMATOSUS WITH GLOMERULAR DISEASE (HCC): ICD-10-CM

## 2021-01-04 DIAGNOSIS — Z87.39 H/O LUPUS NEPHRITIS: ICD-10-CM

## 2021-01-04 DIAGNOSIS — G56.23 CUBITAL TUNNEL SYNDROME, BILATERAL: ICD-10-CM

## 2021-01-04 DIAGNOSIS — R74.8 ELEVATED LIVER ENZYMES: ICD-10-CM

## 2021-01-04 DIAGNOSIS — M54.40 BACK PAIN OF LUMBAR REGION WITH SCIATICA: ICD-10-CM

## 2021-01-04 DIAGNOSIS — Z51.81 MEDICATION MONITORING ENCOUNTER: ICD-10-CM

## 2021-01-04 DIAGNOSIS — G56.03 CARPAL TUNNEL SYNDROME, BILATERAL: ICD-10-CM

## 2021-01-04 LAB
ALBUMIN SERPL-MCNC: 4 G/DL (ref 3.5–5.1)
ALP BLD-CCNC: 50 U/L (ref 38–126)
ALT SERPL-CCNC: 52 U/L (ref 11–66)
ANION GAP SERPL CALCULATED.3IONS-SCNC: 10 MEQ/L (ref 8–16)
AST SERPL-CCNC: 50 U/L (ref 5–40)
BACTERIA: ABNORMAL
BASOPHILS # BLD: 0.4 %
BASOPHILS ABSOLUTE: 0 THOU/MM3 (ref 0–0.1)
BILIRUB SERPL-MCNC: 0.2 MG/DL (ref 0.3–1.2)
BILIRUBIN URINE: NEGATIVE
BLOOD, URINE: NEGATIVE
BUN BLDV-MCNC: 23 MG/DL (ref 7–22)
C-REACTIVE PROTEIN: 0.07 MG/DL (ref 0–1)
CALCIUM SERPL-MCNC: 10.7 MG/DL (ref 8.5–10.5)
CASTS: ABNORMAL /LPF
CASTS: ABNORMAL /LPF
CHARACTER, URINE: CLEAR
CHLORIDE BLD-SCNC: 103 MEQ/L (ref 98–111)
CO2: 27 MEQ/L (ref 23–33)
COLOR: YELLOW
CREAT SERPL-MCNC: 1.6 MG/DL (ref 0.4–1.2)
CREATININE URINE: 220.4 MG/DL
CRYSTALS: ABNORMAL
EOSINOPHIL # BLD: 0.6 %
EOSINOPHILS ABSOLUTE: 0 THOU/MM3 (ref 0–0.4)
EPITHELIAL CELLS, UA: ABNORMAL /HPF
ERYTHROCYTE [DISTWIDTH] IN BLOOD BY AUTOMATED COUNT: 15.8 % (ref 11.5–14.5)
ERYTHROCYTE [DISTWIDTH] IN BLOOD BY AUTOMATED COUNT: 56.3 FL (ref 35–45)
GFR SERPL CREATININE-BSD FRML MDRD: 34 ML/MIN/1.73M2
GLUCOSE BLD-MCNC: 104 MG/DL (ref 70–108)
GLUCOSE, URINE: NEGATIVE MG/DL
HCT VFR BLD CALC: 37 % (ref 37–47)
HEMOGLOBIN: 11 GM/DL (ref 12–16)
IMMATURE GRANS (ABS): 0.02 THOU/MM3 (ref 0–0.07)
IMMATURE GRANULOCYTES: 0.4 %
KETONES, URINE: NEGATIVE
LEUKOCYTE EST, POC: NEGATIVE
LYMPHOCYTES # BLD: 24.2 %
LYMPHOCYTES ABSOLUTE: 1.2 THOU/MM3 (ref 1–4.8)
MCH RBC QN AUTO: 29.1 PG (ref 26–33)
MCHC RBC AUTO-ENTMCNC: 29.7 GM/DL (ref 32.2–35.5)
MCV RBC AUTO: 97.9 FL (ref 81–99)
MISCELLANEOUS LAB TEST RESULT: ABNORMAL
MONOCYTES # BLD: 6 %
MONOCYTES ABSOLUTE: 0.3 THOU/MM3 (ref 0.4–1.3)
NITRITE, URINE: NEGATIVE
NUCLEATED RED BLOOD CELLS: 0 /100 WBC
PH UA: 5 (ref 5–9)
PLATELET # BLD: 130 THOU/MM3 (ref 130–400)
PMV BLD AUTO: 13.1 FL (ref 9.4–12.4)
POTASSIUM SERPL-SCNC: 4.1 MEQ/L (ref 3.5–5.2)
PROT/CREAT RATIO, UR: 1.35
PROTEIN UA: 300 MG/DL
PROTEIN, URINE: 297.1 MG/DL
RBC # BLD: 3.78 MILL/MM3 (ref 4.2–5.4)
RBC URINE: ABNORMAL /HPF
RENAL EPITHELIAL, UA: ABNORMAL
SEDIMENTATION RATE, ERYTHROCYTE: 18 MM/HR (ref 0–20)
SEG NEUTROPHILS: 68.4 %
SEGMENTED NEUTROPHILS ABSOLUTE COUNT: 3.3 THOU/MM3 (ref 1.8–7.7)
SODIUM BLD-SCNC: 140 MEQ/L (ref 135–145)
SPECIFIC GRAVITY UA: 1.02 (ref 1–1.03)
TOTAL PROTEIN: 7.4 G/DL (ref 6.1–8)
UROBILINOGEN, URINE: 0.2 EU/DL (ref 0–1)
WBC # BLD: 4.8 THOU/MM3 (ref 4.8–10.8)
WBC UA: ABNORMAL /HPF
YEAST: ABNORMAL

## 2021-01-04 PROCEDURE — 1036F TOBACCO NON-USER: CPT | Performed by: NURSE PRACTITIONER

## 2021-01-04 PROCEDURE — G8427 DOCREV CUR MEDS BY ELIG CLIN: HCPCS | Performed by: NURSE PRACTITIONER

## 2021-01-04 PROCEDURE — 99214 OFFICE O/P EST MOD 30 MIN: CPT | Performed by: NURSE PRACTITIONER

## 2021-01-04 PROCEDURE — G8417 CALC BMI ABV UP PARAM F/U: HCPCS | Performed by: NURSE PRACTITIONER

## 2021-01-04 PROCEDURE — G8484 FLU IMMUNIZE NO ADMIN: HCPCS | Performed by: NURSE PRACTITIONER

## 2021-01-04 RX ORDER — HYDROXYCHLOROQUINE SULFATE 200 MG/1
200 TABLET, FILM COATED ORAL 2 TIMES DAILY
Qty: 60 TABLET | Refills: 5 | Status: SHIPPED | OUTPATIENT
Start: 2021-01-04 | End: 2021-05-03 | Stop reason: SDUPTHER

## 2021-01-04 SDOH — HEALTH STABILITY: MENTAL HEALTH: HOW MANY STANDARD DRINKS CONTAINING ALCOHOL DO YOU HAVE ON A TYPICAL DAY?: NOT ASKED

## 2021-01-04 SDOH — HEALTH STABILITY: MENTAL HEALTH: HOW OFTEN DO YOU HAVE A DRINK CONTAINING ALCOHOL?: MONTHLY OR LESS

## 2021-01-04 ASSESSMENT — ENCOUNTER SYMPTOMS
EYE PAIN: 0
TROUBLE SWALLOWING: 0
DIARRHEA: 0
BACK PAIN: 1
CONSTIPATION: 0
EYE ITCHING: 0
SHORTNESS OF BREATH: 1
COUGH: 0
NAUSEA: 0
ABDOMINAL PAIN: 1

## 2021-01-04 NOTE — PROGRESS NOTES
[facility] RHEUMATOLOGY FOLLOW UP NOTE       Date Of Service: 1/4/2021  Provider: MACKENZIE Rebollar - CNP    Name: Shayy Oconnor   MRN: [id number]    Chief Complaint(s)     Chief Complaint   Patient presents with    Follow-up     6 week  systemic lupus        History of Present Illness (HPI)     Shayy Oconnor  is a(n)50 y.o. female with a hx of systemic lupus with hiistory of class IV lupus nephritis, CKD stage III, chronic low back pain w/ radiculopathy, lumbar stenosis, h/o seizures here for the f/u evaluation of systemic lupus     Interval hx:    - states she is taking the azathioprine- almost out. Overdue for labs. pain affecting the right fingers, right wrist, right elbow, right shoulder, knees, ankles, neck, back  Pain on a scale 0-10: 8/10  Type of pain: constant  Timing: mornings and evenings  Aggravating factors: weather, back: prolonged bending, forward flexion. Right hand and arm: increases use  Alleviating factors: norco, hot back, gabapentin    Associated symptoms:  + swelling/  Redness/ warmth (ankles, wrists fingers), + AM stiffness lasting ~ 30 minutes    Chest pain and associated SOB continued. Did not see cardiology yet. Referral notes state they have attempted to contact her 3 times and sent a letter. Patient states she called back to schedule and left a message. EKG was normal.     REVIEW OF SYSTEMS: (ROS)    Review of Systems   Constitutional: Positive for appetite change and unexpected weight change. Negative for fatigue and fever. HENT: Positive for congestion. Negative for trouble swallowing. Hair loss   Eyes: Negative for pain and itching. Respiratory: Positive for shortness of breath. Negative for cough. Cardiovascular: Positive for chest pain. Negative for leg swelling. Gastrointestinal: Positive for abdominal pain. Negative for constipation, diarrhea and nausea. Endocrine: Negative for cold intolerance and heat intolerance.    Genitourinary: Negative Mother     Parkinsonism Mother     Diabetes Father     Lupus Maternal Cousin        SOCIAL HISTORY      Social History     Tobacco History     Smoking Status  Former Smoker Smoking Tobacco Type  Cigarettes    Smokeless Tobacco Use  Never Used    Tobacco Comment  1 a day          Alcohol History     Alcohol Use Status  Yes Drinks/Week  0 Standard drinks or equivalent per week Amount  0.0 standard drinks of alcohol/wk Comment  occasionally, couple times a year          Drug Use     Drug Use Status  No          Sexual Activity     Sexually Active  Not Asked                ALLERGIES     Allergies   Allergen Reactions    Codeine Hives and Nausea And Vomiting       CURRENT MEDICATIONS      Current Outpatient Medications   Medication Sig Dispense Refill    HYDROcodone-acetaminophen (NORCO) 5-325 MG per tablet Take 1 tablet by mouth every 8 hours as needed for Pain for up to 30 days. 90 tablet 0    azaTHIOprine (IMURAN) 50 MG tablet Take 2 tablets by mouth every morning AND 1 tablet every evening. 90 tablet 0    levothyroxine (SYNTHROID) 112 MCG tablet take 1 tablet by mouth once daily 90 tablet 1    ondansetron (ZOFRAN) 4 MG tablet Take 1 tablet by mouth 3 times daily as needed for Nausea or Vomiting 30 tablet 0    gabapentin (NEURONTIN) 400 MG capsule Take 1 capsule by mouth 2 times daily for 90 days.  180 capsule 1    ferrous sulfate (IRON 325) 325 (65 Fe) MG tablet take 1 tablet by mouth once daily 90 tablet 3    omeprazole (PRILOSEC) 20 MG delayed release capsule Take 1 capsule by mouth Daily 30 capsule 3    predniSONE (DELTASONE) 2.5 MG tablet Take 1.5 tablets by mouth daily 45 tablet 3    ondansetron (ZOFRAN) 4 MG tablet Take 1 tablet by mouth 3 times daily as needed for Nausea or Vomiting 30 tablet 0    hydroxychloroquine (PLAQUENIL) 200 MG tablet Take 1 tablet by mouth 2 times daily 60 tablet 2    silver sulfADIAZINE (SILVADENE) 1 % cream Apply topically daily BID 50 g 1    mometasone (NASONEX) 50 + tender right    Lower extremities:  HIPS nontender  KNEES nontender, no swelling  ANKLES nontender, no swelling   FEET : nontender, no swelling       RAPID 3:   1/4/2021 --- RAPID 3: 3.7 + 8 + 7 = 18.7     Remission: <3  Low Disease Activity: <6  Moderate Disease Activity: >=6 and <=12  High Disease Activity: >12     LABS    CBC  Lab Results   Component Value Date    WBC 4.7 11/23/2020    RBC 3.80 11/23/2020    HGB 11.3 11/23/2020    HCT 37.3 11/23/2020    MCV 98.2 11/23/2020    MCH 29.7 11/23/2020    MCHC 30.3 11/23/2020    RDW 18.6 03/24/2020     11/23/2020       CMP  Lab Results   Component Value Date    CALCIUM 10.0 11/23/2020    LABALBU 4.3 11/23/2020    PROT 6.8 11/23/2020     11/23/2020    K 3.9 11/23/2020    CO2 25 11/23/2020     11/23/2020    BUN 14 11/23/2020    CREATININE 1.5 11/23/2020    ALKPHOS 52 11/23/2020    ALT 24 11/23/2020    AST 30 11/23/2020       HgBA1c: No components found for: HGBA1C    Lab Results   Component Value Date    VITD25 15 08/12/2020         Lab Results   Component Value Date    ANASCRN Detected (A) 06/17/2019     Lab Results   Component Value Date    SSA SEE BELOW 06/17/2019     Lab Results   Component Value Date    SSB 0 06/17/2019     Lab Results   Component Value Date    ANTI-SMITH 4 06/17/2019     Lab Results   Component Value Date    DSDNAAB SEE BELOW 06/17/2019      No results found for: ANTIRNP  Lab Results   Component Value Date    C3 102 10/05/2020    C4 14 10/05/2020     Lab Results   Component Value Date    CCPAB 8 06/17/2019     Lab Results   Component Value Date    RF 11 06/17/2019       No components found for: CANCASCRN, APANCASCRN  Lab Results   Component Value Date    SEDRATE 10 11/23/2020     Lab Results   Component Value Date    CRP < 0.03 11/23/2020       RADIOLOGY:         ASSESSMENT/PLAN    Assessment   Plan     SLE w/ h/o class IV LN-   - diagnosed in 2001, s/p cytoxan 2007.  C4 was 64, 4+ proteinuria, but 24 urine w/ only 1.1 g. 20-29 RBC/HPF. + BELLE, dsDNA, ANCA (+p-ANCA, neg MPO, OK-3), reported inflammatory arthritis. Exam w/ joint swelling, oral/nasal sores. - plaquenil 200 mg BID   - imuran 100 mg qAM and 50 mg QHS   - prednisone 3.75 mg daily    H/o lupus nephritis  Proteinura   - repeat labs now- new orders given  - poor medication compliance  - ? Repeat renal biopsy    Chest pain- w/ exertion and improved with rest  - EKG normal   - referral to cardiology- called cardiology and appointment time given to patient    Carpal tunnel: bilateral  Bilateral cubital tunnel  - bilat tinel at wrists and elbows. Intermittent numbness/tinlging  - EMG/NCV (Dec 2019) w/ cervical radiculopathy    - gabapentin to 400 mg BID    Abdominal pain   - omeprazole 400 mg BID    Low back pain w/ radiculopathy   - gabapentin 400 mg BID    Anterior chest wall rash- resolved with tx of #1    Medication monitoring   - CBC, CMP, sed rate, CRP, u/a q 4 weeks- DUE NOW   - plaquenil eye exam- need records from Dr. Cynthia Juarez office      No follow-ups on file. Electronically signed by MACKENZIE Mathis - CNP on 1/4/2021 at 9:22 AM    New Prescriptions    No medications on file       Thank you for allowing me to participate in the care of this patient. Please call if there are any questions.

## 2021-01-04 NOTE — TELEPHONE ENCOUNTER
Systemic lupus w/ ho lupus nephritis. LFT elevation       - holding azathioprine x 1 week then re-evaluation of LFTs  - checking C3, C4,   - discussed possibility of repeat renal biopsy for restaging with the increased proteinuria. - discussed change to MMF if the LFT's improve after holding the azthioprine and b/c of the worsening proteinuria.

## 2021-01-04 NOTE — TELEPHONE ENCOUNTER
----- Message from MACKENZIE Rebollar CNP sent at 1/4/2021  2:18 PM EST -----  Will you review her U/A and let me know what you want to do with her?  Kidney biopsy?  ----- Message -----  From: Andrew Finley Incoming Lab Results From Soft  Sent: 1/4/2021  12:11 PM EST  To: MACKENZIE Rebollar CNP

## 2021-01-05 ENCOUNTER — TELEPHONE (OUTPATIENT)
Dept: NEPHROLOGY | Age: 51
End: 2021-01-05

## 2021-01-05 NOTE — TELEPHONE ENCOUNTER
Thank you chief. She has appointment in February but I will see if I can be able to see her this week and take care of it.   Thank you for the good work

## 2021-01-06 ENCOUNTER — OFFICE VISIT (OUTPATIENT)
Dept: CARDIOLOGY CLINIC | Age: 51
End: 2021-01-06
Payer: COMMERCIAL

## 2021-01-06 VITALS
BODY MASS INDEX: 24.4 KG/M2 | SYSTOLIC BLOOD PRESSURE: 116 MMHG | DIASTOLIC BLOOD PRESSURE: 72 MMHG | HEIGHT: 66 IN | HEART RATE: 80 BPM | WEIGHT: 151.8 LBS

## 2021-01-06 DIAGNOSIS — R06.02 SHORTNESS OF BREATH: ICD-10-CM

## 2021-01-06 DIAGNOSIS — R07.2 PRECORDIAL PAIN: ICD-10-CM

## 2021-01-06 DIAGNOSIS — I10 ESSENTIAL HYPERTENSION: Primary | ICD-10-CM

## 2021-01-06 DIAGNOSIS — E78.01 FAMILIAL HYPERCHOLESTEROLEMIA: ICD-10-CM

## 2021-01-06 PROCEDURE — 99204 OFFICE O/P NEW MOD 45 MIN: CPT | Performed by: NUCLEAR MEDICINE

## 2021-01-06 PROCEDURE — G8427 DOCREV CUR MEDS BY ELIG CLIN: HCPCS | Performed by: NUCLEAR MEDICINE

## 2021-01-06 PROCEDURE — G8484 FLU IMMUNIZE NO ADMIN: HCPCS | Performed by: NUCLEAR MEDICINE

## 2021-01-06 PROCEDURE — 1036F TOBACCO NON-USER: CPT | Performed by: NUCLEAR MEDICINE

## 2021-01-06 PROCEDURE — G8420 CALC BMI NORM PARAMETERS: HCPCS | Performed by: NUCLEAR MEDICINE

## 2021-01-06 ASSESSMENT — ENCOUNTER SYMPTOMS
ABDOMINAL PAIN: 0
BLOOD IN STOOL: 0
RECTAL PAIN: 0
NAUSEA: 0
BACK PAIN: 1
VOMITING: 0
DIARRHEA: 0
ABDOMINAL DISTENTION: 0
SHORTNESS OF BREATH: 1
PHOTOPHOBIA: 0
ANAL BLEEDING: 0
CONSTIPATION: 0
CHEST TIGHTNESS: 1

## 2021-01-06 NOTE — PROGRESS NOTES
[address] [facility]  [address]  [facility] [address]  Dept: [phone]  Dept Fax: [phone]  Loc: [phone]    Visit Date: 1/6/2021    Mile Baltazar is a 52 y.o. female who presents todayfor:  Chief Complaint   Patient presents with    New Patient    Establish Cardiologist    Chest Pain    Shortness of Breath    Hyperlipidemia   here for the first time  Had lupus since 2000  Did have intermittent chest pain   Did have more dyspnea lately   More than usual   Chronic but worsened  No known CAD before  Chest pain is resting   No triggers  Some times exertional   No radiation per se  Here for evaluation   Does have hyperlipidemia  On statins   She is x smoker  No recent smoking   Does have family history of CAD       HPI:  HPI  Past Medical History:   Diagnosis Date    Anemia     CKD (chronic kidney disease), stage III     Dyslipidemia     Hematuria     Hyperlipidemia     Hypertension     Hyperuricemia     Hypothyroidism     Kidney disease     as a result from lupus    Lupus ([address]     Lupus nephritis ([address]     Proteinuria     Seizures ([address]     Systemic lupus erythematosus ([address]       Past Surgical History:   Procedure Laterality Date    LUMBAR NERVE BLOCK N/A 3/11/2019    LESI at L4 performed by Casey [facility] at [facility] N/A 5/21/2019    LESI at L3 #1 performed by Casey Rivers MD at [address] [facility] Left 12/13/2018    LUMBAR TRANSFORAMINAL TFESI L5 LEFT #2, performed by Casey Rivers MD at [address] [facility], [address] [place] [address] Bilateral     LUMBAR FACET    OH INJ DX/THER AGNT PARAVERT FACET JOINT, LUMBAR/SAC, 2ND LEVEL Bilateral 5/7/2018    LUMBAR FACET  RAMBO [email or web address] Bilateral performed by Casey Rivers MD at [address] [facility] AA&/STRD TFRML EPI LUMBAR/SACRAL 1 LEVEL Left 9/25/2018    LUMBAR TRANSFORAMINAL TFESI L5 on the LEFT performed by Julia Mendosa MD at [address] [facility] DX/THER SBST INTRLMNR LMBR/SAC W/IMG GDN N/A 7/19/2018    LUMBAR INTER LAMINAR GIGI LESI @L4 performed by Julia Mendosa MD at [address] [facility]     Family History   Problem Relation Age of Onset    Diabetes Mother     Heart Disease Mother     Thyroid Disease Mother     Parkinsonism Mother     Diabetes Father     Lupus Maternal Cousin      Social History     Tobacco Use    Smoking status: Former Smoker     Types: Cigarettes    Smokeless tobacco: Never Used    Tobacco comment: 1 a day   Substance Use Topics    Alcohol use: Yes     Frequency: Monthly or less     Comment: occasionally, couple times a year      Current Outpatient Medications   Medication Sig Dispense Refill    hydroxychloroquine (PLAQUENIL) 200 MG tablet Take 1 tablet by mouth 2 times daily 60 tablet 5    HYDROcodone-acetaminophen (NORCO) 5-325 MG per tablet Take 1 tablet by mouth every 8 hours as needed for Pain for up to 30 days. 90 tablet 0    levothyroxine (SYNTHROID) 112 MCG tablet take 1 tablet by mouth once daily 90 tablet 1    gabapentin (NEURONTIN) 400 MG capsule Take 1 capsule by mouth 2 times daily for 90 days.  180 capsule 1    ferrous sulfate (IRON 325) 325 (65 Fe) MG tablet take 1 tablet by mouth once daily 90 tablet 3    omeprazole (PRILOSEC) 20 MG delayed release capsule Take 1 capsule by mouth Daily 30 capsule 3    predniSONE (DELTASONE) 2.5 MG tablet Take 1.5 tablets by mouth daily 45 tablet 3    ondansetron (ZOFRAN) 4 MG tablet Take 1 tablet by mouth 3 times daily as needed for Nausea or Vomiting 30 tablet 0    silver sulfADIAZINE (SILVADENE) 1 % cream Apply topically daily BID 50 g 1    mometasone (NASONEX) 50 MCG/ACT nasal spray 2 sprays by Nasal route daily 1 Inhaler 3    acetaminophen (APAP EXTRA STRENGTH) 500 MG tablet Take 2 tablets by mouth every 6 hours as needed for Pain 120 tablet 3  loratadine (CLARITIN) 10 MG tablet Take 1 tablet by mouth daily 90 tablet 3    Calcium Carbonate-Vitamin D (OYSTER SHELL CALCIUM/D) 500-200 MG-UNIT TABS take 1 tablet by mouth once daily 90 tablet 3    simvastatin (ZOCOR) 40 MG tablet take 1 tablet by mouth at bedtime 90 tablet 3    azaTHIOprine (IMURAN) 50 MG tablet Take 2 tablets by mouth every morning AND 1 tablet every evening. 90 tablet 0    fluticasone (FLONASE) 50 MCG/ACT nasal spray 2 sprays by Nasal route daily for 10 days 1 Bottle 0     No current facility-administered medications for this visit. Allergies   Allergen Reactions    Codeine Hives and Nausea And Vomiting     Health Maintenance   Topic Date Due    Flu vaccine (1) 09/01/2020    Lipid screen  10/22/2020    TSH testing  12/10/2020    A1C test (Diabetic or Prediabetic)  09/01/2021    Potassium monitoring  01/04/2022    Creatinine monitoring  01/04/2022    Cervical cancer screen  04/29/2022    DTaP/Tdap/Td vaccine (3 - Td) 08/30/2030    Hepatitis C screen  Completed    HIV screen  Addressed    Hepatitis A vaccine  Aged Out    Hepatitis B vaccine  Aged Out    Hib vaccine  Aged Out    Meningococcal (ACWY) vaccine  Aged Out    Pneumococcal 0-64 years Vaccine  Aged Out       Subjective:  Review of Systems   Constitutional: Positive for fatigue. Negative for chills. HENT: Negative for ear discharge and mouth sores. Eyes: Negative for photophobia. Respiratory: Positive for chest tightness and shortness of breath. Cardiovascular: Positive for chest pain and palpitations. Gastrointestinal: Negative for abdominal distention, abdominal pain, anal bleeding, blood in stool, constipation, diarrhea, nausea, rectal pain and vomiting. Endocrine: Negative for polyphagia. Genitourinary: Negative for dysuria and hematuria. Musculoskeletal: Positive for arthralgias, back pain, gait problem, joint swelling, myalgias, neck pain and neck stiffness.    Neurological: Negative for dizziness, syncope and light-headedness. Psychiatric/Behavioral: Negative for confusion, decreased concentration, dysphoric mood, hallucinations and self-injury. The patient is not nervous/anxious and is not hyperactive. Objective:  Physical Exam  HENT:      Head: Normocephalic. Nose: Nose normal.      Mouth/Throat:      Mouth: Mucous membranes are moist.   Eyes:      Extraocular Movements: Extraocular movements intact. Pupils: Pupils are equal, round, and reactive to light. Neck:      Musculoskeletal: Normal range of motion. Cardiovascular:      Rate and Rhythm: Normal rate and regular rhythm. Heart sounds: No murmur. Pulmonary:      Effort: No respiratory distress. Breath sounds: No stridor. No wheezing, rhonchi or rales. Chest:      Chest wall: No tenderness. Abdominal:      General: There is no distension. Palpations: There is no mass. Tenderness: There is no abdominal tenderness. There is no right CVA tenderness, left CVA tenderness, guarding or rebound. Hernia: No hernia is present. Musculoskeletal:         General: No swelling, tenderness, deformity or signs of injury. Right lower leg: No edema. Left lower leg: No edema. Skin:     Coloration: Skin is not jaundiced or pale. Findings: No bruising, erythema, lesion or rash. Neurological:      Mental Status: She is alert and oriented to person, place, and time. Cranial Nerves: No cranial nerve deficit. Sensory: No sensory deficit. Motor: No weakness. Coordination: Coordination normal.      Gait: Gait normal.      Deep Tendon Reflexes: Reflexes normal.   Psychiatric:         Mood and Affect: Mood normal.         Thought Content: Thought content normal.         Judgment: Judgment normal.       /72   Pulse 80   Ht 5' 6\" (1.676 m)   Wt 151 lb 12.8 oz (68.9 kg)   BMI 24.50 kg/m²     Assessment:      Diagnosis Orders   1. Essential hypertension     2.  Familial hypercholesterolemia     3. Precordial pain     4. Shortness of breath     as above  Due to lupus she could be dealing with pericardial disease  ?/ angina   She is anemic and pale  Multiple other issues   Normal ECG     Plan:  No follow-ups on file. Discussed   Cant walk a treadmill probably   Non invasive cardiac testing will be ordered to further evaluate for any ischemic or structural heart disease as a cause of the patient symptoms. We will proceed with a Stress Cardiolite test and echo soon. Continue risk factor modification and medical management  Thank you for allowing me to participate in the care of your patient. Please don't hesitate to contact me regarding any further issues related to the patient care    Orders Placed:  No orders of the defined types were placed in this encounter. Medications Prescribed:  No orders of the defined types were placed in this encounter. Discussed use, benefit, and side effects of prescribed medications. All patient questions answered. Pt voicedunderstanding. Instructed to continue current medications, diet and exercise. Continue risk factor modification and medical management. Patient agreed with treatment plan. Follow up as directed.     Electronically signedby Phan Leblanc MD on 1/6/2021 at 10:47 AM

## 2021-01-07 ENCOUNTER — OFFICE VISIT (OUTPATIENT)
Dept: NEPHROLOGY | Age: 51
End: 2021-01-07
Payer: COMMERCIAL

## 2021-01-07 VITALS
SYSTOLIC BLOOD PRESSURE: 131 MMHG | TEMPERATURE: 97.9 F | OXYGEN SATURATION: 96 % | BODY MASS INDEX: 24.5 KG/M2 | WEIGHT: 151.8 LBS | HEART RATE: 76 BPM | DIASTOLIC BLOOD PRESSURE: 84 MMHG

## 2021-01-07 DIAGNOSIS — R80.1 PERSISTENT PROTEINURIA: ICD-10-CM

## 2021-01-07 DIAGNOSIS — M32.14 LUPUS NEPHRITIS (HCC): ICD-10-CM

## 2021-01-07 DIAGNOSIS — M32.9 LUPUS (HCC): ICD-10-CM

## 2021-01-07 DIAGNOSIS — N18.32 STAGE 3B CHRONIC KIDNEY DISEASE (HCC): Primary | ICD-10-CM

## 2021-01-07 PROCEDURE — 1036F TOBACCO NON-USER: CPT | Performed by: INTERNAL MEDICINE

## 2021-01-07 PROCEDURE — G8420 CALC BMI NORM PARAMETERS: HCPCS | Performed by: INTERNAL MEDICINE

## 2021-01-07 PROCEDURE — 99214 OFFICE O/P EST MOD 30 MIN: CPT | Performed by: INTERNAL MEDICINE

## 2021-01-07 PROCEDURE — G8484 FLU IMMUNIZE NO ADMIN: HCPCS | Performed by: INTERNAL MEDICINE

## 2021-01-07 PROCEDURE — G8427 DOCREV CUR MEDS BY ELIG CLIN: HCPCS | Performed by: INTERNAL MEDICINE

## 2021-01-07 NOTE — PROGRESS NOTES
Renal Progress Note    Assessment and Plan:      Diagnosis Orders   1. Stage 3b chronic kidney disease  Basic Metabolic Panel   2. Persistent proteinuria  Protein / creatinine ratio, urine    IR BIOPSY KIDNEY PERCUTANEOUS   3. Lupus (HCC)  IR BIOPSY KIDNEY PERCUTANEOUS   4. Lupus nephritis ([address]       PLAN:  I discussed my thoughts with the patient. She understood. I discussed that the reason for kidney biopsy. She is okay with it. We will schedule her for percutaneous CT-guided kidney biopsy repeat. Return visit per previous appointment which will be 4 weeks. Patient Active Problem List   Diagnosis    Hypothyroid    Systemic lupus erythematosus (HCC)    Hematuria    Proteinuria    Anemia    CKD (chronic kidney disease), stage III    Dyslipidemia    Hyperuricemia    Lumbar radiculitis    Spinal stenosis of lumbar region with neurogenic claudication    Spinal stenosis of lumbar region without neurogenic claudication       Subjective:   Chief complaint:  Chief Complaint   Patient presents with    Chronic Kidney Disease     Stage IIIb    Proteinuria      HPI:This is a follow up visit for Ms Celio Reyna who is here today for an office appointment. I see her for lupus nephritis. She is being followed for systemic lupus by Dr. Jeanell Hamman from rheumatology. Recently he contacted me asking if repeat biopsy can be done due to proteinuria not improving significantly. As a result we asked the patient to come in. She complains of shortness of breath with moderate exertion of 1 block relieved by rest.  She also discussed what  appears to be  chest pain and is being seen by cardiologist.  No nausea vomiting otherwise. No fever chills. Baseline urine protein creatinine ratio has been mostly 0.56 g to 0.74 g. However in August 2020 it went up to 1.67 g. In October 2020  1.84 g. January 4, 2021 it came down to 1.35 g.   ROS: As in HPI. Other systems are negative.     Medications:     Current Outpatient Medications   Medication Sig Dispense Refill    hydroxychloroquine (PLAQUENIL) 200 MG tablet Take 1 tablet by mouth 2 times daily 60 tablet 5    HYDROcodone-acetaminophen (NORCO) 5-325 MG per tablet Take 1 tablet by mouth every 8 hours as needed for Pain for up to 30 days. 90 tablet 0    levothyroxine (SYNTHROID) 112 MCG tablet take 1 tablet by mouth once daily 90 tablet 1    gabapentin (NEURONTIN) 400 MG capsule Take 1 capsule by mouth 2 times daily for 90 days. 180 capsule 1    ferrous sulfate (IRON 325) 325 (65 Fe) MG tablet take 1 tablet by mouth once daily 90 tablet 3    omeprazole (PRILOSEC) 20 MG delayed release capsule Take 1 capsule by mouth Daily 30 capsule 3    predniSONE (DELTASONE) 2.5 MG tablet Take 1.5 tablets by mouth daily 45 tablet 3    ondansetron (ZOFRAN) 4 MG tablet Take 1 tablet by mouth 3 times daily as needed for Nausea or Vomiting 30 tablet 0    silver sulfADIAZINE (SILVADENE) 1 % cream Apply topically daily BID 50 g 1    mometasone (NASONEX) 50 MCG/ACT nasal spray 2 sprays by Nasal route daily 1 Inhaler 3    acetaminophen (APAP EXTRA STRENGTH) 500 MG tablet Take 2 tablets by mouth every 6 hours as needed for Pain 120 tablet 3    loratadine (CLARITIN) 10 MG tablet Take 1 tablet by mouth daily 90 tablet 3    Calcium Carbonate-Vitamin D (OYSTER SHELL CALCIUM/D) 500-200 MG-UNIT TABS take 1 tablet by mouth once daily 90 tablet 3    simvastatin (ZOCOR) 40 MG tablet take 1 tablet by mouth at bedtime 90 tablet 3    fluticasone (FLONASE) 50 MCG/ACT nasal spray 2 sprays by Nasal route daily for 10 days 1 Bottle 0    azaTHIOprine (IMURAN) 50 MG tablet Take 2 tablets by mouth every morning AND 1 tablet every evening. (Patient not taking: Reported on 1/7/2021) 90 tablet 0     No current facility-administered medications for this visit.         Lab Results:    CBC:   Lab Results   Component Value Date    WBC 4.8 01/04/2021    HGB 11.0 (L) 01/04/2021    HCT 37.0 01/04/2021    MCV 97.9 01/04/2021     01/04/2021     BMP:    Lab Results   Component Value Date     01/04/2021     11/23/2020     10/05/2020    K 4.1 01/04/2021    K 3.9 11/23/2020    K 3.8 10/05/2020     01/04/2021     11/23/2020     10/05/2020    CO2 27 01/04/2021    CO2 25 11/23/2020    CO2 27 10/05/2020    BUN 23 (H) 01/04/2021    BUN 14 11/23/2020    BUN 23 (H) 10/05/2020    CREATININE 1.6 (H) 01/04/2021    CREATININE 1.5 (H) 11/23/2020    CREATININE 1.4 (H) 10/05/2020    GLUCOSE 104 01/04/2021    GLUCOSE 84 11/23/2020    GLUCOSE 89 10/05/2020      Hepatic:   Lab Results   Component Value Date    AST 50 (H) 01/04/2021    AST 30 11/23/2020    AST 44 (H) 10/05/2020    ALT 52 01/04/2021    ALT 24 11/23/2020    ALT 45 10/05/2020    BILITOT 0.2 (L) 01/04/2021    BILITOT 0.2 (L) 11/23/2020    BILITOT 0.3 10/05/2020    ALKPHOS 50 01/04/2021    ALKPHOS 52 11/23/2020    ALKPHOS 62 10/05/2020     BNP: No results found for: BNP  Lipids:   Lab Results   Component Value Date    CHOL 163 10/22/2019    HDL 36 10/22/2019     INR: No results found for: INR  URINE:   Lab Results   Component Value Date    PROTUR 297.1 01/04/2021     Lab Results   Component Value Date    NITRU NEGATIVE 01/04/2021    COLORU YELLOW 01/04/2021    PHUR 5.0 01/04/2021    LABCAST 4-8 HYALINE 01/04/2021    LABCAST NONE SEEN 01/04/2021    WBCUA 2-4 01/04/2021    WBCUA 0-5 12/17/2019    RBCUA 0-2 01/04/2021    MUCUS Present 12/17/2019    YEAST NONE SEEN 01/04/2021    BACTERIA NONE SEEN 01/04/2021    CLARITYU slightly cloudy 08/30/2017    SPECGRAV 1.021 01/04/2021    LEUKOCYTESUR NEGATIVE 01/04/2021    LEUKOCYTESUR TRACE 06/17/2019    UROBILINOGEN 0.2 01/04/2021    BILIRUBINUR NEGATIVE 01/04/2021    BILIRUBINUR small 08/30/2017    BILIRUBINUR Negative 10/18/2016    BLOODU NEGATIVE 01/04/2021    GLUCOSEU Negative 12/17/2019    KETUA NEGATIVE 01/04/2021      Microalbumen/Creatinine ratio:  No components found for: RUCREAT    Objective: Vitals: /84 (Site: Left Upper Arm, Position: Sitting, Cuff Size: Large Adult)   Pulse 76   Temp 97.9 °F (36.6 °C)   Wt 151 lb 12.8 oz (68.9 kg)   SpO2 96%   BMI 24.50 kg/m²      Constitutional:  Alert, awake, no apparent distress  Skin:normal with no rash or any lesions  HEENT:Pupils are reactive . Throat is clear. Oral mucosa is moist.  Neck:supple with no thyromegaly or bruit   Cardiovascular:  S1, S2 without murmur   Respiratory:  Clear to auscultation with no wheezes or rales  Abdomen: +bowel sound, soft, non tender and no bruit  Ext: No LE edema  Musculoskeletal:Intact  Neuro:Alert, awake and oriented with no obvious focal deficit. Speech is normal.    Electronically signed by Mike Prieto MD on 1/7/2021 at 2:24 PM   **This report has been created using voice recognition software. It maycontain minor  errors which are inherent in voice recognition technology. **

## 2021-01-14 RX ORDER — SODIUM CHLORIDE 450 MG/100ML
INJECTION, SOLUTION INTRAVENOUS CONTINUOUS
Status: CANCELLED | OUTPATIENT
Start: 2021-01-14

## 2021-01-14 RX ORDER — FENTANYL CITRATE 50 UG/ML
50 INJECTION, SOLUTION INTRAMUSCULAR; INTRAVENOUS ONCE
Status: CANCELLED | OUTPATIENT
Start: 2021-01-14 | End: 2021-01-14

## 2021-01-14 RX ORDER — MIDAZOLAM HYDROCHLORIDE 2 MG/2ML
1 INJECTION, SOLUTION INTRAMUSCULAR; INTRAVENOUS ONCE
Status: CANCELLED | OUTPATIENT
Start: 2021-01-14 | End: 2021-01-14

## 2021-01-15 ENCOUNTER — HOSPITAL ENCOUNTER (OUTPATIENT)
Dept: CT IMAGING | Age: 51
Discharge: HOME | End: 2021-01-15
Payer: COMMERCIAL

## 2021-01-15 VITALS
OXYGEN SATURATION: 97 % | WEIGHT: 150 LBS | HEART RATE: 64 BPM | RESPIRATION RATE: 16 BRPM | DIASTOLIC BLOOD PRESSURE: 76 MMHG | BODY MASS INDEX: 24.21 KG/M2 | TEMPERATURE: 97.6 F | SYSTOLIC BLOOD PRESSURE: 118 MMHG

## 2021-01-15 DIAGNOSIS — M32.9 LUPUS (HCC): ICD-10-CM

## 2021-01-15 DIAGNOSIS — M32.14 OTHER SYSTEMIC LUPUS ERYTHEMATOSUS WITH GLOMERULAR DISEASE (HCC): ICD-10-CM

## 2021-01-15 DIAGNOSIS — R58 INTRA ABDOMINAL HEMORRHAGE: ICD-10-CM

## 2021-01-15 DIAGNOSIS — R74.8 ELEVATED LIVER ENZYMES: ICD-10-CM

## 2021-01-15 DIAGNOSIS — R80.1 PERSISTENT PROTEINURIA: ICD-10-CM

## 2021-01-15 LAB
ALBUMIN SERPL-MCNC: 4.1 G/DL (ref 3.5–5.1)
ALP BLD-CCNC: 51 U/L (ref 38–126)
ALT SERPL-CCNC: 29 U/L (ref 11–66)
APTT: 34.1 SECONDS (ref 22–38)
AST SERPL-CCNC: 33 U/L (ref 5–40)
BILIRUB SERPL-MCNC: 0.2 MG/DL (ref 0.3–1.2)
BILIRUBIN DIRECT: < 0.2 MG/DL (ref 0–0.3)
C3 COMPLEMENT: 91 MG/DL (ref 90–180)
COMPLEMENT C4: 12 MG/DL (ref 10–40)
CREAT SERPL-MCNC: 1.4 MG/DL (ref 0.4–1.2)
ERYTHROCYTE [DISTWIDTH] IN BLOOD BY AUTOMATED COUNT: 16.4 % (ref 11.5–14.5)
ERYTHROCYTE [DISTWIDTH] IN BLOOD BY AUTOMATED COUNT: 57.1 FL (ref 35–45)
GFR SERPL CREATININE-BSD FRML MDRD: 40 ML/MIN/1.73M2
HCT VFR BLD CALC: 32.5 % (ref 37–47)
HEMOGLOBIN: 10 GM/DL (ref 12–16)
INR BLD: 1.01 (ref 0.85–1.13)
MCH RBC QN AUTO: 29.2 PG (ref 26–33)
MCHC RBC AUTO-ENTMCNC: 30.8 GM/DL (ref 32.2–35.5)
MCV RBC AUTO: 94.8 FL (ref 81–99)
PLATELET # BLD: 150 THOU/MM3 (ref 130–400)
PMV BLD AUTO: 11.9 FL (ref 9.4–12.4)
RBC # BLD: 3.43 MILL/MM3 (ref 4.2–5.4)
TOTAL PROTEIN: 6.9 G/DL (ref 6.1–8)
WBC # BLD: 5.9 THOU/MM3 (ref 4.8–10.8)

## 2021-01-15 PROCEDURE — 99211 OFF/OP EST MAY X REQ PHY/QHP: CPT

## 2021-01-15 PROCEDURE — 88346 IMFLUOR 1ST 1ANTB STAIN PX: CPT

## 2021-01-15 PROCEDURE — 88348 ELECTRON MICROSCOPY DX: CPT

## 2021-01-15 PROCEDURE — 74150 CT ABDOMEN W/O CONTRAST: CPT

## 2021-01-15 PROCEDURE — 49180 BIOPSY ABDOMINAL MASS: CPT

## 2021-01-15 PROCEDURE — 77012 CT SCAN FOR NEEDLE BIOPSY: CPT

## 2021-01-15 PROCEDURE — 6360000002 HC RX W HCPCS

## 2021-01-15 PROCEDURE — 86160 COMPLEMENT ANTIGEN: CPT

## 2021-01-15 PROCEDURE — 85730 THROMBOPLASTIN TIME PARTIAL: CPT

## 2021-01-15 PROCEDURE — 88313 SPECIAL STAINS GROUP 2: CPT

## 2021-01-15 PROCEDURE — 6370000000 HC RX 637 (ALT 250 FOR IP)

## 2021-01-15 PROCEDURE — 88350 IMFLUOR EA ADDL 1ANTB STN PX: CPT

## 2021-01-15 PROCEDURE — 2709999900 HC NON-CHARGEABLE SUPPLY

## 2021-01-15 PROCEDURE — 88305 TISSUE EXAM BY PATHOLOGIST: CPT

## 2021-01-15 PROCEDURE — 2580000003 HC RX 258: Performed by: RADIOLOGY

## 2021-01-15 PROCEDURE — 85610 PROTHROMBIN TIME: CPT

## 2021-01-15 PROCEDURE — 85027 COMPLETE CBC AUTOMATED: CPT

## 2021-01-15 PROCEDURE — 80076 HEPATIC FUNCTION PANEL: CPT

## 2021-01-15 PROCEDURE — 82565 ASSAY OF CREATININE: CPT

## 2021-01-15 PROCEDURE — 36415 COLL VENOUS BLD VENIPUNCTURE: CPT

## 2021-01-15 RX ORDER — MIDAZOLAM HYDROCHLORIDE 2 MG/2ML
1 INJECTION, SOLUTION INTRAMUSCULAR; INTRAVENOUS ONCE
Status: COMPLETED | OUTPATIENT
Start: 2021-01-15 | End: 2021-01-15

## 2021-01-15 RX ORDER — ACETAMINOPHEN 325 MG/1
650 TABLET ORAL ONCE
Status: COMPLETED | OUTPATIENT
Start: 2021-01-15 | End: 2021-01-15

## 2021-01-15 RX ORDER — FENTANYL CITRATE 50 UG/ML
50 INJECTION, SOLUTION INTRAMUSCULAR; INTRAVENOUS ONCE
Status: COMPLETED | OUTPATIENT
Start: 2021-01-15 | End: 2021-01-15

## 2021-01-15 RX ORDER — SODIUM CHLORIDE 450 MG/100ML
INJECTION, SOLUTION INTRAVENOUS CONTINUOUS
Status: DISCONTINUED | OUTPATIENT
Start: 2021-01-15 | End: 2021-01-16 | Stop reason: HOSPADM

## 2021-01-15 RX ORDER — ACETAMINOPHEN 325 MG/1
TABLET ORAL
Status: COMPLETED
Start: 2021-01-15 | End: 2021-01-15

## 2021-01-15 RX ORDER — IBUPROFEN 200 MG
TABLET ORAL ONCE
Status: COMPLETED | OUTPATIENT
Start: 2021-01-15 | End: 2021-01-15

## 2021-01-15 RX ADMIN — FENTANYL CITRATE 50 MCG: 50 INJECTION, SOLUTION INTRAMUSCULAR; INTRAVENOUS at 09:38

## 2021-01-15 RX ADMIN — ACETAMINOPHEN 650 MG: 325 TABLET ORAL at 11:13

## 2021-01-15 RX ADMIN — SODIUM CHLORIDE: 4.5 INJECTION, SOLUTION INTRAVENOUS at 08:08

## 2021-01-15 RX ADMIN — Medication 0.9 G: at 10:12

## 2021-01-15 RX ADMIN — MIDAZOLAM HYDROCHLORIDE 1 MG: 2 INJECTION, SOLUTION INTRAMUSCULAR; INTRAVENOUS at 09:37

## 2021-01-15 ASSESSMENT — PAIN DESCRIPTION - DESCRIPTORS
DESCRIPTORS: ACHING
DESCRIPTORS: THROBBING

## 2021-01-15 ASSESSMENT — PAIN DESCRIPTION - LOCATION
LOCATION: FLANK

## 2021-01-15 ASSESSMENT — PAIN SCALES - GENERAL
PAINLEVEL_OUTOF10: 8
PAINLEVEL_OUTOF10: 8
PAINLEVEL_OUTOF10: 7

## 2021-01-15 ASSESSMENT — PAIN DESCRIPTION - ORIENTATION
ORIENTATION: LEFT

## 2021-01-15 NOTE — PROGRESS NOTES
Formulation and discussion of sedation / procedure plans, risks, benefits, side effects and alternatives with patient and/or responsible adult completed.     Electronically signed by Eliel Wray DO on 1/15/2021 at 9:24 AM

## 2021-01-15 NOTE — PROGRESS NOTES
[facility] ADMITTED FOR KIDNEY BIOPSY PROCEDURE REVIEWED WITH PT VERBALIZED UNDERSTANDING. Pt rights and responsibilities offered to pt to read.     __M__ Safety:       (Environmental)   [place] to environment   Ensure ID band is correct and in place/ allergy band as needed   Assess for fall risk   Initiate fall precautions as applicable (fall band, side rails, etc.)   Call light within reach   Bed in low position/ wheels locked    _M___ Pain:        Assess pain level and characteristics   Administer analgesics as ordered   Assess effectiveness of pain management and report to MD as needed    __M__ Knowledge Deficit:   Assess baseline knowledge   Provide teaching at level of understanding   Provide teaching via preferred learning method   Evaluate teaching effectiveness    __M__ Hemodynamic/Respiratory Status:       (Pre and Post Procedure Monitoring)   Assess/Monitor vital signs and LOC   Assess Baseline SpO2 prior to any sedation   Obtain weight/height   Assess vital signs/ LOC until patient meets discharge criteria   Monitor procedure site and notify MD of any issues    ___

## 2021-01-15 NOTE — H&P
[address] [address]  Sedation/Analgesia History & Physical    Pt Name: Jina Wade  MRN: [id number]  YOB: 1970  Provider Performing Procedure: Azam Persaud MD  Primary Care Physician: MACKENZIE Oliveros CNP    PRE-PROCEDURE   DNR-CCA/DNR-CC []Yes [x]No  Brief History/Pre-Procedure Diagnosis: Chronic renal disease, SLE          MEDICAL HISTORY  []CAD/Valve  []Liver Disease  []Lung Disease []Diabetes  [x]Hypertension [x]Renal Disease  []Additional information:       has a past medical history of Anemia, CKD (chronic kidney disease), stage III, Dyslipidemia, Hematuria, Hyperlipidemia, Hypertension, Hyperuricemia, Hypothyroidism, Kidney disease, Lupus ([facility] [address] Lupus nephritis ([facility] [address] Proteinuria, Seizures ([facility] [address] and Systemic lupus erythematosus ([facility] [address] SURGICAL HISTORY   has a past surgical history that includes Nerve Block (Bilateral); pr inj dx/ther agnt paravert facet joint, lumbar/sac, 2nd level (Bilateral, 5/7/2018); pr njx dx/ther sbst intrlmnr lmbr/sac w/img gdn (N/A, 7/19/2018); pr njx aa&/strd tfrml epi lumbar/sacral 1 level (Left, 9/25/2018); Lumbar spine surgery (Left, 12/13/2018); lumbar nerve block (N/A, 3/11/2019); and lumbar nerve block (N/A, 5/21/2019). Additional information:       ALLERGIES   Allergies as of 01/15/2021 - Review Complete 01/15/2021   Allergen Reaction Noted    Codeine Hives and Nausea And Vomiting 01/05/2012     Additional information:       MEDICATIONS   Coumadin Use Last 5 Days [x]No []Yes  Antiplatelet drug therapy use last 5 days  [x]No []Yes  Other anticoagulant use last 5 days  [x]No []Yes    Current Outpatient Medications:     hydroxychloroquine (PLAQUENIL) 200 MG tablet, Take 1 tablet by mouth 2 times daily, Disp: 60 tablet, Rfl: 5    HYDROcodone-acetaminophen (NORCO) 5-325 MG per tablet, Take 1 tablet by mouth every 8 hours as needed for Pain for up to 30 days. , Disp: 90 tablet, Rfl: 0    levothyroxine (SYNTHROID) 112 MCG tablet, take 1 tablet DO  Prior to Admission medications    Medication Sig Start Date End Date Taking? Authorizing Provider   hydroxychloroquine (PLAQUENIL) 200 MG tablet Take 1 tablet by mouth 2 times daily 1/4/21  Yes MACKENZIE Cadena CNP   HYDROcodone-acetaminophen (NORCO) 5-325 MG per tablet Take 1 tablet by mouth every 8 hours as needed for Pain for up to 30 days. 12/18/20 1/17/21 Yes MACKENZIE Ames CNP   levothyroxine (SYNTHROID) 112 MCG tablet take 1 tablet by mouth once daily 12/3/20  Yes MACKENZIE Barrow CNP   gabapentin (NEURONTIN) 400 MG capsule Take 1 capsule by mouth 2 times daily for 90 days. 10/20/20 1/18/21 Yes MACKENZIE Bills CNP   ferrous sulfate (IRON 325) 325 (65 Fe) MG tablet take 1 tablet by mouth once daily 10/20/20  Yes MACKENZIE Barrow CNP   omeprazole (PRILOSEC) 20 MG delayed release capsule Take 1 capsule by mouth Daily 10/5/20  Yes Jareth Rodriguez DO   predniSONE (DELTASONE) 2.5 MG tablet Take 1.5 tablets by mouth daily 10/5/20 10/5/21 Yes Jareth Rodriguez DO   Calcium Carbonate-Vitamin D (OYSTER SHELL CALCIUM/D) 500-200 MG-UNIT TABS take 1 tablet by mouth once daily 3/18/20  Yes MACKENZIE Barrow CNP   simvastatin (ZOCOR) 40 MG tablet take 1 tablet by mouth at bedtime 3/18/20  Yes MACKENZIE Barrow CNP   azaTHIOprine (IMURAN) 50 MG tablet Take 2 tablets by mouth every morning AND 1 tablet every evening.   Patient not taking: Reported on 1/7/2021 12/4/20 1/3/21  MACKENZIE Cadena CNP   [facility]etron [facility]) 4 MG tablet Take 1 tablet by mouth 3 times daily as needed for Nausea or Vomiting 10/5/20   MACKENZIE Barrow CNP   silver sulfADIAZINE (SILVADENE) 1 % cream Apply topically daily BID 9/10/20   MACKENZIE Ornelas CNP   mometasone (NASONEX) 50 MCG/ACT nasal spray 2 sprays by Nasal route daily 8/17/20   MACKENZIE Barrow CNP   acetaminophen (APAP EXTRA STRENGTH) 500 MG tablet Take 2 tablets by mouth every 6 hours as needed for Pain 4/15/20 MACKENZIE Dunn CNP   loratadine (CLARITIN) 10 MG tablet Take 1 tablet by mouth daily 3/18/20   MACKENZIE Dunn CNP   fluticasone [facility]) 50 MCG/ACT nasal spray 2 sprays by Nasal route daily for 10 days 11/26/19 1/7/21  MACKENZIE Andre CNP     Additional information:       VITAL SIGNS   Vitals:    01/15/21 0731   BP: (!) 123/90   Pulse: 61   Resp: 16   Temp: 97.6 °F (36.4 °C)   SpO2: 99%       PHYSICAL:   Heart:  [x]Regular rate and rhythm  []Other:    Lungs:  [x]Clear    []Other:    Abdomen: [x]Soft    []Other:    Mental Status: [x]Alert & Oriented  []Other:      PLANNED PROCEDURE   [x]Biospy []Arteriogram              []Drainage   []Mediport Insertion  []Fistulogram []IV access       []Vertebroplasty / Augmentation  []IVC filter []Dialysis catheter []Biliary drainage  []Other: []CAPD Catheter []Nephrostomy Tube / Stent  SEDATION  Planned agent:[x]Midazolam []Meperidine [x]Sublimaze []Dilaudid []Morphine     []Diazepam  []Other:     ASA Classification:  []1 [x]2 []3 []4 []5  Class 1: A normal healthy patient  Class 2: Pt with mild to moderate systemic disease  Class 3: Severe systemic disease or disturbance  Class 4: Severe systemic disorders that are already life threatening. Class 5: Moribund pt with little chances of survival, for more than 24 hours. Mallampati I Airway Classification:   []1 []2 [x]3 []4    [x]Pre-procedure diagnostic studies complete and results available. Comment:    [x]Previous sedation/anesthesia experiences assessed. Comment:    [x]The patient is an appropriate candidate to undergo the planned procedure sedation and anesthesia. (Refer to nursing sedation/analgesia documentation record)  [x]Formulation and discussion of sedation/procedure plan, risks, and expectations with patient and/or responsible adult completed. [x]Patient examined immediately prior to the procedure.  (Refer to nursing sedation/analgesia documentation record)    Adán Cruz DO, MD  Electronically signed 1/15/2021 at 9:22 AM

## 2021-01-15 NOTE — PROGRESS NOTES
1025 returns from xray. Injection site left flank soft and dry. Pain at site is 8. Snack taken. [address] UP TO VOIDED ELKIN URINE. PAIN REMAINS 8.  1130 DRESSING DRY PAIN IS 7.   1200 DRESSING DRY NO CHANGE IN PAIN   1230 DRESSING DRY PAIN AT 7.  1300 DRESSING DRY DR Garcia Ano IN TO SEE PT. PAIN AT 7.  1357 TO CT SCAN  1430 RETURNED FROM SCAN DRESSING DRY NO CHANGE INPAIN. DISCUSSED WITH PT NEED TO TALK WITH CARDIOLOGIST VERBALIZED UNDERSTANDING HAS MILY SCHEDLED WITH CARDIOLOGIST. HOME INSTRUCTIONS TO  PT AND FAMILY VERBALIZED UNDERSTANDING. [address] WITH FAMILY.

## 2021-01-15 NOTE — PROGRESS NOTES
9500 Pt in specials holding for CT guided renal biopsy. Explained procedure to pt and pt verbalizes understanding. 5980 Dr Pamella Villalobos to speak to pt and assess pt. Consent signed. 5792 Pt assisted to bathroom and positioned prone on scanner and attached to monitor. Pre scans taken. 1230 First core biopsy sample obtained. Pt tolerating well.  0953 Three core samples obtained and sent to lab.  0955 Surgicel injected per Dr Pamella Villalobos and needle pulled back. Pt dozing. 1000 Instructed pt we are waiting on call from lab. Resting quietly. 1011 Call received from lab. Specimen good. 1012 Dr Pamella Villalobos removed needle. Triple antibiotic ointment applied to site augusto left lower back  with band-aid. Site without redness, swelling or hematoma. 1015 Post scan taken. 1020 Pt positioned on cart for comfort and transferred to [facility] per cart. Report called to Lindsey Ron.

## 2021-01-18 ENCOUNTER — TELEPHONE (OUTPATIENT)
Dept: CARDIOLOGY CLINIC | Age: 51
End: 2021-01-18

## 2021-01-18 DIAGNOSIS — G89.4 CHRONIC PAIN SYNDROME: ICD-10-CM

## 2021-01-18 RX ORDER — HYDROCODONE BITARTRATE AND ACETAMINOPHEN 5; 325 MG/1; MG/1
1 TABLET ORAL EVERY 8 HOURS PRN
Qty: 90 TABLET | Refills: 0 | Status: SHIPPED | OUTPATIENT
Start: 2021-01-18 | End: 2021-02-17

## 2021-01-20 ENCOUNTER — TELEPHONE (OUTPATIENT)
Dept: RHEUMATOLOGY | Age: 51
End: 2021-01-20

## 2021-01-20 ENCOUNTER — TELEPHONE (OUTPATIENT)
Dept: NEPHROLOGY | Age: 51
End: 2021-01-20

## 2021-01-20 DIAGNOSIS — M32.14 OTHER SYSTEMIC LUPUS ERYTHEMATOSUS WITH GLOMERULAR DISEASE (HCC): Primary | ICD-10-CM

## 2021-01-20 LAB — MISC REFERENCE: NORMAL

## 2021-01-20 NOTE — TELEPHONE ENCOUNTER
Systemic lupus   LFT elevation   - called pt w/ message asking for a return phone call. - renal biopsy unrevealing   - would like to start MMF (cellcept) as LFT normalized with resolution of the Azathioprine.

## 2021-01-20 NOTE — TELEPHONE ENCOUNTER
----- Message from Shon Terrell MD sent at 1/20/2021 12:18 PM EST -----  Please inform Dr. Yogesh Benedict from rheumatology that kidney biopsy does not show any lupus nephritis. It is benign.

## 2021-01-25 ENCOUNTER — TELEPHONE (OUTPATIENT)
Dept: CARDIOLOGY CLINIC | Age: 51
End: 2021-01-25

## 2021-01-25 DIAGNOSIS — E78.01 FAMILIAL HYPERCHOLESTEROLEMIA: ICD-10-CM

## 2021-01-25 DIAGNOSIS — R06.02 SHORTNESS OF BREATH: ICD-10-CM

## 2021-01-25 DIAGNOSIS — I10 ESSENTIAL HYPERTENSION: Primary | ICD-10-CM

## 2021-01-25 DIAGNOSIS — R07.2 PRECORDIAL PAIN: ICD-10-CM

## 2021-01-25 NOTE — TELEPHONE ENCOUNTER
Lea Palma is denying Stress test  Peer to peer available  #   Tracking# [id number]    Please advise    Echo was approved    Reason: Prior to an approval, the following doctors notes should be sent: a heart test where you walk (Exercise Stress Test) without sound wave pictures or why this test cannot be done

## 2021-01-26 NOTE — TELEPHONE ENCOUNTER
LMOVM telling patient nuclear stress was denied by insurance and a regular stress will be in its place.

## 2021-01-27 ENCOUNTER — HOSPITAL ENCOUNTER (OUTPATIENT)
Dept: NON INVASIVE DIAGNOSTICS | Age: 51
Discharge: HOME | End: 2021-01-27
Payer: COMMERCIAL

## 2021-01-27 ENCOUNTER — TELEPHONE (OUTPATIENT)
Dept: CARDIOLOGY CLINIC | Age: 51
End: 2021-01-27

## 2021-01-27 ENCOUNTER — APPOINTMENT (OUTPATIENT)
Dept: NON INVASIVE DIAGNOSTICS | Age: 51
End: 2021-01-27
Payer: COMMERCIAL

## 2021-01-27 ENCOUNTER — NURSE ONLY (OUTPATIENT)
Dept: LAB | Age: 51
End: 2021-01-27

## 2021-01-27 DIAGNOSIS — R06.02 SHORTNESS OF BREATH: ICD-10-CM

## 2021-01-27 DIAGNOSIS — R06.02 SHORTNESS OF BREATH: Primary | ICD-10-CM

## 2021-01-27 DIAGNOSIS — E78.01 FAMILIAL HYPERCHOLESTEROLEMIA: ICD-10-CM

## 2021-01-27 DIAGNOSIS — I10 ESSENTIAL HYPERTENSION: ICD-10-CM

## 2021-01-27 DIAGNOSIS — R07.2 PRECORDIAL PAIN: ICD-10-CM

## 2021-01-27 DIAGNOSIS — R80.1 PERSISTENT PROTEINURIA: ICD-10-CM

## 2021-01-27 DIAGNOSIS — N18.32 STAGE 3B CHRONIC KIDNEY DISEASE (HCC): ICD-10-CM

## 2021-01-27 LAB
ANION GAP SERPL CALCULATED.3IONS-SCNC: 11 MEQ/L (ref 8–16)
BUN BLDV-MCNC: 25 MG/DL (ref 7–22)
CALCIUM SERPL-MCNC: 10.4 MG/DL (ref 8.5–10.5)
CHLORIDE BLD-SCNC: 105 MEQ/L (ref 98–111)
CO2: 29 MEQ/L (ref 23–33)
CREAT SERPL-MCNC: 1.5 MG/DL (ref 0.4–1.2)
CREATININE URINE: 123.6 MG/DL
GFR SERPL CREATININE-BSD FRML MDRD: 37 ML/MIN/1.73M2
GLUCOSE BLD-MCNC: 90 MG/DL (ref 70–108)
LV EF: 55 %
LVEF MODALITY: NORMAL
POTASSIUM SERPL-SCNC: 4.7 MEQ/L (ref 3.5–5.2)
PROT/CREAT RATIO, UR: 1.43
PROTEIN, URINE: 177 MG/DL
SODIUM BLD-SCNC: 145 MEQ/L (ref 135–145)

## 2021-01-27 PROCEDURE — 93306 TTE W/DOPPLER COMPLETE: CPT

## 2021-01-27 PROCEDURE — 93017 CV STRESS TEST TRACING ONLY: CPT | Performed by: NUCLEAR MEDICINE

## 2021-01-27 NOTE — TELEPHONE ENCOUNTER
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 She had a stress and echo done today. She is asking if it is okay for her to work or should she wait for the results of testing before going back to work?

## 2021-01-27 NOTE — TELEPHONE ENCOUNTER
Needs schedule for a lexiscan   She failed her regular stress test, couldn't finish it  See if insurance would accept a moises since she failed to complete the regular test   She needs to see her rheumatologist  She does have pericardial effusion likely due to her lupus  If she could take off work for few that would be great till we figure out what is going on

## 2021-01-28 ENCOUNTER — OFFICE VISIT (OUTPATIENT)
Dept: RHEUMATOLOGY | Age: 51
End: 2021-01-28
Payer: COMMERCIAL

## 2021-01-28 VITALS
TEMPERATURE: 96.9 F | OXYGEN SATURATION: 98 % | DIASTOLIC BLOOD PRESSURE: 70 MMHG | HEART RATE: 63 BPM | SYSTOLIC BLOOD PRESSURE: 130 MMHG | HEIGHT: 66 IN | WEIGHT: 149.91 LBS | BODY MASS INDEX: 24.09 KG/M2

## 2021-01-28 DIAGNOSIS — G56.03 CARPAL TUNNEL SYNDROME, BILATERAL: ICD-10-CM

## 2021-01-28 DIAGNOSIS — Z87.39 H/O LUPUS NEPHRITIS: ICD-10-CM

## 2021-01-28 DIAGNOSIS — I31.39 PERICARDIAL EFFUSION: ICD-10-CM

## 2021-01-28 DIAGNOSIS — Z51.81 MEDICATION MONITORING ENCOUNTER: ICD-10-CM

## 2021-01-28 DIAGNOSIS — M32.14 OTHER SYSTEMIC LUPUS ERYTHEMATOSUS WITH GLOMERULAR DISEASE (HCC): Primary | ICD-10-CM

## 2021-01-28 DIAGNOSIS — R74.8 ELEVATED LIVER ENZYMES: ICD-10-CM

## 2021-01-28 PROCEDURE — 1036F TOBACCO NON-USER: CPT | Performed by: INTERNAL MEDICINE

## 2021-01-28 PROCEDURE — 99214 OFFICE O/P EST MOD 30 MIN: CPT | Performed by: INTERNAL MEDICINE

## 2021-01-28 PROCEDURE — G8427 DOCREV CUR MEDS BY ELIG CLIN: HCPCS | Performed by: INTERNAL MEDICINE

## 2021-01-28 PROCEDURE — G8484 FLU IMMUNIZE NO ADMIN: HCPCS | Performed by: INTERNAL MEDICINE

## 2021-01-28 PROCEDURE — G8420 CALC BMI NORM PARAMETERS: HCPCS | Performed by: INTERNAL MEDICINE

## 2021-01-28 RX ORDER — PREDNISONE 10 MG/1
TABLET ORAL
Qty: 30 TABLET | Refills: 0 | Status: SHIPPED | OUTPATIENT
Start: 2021-01-28 | End: 2021-02-16

## 2021-01-28 RX ORDER — MYCOPHENOLATE MOFETIL 500 MG/1
TABLET ORAL
Qty: 98 TABLET | Refills: 0 | Status: SHIPPED | OUTPATIENT
Start: 2021-01-28 | End: 2021-02-16

## 2021-01-28 ASSESSMENT — ENCOUNTER SYMPTOMS
SHORTNESS OF BREATH: 1
ABDOMINAL PAIN: 0
NAUSEA: 0
EYE ITCHING: 0
TROUBLE SWALLOWING: 0
DIARRHEA: 0
EYE PAIN: 0
BACK PAIN: 1
COUGH: 0
CONSTIPATION: 0

## 2021-01-28 NOTE — PROGRESS NOTES
[facility] RHEUMATOLOGY FOLLOW UP NOTE       Date Of Service: 1/28/2021  Provider: Tracy Solomon DO    Name: Harvey Tatum   MRN: [id number]    Chief Complaint(s)     Chief Complaint   Patient presents with    Follow-up     4 months SLE         History of Present Illness (HPI)     Harvey Tatum  is a(n)50 y.o. female with a hx of systemic lupus with hiistory of class IV lupus nephritis, CKD stage III, chronic low back pain w/ radiculopathy, lumbar stenosis, h/o seizures here for the f/u evaluation of systemic lupus         Repeat renal biopsy performed since last evaluation unrevealing. - marline 15, 2021. Azathioprine discontinued because of LFT elevation. After holding medication LFTs have normalized. Decreased appetite since the last evaluation. CT a/p - marline 15, 2021 with moderate pericardial effusion. Nasal sores over the psat week, + increased fatigue, decreased appetite. Pleuritic chest pain intermittently occuings. Ongoing inflammatory arthritis / joint pains -- currently afecting the bilateral hands, wrists, shoulder, Rt elbows, Right hip, Right knee, bilateral ankles, right toes neck and back. Pain up to 7.5/10 over the past week. Aggravating factors: weather, back: prolonged bending, forward flexion. Right hand and arm: increases use Alleviating factors: norco, hot back, gabapentin. + am stiffness lasting about 30 mintues.     + SOB / ZAMORANO with performing work. + cramping of legs with walking         REVIEW OF SYSTEMS: (ROS)    Review of Systems   Constitutional: Positive for appetite change and unexpected weight change. Negative for fatigue and fever. HENT: Positive for congestion. Negative for trouble swallowing. Hair loss   Eyes: Negative for pain and itching. Respiratory: Positive for shortness of breath. Negative for cough. Cardiovascular: Positive for chest pain. Negative for leg swelling.    Gastrointestinal: Negative for abdominal pain, constipation, diarrhea and nausea. Endocrine: Negative for cold intolerance and heat intolerance. Genitourinary: Negative for difficulty urinating, frequency and urgency. Musculoskeletal: Positive for arthralgias, back pain, joint swelling, myalgias and neck pain. Skin: Negative for rash. Neurological: Positive for numbness and headaches. Negative for dizziness and weakness. Psychiatric/Behavioral: Positive for sleep disturbance. The patient is not nervous/anxious.         PAST MEDICAL HISTORY      Past Medical History:   Diagnosis Date    Anemia     CKD (chronic kidney disease), stage III     Dyslipidemia     Hematuria     Hyperlipidemia     Hypertension     Hyperuricemia     Hypothyroidism     Kidney disease     as a result from lupus    Lupus ([facility] [address]     Lupus nephritis ([facility] [address]     Proteinuria     Seizures ([facility] [address]     Systemic lupus erythematosus ([facility] [address]        PAST SURGICAL HISTORY      Past Surgical History:   Procedure Laterality Date    CT BIOPSY ABDOMEN RETROPERITONEUM  1/15/2021    CT BIOPSY ABDOMEN RETROPERITONEUM 1/15/2021 [facility] CT SCAN    LUMBAR NERVE BLOCK N/A 3/11/2019    LESI at L4 performed by Julia Mendosa MD at [facility] N/A 5/21/2019    LESI at L3 #1 performed by Julia Mendosa MD at [address] SURGERY Left 12/13/2018    LUMBAR TRANSFORAMINAL TFESI L5 LEFT #2, performed by Julia Mendosa MD at [address] [place] [address], [address] [place] [address] Bilateral     LUMBAR FACET    ID INJ DX/THER AGNT PARAVERT FACET JOINT, LUMBAR/SAC, 2ND LEVEL Bilateral 5/7/2018    LUMBAR FACET  [email or web address] Bilateral performed by Julia Mendosa MD at [address] [place] [address] AA&/STRD TFRML EPI LUMBAR/SACRAL 1 LEVEL Left 9/25/2018    LUMBAR TRANSFORAMINAL TFESI L5 on the LEFT performed by Julia Mendosa MD at [address] [place] [address] DX/THER SBST INTRLMNR LMBR/SAC W/IMG GDN N/A 7/19/2018    LUMBAR INTER LAMINAR GIGI LESI + tinel bilat,   HANDS/FINGERS   MCPs + tender right 2-5. + bogginess right 2,3   PIPs + tender and boggy right 2-4, left 2,3    Lower extremities:  HIPS nontender  KNEES nontender, no swelling  ANKLES nontender, no swelling   FEET : tender bilat MTPs. LABS    CBC  Lab Results   Component Value Date    WBC 5.9 01/15/2021    RBC 3.43 01/15/2021    HGB 10.0 01/15/2021    HCT 32.5 01/15/2021    MCV 94.8 01/15/2021    MCH 29.2 01/15/2021    MCHC 30.8 01/15/2021    RDW 18.6 03/24/2020     01/15/2021       CMP  Lab Results   Component Value Date    CALCIUM 10.4 01/27/2021    LABALBU 4.1 01/15/2021    PROT 6.9 01/15/2021     01/27/2021    K 4.7 01/27/2021    CO2 29 01/27/2021     01/27/2021    BUN 25 01/27/2021    CREATININE 1.5 01/27/2021    ALKPHOS 51 01/15/2021    ALT 29 01/15/2021    AST 33 01/15/2021       HgBA1c: No components found for: HGBA1C    Lab Results   Component Value Date    VITD25 15 08/12/2020         Lab Results   Component Value Date    ANASCRN Detected (A) 06/17/2019     Lab Results   Component Value Date    SSA SEE BELOW 06/17/2019     Lab Results   Component Value Date    SSB 0 06/17/2019     Lab Results   Component Value Date    ANTI-SMITH 4 06/17/2019     Lab Results   Component Value Date    DSDNAAB SEE BELOW 06/17/2019      No results found for: ANTIRNP  Lab Results   Component Value Date    C3 91 01/15/2021    C4 12 01/15/2021     Lab Results   Component Value Date    CCPAB 8 06/17/2019     Lab Results   Component Value Date    RF 11 06/17/2019       No components found for: CANCASCRN, APANCASCRN  Lab Results   Component Value Date    SEDRATE 18 01/04/2021     Lab Results   Component Value Date    CRP 0.07 01/04/2021       RADIOLOGY:         ASSESSMENT/PLAN    Assessment   Plan     SLE w/ h/o class IV LN-   H/o lupus nephritis , Proteinura  - repeat jan15, 2021 evaluation w/o abnoramlities. diagnosed in 2001, s/p cytoxan 2007.  C4 was 64, 4+ proteinuria, but 24 urine w/ only 1.1 g. 20-29 RBC/HPF. + BELLE, dsDNA, ANCA (+p-ANCA, neg MPO, GA-3), reported inflammatory arthritis. Exam w/ joint swelling, oral/nasal sores. - prednisone taper then decrease to 10mg daily  - b/c flare of serositis, inflammatory arthritis. - plaquenil 200 mg BID   - d/c azathioprin ( imuran )100 mg qAM and 50 mg QHS b/c LFT elevation   - - We discussed risks of Cellcept including liver toxicity, infection, cancer, blood dyscrasias. she was specifically instructed to avoid ETOH use. she was instructed to stop Cellcept at the onset of any infection and instructed to call me if concern for infection. - Start CellCept:  1 tablet twice daily x 1 week. If  well-tolerated take 2 tablets twice a day. - Have labs checked 4 weeks after starting CellCept. SOB *& Chest concer this could be related to serositis from the sytemic lupus   - pt to have stress test in the near future. - treatment of the systemic lupus as outlined above. Carpal tunnel: bilateral - active bilat   - bilat tinel at wrists and elbows. Intermittent numbness/tinlging  - EMG/NCV (Dec 2019) w/ cervical radiculopathy    - gabapentin to 400 mg BID    Low back pain w/ radiculopathy - gabapentin 400 mg BID    Medication monitoring  - CBC, CMP, sed rate, CRP, u/a q 4 weeks   - plaquenil eye exam- need records from Dr. Cynthia Juarez office      No follow-ups on file. Electronically signed by Barron Veronica DO on 1/28/2021 at 11:38 AM    New Prescriptions    No medications on file       Thank you for allowing me to participate in the care of this patient. Please call if there are any questions.

## 2021-02-01 NOTE — TELEPHONE ENCOUNTER
Spoke with pt, and transferred to [facility] to set up appt with Kecia Whitley or Lauro Gonzalez.

## 2021-02-04 ENCOUNTER — HOSPITAL ENCOUNTER (OUTPATIENT)
Dept: NON INVASIVE DIAGNOSTICS | Age: 51
Discharge: HOME | End: 2021-02-04
Payer: COMMERCIAL

## 2021-02-04 ENCOUNTER — TELEPHONE (OUTPATIENT)
Dept: CARDIOLOGY CLINIC | Age: 51
End: 2021-02-04

## 2021-02-04 ENCOUNTER — OFFICE VISIT (OUTPATIENT)
Dept: NEPHROLOGY | Age: 51
End: 2021-02-04
Payer: COMMERCIAL

## 2021-02-04 VITALS
OXYGEN SATURATION: 100 % | BODY MASS INDEX: 24.21 KG/M2 | WEIGHT: 150 LBS | TEMPERATURE: 97.3 F | SYSTOLIC BLOOD PRESSURE: 119 MMHG | DIASTOLIC BLOOD PRESSURE: 80 MMHG | HEART RATE: 73 BPM

## 2021-02-04 VITALS — BODY MASS INDEX: 24.11 KG/M2 | WEIGHT: 150 LBS | HEIGHT: 66 IN

## 2021-02-04 DIAGNOSIS — Z13.9 SCREENING FOR CONDITION: Primary | ICD-10-CM

## 2021-02-04 DIAGNOSIS — R07.2 PRECORDIAL PAIN: ICD-10-CM

## 2021-02-04 DIAGNOSIS — R06.02 SHORTNESS OF BREATH: ICD-10-CM

## 2021-02-04 DIAGNOSIS — M32.9 LUPUS (HCC): ICD-10-CM

## 2021-02-04 DIAGNOSIS — R80.1 PERSISTENT PROTEINURIA: ICD-10-CM

## 2021-02-04 DIAGNOSIS — N18.32 STAGE 3B CHRONIC KIDNEY DISEASE (HCC): Primary | ICD-10-CM

## 2021-02-04 DIAGNOSIS — R94.39 ABNORMAL STRESS TEST: ICD-10-CM

## 2021-02-04 DIAGNOSIS — E78.01 FAMILIAL HYPERCHOLESTEROLEMIA: ICD-10-CM

## 2021-02-04 DIAGNOSIS — I10 ESSENTIAL HYPERTENSION: ICD-10-CM

## 2021-02-04 PROCEDURE — G8484 FLU IMMUNIZE NO ADMIN: HCPCS | Performed by: INTERNAL MEDICINE

## 2021-02-04 PROCEDURE — A9500 TC99M SESTAMIBI: HCPCS | Performed by: NUCLEAR MEDICINE

## 2021-02-04 PROCEDURE — 78452 HT MUSCLE IMAGE SPECT MULT: CPT

## 2021-02-04 PROCEDURE — G8420 CALC BMI NORM PARAMETERS: HCPCS | Performed by: INTERNAL MEDICINE

## 2021-02-04 PROCEDURE — 6360000002 HC RX W HCPCS

## 2021-02-04 PROCEDURE — 3430000000 HC RX DIAGNOSTIC RADIOPHARMACEUTICAL: Performed by: NUCLEAR MEDICINE

## 2021-02-04 PROCEDURE — 93017 CV STRESS TEST TRACING ONLY: CPT

## 2021-02-04 PROCEDURE — 99214 OFFICE O/P EST MOD 30 MIN: CPT | Performed by: INTERNAL MEDICINE

## 2021-02-04 PROCEDURE — 1036F TOBACCO NON-USER: CPT | Performed by: INTERNAL MEDICINE

## 2021-02-04 PROCEDURE — G8427 DOCREV CUR MEDS BY ELIG CLIN: HCPCS | Performed by: INTERNAL MEDICINE

## 2021-02-04 RX ORDER — LISINOPRIL 5 MG/1
5 TABLET ORAL DAILY
Qty: 90 TABLET | Refills: 1 | Status: ON HOLD | OUTPATIENT
Start: 2021-02-04 | End: 2021-02-23 | Stop reason: HOSPADM

## 2021-02-04 RX ADMIN — Medication 9.5 MILLICURIE: at 11:30

## 2021-02-04 RX ADMIN — Medication 32.9 MILLICURIE: at 12:30

## 2021-02-04 NOTE — PROGRESS NOTES
 predniSONE (DELTASONE) 10 MG tablet Take 3 tablets by mouth daily for 7 days, THEN 2 tablets daily for 7 days, THEN 1 tablet daily for 7 days, THEN 0.5 tablets daily. 30 tablet 0    HYDROcodone-acetaminophen (NORCO) 5-325 MG per tablet Take 1 tablet by mouth every 8 hours as needed for Pain for up to 30 days. 90 tablet 0    hydroxychloroquine (PLAQUENIL) 200 MG tablet Take 1 tablet by mouth 2 times daily 60 tablet 5    levothyroxine (SYNTHROID) 112 MCG tablet take 1 tablet by mouth once daily 90 tablet 1    ferrous sulfate (IRON 325) 325 (65 Fe) MG tablet take 1 tablet by mouth once daily 90 tablet 3    omeprazole (PRILOSEC) 20 MG delayed release capsule Take 1 capsule by mouth Daily 30 capsule 3    ondansetron (ZOFRAN) 4 MG tablet Take 1 tablet by mouth 3 times daily as needed for Nausea or Vomiting 30 tablet 0    silver sulfADIAZINE (SILVADENE) 1 % cream Apply topically daily BID 50 g 1    mometasone (NASONEX) 50 MCG/ACT nasal spray 2 sprays by Nasal route daily 1 Inhaler 3    acetaminophen (APAP EXTRA STRENGTH) 500 MG tablet Take 2 tablets by mouth every 6 hours as needed for Pain 120 tablet 3    loratadine (CLARITIN) 10 MG tablet Take 1 tablet by mouth daily 90 tablet 3    Calcium Carbonate-Vitamin D (OYSTER SHELL CALCIUM/D) 500-200 MG-UNIT TABS take 1 tablet by mouth once daily 90 tablet 3    simvastatin (ZOCOR) 40 MG tablet take 1 tablet by mouth at bedtime 90 tablet 3    fluticasone (FLONASE) 50 MCG/ACT nasal spray 2 sprays by Nasal route daily for 10 days 1 Bottle 0    gabapentin (NEURONTIN) 400 MG capsule Take 1 capsule by mouth 2 times daily for 90 days. 180 capsule 1     No current facility-administered medications for this visit.         Lab Results:    CBC:   Lab Results   Component Value Date    WBC 5.9 01/15/2021    HGB 10.0 (L) 01/15/2021    HCT 32.5 (L) 01/15/2021    MCV 94.8 01/15/2021     01/15/2021     BMP:    Lab Results   Component Value Date     01/27/2021    NA 140 01/04/2021     11/23/2020    K 4.7 01/27/2021    K 4.1 01/04/2021    K 3.9 11/23/2020     01/27/2021     01/04/2021     11/23/2020    CO2 29 01/27/2021    CO2 27 01/04/2021    CO2 25 11/23/2020    BUN 25 (H) 01/27/2021    BUN 23 (H) 01/04/2021    BUN 14 11/23/2020    CREATININE 1.5 (H) 01/27/2021    CREATININE 1.4 (H) 01/15/2021    CREATININE 1.6 (H) 01/04/2021    GLUCOSE 90 01/27/2021    GLUCOSE 104 01/04/2021    GLUCOSE 84 11/23/2020      Hepatic:   Lab Results   Component Value Date    AST 33 01/15/2021    AST 50 (H) 01/04/2021    AST 30 11/23/2020    ALT 29 01/15/2021    ALT 52 01/04/2021    ALT 24 11/23/2020    BILITOT 0.2 (L) 01/15/2021    BILITOT 0.2 (L) 01/04/2021    BILITOT 0.2 (L) 11/23/2020    ALKPHOS 51 01/15/2021    ALKPHOS 50 01/04/2021    ALKPHOS 52 11/23/2020     BNP: No results found for: BNP  Lipids:   Lab Results   Component Value Date    CHOL 163 10/22/2019    HDL 36 10/22/2019     INR:   Lab Results   Component Value Date    INR 1.01 01/15/2021     URINE:   Lab Results   Component Value Date    PROTUR 177.0 01/27/2021     Lab Results   Component Value Date    NITRU NEGATIVE 01/04/2021    COLORU YELLOW 01/04/2021    PHUR 5.0 01/04/2021    LABCAST 4-8 HYALINE 01/04/2021    LABCAST NONE SEEN 01/04/2021    WBCUA 2-4 01/04/2021    WBCUA 0-5 12/17/2019    RBCUA 0-2 01/04/2021    MUCUS Present 12/17/2019    YEAST NONE SEEN 01/04/2021    BACTERIA NONE SEEN 01/04/2021    CLARITYU slightly cloudy 08/30/2017    SPECGRAV 1.021 01/04/2021    LEUKOCYTESUR NEGATIVE 01/04/2021    LEUKOCYTESUR TRACE 06/17/2019    UROBILINOGEN 0.2 01/04/2021    BILIRUBINUR NEGATIVE 01/04/2021    BILIRUBINUR small 08/30/2017    BILIRUBINUR Negative 10/18/2016    BLOODU NEGATIVE 01/04/2021    GLUCOSEU Negative 12/17/2019    KETUA NEGATIVE 01/04/2021      Microalbumen/Creatinine ratio:  No components found for: RUCREAT    Objective:   Vitals: /80 (Site: Right Upper Arm, Position: Sitting, Cuff Size: Medium Adult)   Pulse 73   Temp 97.3 °F (36.3 °C)   Wt 150 lb (68 kg)   SpO2 100%   BMI 24.21 kg/m²      Constitutional:  Alert, awake, no apparent distress  Skin:normal with no rash or any lesions  HEENT:Pupils are reactive . Throat is clear. Oral mucosa is moist.  Neck:supple with no thyromegaly or bruit   Cardiovascular:  S1, S2 without murmur   Respiratory:  Clear to auscultation with no wheezes or rales  Abdomen: +bowel sound, soft, non tender and no bruit  Ext: No LE edema  Musculoskeletal:Intact  Neuro:Alert, awake and oriented with no obvious focal deficit. Speech is normal.    Electronically signed by Alyssa Hernandes MD on 2/4/2021 at 1:55 PM   **This report has been created using voice recognition software. It maycontain minor  errors which are inherent in voice recognition technology. **

## 2021-02-05 NOTE — TELEPHONE ENCOUNTER
Heart cath scheduled 02-22-21 @ 1:00pm  prehydration arrive 02-21-21 @ 8:00pm  covid screen 02-15-21    Pt informed, instructions reviewed, mailed to pt with covid screen order and instructions

## 2021-02-08 ENCOUNTER — TELEPHONE (OUTPATIENT)
Dept: CARDIOTHORACIC SURGERY | Age: 51
End: 2021-02-08

## 2021-02-08 NOTE — TELEPHONE ENCOUNTER
Is patient ok to return to work tomorrow? Heart cath scheduled for 2/22/21. Please advise.  Thank you

## 2021-02-10 NOTE — TELEPHONE ENCOUNTER
Patient states she forgot to call her work for a fax number. She will call them today and call our office back.

## 2021-02-15 ENCOUNTER — NURSE ONLY (OUTPATIENT)
Dept: LAB | Age: 51
End: 2021-02-15

## 2021-02-15 ENCOUNTER — HOSPITAL ENCOUNTER (OUTPATIENT)
Age: 51
Discharge: HOME | End: 2021-02-15
Payer: COMMERCIAL

## 2021-02-15 DIAGNOSIS — Z87.39 H/O LUPUS NEPHRITIS: ICD-10-CM

## 2021-02-15 DIAGNOSIS — Z51.81 MEDICATION MONITORING ENCOUNTER: ICD-10-CM

## 2021-02-15 LAB
ALBUMIN SERPL-MCNC: 3.9 G/DL (ref 3.5–5.1)
ALP BLD-CCNC: 53 U/L (ref 38–126)
ALT SERPL-CCNC: 18 U/L (ref 11–66)
ANION GAP SERPL CALCULATED.3IONS-SCNC: 8 MEQ/L (ref 8–16)
AST SERPL-CCNC: 17 U/L (ref 5–40)
BACTERIA: ABNORMAL
BASOPHILS # BLD: 0.3 %
BASOPHILS ABSOLUTE: 0 THOU/MM3 (ref 0–0.1)
BILIRUB SERPL-MCNC: 0.2 MG/DL (ref 0.3–1.2)
BILIRUBIN URINE: NEGATIVE
BLOOD, URINE: NEGATIVE
BUN BLDV-MCNC: 20 MG/DL (ref 7–22)
C-REACTIVE PROTEIN: < 0.3 MG/DL (ref 0–1)
CALCIUM SERPL-MCNC: 9.4 MG/DL (ref 8.5–10.5)
CASTS: ABNORMAL /LPF
CASTS: ABNORMAL /LPF
CHARACTER, URINE: CLEAR
CHLORIDE BLD-SCNC: 107 MEQ/L (ref 98–111)
CO2: 30 MEQ/L (ref 23–33)
COLOR: YELLOW
CREAT SERPL-MCNC: 1.3 MG/DL (ref 0.4–1.2)
CREATININE URINE: 84.8 MG/DL
CRYSTALS: ABNORMAL
EOSINOPHIL # BLD: 0.5 %
EOSINOPHILS ABSOLUTE: 0 THOU/MM3 (ref 0–0.4)
EPITHELIAL CELLS, UA: ABNORMAL /HPF
ERYTHROCYTE [DISTWIDTH] IN BLOOD BY AUTOMATED COUNT: 17 % (ref 11.5–14.5)
ERYTHROCYTE [DISTWIDTH] IN BLOOD BY AUTOMATED COUNT: 61.9 FL (ref 35–45)
GFR SERPL CREATININE-BSD FRML MDRD: 44 ML/MIN/1.73M2
GLUCOSE BLD-MCNC: 78 MG/DL (ref 70–108)
GLUCOSE, URINE: NEGATIVE MG/DL
HCT VFR BLD CALC: 33.7 % (ref 37–47)
HEMOGLOBIN: 9.9 GM/DL (ref 12–16)
IMMATURE GRANS (ABS): 0.02 THOU/MM3 (ref 0–0.07)
IMMATURE GRANULOCYTES: 0.3 %
KETONES, URINE: NEGATIVE
LEUKOCYTE EST, POC: NEGATIVE
LYMPHOCYTES # BLD: 11.5 %
LYMPHOCYTES ABSOLUTE: 0.7 THOU/MM3 (ref 1–4.8)
MCH RBC QN AUTO: 29 PG (ref 26–33)
MCHC RBC AUTO-ENTMCNC: 29.4 GM/DL (ref 32.2–35.5)
MCV RBC AUTO: 98.8 FL (ref 81–99)
MISCELLANEOUS LAB TEST RESULT: ABNORMAL
MONOCYTES # BLD: 4.6 %
MONOCYTES ABSOLUTE: 0.3 THOU/MM3 (ref 0.4–1.3)
NITRITE, URINE: NEGATIVE
NUCLEATED RED BLOOD CELLS: 0 /100 WBC
PH UA: 5.5 (ref 5–9)
PLATELET # BLD: 120 THOU/MM3 (ref 130–400)
PMV BLD AUTO: 12.2 FL (ref 9.4–12.4)
POTASSIUM SERPL-SCNC: 4.2 MEQ/L (ref 3.5–5.2)
PROT/CREAT RATIO, UR: 2.46
PROTEIN UA: 300 MG/DL
PROTEIN, URINE: 208.6 MG/DL
RBC # BLD: 3.41 MILL/MM3 (ref 4.2–5.4)
RBC URINE: ABNORMAL /HPF
RENAL EPITHELIAL, UA: ABNORMAL
SEDIMENTATION RATE, ERYTHROCYTE: 7 MM/HR (ref 0–20)
SEG NEUTROPHILS: 82.8 %
SEGMENTED NEUTROPHILS ABSOLUTE COUNT: 5.1 THOU/MM3 (ref 1.8–7.7)
SODIUM BLD-SCNC: 145 MEQ/L (ref 135–145)
SPECIFIC GRAVITY UA: 1.02 (ref 1–1.03)
TOTAL PROTEIN: 6.8 G/DL (ref 6.1–8)
UROBILINOGEN, URINE: 0.2 EU/DL (ref 0–1)
WBC # BLD: 6.1 THOU/MM3 (ref 4.8–10.8)
WBC UA: ABNORMAL /HPF
YEAST: ABNORMAL

## 2021-02-15 PROCEDURE — U0003 INFECTIOUS AGENT DETECTION BY NUCLEIC ACID (DNA OR RNA); SEVERE ACUTE RESPIRATORY SYNDROME CORONAVIRUS 2 (SARS-COV-2) (CORONAVIRUS DISEASE [COVID-19]), AMPLIFIED PROBE TECHNIQUE, MAKING USE OF HIGH THROUGHPUT TECHNOLOGIES AS DESCRIBED BY CMS-2020-01-R: HCPCS

## 2021-02-16 DIAGNOSIS — I31.39 PERICARDIAL EFFUSION: ICD-10-CM

## 2021-02-16 DIAGNOSIS — M32.14 OTHER SYSTEMIC LUPUS ERYTHEMATOSUS WITH GLOMERULAR DISEASE (HCC): ICD-10-CM

## 2021-02-16 LAB
C3 COMPLEMENT: 103 MG/DL (ref 90–180)
COMPLEMENT C4: 15 MG/DL (ref 10–40)
SARS-COV-2: NOT DETECTED

## 2021-02-16 RX ORDER — PREDNISONE 10 MG/1
10 TABLET ORAL DAILY
Qty: 30 TABLET | Refills: 1 | Status: SHIPPED | OUTPATIENT
Start: 2021-02-16 | End: 2021-03-16 | Stop reason: SDUPTHER

## 2021-02-16 RX ORDER — MYCOPHENOLATE MOFETIL 500 MG/1
1000 TABLET ORAL 2 TIMES DAILY
Qty: 120 TABLET | Refills: 0 | Status: SHIPPED | OUTPATIENT
Start: 2021-02-16 | End: 2021-03-16 | Stop reason: SDUPTHER

## 2021-02-16 NOTE — PROGRESS NOTES
Patient called and informed of the worsening and drop in the platelet count. She reports taking the prednisone 10 mg daily but has only been taking mycophenolate 500 mg twice daily. Systemic lupus: Increase mycophenolate to 1000 mg twice daily continue prednisone 10 mg daily, and Plaquenil    Medication monitoring: Repeat blood testing in 3 to 4 weeks. Patient reported understanding had no further questions.

## 2021-02-19 ENCOUNTER — PATIENT MESSAGE (OUTPATIENT)
Dept: PAIN MANAGEMENT | Age: 51
End: 2021-02-19

## 2021-02-19 ENCOUNTER — PREP FOR PROCEDURE (OUTPATIENT)
Dept: CARDIOLOGY | Age: 51
End: 2021-02-19

## 2021-02-19 DIAGNOSIS — G89.4 CHRONIC PAIN SYNDROME: ICD-10-CM

## 2021-02-19 DIAGNOSIS — G89.4 CHRONIC PAIN SYNDROME: Primary | ICD-10-CM

## 2021-02-19 RX ORDER — ASPIRIN 325 MG
325 TABLET ORAL ONCE
Status: CANCELLED | OUTPATIENT
Start: 2021-02-19 | End: 2021-02-19

## 2021-02-19 RX ORDER — NITROGLYCERIN 0.4 MG/1
0.4 TABLET SUBLINGUAL EVERY 5 MIN PRN
Status: CANCELLED | OUTPATIENT
Start: 2021-02-19

## 2021-02-19 RX ORDER — SODIUM CHLORIDE 9 MG/ML
INJECTION, SOLUTION INTRAVENOUS CONTINUOUS
Status: CANCELLED | OUTPATIENT
Start: 2021-02-19

## 2021-02-19 RX ORDER — SODIUM CHLORIDE 0.9 % (FLUSH) 0.9 %
10 SYRINGE (ML) INJECTION PRN
Status: CANCELLED | OUTPATIENT
Start: 2021-02-19

## 2021-02-19 RX ORDER — SODIUM CHLORIDE 0.9 % (FLUSH) 0.9 %
10 SYRINGE (ML) INJECTION EVERY 12 HOURS SCHEDULED
Status: CANCELLED | OUTPATIENT
Start: 2021-02-19

## 2021-02-19 NOTE — TELEPHONE ENCOUNTER
Roxane Schaumann called requesting a refill on the following medications:  Requested Prescriptions     Pending Prescriptions Disp Refills    HYDROcodone-acetaminophen (NORCO) 5-325 MG per tablet 90 tablet 0     Sig: Take 1 tablet by mouth every 8 hours as needed for Pain for up to 30 days.      Pharmacy verified: [facility]      Date of last visit: 12/21/20  Date of next visit (if applicable): 0/43/2099

## 2021-02-21 ENCOUNTER — HOSPITAL ENCOUNTER (OUTPATIENT)
Age: 51
Discharge: HOME | End: 2021-02-23
Attending: NUCLEAR MEDICINE | Admitting: NUCLEAR MEDICINE
Payer: COMMERCIAL

## 2021-02-21 PROBLEM — N28.9 RENAL INSUFFICIENCY: Status: ACTIVE | Noted: 2021-02-21

## 2021-02-21 LAB
ANION GAP SERPL CALCULATED.3IONS-SCNC: 10 MEQ/L (ref 8–16)
APTT: 33.9 SECONDS (ref 22–38)
BUN BLDV-MCNC: 28 MG/DL (ref 7–22)
CALCIUM SERPL-MCNC: 9.1 MG/DL (ref 8.5–10.5)
CHLORIDE BLD-SCNC: 104 MEQ/L (ref 98–111)
CO2: 26 MEQ/L (ref 23–33)
CREAT SERPL-MCNC: 1.7 MG/DL (ref 0.4–1.2)
ERYTHROCYTE [DISTWIDTH] IN BLOOD BY AUTOMATED COUNT: 15.9 % (ref 11.5–14.5)
ERYTHROCYTE [DISTWIDTH] IN BLOOD BY AUTOMATED COUNT: 57.4 FL (ref 35–45)
GFR SERPL CREATININE-BSD FRML MDRD: 32 ML/MIN/1.73M2
GLUCOSE BLD-MCNC: 161 MG/DL (ref 70–108)
HCT VFR BLD CALC: 32.6 % (ref 37–47)
HEMOGLOBIN: 9.6 GM/DL (ref 12–16)
INR BLD: 0.97 (ref 0.85–1.13)
MCH RBC QN AUTO: 28.6 PG (ref 26–33)
MCHC RBC AUTO-ENTMCNC: 29.4 GM/DL (ref 32.2–35.5)
MCV RBC AUTO: 97 FL (ref 81–99)
PLATELET # BLD: 145 THOU/MM3 (ref 130–400)
PMV BLD AUTO: 11.2 FL (ref 9.4–12.4)
POTASSIUM REFLEX MAGNESIUM: 3.9 MEQ/L (ref 3.5–5.2)
RBC # BLD: 3.36 MILL/MM3 (ref 4.2–5.4)
SODIUM BLD-SCNC: 140 MEQ/L (ref 135–145)
WBC # BLD: 5 THOU/MM3 (ref 4.8–10.8)

## 2021-02-21 PROCEDURE — 85027 COMPLETE CBC AUTOMATED: CPT

## 2021-02-21 PROCEDURE — 86870 RBC ANTIBODY IDENTIFICATION: CPT

## 2021-02-21 PROCEDURE — 86901 BLOOD TYPING SEROLOGIC RH(D): CPT

## 2021-02-21 PROCEDURE — 80048 BASIC METABOLIC PNL TOTAL CA: CPT

## 2021-02-21 PROCEDURE — 2580000003 HC RX 258: Performed by: PHYSICIAN ASSISTANT

## 2021-02-21 PROCEDURE — 36415 COLL VENOUS BLD VENIPUNCTURE: CPT

## 2021-02-21 PROCEDURE — 85730 THROMBOPLASTIN TIME PARTIAL: CPT

## 2021-02-21 PROCEDURE — 86900 BLOOD TYPING SEROLOGIC ABO: CPT

## 2021-02-21 PROCEDURE — 86905 BLOOD TYPING RBC ANTIGENS: CPT

## 2021-02-21 PROCEDURE — 85610 PROTHROMBIN TIME: CPT

## 2021-02-21 PROCEDURE — 86850 RBC ANTIBODY SCREEN: CPT

## 2021-02-21 RX ORDER — GABAPENTIN 400 MG/1
400 CAPSULE ORAL 2 TIMES DAILY
COMMUNITY
End: 2021-05-03

## 2021-02-21 RX ORDER — SODIUM CHLORIDE 9 MG/ML
INJECTION, SOLUTION INTRAVENOUS CONTINUOUS
Status: DISCONTINUED | OUTPATIENT
Start: 2021-02-21 | End: 2021-02-22 | Stop reason: SDUPTHER

## 2021-02-21 RX ORDER — ASPIRIN 325 MG
325 TABLET ORAL ONCE
Status: COMPLETED | OUTPATIENT
Start: 2021-02-21 | End: 2021-02-22

## 2021-02-21 RX ORDER — SODIUM CHLORIDE 0.9 % (FLUSH) 0.9 %
10 SYRINGE (ML) INJECTION EVERY 12 HOURS SCHEDULED
Status: DISCONTINUED | OUTPATIENT
Start: 2021-02-21 | End: 2021-02-22 | Stop reason: SDUPTHER

## 2021-02-21 RX ORDER — NITROGLYCERIN 0.4 MG/1
0.4 TABLET SUBLINGUAL EVERY 5 MIN PRN
Status: DISCONTINUED | OUTPATIENT
Start: 2021-02-21 | End: 2021-02-23 | Stop reason: HOSPADM

## 2021-02-21 RX ORDER — SODIUM CHLORIDE 0.9 % (FLUSH) 0.9 %
10 SYRINGE (ML) INJECTION PRN
Status: DISCONTINUED | OUTPATIENT
Start: 2021-02-21 | End: 2021-02-22 | Stop reason: SDUPTHER

## 2021-02-21 RX ADMIN — SODIUM CHLORIDE, PRESERVATIVE FREE 10 ML: 5 INJECTION INTRAVENOUS at 22:00

## 2021-02-21 RX ADMIN — SODIUM CHLORIDE: 9 INJECTION, SOLUTION INTRAVENOUS at 22:15

## 2021-02-22 LAB
ABO: NORMAL
ANTIBODY IDENTIFICATION: NORMAL
ANTIBODY SCREEN: NORMAL
EKG ATRIAL RATE: 63 BPM
EKG P AXIS: 72 DEGREES
EKG P-R INTERVAL: 166 MS
EKG Q-T INTERVAL: 438 MS
EKG QRS DURATION: 92 MS
EKG QTC CALCULATION (BAZETT): 448 MS
EKG R AXIS: 61 DEGREES
EKG T AXIS: 53 DEGREES
EKG VENTRICULAR RATE: 63 BPM
K ANTIGEN: NORMAL
RH FACTOR: NORMAL

## 2021-02-22 PROCEDURE — 2709999900 HC NON-CHARGEABLE SUPPLY

## 2021-02-22 PROCEDURE — 6360000004 HC RX CONTRAST MEDICATION: Performed by: NUCLEAR MEDICINE

## 2021-02-22 PROCEDURE — 93005 ELECTROCARDIOGRAM TRACING: CPT | Performed by: PHYSICIAN ASSISTANT

## 2021-02-22 PROCEDURE — 2580000003 HC RX 258: Performed by: PHYSICIAN ASSISTANT

## 2021-02-22 PROCEDURE — 6370000000 HC RX 637 (ALT 250 FOR IP): Performed by: PHYSICIAN ASSISTANT

## 2021-02-22 PROCEDURE — 6360000002 HC RX W HCPCS

## 2021-02-22 PROCEDURE — C1769 GUIDE WIRE: HCPCS

## 2021-02-22 PROCEDURE — 2500000003 HC RX 250 WO HCPCS

## 2021-02-22 PROCEDURE — 93458 L HRT ARTERY/VENTRICLE ANGIO: CPT | Performed by: NUCLEAR MEDICINE

## 2021-02-22 PROCEDURE — 6370000000 HC RX 637 (ALT 250 FOR IP): Performed by: NURSE PRACTITIONER

## 2021-02-22 PROCEDURE — C1894 INTRO/SHEATH, NON-LASER: HCPCS

## 2021-02-22 PROCEDURE — 93010 ELECTROCARDIOGRAM REPORT: CPT | Performed by: NUCLEAR MEDICINE

## 2021-02-22 PROCEDURE — 94760 N-INVAS EAR/PLS OXIMETRY 1: CPT

## 2021-02-22 RX ORDER — GABAPENTIN 400 MG/1
400 CAPSULE ORAL 2 TIMES DAILY
Status: DISCONTINUED | OUTPATIENT
Start: 2021-02-22 | End: 2021-02-23 | Stop reason: HOSPADM

## 2021-02-22 RX ORDER — HYDROCODONE BITARTRATE AND ACETAMINOPHEN 5; 325 MG/1; MG/1
1 TABLET ORAL EVERY 8 HOURS PRN
Qty: 90 TABLET | Refills: 0 | Status: SHIPPED | OUTPATIENT
Start: 2021-02-22 | End: 2021-03-22 | Stop reason: SDUPTHER

## 2021-02-22 RX ORDER — SODIUM CHLORIDE 0.9 % (FLUSH) 0.9 %
10 SYRINGE (ML) INJECTION PRN
Status: DISCONTINUED | OUTPATIENT
Start: 2021-02-22 | End: 2021-02-23 | Stop reason: HOSPADM

## 2021-02-22 RX ORDER — SODIUM CHLORIDE 0.9 % (FLUSH) 0.9 %
10 SYRINGE (ML) INJECTION EVERY 12 HOURS SCHEDULED
Status: DISCONTINUED | OUTPATIENT
Start: 2021-02-22 | End: 2021-02-23 | Stop reason: HOSPADM

## 2021-02-22 RX ORDER — ACETAMINOPHEN 325 MG/1
650 TABLET ORAL EVERY 4 HOURS PRN
Status: DISCONTINUED | OUTPATIENT
Start: 2021-02-22 | End: 2021-02-23 | Stop reason: HOSPADM

## 2021-02-22 RX ORDER — ATROPINE SULFATE 0.4 MG/ML
0.5 AMPUL (ML) INJECTION
Status: ACTIVE | OUTPATIENT
Start: 2021-02-22 | End: 2021-02-22

## 2021-02-22 RX ORDER — SODIUM CHLORIDE 9 MG/ML
INJECTION, SOLUTION INTRAVENOUS CONTINUOUS
Status: DISCONTINUED | OUTPATIENT
Start: 2021-02-22 | End: 2021-02-23 | Stop reason: HOSPADM

## 2021-02-22 RX ORDER — ACETAMINOPHEN 325 MG/1
650 TABLET ORAL EVERY 4 HOURS PRN
Status: DISCONTINUED | OUTPATIENT
Start: 2021-02-22 | End: 2021-02-22 | Stop reason: SDUPTHER

## 2021-02-22 RX ADMIN — ASPIRIN 325 MG: 325 TABLET, FILM COATED ORAL at 04:49

## 2021-02-22 RX ADMIN — SODIUM CHLORIDE: 9 INJECTION, SOLUTION INTRAVENOUS at 09:02

## 2021-02-22 RX ADMIN — GABAPENTIN 400 MG: 400 CAPSULE ORAL at 11:02

## 2021-02-22 RX ADMIN — GABAPENTIN 400 MG: 400 CAPSULE ORAL at 20:32

## 2021-02-22 RX ADMIN — IOPAMIDOL 75 ML: 755 INJECTION, SOLUTION INTRAVENOUS at 13:42

## 2021-02-22 ASSESSMENT — PAIN SCALES - GENERAL: PAINLEVEL_OUTOF10: 0

## 2021-02-22 ASSESSMENT — PAIN DESCRIPTION - DESCRIPTORS: DESCRIPTORS: ACHING;DISCOMFORT

## 2021-02-22 NOTE — TELEPHONE ENCOUNTER
OARRS reviewed. UDS: + for  Gabapentin, norco - consistent      Last seen: 12/21/2020.     Follow-up: 03/22/2021

## 2021-02-22 NOTE — PROGRESS NOTES
Pt admitted to  [address] [place] [address] ambulatory to room. Complaints: none. Patient with no IV access. Vital signs obtained. Assessment and data collection initiated. Two nurse skin assessment performed by [facility] RN and Zaynab MCCOLLUM. Patient oriented to room and call light. Policies and procedures for 8[facility] explained. All questions answered with no further questions at this time. Fall prevention and safety brochure discussed with patient. Bed alarm on. Call light in reach. Explained patients right to have family, representative or physician notified of their admission. Patient has Declined for physician to be notified. Patient has Declined for family/representative to be notified. Patient would like family notified once per shift?  No

## 2021-02-22 NOTE — CARE COORDINATION
2/22/21, 11:46 AM EST    Patient goals/plan/ treatment preferences discussed by  and . Patient goals/plan/ treatment preferences reviewed with patient/ family. Patient/ family verbalize understanding of discharge plan and are in agreement with goal/plan/treatment preferences. Understanding was demonstrated using the teach back method. AVS provided by RN at time of discharge, which includes all necessary medical information pertaining to the patients current course of illness, treatment, post-discharge goals of care, and treatment preferences. Met with pt today. She is awaiting her cardiac cath this afternoon. She is from home where she resides with a son. She has no services or DME. She has transportation home. No discharge needs voiced at this time.

## 2021-02-22 NOTE — PLAN OF CARE
Problem: Falls - Risk of:  Goal: Will remain free from falls  Description: Will remain free from falls  Outcome: Met This Shift  Note: Patient remained free from falls this shift. Used call light appropriately. Wore nonskid socks when ambulating with assistance. Bed alarm on. Call light in reach. Goal: Absence of physical injury  Description: Absence of physical injury  Outcome: Met This Shift  Note: Patient remained free from physical injury this shift. Used call light appropriately. Wore nonskid socks when ambulating with assistance. Bed alarm on. Call light in reach. Pathway clear. Problem: Cardiovascular  Goal: Hemodynamic stability  Outcome: Met This Shift  Note: VSS. Continuous telemetry: NSR. Will continue to monitor vitals Q4H and PRN. IV fluids infusing for prehydration. Problem:   Goal: Adequate urinary output  Outcome: Met This Shift  Note: Patient with IV fluids infusing. Adequate urine output noted. No edema noted. Tolerating PO fluids. Problem: Discharge Planning:  Goal: Discharged to appropriate level of care  Description: Discharged to appropriate level of care  Outcome: Ongoing  Note: Plan for heart cath in a.m. receiving IV fluids. Plan discharge post heart cath tomorrow      Patient participated in plan of care and contributed to goal setting.

## 2021-02-22 NOTE — H&P
[address] [address]  Sedation/Analgesia History & Physical    Pt Name: Roxane Schaumann  Account number: [de-identified]  MRN: [id number]  YOB: 1970  Provider Performing Procedure: Stephani Briseno MD [facility]  Primary Care Physician: MACKENZIE Garrett - MITRA  Date: 2/22/2021    PRE-PROCEDURE    Code Status: FULL CODE  Brief History/Pre-Procedure Diagnosis:   Angina  Abn stress test      Consent: : I have discussed with the patient risks, benefits, and alternatives (and relevant risks, benefits, and side effects related to alternatives or not receiving care), and likelihood of the success. The patient and/or representative appear to understand and agree to proceed. The discussion encompasses risks, benefits, and side effects related to the alternatives and the risks related to not receiving the proposed care, treatment, and services. MEDICAL HISTORY  []ASHD/ANGINA/MI/CHF   [x]Hypertension  []Diabetes  []Hyperlipidemia  []Smoking  []Family Hx of ASHD  []Additional information:       has a past medical history of Anemia, CKD (chronic kidney disease), stage III, Dyslipidemia, Hematuria, Hyperlipidemia, Hypertension, Hyperuricemia, Hypothyroidism, Kidney disease, Lupus ([address] Lupus nephritis ([address] Proteinuria, Seizures ([address] and Systemic lupus erythematosus ([address] SURGICAL HISTORY   has a past surgical history that includes Nerve Block (Bilateral); pr inj dx/ther agnt paravert facet joint, lumbar/sac, 2nd level (Bilateral, 5/7/2018); pr njx dx/ther sbst intrlmnr lmbr/sac w/img gdn (N/A, 7/19/2018); pr njx aa&/strd tfrml epi lumbar/sacral 1 level (Left, 9/25/2018); Lumbar spine surgery (Left, 12/13/2018); lumbar nerve block (N/A, 3/11/2019); lumbar nerve block (N/A, 5/21/2019); and CT BIOPSY ABDOMEN RETROPERITONEUM (1/15/2021).   Additional information:       ALLERGIES   Allergies as of 02/21/2021 - Review Complete 02/21/2021   Allergen Reaction Noted    Codeine Hives and Nausea And Vomiting 01/05/2012     Additional information:       MEDICATIONS   Aspirin  [x] 81 mg  [] 325 mg  [] None  Antiplatelet drug therapy use last 5 days  []No []Yes  Coumadin Use Last 5 Days []No []Yes  Other anticoagulant use last 5 days  []No []Yes    Current Facility-Administered Medications:     aspirin EC tablet 325 mg, 325 mg, Oral, Once, MACKENZIE Bright - CNP    gabapentin (NEURONTIN) capsule 400 mg, 400 mg, Oral, BID, MACKENZIE Gates - CNP, 400 mg at 02/22/21 1102    acetaminophen (TYLENOL) tablet 650 mg, 650 mg, Oral, Q4H PRN, MACKENZIE Gates - CNP    influenza quadrivalent split vaccine (FLUZONE;FLUARIX;FLULAVAL;AFLURIA) injection 0.5 mL, 0.5 mL, Intramuscular, Prior to discharge, Rigoberto Saeed MD    0.9 % sodium chloride infusion, , Intravenous, Continuous, Standley Mortimer, PA-C, Last Rate: 75 mL/hr at 02/22/21 0902, New Bag at 02/22/21 0902    nitroGLYCERIN (NITROSTAT) SL tablet 0.4 mg, 0.4 mg, Sublingual, Q5 Min PRN, Standley Mortimer, PA-C    sodium chloride flush 0.9 % injection 10 mL, 10 mL, Intravenous, 2 times per day, Standley Mortimer, PA-C, 10 mL at 02/21/21 2200    sodium chloride flush 0.9 % injection 10 mL, 10 mL, Intravenous, PRN, Standley Mortimer, PA-C  Prior to Admission medications    Medication Sig Start Date End Date Taking? Authorizing Provider   gabapentin (NEURONTIN) 400 MG capsule Take 400 mg by mouth 2 times daily.    Yes Historical Provider, MD   mycophenolate (CELLCEPT) 500 MG tablet Take 2 tablets by mouth 2 times daily 2/16/21 3/18/21 Yes Jareth Rodriguez DO   predniSONE (DELTASONE) 10 MG tablet Take 1 tablet by mouth daily 2/16/21  Yes Jareth Rodriguez DO   lisinopril (PRINIVIL;ZESTRIL) 5 MG tablet Take 1 tablet by mouth daily 2/4/21  Yes Sheron Cardenas MD   hydroxychloroquine (PLAQUENIL) 200 MG tablet Take 1 tablet by mouth 2 times daily 1/4/21  Yes MACKENZIE Dinero - CNP   levothyroxine (SYNTHROID) 112 MCG tablet take 1 tablet by mouth once daily 12/3/20  Yes MACKENZIE Muse CNP   ferrous sulfate (IRON 325) 325 (65 Fe) MG tablet take 1 tablet by mouth once daily 10/20/20  Yes MACKENZIE Muse CNP   omeprazole (PRILOSEC) 20 MG delayed release capsule Take 1 capsule by mouth Daily 10/5/20  Yes Trayton B Jennifer, DO   loratadine (CLARITIN) 10 MG tablet Take 1 tablet by mouth daily 3/18/20  Yes MACKENZIE Muse CNP   Calcium Carbonate-Vitamin D (OYSTER SHELL CALCIUM/D) 500-200 MG-UNIT TABS take 1 tablet by mouth once daily 3/18/20  Yes MACKENZIE Muse CNP   simvastatin (ZOCOR) 40 MG tablet take 1 tablet by mouth at bedtime 3/18/20  Yes MACKENZIE Muse CNP   gabapentin (NEURONTIN) 400 MG capsule Take 1 capsule by mouth 2 times daily for 90 days.  10/20/20 1/18/21  Sergio RootsMACKENZIE CNP   ondansetron (ZOFRAN) 4 MG tablet Take 1 tablet by mouth 3 times daily as needed for Nausea or Vomiting 10/5/20   MACKENZIE Muse CNP   mometasone (NASONEX) 50 MCG/ACT nasal spray 2 sprays by Nasal route daily  Patient taking differently: 2 sprays by Nasal route daily as needed  8/17/20   MACKENZIE Muse CNP   acetaminophen (APAP EXTRA STRENGTH) 500 MG tablet Take 2 tablets by mouth every 6 hours as needed for Pain 4/15/20   MACKENZIE Muse CNP   fluticasone [place] Erichsen) 50 MCG/ACT nasal spray 2 sprays by Nasal route daily for 10 days 11/26/19 2/4/21  MACKENZIE Townsedn CNP     Additional information:       VITAL SIGNS   Vitals:    02/22/21 1120   BP: (!) 113/58   Pulse: 65   Resp: 18   Temp: 98.2 °F (36.8 °C)   SpO2: 97%       PHYSICAL:   General: No acute distress  HEENT:  Unremarkable for age  Neck: without increased JVD, carotid pulses 2+ bilaterally without bruits  Heart: RRR, S1 & S2 WNL, S4 gallop, without murmurs or rubs    Lungs: Clear to auscultation    Abdomen: BS present, without HSM, masses, or tenderness    Extremities: without C,C,E.  Pulses 2+ bilaterally  Mental Status: Alert & Oriented        PLANNED PROCEDURE   [x]Cath  []PCI                []Pacemaker/AICD  []FARRUKH             []Cardioversion []Peripheral angiography/PTA  []Other:      SEDATION  Planned agent:[x]Midazolam []Meperidine [x]Sublimaze []Morphine  []Diazepam  []Other:     ASA Classification:  []1 [x]2 []3 []4 []5  Class 1: A normal healthy patient  Class 2: Pt with mild to moderate systemic disease  Class 3: Severe systemic disease or disturbance  Class 4: Severe systemic disorders that are already life threatening. Class 5: Moribund pt with little chances of survival, for more than 24 hours. Mallampati I Airway Classification:   []1 [x]2 []3 []4    [x]Pre-procedure diagnostic studies complete and results available. Comment:    [x]Previous sedation/anesthesia experiences assessed. Comment:    [x]The patient is an appropriate candidate to undergo the planned procedure sedation and anesthesia. (Refer to nursing sedation/analgesia documentation record)  [x]Formulation and discussion of sedation/procedure plan, risks, and expectations with patient and/or responsible adult completed. [x]Patient examined immediately prior to the procedure.  (Refer to nursing sedation/analgesia documentation record)    Alicia Harris MD [facility]  Electronically signed 2/22/2021 at 12:34 PM

## 2021-02-22 NOTE — PROCEDURES
[address] [place] [address]                            CARDIAC CATHETERIZATION    PATIENT NAME: Susan Waters                   :        1970  MED REC NO:   [id number]                           ROOM:       [id number]  ACCOUNT NO:   [de-identified]                           ADMIT DATE: 2021  PROVIDER:     Dioni Raymundo M.D.    DATE OF PROCEDURE:  2021    CLINICAL HISTORY AND INDICATION:  A 68-year-old patient with symptoms of  dyspnea, history of lupus; had a stress test showing moderate anterior  ischemia, referred for cardiac catheterization to evaluate coronary  anatomy. PROCEDURES:  1. Left heart cath with LV-gram.  2.  Coronary angiogram, right and left. 3.  Sedation:  2 of Versed, 25 of fentanyl between 01:30 and 02:00 p.m.  in my presence under my supervision. 4.  Blood loss less than 10 mL. PROCEDURE DETAILS:  Please refer to my catheterization detailed note. HEMODYNAMIC RESULTS AND LEFT VENTRICULOGRAM:  Left ventricular  end-diastolic pressure was 12 mmHg with no significant change before and  after contrast injection. No significant gradient across the aortic  valve to signify aortic stenosis. Left ventricular function was lower  limits of normal.  EF 50%. CORONARY ARTERIOGRAM RESULTS:  1. Left main is patent, gives rise to left anterior descending and left  circumflex artery. 2.  Left anterior descending artery is pretty much occluded after  diagonal artery and there appears to be no left circumflex artery  either. This is only a small branch that feeds the anterior wall area. 3.  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     CONCLUSION: 1.  Chronically occluded LAD with collaterals from the RCA. 2.  Moderate disease in the proximal RCA which is a very large size,  dominant vessel. 3.  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 4.  Borderline to mildly abnormal LV function. RECOMMENDATIONS:  At this point, case was discussed with interventional  team.  It was felt that the patient would be a candidate for discussion  with the surgical team for possible off-pump HERNANDEZ to LAD given the fact  that the RCA stenosis does not seem to be critical.  We will discuss the  findings with the family. Unfortunately, at the end of the test, the  family was not available for discussion and the patient was still quite  sedated. We will plan on having the patient come in for evaluation in  the office in the next couple of weeks and reassess accordingly.         Margarita Gan M.D.    D: 02/22/2021 15:12:07       T: 02/22/2021 15:16:06     [id number]  Job#: [id number]     Doc#: [id number]    CC:

## 2021-02-23 ENCOUNTER — PATIENT MESSAGE (OUTPATIENT)
Dept: PAIN MANAGEMENT | Age: 51
End: 2021-02-23

## 2021-02-23 VITALS
BODY MASS INDEX: 23.87 KG/M2 | HEART RATE: 64 BPM | SYSTOLIC BLOOD PRESSURE: 111 MMHG | DIASTOLIC BLOOD PRESSURE: 69 MMHG | WEIGHT: 148.5 LBS | OXYGEN SATURATION: 96 % | RESPIRATION RATE: 16 BRPM | TEMPERATURE: 98.2 F | HEIGHT: 66 IN

## 2021-02-23 PROBLEM — E44.0 MODERATE MALNUTRITION (HCC): Status: ACTIVE | Noted: 2021-02-23

## 2021-02-23 PROBLEM — E44.0 MODERATE MALNUTRITION (HCC): Chronic | Status: ACTIVE | Noted: 2021-02-23

## 2021-02-23 PROCEDURE — APPNB30 APP NON BILLABLE TIME 0-30 MINS: Performed by: PHYSICIAN ASSISTANT

## 2021-02-23 PROCEDURE — 2580000003 HC RX 258: Performed by: NUCLEAR MEDICINE

## 2021-02-23 PROCEDURE — 6360000002 HC RX W HCPCS: Performed by: NUCLEAR MEDICINE

## 2021-02-23 PROCEDURE — 90686 IIV4 VACC NO PRSV 0.5 ML IM: CPT | Performed by: NUCLEAR MEDICINE

## 2021-02-23 PROCEDURE — G0008 ADMIN INFLUENZA VIRUS VAC: HCPCS | Performed by: NUCLEAR MEDICINE

## 2021-02-23 PROCEDURE — 6370000000 HC RX 637 (ALT 250 FOR IP): Performed by: NURSE PRACTITIONER

## 2021-02-23 RX ORDER — NITROGLYCERIN 0.4 MG/1
0.4 TABLET SUBLINGUAL EVERY 5 MIN PRN
Qty: 25 TABLET | Refills: 0 | Status: SHIPPED | OUTPATIENT
Start: 2021-02-23

## 2021-02-23 RX ORDER — HYDROCODONE BITARTRATE AND ACETAMINOPHEN 5; 325 MG/1; MG/1
1 TABLET ORAL EVERY 8 HOURS PRN
Qty: 90 TABLET | Refills: 0 | OUTPATIENT
Start: 2021-02-23 | End: 2021-03-25

## 2021-02-23 RX ADMIN — SODIUM CHLORIDE, PRESERVATIVE FREE 10 ML: 5 INJECTION INTRAVENOUS at 09:58

## 2021-02-23 RX ADMIN — INFLUENZA A VIRUS A/VICTORIA/2454/2019 IVR-207 (H1N1) ANTIGEN (PROPIOLACTONE INACTIVATED), INFLUENZA A VIRUS A/HONG KONG/2671/2019 IVR-208 (H3N2) ANTIGEN (PROPIOLACTONE INACTIVATED), INFLUENZA B VIRUS B/VICTORIA/705/2018 BVR-11 ANTIGEN (PROPIOLACTONE INACTIVATED), INFLUENZA B VIRUS B/PHUKET/3073/2013 BVR-1B ANTIGEN (PROPIOLACTONE INACTIVATED) 0.5 ML: 15; 15; 15; 15 INJECTION, SUSPENSION INTRAMUSCULAR at 11:42

## 2021-02-23 RX ADMIN — GABAPENTIN 400 MG: 400 CAPSULE ORAL at 09:57

## 2021-02-23 NOTE — PROGRESS NOTES
Cardiology Progress Note      Patient:  Chelsey Diaz  YOB: 1970  MRN: [id number]   Acct: [de-identified]  Admit Date:  2/21/2021  Primary Cardiologist: Mala Gonzalez MD    Here for outpt cath  Pt stayed overnight due to sbp 80s, which resolved and now low 100s    Subjective (Events in last 24 hours): pt awake and alert. NAD.  No cp or sob  No lightheadedness or syncope      Objective:   /69   Pulse 64   Temp 98.2 °F (36.8 °C) (Oral)   Resp 16   Ht 5' 6\" (1.676 m)   Wt 148 lb 8 oz (67.4 kg)   SpO2 96%   BMI 23.97 kg/m²        TELEMETRY: nsr    Physical Exam:  General Appearance: alert and oriented to person, place and time, in no acute distress  Cardiovascular: normal rate, regular rhythm, normal S1 and S2, no murmurs, rubs, clicks, or gallops, distal pulses intact, no carotid bruits, no JVD  Pulmonary/Chest: clear to auscultation bilaterally- no wheezes, rales or rhonchi, normal air movement, no respiratory distress  Abdomen: soft, non-tender, non-distended, normal bowel sounds, no masses Extremities: no cyanosis, clubbing or edema, pulse   Skin: warm and dry, no rash or erythema  Head: normocephalic and atraumatic  Eyes: pupils equal, round, and reactive to light  Neck: supple and non-tender without mass, no thyromegaly   Musculoskeletal: normal range of motion, no joint swelling, deformity or tenderness  Neurological: alert, oriented, normal speech, no focal findings or movement disorder noted  Right groin - no hematoma, +DP +PT    Medications:    aspirin  325 mg Oral Once    gabapentin  400 mg Oral BID    sodium chloride flush  10 mL Intravenous 2 times per day    influenza virus vaccine  0.5 mL Intramuscular Prior to discharge      sodium chloride 75 mL/hr at 02/22/21 1553         sodium chloride flush, 10 mL, PRN      acetaminophen, 650 mg, Q4H PRN      nitroGLYCERIN, 0.4 mg, Q5 Min PRN        Lab Data:    Cardiac Enzymes:  No results for input(s): CKTOTAL, CKMB, Carmen Guerra in the last 72 hours. CBC:   Lab Results   Component Value Date    WBC 5.0 02/21/2021    RBC 3.36 02/21/2021    HGB 9.6 02/21/2021    HCT 32.6 02/21/2021     02/21/2021       CMP:    Lab Results   Component Value Date     02/21/2021    K 3.9 02/21/2021     02/21/2021    CO2 26 02/21/2021    BUN 28 02/21/2021    CREATININE 1.7 02/21/2021    AGRATIO 1.7 03/24/2020    LABGLOM 32 02/21/2021    GLUCOSE 161 02/21/2021    GLUCOSE 111 03/24/2020    CALCIUM 9.1 02/21/2021       Hepatic Function Panel:    Lab Results   Component Value Date    ALKPHOS 53 02/15/2021    ALT 18 02/15/2021    AST 17 02/15/2021    PROT 6.8 02/15/2021    BILITOT 0.2 02/15/2021    BILIDIR <0.2 01/15/2021    LABALBU 3.9 02/15/2021       Magnesium:    Lab Results   Component Value Date    MG 1.8 07/16/2017       PT/INR:    Lab Results   Component Value Date    INR 0.97 02/21/2021       HgBA1c:    Lab Results   Component Value Date    LABA1C 5.9 09/01/2020       FLP:    Lab Results   Component Value Date    TRIG 221 10/22/2019    HDL 36 10/22/2019    LDLCALC 83 10/22/2019       TSH:    Lab Results   Component Value Date    TSH 3.590 12/10/2019         Assessment:    S/p cath 2/22/21  1. Left main is patent, gives rise to left anterior descending and left  circumflex artery. 2.  Left anterior descending artery is pretty much occluded after  diagonal artery and there appears to be no left circumflex artery  either. This is only a small branch that feeds the anterior wall area. 3.  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       Plan:    Discharge home  See dc instructions  No BB due to lower bp and hr  Consider off-pump HERNANDEZ to LAD given the fact  that the RCA stenosis does not seem to be critical - pt to discuss with dr Darlene Cook as outpt for next step  Pt agrees with above plan         Electronically signed by Colette Sheth PA-C on 2/23/2021 at 10:50 AM

## 2021-02-23 NOTE — TELEPHONE ENCOUNTER
From: Mahnaz Burleson  To:  MACKENZIE Jimenez - CNP  Sent: 2/23/2021 12:16 PM EST  Subject: Non-Urgent Medical Question    Hey did u fill my Vicodin 2-545

## 2021-02-23 NOTE — PLAN OF CARE
Problem: Nutrition  Goal: Optimal nutrition therapy  Outcome: Ongoing   Nutrition Problem #1: Moderate malnutrition, In context of chronic illness  Intervention: Food and/or Nutrient Delivery: Continue Current Diet, Start Oral Nutrition Supplement, Vitamin Supplement  Nutritional Goals: Pt will consume 75% or more of meals during LOS

## 2021-02-23 NOTE — PROGRESS NOTES
Comprehensive Nutrition Assessment    Type and Reason for Visit:  Initial, Positive Nutrition Screen(Weight Loss, Decreased Appetite/Intake)    Nutrition Recommendations/Plan:   *Recommend a Multivitamin w/minerals daily. *Started Ensure Compact TID. *Continue current diet as ordered. Nutrition Assessment: Pt. moderately malnourished AEB criteria as listed below. At risk for further nutrition compromise r/t s/p left heart cath on 2/22 and underlying medical condition (hx CKD, HTN, HLD, Lupus, Seizures). Nutrition recommendations/interventions as per above. Malnutrition Assessment:  Malnutrition Status: Moderate malnutrition    Context:  Chronic Illness     Findings of the 6 clinical characteristics of malnutrition:  Energy Intake:  7 - 75% or less estimated energy requirements for 1 month or longer  Weight Loss:  No significant weight loss(-5.1% weight loss in 3 months per EMR)     Body Fat Loss:  1 - Mild body fat loss Orbital   Muscle Mass Loss:  1 - Mild muscle mass loss Temples (temporalis)  Fluid Accumulation:  No significant fluid accumulation Extremities   Strength:  Not Performed    Estimated Daily Nutrient Needs:  Energy (kcal):  9449-2548 kcal/day (25-30 kcal/kg); Weight Used for Energy Requirements:  (67.4 kg on 2/23)     Protein (g):  80+ g/day (1.2+ g/kg); Weight Used for Protein Requirements:  (67.4 kg on 2/23)        Nutrition Related Findings: s/p left heart cath; pt seen; she reports decreased appetite and intake of meals PTA; pt states she sometimes would go all day without eating d/t not being hungry; pt reports unplanned weight loss over the past few months but is unsure how much; pt denies N/V or chewing/swallowing difficulty with food; no BM yet; Labs: BUN 28, Cr. 1.7, Glucose 161, Hg 9.6. HgA1C 5.9% on 9/1/20. Wounds:  None       Current Nutrition Therapies:    DIET CARB CONTROL;     Anthropometric Measures:  · Height: 5' 6\" (167.6 cm)  · Current Body Weight: 148 lb 8 oz

## 2021-02-24 ENCOUNTER — TELEPHONE (OUTPATIENT)
Dept: FAMILY MEDICINE CLINIC | Age: 51
End: 2021-02-24

## 2021-02-24 DIAGNOSIS — E03.9 ACQUIRED HYPOTHYROIDISM: ICD-10-CM

## 2021-02-24 DIAGNOSIS — M54.40 BACK PAIN OF LUMBAR REGION WITH SCIATICA: ICD-10-CM

## 2021-02-24 DIAGNOSIS — J30.89 NON-SEASONAL ALLERGIC RHINITIS DUE TO OTHER ALLERGIC TRIGGER: ICD-10-CM

## 2021-02-24 DIAGNOSIS — M79.641 RIGHT HAND PAIN: ICD-10-CM

## 2021-02-24 RX ORDER — SIMVASTATIN 40 MG
TABLET ORAL
Qty: 90 TABLET | Refills: 3 | Status: SHIPPED | OUTPATIENT
Start: 2021-02-24 | End: 2022-07-06 | Stop reason: SDUPTHER

## 2021-02-24 RX ORDER — ACETAMINOPHEN 500 MG
1000 TABLET ORAL EVERY 6 HOURS PRN
Qty: 120 TABLET | Refills: 3 | Status: SHIPPED | OUTPATIENT
Start: 2021-02-24 | End: 2022-07-06 | Stop reason: SDUPTHER

## 2021-02-24 RX ORDER — LEVOTHYROXINE SODIUM 112 UG/1
TABLET ORAL
Qty: 90 TABLET | Refills: 1 | Status: SHIPPED | OUTPATIENT
Start: 2021-02-24 | End: 2021-07-22 | Stop reason: SDUPTHER

## 2021-02-24 RX ORDER — FERROUS SULFATE 325(65) MG
325 TABLET ORAL
Qty: 90 TABLET | Refills: 3 | Status: SHIPPED | OUTPATIENT
Start: 2021-02-24 | End: 2022-07-06 | Stop reason: SDUPTHER

## 2021-02-24 RX ORDER — LORATADINE 10 MG/1
10 TABLET ORAL DAILY
Qty: 90 TABLET | Refills: 3 | Status: SHIPPED | OUTPATIENT
Start: 2021-02-24 | End: 2022-04-13

## 2021-02-24 RX ORDER — B-COMPLEX WITH VITAMIN C
TABLET ORAL
Qty: 90 TABLET | Refills: 3 | Status: SHIPPED | OUTPATIENT
Start: 2021-02-24 | End: 2022-04-13

## 2021-02-24 RX ORDER — GABAPENTIN 400 MG/1
400 CAPSULE ORAL 2 TIMES DAILY
Qty: 180 CAPSULE | Refills: 1 | Status: SHIPPED | OUTPATIENT
Start: 2021-02-24 | End: 2021-07-22 | Stop reason: SDUPTHER

## 2021-02-24 NOTE — TELEPHONE ENCOUNTER
[facility] Transitions Initial Follow Up Call    Outreach made within 2 business days of discharge: Yes    Patient: Fátima Michaels Patient : 1970   MRN: [id number]  Reason for Admission: There are no discharge diagnoses documented for the most recent discharge. Discharge Date: 21       Spoke with: Shakeel Rene    Discharge department/facility: [facility]    TCM Interactive Patient Contact:  Was patient able to fill all prescriptions: Yes  Was patient instructed to bring all medications to the follow-up visit: Yes  Is patient taking all medications as directed in the discharge summary?  Yes  Does patient understand their discharge instructions: Yes  Does patient have questions or concerns that need addressed prior to 7-14 day follow up office visit: no    Scheduled appointment with PCP within 7-14 days    Follow Up  Future Appointments   Date Time Provider Justus Dumont   3/9/2021  9:30 AM MACKENZIE Bergman CNP [address] [facility]   3/11/2021 10:40 AM MACKENZIE Singh - CNP N [address] [place] [address]   3/22/2021  3:00 PM MACKENZIE Drake CNP N [facility] [address]   5/3/2021  2:40 PM Kayla Valero DO N [facility] [address]   2021 12:30 PM Emelia Rodríguez MD N [address] [facility]   2021  3:00 PM Rebeka Champion [facility] [address] [place] [address] ([address] [place] [address]

## 2021-03-02 ENCOUNTER — TELEPHONE (OUTPATIENT)
Dept: RHEUMATOLOGY | Age: 51
End: 2021-03-02

## 2021-03-02 ENCOUNTER — NURSE ONLY (OUTPATIENT)
Dept: LAB | Age: 51
End: 2021-03-02

## 2021-03-02 DIAGNOSIS — Z51.81 MEDICATION MONITORING ENCOUNTER: ICD-10-CM

## 2021-03-02 DIAGNOSIS — Z51.81 MEDICATION MONITORING ENCOUNTER: Primary | ICD-10-CM

## 2021-03-09 ENCOUNTER — OFFICE VISIT (OUTPATIENT)
Dept: CARDIOLOGY CLINIC | Age: 51
End: 2021-03-09
Payer: COMMERCIAL

## 2021-03-09 ENCOUNTER — OFFICE VISIT (OUTPATIENT)
Dept: RHEUMATOLOGY | Age: 51
End: 2021-03-09
Payer: COMMERCIAL

## 2021-03-09 VITALS
DIASTOLIC BLOOD PRESSURE: 60 MMHG | BODY MASS INDEX: 24.23 KG/M2 | WEIGHT: 150.8 LBS | HEIGHT: 66 IN | HEART RATE: 72 BPM | SYSTOLIC BLOOD PRESSURE: 104 MMHG

## 2021-03-09 VITALS
BODY MASS INDEX: 24.23 KG/M2 | OXYGEN SATURATION: 97 % | TEMPERATURE: 96.6 F | WEIGHT: 150.79 LBS | HEART RATE: 82 BPM | DIASTOLIC BLOOD PRESSURE: 70 MMHG | SYSTOLIC BLOOD PRESSURE: 110 MMHG | HEIGHT: 66 IN

## 2021-03-09 DIAGNOSIS — M32.14 OTHER SYSTEMIC LUPUS ERYTHEMATOSUS WITH GLOMERULAR DISEASE (HCC): Primary | ICD-10-CM

## 2021-03-09 DIAGNOSIS — G56.23 CUBITAL TUNNEL SYNDROME, BILATERAL: ICD-10-CM

## 2021-03-09 DIAGNOSIS — E78.01 FAMILIAL HYPERCHOLESTEROLEMIA: ICD-10-CM

## 2021-03-09 DIAGNOSIS — G56.03 CARPAL TUNNEL SYNDROME, BILATERAL: ICD-10-CM

## 2021-03-09 DIAGNOSIS — R93.1 ABNORMAL FINDINGS ON CARDIAC CATHETERIZATION: Primary | ICD-10-CM

## 2021-03-09 DIAGNOSIS — M54.40 BACK PAIN OF LUMBAR REGION WITH SCIATICA: ICD-10-CM

## 2021-03-09 DIAGNOSIS — R07.9 CHEST PAIN, UNSPECIFIED TYPE: ICD-10-CM

## 2021-03-09 DIAGNOSIS — Z87.39 H/O LUPUS NEPHRITIS: ICD-10-CM

## 2021-03-09 DIAGNOSIS — Z51.81 MEDICATION MONITORING ENCOUNTER: ICD-10-CM

## 2021-03-09 PROCEDURE — G8420 CALC BMI NORM PARAMETERS: HCPCS | Performed by: NURSE PRACTITIONER

## 2021-03-09 PROCEDURE — 1036F TOBACCO NON-USER: CPT | Performed by: NURSE PRACTITIONER

## 2021-03-09 PROCEDURE — 99214 OFFICE O/P EST MOD 30 MIN: CPT | Performed by: NURSE PRACTITIONER

## 2021-03-09 PROCEDURE — G8482 FLU IMMUNIZE ORDER/ADMIN: HCPCS | Performed by: NURSE PRACTITIONER

## 2021-03-09 PROCEDURE — G8427 DOCREV CUR MEDS BY ELIG CLIN: HCPCS | Performed by: NURSE PRACTITIONER

## 2021-03-09 PROCEDURE — 99213 OFFICE O/P EST LOW 20 MIN: CPT | Performed by: NURSE PRACTITIONER

## 2021-03-09 ASSESSMENT — ENCOUNTER SYMPTOMS
DIARRHEA: 0
CONSTIPATION: 0
SHORTNESS OF BREATH: 1
EYE PAIN: 0
COUGH: 0
NAUSEA: 0
EYE ITCHING: 0
TROUBLE SWALLOWING: 0
BACK PAIN: 1
ABDOMINAL PAIN: 0

## 2021-03-09 NOTE — PROGRESS NOTES
[facility] PROFESSIONAL SERVICES  [facility]   [address]   [address] [address]   Dept: [phone]   Dept Fax: [phone]: [phone]      Chief Complaint   Patient presents with    Follow-Up from Hospital    Hypertension     F/U from [facility] with recommendations for consideration of off-pump HERNANDEZ to LAD given the fact  that the RCA stenosis does not seem to be critical. No further chest pain or sob since discharge but she has not returned to work or physical labor that produced the chest pain to begin with. She has had no chest pain or sob at home completing her daily activities or with walking several blocks. Cardiologist:  Dr. Kimmie Segovia:   No fever, no chills, No fatigue or weight loss  Pulmonary:    No dyspnea, no wheezing  Cardiac:    Denies recent chest pain   GI:     No nausea or vomiting, no abdominal pain  Neuro:    No dizziness or light headedness  Musculoskeletal:  No recent active issues  Extremities:   No edema, good peripheral pulses      Past Medical History:   Diagnosis Date    Anemia     CKD (chronic kidney disease), stage III     Dyslipidemia     Hematuria     Hyperlipidemia     Hypertension     Hyperuricemia     Hypothyroidism     Kidney disease     as a result from lupus    Lupus (HCC)     Lupus nephritis (HCC)     Proteinuria     Seizures (HCC)     Systemic lupus erythematosus (HCC)        Allergies   Allergen Reactions    Codeine Hives and Nausea And Vomiting       Current Outpatient Medications   Medication Sig Dispense Refill    gabapentin (NEURONTIN) 400 MG capsule Take 1 capsule by mouth 2 times daily for 90 days.  180 capsule 1    levothyroxine (SYNTHROID) 112 MCG tablet take 1 tablet by mouth once daily 90 tablet 1    Calcium Carbonate-Vitamin D (OYSTER SHELL CALCIUM/D) 500-200 MG-UNIT TABS take 1 tablet by mouth once daily 90 tablet 3    loratadine (CLARITIN) 10 MG tablet Take 1 tablet by mouth daily 90 tablet 3    acetaminophen (APAP EXTRA STRENGTH) 500 MG tablet Take 2 tablets by mouth every 6 hours as needed for Pain 120 tablet 3    ferrous sulfate (IRON 325) 325 (65 Fe) MG tablet Take 1 tablet by mouth daily (with breakfast) 90 tablet 3    simvastatin (ZOCOR) 40 MG tablet take 1 tablet by mouth at bedtime 90 tablet 3    nitroGLYCERIN (NITROSTAT) 0.4 MG SL tablet Place 1 tablet under the tongue every 5 minutes as needed for Chest pain up to max of 3 total doses. If no relief after 1 dose, call 911. 25 tablet 0    HYDROcodone-acetaminophen (NORCO) 5-325 MG per tablet Take 1 tablet by mouth every 8 hours as needed for Pain for up to 30 days. 90 tablet 0    gabapentin (NEURONTIN) 400 MG capsule Take 400 mg by mouth 2 times daily.  mycophenolate (CELLCEPT) 500 MG tablet Take 2 tablets by mouth 2 times daily 120 tablet 0    predniSONE (DELTASONE) 10 MG tablet Take 1 tablet by mouth daily 30 tablet 1    hydroxychloroquine (PLAQUENIL) 200 MG tablet Take 1 tablet by mouth 2 times daily 60 tablet 5    omeprazole (PRILOSEC) 20 MG delayed release capsule Take 1 capsule by mouth Daily 30 capsule 3    ondansetron (ZOFRAN) 4 MG tablet Take 1 tablet by mouth 3 times daily as needed for Nausea or Vomiting 30 tablet 0    mometasone (NASONEX) 50 MCG/ACT nasal spray 2 sprays by Nasal route daily (Patient taking differently: 2 sprays by Nasal route daily as needed ) 1 Inhaler 3    fluticasone (FLONASE) 50 MCG/ACT nasal spray 2 sprays by Nasal route daily for 10 days 1 Bottle 0     No current facility-administered medications for this visit.         Social History     Socioeconomic History    Marital status:      Spouse name: None    Number of children: None    Years of education: None    Highest education level: None   Occupational History    None   Social Needs    Financial resource strain: None    Food insecurity     Worry: None     Inability: None    Transportation needs     Medical: None Non-medical: None   Tobacco Use    Smoking status: Former Smoker     Packs/day: 0.25     Types: Cigarettes     Start date:      Quit date:      Years since quittin.1    Smokeless tobacco: Never Used    Tobacco comment: 1 a day   Substance and Sexual Activity    Alcohol use: Yes     Frequency: Monthly or less     Comment: occasionally, couple times a year    Drug use: No    Sexual activity: None   Lifestyle    Physical activity     Days per week: None     Minutes per session: None    Stress: None   Relationships    Social connections     Talks on phone: None     Gets together: None     Attends Anabaptist service: None     Active member of club or organization: None     Attends meetings of clubs or organizations: None     Relationship status: None    Intimate partner violence     Fear of current or ex partner: None     Emotionally abused: None     Physically abused: None     Forced sexual activity: None   Other Topics Concern    None   Social History Narrative    None       Family History   Problem Relation Age of Onset    Diabetes Mother     Heart Disease Mother     Thyroid Disease Mother     Parkinsonism Mother     Diabetes Father     Lupus Maternal Cousin        Blood pressure 104/60, pulse 72, height 5' 6\" (1.676 m), weight 150 lb 12.8 oz (68.4 kg), not currently breastfeeding. General:   Well developed, well nourished  Lungs:   Clear to auscultation  Heart:    Normal S1 S2, No murmur, rubs, or gallops  Abdomen:   Soft, non tender, no organomegalies, positive bowel sounds  Extremities:   No edema, no cyanosis, good peripheral pulses  Neurological:   Awake, alert, oriented. No obvious focal deficits  Musculoskeletal:  No obvious deformities    EKG:     [facility]: 21  HEMODYNAMIC RESULTS AND LEFT VENTRICULOGRAM:  Left ventricular  end-diastolic pressure was 12 mmHg with no significant change before and  after contrast injection.   No significant gradient across the aortic  valve to Diagnosis Orders   1. Abnormal findings on cardiac catheterization  Dean Rider MD, Cardiothoracic Surgery, [place]   2. Familial hypercholesterolemia         Orders Placed This Encounter   Procedures   Dean Rider MD, Cardiothoracic Surgery, [place]     Referral Priority:   Routine     Referral Type:   Eval and Treat     Referral Reason:   Specialty Services Required     Referred to Provider:   Faye Meyer MD     Requested Specialty:   Cardiothoracic Surgery     Number of Visits Requested:   1   F/U [facility] with recommendations for considering off-pump HERNANDEZ to LAD given the fact  that the RCA stenosis does not seem to be critical.   Discussed with Dr. Sharona Stroud and he wants patient referred to  [facility] or Dr. Renzo Waggoner for further evaluation  No BB d/t lower HR, B/P  On statin. Will defer asa, DAPT to CV surgery pending their plans. The patient has been educated on symptoms of heart disease that include chest pain, passing out, dizziness, etc. And to report them if there is any change or go to the emergency room. Discussed use, benefit, and side effects of prescribed medications. All patient questions answered. Pt voiced understanding. Instructed to continue current medications, diet and exercise. Continue risk factor modification and medical management. Patient agreed with treatment plan. Follow up as directed.     Continue Dr Diane Lauren current treatment plan  Follow up with Dr Sharona Stroud as scheduled or sooner if needed

## 2021-03-09 NOTE — PROGRESS NOTES
[facility] RHEUMATOLOGY FOLLOW UP NOTE       Date Of Service: 3/9/2021  Provider: MACKENZIE Dinero - CNP    Name: Antoine Roper   MRN: [id number]    Chief Complaint(s)     Chief Complaint   Patient presents with    Follow-up     6 weeks SLE         History of Present Illness (HPI)     Antoine Roper  is a(n)50 y.o. female with a hx of systemic lupus with hiistory of class IV lupus nephritis, CKD stage III, chronic low back pain w/ radiculopathy, lumbar stenosis, h/o seizures here for the f/u evaluation of systemic lupus     Interval hx:    - was only taking MMF 1 tablet twice daily- increased to 1000 mg BID 2/16/21   - had heart cath 2/21- has CAD- still deciding on treatment plan    pain affecting the fingers, wrists, neck, back  Pain on a scale 0-10: 8/10  Type of pain: constant  Timing: mornings and evenings  Aggravating factors: weather, back: prolonged bending, forward flexion. Right hand and arm: increases use  Alleviating factors: norco, hot back, gabapentin    Associated symptoms:  Denies swelling/  Redness/ warmth, + AM stiffness lasting ~ 20 minutes      REVIEW OF SYSTEMS: (ROS)    Review of Systems   Constitutional: Positive for appetite change and unexpected weight change. Negative for fatigue and fever. HENT: Negative for congestion and trouble swallowing. Hair loss   Eyes: Negative for pain and itching. Respiratory: Positive for shortness of breath. Negative for cough. Cardiovascular: Positive for chest pain. Negative for leg swelling. Gastrointestinal: Negative for abdominal pain, constipation, diarrhea and nausea. Endocrine: Negative for cold intolerance and heat intolerance. Genitourinary: Negative for difficulty urinating, frequency and urgency. Musculoskeletal: Positive for arthralgias, back pain, myalgias and neck pain. Negative for joint swelling. Skin: Negative for rash. Neurological: Positive for numbness and headaches.  Negative for dizziness and weakness. Psychiatric/Behavioral: Positive for sleep disturbance. The patient is not nervous/anxious.         PAST MEDICAL HISTORY      Past Medical History:   Diagnosis Date    Anemia     CKD (chronic kidney disease), stage III     Dyslipidemia     Hematuria     Hyperlipidemia     Hypertension     Hyperuricemia     Hypothyroidism     Kidney disease     as a result from lupus    [facility]     Lupus nephritis ([facility]     Proteinuria     Seizures ([facility]     Systemic lupus erythematosus ([facility]        PAST SURGICAL HISTORY      Past Surgical History:   Procedure Laterality Date    CT BIOPSY ABDOMEN RETROPERITONEUM  1/15/2021    CT BIOPSY ABDOMEN RETROPERITONEUM 1/15/2021 [facility] CT SCAN    LUMBAR NERVE BLOCK N/A 3/11/2019    LESI at L4 performed by Joel Milligan MD at [facility] N/A 5/21/2019    LESI at L3 #1 performed by Joel Milligan MD at [address] SURGERY Left 12/13/2018    LUMBAR TRANSFORAMINAL TFESI L5 LEFT #2, performed by Joel Milligan MD at [address] [place] [address], [address] [place] [address] Bilateral     LUMBAR FACET    FL INJ DX/THER AGNT PARAVERT FACET JOINT, LUMBAR/SAC, 2ND LEVEL Bilateral 5/7/2018    LUMBAR FACET  [email or web address] Bilateral performed by Joel Milligan MD at [address] [place] [address] AA&/STRD TFRML EPI LUMBAR/SACRAL 1 LEVEL Left 9/25/2018    LUMBAR TRANSFORAMINAL TFESI L5 on the LEFT performed by Joel Milligan MD at [address] [place] [address] DX/THER SBST INTRLMNR LMBR/SAC W/IMG GDN N/A 7/19/2018    LUMBAR INTER LAMINAR GIGI LESI @L4 performed by Joel Milligan MD at [address] [place] [address]      Family History   Problem Relation Age of Onset    Diabetes Mother     Heart Disease Mother     Thyroid Disease Mother     Parkinsonism Mother     Diabetes Father     Lupus Maternal Cousin        SOCIAL HISTORY      Social History     Tobacco History Smoking Status  Former Smoker Smoking Tobacco Type  Cigarettes    Smokeless Tobacco Use  Never Used    Tobacco Comment  1 a day          Alcohol History     Alcohol Use Status  Yes Drinks/Week  0 Standard drinks or equivalent per week Amount  0.0 standard drinks of alcohol/wk Comment  occasionally, couple times a year          Drug Use     Drug Use Status  No          Sexual Activity     Sexually Active  Not Asked                ALLERGIES     Allergies   Allergen Reactions    Codeine Hives and Nausea And Vomiting       CURRENT MEDICATIONS      Current Outpatient Medications   Medication Sig Dispense Refill    gabapentin (NEURONTIN) 400 MG capsule Take 1 capsule by mouth 2 times daily for 90 days. 180 capsule 1    levothyroxine (SYNTHROID) 112 MCG tablet take 1 tablet by mouth once daily 90 tablet 1    Calcium Carbonate-Vitamin D (OYSTER SHELL CALCIUM/D) 500-200 MG-UNIT TABS take 1 tablet by mouth once daily 90 tablet 3    loratadine (CLARITIN) 10 MG tablet Take 1 tablet by mouth daily 90 tablet 3    acetaminophen (APAP EXTRA STRENGTH) 500 MG tablet Take 2 tablets by mouth every 6 hours as needed for Pain 120 tablet 3    ferrous sulfate (IRON 325) 325 (65 Fe) MG tablet Take 1 tablet by mouth daily (with breakfast) 90 tablet 3    simvastatin (ZOCOR) 40 MG tablet take 1 tablet by mouth at bedtime 90 tablet 3    nitroGLYCERIN (NITROSTAT) 0.4 MG SL tablet Place 1 tablet under the tongue every 5 minutes as needed for Chest pain up to max of 3 total doses. If no relief after 1 dose, call 911. 25 tablet 0    HYDROcodone-acetaminophen (NORCO) 5-325 MG per tablet Take 1 tablet by mouth every 8 hours as needed for Pain for up to 30 days. 90 tablet 0    gabapentin (NEURONTIN) 400 MG capsule Take 400 mg by mouth 2 times daily.       mycophenolate (CELLCEPT) 500 MG tablet Take 2 tablets by mouth 2 times daily 120 tablet 0    predniSONE (DELTASONE) 10 MG tablet Take 1 tablet by mouth daily 30 tablet 1    hydroxychloroquine (PLAQUENIL) 200 MG tablet Take 1 tablet by mouth 2 times daily 60 tablet 5    omeprazole (PRILOSEC) 20 MG delayed release capsule Take 1 capsule by mouth Daily 30 capsule 3    ondansetron (ZOFRAN) 4 MG tablet Take 1 tablet by mouth 3 times daily as needed for Nausea or Vomiting 30 tablet 0    mometasone (NASONEX) 50 MCG/ACT nasal spray 2 sprays by Nasal route daily (Patient taking differently: 2 sprays by Nasal route daily as needed ) 1 Inhaler 3    fluticasone (FLONASE) 50 MCG/ACT nasal spray 2 sprays by Nasal route daily for 10 days 1 Bottle 0     No current facility-administered medications for this visit. PHYSICAL EXAMINATION / OBJECTIVE   Objective:  /70 (Site: Left Upper Arm, Position: Sitting, Cuff Size: Medium Adult)   Pulse 82   Temp 96.6 °F (35.9 °C)   Ht 5' 5.98\" (1.676 m)   Wt 150 lb 12.7 oz (68.4 kg)   SpO2 97%   BMI 24.35 kg/m²     Physical Exam  Vitals signs reviewed. Constitutional:       Appearance: She is well-developed. Neck:      Musculoskeletal: Normal range of motion and neck supple. Cardiovascular:      Rate and Rhythm: Normal rate and regular rhythm. Pulmonary:      Effort: Pulmonary effort is normal.      Breath sounds: Normal breath sounds. Abdominal:      Palpations: Abdomen is soft. Tenderness: There is no abdominal tenderness. Skin:     General: Skin is warm and dry. Findings: No rash. Neurological:      Mental Status: She is alert and oriented to person, place, and time. Deep Tendon Reflexes: Reflexes are normal and symmetric. Psychiatric:         Thought Content: Thought content normal.        Musculoskeletal:     Normal gait.      Strength 5/5 in biceps, triceps, hips, knees,      Upper extremities:    SHOULDERS nontender, no swelling ,   ELBOWS nontender, no swelling  WRISTS nontender, + tinel bilat,   HANDS/FINGERS nontender, no swelling    Lower extremities:  HIPS nontender  KNEES nontender, no swelling  ANKLES nontender, no swelling   FEET : nontender, no swelling       RAPID 3:   3/9/2021 --- RAPID 3: 3.7 + 8 + 7 = 18.7     Remission: <3  Low Disease Activity: <6  Moderate Disease Activity: >=6 and <=12  High Disease Activity: >12     LABS    CBC  Lab Results   Component Value Date    WBC 5.0 02/21/2021    RBC 3.36 02/21/2021    HGB 9.6 02/21/2021    HCT 32.6 02/21/2021    MCV 97.0 02/21/2021    MCH 28.6 02/21/2021    MCHC 29.4 02/21/2021    RDW 18.6 03/24/2020     02/21/2021       CMP  Lab Results   Component Value Date    CALCIUM 9.1 02/21/2021    LABALBU 3.9 02/15/2021    PROT 6.8 02/15/2021     02/21/2021    K 3.9 02/21/2021    CO2 26 02/21/2021     02/21/2021    BUN 28 02/21/2021    CREATININE 1.7 02/21/2021    ALKPHOS 53 02/15/2021    ALT 18 02/15/2021    AST 17 02/15/2021       HgBA1c: No components found for: HGBA1C    Lab Results   Component Value Date    VITD25 15 08/12/2020         Lab Results   Component Value Date    ANASCRN Detected (A) 06/17/2019     Lab Results   Component Value Date    SSA SEE BELOW 06/17/2019     Lab Results   Component Value Date    SSB 0 06/17/2019     Lab Results   Component Value Date    ANTI-SMITH 4 06/17/2019     Lab Results   Component Value Date    DSDNAAB SEE BELOW 06/17/2019      No results found for: ANTIRNP  Lab Results   Component Value Date    C3 103 02/15/2021    C4 15 02/15/2021     Lab Results   Component Value Date    CCPAB 8 06/17/2019     Lab Results   Component Value Date    RF 11 06/17/2019       No components found for: CANCASCRN, APANCASCRN  Lab Results   Component Value Date    SEDRATE 7 02/15/2021     Lab Results   Component Value Date    CRP < 0.30 02/15/2021       RADIOLOGY:         ASSESSMENT/PLAN    Assessment   Plan     SLE w/ h/o class IV LN-   - diagnosed in 2001, s/p cytoxan 2007. C4 was 64, 4+ proteinuria, but 24 urine w/ only 1.1 g. 20-29 RBC/HPF. + BELLE, dsDNA, ANCA (+p-ANCA, neg MPO, MI-3), reported inflammatory arthritis.  Exam w/

## 2021-03-16 ENCOUNTER — OFFICE VISIT (OUTPATIENT)
Dept: CARDIOTHORACIC SURGERY | Age: 51
End: 2021-03-16
Payer: COMMERCIAL

## 2021-03-16 ENCOUNTER — NURSE ONLY (OUTPATIENT)
Dept: LAB | Age: 51
End: 2021-03-16

## 2021-03-16 VITALS
SYSTOLIC BLOOD PRESSURE: 125 MMHG | WEIGHT: 148 LBS | BODY MASS INDEX: 29.06 KG/M2 | HEIGHT: 60 IN | DIASTOLIC BLOOD PRESSURE: 75 MMHG | HEART RATE: 73 BPM

## 2021-03-16 DIAGNOSIS — M32.9 LUPUS (HCC): Primary | ICD-10-CM

## 2021-03-16 DIAGNOSIS — I31.39 PERICARDIAL EFFUSION: ICD-10-CM

## 2021-03-16 DIAGNOSIS — I25.10 CAD, MULTIPLE VESSEL: ICD-10-CM

## 2021-03-16 DIAGNOSIS — Z51.81 MEDICATION MONITORING ENCOUNTER: ICD-10-CM

## 2021-03-16 DIAGNOSIS — M32.14 OTHER SYSTEMIC LUPUS ERYTHEMATOSUS WITH GLOMERULAR DISEASE (HCC): ICD-10-CM

## 2021-03-16 LAB
ALBUMIN SERPL-MCNC: 4.1 G/DL (ref 3.5–5.1)
ALP BLD-CCNC: 57 U/L (ref 38–126)
ALT SERPL-CCNC: 12 U/L (ref 11–66)
ANION GAP SERPL CALCULATED.3IONS-SCNC: 11 MEQ/L (ref 8–16)
AST SERPL-CCNC: 13 U/L (ref 5–40)
BASOPHILS # BLD: 0.4 %
BASOPHILS ABSOLUTE: 0 THOU/MM3 (ref 0–0.1)
BILIRUB SERPL-MCNC: 0.2 MG/DL (ref 0.3–1.2)
BUN BLDV-MCNC: 20 MG/DL (ref 7–22)
C-REACTIVE PROTEIN: < 0.3 MG/DL (ref 0–1)
CALCIUM SERPL-MCNC: 9.8 MG/DL (ref 8.5–10.5)
CHLORIDE BLD-SCNC: 107 MEQ/L (ref 98–111)
CO2: 27 MEQ/L (ref 23–33)
CREAT SERPL-MCNC: 1.5 MG/DL (ref 0.4–1.2)
EOSINOPHIL # BLD: 0.8 %
EOSINOPHILS ABSOLUTE: 0.1 THOU/MM3 (ref 0–0.4)
ERYTHROCYTE [DISTWIDTH] IN BLOOD BY AUTOMATED COUNT: 15.3 % (ref 11.5–14.5)
ERYTHROCYTE [DISTWIDTH] IN BLOOD BY AUTOMATED COUNT: 54.6 FL (ref 35–45)
GFR SERPL CREATININE-BSD FRML MDRD: 37 ML/MIN/1.73M2
GLUCOSE BLD-MCNC: 79 MG/DL (ref 70–108)
HCT VFR BLD CALC: 35.4 % (ref 37–47)
HEMOGLOBIN: 10.3 GM/DL (ref 12–16)
IMMATURE GRANS (ABS): 0.04 THOU/MM3 (ref 0–0.07)
IMMATURE GRANULOCYTES: 0.5 %
LYMPHOCYTES # BLD: 11 %
LYMPHOCYTES ABSOLUTE: 0.9 THOU/MM3 (ref 1–4.8)
MCH RBC QN AUTO: 28.4 PG (ref 26–33)
MCHC RBC AUTO-ENTMCNC: 29.1 GM/DL (ref 32.2–35.5)
MCV RBC AUTO: 97.5 FL (ref 81–99)
MONOCYTES # BLD: 5.1 %
MONOCYTES ABSOLUTE: 0.4 THOU/MM3 (ref 0.4–1.3)
NUCLEATED RED BLOOD CELLS: 0 /100 WBC
PLATELET # BLD: 151 THOU/MM3 (ref 130–400)
PMV BLD AUTO: 12 FL (ref 9.4–12.4)
POTASSIUM SERPL-SCNC: 4.1 MEQ/L (ref 3.5–5.2)
RBC # BLD: 3.63 MILL/MM3 (ref 4.2–5.4)
SEDIMENTATION RATE, ERYTHROCYTE: 6 MM/HR (ref 0–20)
SEG NEUTROPHILS: 82.2 %
SEGMENTED NEUTROPHILS ABSOLUTE COUNT: 6.5 THOU/MM3 (ref 1.8–7.7)
SODIUM BLD-SCNC: 145 MEQ/L (ref 135–145)
TOTAL PROTEIN: 6.9 G/DL (ref 6.1–8)
WBC # BLD: 7.9 THOU/MM3 (ref 4.8–10.8)

## 2021-03-16 PROCEDURE — G8417 CALC BMI ABV UP PARAM F/U: HCPCS | Performed by: THORACIC SURGERY (CARDIOTHORACIC VASCULAR SURGERY)

## 2021-03-16 PROCEDURE — G8482 FLU IMMUNIZE ORDER/ADMIN: HCPCS | Performed by: THORACIC SURGERY (CARDIOTHORACIC VASCULAR SURGERY)

## 2021-03-16 PROCEDURE — 99205 OFFICE O/P NEW HI 60 MIN: CPT | Performed by: THORACIC SURGERY (CARDIOTHORACIC VASCULAR SURGERY)

## 2021-03-16 PROCEDURE — G8427 DOCREV CUR MEDS BY ELIG CLIN: HCPCS | Performed by: THORACIC SURGERY (CARDIOTHORACIC VASCULAR SURGERY)

## 2021-03-16 PROCEDURE — 1036F TOBACCO NON-USER: CPT | Performed by: THORACIC SURGERY (CARDIOTHORACIC VASCULAR SURGERY)

## 2021-03-16 RX ORDER — PREDNISONE 2.5 MG
7.5 TABLET ORAL DAILY
Qty: 90 TABLET | Refills: 1 | Status: SHIPPED | OUTPATIENT
Start: 2021-03-16 | End: 2021-06-16

## 2021-03-16 RX ORDER — MYCOPHENOLATE MOFETIL 500 MG/1
1000 TABLET ORAL 2 TIMES DAILY
Qty: 120 TABLET | Refills: 0 | Status: SHIPPED | OUTPATIENT
Start: 2021-03-16 | End: 2021-04-30

## 2021-03-16 ASSESSMENT — ENCOUNTER SYMPTOMS
COLOR CHANGE: 0
CHEST TIGHTNESS: 1
ABDOMINAL PAIN: 0
EYE DISCHARGE: 0
SHORTNESS OF BREATH: 1

## 2021-03-16 NOTE — PROGRESS NOTES
Diagnosis Orders   1. 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 Pericardial effusion  mycophenolate (CELLCEPT) 500 MG tablet    predniSONE (DELTASONE) 2.5 MG tablet     Wean prednisone to 7.5mg daily   Con. Mycophenolate 1000mg twice daily   Repeat labs in 4 weeks.

## 2021-03-16 NOTE — PROGRESS NOTES
[address] [facility] SURGERY  [address] [facility] [address] [facility]  Dept: [phone]  Dept Fax: [phone]: [phone]    Visit Date: 3/16/2021    Ms. Jamil Mckinney is a 52 y. o.female  who presented for:  Chief Complaint   Patient presents with   [facility] Patient     Abnormal cath 2/21/21       HPI:   52year old female with history of SLE and CRI, on multiple chronic immunosuppressants, referred with stable angina, attributable essentially to a chronically occluded LAD reconstituted by a large PDA. Patient symptomatic with non-radiating chest pressure/heaviness, associated with fatigue and dyspnea. No clear exacerbating/mitigating factors. Stress evaluation confirmed anterior ishcemia. Current Outpatient Medications:     gabapentin (NEURONTIN) 400 MG capsule, Take 1 capsule by mouth 2 times daily for 90 days. , Disp: 180 capsule, Rfl: 1    levothyroxine (SYNTHROID) 112 MCG tablet, take 1 tablet by mouth once daily, Disp: 90 tablet, Rfl: 1    Calcium Carbonate-Vitamin D (OYSTER SHELL CALCIUM/D) 500-200 MG-UNIT TABS, take 1 tablet by mouth once daily, Disp: 90 tablet, Rfl: 3    loratadine (CLARITIN) 10 MG tablet, Take 1 tablet by mouth daily, Disp: 90 tablet, Rfl: 3    acetaminophen (APAP EXTRA STRENGTH) 500 MG tablet, Take 2 tablets by mouth every 6 hours as needed for Pain, Disp: 120 tablet, Rfl: 3    ferrous sulfate (IRON 325) 325 (65 Fe) MG tablet, Take 1 tablet by mouth daily (with breakfast), Disp: 90 tablet, Rfl: 3    simvastatin (ZOCOR) 40 MG tablet, take 1 tablet by mouth at bedtime, Disp: 90 tablet, Rfl: 3    nitroGLYCERIN (NITROSTAT) 0.4 MG SL tablet, Place 1 tablet under the tongue every 5 minutes as needed for Chest pain up to max of 3 total doses.  If no relief after 1 dose, call 911., Disp: 25 tablet, Rfl: 0    HYDROcodone-acetaminophen (NORCO) 5-325 MG per tablet, Take 1 tablet by mouth every 8 hours as needed for Pain for up to 30 days. , Disp: 90 tablet, Rfl: 0    gabapentin (NEURONTIN) 400 MG capsule, Take 400 mg by mouth 2 times daily. , Disp: , Rfl:     mycophenolate (CELLCEPT) 500 MG tablet, Take 2 tablets by mouth 2 times daily, Disp: 120 tablet, Rfl: 0    predniSONE (DELTASONE) 10 MG tablet, Take 1 tablet by mouth daily, Disp: 30 tablet, Rfl: 1    hydroxychloroquine (PLAQUENIL) 200 MG tablet, Take 1 tablet by mouth 2 times daily, Disp: 60 tablet, Rfl: 5    omeprazole (PRILOSEC) 20 MG delayed release capsule, Take 1 capsule by mouth Daily, Disp: 30 capsule, Rfl: 3    ondansetron (ZOFRAN) 4 MG tablet, Take 1 tablet by mouth 3 times daily as needed for Nausea or Vomiting, Disp: 30 tablet, Rfl: 0    mometasone (NASONEX) 50 MCG/ACT nasal spray, 2 sprays by Nasal route daily (Patient taking differently: 2 sprays by Nasal route daily as needed ), Disp: 1 Inhaler, Rfl: 3    fluticasone (FLONASE) 50 MCG/ACT nasal spray, 2 sprays by Nasal route daily for 10 days, Disp: 1 Bottle, Rfl: 0    Allergies   Allergen Reactions    Codeine Hives and Nausea And Vomiting       Past Medical History  Augusto Zavala  has a past medical history of Anemia, CKD (chronic kidney disease), stage III, Dyslipidemia, Hematuria, Hyperlipidemia, Hypertension, Hyperuricemia, Hypothyroidism, Kidney disease, Lupus ([address] Lupus nephritis ([address] Proteinuria, Seizures ([address] and Systemic lupus erythematosus ([address] Social History  Augusto Zavala  reports that she quit smoking about 8 years ago. Her smoking use included cigarettes. She started smoking about 9 years ago. She smoked 0.25 packs per day. She has never used smokeless tobacco. She reports current alcohol use. She reports that she does not use drugs. Family History  Augusto Zavala family history includes Diabetes in her father and mother; Heart Disease in her mother; Lupus in her maternal cousin; Parkinsonism in her mother; Thyroid Disease in her mother.     There is no family history of bicuspid aortic valve, aneurysms, heart transplant, pacemakers, defibrillators, or sudden cardiac death. Past Surgical History   Past Surgical History:   Procedure Laterality Date    CT BIOPSY ABDOMEN RETROPERITONEUM  1/15/2021    CT BIOPSY ABDOMEN RETROPERITONEUM 1/15/2021 [facility] CT SCAN    LUMBAR NERVE BLOCK N/A 3/11/2019    LESI at L4 performed by Magy Gutiérrez MD at [facility] N/A 5/21/2019    LESI at L3 #1 performed by Mayg Gutiérrez MD at [address] Left 12/13/2018    LUMBAR TRANSFORAMINAL TFESI L5 LEFT #2, performed by Magy Gutiérrez MD at [address] Bilateral     LUMBAR FACET    MS INJ DX/THER AGNT PARAVERT FACET JOINT, LUMBAR/SAC, 2ND LEVEL Bilateral 5/7/2018    LUMBAR FACET  RAMBO [email or web address] Bilateral performed by Magy Gutiérrez MD at [address] [place] [address] AA&/STRD TFRML EPI LUMBAR/SACRAL 1 LEVEL Left 9/25/2018    LUMBAR TRANSFORAMINAL TFESI L5 on the LEFT performed by Magy Gutiérrez MD at [address] [place] [address] DX/THER SBST INTRLMNR LMBR/SAC W/IMG GDN N/A 7/19/2018    LUMBAR INTER LAMINAR GIGI LESI @L4 performed by Magy Gutiérrez MD at [address] [facility]       Subjective:     Review of Systems   Constitutional: Positive for activity change and fatigue. HENT: Negative for congestion. Eyes: Negative for discharge. Respiratory: Positive for chest tightness and shortness of breath. Cardiovascular: Positive for chest pain. Gastrointestinal: Negative for abdominal pain. Endocrine: Negative for cold intolerance. Genitourinary: Negative for flank pain. Musculoskeletal: Negative for arthralgias. Skin: Negative for color change. Neurological: Negative for dizziness. Hematological: Negative for adenopathy. Psychiatric/Behavioral: Negative for agitation.        Objective:     /75   Pulse 73   Ht 5' (1.524 m)   Wt 148 lb BUN 28 02/21/2021    LABALBU 3.9 02/15/2021    CREATININE 1.7 02/21/2021    CALCIUM 9.1 02/21/2021    LABGLOM 32 02/21/2021    GLUCOSE 161 02/21/2021    GLUCOSE 111 03/24/2020       Lab Results   Component Value Date    ALKPHOS 53 02/15/2021    ALT 18 02/15/2021    AST 17 02/15/2021    PROT 6.8 02/15/2021    BILITOT 0.2 02/15/2021    BILIDIR <0.2 01/15/2021    LABALBU 3.9 02/15/2021       Lab Results   Component Value Date    MG 1.8 07/16/2017       Lab Results   Component Value Date    INR 0.97 02/21/2021    INR 1.01 01/15/2021         Lab Results   Component Value Date    LABA1C 5.9 09/01/2020       Lab Results   Component Value Date    TRIG 221 10/22/2019    HDL 36 10/22/2019    LDLCALC 83 10/22/2019       Lab Results   Component Value Date    TSH 3.590 12/10/2019         Testing Reviewed:      I have individually reviewed the images of the following tests:    Echocardiogram:  Results for orders placed during the hospital encounter of 01/27/21   ECHO Complete 2D W Doppler W Color    Narrative Transthoracic Echocardiography Report (TTE)     Demographics      Patient Name    [address] [place] [address]  Gender               Female                   L      MR #            [id number]       Race                                                       Ethnicity      Account #       [de-identified]       Room Number      Accession       [id number]      Date of Study        01/27/2021   Number      Date of Birth   1970      Referring Physician  MITRA Morse MD      Age             52 year(s)      Sonographer          Valeria Leblanc RDCS                                      Interpreting         Ryder Hutchinson MD                                   Physician     Procedure    Type of Study      TTE procedure:ECHOCARDIOGRAM COMPLETE 2D W DOPPLER W COLOR.      Procedure Date  Date: 01/27/2021 Start: 12:43 PM    Study Location: Echo Lab  Technical Quality: Adequate visualization    Indications:Shortness of breath and Chest pain. Additional Medical History:Lupus, anemia, chronic kidney disease,  hyperlipidemia, ex smoker, family history of CAD    Patient Status: Routine    Height: 66 inches Weight: 150 pounds BSA: 1.77 m^2 BMI: 24.21 kg/m^2    BP: 118/76 mmHg     Conclusions      Summary   Ejection fraction is visually estimated at 55%. Overall left ventricular function is normal.   Moderate circumferential pericardial effusion with no tamponade   physiology. Signature      ----------------------------------------------------------------   Electronically signed by Verna Martins MD (Interpreting   physician) on 01/27/2021 at 03:59 PM   ----------------------------------------------------------------      Findings      Mitral Valve   The mitral valve structure was normal with normal leaflet separation. DOPPLER: The transmitral velocity was within the normal range with no   evidence for mitral stenosis. There was no evidence of mitral   regurgitation. Aortic Valve   The aortic valve was trileaflet with normal thickness and cuspal   separation. DOPPLER: Transaortic velocity was within the normal range with   no evidence of aortic stenosis. There was no evidence of aortic   regurgitation. Tricuspid Valve   Tricuspid valve was not well visualized. Mild tricuspid regurgitation. Pulmonic Valve   The pulmonic valve leaflets exhibited normal thickness, no calcification,   and normal cuspal separation. DOPPLER: The transpulmonic velocity was   within the normal range with no evidence for regurgitation. Left Atrium   Left atrial size was normal.      Left Ventricle   Ejection fraction is visually estimated at 55%. Overall left ventricular function is normal.      Right Atrium   Right atrial size was normal.      Right Ventricle   The right ventricular size was normal with normal systolic function and   wall thickness. Pericardial Effusion   Moderate circumferential pericardial effusion with no tamponade   physiology. Pleural Effusion   No evidence of pleural effusion. Aorta / Great Vessels   -Aortic root dimension within normal limits.   -The Pulmonary artery is within normal limits. -IVC size is within normal limits with normal respiratory phasic changes.      M-Mode/2D Measurements & Calculations      LV Diastolic   LV Systolic Dimension:    AV Cusp Separation: 2.1 cmLA   Dimension: 4.6 3.2 cm                    Dimension: 3.1 cmAO Root   cm             LV Volume Diastolic: 01.5 Dimension: 2.7 cmLA Area: 12.3   LV FS:30.4 %   ml                        cm^2   LV PW          LV Volume Systolic: 41 ml   Diastolic: 0.8 LV EDV/LV EDV Index: 97.3   cm             ml/55 m^2LV ESV/LV ESV   Septum         Index: 41 ml/23 m^2       RV Diastolic Dimension: 2.4 cm   Diastolic: 0.8 EF Calculated: 57.9 %   cm                                       LA/Aorta: 1.15                                               LA volume/Index: 27.3 ml /15m^2     Doppler Measurements & Calculations      MV Peak E-Wave: 72.2 cm/s AV Peak Velocity: 119 LVOT Peak Velocity: 93.1   MV Peak A-Wave: 43.1 cm/s cm/s                  cm/s   MV E/A Ratio: 1.68        AV Peak Gradient:     LVOT Peak Gradient: 3 mmHg   MV Peak Gradient: 2.09    5.66 mmHg   mmHg                                            TV Peak E-Wave: 36.5 cm/s                                                   TV Peak A-Wave: 23.7 cm/s   MV Deceleration Time: 278   msec                                            TV Peak Gradient: 0.53   MV P1/2t: 81 msec                               mmHg   MVA by PHT:2.72 cm^2                            TR Velocity:228 cm/s                                                   TR Gradient:20.79 mmHg   MV E' Septal Velocity:    AV DVI (Vmax):0.78    PV Peak Velocity: 63.8   8.6 cm/s                                        cm/s   MV A' Septal Velocity: PV Peak Gradient: 1.63   5.3 cm/s                                        mmHg   MV E' Lateral Velocity:   9.2 cm/s   MV A' Lateral Velocity:                         [place] ED Velocity: 103 cm/s   6.9 cm/s   E/E' septal: 8.4   E/E' lateral: 7.85     [id number]%[id number]        Left heart catheterization: as per HPI    Assessment/Plan     1. Lupus ([address]    2. CAD, multiple vessel      Orders Placed This Encounter   Procedures    External Referral To Cardiothoracic Surgery     From a pure technical standpoint of coronary revascularization, patient is technically straightforward candidate for OPCABG LIMA to LAD. However, her chronic immunosuppressant therapy for the SLE places her at elevated risk for severe sternal wound morbidity with a conventional sternotomy. For this reason, I recommend that she undergo MIDCABG via anterior thoracotomy. This is a technically straightforward procedure, but does require a specialized retractor (Thoralift) not available at [facility]. I therefore recommend that the patient be referred to a tertiary center for this reason. This was explained in detail to the patient, who understands, has no further questions, and agrees. Time spent with patient: 80 minutes, of which more than 50% was spent counseling/coordinating the patient's care.       Electronically signed by Susana Yip MD   3/16/2021 at 10:35 AM EDT

## 2021-03-18 ENCOUNTER — TELEPHONE (OUTPATIENT)
Dept: CARDIOTHORACIC SURGERY | Age: 51
End: 2021-03-18

## 2021-03-22 ENCOUNTER — OFFICE VISIT (OUTPATIENT)
Dept: PHYSICAL MEDICINE AND REHAB | Age: 51
End: 2021-03-22
Payer: COMMERCIAL

## 2021-03-22 VITALS
BODY MASS INDEX: 29.06 KG/M2 | HEIGHT: 60 IN | HEART RATE: 68 BPM | WEIGHT: 148 LBS | TEMPERATURE: 97.2 F | DIASTOLIC BLOOD PRESSURE: 62 MMHG | SYSTOLIC BLOOD PRESSURE: 114 MMHG

## 2021-03-22 DIAGNOSIS — G89.4 CHRONIC PAIN SYNDROME: ICD-10-CM

## 2021-03-22 DIAGNOSIS — M47.816 SPONDYLOSIS OF LUMBAR REGION WITHOUT MYELOPATHY OR RADICULOPATHY: Primary | ICD-10-CM

## 2021-03-22 DIAGNOSIS — M47.814 SPONDYLOSIS OF THORACIC REGION WITHOUT MYELOPATHY OR RADICULOPATHY: ICD-10-CM

## 2021-03-22 DIAGNOSIS — F11.90 CHRONIC, CONTINUOUS USE OF OPIOIDS: ICD-10-CM

## 2021-03-22 DIAGNOSIS — M79.641 RIGHT HAND PAIN: ICD-10-CM

## 2021-03-22 DIAGNOSIS — M54.16 LUMBAR RADICULITIS: ICD-10-CM

## 2021-03-22 DIAGNOSIS — M46.1 BILATERAL SACROILIITIS (HCC): ICD-10-CM

## 2021-03-22 DIAGNOSIS — M54.16 LUMBAR RADICULOPATHY: ICD-10-CM

## 2021-03-22 DIAGNOSIS — M48.062 LUMBAR STENOSIS WITH NEUROGENIC CLAUDICATION: ICD-10-CM

## 2021-03-22 DIAGNOSIS — M54.40 BACK PAIN OF LUMBAR REGION WITH SCIATICA: ICD-10-CM

## 2021-03-22 DIAGNOSIS — M48.02 CERVICAL SPINAL STENOSIS: ICD-10-CM

## 2021-03-22 DIAGNOSIS — M46.1 SI (SACROILIAC) JOINT INFLAMMATION (HCC): ICD-10-CM

## 2021-03-22 PROCEDURE — 99214 OFFICE O/P EST MOD 30 MIN: CPT | Performed by: NURSE PRACTITIONER

## 2021-03-22 PROCEDURE — G8417 CALC BMI ABV UP PARAM F/U: HCPCS | Performed by: NURSE PRACTITIONER

## 2021-03-22 PROCEDURE — G8482 FLU IMMUNIZE ORDER/ADMIN: HCPCS | Performed by: NURSE PRACTITIONER

## 2021-03-22 PROCEDURE — 1036F TOBACCO NON-USER: CPT | Performed by: NURSE PRACTITIONER

## 2021-03-22 PROCEDURE — G8427 DOCREV CUR MEDS BY ELIG CLIN: HCPCS | Performed by: NURSE PRACTITIONER

## 2021-03-22 RX ORDER — HYDROCODONE BITARTRATE AND ACETAMINOPHEN 5; 325 MG/1; MG/1
1 TABLET ORAL EVERY 8 HOURS PRN
Qty: 90 TABLET | Refills: 0 | Status: SHIPPED | OUTPATIENT
Start: 2021-03-22 | End: 2021-04-21 | Stop reason: SDUPTHER

## 2021-03-22 RX ORDER — HYDROCODONE BITARTRATE AND ACETAMINOPHEN 5; 325 MG/1; MG/1
1 TABLET ORAL EVERY 8 HOURS PRN
Qty: 90 TABLET | Refills: 0 | Status: SHIPPED | OUTPATIENT
Start: 2021-03-27 | End: 2021-04-21 | Stop reason: SDUPTHER

## 2021-03-22 ASSESSMENT — ENCOUNTER SYMPTOMS
NAUSEA: 0
EYE PAIN: 0
RHINORRHEA: 0
ABDOMINAL PAIN: 0
COLOR CHANGE: 0
DIARRHEA: 0
WHEEZING: 0
PHOTOPHOBIA: 0
CONSTIPATION: 0
SINUS PRESSURE: 0
VOMITING: 0
CHEST TIGHTNESS: 0
SHORTNESS OF BREATH: 0
SORE THROAT: 0
COUGH: 0
BACK PAIN: 1

## 2021-03-22 NOTE — TELEPHONE ENCOUNTER
OARRS reviewed. UDS: + for  Hydrocodone gabapentin consistent. Last seen: 12/21/2020.  Follow-up:   Future Appointments   Date Time Provider Justus Dumont   3/22/2021  3:00 PM MACKENZIE Mccall - CNP N [facility] Pain [facility]   5/3/2021  2:40 PM Liane Esquivel DO N [facility] Rhe[facility]   7/7/2021 12:30 PM Hugh Laureano MD N [facility] Heart [facility]   8/5/2021  3:00 PM Shruthi Srivastava MD N [facility]. [facility]

## 2021-03-22 NOTE — PROGRESS NOTES
worst is 8/10. Pain scale with pain medications or at its best is 4/10. Last dose of Purcell  was today  Drug screen reviewed from 10/2020 and was appropriate  Pill count was not completed today and out   Patient does not have naloxone available at home. Patient has not required use of naloxone at home since last office visit. The patientis allergic to codeine. Subjective:      Review of Systems   Constitutional: Negative for activity change, appetite change, chills, diaphoresis, fatigue, fever and unexpected weight change. HENT: Positive for dental problem. Negative for congestion, ear pain, hearing loss, mouth sores, nosebleeds, rhinorrhea, sinus pressure and sore throat. Dental infection recently   Eyes: Negative for photophobia, pain and visual disturbance. Respiratory: Negative for cough, chest tightness, shortness of breath and wheezing. COVID     Cardiovascular: Negative for chest pain and palpitations. HTN CAD  Recent abnormal heart cath, pending [facility] referral for possible surgery   Gastrointestinal: Negative for abdominal pain, constipation, diarrhea, nausea and vomiting. Endocrine: Negative for cold intolerance, heat intolerance, polydipsia, polyphagia and polyuria. Thyroid issues   Genitourinary: Negative for decreased urine volume, difficulty urinating, frequency and hematuria. CKD   Musculoskeletal: Positive for arthralgias, back pain, myalgias, neck pain and neck stiffness. Negative for gait problem. Skin: Negative for color change and rash. Hot water burn  8/31/2020 . healed   Allergic/Immunologic: Negative for food allergies and immunocompromised state. Neurological: Positive for weakness, numbness and headaches. Negative for dizziness, tremors, seizures, syncope, facial asymmetry, speech difficulty and light-headedness. H/o seizures,no seizures in 16 years. H/O Lupus   Hematological: Does not bruise/bleed easily. Psychiatric/Behavioral: Negative for agitation, behavioral problems, confusion, decreased concentration, dysphoric mood, hallucinations, self-injury, sleep disturbance and suicidal ideas. The patient is not nervous/anxious and is not hyperactive. Objective:     Vitals:    03/22/21 1457   BP: 114/62   Site: Left Upper Arm   Position: Sitting   Cuff Size: Medium Adult   Pulse: 68   Temp: 97.2 °F (36.2 °C)   TempSrc: Temporal   Weight: 148 lb (67.1 kg)   Height: 5' (1.524 m)       Physical Exam  Vitals signs and nursing note reviewed. Constitutional:       General: She is not in acute distress. Appearance: She is well-developed. She is not diaphoretic. HENT:      Head: Normocephalic and atraumatic. Right Ear: External ear normal.      Left Ear: External ear normal.      Nose: Nose normal.      Mouth/Throat:      Pharynx: No oropharyngeal exudate. Eyes:      General: No scleral icterus. Right eye: No discharge. Left eye: No discharge. Conjunctiva/sclera: Conjunctivae normal.      Pupils: Pupils are equal, round, and reactive to light. Neck:      Musculoskeletal: Neck supple. Decreased range of motion. Pain with movement and muscular tenderness present. No edema, erythema or neck rigidity. Thyroid: No thyromegaly. Cardiovascular:      Rate and Rhythm: Normal rate and regular rhythm. Heart sounds: Normal heart sounds. No murmur. No friction rub. No gallop. Pulmonary:      Effort: Pulmonary effort is normal. No respiratory distress. Breath sounds: Normal breath sounds. No wheezing or rales. Chest:      Chest wall: No tenderness. Abdominal:      General: Bowel sounds are normal. There is no distension. Palpations: Abdomen is soft. Tenderness: There is no abdominal tenderness. There is no guarding or rebound. Musculoskeletal:         General: Tenderness present.       Right shoulder: She exhibits decreased range of motion, tenderness, pain, spasm and decreased strength. Left shoulder: She exhibits tenderness, pain and spasm. Right hip: She exhibits tenderness. Left hip: Normal.      Right knee: Normal.      Left knee: Normal.      Right ankle: Tenderness. Cervical back: She exhibits decreased range of motion, tenderness, bony tenderness, pain and spasm. Thoracic back: She exhibits decreased range of motion, tenderness, bony tenderness, pain and spasm. Lumbar back: She exhibits decreased range of motion, tenderness, bony tenderness, pain and spasm. Back:       Right upper arm: She exhibits tenderness. Right forearm: She exhibits tenderness. Right hand: She exhibits decreased range of motion and tenderness. Decreased sensation noted. Decreased strength noted. Right upper leg: She exhibits tenderness. Right foot: Decreased range of motion. Tenderness, bony tenderness and swelling present. Comments: 2 right broken toes has walker shoe on    Skin:     General: Skin is warm. Coloration: Skin is not pale. Findings: No erythema or rash. Neurological:      Mental Status: She is alert and oriented to person, place, and time. She is not disoriented. Cranial Nerves: Cranial nerves are intact. No cranial nerve deficit. Sensory: Sensation is intact. No sensory deficit. Motor: Motor function is intact. No atrophy or abnormal muscle tone. Coordination: Coordination is intact. Coordination normal.      Gait: Gait abnormal.      Deep Tendon Reflexes: Babinski sign absent on the right side. Reflex Scores:       Tricep reflexes are 2+ on the right side and 2+ on the left side. Bicep reflexes are 2+ on the right side and 2+ on the left side. Brachioradialis reflexes are 2+ on the right side and 2+ on the left side. Patellar reflexes are 2+ on the right side and 2+ on the left side.        Achilles reflexes are 2+ on the right side and 2+ on the left

## 2021-04-06 ENCOUNTER — TELEPHONE (OUTPATIENT)
Dept: FAMILY MEDICINE CLINIC | Age: 51
End: 2021-04-06

## 2021-04-06 NOTE — TELEPHONE ENCOUNTER
There is a lot of viral gastroenteritis going around(Nausea dn vomiting), call her and if still symptomatic schedule her an appt for Wednesday April 7th, 2021

## 2021-04-07 RX ORDER — ONDANSETRON 4 MG/1
4 TABLET, FILM COATED ORAL 3 TIMES DAILY PRN
Qty: 30 TABLET | Refills: 0 | Status: SHIPPED | OUTPATIENT
Start: 2021-04-07 | End: 2021-07-07 | Stop reason: SDUPTHER

## 2021-04-07 NOTE — TELEPHONE ENCOUNTER
Per HIPAA, left detailed message letting pt know the recommendations of Gopal Boone. Pt was advised to call the office with any questions or concerns.

## 2021-04-12 ENCOUNTER — TELEPHONE (OUTPATIENT)
Dept: CARDIOLOGY CLINIC | Age: 51
End: 2021-04-12

## 2021-04-12 NOTE — TELEPHONE ENCOUNTER
Gloria   Please see message from pt     From   Susanna Rojas To   [facility] Clinical Support Pool Sent   4/11/2021  9:30 PM   Hey i have not heard anything on the dr about the heart surgery yet

## 2021-04-16 NOTE — TELEPHONE ENCOUNTER
Received call from Rocio Maddox, nurse with [employer]. -  [facility]  She states she is expediting the referral because it has been pending since 3/24/21  She apologized for the inconvienence  Their direct phone number is: [phone]

## 2021-04-16 NOTE — TELEPHONE ENCOUNTER
Spoke to [facility] with [facility] scheduling - transferred  Spoke to [facility] with Cardiovascular Medicine - transferred   [facility] at [facility]  Was informed that a message will need to be sent to the nurses who schedule the appts/surgeries  They will be in contact with our office. Heart and Vascular ph: [phone]    [facility] for patient to inform her of situation.

## 2021-04-21 DIAGNOSIS — M54.16 LUMBAR RADICULITIS: ICD-10-CM

## 2021-04-21 DIAGNOSIS — M48.062 LUMBAR STENOSIS WITH NEUROGENIC CLAUDICATION: ICD-10-CM

## 2021-04-21 DIAGNOSIS — M46.1 SI (SACROILIAC) JOINT INFLAMMATION (HCC): ICD-10-CM

## 2021-04-21 DIAGNOSIS — M48.02 CERVICAL SPINAL STENOSIS: ICD-10-CM

## 2021-04-21 DIAGNOSIS — G89.4 CHRONIC PAIN SYNDROME: ICD-10-CM

## 2021-04-21 DIAGNOSIS — M47.816 SPONDYLOSIS OF LUMBAR REGION WITHOUT MYELOPATHY OR RADICULOPATHY: ICD-10-CM

## 2021-04-21 DIAGNOSIS — F11.90 CHRONIC, CONTINUOUS USE OF OPIOIDS: ICD-10-CM

## 2021-04-21 DIAGNOSIS — M54.16 LUMBAR RADICULOPATHY: ICD-10-CM

## 2021-04-21 DIAGNOSIS — M47.814 SPONDYLOSIS OF THORACIC REGION WITHOUT MYELOPATHY OR RADICULOPATHY: ICD-10-CM

## 2021-04-21 DIAGNOSIS — M79.641 RIGHT HAND PAIN: ICD-10-CM

## 2021-04-21 DIAGNOSIS — M54.40 BACK PAIN OF LUMBAR REGION WITH SCIATICA: ICD-10-CM

## 2021-04-21 DIAGNOSIS — M46.1 BILATERAL SACROILIITIS (HCC): ICD-10-CM

## 2021-04-21 RX ORDER — HYDROCODONE BITARTRATE AND ACETAMINOPHEN 5; 325 MG/1; MG/1
1 TABLET ORAL EVERY 8 HOURS PRN
Qty: 90 TABLET | Refills: 0 | Status: SHIPPED | OUTPATIENT
Start: 2021-04-23 | End: 2021-05-03

## 2021-04-21 RX ORDER — HYDROCODONE BITARTRATE AND ACETAMINOPHEN 5; 325 MG/1; MG/1
1 TABLET ORAL EVERY 8 HOURS PRN
Qty: 90 TABLET | Refills: 0 | Status: SHIPPED | OUTPATIENT
Start: 2021-04-23 | End: 2021-05-20 | Stop reason: SDUPTHER

## 2021-04-21 NOTE — TELEPHONE ENCOUNTER
Baldemar Lan called requesting a refill on the following medications:  Requested Prescriptions     Pending Prescriptions Disp Refills    HYDROcodone-acetaminophen (NORCO) 5-325 MG per tablet 90 tablet 0     Sig: Take 1 tablet by mouth every 8 hours as needed for Pain for up to 30 days. Pharmacy verified: [facility] on [address]  . pv      Date of last visit: 3/22/2021  Date of next visit (if applicable): 0/08/3641

## 2021-04-29 ENCOUNTER — NURSE ONLY (OUTPATIENT)
Dept: LAB | Age: 51
End: 2021-04-29

## 2021-04-29 DIAGNOSIS — Z51.81 MEDICATION MONITORING ENCOUNTER: ICD-10-CM

## 2021-04-29 LAB
ALBUMIN SERPL-MCNC: 3.7 G/DL (ref 3.5–5.1)
ALP BLD-CCNC: 56 U/L (ref 38–126)
ALT SERPL-CCNC: 11 U/L (ref 11–66)
ANION GAP SERPL CALCULATED.3IONS-SCNC: 8 MEQ/L (ref 8–16)
AST SERPL-CCNC: 18 U/L (ref 5–40)
BASOPHILS # BLD: 0.5 %
BASOPHILS ABSOLUTE: 0 THOU/MM3 (ref 0–0.1)
BILIRUB SERPL-MCNC: < 0.2 MG/DL (ref 0.3–1.2)
BUN BLDV-MCNC: 18 MG/DL (ref 7–22)
C-REACTIVE PROTEIN: < 0.3 MG/DL (ref 0–1)
CALCIUM SERPL-MCNC: 9.2 MG/DL (ref 8.5–10.5)
CHLORIDE BLD-SCNC: 104 MEQ/L (ref 98–111)
CO2: 28 MEQ/L (ref 23–33)
CREAT SERPL-MCNC: 1.3 MG/DL (ref 0.4–1.2)
EOSINOPHIL # BLD: 1.3 %
EOSINOPHILS ABSOLUTE: 0.1 THOU/MM3 (ref 0–0.4)
ERYTHROCYTE [DISTWIDTH] IN BLOOD BY AUTOMATED COUNT: 13.8 % (ref 11.5–14.5)
ERYTHROCYTE [DISTWIDTH] IN BLOOD BY AUTOMATED COUNT: 49.4 FL (ref 35–45)
GFR SERPL CREATININE-BSD FRML MDRD: 44 ML/MIN/1.73M2
GLUCOSE BLD-MCNC: 114 MG/DL (ref 70–108)
HCT VFR BLD CALC: 37.2 % (ref 37–47)
HEMOGLOBIN: 10.4 GM/DL (ref 12–16)
HYPOCHROMIA: PRESENT
IMMATURE GRANS (ABS): 0.02 THOU/MM3 (ref 0–0.07)
IMMATURE GRANULOCYTES: 0.3 %
LYMPHOCYTES # BLD: 12.9 %
LYMPHOCYTES ABSOLUTE: 0.8 THOU/MM3 (ref 1–4.8)
MCH RBC QN AUTO: 26.9 PG (ref 26–33)
MCHC RBC AUTO-ENTMCNC: 28 GM/DL (ref 32.2–35.5)
MCV RBC AUTO: 96.4 FL (ref 81–99)
MONOCYTES # BLD: 5.6 %
MONOCYTES ABSOLUTE: 0.3 THOU/MM3 (ref 0.4–1.3)
NUCLEATED RED BLOOD CELLS: 0 /100 WBC
PLATELET # BLD: 135 THOU/MM3 (ref 130–400)
PMV BLD AUTO: 12.5 FL (ref 9.4–12.4)
POTASSIUM SERPL-SCNC: 3.8 MEQ/L (ref 3.5–5.2)
RBC # BLD: 3.86 MILL/MM3 (ref 4.2–5.4)
SEDIMENTATION RATE, ERYTHROCYTE: 15 MM/HR (ref 0–20)
SEG NEUTROPHILS: 79.4 %
SEGMENTED NEUTROPHILS ABSOLUTE COUNT: 4.9 THOU/MM3 (ref 1.8–7.7)
SODIUM BLD-SCNC: 140 MEQ/L (ref 135–145)
TOTAL PROTEIN: 6.3 G/DL (ref 6.1–8)
WBC # BLD: 6.2 THOU/MM3 (ref 4.8–10.8)

## 2021-04-30 DIAGNOSIS — I31.39 PERICARDIAL EFFUSION: ICD-10-CM

## 2021-04-30 DIAGNOSIS — M32.14 OTHER SYSTEMIC LUPUS ERYTHEMATOSUS WITH GLOMERULAR DISEASE (HCC): ICD-10-CM

## 2021-04-30 RX ORDER — MYCOPHENOLATE MOFETIL 500 MG/1
TABLET ORAL
Qty: 150 TABLET | Refills: 0 | Status: SHIPPED | OUTPATIENT
Start: 2021-04-30 | End: 2021-07-08

## 2021-04-30 NOTE — PROGRESS NOTES
Diagnosis Orders   1. 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 Pericardial effusion  mycophenolate (CELLCEPT) 500 MG tablet     - increased Mycophenolate to 1500mg q am and cont. 1000mg at bed time.

## 2021-05-03 ENCOUNTER — OFFICE VISIT (OUTPATIENT)
Dept: RHEUMATOLOGY | Age: 51
End: 2021-05-03
Payer: COMMERCIAL

## 2021-05-03 ENCOUNTER — NURSE ONLY (OUTPATIENT)
Dept: LAB | Age: 51
End: 2021-05-03

## 2021-05-03 VITALS
HEIGHT: 60 IN | HEART RATE: 71 BPM | OXYGEN SATURATION: 98 % | DIASTOLIC BLOOD PRESSURE: 64 MMHG | BODY MASS INDEX: 29.47 KG/M2 | WEIGHT: 150.1 LBS | SYSTOLIC BLOOD PRESSURE: 122 MMHG

## 2021-05-03 DIAGNOSIS — Z87.39 H/O LUPUS NEPHRITIS: ICD-10-CM

## 2021-05-03 DIAGNOSIS — N18.9 CHRONIC KIDNEY DISEASE, UNSPECIFIED CKD STAGE: ICD-10-CM

## 2021-05-03 DIAGNOSIS — I31.39 PERICARDIAL EFFUSION: ICD-10-CM

## 2021-05-03 DIAGNOSIS — M32.14 OTHER SYSTEMIC LUPUS ERYTHEMATOSUS WITH GLOMERULAR DISEASE (HCC): Primary | ICD-10-CM

## 2021-05-03 DIAGNOSIS — Z51.81 MEDICATION MONITORING ENCOUNTER: ICD-10-CM

## 2021-05-03 DIAGNOSIS — M32.14 OTHER SYSTEMIC LUPUS ERYTHEMATOSUS WITH GLOMERULAR DISEASE (HCC): ICD-10-CM

## 2021-05-03 LAB
ALBUMIN SERPL-MCNC: 3.9 G/DL (ref 3.5–5.1)
ALP BLD-CCNC: 56 U/L (ref 38–126)
ALT SERPL-CCNC: 14 U/L (ref 11–66)
ANION GAP SERPL CALCULATED.3IONS-SCNC: 12 MEQ/L (ref 8–16)
AST SERPL-CCNC: 19 U/L (ref 5–40)
BACTERIA: ABNORMAL
BILIRUB SERPL-MCNC: < 0.2 MG/DL (ref 0.3–1.2)
BILIRUBIN URINE: NEGATIVE
BLOOD, URINE: NEGATIVE
BUN BLDV-MCNC: 23 MG/DL (ref 7–22)
C-REACTIVE PROTEIN: < 0.3 MG/DL (ref 0–1)
CALCIUM SERPL-MCNC: 9.4 MG/DL (ref 8.5–10.5)
CASTS: ABNORMAL /LPF
CASTS: ABNORMAL /LPF
CHARACTER, URINE: CLEAR
CHLORIDE BLD-SCNC: 104 MEQ/L (ref 98–111)
CO2: 25 MEQ/L (ref 23–33)
COLOR: YELLOW
CREAT SERPL-MCNC: 1.3 MG/DL (ref 0.4–1.2)
CREATININE URINE: 119.4 MG/DL
CRYSTALS: ABNORMAL
EPITHELIAL CELLS, UA: ABNORMAL /HPF
GFR SERPL CREATININE-BSD FRML MDRD: 44 ML/MIN/1.73M2
GLUCOSE BLD-MCNC: 115 MG/DL (ref 70–108)
GLUCOSE, URINE: NEGATIVE MG/DL
KETONES, URINE: NEGATIVE
LEUKOCYTE EST, POC: NEGATIVE
MISCELLANEOUS LAB TEST RESULT: ABNORMAL
NITRITE, URINE: NEGATIVE
PH UA: 5 (ref 5–9)
POTASSIUM SERPL-SCNC: 4.2 MEQ/L (ref 3.5–5.2)
PROT/CREAT RATIO, UR: 2.12
PROTEIN UA: 300 MG/DL
PROTEIN, URINE: 253.3 MG/DL
RBC URINE: ABNORMAL /HPF
REASON FOR REJECTION: NORMAL
REJECTED TEST: NORMAL
RENAL EPITHELIAL, UA: ABNORMAL
SODIUM BLD-SCNC: 141 MEQ/L (ref 135–145)
SPECIFIC GRAVITY UA: 1.02 (ref 1–1.03)
TOTAL PROTEIN: 6.7 G/DL (ref 6.1–8)
UROBILINOGEN, URINE: 0.2 EU/DL (ref 0–1)
WBC UA: ABNORMAL /HPF
YEAST: ABNORMAL

## 2021-05-03 PROCEDURE — 99214 OFFICE O/P EST MOD 30 MIN: CPT | Performed by: INTERNAL MEDICINE

## 2021-05-03 PROCEDURE — G8427 DOCREV CUR MEDS BY ELIG CLIN: HCPCS | Performed by: INTERNAL MEDICINE

## 2021-05-03 PROCEDURE — G8417 CALC BMI ABV UP PARAM F/U: HCPCS | Performed by: INTERNAL MEDICINE

## 2021-05-03 PROCEDURE — 1036F TOBACCO NON-USER: CPT | Performed by: INTERNAL MEDICINE

## 2021-05-03 RX ORDER — HYDROXYCHLOROQUINE SULFATE 200 MG/1
200 TABLET, FILM COATED ORAL 2 TIMES DAILY
Qty: 60 TABLET | Refills: 5 | Status: SHIPPED | OUTPATIENT
Start: 2021-05-03 | End: 2021-08-09 | Stop reason: SDUPTHER

## 2021-05-03 ASSESSMENT — ENCOUNTER SYMPTOMS
SHORTNESS OF BREATH: 1
BACK PAIN: 1
EYE PAIN: 0
DIARRHEA: 0
COUGH: 0
NAUSEA: 0
EYE ITCHING: 0
TROUBLE SWALLOWING: 0
ABDOMINAL PAIN: 0
CONSTIPATION: 0

## 2021-05-03 NOTE — PROGRESS NOTES
[facility] RHEUMATOLOGY FOLLOW UP NOTE       Date Of Service: 5/3/2021  Provider: Rajinder Schulte DO    Name: Alis Thornton   MRN: [id number]    Chief Complaint(s)     Chief Complaint   Patient presents with    3 Month Follow-Up        History of Present Illness (HPI)     Alis Thornton  is a(n)50 y.o. female with a hx of systemic lupus with hiistory of class IV lupus nephritis, CKD stage III, chronic low back pain w/ radiculopathy, lumbar stenosis, h/o seizures here for the f/u evaluation of systemic lupus     - had heart cath 2/21- has CAD- with [address] clinic evaluation for CABG ? Next week. Ongoing chest pain with exertion. Occasional ZAMORANO      Systemic lupus -    - mild bubble in the urine but no hematuris, foamy urination, or other  urinary changes reported   - occsaional pleuritic chest pain. , + dry mouth, increase loss of hair  over the past month. - ongong arthralgic with right hand, right wrist, right shoulder, right knee, right ankle, neck and lower back pain up to 7/10 over the past week. Timing: mornings and evenings Aggravating factors: weather, back: prolonged bending, forward flexion. Right hand and arm: increases use Alleviating factors: norco, hot back, gabapentin. Occasional swelling of the right hand. - + nasal sores - over the past weekl       REVIEW OF SYSTEMS: (ROS)    Review of Systems   Constitutional: Positive for appetite change. Negative for fatigue, fever and unexpected weight change. HENT: Negative for congestion and trouble swallowing. Hair loss   Eyes: Negative for pain and itching. Respiratory: Positive for shortness of breath. Negative for cough. Cardiovascular: Positive for chest pain. Negative for leg swelling. Gastrointestinal: Negative for abdominal pain, constipation, diarrhea and nausea. Endocrine: Negative for cold intolerance and heat intolerance. Genitourinary: Negative for difficulty urinating, frequency and urgency.    Musculoskeletal: Mental Status: She is alert and oriented to person, place, and time. Deep Tendon Reflexes: Reflexes are normal and symmetric. Psychiatric:         Behavior: Behavior normal.         Thought Content: Thought content normal.        Musculoskeletal:     Normal gait.      Strength 5/5 in biceps, triceps, hips, knees,      Upper extremities:    SHOULDERS nontender, no swelling ,   ELBOWS nontender, no swelling  WRISTS tender right   HANDS/FINGERS - tender mcps right 2,3 pips right 2,3   Lower extremities:  HIPS nontender  KNEES nontender, no swelling  ANKLES nontender, no swelling   FEET : nontender, no swelling       RAPID 3:   5/3/2021 --- RAPID 3: 3.3 + 7 + 7.5 = 17.5    Remission: <3  Low Disease Activity: <6  Moderate Disease Activity: >=6 and <=12  High Disease Activity: >12     LABS    CBC  Lab Results   Component Value Date    WBC 6.2 04/29/2021    RBC 3.86 04/29/2021    HGB 10.4 04/29/2021    HCT 37.2 04/29/2021    MCV 96.4 04/29/2021    MCH 26.9 04/29/2021    MCHC 28.0 04/29/2021    RDW 18.6 03/24/2020     04/29/2021       CMP  Lab Results   Component Value Date    CALCIUM 9.2 04/29/2021    LABALBU 3.7 04/29/2021    PROT 6.3 04/29/2021     04/29/2021    K 3.8 04/29/2021    K 3.9 02/21/2021    CO2 28 04/29/2021     04/29/2021    BUN 18 04/29/2021    CREATININE 1.3 04/29/2021    ALKPHOS 56 04/29/2021    ALT 11 04/29/2021    AST 18 04/29/2021       HgBA1c: No components found for: HGBA1C    Lab Results   Component Value Date    VITD25 15 08/12/2020         Lab Results   Component Value Date    ANASCRN Detected (A) 06/17/2019     Lab Results   Component Value Date    SSA SEE BELOW 06/17/2019     Lab Results   Component Value Date    SSB 0 06/17/2019     Lab Results   Component Value Date    ANTI-SMITH 4 06/17/2019     Lab Results   Component Value Date    DSDNAAB SEE BELOW 06/17/2019      No results found for: ANTIRNP  Lab Results   Component Value Date    C3 103 02/15/2021    C4 15 02/15/2021     Lab Results   Component Value Date    CCPAB 8 06/17/2019     Lab Results   Component Value Date    RF 11 06/17/2019       No components found for: CANCASCRN, APANCASCRN  Lab Results   Component Value Date    SEDRATE 15 04/29/2021     Lab Results   Component Value Date    CRP < 0.30 04/29/2021       RADIOLOGY:         ASSESSMENT/PLAN    Assessment   Plan     SLE w/ h/o class IV LN-   - diagnosed in 2001, s/p cytoxan 2007. C4 was 64, 4+ proteinuria, but 24 urine w/ only 1.1 g. 20-29 RBC/HPF. + BELLE, dsDNA, ANCA (+p-ANCA, neg MPO, AL-3), reported inflammatory arthritis. Exam w/ joint swelling, oral/nasal sores. Prior tx: azathioprin ( imuran )100 mg qAM and 50 mg QHS b/c LFT elevation   - plaquenil 200 mg TWICE DAILY ---  May need to decreased to 300mg daily b/c weight.    - increase to mycophenolate 1500mg q AM and 1000mg daily. (jan 28, 2021)   B/c persistent proteinuria, nasals sores, joint pains, pleuritic chest apin. (re-evaluation needed. - prednisone 7.5 mg daily    H/o lupus nephritis - Proteinura - repeat renal biopsy with no abnormalities    Chest pain- w/ exertion and improved with rest - EKG normal, s/p cath  - following with cardiology and possible CABG     Carpal tunnel: bilateral  Bilateral cubital tunnel  - bilat tinel at wrists and elbows. Intermittent numbness/tinlging  - EMG/NCV (Dec 2019) w/ cervical radiculopathy    - gabapentin 400 mg BID    Low back pain w/ radiculopathy - gabapentin 400 mg BID      Medication monitoring   - CBC, CMP, sed rate, CRP, u/a q 4 weeks   - plaquenil eye exam - last in 2020      No follow-ups on file. Electronically signed by Spring Enciso DO on 5/3/2021 at 2:59 PM    New Prescriptions    No medications on file       Thank you for allowing me to participate in the care of this patient. Please call if there are any questions.

## 2021-05-04 LAB
C3 COMPLEMENT: 116 MG/DL (ref 90–180)
COMPLEMENT C4: 18 MG/DL (ref 10–40)

## 2021-05-07 ENCOUNTER — NURSE ONLY (OUTPATIENT)
Dept: LAB | Age: 51
End: 2021-05-07

## 2021-05-07 LAB
BASOPHILS # BLD: 0.7 %
BASOPHILS ABSOLUTE: 0 THOU/MM3 (ref 0–0.1)
EOSINOPHIL # BLD: 1.9 %
EOSINOPHILS ABSOLUTE: 0.1 THOU/MM3 (ref 0–0.4)
ERYTHROCYTE [DISTWIDTH] IN BLOOD BY AUTOMATED COUNT: 14.4 % (ref 11.5–14.5)
ERYTHROCYTE [DISTWIDTH] IN BLOOD BY AUTOMATED COUNT: 50.6 FL (ref 35–45)
HCT VFR BLD CALC: 37.4 % (ref 37–47)
HEMOGLOBIN: 10.4 GM/DL (ref 12–16)
HYPOCHROMIA: PRESENT
IMMATURE GRANS (ABS): 0.02 THOU/MM3 (ref 0–0.07)
IMMATURE GRANULOCYTES: 0.3 %
LYMPHOCYTES # BLD: 18.8 %
LYMPHOCYTES ABSOLUTE: 1.1 THOU/MM3 (ref 1–4.8)
MCH RBC QN AUTO: 26.9 PG (ref 26–33)
MCHC RBC AUTO-ENTMCNC: 27.8 GM/DL (ref 32.2–35.5)
MCV RBC AUTO: 96.9 FL (ref 81–99)
MONOCYTES # BLD: 6.6 %
MONOCYTES ABSOLUTE: 0.4 THOU/MM3 (ref 0.4–1.3)
NUCLEATED RED BLOOD CELLS: 0 /100 WBC
PLATELET # BLD: 163 THOU/MM3 (ref 130–400)
PLATELET ESTIMATE: ADEQUATE
PMV BLD AUTO: 12 FL (ref 9.4–12.4)
RBC # BLD: 3.86 MILL/MM3 (ref 4.2–5.4)
SCAN OF BLOOD SMEAR: NORMAL
SEG NEUTROPHILS: 71.7 %
SEGMENTED NEUTROPHILS ABSOLUTE COUNT: 4.2 THOU/MM3 (ref 1.8–7.7)
WBC # BLD: 5.9 THOU/MM3 (ref 4.8–10.8)

## 2021-05-12 ENCOUNTER — VIRTUAL VISIT (OUTPATIENT)
Dept: FAMILY MEDICINE CLINIC | Age: 51
End: 2021-05-12
Payer: COMMERCIAL

## 2021-05-12 DIAGNOSIS — R11.14 BILIOUS VOMITING WITH NAUSEA: ICD-10-CM

## 2021-05-12 DIAGNOSIS — R10.13 EPIGASTRIC PAIN: Primary | ICD-10-CM

## 2021-05-12 DIAGNOSIS — R19.7 DIARRHEA, UNSPECIFIED TYPE: ICD-10-CM

## 2021-05-12 PROBLEM — E11.9 TYPE 2 DIABETES MELLITUS WITHOUT COMPLICATION, WITHOUT LONG-TERM CURRENT USE OF INSULIN (HCC): Status: ACTIVE | Noted: 2021-05-12

## 2021-05-12 PROBLEM — E44.0 MODERATE MALNUTRITION (HCC): Chronic | Status: RESOLVED | Noted: 2021-02-23 | Resolved: 2021-05-12

## 2021-05-12 PROCEDURE — 99214 OFFICE O/P EST MOD 30 MIN: CPT | Performed by: NURSE PRACTITIONER

## 2021-05-12 PROCEDURE — G8427 DOCREV CUR MEDS BY ELIG CLIN: HCPCS | Performed by: NURSE PRACTITIONER

## 2021-05-12 RX ORDER — ONDANSETRON 4 MG/1
4 TABLET, FILM COATED ORAL 3 TIMES DAILY PRN
Qty: 30 TABLET | Refills: 0 | Status: SHIPPED | OUTPATIENT
Start: 2021-05-12 | End: 2022-03-10 | Stop reason: SDUPTHER

## 2021-05-12 RX ORDER — KETOROLAC TROMETHAMINE 10 MG/1
10 TABLET, FILM COATED ORAL EVERY 6 HOURS PRN
Qty: 20 TABLET | Refills: 0 | Status: SHIPPED | OUTPATIENT
Start: 2021-05-12 | End: 2022-02-03 | Stop reason: ALTCHOICE

## 2021-05-12 ASSESSMENT — ENCOUNTER SYMPTOMS
VOMITING: 1
BLOOD IN STOOL: 0
DIARRHEA: 1
NAUSEA: 1
RESPIRATORY NEGATIVE: 1
ABDOMINAL DISTENTION: 0
ABDOMINAL PAIN: 1
CONSTIPATION: 0

## 2021-05-12 NOTE — PROGRESS NOTES
Start date:      Quit date:      Years since quittin.3    Smokeless tobacco: Never Used    Tobacco comment: 1 a day   Substance Use Topics    Alcohol use: Yes     Frequency: Monthly or less     Comment: occasionally, couple times a year    Drug use: No        Allergies   Allergen Reactions    Codeine Hives and Nausea And Vomiting   ,   Past Medical History:   Diagnosis Date    Anemia     CKD (chronic kidney disease), stage III     Dyslipidemia     Hematuria     Hyperlipidemia     Hypertension     Hyperuricemia     Hypothyroidism     Kidney disease     as a result from lupus    [facility]     [facility]up[facility] nephritis ([facility]     Proteinuria     Seizures ([facility]     Systemic lupus erythematosus ([facility]    ,   Past Surgical History:   Procedure Laterality Date    CT BIOPSY ABDOMEN RETROPERITONEUM  1/15/2021    CT BIOPSY ABDOMEN RETROPERITONEUM 1/15/2021 [facility] CT SCAN    LUMBAR NERVE BLOCK N/A 3/11/2019    LESI at L4 performed by Griselda Gentile MD at [facility] N/A 2019    LESI at L3 #1 performed by Griselda Gentile MD at [address] [facility] Left 2018    LUMBAR TRANSFORAMINAL TFESI L5 LEFT #2, performed by Griselda Gentile MD at [address] Bilateral     LUMBAR FACET    MD INJ DX/THER AGNT PARAVERT FACET JOINT, LUMBAR/SAC, 2ND LEVEL Bilateral 2018    LUMBAR FACET  [email or web address] performed by Griselda Gentile MD at [address] [place] [address] AA&/STRD TFRML EPI LUMBAR/SACRAL 1 LEVEL Left 2018    LUMBAR TRANSFORAMINAL TFESI L5 on the LEFT performed by Griselda Gentile MD at [address] [address] DX/THER SBST INTRLMNR LMBR/SAC W/IMG GDN N/A 2018    LUMBAR INTER LAMINAR GIGI LESI @L4 performed by Griselda Gentile MD at [address] [place] [address]   ,   Social History     Tobacco Use    Smoking status: Former Smoker     Packs/day: 0.25 Constitutional: [] Appears well-developed and well-nourished [x] No apparent distress      [x] Abnormal- appears to be sick   Mental status  [x] Alert and awake  [x] Oriented to person/place/time [x]Able to follow commands      Eyes:  EOM    []  Normal  [] Abnormal-  Sclera  []  Normal  [] Abnormal -         Discharge []  None visible  [] Abnormal -    HENT:   [] Normocephalic, atraumatic. [] Abnormal   [] Mouth/Throat: Mucous membranes are moist.     External Ears [] Normal  [] Abnormal-     Neck: [] No visualized mass     Pulmonary/Chest: [x] Respiratory effort normal.  [x] No visualized signs of difficulty breathing or respiratory distress        [] Abnormal-      Musculoskeletal:   [x] Normal gait with no signs of ataxia         [] Normal range of motion of neck        [] Abnormal-   Pt did jump up and down during visit negative jar test  Neurological:        [x] No Facial Asymmetry (Cranial nerve 7 motor function) (limited exam to video visit)          [] No gaze palsy        [] Abnormal-         Skin:        [x] No significant exanthematous lesions or discoloration noted on facial skin         [] Abnormal-            Psychiatric:       [] Normal Affect [] No Hallucinations        [] Abnormal-     Other pertinent observable physical exam findings-     ASSESSMENT/PLAN:  1. Bilious vomiting with nausea  zofran for nausea  If symptoms continue for 3 days , contact office  rule out viral illness vs gastritis     2. Epigastric pain  As above  Keep hydrated with gatorade and bland foods  - XR ABDOMEN (KUB) (SINGLE AP VIEW); Future    3. Diarrhea, unspecified type  As above  Rule out viral gastroenteritis  Keep hydrated   If symptoms worsen go to ER over the weekend  Pt voiced understanding. Return if symptoms worsen or fail to improve. [place], was evaluated through a synchronous (real-time) audio-video encounter. The patient (or guardian if applicable) is aware that this is a billable service. Verbal consent to proceed has been obtained within the past 12 months. The visit was conducted pursuant to the emergency declaration under the [address] [place] [address] and the [employer] and [employer] Act. Patient identification was verified, and a caregiver was present when appropriate. The patient was located in a state where the provider was credentialed to provide care. Total time spent on this encounter: Not billed by time    --MACKENZIE Wagner CNP on 5/12/2021 at 2:08 PM    An electronic signature was used to authenticate this note.

## 2021-05-20 DIAGNOSIS — G89.4 CHRONIC PAIN SYNDROME: ICD-10-CM

## 2021-05-20 RX ORDER — HYDROCODONE BITARTRATE AND ACETAMINOPHEN 5; 325 MG/1; MG/1
1 TABLET ORAL EVERY 8 HOURS PRN
Qty: 90 TABLET | Refills: 0 | Status: SHIPPED | OUTPATIENT
Start: 2021-05-23 | End: 2021-06-21 | Stop reason: SDUPTHER

## 2021-05-20 NOTE — TELEPHONE ENCOUNTER
OARRS reviewed. UDS: + for  Hydrocodone gabapentin consistent. Last seen: 3/22/2021.  Follow-up:   Future Appointments   Date Time Provider Justus Dumont   6/28/2021  3:20 PM MACKENZIE Barrett CNP N [facility] - [place]ely   7/7/2021 12:30 PM [address], MD N [facility] [place]   8/5/2021  3:00 PM Michelle Gonzalez MD N [facility] [place]   8/9/2021  2:20 PM MACKENZIE Melton - [place] N [address]

## 2021-06-21 DIAGNOSIS — G89.4 CHRONIC PAIN SYNDROME: ICD-10-CM

## 2021-06-21 RX ORDER — HYDROCODONE BITARTRATE AND ACETAMINOPHEN 5; 325 MG/1; MG/1
1 TABLET ORAL EVERY 8 HOURS PRN
Qty: 90 TABLET | Refills: 0 | Status: SHIPPED | OUTPATIENT
Start: 2021-06-22 | End: 2021-07-20 | Stop reason: SDUPTHER

## 2021-06-21 NOTE — TELEPHONE ENCOUNTER
OARRS reviewed. UDS: + for  Hydrocodone -consistent. + for Gabapentin -inconsistent. Last seen: 3/22/2021.  Follow-up: 6/28/2021

## 2021-06-28 ENCOUNTER — OFFICE VISIT (OUTPATIENT)
Dept: PHYSICAL MEDICINE AND REHAB | Age: 51
End: 2021-06-28
Payer: COMMERCIAL

## 2021-06-28 ENCOUNTER — NURSE ONLY (OUTPATIENT)
Dept: LAB | Age: 51
End: 2021-06-28

## 2021-06-28 VITALS
HEIGHT: 60 IN | HEART RATE: 70 BPM | SYSTOLIC BLOOD PRESSURE: 122 MMHG | DIASTOLIC BLOOD PRESSURE: 78 MMHG | BODY MASS INDEX: 29.45 KG/M2 | WEIGHT: 150 LBS

## 2021-06-28 DIAGNOSIS — M48.062 LUMBAR STENOSIS WITH NEUROGENIC CLAUDICATION: ICD-10-CM

## 2021-06-28 DIAGNOSIS — Z51.81 MEDICATION MONITORING ENCOUNTER: ICD-10-CM

## 2021-06-28 DIAGNOSIS — F11.90 CHRONIC, CONTINUOUS USE OF OPIOIDS: ICD-10-CM

## 2021-06-28 DIAGNOSIS — M48.02 CERVICAL SPINAL STENOSIS: ICD-10-CM

## 2021-06-28 DIAGNOSIS — M32.14 OTHER SYSTEMIC LUPUS ERYTHEMATOSUS WITH GLOMERULAR DISEASE (HCC): ICD-10-CM

## 2021-06-28 DIAGNOSIS — M46.1 BILATERAL SACROILIITIS (HCC): ICD-10-CM

## 2021-06-28 DIAGNOSIS — G89.4 CHRONIC PAIN SYNDROME: ICD-10-CM

## 2021-06-28 DIAGNOSIS — Z87.39 H/O LUPUS NEPHRITIS: ICD-10-CM

## 2021-06-28 DIAGNOSIS — M47.816 SPONDYLOSIS OF LUMBAR REGION WITHOUT MYELOPATHY OR RADICULOPATHY: ICD-10-CM

## 2021-06-28 DIAGNOSIS — M54.16 LUMBAR RADICULOPATHY: ICD-10-CM

## 2021-06-28 DIAGNOSIS — M54.40 BACK PAIN OF LUMBAR REGION WITH SCIATICA: ICD-10-CM

## 2021-06-28 DIAGNOSIS — M54.12 CERVICAL RADICULITIS: Primary | ICD-10-CM

## 2021-06-28 DIAGNOSIS — M46.1 SI (SACROILIAC) JOINT INFLAMMATION (HCC): ICD-10-CM

## 2021-06-28 DIAGNOSIS — M47.814 SPONDYLOSIS OF THORACIC REGION WITHOUT MYELOPATHY OR RADICULOPATHY: ICD-10-CM

## 2021-06-28 DIAGNOSIS — M54.16 LUMBAR RADICULITIS: ICD-10-CM

## 2021-06-28 LAB
ALBUMIN SERPL-MCNC: 4.3 G/DL (ref 3.5–5.1)
ALP BLD-CCNC: 56 U/L (ref 38–126)
ALT SERPL-CCNC: 12 U/L (ref 11–66)
ANION GAP SERPL CALCULATED.3IONS-SCNC: 11 MEQ/L (ref 8–16)
AST SERPL-CCNC: 25 U/L (ref 5–40)
BACTERIA: ABNORMAL
BASOPHILS # BLD: 0.7 %
BASOPHILS ABSOLUTE: 0 THOU/MM3 (ref 0–0.1)
BILIRUB SERPL-MCNC: < 0.2 MG/DL (ref 0.3–1.2)
BILIRUBIN URINE: NEGATIVE
BLOOD, URINE: ABNORMAL
BUN BLDV-MCNC: 27 MG/DL (ref 7–22)
C-REACTIVE PROTEIN: < 0.3 MG/DL (ref 0–1)
CALCIUM SERPL-MCNC: 9.8 MG/DL (ref 8.5–10.5)
CASTS: ABNORMAL /LPF
CASTS: ABNORMAL /LPF
CHARACTER, URINE: CLEAR
CHLORIDE BLD-SCNC: 103 MEQ/L (ref 98–111)
CO2: 26 MEQ/L (ref 23–33)
COLOR: YELLOW
CREAT SERPL-MCNC: 1.9 MG/DL (ref 0.4–1.2)
CREATININE URINE: 174.2 MG/DL
CRYSTALS: ABNORMAL
EOSINOPHIL # BLD: 2 %
EOSINOPHILS ABSOLUTE: 0.1 THOU/MM3 (ref 0–0.4)
EPITHELIAL CELLS, UA: ABNORMAL /HPF
ERYTHROCYTE [DISTWIDTH] IN BLOOD BY AUTOMATED COUNT: 14.6 % (ref 11.5–14.5)
ERYTHROCYTE [DISTWIDTH] IN BLOOD BY AUTOMATED COUNT: 50.4 FL (ref 35–45)
GFR SERPL CREATININE-BSD FRML MDRD: 28 ML/MIN/1.73M2
GLUCOSE BLD-MCNC: 110 MG/DL (ref 70–108)
GLUCOSE, URINE: NEGATIVE MG/DL
HCT VFR BLD CALC: 38.7 % (ref 37–47)
HEMOGLOBIN: 10.9 GM/DL (ref 12–16)
HYPOCHROMIA: PRESENT
IMMATURE GRANS (ABS): 0.01 THOU/MM3 (ref 0–0.07)
IMMATURE GRANULOCYTES: 0.2 %
KETONES, URINE: NEGATIVE
LEUKOCYTE EST, POC: NEGATIVE
LYMPHOCYTES # BLD: 25.3 %
LYMPHOCYTES ABSOLUTE: 1.4 THOU/MM3 (ref 1–4.8)
MCH RBC QN AUTO: 26.5 PG (ref 26–33)
MCHC RBC AUTO-ENTMCNC: 28.2 GM/DL (ref 32.2–35.5)
MCV RBC AUTO: 94.2 FL (ref 81–99)
MISCELLANEOUS LAB TEST RESULT: ABNORMAL
MONOCYTES # BLD: 5.7 %
MONOCYTES ABSOLUTE: 0.3 THOU/MM3 (ref 0.4–1.3)
NITRITE, URINE: NEGATIVE
NUCLEATED RED BLOOD CELLS: 0 /100 WBC
PH UA: 5 (ref 5–9)
PLATELET # BLD: 151 THOU/MM3 (ref 130–400)
PLATELET ESTIMATE: ADEQUATE
PMV BLD AUTO: 12 FL (ref 9.4–12.4)
POIKILOCYTES: ABNORMAL
POTASSIUM SERPL-SCNC: 4.1 MEQ/L (ref 3.5–5.2)
PROT/CREAT RATIO, UR: 1.23
PROTEIN UA: 300 MG/DL
PROTEIN, URINE: 214.5 MG/DL
RBC # BLD: 4.11 MILL/MM3 (ref 4.2–5.4)
RBC URINE: ABNORMAL /HPF
RENAL EPITHELIAL, UA: ABNORMAL
SCAN OF BLOOD SMEAR: NORMAL
SEDIMENTATION RATE, ERYTHROCYTE: 6 MM/HR (ref 0–20)
SEG NEUTROPHILS: 66.1 %
SEGMENTED NEUTROPHILS ABSOLUTE COUNT: 3.7 THOU/MM3 (ref 1.8–7.7)
SODIUM BLD-SCNC: 140 MEQ/L (ref 135–145)
SPECIFIC GRAVITY UA: 1.02 (ref 1–1.03)
TOTAL PROTEIN: 7.1 G/DL (ref 6.1–8)
UROBILINOGEN, URINE: 0.2 EU/DL (ref 0–1)
WBC # BLD: 5.6 THOU/MM3 (ref 4.8–10.8)
WBC UA: ABNORMAL /HPF
YEAST: ABNORMAL

## 2021-06-28 PROCEDURE — G8427 DOCREV CUR MEDS BY ELIG CLIN: HCPCS | Performed by: NURSE PRACTITIONER

## 2021-06-28 PROCEDURE — G8417 CALC BMI ABV UP PARAM F/U: HCPCS | Performed by: NURSE PRACTITIONER

## 2021-06-28 PROCEDURE — 1036F TOBACCO NON-USER: CPT | Performed by: NURSE PRACTITIONER

## 2021-06-28 PROCEDURE — 99214 OFFICE O/P EST MOD 30 MIN: CPT | Performed by: NURSE PRACTITIONER

## 2021-06-28 ASSESSMENT — ENCOUNTER SYMPTOMS
BACK PAIN: 1
DIARRHEA: 0
SHORTNESS OF BREATH: 0
RHINORRHEA: 0
CHEST TIGHTNESS: 0
NAUSEA: 0
COUGH: 0
WHEEZING: 0
SINUS PRESSURE: 0
PHOTOPHOBIA: 0
VOMITING: 0
CONSTIPATION: 0
SORE THROAT: 0
ABDOMINAL PAIN: 0
EYE PAIN: 0
COLOR CHANGE: 0

## 2021-06-28 NOTE — PROGRESS NOTES
[facility] [address]  Dept: [phone]  Dept Fax: [phone]: [phone]    Visit Date: 6/28/2021    Functionality Assessment/Goals Worksheet     On a scale of 0 (Does not Interfere) to 10 (Completely Interferes)     1. Which number describes how during the past week pain has interfered with       the following:  A. General Activity:  6  B. Mood: 6  C. Walking Ability:  7  D. Normal Work (Includes both work outside the home and housework):  7  E. Relations with Other People:   5  F. Sleep:   8  G. Enjoyment of Life:   5    2. Patient Prefers to Take their Pain Medications:     [x]  On a regular basis   []  Only when necessary    []  Does not take pain medications    3. What are the Patient's Goals/Expectations for Visiting Pain Management? [x]  Learn about my pain    []  Receive Medication   []  Physical Therapy     []  Treat Depression   []  Receive Injections    []  Treat Sleep   []  Deal with Anxiety and Stress   []  Treat Opoid Dependence/Addiction   []  Other:    HPI:   Raul Corcoran is a 52 y.o. female is here today for    Chief Complaint: Low back pain, Mid back pain, Neck pain and Hip pain    HPI   She has been having RUE tingling numbness and sharp  pains at times into her fingers, her hands stiffens up and contracture at times. She remains  is off work due to heart issues. Had abnormal heart cath 2/22/2021 referred to [facility] soon. She has not been to [facility] yet, She is unsure and scared to go. She has  cardiology apt next week. She  is unable to complete PT due to heart conditions and  Chest pain and  Angina. She had to take Nitro this past week. No relief from prior lumbar procedures. She did have TFLESI L5 left in 2018 with 50% pain relief 18 months. EMG  Showed C5-6 left.       She continues to take Norco Neurontin  Pain increases with bending, lifting, twisting , turning torso, reaching, pushing, pulling, walking, standing, stairs and getting up and down.  ice, heat, NSAIDS, narcotics, muscle relaxer, OTC rubs creams patches, steroid burst and injections  sharp, stabbing, burning, throbbing, aching, numbness and tingling    Medications reviewed. Patient denies side effects with medications. Patient states she is taking medications as prescribed. Shedenies receiving pain medications from other sources. She denies any ER visits since last visit. Pain scale with out pain medications or at its worst is 7/10. Pain scale with pain medications or at its best is 4/10. Last dose ofNorco was today  Drug screen reviewed from 3/2021 and was appropriate  Pill count was completed today and was appropriate  Patient does have naloxone available at home. Patient has not required use of naloxone at home since last office visit. MRI cervical   :               There is 3 mm of anterolisthesis of C7 on T1. There is loss of disc height and there is degenerative marrow edema in the endplates at the C6-8 level. This is also present at the C6-7 level. No suspicious osseous lesions are present. The facet joints are    normally aligned.       The cervical spinal cord is of normal caliber and signal intensity.  The visualized aspects of the posterior fossa are normal.       On the axial images, at C2-C3, there is a small posterior disc osteophyte complex. There are bilateral facet degenerative changes. There is no spinal canal stenosis. There is mild right foraminal stenosis.       At C3-C4, there is a posterior disc osteophyte complex. There are bilateral facet degenerative changes. There is moderate severity spinal canal stenosis. There is moderate to severe bilateral foraminal stenosis.       At C4-C5, there is a small posterior disc osteophyte complex. There are facet degenerative changes. There is mild spinal canal stenosis.  There is no foraminal stenosis.       At C5-C6, there is a small posterior disc osteophyte complex. There is mild spinal canal stenosis. There is no foraminal stenosis.       At C6-C7, there is a small posterior disc osteophyte complex. There is mild spinal canal stenosis and bilateral foraminal stenosis.       At C7-T1, there are facet degenerative changes. There is uncovering of the disc. There is mild spinal canal stenosis. There is mild foraminal stenosis.       There are no suspicious findings in the cervical soft tissues.               Impression       1. Moderate severity spinal canal stenosis with moderate to severe bilateral foraminal stenosis at the C3-4 level due to degenerative changes. 2. Mild spinal canal stenosis and/or foraminal stenosis at other levels as described above.           EMG        The patientis allergic to codeine. Subjective:      Review of Systems   Constitutional: Negative for activity change, appetite change, chills, diaphoresis, fatigue, fever and unexpected weight change. HENT: Positive for dental problem. Negative for congestion, ear pain, hearing loss, mouth sores, nosebleeds, rhinorrhea, sinus pressure and sore throat. Dental infection recently   Eyes: Negative for photophobia, pain and visual disturbance. Respiratory: Negative for cough, chest tightness, shortness of breath and wheezing. COVID     Cardiovascular: Negative for chest pain and palpitations. HTN CAD  Recent abnormal heart cath, pending [facility] referral for possible surgery   Gastrointestinal: Negative for abdominal pain, constipation, diarrhea, nausea and vomiting. Endocrine: Negative for cold intolerance, heat intolerance, polydipsia, polyphagia and polyuria. Thyroid issues   Genitourinary: Negative for decreased urine volume, difficulty urinating, frequency and hematuria. CKD   Musculoskeletal: Positive for arthralgias, back pain, myalgias, neck pain and neck stiffness. Negative for gait problem.    Skin: Negative for color change and rash. Hot water burn  8/31/2020 . healed   Allergic/Immunologic: Negative for food allergies and immunocompromised state. Neurological: Positive for weakness, numbness and headaches. Negative for dizziness, tremors, seizures, syncope, facial asymmetry, speech difficulty and light-headedness. H/o seizures,no seizures in 16 years. H/O Lupus   Hematological: Does not bruise/bleed easily. Psychiatric/Behavioral: Negative for agitation, behavioral problems, confusion, decreased concentration, dysphoric mood, hallucinations, self-injury, sleep disturbance and suicidal ideas. The patient is not nervous/anxious and is not hyperactive. Objective:     Vitals:    06/28/21 1508   BP: 122/78   Site: Left Upper Arm   Position: Sitting   Cuff Size: Medium Adult   Pulse: 70   Weight: 150 lb (68 kg)   Height: 5' (1.524 m)       Physical Exam  Vitals and nursing note reviewed. Constitutional:       General: She is not in acute distress. Appearance: She is well-developed. She is not diaphoretic. HENT:      Head: Normocephalic and atraumatic. Right Ear: External ear normal.      Left Ear: External ear normal.      Nose: Nose normal.      Mouth/Throat:      Pharynx: No oropharyngeal exudate. Eyes:      General: No scleral icterus. Right eye: No discharge. Left eye: No discharge. Conjunctiva/sclera: Conjunctivae normal.      Pupils: Pupils are equal, round, and reactive to light. Neck:      Thyroid: No thyromegaly. Cardiovascular:      Rate and Rhythm: Normal rate and regular rhythm. Heart sounds: Normal heart sounds. No murmur heard. No friction rub. No gallop. Pulmonary:      Effort: Pulmonary effort is normal. No respiratory distress. Breath sounds: Normal breath sounds. No wheezing or rales. Chest:      Chest wall: No tenderness. Abdominal:      General: Bowel sounds are normal. There is no distension.       Palpations: Abdomen is soft. Tenderness: There is no abdominal tenderness. There is no guarding or rebound. Musculoskeletal:         General: Tenderness present. Right shoulder: Tenderness present. Decreased range of motion. Decreased strength. Left shoulder: Tenderness present. Right upper arm: Tenderness present. Right forearm: Tenderness present. Left forearm: Tenderness present. Right wrist: Tenderness and bony tenderness present. Left wrist: Tenderness and bony tenderness present. Right hand: Tenderness and bony tenderness present. Decreased range of motion. Decreased strength. Decreased sensation. Left hand: Tenderness and bony tenderness present. Decreased range of motion. Decreased strength. Decreased sensation. Cervical back: Neck supple. Spasms, tenderness and bony tenderness present. No edema, erythema or rigidity. Pain with movement and muscular tenderness present. Decreased range of motion. Thoracic back: Spasms, tenderness and bony tenderness present. Decreased range of motion. Lumbar back: Spasms, tenderness and bony tenderness present. Decreased range of motion. Back:       Right hip: Tenderness present. Left hip: Normal.      Right upper leg: Tenderness present. Right knee: Normal.      Left knee: Normal.      Right ankle: Tenderness present. Right foot: Decreased range of motion. Swelling, tenderness and bony tenderness present. Comments: 2 right broken toes has walker shoe on    Skin:     General: Skin is warm. Coloration: Skin is not pale. Findings: No erythema or rash. Neurological:      Mental Status: She is alert and oriented to person, place, and time. She is not disoriented. Cranial Nerves: Cranial nerves are intact. No cranial nerve deficit. Sensory: Sensation is intact. No sensory deficit. Motor: Motor function is intact. No atrophy or abnormal muscle tone.       Coordination: 9. Cervical spinal stenosis    10. Lumbar radiculopathy    11. Back pain of lumbar region with sciatica    12. Chronic, continuous use of opioids            Plan:      OARRS reviewed. Current MED: 15  Patient was not offered naloxone for home. Discussed long term side effects of medications, tolerance, dependency and addiction. Previous UDS reviewed  UDS preformed today for compliance. Patient told can not receive any pain medications from any other source. No evidence of abuse, diversion or aberrant behavior. Medications and/or procedures to improve function and quality of life- patient understanding with this and that may not be pain free  Discussed with patient about safe storage of medications at home  Discussed possible weaning of medication dosing dependent on treatment/procedure results. Testing: reviewed Cervical MRI and EMG  Procedures: TFCESI C5-6 Bilateral must have approval from Dr Giana Williamson , has apt in July 6  Discussed with patient about risks with procedure including infection, reaction to medication, increased pain, or bleeding. Medications:Norco Neurontin   If patient is on blood thinners will need approval to hold:N/A      Meds. Prescribed:   No orders of the defined types were placed in this encounter. Return for TFCESI @C5-6 bilateral , Follow up after procedure.                Electronically signed by MACKENZIE Rivera CNP on6/28/2021 at 3:43 PM

## 2021-06-29 ENCOUNTER — TELEPHONE (OUTPATIENT)
Dept: RHEUMATOLOGY | Age: 51
End: 2021-06-29

## 2021-06-29 DIAGNOSIS — M32.14 OTHER SYSTEMIC LUPUS ERYTHEMATOSUS WITH GLOMERULAR DISEASE (HCC): ICD-10-CM

## 2021-06-29 DIAGNOSIS — Z87.39 H/O LUPUS NEPHRITIS: Primary | ICD-10-CM

## 2021-06-29 LAB
C3 COMPLEMENT: 100 MG/DL (ref 90–180)
COMPLEMENT C4: 19 MG/DL (ref 10–40)

## 2021-06-29 NOTE — TELEPHONE ENCOUNTER
Creatinine elevation    - ? Association with recent Mycophenolate titration. - decrease Mycophenolate to 1000mg twice daily. --- if creatinine improvement may pursue the calcineurin inhibitor or benlysta   - repeat labs in 1-2 weeks.     - will planon seeing patient in clinic on Thursday July 8th at 1 pm.

## 2021-07-07 ENCOUNTER — TELEPHONE (OUTPATIENT)
Dept: CARDIOLOGY CLINIC | Age: 51
End: 2021-07-07

## 2021-07-07 ENCOUNTER — TELEPHONE (OUTPATIENT)
Dept: CARDIOTHORACIC SURGERY | Age: 51
End: 2021-07-07

## 2021-07-07 ENCOUNTER — OFFICE VISIT (OUTPATIENT)
Dept: CARDIOLOGY CLINIC | Age: 51
End: 2021-07-07
Payer: COMMERCIAL

## 2021-07-07 ENCOUNTER — NURSE ONLY (OUTPATIENT)
Dept: LAB | Age: 51
End: 2021-07-07

## 2021-07-07 VITALS
HEIGHT: 66 IN | DIASTOLIC BLOOD PRESSURE: 64 MMHG | HEART RATE: 82 BPM | WEIGHT: 150 LBS | BODY MASS INDEX: 24.11 KG/M2 | SYSTOLIC BLOOD PRESSURE: 138 MMHG

## 2021-07-07 DIAGNOSIS — I25.10 CORONARY ARTERY DISEASE INVOLVING NATIVE CORONARY ARTERY OF NATIVE HEART WITHOUT ANGINA PECTORIS: Primary | ICD-10-CM

## 2021-07-07 DIAGNOSIS — M32.14 OTHER SYSTEMIC LUPUS ERYTHEMATOSUS WITH GLOMERULAR DISEASE (HCC): ICD-10-CM

## 2021-07-07 DIAGNOSIS — Z87.39 H/O LUPUS NEPHRITIS: ICD-10-CM

## 2021-07-07 DIAGNOSIS — I25.5 ISCHEMIC CARDIOMYOPATHY: ICD-10-CM

## 2021-07-07 DIAGNOSIS — Z51.81 MEDICATION MONITORING ENCOUNTER: ICD-10-CM

## 2021-07-07 LAB
ALBUMIN SERPL-MCNC: 4.5 G/DL (ref 3.5–5.1)
ALP BLD-CCNC: 64 U/L (ref 38–126)
ALT SERPL-CCNC: 11 U/L (ref 11–66)
ANION GAP SERPL CALCULATED.3IONS-SCNC: 12 MEQ/L (ref 8–16)
AST SERPL-CCNC: 20 U/L (ref 5–40)
BACTERIA: ABNORMAL
BASOPHILS # BLD: 0.8 %
BASOPHILS ABSOLUTE: 0 THOU/MM3 (ref 0–0.1)
BILIRUB SERPL-MCNC: 0.2 MG/DL (ref 0.3–1.2)
BILIRUBIN URINE: NEGATIVE
BLOOD, URINE: NEGATIVE
BUN BLDV-MCNC: 18 MG/DL (ref 7–22)
C-REACTIVE PROTEIN: < 0.3 MG/DL (ref 0–1)
CALCIUM SERPL-MCNC: 10 MG/DL (ref 8.5–10.5)
CASTS: ABNORMAL /LPF
CASTS: ABNORMAL /LPF
CHARACTER, URINE: CLEAR
CHLORIDE BLD-SCNC: 103 MEQ/L (ref 98–111)
CO2: 23 MEQ/L (ref 23–33)
COLOR: YELLOW
CREAT SERPL-MCNC: 1.4 MG/DL (ref 0.4–1.2)
CREATININE URINE: 113.4 MG/DL
CRYSTALS: ABNORMAL
EOSINOPHIL # BLD: 1.4 %
EOSINOPHILS ABSOLUTE: 0.1 THOU/MM3 (ref 0–0.4)
EPITHELIAL CELLS, UA: ABNORMAL /HPF
ERYTHROCYTE [DISTWIDTH] IN BLOOD BY AUTOMATED COUNT: 14.8 % (ref 11.5–14.5)
ERYTHROCYTE [DISTWIDTH] IN BLOOD BY AUTOMATED COUNT: 51 FL (ref 35–45)
GFR SERPL CREATININE-BSD FRML MDRD: 40 ML/MIN/1.73M2
GLUCOSE BLD-MCNC: 86 MG/DL (ref 70–108)
GLUCOSE, URINE: NEGATIVE MG/DL
HCT VFR BLD CALC: 37.1 % (ref 37–47)
HEMOGLOBIN: 10.8 GM/DL (ref 12–16)
IMMATURE GRANS (ABS): 0.01 THOU/MM3 (ref 0–0.07)
IMMATURE GRANULOCYTES: 0.2 %
KETONES, URINE: NEGATIVE
LEUKOCYTE EST, POC: NEGATIVE
LYMPHOCYTES # BLD: 27.8 %
LYMPHOCYTES ABSOLUTE: 1.4 THOU/MM3 (ref 1–4.8)
MCH RBC QN AUTO: 27.2 PG (ref 26–33)
MCHC RBC AUTO-ENTMCNC: 29.1 GM/DL (ref 32.2–35.5)
MCV RBC AUTO: 93.5 FL (ref 81–99)
MISCELLANEOUS LAB TEST RESULT: ABNORMAL
MONOCYTES # BLD: 6.7 %
MONOCYTES ABSOLUTE: 0.3 THOU/MM3 (ref 0.4–1.3)
NITRITE, URINE: NEGATIVE
NUCLEATED RED BLOOD CELLS: 0 /100 WBC
PH UA: 5 (ref 5–9)
PLATELET # BLD: 134 THOU/MM3 (ref 130–400)
PMV BLD AUTO: 13 FL (ref 9.4–12.4)
POTASSIUM SERPL-SCNC: 4.3 MEQ/L (ref 3.5–5.2)
PROT/CREAT RATIO, UR: 1.14
PROTEIN UA: 300 MG/DL
PROTEIN, URINE: 129.7 MG/DL
RBC # BLD: 3.97 MILL/MM3 (ref 4.2–5.4)
RBC URINE: ABNORMAL /HPF
RENAL EPITHELIAL, UA: ABNORMAL
SEDIMENTATION RATE, ERYTHROCYTE: 14 MM/HR (ref 0–20)
SEG NEUTROPHILS: 63.1 %
SEGMENTED NEUTROPHILS ABSOLUTE COUNT: 3.1 THOU/MM3 (ref 1.8–7.7)
SODIUM BLD-SCNC: 138 MEQ/L (ref 135–145)
SPECIFIC GRAVITY UA: 1.01 (ref 1–1.03)
TOTAL PROTEIN: 7.3 G/DL (ref 6.1–8)
UROBILINOGEN, URINE: 0.2 EU/DL (ref 0–1)
WBC # BLD: 4.9 THOU/MM3 (ref 4.8–10.8)
WBC UA: ABNORMAL /HPF
YEAST: ABNORMAL

## 2021-07-07 PROCEDURE — G8420 CALC BMI NORM PARAMETERS: HCPCS | Performed by: NUCLEAR MEDICINE

## 2021-07-07 PROCEDURE — 99214 OFFICE O/P EST MOD 30 MIN: CPT | Performed by: NUCLEAR MEDICINE

## 2021-07-07 PROCEDURE — 1036F TOBACCO NON-USER: CPT | Performed by: NUCLEAR MEDICINE

## 2021-07-07 PROCEDURE — G8427 DOCREV CUR MEDS BY ELIG CLIN: HCPCS | Performed by: NUCLEAR MEDICINE

## 2021-07-07 NOTE — TELEPHONE ENCOUNTER
Spoke with the patient she will need to contact the [facility] and coordinate that with them . We have no control over [place] clinic scheduling. Patient verbalized understanding.

## 2021-07-07 NOTE — TELEPHONE ENCOUNTER
Patient seen by Dr. Kavon Davila today. Patient requesting Dr. Jong Pisano office to schedule her [place] appointments all for the same day due to transportation.

## 2021-07-07 NOTE — TELEPHONE ENCOUNTER
Patient requests a release to return to work so that she can help her son pay bills since his wife is pregnant and unable to work right now. Please advise. Thank you. Last seen on 3/16/21. Plan was to refer to [facility] for MIDCABG. She reports that Dr Yaz Mcknight was the provider who recommended she be off work.

## 2021-07-07 NOTE — PROGRESS NOTES
[id number] \[facility]\"" [address]  [address]  [place] [address]  Dept: [phone]  Dept Fax: [phone]  Loc: [phone]    Visit Date: 7/7/2021    Laury Weber is a 52 y.o. female who presents todayfor:  Chief Complaint   Patient presents with    Cardiac Clearance     Epidural in neck with Neuro, TBD     Hypertension    Coronary Artery Disease     Cath done   100% LAD   Was supposed to have [place] to LAD  [facility]en [facility]  Plan was to send her to [facility] [place]   She never made it there  Does have lupus  Does have some chest pain at times  Some baseline dyspnea  Dyspnea on exertion   BP is stable  No dizziness  No syncope  Going for neck injections   Here for clearance     HPI:  HPI  Past Medical History:   Diagnosis Date    Anemia     CKD (chronic kidney disease), stage III ([address]     Dyslipidemia     Hematuria     Hyperlipidemia     Hypertension     Hyperuricemia     Hypothyroidism     Kidney disease     as a result from lupus    Lupus ([address]     Lupus nephritis ([address]     Proteinuria     Seizures ([address]     Systemic lupus erythematosus ([address]       Past Surgical History:   Procedure Laterality Date    CT BIOPSY ABDOMEN RETROPERITONEUM  1/15/2021    CT BIOPSY ABDOMEN RETROPERITONEUM 1/15/2021 [facility] CT SCAN    LUMBAR NERVE BLOCK N/A 3/11/2019    LESI at L4 performed by Jeromy Blackburn MD at [facility] N/A 5/21/2019    LESI at L3 #1 performed by Jeromy Blackburn MD at [address] Left 12/13/2018    LUMBAR TRANSFORAMINAL TFESI L5 LEFT #2, performed by Jeromy Blackburn MD at [address] Bilateral     LUMBAR FACET    AK INJ DX/THER AGNT PARAVERT FACET JOINT, LUMBAR/SAC, 2ND LEVEL Bilateral 5/7/2018    LUMBAR FACET  RAMBO Louis[email or web address] Bilateral performed by Jeromy Blackburn MD at [address] [facility] AA&/STRD [facility] EPI LUMBAR/SACRAL 1 LEVEL Left 2018    LUMBAR TRANSFORAMINAL TFESI L5 on the LEFT performed by Arcelia Bhatti MD at [address] [facility] DX/THER SBST INTRLMNR LMBR/SAC W/IMG GDN N/A 2018    LUMBAR INTER LAMINAR GIGI LESI @L4 performed by Arcelia Bhatti MD at [address]     Family History   Problem Relation Age of Onset   Carleen Laguerre Diabetes Mother     Heart Disease Mother     Thyroid Disease Mother     Parkinsonism Mother     Diabetes Father     Lupus Maternal Cousin      Social History     Tobacco Use    Smoking status: Former Smoker     Packs/day: 0.25     Types: Cigarettes     Start date:      Quit date:      Years since quittin.5    Smokeless tobacco: Never Used    Tobacco comment: 1 a day   Substance Use Topics    Alcohol use: Yes     Comment: occasionally, couple times a year      Current Outpatient Medications   Medication Sig Dispense Refill    HYDROcodone-acetaminophen (NORCO) 5-325 MG per tablet Take 1 tablet by mouth every 8 hours as needed for Pain for up to 30 days.  90 tablet 0    predniSONE (DELTASONE) 5 MG tablet Take 1.5 tablets by mouth daily 45 tablet 2    ondansetron (ZOFRAN) 4 MG tablet Take 1 tablet by mouth 3 times daily as needed for Nausea or Vomiting 30 tablet 0    ketorolac (TORADOL) 10 MG tablet Take 1 tablet by mouth every 6 hours as needed for Pain 20 tablet 0    hydroxychloroquine (PLAQUENIL) 200 MG tablet Take 1 tablet by mouth 2 times daily 60 tablet 5    levothyroxine (SYNTHROID) 112 MCG tablet take 1 tablet by mouth once daily 90 tablet 1    Calcium Carbonate-Vitamin D (OYSTER SHELL CALCIUM/D) 500-200 MG-UNIT TABS take 1 tablet by mouth once daily 90 tablet 3    loratadine (CLARITIN) 10 MG tablet Take 1 tablet by mouth daily 90 tablet 3    acetaminophen (APAP EXTRA STRENGTH) 500 MG tablet Take 2 tablets by mouth every 6 hours as needed for Pain 120 tablet 3    ferrous sulfate (IRON 325) 325 (65 Fe) MG tablet Take 1 tablet by mouth daily (with breakfast) 90 tablet 3    simvastatin (ZOCOR) 40 MG tablet take 1 tablet by mouth at bedtime 90 tablet 3    nitroGLYCERIN (NITROSTAT) 0.4 MG SL tablet Place 1 tablet under the tongue every 5 minutes as needed for Chest pain up to max of 3 total doses. If no relief after 1 dose, call 911. 25 tablet 0    omeprazole (PRILOSEC) 20 MG delayed release capsule Take 1 capsule by mouth Daily 30 capsule 3    mometasone (NASONEX) 50 MCG/ACT nasal spray 2 sprays by Nasal route daily (Patient taking differently: 2 sprays by Nasal route daily as needed ) 1 Inhaler 3    mycophenolate (CELLCEPT) 500 MG tablet Take 3 tablets by mouth every morning (before breakfast) AND 2 tablets every evening. 150 tablet 0    gabapentin (NEURONTIN) 400 MG capsule Take 1 capsule by mouth 2 times daily for 90 days. 180 capsule 1    fluticasone (FLONASE) 50 MCG/ACT nasal spray 2 sprays by Nasal route daily for 10 days 1 Bottle 0     No current facility-administered medications for this visit.      Allergies   Allergen Reactions    Codeine Hives and Nausea And Vomiting     Health Maintenance   Topic Date Due    Diabetic foot exam  Never done    Diabetic retinal exam  Never done    COVID-19 Vaccine (1) Never done    Diabetic microalbuminuria test  Never done    Hepatitis B vaccine (1 of 3 - Risk 3-dose series) Never done    Colon cancer screen colonoscopy  Never done    Lipid screen  10/22/2020    TSH testing  12/10/2020    A1C test (Diabetic or Prediabetic)  09/01/2021    Flu vaccine (1) 09/01/2021    Cervical cancer screen  04/29/2022    Potassium monitoring  06/28/2022    Creatinine monitoring  06/28/2022    DTaP/Tdap/Td vaccine (3 - Td or Tdap) 08/30/2030    Pneumococcal 0-64 years Vaccine (2 of 2 - PPSV23) 09/30/2037    Hepatitis C screen  Completed    HIV screen  Addressed    Hepatitis A vaccine  Aged Out    Hib vaccine  Aged Out    Meningococcal (ACWY) vaccine  Aged Out Subjective:  Review of Systems  General:   No fever, no chills, some fatigue or weight loss  Pulmonary:    some dyspnea, no wheezing  Cardiac:    Does have recent chest pain,   GI:     No nausea or vomiting, no abdominal pain  Neuro:     No dizziness or light headedness,   Musculoskeletal:  No recent active issues  Extremities:   No edema, no obvious claudication       Objective:  Physical Exam  /64   Pulse 82   Ht 5' 6\" (1.676 m)   Wt 150 lb (68 kg)   BMI 24.21 kg/m²   General:   Well developed, well nourished  Lungs:    Clear to auscultation  Heart:    Normal S1 S2, Slight murmur. no rubs, no gallops  Abdomen:   Soft, non tender, no organomegalies, positive bowel sounds  Extremities:   No edema, no cyanosis, good peripheral pulses  Neurological:   Awake, alert, oriented. No obvious focal deficits  Musculoskelatal:  No obvious deformities    Assessment:      Diagnosis Orders   1. Coronary artery disease involving native coronary artery of native heart without angina pectoris     2. Ischemic cardiomyopathy     as above  Higher risk patient  She is not sure she wants to have CABG now  She understands the risk well      Plan:  No follow-ups on file. Add low dose beta blockers  Monitor the BP response to it  Discussed CABG and all what it entails  She is to think options  Clear for injection other wise  Continue risk factor modification and medical management  Thank you for allowing me to participate in the care of your patient. Please don't hesitate to contact me regarding any further issues related to the patient care    Orders Placed:  No orders of the defined types were placed in this encounter. Medications Prescribed:  No orders of the defined types were placed in this encounter. Discussed use, benefit, and side effects of prescribed medications. All patient questions answered. Pt voicedunderstanding. Instructed to continue current medications, diet and exercise.  Continue risk factor

## 2021-07-08 ENCOUNTER — OFFICE VISIT (OUTPATIENT)
Dept: RHEUMATOLOGY | Age: 51
End: 2021-07-08
Payer: COMMERCIAL

## 2021-07-08 ENCOUNTER — NURSE ONLY (OUTPATIENT)
Dept: LAB | Age: 51
End: 2021-07-08

## 2021-07-08 VITALS
OXYGEN SATURATION: 98 % | BODY MASS INDEX: 24.09 KG/M2 | HEART RATE: 76 BPM | DIASTOLIC BLOOD PRESSURE: 70 MMHG | HEIGHT: 66 IN | SYSTOLIC BLOOD PRESSURE: 110 MMHG | WEIGHT: 149.91 LBS

## 2021-07-08 DIAGNOSIS — M32.14 OTHER SYSTEMIC LUPUS ERYTHEMATOSUS WITH GLOMERULAR DISEASE (HCC): ICD-10-CM

## 2021-07-08 DIAGNOSIS — Z87.39 H/O LUPUS NEPHRITIS: Primary | ICD-10-CM

## 2021-07-08 DIAGNOSIS — Z87.39 H/O LUPUS NEPHRITIS: ICD-10-CM

## 2021-07-08 DIAGNOSIS — I31.39 PERICARDIAL EFFUSION: ICD-10-CM

## 2021-07-08 DIAGNOSIS — Z51.81 MEDICATION MONITORING ENCOUNTER: ICD-10-CM

## 2021-07-08 DIAGNOSIS — N18.9 CHRONIC KIDNEY DISEASE, UNSPECIFIED CKD STAGE: ICD-10-CM

## 2021-07-08 PROCEDURE — G8420 CALC BMI NORM PARAMETERS: HCPCS | Performed by: INTERNAL MEDICINE

## 2021-07-08 PROCEDURE — 99214 OFFICE O/P EST MOD 30 MIN: CPT | Performed by: INTERNAL MEDICINE

## 2021-07-08 PROCEDURE — G8427 DOCREV CUR MEDS BY ELIG CLIN: HCPCS | Performed by: INTERNAL MEDICINE

## 2021-07-08 PROCEDURE — 1036F TOBACCO NON-USER: CPT | Performed by: INTERNAL MEDICINE

## 2021-07-08 RX ORDER — MYCOPHENOLATE MOFETIL 500 MG/1
TABLET ORAL
Qty: 120 TABLET | Refills: 0 | Status: SHIPPED | OUTPATIENT
Start: 2021-07-08 | End: 2022-01-19 | Stop reason: SDUPTHER

## 2021-07-08 RX ORDER — PREDNISONE 1 MG/1
7.5 TABLET ORAL DAILY
Qty: 45 TABLET | Refills: 2 | Status: SHIPPED | OUTPATIENT
Start: 2021-07-08 | End: 2022-01-13

## 2021-07-08 ASSESSMENT — ENCOUNTER SYMPTOMS
TROUBLE SWALLOWING: 0
EYE PAIN: 0
SHORTNESS OF BREATH: 1
EYE ITCHING: 0
ABDOMINAL PAIN: 0
DIARRHEA: 0
CONSTIPATION: 0
BACK PAIN: 1
NAUSEA: 0
COUGH: 0
COLOR CHANGE: 0

## 2021-07-08 NOTE — PROGRESS NOTES
[facility] RHEUMATOLOGY FOLLOW UP NOTE       Date Of Service: 7/8/2021  Provider: Char Weinberg DO    Name: Laury Weber   MRN: [id number]    Chief Complaint(s)     Chief Complaint   Patient presents with    Follow-up     2 MONTHS SLE         History of Present Illness (HPI)     Laury Weber  is a(n)50 y.o. female with a hx of systemic lupus with hiistory of class IV lupus nephritis, CKD stage III, chronic low back pain w/ radiculopathy, lumbar stenosis, h/o seizures here for the f/u evaluation of systemic lupus       Systemic lupus -    - Mycophenolate decreased to 1000mg twice daily given the recent rise in creatinine.    - active right nasal sores. Arthralgia/arthritis - right arm, wrists, right legs. Constant  throbbing/tingling pain in the right hands, wrist region. pain from the right knee to the right foot intermittently occurring. Reported intermittent swelling of the right dorsla wrist/hands. Timing: mornings and evenings Aggravating factors: weather, back: prolonged bending, forward flexion. Right hand and arm: increases use Alleviating factors: norco, hot back, gabapentin. AM stiffness 20-30 min. Reported swelling right wrist, and dependent swelling of the ankles. Occasional chest pain with walking up stairs, improved with medications / NTG      REVIEW OF SYSTEMS: (ROS)    Review of Systems   Constitutional: Positive for appetite change. Negative for fatigue, fever and unexpected weight change. HENT: Negative for congestion and trouble swallowing.         + Hair loss, + dry mouth   Eyes: Negative for pain and itching. +dry eyes      Respiratory: Positive for shortness of breath. Negative for cough. Cardiovascular: Positive for chest pain. Negative for leg swelling. Gastrointestinal: Negative for abdominal pain, constipation, diarrhea and nausea. Decreased appetite   Endocrine: Negative for cold intolerance and heat intolerance.    Genitourinary: Negative for difficulty urinating, frequency and urgency. Musculoskeletal: Positive for arthralgias, back pain, myalgias and neck pain. Negative for joint swelling. Skin: Negative for color change and rash. Neurological: Positive for numbness and headaches. Negative for dizziness and weakness. Psychiatric/Behavioral: Positive for sleep disturbance. The patient is not nervous/anxious.         PAST MEDICAL HISTORY      Past Medical History:   Diagnosis Date    Anemia     CKD (chronic kidney disease), stage III ([facility]     Dyslipidemia     Hematuria     Hyperlipidemia     Hypertension     Hyperuricemia     Hypothyroidism     Kidney disease     as a result from lupus    Lupus ([facility]     Lupus nephritis ([facility]     Proteinuria     Seizures ([facility]     Systemic lupus erythematosus ([facility]        PAST SURGICAL HISTORY      Past Surgical History:   Procedure Laterality Date    CT BIOPSY ABDOMEN RETROPERITONEUM  1/15/2021    CT BIOPSY ABDOMEN RETROPERITONEUM 1/15/2021 [facility] CT SCAN    LUMBAR NERVE BLOCK N/A 3/11/2019    LESI at L4 performed by Omar Sal MD at [facility] [place] N/A 5/21/2019    LESI at L3 #1 performed by Omar Sal MD at [address] [facility] Left 12/13/2018    LUMBAR TRANSFORAMINAL TFESI L5 LEFT #2, performed by Omar Sal MD at [address] Bilateral     LUMBAR FACET    ID INJ DX/THER AGNT PARAVERT FACET JOINT, LUMBAR/SAC, 2ND LEVEL Bilateral 5/7/2018    LUMBAR FACET  [email or web address] Bilateral performed by Omar Sal MD at [address] [place] [address] AA&/STRD TFRML EPI LUMBAR/SACRAL 1 LEVEL Left 9/25/2018    LUMBAR TRANSFORAMINAL TFESI L5 on the LEFT performed by Omar Sal MD at [address] [facility] DX/THER SBST INTRLMNR LMBR/SAC W/IMG GDN N/A 7/19/2018    LUMBAR INTER LAMINAR GIGI LESI @L4 performed by Omar Sal MD at [address] [facility] FAMILY HISTORY      Family History   Problem Relation Age of Onset    Diabetes Mother     Heart Disease Mother     Thyroid Disease Mother     Parkinsonism Mother     Diabetes Father     Lupus Maternal Cousin        SOCIAL HISTORY      Social History     Tobacco History     Smoking Status  Former Smoker Smoking Tobacco Type  Cigarettes    Smokeless Tobacco Use  Never Used    Tobacco Comment  1 a day          Alcohol History     Alcohol Use Status  Yes Drinks/Week  0 Standard drinks or equivalent per week Amount  0.0 standard drinks of alcohol/wk Comment  occasionally, couple times a year          Drug Use     Drug Use Status  No          Sexual Activity     Sexually Active  Not Asked                ALLERGIES     Allergies   Allergen Reactions    Codeine Hives and Nausea And Vomiting       CURRENT MEDICATIONS      Current Outpatient Medications   Medication Sig Dispense Refill    HYDROcodone-acetaminophen (NORCO) 5-325 MG per tablet Take 1 tablet by mouth every 8 hours as needed for Pain for up to 30 days.  90 tablet 0    predniSONE (DELTASONE) 5 MG tablet Take 1.5 tablets by mouth daily 45 tablet 2    ondansetron (ZOFRAN) 4 MG tablet Take 1 tablet by mouth 3 times daily as needed for Nausea or Vomiting 30 tablet 0    ketorolac (TORADOL) 10 MG tablet Take 1 tablet by mouth every 6 hours as needed for Pain 20 tablet 0    hydroxychloroquine (PLAQUENIL) 200 MG tablet Take 1 tablet by mouth 2 times daily 60 tablet 5    levothyroxine (SYNTHROID) 112 MCG tablet take 1 tablet by mouth once daily 90 tablet 1    Calcium Carbonate-Vitamin D (OYSTER SHELL CALCIUM/D) 500-200 MG-UNIT TABS take 1 tablet by mouth once daily 90 tablet 3    loratadine (CLARITIN) 10 MG tablet Take 1 tablet by mouth daily 90 tablet 3    acetaminophen (APAP EXTRA STRENGTH) 500 MG tablet Take 2 tablets by mouth every 6 hours as needed for Pain 120 tablet 3    ferrous sulfate (IRON 325) 325 (65 Fe) MG tablet Take 1 tablet by mouth daily (with breakfast) 90 tablet 3    simvastatin (ZOCOR) 40 MG tablet take 1 tablet by mouth at bedtime 90 tablet 3    nitroGLYCERIN (NITROSTAT) 0.4 MG SL tablet Place 1 tablet under the tongue every 5 minutes as needed for Chest pain up to max of 3 total doses. If no relief after 1 dose, call 911. 25 tablet 0    omeprazole (PRILOSEC) 20 MG delayed release capsule Take 1 capsule by mouth Daily 30 capsule 3    mometasone (NASONEX) 50 MCG/ACT nasal spray 2 sprays by Nasal route daily (Patient taking differently: 2 sprays by Nasal route daily as needed ) 1 Inhaler 3    mycophenolate (CELLCEPT) 500 MG tablet Take 3 tablets by mouth every morning (before breakfast) AND 2 tablets every evening. 150 tablet 0    gabapentin (NEURONTIN) 400 MG capsule Take 1 capsule by mouth 2 times daily for 90 days. 180 capsule 1    fluticasone (FLONASE) 50 MCG/ACT nasal spray 2 sprays by Nasal route daily for 10 days 1 Bottle 0     No current facility-administered medications for this visit. PHYSICAL EXAMINATION / OBJECTIVE   Objective:  /70 (Site: Left Upper Arm, Position: Sitting, Cuff Size: Medium Adult)   Pulse 76   Ht 5' 5.98\" (1.676 m)   Wt 149 lb 14.6 oz (68 kg)   SpO2 98%   BMI 24.21 kg/m²     Physical Exam  Vitals reviewed. Constitutional:       General: She is not in acute distress. Appearance: She is well-developed. She is not diaphoretic. HENT:      Head: Normocephalic and atraumatic. Nose: Nose normal. No congestion. Comments: Scabbed nasal sores w/ crusted blood bilat  Eyes:      General: No scleral icterus. Conjunctiva/sclera: Conjunctivae normal.   Cardiovascular:      Rate and Rhythm: Normal rate and regular rhythm. Heart sounds: Normal heart sounds. Pulmonary:      Effort: Pulmonary effort is normal. No respiratory distress. Breath sounds: Normal breath sounds. No wheezing or rales. Abdominal:      Palpations: Abdomen is soft. Tenderness:  There is no abdominal tenderness. Musculoskeletal:      Cervical back: Normal range of motion and neck supple. Lymphadenopathy:      Cervical: No cervical adenopathy. Skin:     General: Skin is warm and dry. Findings: No rash. Neurological:      Mental Status: She is alert and oriented to person, place, and time. Deep Tendon Reflexes: Reflexes are normal and symmetric. Psychiatric:         Behavior: Behavior normal.         Thought Content: Thought content normal.        Musculoskeletal:     Normal gait. Strength 5/5 in biceps, triceps, hips, knees,      Upper extremities:    SHOULDERS , ELBOWS nontender, no swelling  WRISTS tender right   HANDS/FINGERS - tender mcps right 1-5, PIPs right 2,3 . Mild swelling of right 2,3 mcp. Lower extremities:  HIPS nontender  KNEES tender right, warmth left knee. ANKLES nontender, no swelling   FEET - tender right MTP compression.        RAPID 3:   7/8/2021 --- RAPID 3:3.3+7+6.5= 16.8    Remission: <3  Low Disease Activity: <6  Moderate Disease Activity: >=6 and <=12  High Disease Activity: >12     LABS    CBC  Lab Results   Component Value Date    WBC 4.9 07/07/2021    RBC 3.97 07/07/2021    HGB 10.8 07/07/2021    HCT 37.1 07/07/2021    MCV 93.5 07/07/2021    MCH 27.2 07/07/2021    MCHC 29.1 07/07/2021    RDW 18.6 03/24/2020     07/07/2021       CMP  Lab Results   Component Value Date    CALCIUM 10.0 07/07/2021    LABALBU 4.5 07/07/2021    PROT 7.3 07/07/2021     07/07/2021    K 4.3 07/07/2021    K 3.9 02/21/2021    CO2 23 07/07/2021     07/07/2021    BUN 18 07/07/2021    CREATININE 1.4 07/07/2021    ALKPHOS 64 07/07/2021    ALT 11 07/07/2021    AST 20 07/07/2021       HgBA1c: No components found for: HGBA1C    Lab Results   Component Value Date    VITD25 15 08/12/2020         Lab Results   Component Value Date    ANASCRN Detected (A) 06/17/2019     Lab Results   Component Value Date    SSA SEE BELOW 06/17/2019     Lab Results   Component Value Date    SSB 0 06/17/2019     Lab Results   Component Value Date    ANTI-SMITH 4 06/17/2019     Lab Results   Component Value Date    DSDNAAB SEE BELOW 06/17/2019      No results found for: ANTIRNP  Lab Results   Component Value Date    C3 100 06/28/2021    C4 19 06/28/2021     Lab Results   Component Value Date    CCPAB 8 06/17/2019     Lab Results   Component Value Date    RF 11 06/17/2019       No components found for: CANCASCRN, APANCASCRN  Lab Results   Component Value Date    SEDRATE 14 07/07/2021     Lab Results   Component Value Date    CRP < 0.30 07/07/2021       RADIOLOGY:         ASSESSMENT/PLAN    Assessment   Plan     SLE w/ h/o class IV LN - ACTIVE --persistent proteinuria, nasals sores, joint pains  - diagnosed in 2001, s/p cytoxan 2007. C4 was 64, 4+ proteinuria, but 24 urine w/ only 1.1 g. 20-29 RBC/HPF. + BELLE, dsDNA, ANCA (+p-ANCA, neg MPO, WV-3), reported inflammatory arthritis. Exam w/ joint swelling, oral/nasal sores. Prior tx: azathioprin ( imuran )100 mg qAM and 50 mg QHS b/c LFT elevation. Mycophenolate 2500mg daily w/ increased creatinine (June 2021)      - plaquenil 200mg twice daily    - Mycophenolate 1000mg twice daily (jan 28, 2021)    - prednisone 7.5 mg daily   - discussed pursuit of benlysta vs voclosporin given the continue lupus activity. - patien okay with starting benlysta subcu weekly injection if we can get improved.  -sledai of 22 on today's evaluation. H/o lupus nephritis - Proteinura - repeat renal biopsy with no abnormalities    CAD ---  w/ exertion and improved with rest - EKG normal, s/p cath  W/ 100% LAD   - referred to [facility] for possible CABG     Cervical radiculopathy   - bilat tinel at wrists and elbows.  Intermittent numbness/tinlging  - EMG/NCV (Dec 2019) w/ cervical radiculopathy    - gabapentin 400 mg TWICE DAILY    - awaiting cervical epidural injection     Low back pain w/ radiculopathy - gabapentin 400 mg BID    Medication monitoring   - CBC, CMP, sed rate, CRP, u/a q 8 weeks. - plaquenil eye exam - last in 2020      No follow-ups on file. Electronically signed by Brooke Elizalde DO on 7/8/2021 at 1:39 PM    New Prescriptions    No medications on file       Thank you for allowing me to participate in the care of this patient. Please call if there are any questions.

## 2021-07-09 LAB
C3 COMPLEMENT: 122 MG/DL (ref 90–180)
COMPLEMENT C4: 24 MG/DL (ref 10–40)

## 2021-07-11 LAB
QUANTI TB GOLD PLUS: NORMAL
QUANTI TB1 MINUS NIL: 0 IU/ML (ref 0–0.34)
QUANTI TB2 MINUS NIL: 0 IU/ML (ref 0–0.34)
QUANTIFERON MITOGEN MINUS NIL: 0.02 IU/ML
QUANTIFERON NIL: 0.01 IU/ML

## 2021-07-12 NOTE — TELEPHONE ENCOUNTER
Jose Harding I explained to her that Rosa Savage referred her to [facility],  And he can not recommend that she go back to work until she has been revascularized. Someone in the back ground stated \"F it we dont need his approval then and hang up\"   Patient hung up.

## 2021-07-14 ENCOUNTER — TELEPHONE (OUTPATIENT)
Dept: FAMILY MEDICINE CLINIC | Age: 51
End: 2021-07-14

## 2021-07-14 NOTE — TELEPHONE ENCOUNTER
Left detailed msg requesting pt to call back at earliest convenience to schedule a nurse visit for PPD.

## 2021-07-14 NOTE — TELEPHONE ENCOUNTER
----- Message from Amy Kwon, MACKENZIE - CNP sent at 7/13/2021 12:18 PM EDT -----  Pt need to come in for a nurse visit for a PPD test due to questionable tb test through Dr. Armand Saavedra, Thanks

## 2021-07-19 NOTE — TELEPHONE ENCOUNTER
Number still rings busy. Sent my chart message requesting pt to call our office at her earliest convenience. Closing encounter since we attempted to call pt.

## 2021-07-20 DIAGNOSIS — G89.4 CHRONIC PAIN SYNDROME: ICD-10-CM

## 2021-07-20 RX ORDER — HYDROCODONE BITARTRATE AND ACETAMINOPHEN 5; 325 MG/1; MG/1
1 TABLET ORAL EVERY 8 HOURS PRN
Qty: 90 TABLET | Refills: 0 | Status: SHIPPED | OUTPATIENT
Start: 2021-07-22 | End: 2021-08-19 | Stop reason: SDUPTHER

## 2021-07-20 NOTE — TELEPHONE ENCOUNTER
OARRS reviewed. UDS: + for  Hydrocodone consistent. Last seen: 6/28/2021.  Follow-up:   Future Appointments   Date Time Provider Justus Julia   8/5/2021  3:00 PM Brady Dennis MD N [facility]. [facility]   8/9/2021  2:20 PM MACKENZIE Sky - CNP N [facility] - [facility]   1/12/2022 12:15 PM Ritu Vale MD N [id number] [facility]

## 2021-07-22 ENCOUNTER — VIRTUAL VISIT (OUTPATIENT)
Dept: FAMILY MEDICINE CLINIC | Age: 51
End: 2021-07-22
Payer: COMMERCIAL

## 2021-07-22 ENCOUNTER — TELEPHONE (OUTPATIENT)
Dept: FAMILY MEDICINE CLINIC | Age: 51
End: 2021-07-22

## 2021-07-22 DIAGNOSIS — M79.641 RIGHT HAND PAIN: ICD-10-CM

## 2021-07-22 DIAGNOSIS — B85.0 HEAD LICE: Primary | ICD-10-CM

## 2021-07-22 DIAGNOSIS — Z13.31 DEPRESSION SCREEN: ICD-10-CM

## 2021-07-22 DIAGNOSIS — E78.2 MIXED HYPERLIPIDEMIA: ICD-10-CM

## 2021-07-22 DIAGNOSIS — E03.9 ACQUIRED HYPOTHYROIDISM: ICD-10-CM

## 2021-07-22 DIAGNOSIS — E11.9 TYPE 2 DIABETES MELLITUS WITHOUT COMPLICATION, WITHOUT LONG-TERM CURRENT USE OF INSULIN (HCC): ICD-10-CM

## 2021-07-22 DIAGNOSIS — M54.40 BACK PAIN OF LUMBAR REGION WITH SCIATICA: ICD-10-CM

## 2021-07-22 PROCEDURE — G8420 CALC BMI NORM PARAMETERS: HCPCS | Performed by: NURSE PRACTITIONER

## 2021-07-22 PROCEDURE — 3046F HEMOGLOBIN A1C LEVEL >9.0%: CPT | Performed by: NURSE PRACTITIONER

## 2021-07-22 PROCEDURE — G8427 DOCREV CUR MEDS BY ELIG CLIN: HCPCS | Performed by: NURSE PRACTITIONER

## 2021-07-22 PROCEDURE — 96160 PT-FOCUSED HLTH RISK ASSMT: CPT | Performed by: NURSE PRACTITIONER

## 2021-07-22 PROCEDURE — 2022F DILAT RTA XM EVC RTNOPTHY: CPT | Performed by: NURSE PRACTITIONER

## 2021-07-22 PROCEDURE — 1036F TOBACCO NON-USER: CPT | Performed by: NURSE PRACTITIONER

## 2021-07-22 PROCEDURE — 99214 OFFICE O/P EST MOD 30 MIN: CPT | Performed by: NURSE PRACTITIONER

## 2021-07-22 RX ORDER — LEVOTHYROXINE SODIUM 112 UG/1
TABLET ORAL
Qty: 90 TABLET | Refills: 1 | Status: SHIPPED | OUTPATIENT
Start: 2021-07-22 | End: 2022-07-06 | Stop reason: SDUPTHER

## 2021-07-22 RX ORDER — GABAPENTIN 400 MG/1
400 CAPSULE ORAL 2 TIMES DAILY
Qty: 180 CAPSULE | Refills: 1 | Status: SHIPPED | OUTPATIENT
Start: 2021-07-22 | End: 2021-10-12 | Stop reason: SDUPTHER

## 2021-07-22 RX ORDER — PERMETHRIN 50 MG/G
CREAM TOPICAL
Qty: 3 TUBE | Refills: 1 | Status: SHIPPED | OUTPATIENT
Start: 2021-07-22

## 2021-07-22 ASSESSMENT — PATIENT HEALTH QUESTIONNAIRE - PHQ9
SUM OF ALL RESPONSES TO PHQ9 QUESTIONS 1 & 2: 0
SUM OF ALL RESPONSES TO PHQ QUESTIONS 1-9: 0
SUM OF ALL RESPONSES TO PHQ QUESTIONS 1-9: 0
1. LITTLE INTEREST OR PLEASURE IN DOING THINGS: 0
2. FEELING DOWN, DEPRESSED OR HOPELESS: 0
SUM OF ALL RESPONSES TO PHQ QUESTIONS 1-9: 0

## 2021-07-22 ASSESSMENT — ENCOUNTER SYMPTOMS
GASTROINTESTINAL NEGATIVE: 1
BACK PAIN: 1
RESPIRATORY NEGATIVE: 1

## 2021-07-22 NOTE — PROGRESS NOTES
2021    TELEHEALTH EVALUATION -- Audio/Visual (During TMAMS-21 public health emergency)    HPI:  Visit conducted with pt at home and provider Maddie Delgado CNP in office   Jeffreyadelfo Wright (:  1970) has requested an audio/video evaluation for the following concern(s):    Pt calls in erin with complaints of head lice, statse she went to the beauty shop and they saw them. 5 other adults and 1 child lives in the house and they all have them. The child is 21 months old advised they will need to contact their provider for meds. Hypothyroidism    HPI:  Currently treated for Hypothyroidism? Yes  Fatigue? No  Recent change in weight? No  Cold/Heat intolerance? No  Diarrhea/Constipation? No  Diaphoresis? No  Anxiety? No  Palpitations? No   Hair Loss? No    Pt takes gabapentin for pain control needs refills. It is working well    Also see Rheumatology for RA. Takes cholesterol meds, needs lipid panel     Lab Results   Component Value Date     2021    K 4.3 2021    K 3.9 2021     2021    CO2 23 2021    BUN 18 2021    CREATININE 1.4 2021    GLUCOSE 86 2021    GLUCOSE 111 2020    CALCIUM 10.0 2021      Lab Results   Component Value Date    TSH 3.590 12/10/2019    T4FREE 0.59 (L) 10/22/2019           Review of Systems   Constitutional: Negative for chills, fatigue and fever. Respiratory: Negative. Cardiovascular: Negative. Gastrointestinal: Negative. Genitourinary: Negative for difficulty urinating and dysuria. Musculoskeletal: Positive for arthralgias, back pain and myalgias. Skin:        Scalp itching, lice found at [employer]    Neurological: Negative for dizziness, facial asymmetry and headaches. Prior to Visit Medications    Medication Sig Taking?  Authorizing Provider   permethrin (ELIMITE) 5 % cream Apply to wet hair and leave on 10 minutes and then rinse, may repeat on day 9 Yes MACKENZIE Lamar - CNP   gabapentin (NEURONTIN) 400 MG capsule Take 1 capsule by mouth 2 times daily for 90 days. Yes MACKENZIE Gore CNP   levothyroxine (SYNTHROID) 112 MCG tablet take 1 tablet by mouth once daily Yes MACKENZIE Gore CNP   HYDROcodone-acetaminophen (NORCO) 5-325 MG per tablet Take 1 tablet by mouth every 8 hours as needed for Pain for up to 30 days. MACKENZIE Moreno CNP   predniSONE (DELTASONE) 5 MG tablet Take 1.5 tablets by mouth daily  Jareth Rodriguez DO   mycophenolate (CELLCEPT) 500 MG tablet Take 2 tablets by mouth every morning (before breakfast) AND 2 tablets every evening. Jareth Rodriguez DO   ondansetron (ZOFRAN) 4 MG tablet Take 1 tablet by mouth 3 times daily as needed for Nausea or Vomiting  MACKENZIE Gore CNP   ketorolac (TORADOL) 10 MG tablet Take 1 tablet by mouth every 6 hours as needed for Pain  MACKENZIE Gore CNP   hydroxychloroquine (PLAQUENIL) 200 MG tablet Take 1 tablet by mouth 2 times daily  Jareth Rodriguez DO   Calcium Carbonate-Vitamin D (OYSTER SHELL CALCIUM/D) 500-200 MG-UNIT TABS take 1 tablet by mouth once daily  MACKENZIE Miller CNP   loratadine (CLARITIN) 10 MG tablet Take 1 tablet by mouth daily  MACKENZIE Miller CNP   acetaminophen (APAP EXTRA STRENGTH) 500 MG tablet Take 2 tablets by mouth every 6 hours as needed for Pain  MACKENZIE Gore CNP   ferrous sulfate (IRON 325) 325 (65 Fe) MG tablet Take 1 tablet by mouth daily (with breakfast)  MACKENZIE Gore CNP   simvastatin (ZOCOR) 40 MG tablet take 1 tablet by mouth at bedtime  MACKENZIE Gore CNP   nitroGLYCERIN (NITROSTAT) 0.4 MG SL tablet Place 1 tablet under the tongue every 5 minutes as needed for Chest pain up to max of 3 total doses. If no relief after 1 dose, call 911.   Alyssa Rodriguez PA-C   omeprazole (PRILOSEC) 20 MG delayed release capsule Take 1 capsule by mouth Daily  Gene Willis DO       Social History     Tobacco Use    Smoking status: Former Smoker     Packs/day: 0.25     Types: Cigarettes     Start date:      Quit date:      Years since quittin.5    Smokeless tobacco: Never Used    Tobacco comment: 1 a day   Vaping Use    Vaping Use: Never used   Substance Use Topics    Alcohol use: Yes     Comment: occasionally, couple times a year    Drug use: No        Allergies   Allergen Reactions    Codeine Hives and Nausea And Vomiting   ,   Past Medical History:   Diagnosis Date    Anemia     CKD (chronic kidney disease), stage III ([address]     Dyslipidemia     Hematuria     Hyperlipidemia     Hypertension     Hyperuricemia     Hypothyroidism     Kidney disease     as a result from lupus    [facility]     Lupus nephritis ([facility]     Proteinuria     Seizures ([facility]     Systemic lupus erythematosus ([facility]    ,   Past Surgical History:   Procedure Laterality Date    CT BIOPSY ABDOMEN RETROPERITONEUM  1/15/2021    CT BIOPSY ABDOMEN RETROPERITONEUM 1/15/2021 [facility] CT SCAN    LUMBAR NERVE BLOCK N/A 3/11/2019    LESI at L4 performed by Kim Cash MD at [facility] N/A 2019    LESI at L3 #1 performed by Kim Cash MD at [facility] Left 2018    LUMBAR TRANSFORAMINAL TFESI L5 LEFT #2, performed by Kim Cash MD at [address] Bilateral     LUMBAR FACET    MN INJ DX/THER AGNT PARAVERT FACET JOINT, LUMBAR/SAC, 2ND LEVEL Bilateral 2018    LUMBAR FACET  [email or web address] Bilateral performed by Kim Cash MD at [facility] AA&/STRD TFRML EPI LUMBAR/SACRAL 1 LEVEL Left 2018    LUMBAR TRANSFORAMINAL TFESI L5 on the LEFT performed by Kim Cash MD at [facility] DX/THER SBST INTRLMNR LMBR/SAC W/IMG GDN N/A 2018    LUMBAR INTER LAMINAR GIGI LESI @L4 performed by Kim Cash MD at [address] [place] [address]   ,   Social History Tobacco Use    Smoking status: Former Smoker     Packs/day: 0.25     Types: Cigarettes     Start date:      Quit date:      Years since quittin.5    Smokeless tobacco: Never Used    Tobacco comment: 1 a day   Vaping Use    Vaping Use: Never used   Substance Use Topics    Alcohol use: Yes     Comment: occasionally, couple times a year    Drug use: No   ,   Family History   Problem Relation Age of Onset    Diabetes Mother     Heart Disease Mother     Thyroid Disease Mother     Parkinsonism Mother     Diabetes Father     Lupus Maternal Cousin    ,   Immunization History   Administered Date(s) Administered    Influenza 10/11/2012    Influenza Virus Vaccine 2015    Influenza, Mariella Herb, IM, (6 mo and older Fluzone, Flulaval, Fluarix and 3 yrs and older Afluria) 2016    Influenza, Quadv, IM, PF (6 mo and older Fluzone, Flulaval, Fluarix, and 3 yrs and older Afluria) 2015, 2017, 2018, 10/22/2019, 2021    Pneumococcal Polysaccharide ([id number]) 2017    Td, (Adult) Not Adsorbed 2020    Tdap (Boostrix, Adacel) 2013   ,   Health Maintenance   Topic Date Due    Diabetic foot exam  Never done    Diabetic retinal exam  Never done    COVID-19 Vaccine (1) Never done    Diabetic microalbuminuria test  Never done    Hepatitis B vaccine (1 of 3 - Risk 3-dose series) Never done    Colon cancer screen colonoscopy  Never done    Pneumococcal 0-64 years Vaccine (2 of 4 - PCV13) 2018    Lipid screen  10/22/2020    TSH testing  12/10/2020    A1C test (Diabetic or Prediabetic)  2021    Flu vaccine (1) 2021    Cervical cancer screen  2022    Potassium monitoring  2022    Creatinine monitoring  2022    DTaP/Tdap/Td vaccine (3 - Td or Tdap) 2030    Hepatitis C screen  Completed    HIV screen  Addressed    Hepatitis A vaccine  Aged Out    Hib vaccine  Aged Out    Meningococcal (ACWY) vaccine  Aged Refill: 1    2. Type 2 diabetes mellitus without complication, without long-term current use of insulin (HCC)  Stable  No elevated sugars     3. Back pain of lumbar region with sciatica  controlled with gabapentin   - gabapentin (NEURONTIN) 400 MG capsule; Take 1 capsule by mouth 2 times daily for 90 days. Dispense: 180 capsule; Refill: 1    4. Right hand pain    - gabapentin (NEURONTIN) 400 MG capsule; Take 1 capsule by mouth 2 times daily for 90 days. Dispense: 180 capsule; Refill: 1    5. Acquired hypothyroidism    - levothyroxine (SYNTHROID) 112 MCG tablet; take 1 tablet by mouth once daily  Dispense: 90 tablet; Refill: 1  - TSH With Reflex Ft4; Future    6. Mixed hyperlipidemia  Low fat diet - Lipid Panel; Future      Return in about 3 months (around 10/22/2021) for med refill. Oscar Jesúswanda, was evaluated through a synchronous (real-time) audio-video encounter. The patient (or guardian if applicable) is aware that this is a billable service. Verbal consent to proceed has been obtained within the past 12 months. The visit was conducted pursuant to the emergency declaration under the [address] [place] [address], [address] [place] [address] authority and the [employer] and [employer] General Act. Patient identification was verified, and a caregiver was present when appropriate. The patient was located in a state where the provider was credentialed to provide care. Total time spent on this encounter: Not billed by time    --MACKENZIE Colon CNP on 7/22/2021 at 3:02 PM    An electronic signature was used to authenticate this note.

## 2021-08-04 ENCOUNTER — TELEPHONE (OUTPATIENT)
Dept: CARDIOLOGY CLINIC | Age: 51
End: 2021-08-04

## 2021-08-04 NOTE — TELEPHONE ENCOUNTER
Edgar Salvador MD 4 minutes ago (11:04 AM)     I need to go down there to do some testing on my heart before they can do surgery on me     LM for patient to call our office back to see exactly what she is needing.

## 2021-08-05 ENCOUNTER — OFFICE VISIT (OUTPATIENT)
Dept: NEPHROLOGY | Age: 51
End: 2021-08-05
Payer: COMMERCIAL

## 2021-08-05 VITALS
BODY MASS INDEX: 25.29 KG/M2 | TEMPERATURE: 98.1 F | HEART RATE: 66 BPM | WEIGHT: 156.6 LBS | OXYGEN SATURATION: 99 % | SYSTOLIC BLOOD PRESSURE: 116 MMHG | DIASTOLIC BLOOD PRESSURE: 74 MMHG

## 2021-08-05 DIAGNOSIS — R80.1 PERSISTENT PROTEINURIA: ICD-10-CM

## 2021-08-05 DIAGNOSIS — N18.32 STAGE 3B CHRONIC KIDNEY DISEASE (HCC): Primary | ICD-10-CM

## 2021-08-05 DIAGNOSIS — E87.1 HYPONATREMIA: ICD-10-CM

## 2021-08-05 DIAGNOSIS — M32.15 SYSTEMIC LUPUS ERYTHEMATOSUS WITH TUBULO-INTERSTITIAL NEPHROPATHY, UNSPECIFIED SLE TYPE (HCC): ICD-10-CM

## 2021-08-05 PROCEDURE — 1036F TOBACCO NON-USER: CPT | Performed by: INTERNAL MEDICINE

## 2021-08-05 PROCEDURE — G8427 DOCREV CUR MEDS BY ELIG CLIN: HCPCS | Performed by: INTERNAL MEDICINE

## 2021-08-05 PROCEDURE — 99213 OFFICE O/P EST LOW 20 MIN: CPT | Performed by: INTERNAL MEDICINE

## 2021-08-05 PROCEDURE — G8417 CALC BMI ABV UP PARAM F/U: HCPCS | Performed by: INTERNAL MEDICINE

## 2021-08-05 NOTE — PATIENT INSTRUCTIONS
Protein Sources in the Diet    Diary foods  Milk, buttermilk  Yogurt  Cheese, cottage cheese, parmesan, feta, American Swiss, Mozzarella, ricotta    Meats  Beef  Pork  Poultry  Fish    Meat Alternatives  Egg  Peanut butter  Dry beans, peas.  Lentils  Soy food, soy milk, tofu  Nuts and seeds (tree nuts, peanuts)    Other foods with protein  Bread  Cereals, Oatmeal  Grains (grits, flour, rice)  Starchy vegetables (Green peas, potatoes, winter squash, corn)  Popcorn  Crackers  Tortilla chips

## 2021-08-05 NOTE — PROGRESS NOTES
Renal Progress Note    Assessment and Plan:      Diagnosis Orders   1. Stage 3b chronic kidney disease ([address]     2. Systemic lupus erythematosus with tubulo-interstitial nephropathy, unspecified SLE type (HCC)     3. Persistent proteinuria     4. Hyponatremia       PLAN:  Lab result reviewed with the patient in [facility] together  She understood  She had no questions  Serum creatinine stable at 1.4 mg/dL  Urine protein creatinine ratio is slightly improved to 1100 mg from 1200 mg  Medications reviewed  No changes  I encouraged her to do the best she can with low protein diet  Goal urine protein is 1000 mg or less  I discussed that with her  New protein diet provided to her again today per her request  Return visit in 6 months with labs  I wished her good luck with the upcoming procedure in [facility] [employer] [facility]    Patient Active Problem List   Diagnosis    Hypothyroid    Systemic lupus erythematosus ([address]    Hematuria    Proteinuria    Anemia    CKD (chronic kidney disease), stage III ([address]    Dyslipidemia    Hyperuricemia    Lumbar radiculitis    Spinal stenosis of lumbar region without neurogenic claudication    Renal insufficiency    Type 2 diabetes mellitus without complication, without long-term current use of insulin ([address]           Subjective:   Chief complaint:  Chief Complaint   Patient presents with    Chronic Kidney Disease     Stage IIIb      HPI:This is a follow up visit for Zeeshan Mason who is here today for return appointment. See her for chronic kidney disease. She also has a nonnephrotic range proteinuria. She was seen about 6 months ago. Doing well since then. No complaint today. No new medications since I saw her. She does  see  bubbles in her urine sometime. No chest pain. No shortness of breath. No nausea or vomiting. No fever chills. No lupus related rash. She is supposed to have her heart unblock[place] [facility] [place] ROS:  Pertinent positives stated above in HPI.  All other systems were reviewed and were negative. Medications:     Current Outpatient Medications   Medication Sig Dispense Refill    permethrin (ELIMITE) 5 % cream Apply to wet hair and leave on 10 minutes and then rinse, may repeat on day 9 3 Tube 1    gabapentin (NEURONTIN) 400 MG capsule Take 1 capsule by mouth 2 times daily for 90 days. 180 capsule 1    levothyroxine (SYNTHROID) 112 MCG tablet take 1 tablet by mouth once daily 90 tablet 1    HYDROcodone-acetaminophen (NORCO) 5-325 MG per tablet Take 1 tablet by mouth every 8 hours as needed for Pain for up to 30 days. 90 tablet 0    predniSONE (DELTASONE) 5 MG tablet Take 1.5 tablets by mouth daily 45 tablet 2    mycophenolate (CELLCEPT) 500 MG tablet Take 2 tablets by mouth every morning (before breakfast) AND 2 tablets every evening. 120 tablet 0    ondansetron (ZOFRAN) 4 MG tablet Take 1 tablet by mouth 3 times daily as needed for Nausea or Vomiting 30 tablet 0    ketorolac (TORADOL) 10 MG tablet Take 1 tablet by mouth every 6 hours as needed for Pain 20 tablet 0    hydroxychloroquine (PLAQUENIL) 200 MG tablet Take 1 tablet by mouth 2 times daily 60 tablet 5    Calcium Carbonate-Vitamin D (OYSTER SHELL CALCIUM/D) 500-200 MG-UNIT TABS take 1 tablet by mouth once daily 90 tablet 3    loratadine (CLARITIN) 10 MG tablet Take 1 tablet by mouth daily 90 tablet 3    acetaminophen (APAP EXTRA STRENGTH) 500 MG tablet Take 2 tablets by mouth every 6 hours as needed for Pain 120 tablet 3    ferrous sulfate (IRON 325) 325 (65 Fe) MG tablet Take 1 tablet by mouth daily (with breakfast) 90 tablet 3    simvastatin (ZOCOR) 40 MG tablet take 1 tablet by mouth at bedtime 90 tablet 3    nitroGLYCERIN (NITROSTAT) 0.4 MG SL tablet Place 1 tablet under the tongue every 5 minutes as needed for Chest pain up to max of 3 total doses.  If no relief after 1 dose, call 911. 25 tablet 0    omeprazole (PRILOSEC) 20 MG delayed release capsule Take 1 capsule by mouth Daily 30 capsule 3     No current facility-administered medications for this visit.        Lab Results:    CBC:   Lab Results   Component Value Date    WBC 4.9 07/07/2021    HGB 10.8 (L) 07/07/2021    HCT 37.1 07/07/2021    MCV 93.5 07/07/2021     07/07/2021     BMP:    Lab Results   Component Value Date     07/07/2021     06/28/2021     05/03/2021    K 4.3 07/07/2021    K 4.1 06/28/2021    K 4.2 05/03/2021     07/07/2021     06/28/2021     05/03/2021    CO2 23 07/07/2021    CO2 26 06/28/2021    CO2 25 05/03/2021    BUN 18 07/07/2021    BUN 27 (H) 06/28/2021    BUN 23 (H) 05/03/2021    CREATININE 1.4 (H) 07/07/2021    CREATININE 1.9 (H) 06/28/2021    CREATININE 1.3 (H) 05/03/2021    GLUCOSE 86 07/07/2021    GLUCOSE 110 (H) 06/28/2021    GLUCOSE 115 (H) 05/03/2021      Hepatic:   Lab Results   Component Value Date    AST 20 07/07/2021    AST 25 06/28/2021    AST 19 05/03/2021    ALT 11 07/07/2021    ALT 12 06/28/2021    ALT 14 05/03/2021    BILITOT 0.2 (L) 07/07/2021    BILITOT <0.2 (L) 06/28/2021    BILITOT <0.2 (L) 05/03/2021    ALKPHOS 64 07/07/2021    ALKPHOS 56 06/28/2021    ALKPHOS 56 05/03/2021     BNP: No results found for: BNP  Lipids:   Lab Results   Component Value Date    CHOL 163 10/22/2019    HDL 36 10/22/2019     INR:   Lab Results   Component Value Date    INR 0.97 02/21/2021    INR 1.01 01/15/2021     URINE:   Lab Results   Component Value Date    PROTUR 129.7 07/07/2021     Lab Results   Component Value Date    NITRU NEGATIVE 07/07/2021    COLORU YELLOW 07/07/2021    PHUR 5.0 07/07/2021    LABCAST NONE SEEN 07/07/2021    LABCAST NONE SEEN 07/07/2021    WBCUA 0-2 07/07/2021    WBCUA 0-5 12/17/2019    RBCUA NONE SEEN 07/07/2021    MUCUS Present 12/17/2019    YEAST NONE SEEN 07/07/2021    BACTERIA NONE SEEN 07/07/2021    CLARITYU slightly cloudy 08/30/2017    SPECGRAV 1.015 07/07/2021    LEUKOCYTESUR NEGATIVE 07/07/2021    LEUKOCYTESUR TRACE 06/17/2019    UROBILINOGEN 0.2 07/07/2021    BILIRUBINUR NEGATIVE 07/07/2021    BILIRUBINUR small 08/30/2017    BILIRUBINUR Negative 10/18/2016    BLOODU NEGATIVE 07/07/2021    GLUCOSEU Negative 12/17/2019    KETUA NEGATIVE 07/07/2021      Microalbumen/Creatinine ratio:  No components found for: RUCREAT    Objective:   Vitals: /74 (Site: Left Upper Arm, Position: Sitting, Cuff Size: Large Adult)   Pulse 66   Temp 98.1 °F (36.7 °C)   Wt 156 lb 9.6 oz (71 kg)   SpO2 99%   BMI 25.29 kg/m²      Constitutional:  Alert, awake, no apparent distress  Skin:normal with no rash or any lesions  HEENT:Pupils are reactive . Throat is clear. Oral mucosa is moist.  Neck:supple with no adenopathy. Cardiovascular:  S1, S2 without murmur   Respiratory:  Clear to auscultation with no wheezes or rales  Abdomen: +bowel sound, soft, non tender and no bruit  Ext: NO LE edema  Musculoskeletal:Intact  Neuro:Alert, awake and oriented with no obvious focal deficit. Speech is normal.    Electronically signed by Gely Schuster MD on 8/5/2021 at 3:06 PM   **This report has been created using voice recognition software. It maycontain minor  errors which are inherent in voice recognition technology. **

## 2021-08-06 DIAGNOSIS — M32.14 SYSTEMIC LUPUS ERYTHEMATOSUS WITH GLOMERULAR DISEASE, UNSPECIFIED SLE TYPE (HCC): ICD-10-CM

## 2021-08-06 DIAGNOSIS — Z87.39 H/O LUPUS NEPHRITIS: Primary | ICD-10-CM

## 2021-08-06 RX ORDER — BELIMUMAB 200 MG/ML
200 SOLUTION SUBCUTANEOUS WEEKLY
Qty: 4 ML | Refills: 5 | Status: SHIPPED | OUTPATIENT
Start: 2021-08-06 | End: 2021-08-25 | Stop reason: SDUPTHER

## 2021-08-06 NOTE — PROGRESS NOTES
Diagnosis Orders   1. H/O lupus nephritis  Belimumab (BENLYSTA) 200 MG/ML SOAJ   2.  Systemic lupus erythematosus with glomerular disease, unspecified SLE type [facility]  Belimumab (BENLYSTA) 200 MG/ML SOAJ

## 2021-08-09 ENCOUNTER — OFFICE VISIT (OUTPATIENT)
Dept: RHEUMATOLOGY | Age: 51
End: 2021-08-09
Payer: COMMERCIAL

## 2021-08-09 ENCOUNTER — TELEPHONE (OUTPATIENT)
Dept: RHEUMATOLOGY | Age: 51
End: 2021-08-09

## 2021-08-09 VITALS
HEIGHT: 60 IN | BODY MASS INDEX: 31.61 KG/M2 | HEART RATE: 68 BPM | DIASTOLIC BLOOD PRESSURE: 76 MMHG | OXYGEN SATURATION: 100 % | SYSTOLIC BLOOD PRESSURE: 112 MMHG | WEIGHT: 161 LBS

## 2021-08-09 DIAGNOSIS — Z87.39 H/O LUPUS NEPHRITIS: ICD-10-CM

## 2021-08-09 DIAGNOSIS — Z51.81 MEDICATION MONITORING ENCOUNTER: ICD-10-CM

## 2021-08-09 DIAGNOSIS — M54.40 BACK PAIN OF LUMBAR REGION WITH SCIATICA: ICD-10-CM

## 2021-08-09 DIAGNOSIS — M32.14 SYSTEMIC LUPUS ERYTHEMATOSUS WITH GLOMERULAR DISEASE, UNSPECIFIED SLE TYPE (HCC): Primary | ICD-10-CM

## 2021-08-09 DIAGNOSIS — M54.12 CERVICAL RADICULOPATHY: ICD-10-CM

## 2021-08-09 DIAGNOSIS — M32.14 SYSTEMIC LUPUS ERYTHEMATOSUS WITH GLOMERULAR DISEASE, UNSPECIFIED SLE TYPE (HCC): ICD-10-CM

## 2021-08-09 DIAGNOSIS — M32.14 OTHER SYSTEMIC LUPUS ERYTHEMATOSUS WITH GLOMERULAR DISEASE (HCC): ICD-10-CM

## 2021-08-09 PROCEDURE — 99214 OFFICE O/P EST MOD 30 MIN: CPT | Performed by: NURSE PRACTITIONER

## 2021-08-09 PROCEDURE — G8427 DOCREV CUR MEDS BY ELIG CLIN: HCPCS | Performed by: NURSE PRACTITIONER

## 2021-08-09 PROCEDURE — 1036F TOBACCO NON-USER: CPT | Performed by: NURSE PRACTITIONER

## 2021-08-09 PROCEDURE — G8417 CALC BMI ABV UP PARAM F/U: HCPCS | Performed by: NURSE PRACTITIONER

## 2021-08-09 RX ORDER — HYDROXYCHLOROQUINE SULFATE 200 MG/1
200 TABLET, FILM COATED ORAL 2 TIMES DAILY
Qty: 60 TABLET | Refills: 5 | Status: SHIPPED | OUTPATIENT
Start: 2021-08-09 | End: 2022-02-14 | Stop reason: SDUPTHER

## 2021-08-09 ASSESSMENT — ENCOUNTER SYMPTOMS
ABDOMINAL PAIN: 0
SHORTNESS OF BREATH: 0
CONSTIPATION: 0
EYE PAIN: 0
DIARRHEA: 0
NAUSEA: 0
COUGH: 0
BACK PAIN: 1
TROUBLE SWALLOWING: 0
EYE ITCHING: 0

## 2021-08-09 NOTE — PROGRESS NOTES
PAST MEDICAL HISTORY      Past Medical History:   Diagnosis Date    Anemia     CKD (chronic kidney disease), [facility] III ([facility] [address]     Dyslipidemia     Hematuria     Hyperlipidemia     Hypertension     Hyperuricemia     Hypothyroidism     Kidney disease     as a result from lupus    Lupus ([facility]     Lupus nephritis ([facility]     Proteinuria     Seizures ([facility] [address]     Systemic lupus erythematosus ([facility] [address] [facility]        PAST SURGICAL HISTORY      Past Surgical History:   Procedure Laterality Date    CT BIOPSY ABDOMEN RETROPERITONEUM  1/15/2021    CT BIOPSY ABDOMEN RETROPERITONEUM 1/15/2021 [facility] CT SCAN    LUMBAR NERVE BLOCK N/A 3/11/2019    LESI at L4 performed by Rebeka Montoya MD at [facility] N/A 5/21/2019    LESI at L3 #1 performed by Rebeka Montoya MD at [address] SURGERY Left 12/13/2018    LUMBAR TRANSFORAMINAL TFESI L5 LEFT #2, performed by Rebeka Montoya MD at [address] [facility] Bilateral     LUMBAR FACET    OR INJ DX/THER AGNT PARAVERT FACET JOINT, LUMBAR/SAC, 2ND LEVEL Bilateral 5/7/2018    LUMBAR FACET  MBB [email or web address] Bilateral performed by Rebeka Montoya MD at [address] [facility] AA&/STRD TFRML EPI LUMBAR/SACRAL 1 LEVEL Left 9/25/2018    LUMBAR TRANSFORAMINAL TFESI L5 on the LEFT performed by Rebeka Montoya MD at [address] [facility] DX/THER SBST INTRLMNR LMBR/SAC W/IMG GDN N/A 7/19/2018    LUMBAR INTER LAMINAR GIGI LESI @L4 performed by Rebeka Montoya MD at [address] [facility]      Family History   Problem Relation Age of Onset    Diabetes Mother     Heart Disease Mother     Thyroid Disease Mother     Parkinsonism Mother     Diabetes Father     Lupus Maternal Cousin        SOCIAL HISTORY      Social History     Tobacco History     Smoking Status  Former Smoker Smoking Tobacco Type  Cigarettes    Smokeless Tobacco Use  Never Used    Tobacco Comment  1 a day          Alcohol History     Alcohol Use Status  Yes Drinks/Week  0 Standard drinks or equivalent per week Amount  0.0 standard drinks of alcohol/wk Comment  occasionally, couple times a year          Drug Use     Drug Use Status  No          Sexual Activity     Sexually Active  Not Asked                ALLERGIES     Allergies   Allergen Reactions    Codeine Hives and Nausea And Vomiting       CURRENT MEDICATIONS      Current Outpatient Medications   Medication Sig Dispense Refill    Belimumab (BENLYSTA) 200 MG/ML SOAJ Inject 200 mg into the skin once a week 4 mL 5    permethrin (ELIMITE) 5 % cream Apply to wet hair and leave on 10 minutes and then rinse, may repeat on day 9 3 Tube 1    gabapentin (NEURONTIN) 400 MG capsule Take 1 capsule by mouth 2 times daily for 90 days. 180 capsule 1    levothyroxine (SYNTHROID) 112 MCG tablet take 1 tablet by mouth once daily 90 tablet 1    HYDROcodone-acetaminophen (NORCO) 5-325 MG per tablet Take 1 tablet by mouth every 8 hours as needed for Pain for up to 30 days. 90 tablet 0    predniSONE (DELTASONE) 5 MG tablet Take 1.5 tablets by mouth daily 45 tablet 2    mycophenolate (CELLCEPT) 500 MG tablet Take 2 tablets by mouth every morning (before breakfast) AND 2 tablets every evening.  120 tablet 0    ondansetron (ZOFRAN) 4 MG tablet Take 1 tablet by mouth 3 times daily as needed for Nausea or Vomiting 30 tablet 0    ketorolac (TORADOL) 10 MG tablet Take 1 tablet by mouth every 6 hours as needed for Pain 20 tablet 0    hydroxychloroquine (PLAQUENIL) 200 MG tablet Take 1 tablet by mouth 2 times daily 60 tablet 5    Calcium Carbonate-Vitamin D (OYSTER SHELL CALCIUM/D) 500-200 MG-UNIT TABS take 1 tablet by mouth once daily 90 tablet 3    loratadine (CLARITIN) 10 MG tablet Take 1 tablet by mouth daily 90 tablet 3    acetaminophen (APAP EXTRA STRENGTH) 500 MG tablet Take 2 tablets by mouth every 6 hours as needed for Pain 120 tablet 3  ferrous sulfate (IRON 325) 325 (65 Fe) MG tablet Take 1 tablet by mouth daily (with breakfast) 90 tablet 3    simvastatin (ZOCOR) 40 MG tablet take 1 tablet by mouth at bedtime 90 tablet 3    nitroGLYCERIN (NITROSTAT) 0.4 MG SL tablet Place 1 tablet under the tongue every 5 minutes as needed for Chest pain up to max of 3 total doses. If no relief after 1 dose, call 911. 25 tablet 0    omeprazole (PRILOSEC) 20 MG delayed release capsule Take 1 capsule by mouth Daily 30 capsule 3     No current facility-administered medications for this visit. PHYSICAL EXAMINATION / OBJECTIVE   Objective:  /76 (Site: Left Upper Arm, Position: Sitting, Cuff Size: Medium Adult)   Pulse 68   Ht 5' (1.524 m)   Wt 161 lb (73 kg)   SpO2 100%   BMI 31.44 kg/m²     Physical Exam  Vitals reviewed. Constitutional:       Appearance: She is well-developed. Cardiovascular:      Rate and Rhythm: Normal rate and regular rhythm. Pulmonary:      Effort: Pulmonary effort is normal.      Breath sounds: Normal breath sounds. Abdominal:      Palpations: Abdomen is soft. Tenderness: There is no abdominal tenderness. Musculoskeletal:      Cervical back: Normal range of motion and neck supple. Skin:     General: Skin is warm and dry. Findings: No rash. Neurological:      Mental Status: She is alert and oriented to person, place, and time. Deep Tendon Reflexes: Reflexes are normal and symmetric. Psychiatric:         Thought Content:  Thought content normal.        Upper extremities:    SHOULDERS nontender, no swelling ,   ELBOWS nontender, no swelling  WRISTS nontender, + tinel bilat,   HANDS/FINGERS nontender, no swelling    Lower extremities:  HIPS nontender  KNEES nontender, no swelling  ANKLES nontender, no swelling   FEET : nontender, no swelling       RAPID 3:   8/9/2021 --- RAPID 3: 3.7 + 8 + 7 = 18.7     Remission: <3  Low Disease Activity: <6  Moderate Disease Activity: >=6 and <=12  High Disease lupus nephritis  Proteinura   - repeat renal biopsy with no abnormalities    CAD- following with cardiology    Cervical radiculopathy  - EMG/NCV (Dec 2019) w/ cervical radiculopathy    - gabapentin 400 mg BID    Low back pain w/ radiculopathy   - gabapentin 400 mg BID    Medication monitoring   - CBC, CMP, sed rate, CRP, u/a q 8 weeks   - plaquenil eye exam (2020)      No follow-ups on file. Electronically signed by Serafin Brock, MACKENZIE - CNP on 8/9/2021 at 2:47 PM    New Prescriptions    No medications on file       Thank you for allowing me to participate in the care of this patient. Please call if there are any questions.

## 2021-08-15 ENCOUNTER — PATIENT MESSAGE (OUTPATIENT)
Dept: FAMILY MEDICINE CLINIC | Age: 51
End: 2021-08-15

## 2021-08-16 ENCOUNTER — TELEPHONE (OUTPATIENT)
Dept: FAMILY MEDICINE CLINIC | Age: 51
End: 2021-08-16
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
## 2021-08-17 ENCOUNTER — VIRTUAL VISIT (OUTPATIENT)
Dept: FAMILY MEDICINE CLINIC | Age: 51
End: 2021-08-17
Payer: COMMERCIAL

## 2021-08-17 ENCOUNTER — PATIENT MESSAGE (OUTPATIENT)
Dept: FAMILY MEDICINE CLINIC | Age: 51
End: 2021-08-17

## 2021-08-17 DIAGNOSIS — T30.0 FIRST DEGREE BURN INJURY: Primary | ICD-10-CM

## 2021-08-17 DIAGNOSIS — T21.24XA PARTIAL THICKNESS BURN OF BACK, INITIAL ENCOUNTER: ICD-10-CM

## 2021-08-17 PROCEDURE — 99213 OFFICE O/P EST LOW 20 MIN: CPT | Performed by: NURSE PRACTITIONER

## 2021-08-17 PROCEDURE — G8427 DOCREV CUR MEDS BY ELIG CLIN: HCPCS | Performed by: NURSE PRACTITIONER

## 2021-08-17 ASSESSMENT — ENCOUNTER SYMPTOMS
RESPIRATORY NEGATIVE: 1
COLOR CHANGE: 1
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
## 2021-08-17 NOTE — TELEPHONE ENCOUNTER
Future Appointments   Date Time Provider Justus Burciagai   8/17/2021 12:00 PM Austin Be, APRN - CNP [facility] [place]   9/15/2021  3:20 PM Myron Quintero APRN - CNP N [facility] [place]   11/15/2021  2:40 PM Maria Del Rosario Rogers APRN - CNP N [facility] Rheum P - [place]   1/12/2022 12:15 PM Maynard Brunner, MD N [facility] [place]   2/3/2022  3:00 PM MD GAYATRI Clinton [facility], [facility]. [facility] [place]   2/14/2022  2:00 PM Tam King DO N [facility]um [facility] [place]

## 2021-08-17 NOTE — TELEPHONE ENCOUNTER
From: Yulisa Calle  To: MACKENZIE Gore - MITRA  Sent: 8/17/2021 11:12 AM EDT  Subject: Non-Urgent Medical Question    Hey what time do u have

## 2021-08-19 DIAGNOSIS — G89.4 CHRONIC PAIN SYNDROME: ICD-10-CM

## 2021-08-19 RX ORDER — HYDROCODONE BITARTRATE AND ACETAMINOPHEN 5; 325 MG/1; MG/1
1 TABLET ORAL EVERY 8 HOURS PRN
Qty: 90 TABLET | Refills: 0 | Status: SHIPPED | OUTPATIENT
Start: 2021-08-21 | End: 2021-09-22 | Stop reason: SDUPTHER

## 2021-08-19 NOTE — TELEPHONE ENCOUNTER
OARRS reviewed. UDS: + for  Hydrocodone consistent. Last seen: 6/28/2021.  Follow-up:   Future Appointments   Date Time Provider Justus Dumont   9/15/2021  3:20 PM MACKENZIE Corona - CNP N [facility] [place]   11/15/2021  2:40 PM MACKENZIE Guardado - CNP N [facility] Rheum [facility] [place]   1/12/2022 12:15 PM Mckenzie Hoover MD N [facility] [place]   2/3/2022  3:00 PM MD GAYATRI Bolden [facility]. [facility] [place]   2/14/2022  2:00 PM Kiara Carranza DO N [facility] Rheum [facility] [place]

## 2021-08-25 RX ORDER — BELIMUMAB 200 MG/ML
200 SOLUTION SUBCUTANEOUS WEEKLY
Qty: 4 ML | Refills: 5 | Status: SHIPPED | OUTPATIENT
Start: 2021-08-25 | End: 2022-03-11 | Stop reason: SDUPTHER

## 2021-08-25 NOTE — TELEPHONE ENCOUNTER
[facility] unable to fill Benlysta.  Per Dr Monie Solorzano verbal order; rx sent to [facility]

## 2021-08-27 DIAGNOSIS — T21.24XA PARTIAL THICKNESS BURN OF BACK, INITIAL ENCOUNTER: ICD-10-CM

## 2021-08-27 DIAGNOSIS — T30.0 FIRST DEGREE BURN INJURY: ICD-10-CM

## 2021-08-29 DIAGNOSIS — R10.9 ABDOMINAL PAIN, UNSPECIFIED ABDOMINAL LOCATION: ICD-10-CM

## 2021-08-30 RX ORDER — OMEPRAZOLE 20 MG/1
CAPSULE, DELAYED RELEASE ORAL
Qty: 30 CAPSULE | Refills: 3 | Status: SHIPPED | OUTPATIENT
Start: 2021-08-30 | End: 2022-04-13

## 2021-09-07 ENCOUNTER — TELEPHONE (OUTPATIENT)
Dept: CARDIOLOGY CLINIC | Age: 51
End: 2021-09-07

## 2021-09-07 NOTE — TELEPHONE ENCOUNTER
Pt called she never went to [facility] [place] for her appt due to lack of transportation     She found a ride now but does not have an appt yet   She is asking if she can work until then, pt states she didn't know what \"claudia\" would have her do     I advised pt to find out from \"claudia\" what he would be having her do and we can ask Dr. Wright Fischer ///

## 2021-09-16 ENCOUNTER — TELEPHONE (OUTPATIENT)
Dept: CARDIOTHORACIC SURGERY | Age: 51
End: 2021-09-16

## 2021-09-16 DIAGNOSIS — I25.10 CAD, MULTIPLE VESSEL: Primary | ICD-10-CM

## 2021-09-16 DIAGNOSIS — M32.9 LUPUS (HCC): ICD-10-CM

## 2021-09-17 DIAGNOSIS — M79.641 RIGHT HAND PAIN: ICD-10-CM

## 2021-09-17 DIAGNOSIS — M54.40 BACK PAIN OF LUMBAR REGION WITH SCIATICA: ICD-10-CM

## 2021-09-17 RX ORDER — GABAPENTIN 100 MG/1
CAPSULE ORAL
Qty: 180 CAPSULE | Refills: 0 | Status: SHIPPED | OUTPATIENT
Start: 2021-09-17 | End: 2022-08-11

## 2021-09-22 DIAGNOSIS — G89.4 CHRONIC PAIN SYNDROME: ICD-10-CM

## 2021-09-22 RX ORDER — HYDROCODONE BITARTRATE AND ACETAMINOPHEN 5; 325 MG/1; MG/1
1 TABLET ORAL EVERY 8 HOURS PRN
Qty: 90 TABLET | Refills: 0 | Status: SHIPPED | OUTPATIENT
Start: 2021-09-22 | End: 2021-10-12 | Stop reason: SDUPTHER

## 2021-09-22 NOTE — TELEPHONE ENCOUNTER
OARRS reviewed. UDS: + for  hydrocodone consistent. Last seen: 6/28/2021.  Follow-up:   Future Appointments   Date Time Provider Justus Dumont   10/12/2021  3:40 PM MACKENZIE Demarco - CNP N [facility] [address]   11/15/2021  2:40 PM MACKENZIE Vines CNP N SR[facility] Rheum [address]   1/12/2022 12:15 PM Amanda Espinoza MD N [facility] Heart [address]   2/3/2022  3:00 PM Jovi Thomas MD N [facility], [facility]. [address]   2/14/2022  2:00 PM Josephine Anton DO N SRP[facility] Rheum [address]

## 2021-10-12 ENCOUNTER — OFFICE VISIT (OUTPATIENT)
Dept: PHYSICAL MEDICINE AND REHAB | Age: 51
End: 2021-10-12
Payer: COMMERCIAL

## 2021-10-12 VITALS
DIASTOLIC BLOOD PRESSURE: 86 MMHG | SYSTOLIC BLOOD PRESSURE: 116 MMHG | HEIGHT: 66 IN | OXYGEN SATURATION: 97 % | WEIGHT: 161 LBS | HEART RATE: 73 BPM | BODY MASS INDEX: 25.88 KG/M2

## 2021-10-12 DIAGNOSIS — M48.062 LUMBAR STENOSIS WITH NEUROGENIC CLAUDICATION: ICD-10-CM

## 2021-10-12 DIAGNOSIS — M46.1 BILATERAL SACROILIITIS (HCC): ICD-10-CM

## 2021-10-12 DIAGNOSIS — M54.40 BACK PAIN OF LUMBAR REGION WITH SCIATICA: ICD-10-CM

## 2021-10-12 DIAGNOSIS — M46.1 SI (SACROILIAC) JOINT INFLAMMATION (HCC): ICD-10-CM

## 2021-10-12 DIAGNOSIS — G89.4 CHRONIC PAIN SYNDROME: ICD-10-CM

## 2021-10-12 DIAGNOSIS — M48.02 CERVICAL SPINAL STENOSIS: ICD-10-CM

## 2021-10-12 DIAGNOSIS — M54.12 CERVICAL RADICULITIS: Primary | ICD-10-CM

## 2021-10-12 DIAGNOSIS — M54.16 LUMBAR RADICULOPATHY: ICD-10-CM

## 2021-10-12 DIAGNOSIS — M54.16 LUMBAR RADICULITIS: ICD-10-CM

## 2021-10-12 DIAGNOSIS — M47.816 SPONDYLOSIS OF LUMBAR REGION WITHOUT MYELOPATHY OR RADICULOPATHY: ICD-10-CM

## 2021-10-12 DIAGNOSIS — M47.814 SPONDYLOSIS OF THORACIC REGION WITHOUT MYELOPATHY OR RADICULOPATHY: ICD-10-CM

## 2021-10-12 DIAGNOSIS — M79.641 RIGHT HAND PAIN: ICD-10-CM

## 2021-10-12 PROCEDURE — G8417 CALC BMI ABV UP PARAM F/U: HCPCS | Performed by: NURSE PRACTITIONER

## 2021-10-12 PROCEDURE — G8484 FLU IMMUNIZE NO ADMIN: HCPCS | Performed by: NURSE PRACTITIONER

## 2021-10-12 PROCEDURE — 1036F TOBACCO NON-USER: CPT | Performed by: NURSE PRACTITIONER

## 2021-10-12 PROCEDURE — G8427 DOCREV CUR MEDS BY ELIG CLIN: HCPCS | Performed by: NURSE PRACTITIONER

## 2021-10-12 PROCEDURE — 99214 OFFICE O/P EST MOD 30 MIN: CPT | Performed by: NURSE PRACTITIONER

## 2021-10-12 RX ORDER — HYDROCODONE BITARTRATE AND ACETAMINOPHEN 5; 325 MG/1; MG/1
1 TABLET ORAL EVERY 6 HOURS PRN
Qty: 120 TABLET | Refills: 0 | Status: SHIPPED | OUTPATIENT
Start: 2021-10-19 | End: 2021-11-16 | Stop reason: SDUPTHER

## 2021-10-12 RX ORDER — GABAPENTIN 400 MG/1
400 CAPSULE ORAL 2 TIMES DAILY
Qty: 180 CAPSULE | Refills: 1 | Status: SHIPPED | OUTPATIENT
Start: 2021-10-12 | End: 2022-03-15

## 2021-10-12 ASSESSMENT — ENCOUNTER SYMPTOMS
COLOR CHANGE: 0
ABDOMINAL PAIN: 0
CONSTIPATION: 0
NAUSEA: 0
BACK PAIN: 1
CHEST TIGHTNESS: 0
SINUS PRESSURE: 0
EYE PAIN: 0
RHINORRHEA: 0
SHORTNESS OF BREATH: 0
PHOTOPHOBIA: 0
VOMITING: 0
DIARRHEA: 0
COUGH: 0
SORE THROAT: 0
WHEEZING: 0

## 2021-10-12 NOTE — PROGRESS NOTES
[facility] [address]  Dept: [phone]  Dept Fax: [phone]: [phone]    Visit Date: 10/12/2021    Functionality Assessment/Goals Worksheet     On a scale of 0 (Does not Interfere) to 10 (Completely Interferes)     1. Which number describes how during the past week pain has interfered with       the following:  A. General Activity:  5  B. Mood: 8  C. Walking Ability:  7  D. Normal Work (Includes both work outside the home and housework):  7  E. Relations with Other People:   6  F. Sleep:   8  G. Enjoyment of Life:   7    2. Patient Prefers to Take their Pain Medications:     [x]  On a regular basis   []  Only when necessary    []  Does not take pain medications    3. What are the Patient's Goals/Expectations for Visiting Pain Management? []  Learn about my pain    []  Receive Medication   []  Physical Therapy     []  Treat Depression   []  Receive Injections    []  Treat Sleep   []  Deal with Anxiety and Stress   []  Treat Opoid Dependence/Addiction   [x]  Other:      HPI:   Lilliam Elkins is a 54 y.o. female is here today for    Chief Complaint: Low back pain, Mid back pain, Neck pain and Hip pain    HPI   F/U has not bene to [facility] as of yet for cardiac issues she was having earlier this yr. Had heart cath Feb 2021. She remains off work due to cardiac issues. She is unable to tolerate PT due to chest pain  Angina. No relief from prior lumbar procedures.  She did have TFLESI L5 left in 2018 with 50% pain relief 18 months. EMG  Showed C5-6 left. Pain increases with bending, lifting, twisting , reaching, pushing, pulling, turning head, walking, standing, raising arms, stairs and getting up and down.   Treatments Tried PT, ice, heat, Chiropractor, NSAIDS, narcotics, muscle relaxer, OTC rubs creams patches, steroid burst and injections  Pain Description sharp, shooting, stabbing, burning, pressure, throbbing, aching, numbness, tingling and pins and needles    Medications reviewed. Patient denies side effects with medications. Patient states she is taking medications as prescribed. Shedenies receiving pain medications from other sources. She denies any ER visits since last visit. Pain scale with out pain medications or at its worst is 8/10. Pain scale with pain medications or at its best is 5/10. Last dose of Norco  was today   Drug screen reviewed from 6/2021 and was appropriate  Pill count was completed today and was appropriate Pt is out of meds:   Patient  does not have naloxone available at home. Patient has not required use of naloxone at home since last office visit. MRI cervical   :               There is 3 mm of anterolisthesis of C7 on T1. There is loss of disc height and there is degenerative marrow edema in the endplates at the Y2-6 level. This is also present at the C6-7 level. No suspicious osseous lesions are present. The facet joints are    normally aligned.       The cervical spinal cord is of normal caliber and signal intensity.  The visualized aspects of the posterior fossa are normal.       On the axial images, at C2-C3, there is a small posterior disc osteophyte complex. There are bilateral facet degenerative changes. There is no spinal canal stenosis. There is mild right foraminal stenosis.       At C3-C4, there is a posterior disc osteophyte complex. There are bilateral facet degenerative changes. There is moderate severity spinal canal stenosis. There is moderate to severe bilateral foraminal stenosis.       At C4-C5, there is a small posterior disc osteophyte complex. There are facet degenerative changes. There is mild spinal canal stenosis. There is no foraminal stenosis.       At C5-C6, there is a small posterior disc osteophyte complex. There is mild spinal canal stenosis.  There is no foraminal stenosis.       At C6-C7, there is a small posterior disc osteophyte complex. There is mild spinal canal stenosis and bilateral foraminal stenosis.       At C7-T1, there are facet degenerative changes. There is uncovering of the disc. There is mild spinal canal stenosis. There is mild foraminal stenosis.       There are no suspicious findings in the cervical soft tissues.               Impression       1. Moderate severity spinal canal stenosis with moderate to severe bilateral foraminal stenosis at the C3-4 level due to degenerative changes. 2. Mild spinal canal stenosis and/or foraminal stenosis at other levels as described above.         EMG    The patientis allergic to codeine. Subjective:      Review of Systems   Constitutional: Negative for activity change, appetite change, chills, diaphoresis, fatigue, fever and unexpected weight change. HENT: Positive for dental problem. Negative for congestion, ear pain, hearing loss, mouth sores, nosebleeds, rhinorrhea, sinus pressure and sore throat. Dental infection recently   Eyes: Negative for photophobia, pain and visual disturbance. Respiratory: Negative for cough, chest tightness, shortness of breath and wheezing. COVID     Cardiovascular: Negative for chest pain and palpitations. HTN CAD  Recent abnormal heart cath, pending [facility] referral for possible surgery   Gastrointestinal: Negative for abdominal pain, constipation, diarrhea, nausea and vomiting. Endocrine: Negative for cold intolerance, heat intolerance, polydipsia, polyphagia and polyuria. Thyroid issues   Genitourinary: Negative for decreased urine volume, difficulty urinating, frequency and hematuria. CKD   Musculoskeletal: Positive for arthralgias, back pain, joint swelling, myalgias, neck pain and neck stiffness. Negative for gait problem. Skin: Negative for color change and rash.         Hot water burn  8/31/2020 . healed   Allergic/Immunologic: Negative for food allergies and immunocompromised state. Neurological: Positive for dizziness, weakness, numbness and headaches. Negative for tremors, seizures, syncope, facial asymmetry, speech difficulty and light-headedness. H/o seizures,no seizures in 16 years. H/O Lupus   Hematological: Does not bruise/bleed easily. Psychiatric/Behavioral: Negative for agitation, behavioral problems, confusion, decreased concentration, dysphoric mood, hallucinations, self-injury, sleep disturbance and suicidal ideas. The patient is not nervous/anxious and is not hyperactive. Objective:     Vitals:    10/12/21 1544   BP: 116/86   Site: Left Upper Arm   Position: Sitting   Cuff Size: Large Adult   Pulse: 73   SpO2: 97%   Weight: 161 lb (73 kg)   Height: 5' 5.98\" (1.676 m)       Physical Exam  Vitals and nursing note reviewed. Constitutional:       General: She is not in acute distress. Appearance: She is well-developed. She is not diaphoretic. HENT:      Head: Normocephalic and atraumatic. Right Ear: External ear normal.      Left Ear: External ear normal.      Nose: Nose normal.      Mouth/Throat:      Pharynx: No oropharyngeal exudate. Eyes:      General: No scleral icterus. Right eye: No discharge. Left eye: No discharge. Conjunctiva/sclera: Conjunctivae normal.      Pupils: Pupils are equal, round, and reactive to light. Neck:      Thyroid: No thyromegaly. Cardiovascular:      Rate and Rhythm: Normal rate and regular rhythm. Heart sounds: Normal heart sounds. No murmur heard. No friction rub. No gallop. Pulmonary:      Effort: Pulmonary effort is normal. No respiratory distress. Breath sounds: Normal breath sounds. No wheezing or rales. Chest:      Chest wall: No tenderness. Abdominal:      General: Bowel sounds are normal. There is no distension. Palpations: Abdomen is soft. Tenderness: There is no abdominal tenderness. There is no guarding or rebound. Musculoskeletal:         General: Tenderness present. Right shoulder: Tenderness present. Decreased range of motion. Decreased strength. Left shoulder: Tenderness present. Right upper arm: Tenderness present. Right forearm: Tenderness present. Left forearm: Tenderness present. Right wrist: Tenderness and bony tenderness present. Left wrist: Tenderness and bony tenderness present. Right hand: Tenderness and bony tenderness present. Decreased range of motion. Decreased strength. Decreased sensation. Left hand: Tenderness and bony tenderness present. Decreased range of motion. Decreased strength. Decreased sensation. Cervical back: Neck supple. Spasms, tenderness and bony tenderness present. No edema, erythema or rigidity. Pain with movement and muscular tenderness present. Decreased range of motion. Thoracic back: Spasms, tenderness and bony tenderness present. Decreased range of motion. Lumbar back: Spasms, tenderness and bony tenderness present. Decreased range of motion. Back:       Right hip: Tenderness present. Left hip: Normal.      Right upper leg: Tenderness present. Right knee: Normal.      Left knee: Normal.      Right ankle: Tenderness present. Right foot: Decreased range of motion. Swelling, tenderness and bony tenderness present. Comments: 2 right broken toes has walker shoe on    Skin:     General: Skin is warm. Coloration: Skin is not pale. Findings: No erythema or rash. Neurological:      Mental Status: She is alert and oriented to person, place, and time. She is not disoriented. Cranial Nerves: Cranial nerves are intact. No cranial nerve deficit. Sensory: Sensation is intact. No sensory deficit. Motor: Motor function is intact. No atrophy or abnormal muscle tone. Coordination: Coordination is intact.  Coordination normal.      Gait: Gait abnormal.      Deep Tendon Reflexes: Babinski sign absent on the right side. Reflex Scores:       Tricep reflexes are 2+ on the right side and 2+ on the left side. Bicep reflexes are 2+ on the right side and 2+ on the left side. Brachioradialis reflexes are 2+ on the right side and 2+ on the left side. Patellar reflexes are 2+ on the right side and 2+ on the left side. Achilles reflexes are 2+ on the right side and 2+ on the left side. Comments: Motor  4/5 RUE  5/5 BLE   Psychiatric:         Attention and Perception: Attention and perception normal. She is attentive. Mood and Affect: Mood and affect normal. Mood is not anxious or depressed. Affect is not labile, blunt, angry or inappropriate. Speech: Speech normal. She is communicative. Speech is not rapid and pressured, delayed, slurred or tangential.         Behavior: Behavior normal. Behavior is not agitated, slowed, aggressive, withdrawn, hyperactive or combative. Behavior is cooperative. Thought Content: Thought content normal. Thought content is not paranoid or delusional. Thought content does not include homicidal or suicidal ideation. Thought content does not include homicidal or suicidal plan. Cognition and Memory: Cognition and memory normal. Memory is not impaired. She does not exhibit impaired recent memory or impaired remote memory. Judgment: Judgment normal. Judgment is not impulsive or inappropriate. VONDA  Patricks test: +    [place] +     Assessment:     1. Cervical radiculitis    2. Back pain of lumbar region with sciatica    3. Right hand pain    4. Chronic pain syndrome    5. Spondylosis of lumbar region without myelopathy or radiculopathy    6. SI (sacroiliac) joint inflammation (HCC)    7. Lumbar radiculitis    8. Lumbar stenosis with neurogenic claudication    9. Spondylosis of thoracic region without myelopathy or radiculopathy    10. Bilateral sacroiliitis ([address]    11. Lumbar radiculopathy    12. Cervical spinal stenosis            Plan:      OARRS reviewed. Current MED: 15  Patient was not offered naloxone for home. Discussed long term side effects of medications, tolerance, dependency and addiction. Previous UDS reviewed  UDS preformed today for compliance. Patient told can not receive any pain medications from any other source. No evidence of abuse, diversion or aberrant behavior. Medications and/or procedures to improve function and quality of life- patient understanding with this and that may not be pain free  Discussed with patient about safe storage of medications at home  Discussed possible weaning of medication dosing dependent on treatment/procedure results. Testing: Cervical MRI EMG   Procedures: Discussed TFCESI   Discussed with patient about risks with procedure including infection, reaction to medication, increased pain, or bleeding. Medications:Norco Increase Neurontin to 800 mg at HS and  500 mg in morning if can tolerate   If patient is on blood thinners will need approval to hold:N/A      Meds. Prescribed:   Orders Placed This Encounter   Medications    gabapentin (NEURONTIN) 400 MG capsule     Sig: Take 1 capsule by mouth 2 times daily for 90 days. Dispense:  180 capsule     Refill:  1    HYDROcodone-acetaminophen (NORCO) 5-325 MG per tablet     Sig: Take 1 tablet by mouth every 6 hours as needed for Pain for up to 30 days. OK to fill early due to dose change     Dispense:  120 tablet     Refill:  0     Reduce doses taken as pain becomes manageable       Return in about 3 months (around 1/12/2022) for Follow up pain medications.                Electronically signed by MACKENZIE Luna CNP on10/12/2021 at 4:10 PM

## 2021-11-09 ENCOUNTER — VIRTUAL VISIT (OUTPATIENT)
Dept: FAMILY MEDICINE CLINIC | Age: 51
End: 2021-11-09
Payer: COMMERCIAL

## 2021-11-09 ENCOUNTER — HOSPITAL ENCOUNTER (EMERGENCY)
Age: 51
Discharge: HOME | End: 2021-11-09
Attending: EMERGENCY MEDICINE
Payer: COMMERCIAL

## 2021-11-09 ENCOUNTER — PATIENT MESSAGE (OUTPATIENT)
Dept: FAMILY MEDICINE CLINIC | Age: 51
End: 2021-11-09

## 2021-11-09 ENCOUNTER — APPOINTMENT (OUTPATIENT)
Dept: GENERAL RADIOLOGY | Age: 51
End: 2021-11-09
Payer: COMMERCIAL

## 2021-11-09 VITALS
TEMPERATURE: 98.5 F | HEART RATE: 69 BPM | SYSTOLIC BLOOD PRESSURE: 138 MMHG | RESPIRATION RATE: 14 BRPM | OXYGEN SATURATION: 97 % | DIASTOLIC BLOOD PRESSURE: 91 MMHG

## 2021-11-09 DIAGNOSIS — Z20.822 CLOSE EXPOSURE TO COVID-19 VIRUS: Primary | ICD-10-CM

## 2021-11-09 DIAGNOSIS — Z20.822 CONTACT WITH AND (SUSPECTED) EXPOSURE TO COVID-19: Primary | ICD-10-CM

## 2021-11-09 DIAGNOSIS — R51.9 NONINTRACTABLE HEADACHE, UNSPECIFIED CHRONICITY PATTERN, UNSPECIFIED HEADACHE TYPE: ICD-10-CM

## 2021-11-09 DIAGNOSIS — R09.89 CHEST CONGESTION: ICD-10-CM

## 2021-11-09 DIAGNOSIS — R52 BODY ACHES: ICD-10-CM

## 2021-11-09 LAB
ANION GAP SERPL CALCULATED.3IONS-SCNC: 12 MEQ/L (ref 8–16)
BASOPHILS # BLD: 0.6 %
BASOPHILS ABSOLUTE: 0 THOU/MM3 (ref 0–0.1)
BUN BLDV-MCNC: 19 MG/DL (ref 7–22)
CALCIUM SERPL-MCNC: 9.5 MG/DL (ref 8.5–10.5)
CHLORIDE BLD-SCNC: 104 MEQ/L (ref 98–111)
CO2: 22 MEQ/L (ref 23–33)
CREAT SERPL-MCNC: 1.5 MG/DL (ref 0.4–1.2)
EOSINOPHIL # BLD: 1.3 %
EOSINOPHILS ABSOLUTE: 0.1 THOU/MM3 (ref 0–0.4)
ERYTHROCYTE [DISTWIDTH] IN BLOOD BY AUTOMATED COUNT: 15 % (ref 11.5–14.5)
ERYTHROCYTE [DISTWIDTH] IN BLOOD BY AUTOMATED COUNT: 52 FL (ref 35–45)
GFR SERPL CREATININE-BSD FRML MDRD: 37 ML/MIN/1.73M2
GLUCOSE BLD-MCNC: 98 MG/DL (ref 70–108)
HCT VFR BLD CALC: 37.4 % (ref 37–47)
HEMOGLOBIN: 11.2 GM/DL (ref 12–16)
IMMATURE GRANS (ABS): 0.01 THOU/MM3 (ref 0–0.07)
IMMATURE GRANULOCYTES: 0.2 %
LYMPHOCYTES # BLD: 26.4 %
LYMPHOCYTES ABSOLUTE: 1.4 THOU/MM3 (ref 1–4.8)
MCH RBC QN AUTO: 28.2 PG (ref 26–33)
MCHC RBC AUTO-ENTMCNC: 29.9 GM/DL (ref 32.2–35.5)
MCV RBC AUTO: 94.2 FL (ref 81–99)
MONOCYTES # BLD: 5.9 %
MONOCYTES ABSOLUTE: 0.3 THOU/MM3 (ref 0.4–1.3)
NUCLEATED RED BLOOD CELLS: 0 /100 WBC
OSMOLALITY CALCULATION: 277.9 MOSMOL/KG (ref 275–300)
PLATELET # BLD: 147 THOU/MM3 (ref 130–400)
PMV BLD AUTO: 11.4 FL (ref 9.4–12.4)
POTASSIUM REFLEX MAGNESIUM: 3.7 MEQ/L (ref 3.5–5.2)
RBC # BLD: 3.97 MILL/MM3 (ref 4.2–5.4)
SARS-COV-2, NAAT: NOT  DETECTED
SEG NEUTROPHILS: 65.6 %
SEGMENTED NEUTROPHILS ABSOLUTE COUNT: 3.4 THOU/MM3 (ref 1.8–7.7)
SODIUM BLD-SCNC: 138 MEQ/L (ref 135–145)
TROPONIN T: < 0.01 NG/ML
WBC # BLD: 5.2 THOU/MM3 (ref 4.8–10.8)

## 2021-11-09 PROCEDURE — G8427 DOCREV CUR MEDS BY ELIG CLIN: HCPCS | Performed by: NURSE PRACTITIONER

## 2021-11-09 PROCEDURE — 87635 SARS-COV-2 COVID-19 AMP PRB: CPT

## 2021-11-09 PROCEDURE — 99214 OFFICE O/P EST MOD 30 MIN: CPT | Performed by: NURSE PRACTITIONER

## 2021-11-09 PROCEDURE — 36415 COLL VENOUS BLD VENIPUNCTURE: CPT

## 2021-11-09 PROCEDURE — 85025 COMPLETE CBC W/AUTO DIFF WBC: CPT

## 2021-11-09 PROCEDURE — 84484 ASSAY OF TROPONIN QUANT: CPT

## 2021-11-09 PROCEDURE — 80048 BASIC METABOLIC PNL TOTAL CA: CPT

## 2021-11-09 PROCEDURE — 99281 EMR DPT VST MAYX REQ PHY/QHP: CPT

## 2021-11-09 PROCEDURE — 71045 X-RAY EXAM CHEST 1 VIEW: CPT

## 2021-11-09 PROCEDURE — 93005 ELECTROCARDIOGRAM TRACING: CPT | Performed by: STUDENT IN AN ORGANIZED HEALTH CARE EDUCATION/TRAINING PROGRAM

## 2021-11-09 RX ORDER — ALBUTEROL SULFATE 90 UG/1
2 AEROSOL, METERED RESPIRATORY (INHALATION) EVERY 6 HOURS PRN
Qty: 18 G | Refills: 3 | Status: SHIPPED | OUTPATIENT
Start: 2021-11-09

## 2021-11-09 RX ORDER — GUAIFENESIN 600 MG/1
600 TABLET, EXTENDED RELEASE ORAL 2 TIMES DAILY
Qty: 30 TABLET | Refills: 0 | Status: SHIPPED | OUTPATIENT
Start: 2021-11-09 | End: 2021-11-09 | Stop reason: SDUPTHER

## 2021-11-09 RX ORDER — GUAIFENESIN 600 MG/1
600 TABLET, EXTENDED RELEASE ORAL 2 TIMES DAILY
Qty: 30 TABLET | Refills: 0 | Status: SHIPPED | OUTPATIENT
Start: 2021-11-09 | End: 2021-11-24

## 2021-11-09 RX ORDER — ALBUTEROL SULFATE 90 UG/1
2 AEROSOL, METERED RESPIRATORY (INHALATION) EVERY 6 HOURS PRN
Qty: 18 G | Refills: 3 | Status: SHIPPED | OUTPATIENT
Start: 2021-11-09 | End: 2021-11-09 | Stop reason: SDUPTHER

## 2021-11-09 ASSESSMENT — ENCOUNTER SYMPTOMS
CHOKING: 0
ABDOMINAL DISTENTION: 0
COUGH: 1
CONSTIPATION: 0
SORE THROAT: 0
RHINORRHEA: 1
SINUS PRESSURE: 0
SHORTNESS OF BREATH: 0
SINUS PAIN: 0
EYES NEGATIVE: 1
NAUSEA: 0
GASTROINTESTINAL NEGATIVE: 1
DIARRHEA: 0
ABDOMINAL PAIN: 0
TROUBLE SWALLOWING: 0
CHEST TIGHTNESS: 0
RESPIRATORY NEGATIVE: 1
VOMITING: 0
WHEEZING: 0

## 2021-11-09 NOTE — ED PROVIDER NOTES
[place] ENCOUNTER          Pt Name: Nadya Bloom  MRN: [id number]  [place] 1970  Date of evaluation: 11/9/2021  Treating Resident Physician: Diane Sarah MD  Supervising Physician: Steffi  COMPLAINT       Chief Complaint   Patient presents with    Covid Testing     History obtained from the patient. HISTORY OF PRESENT ILLNESS    HPI  Nadya Bloom is a 54 y.o. female who presents to the emergency department for evaluation of weakness, dry cough. Previous medical history of hypertension, lupus, patient refers has had since 2 days ago weakness, fatigue, headache, joint pain generalized, mild shortness of breath, dry cough. Her daughter was recently diagnosed with COVID-19, patient is concerned about that. The patient has no other acute complaints at this time. REVIEW OF SYSTEMS   Review of Systems   Constitutional: Negative. HENT: Negative. Eyes: Negative. Respiratory: Negative. Cardiovascular: Negative. Gastrointestinal: Negative. Endocrine: Negative. Genitourinary: Negative. Musculoskeletal: Negative. Neurological: Negative. Psychiatric/Behavioral: Negative.           PAST MEDICAL AND SURGICAL HISTORY     Past Medical History:   Diagnosis Date    Anemia     CKD (chronic kidney disease), stage III ([address]     Dyslipidemia     Hematuria     Hyperlipidemia     Hypertension     Hyperuricemia     Hypothyroidism     Kidney disease     as a result from lupus    Lupus ([address]     Lupus nephritis (HCC)     Proteinuria     Seizures (HCC)     Systemic lupus erythematosus ([address]      Past Surgical History:   Procedure Laterality Date    CT BIOPSY ABDOMEN RETROPERITONEUM  1/15/2021    CT BIOPSY ABDOMEN RETROPERITONEUM 1/15/2021 [facility] CT SCAN    LUMBAR NERVE BLOCK N/A 3/11/2019    LESI at L4 [facility] Brandy Pulido MD at [facility] BLOCK N/A 5/21/2019    LESI at L3 #1 performed by Katelyn Milligan MD at [address] Left 12/13/2018    LUMBAR TRANSFORAMINAL TFESI L5 LEFT #2, performed by Katelyn Milligan MD at [address], [address] Bilateral     LUMBAR FACET    LA INJ DX/THER AGNT PARAVERT FACET JOINT, LUMBAR/SAC, 2ND LEVEL Bilateral 5/7/2018    LUMBAR FACET  [email or web address] Bilateral performed by Katelyn Milligan MD at [address] [facility] AA&/STRD TFRML EPI LUMBAR/SACRAL 1 LEVEL Left 9/25/2018    LUMBAR TRANSFORAMINAL TFESI L5 on the LEFT performed by Katelyn Milligan MD at [address] [facility] DX/THER SBST INTRLMNR LMBR/SAC W/IMG GDN N/A 7/19/2018    LUMBAR INTER LAMINAR GIGI LESI @L4 performed by Katelyn Milligan MD at [address] [facility]   No current facility-administered medications for this encounter. Current Outpatient Medications:     guaiFENesin (MUCINEX) 600 MG extended release tablet, Take 1 tablet by mouth 2 times daily for 15 days, Disp: 30 tablet, Rfl: 0    albuterol sulfate HFA (PROVENTIL HFA) 108 (90 Base) MCG/ACT inhaler, Inhale 2 puffs into the lungs every 6 hours as needed for Wheezing, Disp: 18 g, Rfl: 3    gabapentin (NEURONTIN) 400 MG capsule, Take 1 capsule by mouth 2 times daily for 90 days. , Disp: 180 capsule, Rfl: 1    HYDROcodone-acetaminophen (NORCO) 5-325 MG per tablet, Take 1 tablet by mouth every 6 hours as needed for Pain for up to 30 days. OK to fill early due to dose change, Disp: 120 tablet, Rfl: 0    gabapentin (NEURONTIN) 100 MG capsule, Take bid with the 400 mg tablet for a total dose of 500 mg bid daily, Disp: 180 capsule, Rfl: 0    omeprazole (PRILOSEC) 20 MG delayed release capsule, take 1 capsule by mouth once daily, Disp: 30 capsule, Rfl: 3    silver sulfADIAZINE (SILVADENE) 1 % cream, Apply topically daily. , Disp: 50 g, Rfl: 1    Belimumab (BENLYSTA) 200 MG/ML SOAJ, Inject 200 mg into the skin once a week, Disp: 4 mL, Rfl: 5    hydroxychloroquine (PLAQUENIL) 200 MG tablet, Take 1 tablet by mouth 2 times daily, Disp: 60 tablet, Rfl: 5    permethrin (ELIMITE) 5 % cream, Apply to wet hair and leave on 10 minutes and then rinse, may repeat on day 9, Disp: 3 Tube, Rfl: 1    levothyroxine (SYNTHROID) 112 MCG tablet, take 1 tablet by mouth once daily, Disp: 90 tablet, Rfl: 1    predniSONE (DELTASONE) 5 MG tablet, Take 1.5 tablets by mouth daily, Disp: 45 tablet, Rfl: 2    mycophenolate (CELLCEPT) 500 MG tablet, Take 2 tablets by mouth every morning (before breakfast) AND 2 tablets every evening., Disp: 120 tablet, Rfl: 0    ondansetron (ZOFRAN) 4 MG tablet, Take 1 tablet by mouth 3 times daily as needed for Nausea or Vomiting, Disp: 30 tablet, Rfl: 0    ketorolac (TORADOL) 10 MG tablet, Take 1 tablet by mouth every 6 hours as needed for Pain, Disp: 20 tablet, Rfl: 0    Calcium Carbonate-Vitamin D (OYSTER SHELL CALCIUM/D) 500-200 MG-UNIT TABS, take 1 tablet by mouth once daily, Disp: 90 tablet, Rfl: 3    loratadine (CLARITIN) 10 MG tablet, Take 1 tablet by mouth daily, Disp: 90 tablet, Rfl: 3    acetaminophen (APAP EXTRA STRENGTH) 500 MG tablet, Take 2 tablets by mouth every 6 hours as needed for Pain, Disp: 120 tablet, Rfl: 3    ferrous sulfate (IRON 325) 325 (65 Fe) MG tablet, Take 1 tablet by mouth daily (with breakfast), Disp: 90 tablet, Rfl: 3    simvastatin (ZOCOR) 40 MG tablet, take 1 tablet by mouth at bedtime, Disp: 90 tablet, Rfl: 3    nitroGLYCERIN (NITROSTAT) 0.4 MG SL tablet, Place 1 tablet under the tongue every 5 minutes as needed for Chest pain up to max of 3 total doses.  If no relief after 1 dose, call 911., Disp: 25 tablet, Rfl: 0      SOCIAL HISTORY     Social History     Social History Narrative    Not on file     Social History     Tobacco Use    Smoking status: Former Smoker     Packs/day: 0.25     Types: Cigarettes     Start date: 2012 Quit date:      Years since quittin.8    Smokeless tobacco: Never Used    Tobacco comment: 1 a day   Vaping Use    Vaping Use: Never used   Substance Use Topics    Alcohol use: Yes     Comment: occasionally, couple times a year    Drug use: No         ALLERGIES     Allergies   Allergen Reactions    Codeine Hives and Nausea And Vomiting         FAMILY HISTORY     Family History   Problem Relation Age of Onset    Diabetes Mother     Heart Disease Mother     Thyroid Disease Mother     Parkinsonism Mother     Diabetes Father     Lupus Maternal Cousin          PREVIOUS RECORDS   Previous records reviewed: Previous medical history of lupus, hypertension, current medications mycophenolate. PHYSICAL EXAM     ED Triage Vitals [21 1519]   BP Temp Temp Source Pulse Resp SpO2 Height Weight   (!) 138/91 98.5 °F (36.9 °C) Oral 69 14 97 % -- --     Initial vital signs and nursing assessment reviewed and normal. There is no height or weight on file to calculate BMI. Pulsoximetry is normal per my interpretation. Additional Vital Signs:  Vitals:    21 1519   BP: (!) 138/91   Pulse: 69   Resp: 14   Temp: 98.5 °F (36.9 °C)   SpO2: 97%       Physical Exam  Constitutional:       Appearance: Normal appearance. HENT:      Head: Normocephalic and atraumatic. Nose: Nose normal.      Mouth/Throat:      Mouth: Mucous membranes are moist.   Eyes:      Pupils: Pupils are equal, round, and reactive to light. Cardiovascular:      Rate and Rhythm: Normal rate and regular rhythm. Pulses: Normal pulses. Heart sounds: Normal heart sounds. Pulmonary:      Effort: Pulmonary effort is normal. No respiratory distress. Breath sounds: Normal breath sounds. No stridor. No wheezing, rhonchi or rales. Chest:      Chest wall: No tenderness. Abdominal:      General: Abdomen is flat. Palpations: Abdomen is soft. Musculoskeletal:         General: Normal range of motion.       Cervical back: Normal range of motion and neck supple. Skin:     General: Skin is warm. Capillary Refill: Capillary refill takes less than 2 seconds. Neurological:      General: No focal deficit present. Mental Status: She is alert and oriented to person, place, and time. Psychiatric:         Mood and Affect: Mood normal.             MEDICAL DECISION MAKING   Initial Assessment:   3 54year-old patient, PMH lupus hypertension, complaining of upper respiratory symptoms, with positive COVID-19 contact, normal oxygen saturations, no tachypneic, physical examination normal risk factors associated lupus and hypertension we will rule out COVID-19 pneumonia we will establish severity  2. Clinical impression  a. COVID-19 positive contact  b. Upper respiratory infection. Plan:    Covid 19 rapid test   Chest x-ray, CBC, BMP   Reassessment        ED RESULTS   Laboratory results:  Labs Reviewed   CBC WITH AUTO DIFFERENTIAL - Abnormal; Notable for the following components:       Result Value    RBC 3.97 (*)     Hemoglobin 11.2 (*)     MCHC 29.9 (*)     RDW-CV 15.0 (*)     RDW-SD 52.0 (*)     Monocytes Absolute 0.3 (*)     All other components within normal limits   BASIC METABOLIC PANEL W/ REFLEX TO MG FOR LOW K - Abnormal; Notable for the following components:    CO2 22 (*)     CREATININE 1.5 (*)     All other components within normal limits   GLOMERULAR FILTRATION RATE, ESTIMATED - Abnormal; Notable for the following components:    Est, Glom Filt Rate 37 (*)     All other components within normal limits   COVID-19, RAPID   TROPONIN   ANION GAP   OSMOLALITY       Radiologic studies results:  XR CHEST PORTABLE   Final Result   1. Moderate cardiomegaly, otherwise negative chest x-ray. .               **This report has been created using voice recognition software. It may contain minor errors which are inherent in voice recognition technology. **      Final report electronically signed by DR Salazar Negron on 11/9/2021 3:54 PM          ED Medications administered this visit: Medications - No data to display      ED COURSE      Assessment  COVID-19 negative, chest x-ray normal, troponin negative, acute inflammatory reactants negative EKG normal, patient will currently with upper respiratory infection and COVID-19 positive contact. Decision to discharge with alarm signs recommendations of isolation, follow-up with primary care physician in 2 days patient understands and agrees    Strict return precautions and follow up instructions were discussed with the patient prior to discharge, with which the patient agrees. MEDICATION CHANGES     New Prescriptions    No medications on file         FINAL DISPOSITION     Final diagnoses:   Contact with and (suspected) exposure to covid-19     Condition: condition: good  Dispo: Discharge to home      This transcription was electronically signed. Parts of this transcriptions may have been dictated by use of voice recognition software and electronically transcribed, and parts may have been transcribed with the assistance of an ED scribe. The transcription may contain errors not detected in proofreading. Please refer to my supervising physician's documentation if my documentation differs.     Electronically Signed: Raine Ledesma MD, 11/09/21, 5:11 PM         Raine Ledesma MD  Resident  11/09/21 [id number]

## 2021-11-09 NOTE — TELEPHONE ENCOUNTER
Future Appointments   Date Time Provider Justus Burciagai   11/9/2021  2:40 PM MACKENZIE Prescott - CNP Jyoti Pollock [facility]   1/4/2022  2:00 PM MACKENZIE Mcneill - CNP Dante Lissette Rheum [facility]   1/12/2022 12:15 PM Mariah Mane MD N [facility] Heart [facility] - [facility]   1/12/2022  3:40 PM MACKENZIE Vigil - CNP N [facility] Pain [facility] - [facility]   2/3/2022  3:00 PM MD GAYATRI Cm [facility]. [facility] - [facility]   2/14/2022  2:00 PM Narda Crocker DO N [facility] Rheum [facility] - [facility]

## 2021-11-09 NOTE — PROGRESS NOTES
(NEURONTIN) 400 MG capsule Take 1 capsule by mouth 2 times daily for 90 days. MACKENZIE Chatman CNP   HYDROcodone-acetaminophen (NORCO) 5-325 MG per tablet Take 1 tablet by mouth every 6 hours as needed for Pain for up to 30 days. OK to fill early due to dose change  MACKENZIE Varela CNP   gabapentin (NEURONTIN) 100 MG capsule Take bid with the 400 mg tablet for a total dose of 500 mg bid daily  MACKENZIE Miller CNP   omeprazole (PRILOSEC) 20 MG delayed release capsule take 1 capsule by mouth once daily  MACKENZIE Major CNP   silver sulfADIAZINE (SILVADENE) 1 % cream Apply topically daily. MACKENZIE Grayson CNP   Belimumab (BENLYSTA) 200 MG/ML SOAJ Inject 200 mg into the skin once a week  MACKENZIE Major CNP   hydroxychloroquine (PLAQUENIL) 200 MG tablet Take 1 tablet by mouth 2 times daily  MACKENZIE Major CNP   permethrin (ELIMITE) 5 % cream Apply to wet hair and leave on 10 minutes and then rinse, may repeat on day 9  MACKENZIE Miller CNP   levothyroxine (SYNTHROID) 112 MCG tablet take 1 tablet by mouth once daily  MACKENZIE Miller CNP   predniSONE (DELTASONE) 5 MG tablet Take 1.5 tablets by mouth daily  Jareth Rodriguez DO   mycophenolate (CELLCEPT) 500 MG tablet Take 2 tablets by mouth every morning (before breakfast) AND 2 tablets every evening.   Jareth Rodriguez DO   ondansetron (ZOFRAN) 4 MG tablet Take 1 tablet by mouth 3 times daily as needed for Nausea or Vomiting  MACKENZIE Grayson CNP   ketorolac (TORADOL) 10 MG tablet Take 1 tablet by mouth every 6 hours as needed for Pain  MACKENZIE Grayson CNP   Calcium Carbonate-Vitamin D (OYSTER SHELL CALCIUM/D) 500-200 MG-UNIT TABS take 1 tablet by mouth once daily  MACKENZIE Grayson CNP   loratadine (CLARITIN) 10 MG tablet Take 1 tablet by mouth daily  MACKENZIE Grayson CNP   acetaminophen (APAP EXTRA STRENGTH) 500 MG tablet Take 2 tablets by mouth every 6 hours as needed for Pain  Hui Patches, APRN - CNP   ferrous sulfate (IRON 325) 325 (65 Fe) MG tablet Take 1 tablet by mouth daily (with breakfast)  Hui Patches, APRN - CNP   simvastatin (ZOCOR) 40 MG tablet take 1 tablet by mouth at bedtime  Hui Patches, APRN - CNP   nitroGLYCERIN (NITROSTAT) 0.4 MG SL tablet Place 1 tablet under the tongue every 5 minutes as needed for Chest pain up to max of 3 total doses. If no relief after 1 dose, call 911.   Martell Russell PA-C       Social History     Tobacco Use    Smoking status: Former Smoker     Packs/day: 0.25     Types: Cigarettes     Start date:      Quit date:      Years since quittin.8    Smokeless tobacco: Never Used    Tobacco comment: 1 a day   Vaping Use    Vaping Use: Never used   Substance Use Topics    Alcohol use: Yes     Comment: occasionally, couple times a year    Drug use: No        Allergies   Allergen Reactions    Codeine Hives and Nausea And Vomiting   ,   Past Medical History:   Diagnosis Date    Anemia     CKD (chronic kidney disease), stage III ([address]     Dyslipidemia     Hematuria     Hyperlipidemia     Hypertension     Hyperuricemia     Hypothyroidism     Kidney disease     as a result from lupus    Lupus ([address]     Lupus nephritis ([address]     Proteinuria     Seizures ([address]     Systemic lupus erythematosus ([address]    ,   Past Surgical History:   Procedure Laterality Date    CT BIOPSY ABDOMEN RETROPERITONEUM  1/15/2021    CT BIOPSY ABDOMEN RETROPERITONEUM 1/15/2021 [facility] CT SCAN    LUMBAR NERVE BLOCK N/A 3/11/2019    LESI at L4 performed by Guadalupe Hill MD at [facility] N/A 2019    LESI at L3 #1 performed by Guadalupe Hill MD at [address] Left 2018    LUMBAR TRANSFORAMINAL TFESI L5 LEFT #2, performed by Guadalupe Hill MD at [address] Bilateral     LUMBAR FACET    [place] INJ DX/THER AGNT PARAVERT FACET JOINT, LUMBAR/SAC, 2ND LEVEL Bilateral 2018    LUMBAR FACET  RAMBO [email or web address] Bilateral performed by Candy Okeefe MD at [address] AA&/STRD TFRML EPI LUMBAR/SACRAL 1 LEVEL Left 2018    LUMBAR TRANSFORAMINAL TFESI L5 on the LEFT performed by Candy Okeefe MD at [address] DX/THER SBST INTRLMNR LMBR/SAC W/IMG GDN N/A 2018    LUMBAR INTER LAMINAR GIGI LESI @L4 performed by Candy Okeefe MD at [address] [facility] [address]   ,   Social History     Tobacco Use    Smoking status: Former Smoker     Packs/day: 0.25     Types: Cigarettes     Start date:      Quit date:      Years since quittin.8    Smokeless tobacco: Never Used    Tobacco comment: 1 a day   Vaping Use    Vaping Use: Never used   Substance Use Topics    Alcohol use: Yes     Comment: occasionally, couple times a year    Drug use: No   ,   Family History   Problem Relation Age of Onset    Diabetes Mother     Heart Disease Mother     Thyroid Disease Mother     Parkinsonism Mother     Diabetes Father     Lupus Maternal Cousin    ,   Immunization History   Administered Date(s) Administered    Influenza 10/11/2012    Influenza Virus Vaccine 2015    Influenza, [place], IM, (6 mo and older Fluzone, Flulaval, Fluarix and 3 yrs and older Afluria) 2016    Influenza, Quadv, IM, PF (6 mo and older Fluzone, Flulaval, Fluarix, and 3 yrs and older Afluria) 2015, 2017, 2018, 10/22/2019, 2021    Pneumococcal Polysaccharide ([id number]) 2017    Td, (Adult) Not Adsorbed 2020    Tdap (Boostrix, Adacel) 2013   ,   Health Maintenance   Topic Date Due    Diabetic foot exam  Never done    Diabetic retinal exam  Never done    COVID-19 Vaccine (1) Never done    Diabetic microalbuminuria test  Never done    Hepatitis B vaccine (1 of 3 - Risk 3-dose series) Never done    Colon cancer screen colonoscopy  Never done    Lipid screen  10/22/2020    TSH testing  12/10/2020    A1C test (Diabetic or Prediabetic)  09/01/2021    Flu vaccine (1) 09/01/2021    Cervical cancer screen  04/29/2022    Potassium monitoring  08/05/2022    Creatinine monitoring  08/05/2022    DTaP/Tdap/Td vaccine (3 - Td or Tdap) 08/30/2030    Pneumococcal 0-64 years Vaccine (2 of 2 - PPSV23) 09/30/2037    Hepatitis C screen  Completed    HIV screen  Addressed    Hepatitis A vaccine  Aged Out    Hib vaccine  Aged Out    Meningococcal (ACWY) vaccine  Aged Out       PHYSICAL EXAMINATION:  [ INSTRUCTIONS:  \"[x]\" Indicates a positive item  \"[]\" Indicates a negative item  -- DELETE ALL ITEMS NOT EXAMINED]  Vital Signs: (As obtained by patient/caregiver or practitioner observation)    Blood pressure-  Heart rate-    Respiratory rate-    Temperature-  Pulse oximetry-     Constitutional: [x] Appears well-developed and well-nourished [x] No apparent distress      [] Abnormal-   Mental status  [x] Alert and awake  [x] Oriented to person/place/time [x]Able to follow commands      Eyes:  EOM    []  Normal  [] Abnormal-  Sclera  []  Normal  [] Abnormal -         Discharge [x]  None visible  [] Abnormal -    HENT:   [] Normocephalic, atraumatic. [] Abnormal   [x] Mouth/Throat: Mucous membranes are moist.     External Ears [] Normal  [] Abnormal-     Neck: [] No visualized mass     Pulmonary/Chest: [x] Respiratory effort normal.  [x] No visualized signs of difficulty breathing or respiratory distress, pt did not cough during visit no difficulty with completing sentences.          [] Abnormal-      Musculoskeletal:   [] Normal gait with no signs of ataxia         [] Normal range of motion of neck        [] Abnormal-       Neurological:        [] No Facial Asymmetry (Cranial nerve 7 motor function) (limited exam to video visit)          [] No gaze palsy        [] Abnormal-         Skin:        [x] No significant exanthematous lesions or discoloration noted on facial skin [] Abnormal-            Psychiatric:       [x] Normal Affect [x] No Hallucinations        [] Abnormal-     Other pertinent observable physical exam findings-     ASSESSMENT/PLAN:  1. Close exposure to COVID-19 virus  R/o covid  vitamin c, d,m and zinc  increase fluids  Call office for any worsening symptoms. Tylenol for fever or pain   - COVID-19; Future    2. Chest congestion  #1  mucinex  albuterol  - COVID-19; Future    3. Body aches  #1  - COVID-19; Future    4. Nonintractable headache, unspecified chronicity pattern, unspecified headache type  #1- COVID-19; Future      Return if symptoms worsen or fail to improve. Yoselin Cons, was evaluated through a synchronous (real-time) audio-video encounter. The patient (or guardian if applicable) is aware that this is a billable service. Verbal consent to proceed has been obtained within the past 12 months. The visit was conducted pursuant to the emergency declaration under the [address] [place] [address] authority and the [employer] and [employer] General Act. Patient identification was verified, and a caregiver was present when appropriate. The patient was located in a state where the provider was credentialed to provide care. Total time spent on this encounter: Not billed by time    --MACKENZIE Garcia CNP on 11/9/2021 at 3:04 PM    An electronic signature was used to authenticate this note.

## 2021-11-09 NOTE — TELEPHONE ENCOUNTER
Patient stats that she was off work 11.08.2021 and needs a note to go back to work and wonders if she needs to get tested for COVID  Patient reports the following sxs cough, stuffy nose, cant breathe, headaches

## 2021-11-09 NOTE — Clinical Note
Adrián Peraza was seen and treated in our emergency department on 11/9/2021. She may return to work on 11/12/2021. If you have any questions or concerns, please don't hesitate to call.       Adolph Smalls MD

## 2021-11-10 LAB
EKG ATRIAL RATE: 68 BPM
EKG P AXIS: 77 DEGREES
EKG P-R INTERVAL: 168 MS
EKG Q-T INTERVAL: 446 MS
EKG QRS DURATION: 86 MS
EKG QTC CALCULATION (BAZETT): 474 MS
EKG R AXIS: 76 DEGREES
EKG T AXIS: 78 DEGREES
EKG VENTRICULAR RATE: 68 BPM

## 2021-11-10 PROCEDURE — 93010 ELECTROCARDIOGRAM REPORT: CPT | Performed by: INTERNAL MEDICINE

## 2021-11-16 DIAGNOSIS — G89.4 CHRONIC PAIN SYNDROME: ICD-10-CM

## 2021-11-16 RX ORDER — HYDROCODONE BITARTRATE AND ACETAMINOPHEN 5; 325 MG/1; MG/1
1 TABLET ORAL EVERY 6 HOURS PRN
Qty: 120 TABLET | Refills: 0 | Status: SHIPPED | OUTPATIENT
Start: 2021-11-18 | End: 2021-12-16 | Stop reason: SDUPTHER

## 2021-11-16 NOTE — TELEPHONE ENCOUNTER
OARRS reviewed. UDS: + for  norhydrocodone gabapentin consistent. Last seen: 10/12/2021.  Follow-up:   Future Appointments   Date Time Provider Justus Dumont   1/4/2022  2:00 PM MACKENZIE Mckeon - CNP N [facility] Rheum [facility] [place]   1/12/2022 12:15 PM Lorenzo Ball MD N [facility] Heart [facility] [place]   1/12/2022  3:40 PM MACKENZIE Castillo - CNP N [facility] Pain [facility] [place]   2/3/2022  3:00 PM Chris Arndt MD N [facility]. [facility] [place]   2/14/2022  2:00 PM DO GAYATRI Montelongo [facility] Rheum [facility] [place]

## 2021-11-19 ENCOUNTER — TELEPHONE (OUTPATIENT)
Dept: FAMILY MEDICINE CLINIC | Age: 51
End: 2021-11-19

## 2021-11-19 NOTE — TELEPHONE ENCOUNTER
----- Message from [facility] sent at 11/18/2021  4:55 PM EST -----  Subject: Message to Provider    QUESTIONS  Information for Provider? patient would like to return to work. She would   like to come  a letter so that she can go back. Does she need an   appointment for this. Can someone call and let her know if she can just   come  or if she has to have an appointment.   ---------------------------------------------------------------------------  --------------  [employer]  What is the best way for the office to contact you? OK to leave message on   voicemail  Preferred Call Back Phone Number? [phone]  ---------------------------------------------------------------------------  --------------  SCRIPT ANSWERS  Relationship to Patient?  Self

## 2021-12-16 DIAGNOSIS — G89.4 CHRONIC PAIN SYNDROME: ICD-10-CM

## 2021-12-16 RX ORDER — HYDROCODONE BITARTRATE AND ACETAMINOPHEN 5; 325 MG/1; MG/1
1 TABLET ORAL EVERY 6 HOURS PRN
Qty: 120 TABLET | Refills: 0 | Status: SHIPPED | OUTPATIENT
Start: 2021-12-18 | End: 2022-01-12 | Stop reason: SDUPTHER

## 2021-12-16 NOTE — TELEPHONE ENCOUNTER
OARRS reviewed. UDS: + for  norhydrocodone gabapentin consisternt. Last seen: 10/12/2021.  Follow-up:   Future Appointments   Date Time Provider Justus Dumont   1/4/2022  2:00 PM MACKENZIE Sánchez - CNP N [facility] Rheum [facility] [place]   1/12/2022 12:15 PM Ede Benites MD N [facility] Heart [facility] [place]   1/12/2022  3:40 PM MACKENZIE Zapata - CNP N [facility] Pain [facility] [place]   2/3/2022  3:00 PM Minoo Tapia MD N [facility]. [facility] [place]   2/14/2022  2:00 PM Lexx Canales DO N [facility] Rheum [facility] [place]

## 2022-01-04 ENCOUNTER — NURSE ONLY (OUTPATIENT)
Dept: LAB | Age: 52
End: 2022-01-04

## 2022-01-04 ENCOUNTER — OFFICE VISIT (OUTPATIENT)
Dept: RHEUMATOLOGY | Age: 52
End: 2022-01-04
Payer: COMMERCIAL

## 2022-01-04 VITALS
DIASTOLIC BLOOD PRESSURE: 78 MMHG | HEIGHT: 66 IN | WEIGHT: 161 LBS | SYSTOLIC BLOOD PRESSURE: 112 MMHG | HEART RATE: 71 BPM | BODY MASS INDEX: 25.88 KG/M2 | RESPIRATION RATE: 18 BRPM | OXYGEN SATURATION: 97 %

## 2022-01-04 DIAGNOSIS — M32.14 SYSTEMIC LUPUS ERYTHEMATOSUS WITH GLOMERULAR DISEASE, UNSPECIFIED SLE TYPE (HCC): ICD-10-CM

## 2022-01-04 DIAGNOSIS — Z51.81 MEDICATION MONITORING ENCOUNTER: ICD-10-CM

## 2022-01-04 DIAGNOSIS — M32.14 SYSTEMIC LUPUS ERYTHEMATOSUS WITH GLOMERULAR DISEASE, UNSPECIFIED SLE TYPE (HCC): Primary | ICD-10-CM

## 2022-01-04 DIAGNOSIS — M54.40 BACK PAIN OF LUMBAR REGION WITH SCIATICA: ICD-10-CM

## 2022-01-04 DIAGNOSIS — M32.14 OTHER SYSTEMIC LUPUS ERYTHEMATOSUS WITH GLOMERULAR DISEASE (HCC): ICD-10-CM

## 2022-01-04 DIAGNOSIS — Z87.39 H/O LUPUS NEPHRITIS: ICD-10-CM

## 2022-01-04 LAB
ALBUMIN SERPL-MCNC: 4.4 G/DL (ref 3.5–5.1)
ALP BLD-CCNC: 64 U/L (ref 38–126)
ALT SERPL-CCNC: 32 U/L (ref 11–66)
ANION GAP SERPL CALCULATED.3IONS-SCNC: 14 MEQ/L (ref 8–16)
AST SERPL-CCNC: 50 U/L (ref 5–40)
BACTERIA: ABNORMAL
BASOPHILS # BLD: 0.5 %
BASOPHILS ABSOLUTE: 0 THOU/MM3 (ref 0–0.1)
BILIRUB SERPL-MCNC: 0.2 MG/DL (ref 0.3–1.2)
BILIRUBIN URINE: NEGATIVE
BLOOD, URINE: ABNORMAL
BUN BLDV-MCNC: 16 MG/DL (ref 7–22)
C-REACTIVE PROTEIN: < 0.3 MG/DL (ref 0–1)
CALCIUM SERPL-MCNC: 10.2 MG/DL (ref 8.5–10.5)
CASTS: ABNORMAL /LPF
CASTS: ABNORMAL /LPF
CHARACTER, URINE: CLEAR
CHLORIDE BLD-SCNC: 100 MEQ/L (ref 98–111)
CO2: 28 MEQ/L (ref 23–33)
COLOR: YELLOW
CREAT SERPL-MCNC: 1.9 MG/DL (ref 0.4–1.2)
CREATININE URINE: 376.6 MG/DL
CRYSTALS: ABNORMAL
EOSINOPHIL # BLD: 0.2 %
EOSINOPHILS ABSOLUTE: 0 THOU/MM3 (ref 0–0.4)
EPITHELIAL CELLS, UA: ABNORMAL /HPF
ERYTHROCYTE [DISTWIDTH] IN BLOOD BY AUTOMATED COUNT: 15.9 % (ref 11.5–14.5)
ERYTHROCYTE [DISTWIDTH] IN BLOOD BY AUTOMATED COUNT: 54.5 FL (ref 35–45)
GFR SERPL CREATININE-BSD FRML MDRD: 28 ML/MIN/1.73M2
GLUCOSE BLD-MCNC: 93 MG/DL (ref 70–108)
GLUCOSE, URINE: NEGATIVE MG/DL
HCT VFR BLD CALC: 36.3 % (ref 37–47)
HEMOGLOBIN: 11.3 GM/DL (ref 12–16)
IMMATURE GRANS (ABS): 0.02 THOU/MM3 (ref 0–0.07)
IMMATURE GRANULOCYTES: 0.4 %
KETONES, URINE: ABNORMAL
LEUKOCYTE EST, POC: NEGATIVE
LYMPHOCYTES # BLD: 12.3 %
LYMPHOCYTES ABSOLUTE: 0.7 THOU/MM3 (ref 1–4.8)
MCH RBC QN AUTO: 29 PG (ref 26–33)
MCHC RBC AUTO-ENTMCNC: 31.1 GM/DL (ref 32.2–35.5)
MCV RBC AUTO: 93.1 FL (ref 81–99)
MISCELLANEOUS LAB TEST RESULT: ABNORMAL
MONOCYTES # BLD: 4 %
MONOCYTES ABSOLUTE: 0.2 THOU/MM3 (ref 0.4–1.3)
NITRITE, URINE: NEGATIVE
NUCLEATED RED BLOOD CELLS: 0 /100 WBC
PH UA: 5 (ref 5–9)
PLATELET # BLD: 157 THOU/MM3 (ref 130–400)
PMV BLD AUTO: 12.6 FL (ref 9.4–12.4)
POTASSIUM SERPL-SCNC: 3.7 MEQ/L (ref 3.5–5.2)
PROT/CREAT RATIO, UR: 1.9
PROTEIN UA: >= 300 MG/DL
PROTEIN, URINE: 714 MG/DL
RBC # BLD: 3.9 MILL/MM3 (ref 4.2–5.4)
RBC URINE: ABNORMAL /HPF
RENAL EPITHELIAL, UA: ABNORMAL
SEDIMENTATION RATE, ERYTHROCYTE: 30 MM/HR (ref 0–20)
SEG NEUTROPHILS: 82.6 %
SEGMENTED NEUTROPHILS ABSOLUTE COUNT: 4.5 THOU/MM3 (ref 1.8–7.7)
SODIUM BLD-SCNC: 142 MEQ/L (ref 135–145)
SPECIFIC GRAVITY UA: 1.03 (ref 1–1.03)
TOTAL PROTEIN: 7.1 G/DL (ref 6.1–8)
UROBILINOGEN, URINE: 0.2 EU/DL (ref 0–1)
WBC # BLD: 5.5 THOU/MM3 (ref 4.8–10.8)
WBC UA: ABNORMAL /HPF
YEAST: ABNORMAL

## 2022-01-04 PROCEDURE — G8484 FLU IMMUNIZE NO ADMIN: HCPCS | Performed by: NURSE PRACTITIONER

## 2022-01-04 PROCEDURE — G8417 CALC BMI ABV UP PARAM F/U: HCPCS | Performed by: NURSE PRACTITIONER

## 2022-01-04 PROCEDURE — 99214 OFFICE O/P EST MOD 30 MIN: CPT | Performed by: NURSE PRACTITIONER

## 2022-01-04 PROCEDURE — G8427 DOCREV CUR MEDS BY ELIG CLIN: HCPCS | Performed by: NURSE PRACTITIONER

## 2022-01-04 PROCEDURE — 1036F TOBACCO NON-USER: CPT | Performed by: NURSE PRACTITIONER

## 2022-01-04 ASSESSMENT — ENCOUNTER SYMPTOMS
DIARRHEA: 0
COUGH: 0
CONSTIPATION: 0
EYE ITCHING: 0
TROUBLE SWALLOWING: 0
ABDOMINAL PAIN: 1
SHORTNESS OF BREATH: 1
BACK PAIN: 1
EYE PAIN: 0
NAUSEA: 0

## 2022-01-04 NOTE — PROGRESS NOTES
[facility] RHEUMATOLOGY FOLLOW UP NOTE       Date Of Service: 1/4/2022  Provider: MACKENZIE Maria - CNP    Name: Mindy Live   MRN: [id number]    Chief Complaint(s)     Chief Complaint   Patient presents with    Follow-up     3 mo f/u - systemic lupus srythematosus with glomerular disease         History of Present Illness (HPI)     Mindy Live  is a(n)51 y.o. female with a hx of systemic lupus with hiistory of class IV lupus nephritis, CKD stage III, chronic low back pain w/ radiculopathy, lumbar stenosis, h/o seizures here for the f/u evaluation of systemic lupus     Interval hx:    - still hasn't started benlysta   - patient states she is still taking mycophenolate, but we have not filled since july    pain affecting the right fingers, right wrist, right elbow, right ankle, right toes, neck, back  Pain on a scale 0-10: 7.5/10  Type of pain: constant  Timing: mornings and evenings  Aggravating factors: weather, back: prolonged bending, forward flexion. Right hand and arm: increases use  Alleviating factors: norco, hot back, gabapentin    Associated symptoms:  Denies swelling/  Redness/ warmth, + AM stiffness lasting ~ 30 minutes      REVIEW OF SYSTEMS: (ROS)    Review of Systems   Constitutional: Positive for fatigue. Negative for appetite change, fever and unexpected weight change. HENT: Negative for congestion and trouble swallowing. Hair loss, dry mouth   Eyes: Negative for pain and itching. Dry eyes   Respiratory: Positive for shortness of breath. Negative for cough. Cardiovascular: Negative for chest pain and leg swelling. Gastrointestinal: Positive for abdominal pain. Negative for constipation, diarrhea and nausea. Endocrine: Negative for cold intolerance and heat intolerance. Genitourinary: Negative for difficulty urinating, frequency and urgency. Musculoskeletal: Positive for arthralgias, back pain, joint swelling, myalgias and neck pain.    Skin: Negative for rash. Neurological: Positive for numbness and headaches. Negative for dizziness and weakness. Psychiatric/Behavioral: Positive for sleep disturbance. The patient is not nervous/anxious.         PAST MEDICAL HISTORY      Past Medical History:   Diagnosis Date    Anemia     CKD (chronic kidney disease), stage III ([address]     Dyslipidemia     Hematuria     Hyperlipidemia     Hypertension     Hyperuricemia     Hypothyroidism     Kidney disease     as a result from lupus    Lupus ([facility]     Lupus nephritis ([address]     Proteinuria     Seizures ([facility]     Systemic lupus erythematosus ([facility]        PAST SURGICAL HISTORY      Past Surgical History:   Procedure Laterality Date    CT BIOPSY ABDOMEN RETROPERITONEUM  1/15/2021    CT BIOPSY ABDOMEN RETROPERITONEUM 1/15/2021 [facility] CT SCAN    LUMBAR NERVE BLOCK N/A 3/11/2019    LESI at L4 performed by Kody Mann MD at [facility] N/A 5/21/2019    LESI at L3 #1 performed by Kody Mann MD at [address] SURGERY Left 12/13/2018    LUMBAR TRANSFORAMINAL TFESI L5 LEFT #2, performed by Kody Mann MD at [address] [place] [address] Bilateral     LUMBAR FACET    [place] INJ DX/THER AGNT PARAVERT FACET JOINT, LUMBAR/SAC, 2ND LEVEL Bilateral 5/7/2018    LUMBAR FACET  MBB [email or web address] Bilateral performed by Kody Mann MD at [address] [place] [address] AA&/STRD TFRML EPI LUMBAR/SACRAL 1 LEVEL Left 9/25/2018    LUMBAR TRANSFORAMINAL TFESI L5 on the LEFT performed by Kody Mann MD at [address] [place] [address] DX/THER SBST INTRLMNR LMBR/SAC W/IMG GDN N/A 7/19/2018    LUMBAR INTER LAMINAR GIGI LESI @L4 performed by Kody Mann MD at [address] [place] [address]      Family History   Problem Relation Age of Onset    Diabetes Mother     Heart Disease Mother     Thyroid Disease Mother     Parkinsonism Mother     Diabetes Father     Lupus Maternal Cousin        SOCIAL HISTORY      Social History     Tobacco History     Smoking Status  Former Smoker Smoking Tobacco Type  Cigarettes    Smokeless Tobacco Use  Never Used    Tobacco Comment  1 a day          Alcohol History     Alcohol Use Status  Yes Drinks/Week  0 Standard drinks or equivalent per week Amount  0.0 standard drinks of alcohol/wk Comment  occasionally, couple times a year          Drug Use     Drug Use Status  No          Sexual Activity     Sexually Active  Not Asked                ALLERGIES     Allergies   Allergen Reactions    Codeine Hives and Nausea And Vomiting       CURRENT MEDICATIONS      Current Outpatient Medications   Medication Sig Dispense Refill    HYDROcodone-acetaminophen (NORCO) 5-325 MG per tablet Take 1 tablet by mouth every 6 hours as needed for Pain for up to 30 days. 120 tablet 0    albuterol sulfate HFA (PROVENTIL HFA) 108 (90 Base) MCG/ACT inhaler Inhale 2 puffs into the lungs every 6 hours as needed for Wheezing 18 g 3    gabapentin (NEURONTIN) 400 MG capsule Take 1 capsule by mouth 2 times daily for 90 days. 180 capsule 1    omeprazole (PRILOSEC) 20 MG delayed release capsule take 1 capsule by mouth once daily 30 capsule 3    silver sulfADIAZINE (SILVADENE) 1 % cream Apply topically daily.  50 g 1    Belimumab (BENLYSTA) 200 MG/ML SOAJ Inject 200 mg into the skin once a week 4 mL 5    hydroxychloroquine (PLAQUENIL) 200 MG tablet Take 1 tablet by mouth 2 times daily 60 tablet 5    permethrin (ELIMITE) 5 % cream Apply to wet hair and leave on 10 minutes and then rinse, may repeat on day 9 3 Tube 1    levothyroxine (SYNTHROID) 112 MCG tablet take 1 tablet by mouth once daily 90 tablet 1    predniSONE (DELTASONE) 5 MG tablet Take 1.5 tablets by mouth daily 45 tablet 2    ondansetron (ZOFRAN) 4 MG tablet Take 1 tablet by mouth 3 times daily as needed for Nausea or Vomiting 30 tablet 0    ketorolac (TORADOL) 10 MG tablet Take 1 tablet by mouth every 6 hours as needed for Pain 20 tablet 0    Calcium Carbonate-Vitamin D (OYSTER SHELL CALCIUM/D) 500-200 MG-UNIT TABS take 1 tablet by mouth once daily 90 tablet 3    loratadine (CLARITIN) 10 MG tablet Take 1 tablet by mouth daily 90 tablet 3    acetaminophen (APAP EXTRA STRENGTH) 500 MG tablet Take 2 tablets by mouth every 6 hours as needed for Pain 120 tablet 3    ferrous sulfate (IRON 325) 325 (65 Fe) MG tablet Take 1 tablet by mouth daily (with breakfast) 90 tablet 3    simvastatin (ZOCOR) 40 MG tablet take 1 tablet by mouth at bedtime 90 tablet 3    nitroGLYCERIN (NITROSTAT) 0.4 MG SL tablet Place 1 tablet under the tongue every 5 minutes as needed for Chest pain up to max of 3 total doses. If no relief after 1 dose, call 911. 25 tablet 0    gabapentin (NEURONTIN) 100 MG capsule Take bid with the 400 mg tablet for a total dose of 500 mg bid daily 180 capsule 0    mycophenolate (CELLCEPT) 500 MG tablet Take 2 tablets by mouth every morning (before breakfast) AND 2 tablets every evening. 120 tablet 0     No current facility-administered medications for this visit. PHYSICAL EXAMINATION / OBJECTIVE   Objective:  /78 (Site: Left Upper Arm, Position: Sitting, Cuff Size: Large Adult)   Pulse 71   Resp 18   Ht 5' 6\" (1.676 m)   Wt 161 lb (73 kg)   SpO2 97%   BMI 25.99 kg/m²     Physical Exam  Vitals reviewed. Constitutional:       Appearance: She is well-developed. Cardiovascular:      Rate and Rhythm: Normal rate and regular rhythm. Pulmonary:      Effort: Pulmonary effort is normal.      Breath sounds: Normal breath sounds. Abdominal:      Palpations: Abdomen is soft. Tenderness: There is no abdominal tenderness. Musculoskeletal:      Cervical back: Normal range of motion and neck supple. Skin:     General: Skin is warm and dry. Findings: No rash. Neurological:      Mental Status: She is alert and oriented to person, place, and time.       Deep Tendon Reflexes: Reflexes are normal and symmetric. Psychiatric:         Thought Content:  Thought content normal.        Upper extremities:    SHOULDERS nontender, no swelling ,   ELBOWS nontender, no swelling  WRISTS nontender, + tinel bilat,   HANDS/FINGERS nontender, + bogginess right 2-4    Lower extremities:  HIPS nontender  KNEES nontender, no swelling  ANKLES nontender, no swelling   FEET : nontender, no swelling       RAPID 3:   1/4/2022 --- RAPID 3: 3.7 + 8 + 7 = 18.7     Remission: <3  Low Disease Activity: <6  Moderate Disease Activity: >=6 and <=12  High Disease Activity: >12     LABS    CBC  Lab Results   Component Value Date    WBC 5.2 11/09/2021    RBC 3.97 11/09/2021    HGB 11.2 11/09/2021    HCT 37.4 11/09/2021    MCV 94.2 11/09/2021    MCH 28.2 11/09/2021    MCHC 29.9 11/09/2021    RDW 18.6 03/24/2020     11/09/2021       CMP  Lab Results   Component Value Date    CALCIUM 9.5 11/09/2021    LABALBU 4.0 08/05/2021    PROT 6.6 08/05/2021     11/09/2021    K 3.7 11/09/2021    CO2 22 11/09/2021     11/09/2021    BUN 19 11/09/2021    CREATININE 1.5 11/09/2021    ALKPHOS 58 08/05/2021    ALT 11 08/05/2021    AST 17 08/05/2021       HgBA1c: No components found for: HGBA1C    Lab Results   Component Value Date    VITD25 15 08/12/2020         Lab Results   Component Value Date    ANASCRN Detected (A) 06/17/2019     Lab Results   Component Value Date    SSA SEE BELOW 06/17/2019     Lab Results   Component Value Date    SSB 0 06/17/2019     Lab Results   Component Value Date    ANTI-SMITH 4 06/17/2019     Lab Results   Component Value Date    DSDNAAB SEE BELOW 06/17/2019      No results found for: ANTIRNP  Lab Results   Component Value Date    C3 101 08/05/2021    C4 21 08/05/2021     Lab Results   Component Value Date    CCPAB 8 06/17/2019     Lab Results   Component Value Date    RF 11 06/17/2019       No components found for: CANCASCRN, APANCASCRN  Lab Results   Component Value Date    [facility]

## 2022-01-05 ENCOUNTER — TELEPHONE (OUTPATIENT)
Dept: RHEUMATOLOGY | Age: 52
End: 2022-01-05

## 2022-01-05 DIAGNOSIS — R74.01 ELEVATED AST (SGOT): ICD-10-CM

## 2022-01-05 DIAGNOSIS — M32.14 SYSTEMIC LUPUS ERYTHEMATOSUS WITH GLOMERULAR DISEASE, UNSPECIFIED SLE TYPE (HCC): Primary | ICD-10-CM

## 2022-01-05 DIAGNOSIS — Z87.39 H/O LUPUS NEPHRITIS: ICD-10-CM

## 2022-01-05 NOTE — TELEPHONE ENCOUNTER
Pt called back reviewed note from Dr. Abran Campbell pt voiced understanding and stated she will go get labs when she goes to her next appt 01/12/2022 advised pt that we really want these done sooner to get things figured out ASAP and her back on medications.  Pt voiced understanding

## 2022-01-05 NOTE — TELEPHONE ENCOUNTER
Diagnosis Orders   1. Systemic lupus erythematosus with glomerular disease, unspecified SLE type ([facility]     2. H/O lupus nephritis     3. Elevated AST (SGOT)  CK    Aldolase    Lactate Dehydrogenase    Gamma GT     - concern for medication non-complaince patient w/ worsening proteinuria, Mycophenolate not refilled since July 2021.   - Pt called but there was no answer. A message was left on the patients voicemail asking them to call back.    - additional testing to evaluate the LFT abnoramlities have been ordered

## 2022-01-06 LAB
C3 COMPLEMENT: 100 MG/DL (ref 90–180)
COMPLEMENT C4: 22 MG/DL (ref 10–40)

## 2022-01-12 ENCOUNTER — NURSE ONLY (OUTPATIENT)
Dept: LAB | Age: 52
End: 2022-01-12

## 2022-01-12 ENCOUNTER — OFFICE VISIT (OUTPATIENT)
Dept: PHYSICAL MEDICINE AND REHAB | Age: 52
End: 2022-01-12
Payer: COMMERCIAL

## 2022-01-12 ENCOUNTER — OFFICE VISIT (OUTPATIENT)
Dept: CARDIOLOGY CLINIC | Age: 52
End: 2022-01-12
Payer: COMMERCIAL

## 2022-01-12 VITALS
SYSTOLIC BLOOD PRESSURE: 130 MMHG | HEART RATE: 62 BPM | HEIGHT: 66 IN | BODY MASS INDEX: 24.59 KG/M2 | DIASTOLIC BLOOD PRESSURE: 78 MMHG | WEIGHT: 153 LBS

## 2022-01-12 VITALS
BODY MASS INDEX: 24.59 KG/M2 | WEIGHT: 153 LBS | DIASTOLIC BLOOD PRESSURE: 80 MMHG | HEIGHT: 66 IN | HEART RATE: 68 BPM | SYSTOLIC BLOOD PRESSURE: 122 MMHG

## 2022-01-12 DIAGNOSIS — M46.1 BILATERAL SACROILIITIS (HCC): ICD-10-CM

## 2022-01-12 DIAGNOSIS — R74.01 ELEVATED AST (SGOT): ICD-10-CM

## 2022-01-12 DIAGNOSIS — I10 PRIMARY HYPERTENSION: ICD-10-CM

## 2022-01-12 DIAGNOSIS — Z51.81 MEDICATION MONITORING ENCOUNTER: ICD-10-CM

## 2022-01-12 DIAGNOSIS — M32.14 SYSTEMIC LUPUS ERYTHEMATOSUS WITH GLOMERULAR DISEASE, UNSPECIFIED SLE TYPE (HCC): ICD-10-CM

## 2022-01-12 DIAGNOSIS — M54.12 CERVICAL RADICULITIS: ICD-10-CM

## 2022-01-12 DIAGNOSIS — M48.062 LUMBAR STENOSIS WITH NEUROGENIC CLAUDICATION: ICD-10-CM

## 2022-01-12 DIAGNOSIS — M54.16 LUMBAR RADICULITIS: ICD-10-CM

## 2022-01-12 DIAGNOSIS — M54.16 LUMBAR RADICULOPATHY: ICD-10-CM

## 2022-01-12 DIAGNOSIS — N18.32 STAGE 3B CHRONIC KIDNEY DISEASE (HCC): ICD-10-CM

## 2022-01-12 DIAGNOSIS — G89.4 CHRONIC PAIN SYNDROME: ICD-10-CM

## 2022-01-12 DIAGNOSIS — M47.816 SPONDYLOSIS OF LUMBAR REGION WITHOUT MYELOPATHY OR RADICULOPATHY: Primary | ICD-10-CM

## 2022-01-12 DIAGNOSIS — M46.1 SI (SACROILIAC) JOINT INFLAMMATION (HCC): ICD-10-CM

## 2022-01-12 DIAGNOSIS — I25.810 CORONARY ARTERY DISEASE INVOLVING CORONARY BYPASS GRAFT OF NATIVE HEART WITHOUT ANGINA PECTORIS: Primary | ICD-10-CM

## 2022-01-12 DIAGNOSIS — M47.814 SPONDYLOSIS OF THORACIC REGION WITHOUT MYELOPATHY OR RADICULOPATHY: ICD-10-CM

## 2022-01-12 PROBLEM — Q24.5 MALFORMATION OF CORONARY VESSELS: Status: ACTIVE | Noted: 2022-01-12

## 2022-01-12 LAB
ALBUMIN SERPL-MCNC: 4.8 G/DL (ref 3.5–5.1)
ALP BLD-CCNC: 66 U/L (ref 38–126)
ALT SERPL-CCNC: 21 U/L (ref 11–66)
ANION GAP SERPL CALCULATED.3IONS-SCNC: 14 MEQ/L (ref 8–16)
AST SERPL-CCNC: 30 U/L (ref 5–40)
BASOPHILS # BLD: 0.3 %
BASOPHILS ABSOLUTE: 0 THOU/MM3 (ref 0–0.1)
BILIRUB SERPL-MCNC: 0.3 MG/DL (ref 0.3–1.2)
BUN BLDV-MCNC: 27 MG/DL (ref 7–22)
C-REACTIVE PROTEIN: < 0.3 MG/DL (ref 0–1)
CALCIUM SERPL-MCNC: 10.2 MG/DL (ref 8.5–10.5)
CHLORIDE BLD-SCNC: 99 MEQ/L (ref 98–111)
CO2: 27 MEQ/L (ref 23–33)
CREAT SERPL-MCNC: 2.1 MG/DL (ref 0.4–1.2)
CREATININE URINE: 230.3 MG/DL
EOSINOPHIL # BLD: 0.2 %
EOSINOPHILS ABSOLUTE: 0 THOU/MM3 (ref 0–0.4)
ERYTHROCYTE [DISTWIDTH] IN BLOOD BY AUTOMATED COUNT: 16.1 % (ref 11.5–14.5)
ERYTHROCYTE [DISTWIDTH] IN BLOOD BY AUTOMATED COUNT: 57 FL (ref 35–45)
GAMMA GLUTAMYL TRANSFERASE: 10 U/L (ref 8–69)
GFR SERPL CREATININE-BSD FRML MDRD: 25 ML/MIN/1.73M2
GLUCOSE BLD-MCNC: 97 MG/DL (ref 70–108)
HCT VFR BLD CALC: 37.7 % (ref 37–47)
HEMOGLOBIN: 11.3 GM/DL (ref 12–16)
IMMATURE GRANS (ABS): 0.02 THOU/MM3 (ref 0–0.07)
IMMATURE GRANULOCYTES: 0.3 %
LD: 485 U/L (ref 100–190)
LYMPHOCYTES # BLD: 13.2 %
LYMPHOCYTES ABSOLUTE: 0.8 THOU/MM3 (ref 1–4.8)
MCH RBC QN AUTO: 28.8 PG (ref 26–33)
MCHC RBC AUTO-ENTMCNC: 30 GM/DL (ref 32.2–35.5)
MCV RBC AUTO: 96.2 FL (ref 81–99)
MONOCYTES # BLD: 3.4 %
MONOCYTES ABSOLUTE: 0.2 THOU/MM3 (ref 0.4–1.3)
NUCLEATED RED BLOOD CELLS: 0 /100 WBC
PLATELET # BLD: ABNORMAL THOU/MM3 (ref 130–400)
PLATELET ESTIMATE: ABNORMAL
POTASSIUM SERPL-SCNC: 4 MEQ/L (ref 3.5–5.2)
PROT/CREAT RATIO, UR: 2.42
PROTEIN, URINE: 558 MG/DL
RBC # BLD: 3.92 MILL/MM3 (ref 4.2–5.4)
SCAN OF BLOOD SMEAR: NORMAL
SEDIMENTATION RATE, ERYTHROCYTE: 12 MM/HR (ref 0–20)
SEG NEUTROPHILS: 82.6 %
SEGMENTED NEUTROPHILS ABSOLUTE COUNT: 5.3 THOU/MM3 (ref 1.8–7.7)
SODIUM BLD-SCNC: 140 MEQ/L (ref 135–145)
TOTAL CK: 533 U/L (ref 30–135)
TOTAL PROTEIN: 7.3 G/DL (ref 6.1–8)
WBC # BLD: 6.4 THOU/MM3 (ref 4.8–10.8)

## 2022-01-12 PROCEDURE — 1036F TOBACCO NON-USER: CPT | Performed by: NUCLEAR MEDICINE

## 2022-01-12 PROCEDURE — G8427 DOCREV CUR MEDS BY ELIG CLIN: HCPCS | Performed by: NUCLEAR MEDICINE

## 2022-01-12 PROCEDURE — G8420 CALC BMI NORM PARAMETERS: HCPCS | Performed by: NUCLEAR MEDICINE

## 2022-01-12 PROCEDURE — 1036F TOBACCO NON-USER: CPT | Performed by: NURSE PRACTITIONER

## 2022-01-12 PROCEDURE — G8484 FLU IMMUNIZE NO ADMIN: HCPCS | Performed by: NUCLEAR MEDICINE

## 2022-01-12 PROCEDURE — G8427 DOCREV CUR MEDS BY ELIG CLIN: HCPCS | Performed by: NURSE PRACTITIONER

## 2022-01-12 PROCEDURE — 99213 OFFICE O/P EST LOW 20 MIN: CPT | Performed by: NUCLEAR MEDICINE

## 2022-01-12 PROCEDURE — G8420 CALC BMI NORM PARAMETERS: HCPCS | Performed by: NURSE PRACTITIONER

## 2022-01-12 PROCEDURE — 99214 OFFICE O/P EST MOD 30 MIN: CPT | Performed by: NURSE PRACTITIONER

## 2022-01-12 PROCEDURE — G8484 FLU IMMUNIZE NO ADMIN: HCPCS | Performed by: NURSE PRACTITIONER

## 2022-01-12 RX ORDER — HYDROCODONE BITARTRATE AND ACETAMINOPHEN 5; 325 MG/1; MG/1
1 TABLET ORAL EVERY 6 HOURS PRN
Qty: 120 TABLET | Refills: 0 | Status: SHIPPED | OUTPATIENT
Start: 2022-01-17 | End: 2022-02-16 | Stop reason: SDUPTHER

## 2022-01-12 ASSESSMENT — ENCOUNTER SYMPTOMS
VOMITING: 0
EYE PAIN: 0
WHEEZING: 0
CONSTIPATION: 0
CHEST TIGHTNESS: 0
ABDOMINAL PAIN: 0
COLOR CHANGE: 0
SINUS PRESSURE: 0
BACK PAIN: 1
RHINORRHEA: 0
SORE THROAT: 0
COUGH: 0
PHOTOPHOBIA: 0
SHORTNESS OF BREATH: 0
NAUSEA: 0
DIARRHEA: 0

## 2022-01-12 NOTE — PROGRESS NOTES
[id number] [facility]  [place] .  [address] [place] [id number]  Dept: [phone]  Dept Fax: [phone]  Loc: [phone]    Visit Date: 1/12/2022    Kranthi Hussein is a 54 y.o. female who presents todayfor:  Chief Complaint   Patient presents with    Coronary Artery Disease    Hypertension    Hyperlipidemia   known 100% LAD  Still waiting on Hunter   No CABG yet   Medical RX  Some angina  Some use of nitro   Some dyspnea  Anemic   Known lupus  No changes in breathing        HPI:  HPI  Past Medical History:   Diagnosis Date    Anemia     CKD (chronic kidney disease), stage III ([address]     Dyslipidemia     Hematuria     Hyperlipidemia     Hypertension     Hyperuricemia     Hypothyroidism     Kidney disease     as a result from lupus    [facility]     Lupus nephritis [facility]     Proteinuria     Seizures ([facility]     Systemic lupus erythematosus ([facility]       Past Surgical History:   Procedure Laterality Date    CT BIOPSY ABDOMEN RETROPERITONEUM  1/15/2021    CT BIOPSY ABDOMEN RETROPERITONEUM 1/15/2021 [facility] CT SCAN    LUMBAR NERVE BLOCK N/A 3/11/2019    LESI at L4 performed by Jeffrey Panchal MD at [facility] N/A 5/21/2019    LESI at L3 #1 performed by Jeffrey Panchal MD at [address] Left 12/13/2018    LUMBAR TRANSFORAMINAL TFESI L5 LEFT #2, performed by Jeffrey Panchal MD at [address] [place] [address] Bilateral     LUMBAR FACET    [place] INJ DX/THER AGNT PARAVERT FACET JOINT, LUMBAR/SAC, 2ND LEVEL Bilateral 5/7/2018    LUMBAR FACET  [email or web address] Bilateral performed by Jeffrey Panchal MD at [address] [place] [address] AA&/STRD TFRML EPI LUMBAR/SACRAL 1 LEVEL Left 9/25/2018    LUMBAR TRANSFORAMINAL TFESI L5 on the LEFT performed by Jeffrey Panchal MD at [address] [place] [address] DX/THER SBST INTRLMNR LMBR/SAC W/IMG CALCIUM/D) 500-200 MG-UNIT TABS take 1 tablet by mouth once daily 90 tablet 3    loratadine (CLARITIN) 10 MG tablet Take 1 tablet by mouth daily 90 tablet 3    acetaminophen (APAP EXTRA STRENGTH) 500 MG tablet Take 2 tablets by mouth every 6 hours as needed for Pain 120 tablet 3    ferrous sulfate (IRON 325) 325 (65 Fe) MG tablet Take 1 tablet by mouth daily (with breakfast) 90 tablet 3    simvastatin (ZOCOR) 40 MG tablet take 1 tablet by mouth at bedtime 90 tablet 3    nitroGLYCERIN (NITROSTAT) 0.4 MG SL tablet Place 1 tablet under the tongue every 5 minutes as needed for Chest pain up to max of 3 total doses. If no relief after 1 dose, call 911. 25 tablet 0    gabapentin (NEURONTIN) 400 MG capsule Take 1 capsule by mouth 2 times daily for 90 days. 180 capsule 1    gabapentin (NEURONTIN) 100 MG capsule Take bid with the 400 mg tablet for a total dose of 500 mg bid daily 180 capsule 0    mycophenolate (CELLCEPT) 500 MG tablet Take 2 tablets by mouth every morning (before breakfast) AND 2 tablets every evening. 120 tablet 0     No current facility-administered medications for this visit.      Allergies   Allergen Reactions    Codeine Hives and Nausea And Vomiting     Health Maintenance   Topic Date Due    COVID-19 Vaccine (1) Never done    Diabetic foot exam  Never done    Diabetic microalbuminuria test  Never done    Diabetic retinal exam  Never done    Hepatitis B vaccine (1 of 3 - Risk 3-dose series) Never done    Colon cancer screen colonoscopy  Never done    Lipid screen  10/22/2020    TSH testing  12/10/2020    A1C test (Diabetic or Prediabetic)  09/01/2021    Flu vaccine (1) 09/01/2021    Cervical cancer screen  04/29/2022    Depression Screen  07/22/2022    Potassium monitoring  01/04/2023    Creatinine monitoring  01/04/2023    DTaP/Tdap/Td vaccine (3 - Td or Tdap) 08/30/2030    Pneumococcal 0-64 years Vaccine (2 of 2 - PPSV23) 09/30/2037    Hepatitis C screen  Completed    HIV screen  Addressed    Hepatitis A vaccine  Aged Out    Hib vaccine  Aged Out    Meningococcal (ACWY) vaccine  Aged Out       Subjective:  Review of Systems  General:   No fever, no chills, No fatigue or weight loss  Pulmonary:    some dyspnea, no wheezing  Cardiac:    Does have some chest pain,   GI:     No nausea or vomiting, no abdominal pain  Neuro:    No dizziness or light headedness,   Musculoskeletal:  No recent active issues  Extremities:   No edema, no obvious claudication       Objective:  Physical Exam  /80   Pulse 68   Ht 5' 6\" (1.676 m)   Wt 153 lb (69.4 kg)   BMI 24.69 kg/m²   General:   Well developed, well nourished  Lungs:   Clear to auscultation  Heart:    Normal S1 S2, Slight murmur. no rubs, no gallops  Abdomen:   Soft, non tender, no organomegalies, positive bowel sounds  Extremities:   No edema, no cyanosis, good peripheral pulses  Neurological:   Awake, alert, oriented. No obvious focal deficits  Musculoskelatal:  No obvious deformities    Assessment:      Diagnosis Orders   1. Coronary artery disease involving coronary bypass graft of native heart without angina pectoris     2. Primary hypertension     as above  Cardiac fair for now     Plan:  No follow-ups on file. As above  Continue risk factor modification and medical management  Thank you for allowing me to participate in the care of your patient. Please don't hesitate to contact me regarding any further issues related to the patient care    Orders Placed:  No orders of the defined types were placed in this encounter. Medications Prescribed:  No orders of the defined types were placed in this encounter. Discussed use, benefit, and side effects of prescribed medications. All patient questions answered. Pt voicedunderstanding. Instructed to continue current medications, diet and exercise. Continue risk factor modification and medical management. Patient agreed with treatment plan.  Follow up as directed.     Electronically signedby Elliott Urbano MD on 1/12/2022 at 12:27 PM

## 2022-01-12 NOTE — PROGRESS NOTES
[address]  Dept: [phone]  Dept Fax: [phone]: [phone]    Visit Date: 1/12/2022    Functionality Assessment/Goals Worksheet     On a scale of 0 (Does not Interfere) to 10 (Completely Interferes)     1. Which number describes how during the past week pain has interfered with       the following:  A. General Activity:  6  B. Mood: 8  C. Walking Ability:  8  D. Normal Work (Includes both work outside the home and housework):  5  E. Relations with Other People:   7  F. Sleep:   8  G. Enjoyment of Life:   7    2. Patient Prefers to Take their Pain Medications:     [x]  On a regular basis   []  Only when necessary    []  Does not take pain medications    3. What are the Patient's Goals/Expectations for Visiting Pain Management? [x]  Learn about my pain    []  Receive Medication   []  Physical Therapy     []  Treat Depression   []  Receive Injections    []  Treat Sleep   []  Deal with Anxiety and Stress   []  Treat Opoid Dependence/Addiction   []  Other:    HPI:   Demarcus Eric is a 54 y.o. female is here today for    Chief Complaint: Low back pain, Mid back pain, Neck pain, Hip pain and SI pain    HPI     F/U  Still has not gone to [facility] to see cardiology for possible CABG. They are reviewing records. Had heart cath Feb 2021. She remains off work due to cardiac issues. She is unable to tolerate PT due to chest pain  Angina. No relief from prior lumbar procedures.  She did have TFLESI L5 left in 2018 with 50% pain relief 18 months. EMG  Showed C5-6 left. Pain increases with bending, lifting, twisting , reaching, pushing, pulling, turning head, walking, standing, raising arms, stairs and getting up and down.   Treatments Tried PT, ice, heat, Chiropractor, NSAIDS, narcotics, muscle relaxer, OTC rubs creams patches, steroid burst and injections  Pain Description sharp, shooting, stabbing, burning, pressure, throbbing, aching, numbness, tingling and pins and needles    Medications reviewed. Patient denies side effects with medications. Patient states she is taking medications as prescribed. Shedenies receiving pain medications from other sources. She complains of any ER visits since last visit. UC covid test 11/2021  Pain scale with out pain medications or at its worst is 7/10. Pain scale with pain medications or at its best is 4/10. Last dose of Dunfermline was today   Drug screen reviewed from 10/2021and was appropriate  Pill count completed  today and WNL: Yes  MRI cervical   :               There is 3 mm of anterolisthesis of C7 on T1. There is loss of disc height and there is degenerative marrow edema in the endplates at the Q6-7 level. This is also present at the C6-7 level. No suspicious osseous lesions are present. The facet joints are    normally aligned.       The cervical spinal cord is of normal caliber and signal intensity.  The visualized aspects of the posterior fossa are normal.       On the axial images, at C2-C3, there is a small posterior disc osteophyte complex. There are bilateral facet degenerative changes. There is no spinal canal stenosis. There is mild right foraminal stenosis.       At C3-C4, there is a posterior disc osteophyte complex. There are bilateral facet degenerative changes. There is moderate severity spinal canal stenosis. There is moderate to severe bilateral foraminal stenosis.       At C4-C5, there is a small posterior disc osteophyte complex. There are facet degenerative changes. There is mild spinal canal stenosis. There is no foraminal stenosis.       At C5-C6, there is a small posterior disc osteophyte complex. There is mild spinal canal stenosis. There is no foraminal stenosis.       At C6-C7, there is a small posterior disc osteophyte complex.  There is mild spinal canal stenosis and bilateral foraminal stenosis.       At C7-T1, there are facet degenerative changes. There is uncovering of the disc. There is mild spinal canal stenosis. There is mild foraminal stenosis.       There are no suspicious findings in the cervical soft tissues.               Impression       1. Moderate severity spinal canal stenosis with moderate to severe bilateral foraminal stenosis at the C3-4 level due to degenerative changes. 2. Mild spinal canal stenosis and/or foraminal stenosis at other levels as described above.         EMG      The patientis allergic to codeine. Subjective:      Review of Systems   Constitutional: Negative for activity change, appetite change, chills, diaphoresis, fatigue, fever and unexpected weight change. HENT: Positive for dental problem. Negative for congestion, ear pain, hearing loss, mouth sores, nosebleeds, rhinorrhea, sinus pressure and sore throat. Dental infection recently   Eyes: Negative for photophobia, pain and visual disturbance. Respiratory: Negative for cough, chest tightness, shortness of breath and wheezing. COVID     Cardiovascular: Negative for chest pain and palpitations. HTN CAD  Recent abnormal heart cath, pending [facility] referral for possible surgery   Gastrointestinal: Negative for abdominal pain, constipation, diarrhea, nausea and vomiting. Endocrine: Negative for cold intolerance, heat intolerance, polydipsia, polyphagia and polyuria. Thyroid issues   Genitourinary: Negative for decreased urine volume, difficulty urinating, frequency and hematuria. CKD   Musculoskeletal: Positive for arthralgias, back pain, joint swelling, myalgias, neck pain and neck stiffness. Negative for gait problem. Skin: Negative for color change and rash. Hot water burn  8/31/2020 . healed   Allergic/Immunologic: Negative for food allergies and immunocompromised state. Neurological: Positive for dizziness, weakness, numbness and headaches.  Negative for tremors, seizures, syncope, facial asymmetry, speech difficulty and light-headedness. H/o seizures,no seizures in 16 years. H/O Lupus   Hematological: Does not bruise/bleed easily. Psychiatric/Behavioral: Negative for agitation, behavioral problems, confusion, decreased concentration, dysphoric mood, hallucinations, self-injury, sleep disturbance and suicidal ideas. The patient is not nervous/anxious and is not hyperactive. Objective:     Vitals:    01/12/22 1351   BP: 130/78   Site: Left Upper Arm   Position: Sitting   Cuff Size: Medium Adult   Pulse: 62   Weight: 153 lb (69.4 kg)   Height: 5' 6\" (1.676 m)       Physical Exam  Vitals and nursing note reviewed. Constitutional:       General: She is not in acute distress. Appearance: She is well-developed. She is not diaphoretic. HENT:      Head: Normocephalic and atraumatic. Right Ear: External ear normal.      Left Ear: External ear normal.      Nose: Nose normal.      Mouth/Throat:      Pharynx: No oropharyngeal exudate. Eyes:      General: No scleral icterus. Right eye: No discharge. Left eye: No discharge. Conjunctiva/sclera: Conjunctivae normal.      Pupils: Pupils are equal, round, and reactive to light. Neck:      Thyroid: No thyromegaly. Cardiovascular:      Rate and Rhythm: Normal rate and regular rhythm. Heart sounds: Normal heart sounds. No murmur heard. No friction rub. No gallop. Pulmonary:      Effort: Pulmonary effort is normal. No respiratory distress. Breath sounds: Normal breath sounds. No wheezing or rales. Chest:      Chest wall: No tenderness. Abdominal:      General: Bowel sounds are normal. There is no distension. Palpations: Abdomen is soft. Tenderness: There is no abdominal tenderness. There is no guarding or rebound. Musculoskeletal:         General: Tenderness present. Right shoulder: Tenderness present. Decreased range of motion.  Decreased strength. Left shoulder: Tenderness present. Right upper arm: Tenderness present. Right forearm: Tenderness present. Left forearm: Tenderness present. Right wrist: Tenderness and bony tenderness present. Left wrist: Tenderness and bony tenderness present. Right hand: Tenderness and bony tenderness present. Decreased range of motion. Decreased strength. Decreased sensation. Left hand: Tenderness and bony tenderness present. Decreased range of motion. Decreased strength. Decreased sensation. Cervical back: Neck supple. Spasms, tenderness and bony tenderness present. No edema, erythema or rigidity. Pain with movement and muscular tenderness present. Decreased range of motion. Thoracic back: Spasms, tenderness and bony tenderness present. Decreased range of motion. Lumbar back: Spasms, tenderness and bony tenderness present. Decreased range of motion. Back:       Right hip: Tenderness present. Left hip: Normal.      Right upper leg: Tenderness present. Right knee: Normal.      Left knee: Normal.      Right ankle: Tenderness present. Right foot: Decreased range of motion. Swelling, tenderness and bony tenderness present. Comments: 2 right broken toes has walker shoe on    Skin:     General: Skin is warm. Coloration: Skin is not pale. Findings: No erythema or rash. Neurological:      Mental Status: She is alert and oriented to person, place, and time. She is not disoriented. Cranial Nerves: Cranial nerves are intact. No cranial nerve deficit. Sensory: Sensation is intact. No sensory deficit. Motor: Motor function is intact. No atrophy or abnormal muscle tone. Coordination: Coordination is intact. Coordination normal.      Gait: Gait abnormal.      Deep Tendon Reflexes: Babinski sign absent on the right side. Reflex Scores:       Tricep reflexes are 2+ on the right side and 2+ on the left side. Bicep reflexes are 2+ on the right side and 2+ on the left side. Brachioradialis reflexes are 2+ on the right side and 2+ on the left side. Patellar reflexes are 2+ on the right side and 2+ on the left side. Achilles reflexes are 2+ on the right side and 2+ on the left side. Comments: Motor  4/5 RUE  5/5 BLE   Psychiatric:         Attention and Perception: Attention and perception normal. She is attentive. Mood and Affect: Mood and affect normal. Mood is not anxious or depressed. Affect is not labile, blunt, angry or inappropriate. Speech: Speech normal. She is communicative. Speech is not rapid and pressured, delayed, slurred or tangential.         Behavior: Behavior normal. Behavior is not agitated, slowed, aggressive, withdrawn, hyperactive or combative. Behavior is cooperative. Thought Content: Thought content normal. Thought content is not paranoid or delusional. Thought content does not include homicidal or suicidal ideation. Thought content does not include homicidal or suicidal plan. Cognition and Memory: Cognition and memory normal. Memory is not impaired. She does not exhibit impaired recent memory or impaired remote memory. Judgment: Judgment normal. Judgment is not impulsive or inappropriate. VONDA  Patricks test  positive  Yeoman's  positive  Gaenslen's  positive  Kemps  positive  Phalens Reverse Phalens  na  Tinels  na  Spurlings  positive       Assessment:     1. Spondylosis of lumbar region without myelopathy or radiculopathy    2. Lumbar stenosis with neurogenic claudication    3. Lumbar radiculitis    4. Cervical radiculitis    5. SI (sacroiliac) joint inflammation (HCC)    6. Spondylosis of thoracic region without myelopathy or radiculopathy    7. Bilateral sacroiliitis ([address]    8. Lumbar radiculopathy    9. Chronic pain syndrome            Plan:      OARRS reviewed. Current MED: 20  Patient was not offered naloxone for home. Discussed long term side effects of medications, tolerance, dependency and addiction. Previous UDS reviewed  UDS preformed today for compliance. Patient told can not receive any pain medications from any other source. No evidence of abuse, diversion or aberrant behavior. Medications and/or procedures to improve function and quality of life- patient understanding with this and that may not be pain free  Discussed with patient about safe storage of medications at home  Discussed possible weaning of medication dosing dependent on treatment/procedure results. Testing: reviewed Cervical MRI   Procedures: discussed repeat or trial ELLIOT TFCESI if need  Discussed with patient about risks with procedure including infection, reaction to medication, increased pain, or bleeding. Medications:Norco Nerurontin   If patient is on blood thinners will need approval to hold yes or no:N/A  Does patient have implanted device AICD or pacemaker etc? Will representative need notified prior to procedure yes or no:N/A      Meds. Prescribed:   Orders Placed This Encounter   Medications    HYDROcodone-acetaminophen (NORCO) 5-325 MG per tablet     Sig: Take 1 tablet by mouth every 6 hours as needed for Pain for up to 30 days. Dispense:  120 tablet     Refill:  0     Reduce doses taken as pain becomes manageable       Return in about 3 months (around 4/12/2022) for Follow up pain medications.                Electronically signed by MACKENZIE Carrera CNP on1/12/2022 at 2:05 PM

## 2022-01-13 ENCOUNTER — TELEPHONE (OUTPATIENT)
Dept: RHEUMATOLOGY | Age: 52
End: 2022-01-13

## 2022-01-13 DIAGNOSIS — R80.1 PERSISTENT PROTEINURIA: ICD-10-CM

## 2022-01-13 DIAGNOSIS — E55.9 VITAMIN D DEFICIENCY: ICD-10-CM

## 2022-01-13 DIAGNOSIS — M32.14 SYSTEMIC LUPUS ERYTHEMATOSUS WITH GLOMERULAR DISEASE, UNSPECIFIED SLE TYPE (HCC): ICD-10-CM

## 2022-01-13 DIAGNOSIS — N18.9 ACUTE KIDNEY INJURY SUPERIMPOSED ON CKD (HCC): Primary | ICD-10-CM

## 2022-01-13 DIAGNOSIS — N17.9 ACUTE KIDNEY INJURY SUPERIMPOSED ON CKD (HCC): Primary | ICD-10-CM

## 2022-01-13 LAB
BACTERIA: ABNORMAL
BILIRUBIN URINE: NEGATIVE
BLOOD, URINE: ABNORMAL
C3 COMPLEMENT: 105 MG/DL (ref 90–180)
CASTS: ABNORMAL /LPF
CHARACTER, URINE: CLEAR
COLOR: YELLOW
COMPLEMENT C4: 21 MG/DL (ref 10–40)
EPITHELIAL CELLS, UA: ABNORMAL /HPF
GLUCOSE, URINE: NEGATIVE MG/DL
KETONES, URINE: NEGATIVE
LEUKOCYTE EST, POC: NEGATIVE
NITRITE, URINE: NEGATIVE
PH UA: 5.5 (ref 5–9)
PROTEIN UA: >= 300 MG/DL
RBC URINE: ABNORMAL /HPF
SPECIFIC GRAVITY UA: >= 1.03 (ref 1–1.03)
UROBILINOGEN, URINE: 0.2 EU/DL (ref 0–1)
WBC UA: ABNORMAL /HPF

## 2022-01-13 RX ORDER — PREDNISONE 10 MG/1
TABLET ORAL
Qty: 30 TABLET | Refills: 0 | Status: SHIPPED | OUTPATIENT
Start: 2022-01-13 | End: 2022-01-28

## 2022-01-13 RX ORDER — ERGOCALCIFEROL 1.25 MG/1
50000 CAPSULE ORAL WEEKLY
Qty: 12 CAPSULE | Refills: 0 | Status: SHIPPED | OUTPATIENT
Start: 2022-01-13 | End: 2022-04-29 | Stop reason: SDUPTHER

## 2022-01-13 NOTE — TELEPHONE ENCOUNTER
Diagnosis Orders   1. Acute kidney injury superimposed on CKD (HCC)  predniSONE (DELTASONE) 10 MG tablet   2. Systemic lupus erythematosus with glomerular disease, unspecified SLE type (HCC)  predniSONE (DELTASONE) 10 MG tablet   3. Persistent proteinuria  predniSONE (DELTASONE) 10 MG tablet   4. Vitamin D deficiency  vitamin D (ERGOCALCIFEROL) 1.25 MG (82247 UT) CAPS capsule     - patient called informed of the worsened kidney function test and proteinuria. She does report having stopped of the mycophenolate around August after having COVID. Restarted medication approximately 1 week ago. Provided to help with the underlying systemic inflammation while restarting the medication. Discussed the importance of continuing the mycophenolate given the risk of permanent and irreversible damage to the kidneys if left untreated.     -If continued/percent pertinent anuria we may need to pursue Benlysta or voclosporin

## 2022-01-18 ENCOUNTER — PATIENT MESSAGE (OUTPATIENT)
Dept: RHEUMATOLOGY | Age: 52
End: 2022-01-18

## 2022-01-18 DIAGNOSIS — I31.39 PERICARDIAL EFFUSION: ICD-10-CM

## 2022-01-18 DIAGNOSIS — M32.14 OTHER SYSTEMIC LUPUS ERYTHEMATOSUS WITH GLOMERULAR DISEASE (HCC): ICD-10-CM

## 2022-01-18 LAB — ALDOLASE: 9.8 U/L (ref 1.5–8.1)

## 2022-01-19 RX ORDER — MYCOPHENOLATE MOFETIL 500 MG/1
TABLET ORAL
Qty: 120 TABLET | Refills: 0 | Status: SHIPPED | OUTPATIENT
Start: 2022-01-19 | End: 2022-02-14 | Stop reason: SDUPTHER

## 2022-02-03 ENCOUNTER — VIRTUAL VISIT (OUTPATIENT)
Dept: NEPHROLOGY | Age: 52
End: 2022-02-03
Payer: COMMERCIAL

## 2022-02-03 DIAGNOSIS — E87.1 HYPONATREMIA: ICD-10-CM

## 2022-02-03 DIAGNOSIS — R80.1 PERSISTENT PROTEINURIA: ICD-10-CM

## 2022-02-03 DIAGNOSIS — E11.9 TYPE 2 DIABETES MELLITUS WITHOUT COMPLICATION, WITHOUT LONG-TERM CURRENT USE OF INSULIN (HCC): ICD-10-CM

## 2022-02-03 DIAGNOSIS — M32.15 SYSTEMIC LUPUS ERYTHEMATOSUS WITH TUBULO-INTERSTITIAL NEPHROPATHY, UNSPECIFIED SLE TYPE (HCC): ICD-10-CM

## 2022-02-03 DIAGNOSIS — M32.14 LUPUS NEPHRITIS (HCC): ICD-10-CM

## 2022-02-03 DIAGNOSIS — N17.9 AKI (ACUTE KIDNEY INJURY) (HCC): Primary | ICD-10-CM

## 2022-02-03 DIAGNOSIS — N18.32 STAGE 3B CHRONIC KIDNEY DISEASE (HCC): ICD-10-CM

## 2022-02-03 PROCEDURE — G8420 CALC BMI NORM PARAMETERS: HCPCS | Performed by: INTERNAL MEDICINE

## 2022-02-03 PROCEDURE — G8484 FLU IMMUNIZE NO ADMIN: HCPCS | Performed by: INTERNAL MEDICINE

## 2022-02-03 PROCEDURE — 99213 OFFICE O/P EST LOW 20 MIN: CPT | Performed by: INTERNAL MEDICINE

## 2022-02-03 PROCEDURE — 3046F HEMOGLOBIN A1C LEVEL >9.0%: CPT | Performed by: INTERNAL MEDICINE

## 2022-02-03 PROCEDURE — G8427 DOCREV CUR MEDS BY ELIG CLIN: HCPCS | Performed by: INTERNAL MEDICINE

## 2022-02-03 PROCEDURE — 2022F DILAT RTA XM EVC RTNOPTHY: CPT | Performed by: INTERNAL MEDICINE

## 2022-02-03 PROCEDURE — 1036F TOBACCO NON-USER: CPT | Performed by: INTERNAL MEDICINE

## 2022-02-03 NOTE — PROGRESS NOTES
2/3/2022    TELEHEALTH EVALUATION -- Audio/Visual (During PWJWN-56 public health emergency). Telehealth video visit with Ms. Jin Mcnair  Seeing her for chronic kidney disease. Place of visit for patient is home  Place of visit for physician is office      HPI: This is a follow-up visit for Ms. Jin Mcnair. I seen her for chronic kidney disease. She was last seen about 6 months ago. Serum creatinine was 1.4 mg/dL at that time. Today it is 2.1 mg/dL. She has had some episodes of nausea vomiting and diarrhea which all have resolved. She also has been taking  ketorolac. No chest pain or shortness of breath. She has chronic joint ache and joint pain modified by medications. [address] [place] [address] (:  1970) has requested an audio/video evaluation for the following concern(s):    Follow-up for chronic kidney disease    Review of Systems as in HPI    Prior to Visit Medications    Medication Sig Taking? Authorizing Provider   mycophenolate (CELLCEPT) 500 MG tablet Take 2 tablets by mouth every morning (before breakfast) AND 2 tablets every evening. Jareth Rodriguez DO   vitamin D (ERGOCALCIFEROL) 1.25 MG (31910 UT) CAPS capsule Take 1 capsule by mouth once a week  Jareth Rodriguez DO   HYDROcodone-acetaminophen (NORCO) 5-325 MG per tablet Take 1 tablet by mouth every 6 hours as needed for Pain for up to 30 days. MACKENZIE Turner CNP   albuterol sulfate HFA (PROVENTIL HFA) 108 (90 Base) MCG/ACT inhaler Inhale 2 puffs into the lungs every 6 hours as needed for Wheezing  MACKENZIE Carson CNP   gabapentin (NEURONTIN) 400 MG capsule Take 1 capsule by mouth 2 times daily for 90 days.   MACKENZIE Turner CNP   gabapentin (NEURONTIN) 100 MG capsule Take bid with the 400 mg tablet for a total dose of 500 mg bid daily  MACKENZIE Miller CNP   omeprazole (PRILOSEC) 20 MG delayed release capsule take 1 capsule by mouth once daily  MACKENZIE Corral CNP   silver sulfADIAZINE (SILVADENE) 1 % cream Apply topically daily. MACKENZIE Garcia CNP   Belimumab (BENLYSTA) 200 MG/ML SOAJ Inject 200 mg into the skin once a week  Nancyetta PrimMACKENZIE knapp CNP   hydroxychloroquine (PLAQUENIL) 200 MG tablet Take 1 tablet by mouth 2 times daily  Grzegorz Prima, APRN - CNP   permethrin (ELIMITE) 5 % cream Apply to wet hair and leave on 10 minutes and then rinse, may repeat on day 9  MACKENZIE Miller CNP   levothyroxine (SYNTHROID) 112 MCG tablet take 1 tablet by mouth once daily  MACKENZIE Miller CNP   ondansetron (ZOFRAN) 4 MG tablet Take 1 tablet by mouth 3 times daily as needed for Nausea or Vomiting  MACKENZIE Garcia CNP   ketorolac (TORADOL) 10 MG tablet Take 1 tablet by mouth every 6 hours as needed for Pain  MACKENZIE Garcia CNP   Calcium Carbonate-Vitamin D (OYSTER SHELL CALCIUM/D) 500-200 MG-UNIT TABS take 1 tablet by mouth once daily  MACKENZIE Miller CNP   loratadine (CLARITIN) 10 MG tablet Take 1 tablet by mouth daily  MACKENZIE Miller CNP   acetaminophen (APAP EXTRA STRENGTH) 500 MG tablet Take 2 tablets by mouth every 6 hours as needed for Pain  MACKENZIE Garcia CNP   ferrous sulfate (IRON 325) 325 (65 Fe) MG tablet Take 1 tablet by mouth daily (with breakfast)  MACKENZIE Garcia CNP   simvastatin (ZOCOR) 40 MG tablet take 1 tablet by mouth at bedtime  MACKENZIE Garcia CNP   nitroGLYCERIN (NITROSTAT) 0.4 MG SL tablet Place 1 tablet under the tongue every 5 minutes as needed for Chest pain up to max of 3 total doses. If no relief after 1 dose, call 911.   Carmen Ontiveros PA-C       Social History     Tobacco Use    Smoking status: Former Smoker     Packs/day: 0.25     Types: Cigarettes     Start date:      Quit date:      Years since quittin.0    Smokeless tobacco: Never Used    Tobacco comment: 1 a day   Vaping Use    Vaping Use: Never used   Substance Use Topics    Alcohol use: Yes     Comment: occasionally, couple times a year    Drug use: No            PHYSICAL EXAMINATION:  [ INSTRUCTIONS:  \"[x]\" Indicates a positive item  \"[]\" Indicates a negative item  -- DELETE ALL ITEMS NOT EXAMINED]  Vital Signs: (As obtained by patient/caregiver or practitioner observation)    Blood pressure-  Heart rate-    Respiratory rate-    Temperature-  Pulse oximetry-     Constitutional: [x] Appears well-developed and well-nourished [] No apparent distress      [] Abnormal-   Mental status  [x] Alert and awake  [x] Oriented to person/place/time [x]Able to follow commands      Eyes:  EOM    [x]  Normal  [] Abnormal-  Sclera  [x]  Normal  [] Abnormal -         Discharge [x]  None visible  [] Abnormal -    HENT:   [x] Normocephalic, atraumatic. [] Abnormal   [x] Mouth/Throat: Mucous membranes are moist.     External Ears [x] Normal  [] Abnormal-     Neck: [x] No visualized mass     Pulmonary/Chest: [x] Respiratory effort normal.  [x] No visualized signs of difficulty breathing or respiratory distress        [] Abnormal-      Musculoskeletal:   [] Normal gait with no signs of ataxia         [] Normal range of motion of neck        [] Abnormal-       Neurological:        [x] No Facial Asymmetry (Cranial nerve 7 motor function) (limited exam to video visit)          [x] No gaze palsy        [] Abnormal-         Skin:        [x] No significant exanthematous lesions or discoloration noted on facial skin         [] Abnormal-            Psychiatric:       [x] Normal Affect [] No Hallucinations        [] Abnormal-     Other pertinent observable physical exam findings-     ASSESSMENT/PLAN:   Diagnosis Orders   1. CORI (acute kidney injury) ([facility]  Basic Metabolic Panel   2. Type 2 diabetes mellitus without complication, without long-term current use of insulin (HCC)     3. Stage 3b chronic kidney disease (HCC)  Vitamin D 25 Hydroxy    PTH, Intact   4.  Systemic lupus erythematosus with tubulo-interstitial nephropathy, unspecified SLE type ([facility] [address]     5. Persistent proteinuria  Protein / creatinine ratio, urine   6. Lupus nephritis ([address]     7. Hyponatremia       PLAN:   Lab result discussed with the patient. She appeared to have understood. Serum creatinine is increased to 2.1 mg/dL from 1.4 mg/dL 6 months ago. This is likely due to recent episodes of nausea, vomiting and diarrhea. This may also be compounded by ketorolac. I advised her to increase her fluid intake. Discontinue ketorolac completely. I also emphasized to her the importance of a low protein diet as urine protein creatinine ratio has increased to 2.42 g from 1.65 g 6 months ago. We will repeat labs in about 2 weeks. If no improvement may entertain the idea of possibly a kidney biopsy again if it will help the rheumatologist in his management of the patient  Return return 3 months otherwise not 6 months. No follow-ups on file. Demarcus Eric, was evaluated through a synchronous (real-time) audio-video encounter. The patient (or guardian if applicable) is aware that this is a billable service, which includes applicable co-pays. This Virtual Visit was conducted with patient's (and/or legal guardian's) consent. The visit was conducted pursuant to the emergency declaration under the [address] [place] [address] authority and the [employer] and [employer] General Act. Patient identification was verified, and a caregiver was present when appropriate. The patient was located at home in a state where the provider was licensed to provide care. Total time spent on this encounter: About 15 minutes. --Ritesh Joseph MD on 2/3/2022 at 1:46 PM    An electronic signature was used to authenticate this note.

## 2022-02-14 ENCOUNTER — NURSE ONLY (OUTPATIENT)
Dept: LAB | Age: 52
End: 2022-02-14

## 2022-02-14 ENCOUNTER — OFFICE VISIT (OUTPATIENT)
Dept: RHEUMATOLOGY | Age: 52
End: 2022-02-14
Payer: COMMERCIAL

## 2022-02-14 VITALS
OXYGEN SATURATION: 96 % | BODY MASS INDEX: 25.39 KG/M2 | DIASTOLIC BLOOD PRESSURE: 60 MMHG | WEIGHT: 158 LBS | HEART RATE: 82 BPM | SYSTOLIC BLOOD PRESSURE: 102 MMHG | HEIGHT: 66 IN

## 2022-02-14 DIAGNOSIS — G56.23 CUBITAL TUNNEL SYNDROME, BILATERAL: ICD-10-CM

## 2022-02-14 DIAGNOSIS — M32.14 SYSTEMIC LUPUS ERYTHEMATOSUS WITH GLOMERULAR DISEASE, UNSPECIFIED SLE TYPE (HCC): ICD-10-CM

## 2022-02-14 DIAGNOSIS — N18.9 CHRONIC KIDNEY DISEASE, UNSPECIFIED CKD STAGE: ICD-10-CM

## 2022-02-14 DIAGNOSIS — Z87.39 H/O LUPUS NEPHRITIS: ICD-10-CM

## 2022-02-14 DIAGNOSIS — R80.1 PERSISTENT PROTEINURIA: ICD-10-CM

## 2022-02-14 DIAGNOSIS — I31.39 PERICARDIAL EFFUSION: ICD-10-CM

## 2022-02-14 DIAGNOSIS — N17.9 ACUTE KIDNEY INJURY SUPERIMPOSED ON CKD (HCC): ICD-10-CM

## 2022-02-14 DIAGNOSIS — N17.9 AKI (ACUTE KIDNEY INJURY) (HCC): ICD-10-CM

## 2022-02-14 DIAGNOSIS — M32.14 OTHER SYSTEMIC LUPUS ERYTHEMATOSUS WITH GLOMERULAR DISEASE (HCC): ICD-10-CM

## 2022-02-14 DIAGNOSIS — Z51.81 MEDICATION MONITORING ENCOUNTER: ICD-10-CM

## 2022-02-14 DIAGNOSIS — N18.9 ACUTE KIDNEY INJURY SUPERIMPOSED ON CKD (HCC): ICD-10-CM

## 2022-02-14 DIAGNOSIS — M32.14 SYSTEMIC LUPUS ERYTHEMATOSUS WITH GLOMERULAR DISEASE, UNSPECIFIED SLE TYPE (HCC): Primary | ICD-10-CM

## 2022-02-14 LAB
ALBUMIN SERPL-MCNC: 3.9 G/DL (ref 3.5–5.1)
ALP BLD-CCNC: 68 U/L (ref 38–126)
ALT SERPL-CCNC: 7 U/L (ref 11–66)
ANION GAP SERPL CALCULATED.3IONS-SCNC: 12 MEQ/L (ref 8–16)
ANION GAP SERPL CALCULATED.3IONS-SCNC: 13 MEQ/L (ref 8–16)
AST SERPL-CCNC: 12 U/L (ref 5–40)
BACTERIA: ABNORMAL
BASOPHILS # BLD: 0.4 %
BASOPHILS ABSOLUTE: 0 THOU/MM3 (ref 0–0.1)
BILIRUB SERPL-MCNC: < 0.2 MG/DL (ref 0.3–1.2)
BILIRUBIN URINE: NEGATIVE
BLOOD, URINE: NEGATIVE
BUN BLDV-MCNC: 24 MG/DL (ref 7–22)
BUN BLDV-MCNC: 24 MG/DL (ref 7–22)
C-REACTIVE PROTEIN: < 0.3 MG/DL (ref 0–1)
CALCIUM SERPL-MCNC: 8.9 MG/DL (ref 8.5–10.5)
CALCIUM SERPL-MCNC: 9 MG/DL (ref 8.5–10.5)
CASTS: ABNORMAL /LPF
CASTS: ABNORMAL /LPF
CHARACTER, URINE: CLEAR
CHLORIDE BLD-SCNC: 105 MEQ/L (ref 98–111)
CHLORIDE BLD-SCNC: 106 MEQ/L (ref 98–111)
CO2: 26 MEQ/L (ref 23–33)
CO2: 26 MEQ/L (ref 23–33)
COLOR: YELLOW
CREAT SERPL-MCNC: 1.7 MG/DL (ref 0.4–1.2)
CREAT SERPL-MCNC: 1.7 MG/DL (ref 0.4–1.2)
CREATININE URINE: 172.1 MG/DL
CRYSTALS: ABNORMAL
EOSINOPHIL # BLD: 0.6 %
EOSINOPHILS ABSOLUTE: 0 THOU/MM3 (ref 0–0.4)
EPITHELIAL CELLS, UA: ABNORMAL /HPF
ERYTHROCYTE [DISTWIDTH] IN BLOOD BY AUTOMATED COUNT: 15.2 % (ref 11.5–14.5)
ERYTHROCYTE [DISTWIDTH] IN BLOOD BY AUTOMATED COUNT: 54.6 FL (ref 35–45)
GFR SERPL CREATININE-BSD FRML MDRD: 32 ML/MIN/1.73M2
GFR SERPL CREATININE-BSD FRML MDRD: 32 ML/MIN/1.73M2
GLUCOSE BLD-MCNC: 86 MG/DL (ref 70–108)
GLUCOSE BLD-MCNC: 87 MG/DL (ref 70–108)
GLUCOSE, URINE: NEGATIVE MG/DL
HCT VFR BLD CALC: 33.7 % (ref 37–47)
HEMOGLOBIN: 9.7 GM/DL (ref 12–16)
HYPOCHROMIA: PRESENT
IMMATURE GRANS (ABS): 0.01 THOU/MM3 (ref 0–0.07)
IMMATURE GRANULOCYTES: 0.2 %
KETONES, URINE: NEGATIVE
LEUKOCYTE EST, POC: NEGATIVE
LYMPHOCYTES # BLD: 19.3 %
LYMPHOCYTES ABSOLUTE: 1 THOU/MM3 (ref 1–4.8)
MCH RBC QN AUTO: 28.4 PG (ref 26–33)
MCHC RBC AUTO-ENTMCNC: 28.8 GM/DL (ref 32.2–35.5)
MCV RBC AUTO: 98.5 FL (ref 81–99)
MISCELLANEOUS LAB TEST RESULT: ABNORMAL
MONOCYTES # BLD: 6.4 %
MONOCYTES ABSOLUTE: 0.3 THOU/MM3 (ref 0.4–1.3)
NITRITE, URINE: NEGATIVE
NUCLEATED RED BLOOD CELLS: 0 /100 WBC
PH UA: 5 (ref 5–9)
PLATELET # BLD: 182 THOU/MM3 (ref 130–400)
PLATELET ESTIMATE: ADEQUATE
PMV BLD AUTO: 11.9 FL (ref 9.4–12.4)
POTASSIUM SERPL-SCNC: 4.5 MEQ/L (ref 3.5–5.2)
POTASSIUM SERPL-SCNC: 4.5 MEQ/L (ref 3.5–5.2)
PROT/CREAT RATIO, UR: 1.79
PROTEIN UA: 300 MG/DL
PROTEIN, URINE: 308 MG/DL
RBC # BLD: 3.42 MILL/MM3 (ref 4.2–5.4)
RBC URINE: ABNORMAL /HPF
RENAL EPITHELIAL, UA: ABNORMAL
SCAN OF BLOOD SMEAR: NORMAL
SEDIMENTATION RATE, ERYTHROCYTE: 20 MM/HR (ref 0–20)
SEG NEUTROPHILS: 73.1 %
SEGMENTED NEUTROPHILS ABSOLUTE COUNT: 3.9 THOU/MM3 (ref 1.8–7.7)
SODIUM BLD-SCNC: 144 MEQ/L (ref 135–145)
SODIUM BLD-SCNC: 144 MEQ/L (ref 135–145)
SPECIFIC GRAVITY UA: 1.03 (ref 1–1.03)
TOTAL PROTEIN: 6.1 G/DL (ref 6.1–8)
UROBILINOGEN, URINE: 0.2 EU/DL (ref 0–1)
WBC # BLD: 5.4 THOU/MM3 (ref 4.8–10.8)
WBC UA: ABNORMAL /HPF
YEAST: ABNORMAL

## 2022-02-14 PROCEDURE — 99215 OFFICE O/P EST HI 40 MIN: CPT | Performed by: INTERNAL MEDICINE

## 2022-02-14 PROCEDURE — G8427 DOCREV CUR MEDS BY ELIG CLIN: HCPCS | Performed by: INTERNAL MEDICINE

## 2022-02-14 PROCEDURE — G8484 FLU IMMUNIZE NO ADMIN: HCPCS | Performed by: INTERNAL MEDICINE

## 2022-02-14 PROCEDURE — 1036F TOBACCO NON-USER: CPT | Performed by: INTERNAL MEDICINE

## 2022-02-14 PROCEDURE — G8417 CALC BMI ABV UP PARAM F/U: HCPCS | Performed by: INTERNAL MEDICINE

## 2022-02-14 RX ORDER — HYDROXYCHLOROQUINE SULFATE 200 MG/1
200 TABLET, FILM COATED ORAL 2 TIMES DAILY
Qty: 60 TABLET | Refills: 5 | Status: SHIPPED | OUTPATIENT
Start: 2022-02-14 | End: 2022-05-17 | Stop reason: SDUPTHER

## 2022-02-14 RX ORDER — MYCOPHENOLATE MOFETIL 500 MG/1
TABLET ORAL
Qty: 120 TABLET | Refills: 0 | Status: SHIPPED | OUTPATIENT
Start: 2022-02-14 | End: 2022-04-29 | Stop reason: SDUPTHER

## 2022-02-14 RX ORDER — BELIMUMAB 400 MG/5ML
700 INJECTION, POWDER, LYOPHILIZED, FOR SOLUTION INTRAVENOUS
Qty: 2 EACH | Refills: 0 | Status: SHIPPED | OUTPATIENT
Start: 2022-02-14 | End: 2022-02-23

## 2022-02-14 RX ORDER — PREDNISONE 2.5 MG
7.5 TABLET ORAL DAILY
Qty: 90 TABLET | Refills: 1 | Status: SHIPPED | OUTPATIENT
Start: 2022-02-14 | End: 2022-04-20 | Stop reason: SDUPTHER

## 2022-02-14 ASSESSMENT — ENCOUNTER SYMPTOMS
CONSTIPATION: 0
EYE REDNESS: 0
VOMITING: 0
COUGH: 1
EYES NEGATIVE: 1
NAUSEA: 0
SHORTNESS OF BREATH: 1
EYE PAIN: 0
WHEEZING: 0
DIARRHEA: 0

## 2022-02-14 NOTE — PROGRESS NOTES
[facility] RHEUMATOLOGY FOLLOW UP NOTE     Date Of Service: 2/14/2022  Provider: Erika Mclean DO , DO  PCP: MACKENZIE Bhatti - CNP   Name: Rasheeda Pringle   MRN: [id number]    ASSESSMENT/PLAN:   SLE w/ h/o class IV LN-   - diagnosed in 2001, s/p cytoxan 2007. C4 was 64, 4+ proteinuria, but 24 urine w/ only 1.1 g. 20-29 RBC/HPF. + BELLE, dsDNA, ANCA (+p-ANCA, neg MPO, FL-3), reported inflammatory arthritis. Exam w/ joint swelling, oral/nasal sores. - plaquenil 200 mg BID               - mycophenolate 1000 mg BID               - refilling prednisone 7.5 mg daily               - benlysta subcu weekly- [place] approved, will have staff contacting pharmacy for delivery --- if unable to get coverage will attempt to add voclosporin po if not able to start the medication benlysta over the next 1-2 weeks. = patient reported okay with the current treatment. Alt tx if cytoxan. CKD: worsening, treatment as outlined above. Proteinura  - worsening - ? 2/2 to medication non-adherence. - repeat renal biopsy with no abnormalities (1/15/2021)     CAD- following with cardiology     Cervical radiculopathy  - EMG/NCV (Dec 2019) w/ cervical radiculopathy                - gabapentin 400 mg TWICE DAILY    Vitamin D def - taking vitamin D3  5000 IU daily - re-evaluation in 2-3 months      Right hand numbness - suspected related to active lupus. And inflammatory arthritis - as sxs started after running out of the prednisone. Medication monitoring               - CBC, CMP, sed rate, CRP, C3, C4, u/a q 4 weeks x 3 b/c of the increased lupus activity                - plaquenil eye exam (2020)         No follow-ups on file.   New Prescriptions    No medications on file     History of Present Illness (HPI)     Chief Complaint   Patient presents with    Follow-up     1 month SLE         Rasheeda Pringle  is a(n)51 y.o. female with a hx of systemic lupus with hiistory of class IV lupus nephritis, CKD stage III, chronic low back pain w/ radiculopathy, lumbar stenosis, h/o seizures here for the f/u evaluation of systemic lupus      worsened kidney function test and proteinuria. stopped of the mycophenolate around August after having COVID. Restarted the 1st week of Jan 2022. Off the prednisone x 2 days. Cont. arthralgi of hte finger, wrists, left ankle, neck and lower back pain 7.5/10 over the past 2 day. Increased pain and swelling over the past 2 days. 30 min of AM stiffness. Timing: evenings when lying down. Aggravating factors: weather, back: prolonged bending, forward flexion. Alleviating factors: tylenol. norco, hot back, gabapentin     + nasal sores. Fatigue, dry ryes, mouth, SOB/ZAMORANO. , cramping/hussain horses in calfs intermittently occurring.   + numbness Right hand and forearms.              REVIEW OF SYSTEMS: (ROS)    Review of Systems   Constitutional: Positive for fatigue. Negative for diaphoresis and fever. HENT: Negative for congestion, hearing loss, mouth sores and nosebleeds. Eyes: Negative. Negative for pain and redness. Respiratory: Positive for cough and shortness of breath. Negative for wheezing. Cardiovascular: Negative. Negative for chest pain. Gastrointestinal: Negative for constipation, diarrhea, nausea and vomiting. Genitourinary: Negative for difficulty urinating, frequency, genital sores and hematuria. Musculoskeletal: Positive for myalgias. Skin: Negative for rash. Neurological: Positive for dizziness and numbness. Negative for weakness and headaches. Hematological: Does not bruise/bleed easily. Psychiatric/Behavioral: Negative for sleep disturbance. The patient is not nervous/anxious.         PmHx:  has a past medical history of Anemia, CKD (chronic kidney disease), stage III ([address] Dyslipidemia, Hematuria, Hyperlipidemia, Hypertension, Hyperuricemia, Hypothyroidism, Kidney disease, Lupus ([address] Lupus nephritis ([address] Proteinuria, Seizures ([address] and Systemic lupus erythematosus ([facility] [address] Social History:  reports that she quit smoking about 9 years ago. Her smoking use included cigarettes. She started smoking about 10 years ago. She smoked 0.25 packs per day. She has never used smokeless tobacco. She reports current alcohol use. She reports that she does not use drugs. ALLERGIES     Allergies   Allergen Reactions    Codeine Hives and Nausea And Vomiting       CURRENT MEDICATIONS      Current Outpatient Medications:     mycophenolate (CELLCEPT) 500 MG tablet, Take 2 tablets by mouth every morning (before breakfast) AND 2 tablets every evening., Disp: 120 tablet, Rfl: 0    vitamin D (ERGOCALCIFEROL) 1.25 MG (21866 UT) CAPS capsule, Take 1 capsule by mouth once a week, Disp: 12 capsule, Rfl: 0    HYDROcodone-acetaminophen (NORCO) 5-325 MG per tablet, Take 1 tablet by mouth every 6 hours as needed for Pain for up to 30 days. , Disp: 120 tablet, Rfl: 0    albuterol sulfate HFA (PROVENTIL HFA) 108 (90 Base) MCG/ACT inhaler, Inhale 2 puffs into the lungs every 6 hours as needed for Wheezing, Disp: 18 g, Rfl: 3    omeprazole (PRILOSEC) 20 MG delayed release capsule, take 1 capsule by mouth once daily, Disp: 30 capsule, Rfl: 3    silver sulfADIAZINE (SILVADENE) 1 % cream, Apply topically daily. , Disp: 50 g, Rfl: 1    Belimumab (BENLYSTA) 200 MG/ML SOAJ, Inject 200 mg into the skin once a week, Disp: 4 mL, Rfl: 5    hydroxychloroquine (PLAQUENIL) 200 MG tablet, Take 1 tablet by mouth 2 times daily, Disp: 60 tablet, Rfl: 5    permethrin (ELIMITE) 5 % cream, Apply to wet hair and leave on 10 minutes and then rinse, may repeat on day 9, Disp: 3 Tube, Rfl: 1    levothyroxine (SYNTHROID) 112 MCG tablet, take 1 tablet by mouth once daily, Disp: 90 tablet, Rfl: 1    ondansetron (ZOFRAN) 4 MG tablet, Take 1 tablet by mouth 3 times daily as needed for Nausea or Vomiting, Disp: 30 tablet, Rfl: 0    Calcium Carbonate-Vitamin D (OYSTER SHELL CALCIUM/D) 500-200 MG-UNIT TABS, take 1 tablet by mouth once daily, Disp: 90 tablet, Rfl: 3    loratadine (CLARITIN) 10 MG tablet, Take 1 tablet by mouth daily, Disp: 90 tablet, Rfl: 3    acetaminophen (APAP EXTRA STRENGTH) 500 MG tablet, Take 2 tablets by mouth every 6 hours as needed for Pain, Disp: 120 tablet, Rfl: 3    ferrous sulfate (IRON 325) 325 (65 Fe) MG tablet, Take 1 tablet by mouth daily (with breakfast), Disp: 90 tablet, Rfl: 3    simvastatin (ZOCOR) 40 MG tablet, take 1 tablet by mouth at bedtime, Disp: 90 tablet, Rfl: 3    nitroGLYCERIN (NITROSTAT) 0.4 MG SL tablet, Place 1 tablet under the tongue every 5 minutes as needed for Chest pain up to max of 3 total doses. If no relief after 1 dose, call 911., Disp: 25 tablet, Rfl: 0    gabapentin (NEURONTIN) 400 MG capsule, Take 1 capsule by mouth 2 times daily for 90 days. , Disp: 180 capsule, Rfl: 1    gabapentin (NEURONTIN) 100 MG capsule, Take bid with the 400 mg tablet for a total dose of 500 mg bid daily, Disp: 180 capsule, Rfl: 0    PHYSICAL EXAMINATION / OBJECTIVE     Objective:  /60 (Site: Left Upper Arm, Position: Sitting, Cuff Size: Medium Adult)   Pulse 82   Ht 5' 5.98\" (1.676 m)   Wt 158 lb (71.7 kg)   SpO2 96%   BMI 25.51 kg/m²     Physical Exam  Constitutional:       General: She is not in acute distress. Appearance: She is well-developed. She is not diaphoretic. HENT:      Head: Normocephalic and atraumatic. Nose: Nose normal.      Comments: Scabbed sores bilat     Mouth/Throat:      Mouth: Mucous membranes are moist.   Eyes:      General: No scleral icterus. Conjunctiva/sclera: Conjunctivae normal.   Cardiovascular:      Rate and Rhythm: Normal rate and regular rhythm. Heart sounds: Normal heart sounds. Pulmonary:      Effort: Pulmonary effort is normal. No respiratory distress. Breath sounds: Normal breath sounds. No wheezing or rales. Musculoskeletal:         General: Swelling and tenderness present.       Cervical back: Normal range of motion and neck supple. Lymphadenopathy:      Cervical: No cervical adenopathy. Skin:     General: Skin is warm and dry. Neurological:      Mental Status: She is alert and oriented to person, place, and time. Psychiatric:         Behavior: Behavior normal.       Musculoskeletal:  Upper extremities:    SHOULDER's: , nt   ELBOWS nt  WRISTS Non-tender, + tinel Right   HANDS/FINGERS : tender PIPs right 2,3,4  Boggy rigth 2,3,4 left 3rd.      Lower extremities:   NT.        RAPID3: 2/14/2022 ---   4 + 7.5 + 6.5 = 18      Remission: <3  Low Disease Activity: <6  Moderate Disease Activity: >=6 and <=12  High Disease Activity: >12     LABS      Lab Results   Component Value Date    WBC 6.4 01/12/2022    HGB 11.3 (L) 01/12/2022    MCV 96.2 01/12/2022    MCHC 30.0 (L) 01/12/2022    RDW 18.6 (H) 03/24/2020    PLT see below 01/12/2022    NEUTROABS 2600 03/24/2020    LYMPHSABS 0.8 (L) 01/12/2022    EOSABS 0.0 01/12/2022    BASOSABS 0.0 01/12/2022         Chemistry        Component Value Date/Time     01/12/2022 1412    K 4.0 01/12/2022 1412    K 3.7 11/09/2021 1545    CL 99 01/12/2022 1412    CO2 27 01/12/2022 1412    BUN 27 (H) 01/12/2022 1412    CREATININE 2.1 (H) 01/12/2022 1412        Component Value Date/Time    CALCIUM 10.2 01/12/2022 1412    ALKPHOS 66 01/12/2022 1412    AST 30 01/12/2022 1412    ALT 21 01/12/2022 1412    BILITOT 0.3 01/12/2022 1412            Lab Results   Component Value Date    SEDRATE 12 01/12/2022    SEDRATE 30 (H) 01/04/2022    SEDRATE 25 (H) 08/05/2021    CRP < 0.30 01/12/2022    CRP < 0.30 01/04/2022    CRP < 0.30 08/05/2021       Lab Results   Component Value Date    VITD25 12 01/12/2022       Lab Results   Component Value Date    ANASCRN Detected (A) 06/17/2019     Lab Results   Component Value Date    SSA SEE BELOW 06/17/2019     Lab Results   Component Value Date    SSB 0 06/17/2019     Lab Results   Component Value Date    ANTI-SMITH 4 06/17/2019     Lab Results   Component Value Date    DSDNAAB SEE BELOW 06/17/2019      No results found for: ANTIRNP  Lab Results   Component Value Date    C3 105 01/12/2022    C4 21 01/12/2022     Lab Results   Component Value Date    CCPAB 8 06/17/2019     Lab Results   Component Value Date    RF 11 06/17/2019           RADIOLOGY / PROCEDURES:                 Electronically signed by Britta Cody DO on 2/14/22 at 2:12 PM EST  Please contact the office if you have any questions or change of symptoms.

## 2022-02-15 ENCOUNTER — TELEPHONE (OUTPATIENT)
Dept: NEPHROLOGY | Age: 52
End: 2022-02-15

## 2022-02-15 DIAGNOSIS — I31.39 PERICARDIAL EFFUSION: ICD-10-CM

## 2022-02-15 DIAGNOSIS — M32.14 OTHER SYSTEMIC LUPUS ERYTHEMATOSUS WITH GLOMERULAR DISEASE (HCC): ICD-10-CM

## 2022-02-15 DIAGNOSIS — M32.14 SYSTEMIC LUPUS ERYTHEMATOSUS WITH GLOMERULAR DISEASE, UNSPECIFIED SLE TYPE (HCC): ICD-10-CM

## 2022-02-15 DIAGNOSIS — R80.1 PERSISTENT PROTEINURIA: ICD-10-CM

## 2022-02-15 NOTE — TELEPHONE ENCOUNTER
----- Message from Blaire Aldana MD sent at 2/15/2022  5:37 AM EST -----  Serum creatinine is improved

## 2022-02-16 DIAGNOSIS — G89.4 CHRONIC PAIN SYNDROME: ICD-10-CM

## 2022-02-16 LAB
C3 COMPLEMENT: 123 MG/DL (ref 90–180)
COMPLEMENT C4: 24 MG/DL (ref 10–40)

## 2022-02-16 RX ORDER — HYDROCODONE BITARTRATE AND ACETAMINOPHEN 5; 325 MG/1; MG/1
1 TABLET ORAL EVERY 6 HOURS PRN
Qty: 120 TABLET | Refills: 0 | Status: SHIPPED | OUTPATIENT
Start: 2022-02-16 | End: 2022-03-14 | Stop reason: SDUPTHER

## 2022-02-16 NOTE — TELEPHONE ENCOUNTER
OARRS reviewed. UDS: + for   hydrocodone  Last seen: 1/12/2022.  Follow-up:   Future Appointments   Date Time Provider Justus Dumont   3/30/2022  3:40 PM Kalpana Landeros DO [facility]   4/6/2022  1:40 PM MACKENZIE Harkins CNP [facility] Pain [address]   5/5/2022  3:00 PM Conrad Fisher MD [address]   1/18/2023 12:15 PM Melissa Reed MD [facility] Heart [address] [facility]

## 2022-02-23 ENCOUNTER — TELEPHONE (OUTPATIENT)
Dept: RHEUMATOLOGY | Age: 52
End: 2022-02-23

## 2022-02-23 DIAGNOSIS — R80.9 PROTEINURIA, UNSPECIFIED TYPE: ICD-10-CM

## 2022-02-23 DIAGNOSIS — M32.14 SYSTEMIC LUPUS ERYTHEMATOSUS WITH GLOMERULAR DISEASE, UNSPECIFIED SLE TYPE (HCC): Primary | ICD-10-CM

## 2022-02-23 RX ORDER — BELIMUMAB 400 MG/5ML
10 INJECTION, POWDER, LYOPHILIZED, FOR SOLUTION INTRAVENOUS
Qty: 2 EACH | Refills: 0 | Status: SHIPPED | OUTPATIENT
Start: 2022-02-23 | End: 2022-03-17

## 2022-02-23 NOTE — TELEPHONE ENCOUNTER
The patient is a white bag infusion insurance indicating we have to send the prescription to the pharmacy and they send the medication to our pharmacy for infusion. The patient should be receiving 10 mg/kg IV every 2 weeks for 3 doses prior to transitioning to home based subcutaneous dosing of 200 mg once weekly. Diagnosis Orders   1. Systemic lupus erythematosus with glomerular disease, unspecified SLE type (HCC)  belimumab (BELIMUMAB) 400 MG SOLR   2.  Proteinuria, unspecified type  belimumab (BELIMUMAB) 400 MG SOLR

## 2022-03-04 ENCOUNTER — TELEPHONE (OUTPATIENT)
Dept: RHEUMATOLOGY | Age: 52
End: 2022-03-04

## 2022-03-04 NOTE — TELEPHONE ENCOUNTER
Pt called but there was no answer. A message was left on the patients voicemail asking them to call back. - wanting to discus with patient if she has heard from the infusion center or has started subcu benlysta.    - if not started we may need to pursue other treatment options

## 2022-03-09 ENCOUNTER — PATIENT MESSAGE (OUTPATIENT)
Dept: FAMILY MEDICINE CLINIC | Age: 52
End: 2022-03-09

## 2022-03-10 RX ORDER — ONDANSETRON 4 MG/1
4 TABLET, FILM COATED ORAL 3 TIMES DAILY PRN
Qty: 30 TABLET | Refills: 0 | Status: SHIPPED | OUTPATIENT
Start: 2022-03-10 | End: 2022-07-06 | Stop reason: SDUPTHER

## 2022-03-11 RX ORDER — BELIMUMAB 200 MG/ML
200 SOLUTION SUBCUTANEOUS WEEKLY
Qty: 4 ML | Refills: 5 | Status: SHIPPED | OUTPATIENT
Start: 2022-03-11 | End: 2022-03-16

## 2022-03-14 DIAGNOSIS — G89.4 CHRONIC PAIN SYNDROME: ICD-10-CM

## 2022-03-14 RX ORDER — HYDROCODONE BITARTRATE AND ACETAMINOPHEN 5; 325 MG/1; MG/1
1 TABLET ORAL EVERY 8 HOURS PRN
Qty: 90 TABLET | Refills: 0 | Status: SHIPPED | OUTPATIENT
Start: 2022-03-18 | End: 2022-04-14 | Stop reason: SDUPTHER

## 2022-03-14 NOTE — TELEPHONE ENCOUNTER
Please let pt know I  lowered Norco to PRN q 8 hrs due to age and clinic is focusing on lowering narcotic usage and using interventional injections and therapies to manage pain.  Thanks

## 2022-03-14 NOTE — TELEPHONE ENCOUNTER
OARRS reviewed. UDS: + for  Hydrocodone consistent. Last seen: 1/12/2022.  Follow-up:   Future Appointments   Date Time Provider Justus Dumont   3/30/2022  3:40 PM Maeve Hanson DO [facility]   4/6/2022  1:40 PM MACKENZIE Dong CNP [facility] Pain [address]   5/5/2022  3:00 PM Conrad Juarez MD [facility]. [address]   1/18/2023 12:15 PM Simba Godoy MD [facility] Heart [address]

## 2022-03-15 DIAGNOSIS — M54.40 BACK PAIN OF LUMBAR REGION WITH SCIATICA: ICD-10-CM

## 2022-03-15 DIAGNOSIS — M79.641 RIGHT HAND PAIN: ICD-10-CM

## 2022-03-15 RX ORDER — GABAPENTIN 400 MG/1
CAPSULE ORAL
Qty: 180 CAPSULE | Refills: 1 | Status: SHIPPED | OUTPATIENT
Start: 2022-03-15 | End: 2022-07-06 | Stop reason: SDUPTHER

## 2022-03-16 DIAGNOSIS — M32.14 SYSTEMIC LUPUS ERYTHEMATOSUS WITH GLOMERULAR DISEASE, UNSPECIFIED SLE TYPE (HCC): ICD-10-CM

## 2022-03-16 DIAGNOSIS — Z87.39 H/O LUPUS NEPHRITIS: ICD-10-CM

## 2022-03-16 RX ORDER — BELIMUMAB 200 MG/ML
SOLUTION SUBCUTANEOUS
Qty: 4 ML | Refills: 5 | Status: SHIPPED | OUTPATIENT
Start: 2022-03-16 | End: 2022-05-17

## 2022-03-28 ENCOUNTER — TELEPHONE (OUTPATIENT)
Dept: RHEUMATOLOGY | Age: 52
End: 2022-03-28

## 2022-03-30 ENCOUNTER — OFFICE VISIT (OUTPATIENT)
Dept: RHEUMATOLOGY | Age: 52
End: 2022-03-30
Payer: COMMERCIAL

## 2022-03-30 VITALS
SYSTOLIC BLOOD PRESSURE: 118 MMHG | DIASTOLIC BLOOD PRESSURE: 76 MMHG | HEART RATE: 73 BPM | WEIGHT: 152 LBS | OXYGEN SATURATION: 98 % | BODY MASS INDEX: 24.43 KG/M2 | HEIGHT: 66 IN

## 2022-03-30 DIAGNOSIS — M32.14 SYSTEMIC LUPUS ERYTHEMATOSUS WITH GLOMERULAR DISEASE, UNSPECIFIED SLE TYPE (HCC): Primary | ICD-10-CM

## 2022-03-30 DIAGNOSIS — E55.9 VITAMIN D DEFICIENCY: ICD-10-CM

## 2022-03-30 DIAGNOSIS — R80.9 PROTEINURIA, UNSPECIFIED TYPE: ICD-10-CM

## 2022-03-30 DIAGNOSIS — Z87.39 H/O LUPUS NEPHRITIS: ICD-10-CM

## 2022-03-30 DIAGNOSIS — Z51.81 MEDICATION MONITORING ENCOUNTER: ICD-10-CM

## 2022-03-30 DIAGNOSIS — D64.9 ANEMIA, UNSPECIFIED TYPE: ICD-10-CM

## 2022-03-30 DIAGNOSIS — N18.9 CHRONIC KIDNEY DISEASE, UNSPECIFIED CKD STAGE: ICD-10-CM

## 2022-03-30 PROCEDURE — G8420 CALC BMI NORM PARAMETERS: HCPCS | Performed by: INTERNAL MEDICINE

## 2022-03-30 PROCEDURE — G8427 DOCREV CUR MEDS BY ELIG CLIN: HCPCS | Performed by: INTERNAL MEDICINE

## 2022-03-30 PROCEDURE — 99215 OFFICE O/P EST HI 40 MIN: CPT | Performed by: INTERNAL MEDICINE

## 2022-03-30 PROCEDURE — G8484 FLU IMMUNIZE NO ADMIN: HCPCS | Performed by: INTERNAL MEDICINE

## 2022-03-30 PROCEDURE — 1036F TOBACCO NON-USER: CPT | Performed by: INTERNAL MEDICINE

## 2022-03-30 ASSESSMENT — ENCOUNTER SYMPTOMS
WHEEZING: 0
EYE PAIN: 0
NAUSEA: 0
COUGH: 1
EYE REDNESS: 0
EYES NEGATIVE: 1
DIARRHEA: 0
VOMITING: 0
SHORTNESS OF BREATH: 1
CONSTIPATION: 0

## 2022-03-30 NOTE — PROGRESS NOTES
C4, u/a q 4 weeks x 2                - plaquenil eye exam (2020)         Return in about 6 weeks (around 5/11/2022). New Prescriptions    No medications on file     History of Present Illness (HPI)     Chief Complaint   Patient presents with    Follow-up     6 week f/u        Cade Hopkins  is a(n)51 y.o. female with a hx of systemic lupus with hiistory of class IV lupus nephritis, CKD stage III, chronic low back pain w/ radiculopathy, lumbar stenosis, h/o seizures here for the f/u evaluation of systemic lupus      Worsening pain in the left knee recently. Active pain  finger, wrists, knees, and ankles , neck and lower back pain 7.5/10. Timing: evenings when lying down. Aggravating factors: weather, back: prolonged bending, forward flexion. Alleviating factors: tylenol. norco, hot back, gabapentin. AM stiffness about 30 mintues. + nasal sores. Fatigue, dry ryes, mouth, SOB/ZAMORANO. , cramping/hussain horses in calfs intermittently occurring.   + numbness Right hand and forearms.              REVIEW OF SYSTEMS: (ROS)    Review of Systems   Constitutional: Positive for fatigue. Negative for diaphoresis and fever. HENT: Negative for congestion, hearing loss, mouth sores and nosebleeds. Eyes: Negative. Negative for pain and redness. Respiratory: Positive for cough and shortness of breath. Negative for wheezing. Cardiovascular: Negative. Negative for chest pain. Gastrointestinal: Negative for constipation, diarrhea, nausea and vomiting. Genitourinary: Negative for difficulty urinating, frequency, genital sores and hematuria. Musculoskeletal: Positive for myalgias. Skin: Negative for rash. Neurological: Positive for dizziness and numbness. Negative for weakness and headaches. Hematological: Does not bruise/bleed easily. Psychiatric/Behavioral: Negative for sleep disturbance. The patient is not nervous/anxious.         PmHx:  has a past medical history of Anemia, CKD (chronic kidney disease), stage III ([facility] [address] Dyslipidemia, Hematuria, Hyperlipidemia, Hypertension, Hyperuricemia, Hypothyroidism, Kidney disease, Lupus ([facility] [address] Lupus nephritis ([facility] [address] Proteinuria, Seizures ([facility] [address] and Systemic lupus erythematosus ([facility] [address] Social History:  reports that she quit smoking about 9 years ago. Her smoking use included cigarettes. She started smoking about 10 years ago. She smoked 0.25 packs per day. She has never used smokeless tobacco. She reports current alcohol use. She reports that she does not use drugs. ALLERGIES     Allergies   Allergen Reactions    Codeine Hives and Nausea And Vomiting       CURRENT MEDICATIONS      Current Outpatient Medications:     Belimumab (BENLYSTA) 200 MG/ML SOAJ, Start 400mg subcu weekly x 4 weeks then change to  200mg subcu weekly, Disp: 4 mL, Rfl: 5    gabapentin (NEURONTIN) 400 MG capsule, take 1 capsule by mouth twice a day, Disp: 180 capsule, Rfl: 1    HYDROcodone-acetaminophen (NORCO) 5-325 MG per tablet, Take 1 tablet by mouth every 8 hours as needed for Pain for up to 30 days. , Disp: 90 tablet, Rfl: 0    ondansetron (ZOFRAN) 4 MG tablet, Take 1 tablet by mouth 3 times daily as needed for Nausea or Vomiting, Disp: 30 tablet, Rfl: 0    hydroxychloroquine (PLAQUENIL) 200 MG tablet, Take 1 tablet by mouth 2 times daily, Disp: 60 tablet, Rfl: 5    mycophenolate (CELLCEPT) 500 MG tablet, Take 2 tablets by mouth every morning (before breakfast) AND 2 tablets every evening., Disp: 120 tablet, Rfl: 0    predniSONE (DELTASONE) 2.5 MG tablet, Take 3 tablets by mouth daily, Disp: 90 tablet, Rfl: 1    vitamin D (ERGOCALCIFEROL) 1.25 MG (02006 UT) CAPS capsule, Take 1 capsule by mouth once a week, Disp: 12 capsule, Rfl: 0    albuterol sulfate HFA (PROVENTIL HFA) 108 (90 Base) MCG/ACT inhaler, Inhale 2 puffs into the lungs every 6 hours as needed for Wheezing, Disp: 18 g, Rfl: 3    omeprazole (PRILOSEC) 20 MG delayed release capsule, take 1 capsule by mouth once daily, Disp: 30 capsule, Rfl: 3    silver sulfADIAZINE (SILVADENE) 1 % cream, Apply topically daily. , Disp: 50 g, Rfl: 1    permethrin (ELIMITE) 5 % cream, Apply to wet hair and leave on 10 minutes and then rinse, may repeat on day 9, Disp: 3 Tube, Rfl: 1    levothyroxine (SYNTHROID) 112 MCG tablet, take 1 tablet by mouth once daily, Disp: 90 tablet, Rfl: 1    Calcium Carbonate-Vitamin D (OYSTER SHELL CALCIUM/D) 500-200 MG-UNIT TABS, take 1 tablet by mouth once daily, Disp: 90 tablet, Rfl: 3    loratadine (CLARITIN) 10 MG tablet, Take 1 tablet by mouth daily, Disp: 90 tablet, Rfl: 3    acetaminophen (APAP EXTRA STRENGTH) 500 MG tablet, Take 2 tablets by mouth every 6 hours as needed for Pain, Disp: 120 tablet, Rfl: 3    ferrous sulfate (IRON 325) 325 (65 Fe) MG tablet, Take 1 tablet by mouth daily (with breakfast), Disp: 90 tablet, Rfl: 3    simvastatin (ZOCOR) 40 MG tablet, take 1 tablet by mouth at bedtime, Disp: 90 tablet, Rfl: 3    nitroGLYCERIN (NITROSTAT) 0.4 MG SL tablet, Place 1 tablet under the tongue every 5 minutes as needed for Chest pain up to max of 3 total doses. If no relief after 1 dose, call 911., Disp: 25 tablet, Rfl: 0    gabapentin (NEURONTIN) 100 MG capsule, Take bid with the 400 mg tablet for a total dose of 500 mg bid daily, Disp: 180 capsule, Rfl: 0    PHYSICAL EXAMINATION / OBJECTIVE     Objective:  /76 (Site: Left Upper Arm, Position: Sitting, Cuff Size: Large Adult)   Pulse 73   Ht 5' 5.98\" (1.676 m)   Wt 152 lb (68.9 kg)   SpO2 98%   BMI 24.55 kg/m²     Physical Exam  Constitutional:       General: She is not in acute distress. Appearance: She is well-developed. She is not diaphoretic. HENT:      Head: Normocephalic and atraumatic. Nose: Nose normal.      Mouth/Throat:      Mouth: Mucous membranes are moist.   Eyes:      General: No scleral icterus.      Conjunctiva/sclera: Conjunctivae normal.   Cardiovascular:      Rate and Rhythm: Normal rate and regular Date    ANASCRN Detected (A) 06/17/2019     Lab Results   Component Value Date    DSDNAAB SEE BELOW 06/17/2019     Lab Results   Component Value Date    C3 123 02/14/2022    C4 24 02/14/2022       RADIOLOGY / PROCEDURES:                 Electronically signed by Tsering Moe DO on 2/14/22 at 2:12 PM EST  Please contact the office if you have any questions or change of symptoms.

## 2022-03-31 NOTE — TELEPHONE ENCOUNTER
Benlysta approved via cover my meds. Patient contacted and instructed to reach out to specialty pharmacy. Gave patient contact information for pharmacy. Ph: [phone]      Approved. Approved for BENLYSTA Soln Auto-inj, quantity up to 4 per 14 days, under the pharmacy benefit. The drug has been approved from 03/28/2022 to 03/28/2023. Generic substitution required when available.

## 2022-04-05 ENCOUNTER — TELEPHONE (OUTPATIENT)
Dept: RHEUMATOLOGY | Age: 52
End: 2022-04-05

## 2022-04-05 NOTE — TELEPHONE ENCOUNTER
Pt LM saying she doesn't remember her instructions for Benlysta called back reviewed instructions, pt them wanted me to tell her over the phone how to open and give medication, I didn't feel comfortable as the injector pens change all the time. Informed pt she can come in and we can walk her through it.

## 2022-04-06 ENCOUNTER — OFFICE VISIT (OUTPATIENT)
Dept: PHYSICAL MEDICINE AND REHAB | Age: 52
End: 2022-04-06
Payer: COMMERCIAL

## 2022-04-06 VITALS
SYSTOLIC BLOOD PRESSURE: 126 MMHG | HEART RATE: 70 BPM | WEIGHT: 152 LBS | HEIGHT: 66 IN | DIASTOLIC BLOOD PRESSURE: 74 MMHG | BODY MASS INDEX: 24.43 KG/M2

## 2022-04-06 DIAGNOSIS — M47.816 SPONDYLOSIS OF LUMBAR REGION WITHOUT MYELOPATHY OR RADICULOPATHY: ICD-10-CM

## 2022-04-06 DIAGNOSIS — M47.814 SPONDYLOSIS OF THORACIC REGION WITHOUT MYELOPATHY OR RADICULOPATHY: ICD-10-CM

## 2022-04-06 DIAGNOSIS — M54.16 LUMBAR RADICULITIS: Primary | ICD-10-CM

## 2022-04-06 DIAGNOSIS — M48.061 SPINAL STENOSIS OF LUMBAR REGION WITHOUT NEUROGENIC CLAUDICATION: ICD-10-CM

## 2022-04-06 DIAGNOSIS — M54.12 CERVICAL RADICULITIS: ICD-10-CM

## 2022-04-06 DIAGNOSIS — M48.062 LUMBAR STENOSIS WITH NEUROGENIC CLAUDICATION: ICD-10-CM

## 2022-04-06 DIAGNOSIS — M46.1 SI (SACROILIAC) JOINT INFLAMMATION (HCC): ICD-10-CM

## 2022-04-06 PROCEDURE — G8427 DOCREV CUR MEDS BY ELIG CLIN: HCPCS | Performed by: NURSE PRACTITIONER

## 2022-04-06 PROCEDURE — 1036F TOBACCO NON-USER: CPT | Performed by: NURSE PRACTITIONER

## 2022-04-06 PROCEDURE — G8420 CALC BMI NORM PARAMETERS: HCPCS | Performed by: NURSE PRACTITIONER

## 2022-04-06 PROCEDURE — 99214 OFFICE O/P EST MOD 30 MIN: CPT | Performed by: NURSE PRACTITIONER

## 2022-04-06 ASSESSMENT — ENCOUNTER SYMPTOMS
CONSTIPATION: 0
NAUSEA: 0
COLOR CHANGE: 0
COUGH: 0
ABDOMINAL PAIN: 0
VOMITING: 0
DIARRHEA: 0
CHEST TIGHTNESS: 0
EYE PAIN: 0
SINUS PRESSURE: 0
BACK PAIN: 1
SORE THROAT: 0
SHORTNESS OF BREATH: 0
WHEEZING: 0
PHOTOPHOBIA: 0
RHINORRHEA: 0

## 2022-04-06 NOTE — PROGRESS NOTES
[address] [facility] [address]  Dept: [phone]  Dept Fax: [phone]: [phone]    Visit Date: 4/6/2022    Functionality Assessment/Goals Worksheet     On a scale of 0 (Does not Interfere) to 10 (Completely Interferes)     1. Which number describes how during the past week pain has interfered with       the following:  A. General Activity:  7  B. Mood: 8  C. Walking Ability:  6  D. Normal Work (Includes both work outside the home and housework):  7  E. Relations with Other People:   5  F. Sleep:   8  G. Enjoyment of Life:   7    2. Patient Prefers to Take their Pain Medications:     [x]  On a regular basis   []  Only when necessary    []  Does not take pain medications    3. What are the Patient's Goals/Expectations for Visiting Pain Management? []  Learn about my pain    []  Receive Medication   []  Physical Therapy     []  Treat Depression   []  Receive Injections    []  Treat Sleep   []  Deal with Anxiety and Stress   []  Treat Opoid Dependence/Addiction   []  Other:      HPI:   Racquel Gonzalez is a 54 y.o. female is here today for    Chief Complaint: Low back pain, Mid back pain, Neck pain, Hip pain and SI pain    HPI   F/U still has not went to [facility] for cardiac workup post abnormal heart cath, Dr Edgar Tadeo referred her. Told her to discuss with his office that [facility] has not called her yet to make appointment. Pending [facility] to see cardiology for possible CABG or procedures. They are still  reviewing records.      Had heart cath Feb 2021. She remains off work due to cardiac issues. She is unable to tolerate PT due to chest pain  Angina. She follows Rheumatology and recently started BENLYSTA      No relief from prior lumbar procedures.  She did have TFLESI L5 left in 2018 with 50% pain relief 18 months. EMG  Showed C5-6 left cervical radiculopathy .    Pain increases with bending, lifting, twisting , reaching, pushing, pulling, turning head, walking, standing, raising arms, stairs and getting up and down. Treatments Tried PT, ice, heat, Chiropractor, NSAIDS, narcotics, muscle relaxer, OTC rubs creams patches, steroid burst and injections  Pain Description sharp, shooting, stabbing, burning, pressure, throbbing, aching, numbness, tingling and pins and needles    She denies any ER visits or new health issues since last visit. Pain scale with out pain medications or at its worst is 7/10. Pain scale with pain medications or at its best is 4/10. Last dose of [place] was today  Drug screen reviewed from 1/2022 and was appropriate  Pill count completed  today and WNL: Yes     Radiology:  MRI cervical   :               There is 3 mm of anterolisthesis of C7 on T1. There is loss of disc height and there is degenerative marrow edema in the endplates at the Q6-8 level. This is also present at the C6-7 level. No suspicious osseous lesions are present. The facet joints are    normally aligned.       The cervical spinal cord is of normal caliber and signal intensity.  The visualized aspects of the posterior fossa are normal.       On the axial images, at C2-C3, there is a small posterior disc osteophyte complex. There are bilateral facet degenerative changes. There is no spinal canal stenosis. There is mild right foraminal stenosis.       At C3-C4, there is a posterior disc osteophyte complex. There are bilateral facet degenerative changes. There is moderate severity spinal canal stenosis. There is moderate to severe bilateral foraminal stenosis.       At C4-C5, there is a small posterior disc osteophyte complex. There are facet degenerative changes. There is mild spinal canal stenosis. There is no foraminal stenosis.       At C5-C6, there is a small posterior disc osteophyte complex. There is mild spinal canal stenosis.  There is no foraminal stenosis.       At C6-C7, there is a small posterior disc osteophyte complex. There is mild spinal canal stenosis and bilateral foraminal stenosis.       At C7-T1, there are facet degenerative changes. There is uncovering of the disc. There is mild spinal canal stenosis. There is mild foraminal stenosis.       There are no suspicious findings in the cervical soft tissues.               Impression       1. Moderate severity spinal canal stenosis with moderate to severe bilateral foraminal stenosis at the C3-4 level due to degenerative changes. 2. Mild spinal canal stenosis and/or foraminal stenosis at other levels as described above.          EMG        The patientis allergic to codeine. Subjective:      Review of Systems   Constitutional: Negative for activity change, appetite change, chills, diaphoresis, fatigue, fever and unexpected weight change. HENT: Positive for dental problem. Negative for congestion, ear pain, hearing loss, mouth sores, nosebleeds, rhinorrhea, sinus pressure and sore throat. Dental infection recently   Eyes: Negative for photophobia, pain and visual disturbance. Respiratory: Negative for cough, chest tightness, shortness of breath and wheezing. COVID     Cardiovascular: Negative for chest pain and palpitations. HTN CAD  Recent abnormal heart cath, pending [facility] referral for possible surgery   Gastrointestinal: Negative for abdominal pain, constipation, diarrhea, nausea and vomiting. Endocrine: Negative for cold intolerance, heat intolerance, polydipsia, polyphagia and polyuria. Thyroid issues   Genitourinary: Negative for decreased urine volume, difficulty urinating, frequency and hematuria. CKD   Musculoskeletal: Positive for arthralgias, back pain, joint swelling, myalgias, neck pain and neck stiffness. Negative for gait problem. Skin: Negative for color change and rash.         Hot water burn  8/31/2020 . healed   Allergic/Immunologic: Negative for food allergies and immunocompromised state. Neurological: Positive for dizziness, weakness, numbness and headaches. Negative for tremors, seizures, syncope, facial asymmetry, speech difficulty and light-headedness. H/o seizures,no seizures in 16 years. H/O Lupus   Hematological: Does not bruise/bleed easily. Psychiatric/Behavioral: Negative for agitation, behavioral problems, confusion, decreased concentration, dysphoric mood, hallucinations, self-injury, sleep disturbance and suicidal ideas. The patient is not nervous/anxious and is not hyperactive. Objective:     Vitals:    04/06/22 1353   BP: 126/74   Site: Left Upper Arm   Position: Sitting   Cuff Size: Medium Adult   Pulse: 70   Weight: 152 lb (68.9 kg)   Height: 5' 6\" (1.676 m)       Physical Exam  Vitals and nursing note reviewed. Constitutional:       General: She is not in acute distress. Appearance: She is well-developed. She is not diaphoretic. HENT:      Head: Normocephalic and atraumatic. Right Ear: External ear normal.      Left Ear: External ear normal.      Nose: Nose normal.      Mouth/Throat:      Pharynx: No oropharyngeal exudate. Eyes:      General: No scleral icterus. Right eye: No discharge. Left eye: No discharge. Conjunctiva/sclera: Conjunctivae normal.      Pupils: Pupils are equal, round, and reactive to light. Neck:      Thyroid: No thyromegaly. Cardiovascular:      Rate and Rhythm: Normal rate and regular rhythm. Heart sounds: Normal heart sounds. No murmur heard. No friction rub. No gallop. Pulmonary:      Effort: Pulmonary effort is normal. No respiratory distress. Breath sounds: Normal breath sounds. No wheezing or rales. Chest:      Chest wall: No tenderness. Abdominal:      General: Bowel sounds are normal. There is no distension. Palpations: Abdomen is soft. Tenderness: There is no abdominal tenderness. There is no guarding or rebound.    Musculoskeletal: General: Tenderness present. Right shoulder: Tenderness present. Decreased range of motion. Decreased strength. Left shoulder: Tenderness present. Right upper arm: Tenderness present. Right forearm: Tenderness present. Left forearm: Tenderness present. Right wrist: Tenderness and bony tenderness present. Left wrist: Tenderness and bony tenderness present. Right hand: Tenderness and bony tenderness present. Decreased range of motion. Decreased strength. Decreased sensation. Left hand: Tenderness and bony tenderness present. Decreased range of motion. Decreased strength. Decreased sensation. Cervical back: Neck supple. Spasms, tenderness and bony tenderness present. No edema, erythema or rigidity. Pain with movement and muscular tenderness present. Decreased range of motion. Thoracic back: Spasms, tenderness and bony tenderness present. Decreased range of motion. Lumbar back: Spasms, tenderness and bony tenderness present. Decreased range of motion. Back:       Right hip: Tenderness present. Left hip: Normal.      Right upper leg: Tenderness present. Right knee: Normal.      Left knee: Normal.      Right ankle: Tenderness present. Right foot: Decreased range of motion. Swelling, tenderness and bony tenderness present. Comments: 2 right broken toes has walker shoe on    Skin:     General: Skin is warm. Coloration: Skin is not pale. Findings: No erythema or rash. Neurological:      Mental Status: She is alert and oriented to person, place, and time. She is not disoriented. Cranial Nerves: Cranial nerves are intact. No cranial nerve deficit. Sensory: Sensation is intact. No sensory deficit. Motor: Motor function is intact. No atrophy or abnormal muscle tone. Coordination: Coordination is intact.  Coordination normal.      Gait: Gait abnormal.      Deep Tendon Reflexes: Babinski sign absent on the right side. Reflex Scores:       Tricep reflexes are 2+ on the right side and 2+ on the left side. Bicep reflexes are 2+ on the right side and 2+ on the left side. Brachioradialis reflexes are 2+ on the right side and 2+ on the left side. Patellar reflexes are 2+ on the right side and 2+ on the left side. Achilles reflexes are 2+ on the right side and 2+ on the left side. Comments: Motor  4/5 RUE  5/5 BLE   Psychiatric:         Attention and Perception: Attention and perception normal. She is attentive. Mood and Affect: Mood and affect normal. Mood is not anxious or depressed. Affect is not labile, blunt, angry or inappropriate. Speech: Speech normal. She is communicative. Speech is not rapid and pressured, delayed, slurred or tangential.         Behavior: Behavior normal. Behavior is not agitated, slowed, aggressive, withdrawn, hyperactive or combative. Behavior is cooperative. Thought Content: Thought content normal. Thought content is not paranoid or delusional. Thought content does not include homicidal or suicidal ideation. Thought content does not include homicidal or suicidal plan. Cognition and Memory: Cognition and memory normal. Memory is not impaired. She does not exhibit impaired recent memory or impaired remote memory. Judgment: Judgment normal. Judgment is not impulsive or inappropriate. VONDA  Patricks test  positive  Yeoman's  or Gaenslen's positive  Kemps  positive  Spurlings  positive  Todd's na         Assessment:     1. Lumbar radiculitis    2. Spinal stenosis of lumbar region without neurogenic claudication    3. Spondylosis of lumbar region without myelopathy or radiculopathy    4. SI (sacroiliac) joint inflammation (HCC)    5. Cervical radiculitis    6. Spondylosis of thoracic region without myelopathy or radiculopathy    7. Lumbar stenosis with neurogenic claudication            Plan:      · OARRS reviewed. Current MED: 15  · Patient was not offered naloxone for home.  Testing Labs or Radiology reviewed:Cervical MRI     Procedures:none at this time pending cardiac workup at [facility] Discussed with patient about risks with procedure including infection, reaction to medication, increased pain, or bleeding.  Medications:Norco       Meds. Prescribed:   No orders of the defined types were placed in this encounter. Return in about 3 months (around 7/6/2022) for Follow up pain medications.                Electronically signed by MACKENZIE Clark CNP on4/6/2022 at 3:23 PM

## 2022-04-13 DIAGNOSIS — J30.89 NON-SEASONAL ALLERGIC RHINITIS DUE TO OTHER ALLERGIC TRIGGER: ICD-10-CM

## 2022-04-13 DIAGNOSIS — R10.9 ABDOMINAL PAIN, UNSPECIFIED ABDOMINAL LOCATION: ICD-10-CM

## 2022-04-13 RX ORDER — LORATADINE 10 MG/1
TABLET ORAL
Qty: 90 TABLET | Refills: 3 | Status: SHIPPED | OUTPATIENT
Start: 2022-04-13

## 2022-04-13 RX ORDER — OMEPRAZOLE 20 MG/1
CAPSULE, DELAYED RELEASE ORAL
Qty: 30 CAPSULE | Refills: 3 | Status: SHIPPED | OUTPATIENT
Start: 2022-04-13

## 2022-04-14 DIAGNOSIS — G89.4 CHRONIC PAIN SYNDROME: ICD-10-CM

## 2022-04-14 RX ORDER — HYDROCODONE BITARTRATE AND ACETAMINOPHEN 5; 325 MG/1; MG/1
1 TABLET ORAL EVERY 8 HOURS PRN
Qty: 90 TABLET | Refills: 0 | Status: SHIPPED | OUTPATIENT
Start: 2022-04-17 | End: 2022-05-17 | Stop reason: SDUPTHER

## 2022-04-14 NOTE — TELEPHONE ENCOUNTER
OARRS reviewed. UDS: + for  hydrocodone consistent. Last seen: 4/6/2022.  Follow-up:   Future Appointments   Date Time Provider Justus Julia   5/5/2022  3:00 PM Danielle Stauffer MD N [facility]. [facility] [place] [address]   5/17/2022  3:40 PM Jaqueline Bhandari DO N [facility] Rheum [facility] [place]   6/29/2022  3:20 PM Bharathford Reason Leodis Gnosticist, APRN - CNP N [facility] Pain [facility] [place] [address]   1/18/2023 12:15 PM Izabela Sloan MD N [facility] Heart [facility]

## 2022-04-20 ENCOUNTER — PATIENT MESSAGE (OUTPATIENT)
Dept: RHEUMATOLOGY | Age: 52
End: 2022-04-20

## 2022-04-20 DIAGNOSIS — G56.23 CUBITAL TUNNEL SYNDROME, BILATERAL: ICD-10-CM

## 2022-04-20 DIAGNOSIS — M32.14 OTHER SYSTEMIC LUPUS ERYTHEMATOSUS WITH GLOMERULAR DISEASE (HCC): ICD-10-CM

## 2022-04-20 RX ORDER — PREDNISONE 2.5 MG
7.5 TABLET ORAL DAILY
Qty: 90 TABLET | Refills: 1 | Status: SHIPPED | OUTPATIENT
Start: 2022-04-20 | End: 2022-05-17 | Stop reason: SDUPTHER

## 2022-04-20 NOTE — TELEPHONE ENCOUNTER
From: Faustino Espinoza  To: Dr. Rachael Gregg: 4/20/2022 10:21 AM EDT  Subject: La Marin can u send in this meds in to [facility] on [address] mycophenolate 500mg and prednisone

## 2022-04-21 ENCOUNTER — PATIENT MESSAGE (OUTPATIENT)
Dept: RHEUMATOLOGY | Age: 52
End: 2022-04-21

## 2022-04-25 ENCOUNTER — NURSE ONLY (OUTPATIENT)
Dept: LAB | Age: 52
End: 2022-04-25

## 2022-04-25 DIAGNOSIS — Z51.81 MEDICATION MONITORING ENCOUNTER: ICD-10-CM

## 2022-04-25 DIAGNOSIS — D64.9 ANEMIA, UNSPECIFIED TYPE: ICD-10-CM

## 2022-04-25 DIAGNOSIS — E55.9 VITAMIN D DEFICIENCY: ICD-10-CM

## 2022-04-25 LAB
ALBUMIN SERPL-MCNC: 3.9 G/DL (ref 3.5–5.1)
ALP BLD-CCNC: 70 U/L (ref 38–126)
ALT SERPL-CCNC: 9 U/L (ref 11–66)
ANION GAP SERPL CALCULATED.3IONS-SCNC: 11 MEQ/L (ref 8–16)
AST SERPL-CCNC: 17 U/L (ref 5–40)
BACTERIA: ABNORMAL
BILIRUB SERPL-MCNC: < 0.2 MG/DL (ref 0.3–1.2)
BILIRUBIN URINE: NEGATIVE
BLOOD, URINE: NEGATIVE
BUN BLDV-MCNC: 21 MG/DL (ref 7–22)
CALCIUM SERPL-MCNC: 9 MG/DL (ref 8.5–10.5)
CASTS: ABNORMAL /LPF
CHARACTER, URINE: CLEAR
CHLORIDE BLD-SCNC: 108 MEQ/L (ref 98–111)
CO2: 25 MEQ/L (ref 23–33)
COLOR: YELLOW
CREAT SERPL-MCNC: 1.6 MG/DL (ref 0.4–1.2)
CREATININE URINE: 201 MG/DL
EPITHELIAL CELLS, UA: ABNORMAL /HPF
ERYTHROCYTE [DISTWIDTH] IN BLOOD BY AUTOMATED COUNT: 14.8 % (ref 11.5–14.5)
ERYTHROCYTE [DISTWIDTH] IN BLOOD BY AUTOMATED COUNT: 51.9 FL (ref 35–45)
FOLATE: 14.3 NG/ML (ref 4.8–24.2)
GFR SERPL CREATININE-BSD FRML MDRD: 34 ML/MIN/1.73M2
GLUCOSE BLD-MCNC: 87 MG/DL (ref 70–108)
GLUCOSE, URINE: NEGATIVE MG/DL
HCT VFR BLD CALC: 36.8 % (ref 37–47)
HEMOGLOBIN: 10.5 GM/DL (ref 12–16)
IRON: 62 UG/DL (ref 50–170)
KETONES, URINE: NEGATIVE
LEUKOCYTE EST, POC: ABNORMAL
MCH RBC QN AUTO: 27.3 PG (ref 26–33)
MCHC RBC AUTO-ENTMCNC: 28.5 GM/DL (ref 32.2–35.5)
MCV RBC AUTO: 95.6 FL (ref 81–99)
NITRITE, URINE: NEGATIVE
PH UA: 5 (ref 5–9)
PLATELET # BLD: 152 THOU/MM3 (ref 130–400)
PMV BLD AUTO: 12.5 FL (ref 9.4–12.4)
POTASSIUM SERPL-SCNC: 4.1 MEQ/L (ref 3.5–5.2)
PROT/CREAT RATIO, UR: 2.19
PROTEIN UA: 300 MG/DL
PROTEIN, URINE: 440.7 MG/DL
RBC # BLD: 3.85 MILL/MM3 (ref 4.2–5.4)
RBC URINE: ABNORMAL /HPF
SEDIMENTATION RATE, ERYTHROCYTE: 15 MM/HR (ref 0–20)
SODIUM BLD-SCNC: 144 MEQ/L (ref 135–145)
SPECIFIC GRAVITY UA: 1.02 (ref 1–1.03)
TOTAL IRON BINDING CAPACITY: 216 UG/DL (ref 171–450)
TOTAL PROTEIN: 6.2 G/DL (ref 6.1–8)
UROBILINOGEN, URINE: 0.2 EU/DL (ref 0–1)
VITAMIN B-12: 440 PG/ML (ref 211–911)
VITAMIN D 25-HYDROXY: 13 NG/ML (ref 30–100)
WBC # BLD: 4.6 THOU/MM3 (ref 4.8–10.8)
WBC UA: ABNORMAL /HPF

## 2022-04-26 LAB
C3 COMPLEMENT: 121 MG/DL (ref 90–180)
COMPLEMENT C4: 29 MG/DL (ref 10–40)

## 2022-04-29 DIAGNOSIS — E55.9 VITAMIN D DEFICIENCY: ICD-10-CM

## 2022-04-29 DIAGNOSIS — M32.14 SYSTEMIC LUPUS ERYTHEMATOSUS WITH GLOMERULAR DISEASE, UNSPECIFIED SLE TYPE (HCC): Primary | ICD-10-CM

## 2022-04-29 DIAGNOSIS — R80.1 PERSISTENT PROTEINURIA: ICD-10-CM

## 2022-04-29 DIAGNOSIS — I31.39 PERICARDIAL EFFUSION: ICD-10-CM

## 2022-04-29 DIAGNOSIS — M32.14 OTHER SYSTEMIC LUPUS ERYTHEMATOSUS WITH GLOMERULAR DISEASE (HCC): ICD-10-CM

## 2022-04-29 DIAGNOSIS — R80.9 PROTEINURIA, UNSPECIFIED TYPE: ICD-10-CM

## 2022-04-29 RX ORDER — MYCOPHENOLATE MOFETIL 500 MG/1
TABLET ORAL
Qty: 120 TABLET | Refills: 0 | Status: SHIPPED | OUTPATIENT
Start: 2022-04-29 | End: 2022-05-17 | Stop reason: SDUPTHER

## 2022-04-29 RX ORDER — ERGOCALCIFEROL 1.25 MG/1
50000 CAPSULE ORAL WEEKLY
Qty: 12 CAPSULE | Refills: 0 | Status: ON HOLD | OUTPATIENT
Start: 2022-04-29 | End: 2022-11-01

## 2022-04-29 NOTE — PROGRESS NOTES
Diagnosis Orders   1. Systemic lupus erythematosus with glomerular disease, unspecified SLE type (HCC)  mycophenolate (CELLCEPT) 500 MG tablet   2. Proteinuria, unspecified type     3. Vitamin D deficiency  vitamin D (ERGOCALCIFEROL) 1.25 MG (57859 UT) CAPS capsule   4. Other systemic lupus erythematosus with glomerular disease (HCC)  mycophenolate (CELLCEPT) 500 MG tablet   5. Pericardial effusion  mycophenolate (CELLCEPT) 500 MG tablet   6. Persistent proteinuria  mycophenolate (CELLCEPT) 500 MG tablet     -- cont. Mycophenolate , Plaquenil and prednisone. -- cont. Ergocalciferol 50,000 IU weekly   -- benlysta started 4 weeks ago   -- cont. Proteinuria - will notify nephrologist. Jamar Francis can take a few months to see improvement.    -- repeat labs in 4 weeks

## 2022-05-05 ENCOUNTER — OFFICE VISIT (OUTPATIENT)
Dept: NEPHROLOGY | Age: 52
End: 2022-05-05
Payer: COMMERCIAL

## 2022-05-05 VITALS
WEIGHT: 150.4 LBS | SYSTOLIC BLOOD PRESSURE: 110 MMHG | DIASTOLIC BLOOD PRESSURE: 72 MMHG | HEART RATE: 75 BPM | OXYGEN SATURATION: 97 % | BODY MASS INDEX: 24.28 KG/M2

## 2022-05-05 DIAGNOSIS — M32.15 SYSTEMIC LUPUS ERYTHEMATOSUS WITH TUBULO-INTERSTITIAL NEPHROPATHY, UNSPECIFIED SLE TYPE (HCC): ICD-10-CM

## 2022-05-05 DIAGNOSIS — N18.32 STAGE 3B CHRONIC KIDNEY DISEASE (HCC): Primary | ICD-10-CM

## 2022-05-05 DIAGNOSIS — M32.14 LUPUS NEPHRITIS (HCC): ICD-10-CM

## 2022-05-05 DIAGNOSIS — R80.1 PERSISTENT PROTEINURIA: ICD-10-CM

## 2022-05-05 PROCEDURE — G8420 CALC BMI NORM PARAMETERS: HCPCS | Performed by: INTERNAL MEDICINE

## 2022-05-05 PROCEDURE — 1036F TOBACCO NON-USER: CPT | Performed by: INTERNAL MEDICINE

## 2022-05-05 PROCEDURE — G8427 DOCREV CUR MEDS BY ELIG CLIN: HCPCS | Performed by: INTERNAL MEDICINE

## 2022-05-05 PROCEDURE — 99214 OFFICE O/P EST MOD 30 MIN: CPT | Performed by: INTERNAL MEDICINE

## 2022-05-05 NOTE — PROGRESS NOTES
Renal Progress Note    Assessment and Plan:      Diagnosis Orders   1. Stage 3b chronic kidney disease ([facility] [address]     2. Systemic lupus erythematosus with tubulo-interstitial nephropathy, unspecified SLE type (HCC)     3. Persistent proteinuria     4. Lupus nephritis ([facility] [address]               PLAN:  1. Labs reviewed with the patient   2. She understood   3. We reviewed the labs together in epic   4. Serum creatinine is slightly improved to 1.6 mg/dL from 1.7 g/dL. 5. Random urine protein creatinine ratio is increased to 2.1 g from 1.79 g.  6. Medications reviewed  7. No changes  8. Reluctant to give her an ACE inhibitor or angiotensin receptor blockers for the proteinuria since  her blood pressure is actually on the low end of normal.  I am afraid it might make her hypotensive. 9. I discussed that with her. 10. Meanwhile, we will continue low protein diet which she has not been doing. 11. I encouraged her to do the best she can with it  12. Another low protein diet information provided to her and she does not recall where she placed the previous one  13. Return visit in 3 months otherwise. Patient Active Problem List   Diagnosis    Hypothyroid    Systemic lupus erythematosus (HCC)    Hematuria    Proteinuria    Anemia    CKD [facility]chronic kidney disease), stage III ([facility])    Dyslipidemia    Hyperuricemia    Lumbar radiculitis    Spinal stenosis of lumbar region without neurogenic claudication    Renal insufficiency    Type 2 diabetes mellitus without complication, without long-term current use of insulin ([facility])    Malformation of coronary vessels           Subjective:   Chief complaint:  Chief Complaint   Patient presents with    Chronic Kidney Disease     Stage IIIb      HPI:This is a follow up visit for MsQuinn Zhang Drew who is here today for return appointment. I see her for chronic kidney disease. She was last seen about 3 months ago. Doing well since then.   She still has  chronic joint pain exacerbated by activity but relieved by rest.  No chest pain or shortness of breath however. No nausea or vomiting. No fever or chills. She does have a systemic lupus and is followed by Dr. Douglas Booth from rheumatology. .  Since last time I saw her, she has been placed on belimumab for systemic lupus. ROS:  Pertinent positives stated above in HPI. All other systems were reviewed and were negative. Medications:     Current Outpatient Medications   Medication Sig Dispense Refill    vitamin D (ERGOCALCIFEROL) 1.25 MG (32039 UT) CAPS capsule Take 1 capsule by mouth once a week 12 capsule 0    mycophenolate (CELLCEPT) 500 MG tablet Take 2 tablets by mouth every morning (before breakfast) AND 2 tablets every evening. 120 tablet 0    HYDROcodone-acetaminophen (NORCO) 5-325 MG per tablet Take 1 tablet by mouth every 8 hours as needed for Pain for up to 30 days.  90 tablet 0    loratadine (CLARITIN) 10 MG tablet take 1 tablet by mouth once daily 90 tablet 3    Calcium Carb-Cholecalciferol (OYSTER SHELL CALCIUM W/D) 500-200 MG-UNIT TABS tablet take 1 tablet by mouth once daily 90 tablet 3    omeprazole (PRILOSEC) 20 MG delayed release capsule take 1 capsule by mouth once daily 30 capsule 3    Belimumab (BENLYSTA) 200 MG/ML SOAJ Start 400mg subcu weekly x 4 weeks then change to  200mg subcu weekly 4 mL 5    gabapentin (NEURONTIN) 400 MG capsule take 1 capsule by mouth twice a day 180 capsule 1    ondansetron (ZOFRAN) 4 MG tablet Take 1 tablet by mouth 3 times daily as needed for Nausea or Vomiting 30 tablet 0    hydroxychloroquine (PLAQUENIL) 200 MG tablet Take 1 tablet by mouth 2 times daily 60 tablet 5    albuterol sulfate HFA (PROVENTIL HFA) 108 (90 Base) MCG/ACT inhaler Inhale 2 puffs into the lungs every 6 hours as needed for Wheezing 18 g 3    gabapentin (NEURONTIN) 100 MG capsule Take bid with the 400 mg tablet for a total dose of 500 mg bid daily 180 capsule 0    silver sulfADIAZINE (SILVADENE) 1 % cream Apply topically daily. 50 g 1    permethrin (ELIMITE) 5 % cream Apply to wet hair and leave on 10 minutes and then rinse, may repeat on day 9 3 Tube 1    levothyroxine (SYNTHROID) 112 MCG tablet take 1 tablet by mouth once daily 90 tablet 1    acetaminophen (APAP EXTRA STRENGTH) 500 MG tablet Take 2 tablets by mouth every 6 hours as needed for Pain 120 tablet 3    ferrous sulfate (IRON 325) 325 (65 Fe) MG tablet Take 1 tablet by mouth daily (with breakfast) 90 tablet 3    simvastatin (ZOCOR) 40 MG tablet take 1 tablet by mouth at bedtime 90 tablet 3    nitroGLYCERIN (NITROSTAT) 0.4 MG SL tablet Place 1 tablet under the tongue every 5 minutes as needed for Chest pain up to max of 3 total doses. If no relief after 1 dose, call 911. 25 tablet 0    predniSONE (DELTASONE) 2.5 MG tablet Take 3 tablets by mouth daily (Patient not taking: Reported on 5/5/2022) 90 tablet 1     No current facility-administered medications for this visit.        Lab Results:    CBC:   Lab Results   Component Value Date    WBC 4.6 (L) 04/25/2022    HGB 10.5 (L) 04/25/2022    HCT 36.8 (L) 04/25/2022    MCV 95.6 04/25/2022     04/25/2022     BMP:    Lab Results   Component Value Date     04/25/2022     02/14/2022     02/14/2022    K 4.1 04/25/2022    K 4.5 02/14/2022    K 4.5 02/14/2022     04/25/2022     02/14/2022     02/14/2022    CO2 25 04/25/2022    CO2 26 02/14/2022    CO2 26 02/14/2022    BUN 21 04/25/2022    BUN 24 (H) 02/14/2022    BUN 24 (H) 02/14/2022    CREATININE 1.6 (H) 04/25/2022    CREATININE 1.7 (H) 02/14/2022    CREATININE 1.7 (H) 02/14/2022    GLUCOSE 87 04/25/2022    GLUCOSE 87 02/14/2022    GLUCOSE 86 02/14/2022      Hepatic:   Lab Results   Component Value Date    AST 17 04/25/2022    AST 12 02/14/2022    AST 30 01/12/2022    ALT 9 (L) 04/25/2022    ALT 7 (L) 02/14/2022    ALT 21 01/12/2022    BILITOT <0.2 (L) 04/25/2022    BILITOT <0.2 (L) 02/14/2022    BILITOT 0.3 01/12/2022 ALKPHOS 70 04/25/2022    ALKPHOS 68 02/14/2022    ALKPHOS 66 01/12/2022     BNP: No results found for: BNP  Lipids:   Lab Results   Component Value Date    CHOL 163 10/22/2019    HDL 36 10/22/2019     INR:   Lab Results   Component Value Date    INR 0.97 02/21/2021    INR 1.01 01/15/2021     URINE:   Lab Results   Component Value Date    PROTUR 440.7 04/25/2022     Lab Results   Component Value Date    NITRU NEGATIVE 04/25/2022    COLORU YELLOW 04/25/2022    PHUR 5.0 04/25/2022    LABCAST 0-4 FINE GRAN 04/25/2022    WBCUA 10-15 04/25/2022    WBCUA 0-5 12/17/2019    RBCUA 0-2 04/25/2022    MUCUS Present 12/17/2019    YEAST NONE SEEN 02/14/2022    BACTERIA NONE 04/25/2022    CLARITYU slightly cloudy 08/30/2017    SPECGRAV 1.023 04/25/2022    LEUKOCYTESUR SMALL 04/25/2022    LEUKOCYTESUR TRACE 06/17/2019    UROBILINOGEN 0.2 04/25/2022    BILIRUBINUR NEGATIVE 04/25/2022    BILIRUBINUR small 08/30/2017    BILIRUBINUR Negative 10/18/2016    BLOODU NEGATIVE 04/25/2022    GLUCOSEU Negative 12/17/2019    KETUA NEGATIVE 04/25/2022      Microalbumen/Creatinine ratio:  No components found for: RUCREAT    Objective:   Vitals: /72 (Site: Right Upper Arm, Position: Sitting, Cuff Size: Medium Adult)   Pulse 75   Wt 150 lb 6.4 oz (68.2 kg)   SpO2 97%   BMI 24.28 kg/m²      Constitutional: Well-developed middle-age lady alert, awake, no apparent distress  Skin:normal with no rash or any significant lesions  HEENT:Pupils are reactive . Throat is clear. Oral mucosa is moist.  Neck:supple with no thyromegaly, JVD, lymphadenopathy or bruit   Cardiovascular: Regular sinus rhythm without murmur, rubs or gallops   Respiratory:  Clear to auscultation with no wheezes or rales  Abdomen: Good bowel sound, soft, non tender and no bruit  Ext: No LE edema  Musculoskeletal:Intact  Neuro:Alert, awake and oriented with no obvious focal deficit.   Speech is normal.    Electronically signed by Calderon Ashby MD on 5/5/2022 at 2:59 PM **This report has been created using voice recognition software. It maycontain minor  errors which are inherent in voice recognition technology. **

## 2022-05-16 ENCOUNTER — TELEPHONE (OUTPATIENT)
Dept: RHEUMATOLOGY | Age: 52
End: 2022-05-16

## 2022-05-17 ENCOUNTER — TELEMEDICINE (OUTPATIENT)
Dept: RHEUMATOLOGY | Age: 52
End: 2022-05-17
Payer: COMMERCIAL

## 2022-05-17 DIAGNOSIS — G56.23 CUBITAL TUNNEL SYNDROME, BILATERAL: ICD-10-CM

## 2022-05-17 DIAGNOSIS — I31.39 PERICARDIAL EFFUSION: ICD-10-CM

## 2022-05-17 DIAGNOSIS — M32.14 SYSTEMIC LUPUS ERYTHEMATOSUS WITH GLOMERULAR DISEASE, UNSPECIFIED SLE TYPE (HCC): ICD-10-CM

## 2022-05-17 DIAGNOSIS — M32.14 OTHER SYSTEMIC LUPUS ERYTHEMATOSUS WITH GLOMERULAR DISEASE (HCC): ICD-10-CM

## 2022-05-17 DIAGNOSIS — G89.4 CHRONIC PAIN SYNDROME: ICD-10-CM

## 2022-05-17 DIAGNOSIS — R80.1 PERSISTENT PROTEINURIA: ICD-10-CM

## 2022-05-17 PROCEDURE — G8428 CUR MEDS NOT DOCUMENT: HCPCS | Performed by: INTERNAL MEDICINE

## 2022-05-17 PROCEDURE — 99214 OFFICE O/P EST MOD 30 MIN: CPT | Performed by: INTERNAL MEDICINE

## 2022-05-17 RX ORDER — PREDNISONE 2.5 MG
7.5 TABLET ORAL DAILY
Qty: 90 TABLET | Refills: 1 | Status: SHIPPED | OUTPATIENT
Start: 2022-05-17 | End: 2022-07-12 | Stop reason: SDUPTHER

## 2022-05-17 RX ORDER — HYDROCODONE BITARTRATE AND ACETAMINOPHEN 5; 325 MG/1; MG/1
1 TABLET ORAL EVERY 8 HOURS PRN
Qty: 90 TABLET | Refills: 0 | Status: SHIPPED | OUTPATIENT
Start: 2022-05-17 | End: 2022-06-15 | Stop reason: SDUPTHER

## 2022-05-17 RX ORDER — HYDROXYCHLOROQUINE SULFATE 200 MG/1
200 TABLET, FILM COATED ORAL 2 TIMES DAILY
Qty: 60 TABLET | Refills: 5 | Status: SHIPPED | OUTPATIENT
Start: 2022-05-17 | End: 2022-07-12 | Stop reason: SDUPTHER

## 2022-05-17 RX ORDER — MYCOPHENOLATE MOFETIL 500 MG/1
TABLET ORAL
Qty: 120 TABLET | Refills: 0 | Status: SHIPPED | OUTPATIENT
Start: 2022-05-17 | End: 2022-07-05 | Stop reason: SDUPTHER

## 2022-05-17 RX ORDER — BELIMUMAB 200 MG/ML
200 SOLUTION SUBCUTANEOUS
Qty: 3.92 ML | Refills: 2 | Status: SHIPPED | OUTPATIENT
Start: 2022-05-17

## 2022-05-17 ASSESSMENT — ENCOUNTER SYMPTOMS
EYE REDNESS: 0
SHORTNESS OF BREATH: 1
COUGH: 1
DIARRHEA: 0
VOMITING: 0
CONSTIPATION: 0
NAUSEA: 0
EYE PAIN: 0
WHEEZING: 0
EYES NEGATIVE: 1

## 2022-05-17 NOTE — TELEPHONE ENCOUNTER
Jeffrey Wright called requesting a refill on the following medications:  Requested Prescriptions     Pending Prescriptions Disp Refills    HYDROcodone-acetaminophen (NORCO) 5-325 MG per tablet 90 tablet 0     Sig: Take 1 tablet by mouth every 8 hours as needed for Pain for up to 30 days. Pharmacy verified: [facility]  . pv      Date of last visit: 4/06/2022  Date of next visit (if applicable): 3/45/6681 Arava Counseling:  Patient counseled regarding adverse effects of Arava including but not limited to nausea, vomiting, abnormalities in liver function tests. Patients may develop mouth sores, rash, diarrhea, and abnormalities in blood counts. The patient understands that monitoring is required including LFTs and blood counts.  There is a rare possibility of scarring of the liver and lung problems that can occur when taking methotrexate. Persistent nausea, loss of appetite, pale stools, dark urine, cough, and shortness of breath should be reported immediately. Patient advised to discontinue Arava treatment and consult with a physician prior to attempting conception. The patient will have to undergo a treatment to eliminate Arava from the body prior to conception.

## 2022-05-17 NOTE — Clinical Note
Please schedule a follow up appointment w/ maria luisa at 340 time slot. Patient will be stopping by the office to  1-2 samples of benlysta.

## 2022-05-17 NOTE — TELEPHONE ENCOUNTER
OARRS reviewed. UDS: + for  hydrocodone consistent. Last seen: 4/6/2022.  Follow-up:   Future Appointments   Date Time Provider Justus Dumont   5/17/2022  3:40 PM Reanna Brunner DO N [facility] Rheum [facility]   6/29/2022  3:20 PM MACKENZIE Mitchell CNP N [facility] Pain [facility]   8/11/2022  2:20 PM Conrad Valverde MD N [facility]   1/18/2023 12:15 PM [address], MD N [facility] Heart [address] [facility]

## 2022-05-17 NOTE — PROGRESS NOTES
Ross Hudson is a 54 y.o. female evaluated via telephone on 5/17/2022 for No chief complaint on file. .        Documentation:  I communicated with the patient and/or health care decision maker about systemic luupus . Details of this discussion including any medical advice provided: as outlined below     Total Time: minutes: 11-20 minutes    Ross Hudson was evaluated through a synchronous (real-time) audio encounter. Patient identification was verified at the start of the visit. She (or guardian if applicable) is aware that this is a billable service, which includes applicable co-pays. This visit was conducted with the patient's (and/or legal guardian's) verbal consent. She has not had a related appointment within my department in the past 7 days or scheduled within the next 24 hours. The patient was located in a state where the provider was licensed to provide care. Note: not billable if this call serves to triage the patient into an appointment for the relevant concern    César Dai DO          [address] [place] [address] NOTE     Date Of Service: 5/17/2022  Provider: César Dai DO, DO  PCP: MACKENZIE Toro - CNP   Name: Ross Hudson   MRN: [id number]    ASSESSMENT/PLAN:     SLE w/ h/o class IV LN- --active inflammatory arthritis (improved) , proteinuria. - diagnosed in 2001, s/p cytoxan 2007. C4 was 64, 4+ proteinuria, but 24 urine w/ only 1.1 g. 20-29 RBC/HPF. + BELLE, dsDNA, ANCA (+p-ANCA, neg MPO, WV-3), reported inflammatory arthritis. Exam w/ joint swelling, oral/nasal sores. - Plaquenil 200 mg BID               - Mycophenolate 1000 mg BID               - Prednisone 7.5 mg daily               - benlysta 200mg subcu weekly (march 2022)     - awaiting f/u labs      CKD: worsening, treatment as outlined above.    Proteinura  - worsening - up to 2.19 gm on recent evaluation from 4/25/2022.    - repeat renal biopsy with no abnormalities (1/15/2021)    Anemia: improved with hgb up to 10.5 from 9.7 on April 2022. Evaluation    - re-evaluation pending. Myalgia - discussed trial of Mag 400 to 800mg at bed time to see if the ? RLS symptoms improve. Cervical radiculopathy  - EMG/NCV (Dec 2019) w/ cervical radiculopathy                - gabapentin 400 mg TWICE DAILY (limited on titration b/c CKD)     Vitamin D def - taking vitamin D3  5000 IU daily--- repeat vitamin D level @ 13 ng/dl on 4/25/2022 evaluation      Medication monitoring               - CBC, CMP, sed rate, CRP, C3, C4, u/a q 4 weeks x 3 b/c worsening proteinura, anemia                - plaquenil eye exam (2020)         No follow-ups on file. New Prescriptions    No medications on file     History of Present Illness (HPI)     No chief complaint on file. Ariadne Cr  is a(n)51 y.o. female with a hx of systemic lupus with hiistory of class IV lupus nephritis, CKD stage III, chronic low back pain w/ radiculopathy, lumbar stenosis, h/o seizures here for the f/u evaluation of systemic lupus     Worsening of the muscle cramping mainly in the evenings. Described as throbbing that improve w/ gabapentin , movement and massaging. Systemic lupus w/ lupus nephritis --- started benlysta in march 2022. Tolerating the medications without significant side effects. Throbbing pain in the bilateral hands, intemrittent swelling of the fingers (improved since the last evaluation)   Timing: evenings when lying down. Aggravating factors: . Alleviating factors: tylenol. norco, hot back, gabapentin. AM stiffness about 30 mintues. Fatigue, denies dry ryes, mouth, nasal sores, oral sorse. .   Decreased lower extremity swelling since the last evaluation     Lower back pain: localized. Aggravated w/  prolonged bending, forward flexion. Denies radicular pain, weakness, numbness. REVIEW OF SYSTEMS: (ROS)    Review of Systems   Constitutional: Positive for fatigue.  Negative for diaphoresis and fever. HENT: Negative for congestion, hearing loss, mouth sores and nosebleeds. Eyes: Negative. Negative for pain and redness. Respiratory: Positive for cough and shortness of breath. Negative for wheezing. Cardiovascular: Negative. Negative for chest pain. Gastrointestinal: Negative for constipation, diarrhea, nausea and vomiting. Genitourinary: Negative for difficulty urinating, frequency, genital sores and hematuria. Musculoskeletal: Positive for myalgias. Skin: Negative for rash. Neurological: Positive for dizziness and numbness. Negative for weakness and headaches. Hematological: Does not bruise/bleed easily. Psychiatric/Behavioral: Negative for sleep disturbance. The patient is not nervous/anxious. PmHx:  has a past medical history of Anemia, CKD (chronic kidney disease), stage III ([facility] [address] Dyslipidemia, Hematuria, Hyperlipidemia, Hypertension, Hyperuricemia, Hypothyroidism, Kidney disease, Lupus ([facility] [address] Lupus nephritis ([facility] [address] Proteinuria, Seizures ([facility] [address] and Systemic lupus erythematosus ([facility] [address] Social History:  reports that she quit smoking about 9 years ago. Her smoking use included cigarettes. She started smoking about 10 years ago. She smoked 0.25 packs per day. She has never used smokeless tobacco. She reports current alcohol use. She reports that she does not use drugs. ALLERGIES     Allergies   Allergen Reactions    Codeine Hives and Nausea And Vomiting       CURRENT MEDICATIONS      Current Outpatient Medications:     HYDROcodone-acetaminophen (NORCO) 5-325 MG per tablet, Take 1 tablet by mouth every 8 hours as needed for Pain for up to 30 days. , Disp: 90 tablet, Rfl: 0    vitamin D (ERGOCALCIFEROL) 1.25 MG (52640 UT) CAPS capsule, Take 1 capsule by mouth once a week, Disp: 12 capsule, Rfl: 0    mycophenolate (CELLCEPT) 500 MG tablet, Take 2 tablets by mouth every morning (before breakfast) AND 2 tablets every evening., Disp: 120 tablet, Rfl: 0   predniSONE (DELTASONE) 2.5 MG tablet, Take 3 tablets by mouth daily (Patient not taking: Reported on 5/5/2022), Disp: 90 tablet, Rfl: 1    loratadine (CLARITIN) 10 MG tablet, take 1 tablet by mouth once daily, Disp: 90 tablet, Rfl: 3    Calcium Carb-Cholecalciferol (OYSTER SHELL CALCIUM W/D) 500-200 MG-UNIT TABS tablet, take 1 tablet by mouth once daily, Disp: 90 tablet, Rfl: 3    omeprazole (PRILOSEC) 20 MG delayed release capsule, take 1 capsule by mouth once daily, Disp: 30 capsule, Rfl: 3    Belimumab (BENLYSTA) 200 MG/ML SOAJ, Start 400mg subcu weekly x 4 weeks then change to  200mg subcu weekly, Disp: 4 mL, Rfl: 5    gabapentin (NEURONTIN) 400 MG capsule, take 1 capsule by mouth twice a day, Disp: 180 capsule, Rfl: 1    ondansetron (ZOFRAN) 4 MG tablet, Take 1 tablet by mouth 3 times daily as needed for Nausea or Vomiting, Disp: 30 tablet, Rfl: 0    hydroxychloroquine (PLAQUENIL) 200 MG tablet, Take 1 tablet by mouth 2 times daily, Disp: 60 tablet, Rfl: 5    albuterol sulfate HFA (PROVENTIL HFA) 108 (90 Base) MCG/ACT inhaler, Inhale 2 puffs into the lungs every 6 hours as needed for Wheezing, Disp: 18 g, Rfl: 3    gabapentin (NEURONTIN) 100 MG capsule, Take bid with the 400 mg tablet for a total dose of 500 mg bid daily, Disp: 180 capsule, Rfl: 0    silver sulfADIAZINE (SILVADENE) 1 % cream, Apply topically daily. , Disp: 50 g, Rfl: 1    permethrin (ELIMITE) 5 % cream, Apply to wet hair and leave on 10 minutes and then rinse, may repeat on day 9, Disp: 3 Tube, Rfl: 1    levothyroxine (SYNTHROID) 112 MCG tablet, take 1 tablet by mouth once daily, Disp: 90 tablet, Rfl: 1    acetaminophen (APAP EXTRA STRENGTH) 500 MG tablet, Take 2 tablets by mouth every 6 hours as needed for Pain, Disp: 120 tablet, Rfl: 3    ferrous sulfate (IRON 325) 325 (65 Fe) MG tablet, Take 1 tablet by mouth daily (with breakfast), Disp: 90 tablet, Rfl: 3    simvastatin (ZOCOR) 40 MG tablet, take 1 tablet by mouth at bedtime, Disp: 90 tablet, Rfl: 3    nitroGLYCERIN (NITROSTAT) 0.4 MG SL tablet, Place 1 tablet under the tongue every 5 minutes as needed for Chest pain up to max of 3 total doses. If no relief after 1 dose, call 911., Disp: 25 tablet, Rfl: 0    PHYSICAL EXAMINATION / OBJECTIVE     Objective: There were no vitals taken for this visit. Physical Exam  Constitutional:       Appearance: Normal appearance. HENT:      Head: Normocephalic. Nose: Nose normal.   Eyes:      Conjunctiva/sclera: Conjunctivae normal.   Musculoskeletal:         General: Swelling present. Skin:     Findings: No rash. Neurological:      Mental Status: She is alert and oriented to person, place, and time. Psychiatric:         Mood and Affect: Mood normal.         Behavior: Behavior normal.       Musculoskeletal:  Upper extremities:    HANDS/FINGERS : swelling  MCPS left 2 .  PIps righ 4,5 , left, 34            RAPID3: 5/17/2022 ---   3 + 7 + 7.5 = 17.5       Remission: <3  Low Disease Activity: <6  Moderate Disease Activity: >=6 and <=12  High Disease Activity: >12     LABS      Lab Results   Component Value Date    WBC 4.6 (L) 04/25/2022    HGB 10.5 (L) 04/25/2022    MCV 95.6 04/25/2022    MCHC 28.5 (L) 04/25/2022    RDW 18.6 (H) 03/24/2020     04/25/2022    NEUTROABS 2600 03/24/2020    LYMPHSABS 1.0 02/14/2022    EOSABS 0.0 02/14/2022    BASOSABS 0.0 02/14/2022         Chemistry        Component Value Date/Time     04/25/2022 1600    K 4.1 04/25/2022 1600    K 3.7 11/09/2021 1545     04/25/2022 1600    CO2 25 04/25/2022 1600    BUN 21 04/25/2022 1600    CREATININE 1.6 (H) 04/25/2022 1600        Component Value Date/Time    CALCIUM 9.0 04/25/2022 1600    ALKPHOS 70 04/25/2022 1600    AST 17 04/25/2022 1600    ALT 9 (L) 04/25/2022 1600    BILITOT <0.2 (L) 04/25/2022 1600            Lab Results   Component Value Date    SEDRATE 15 04/25/2022    SEDRATE 20 02/14/2022    SEDRATE 12 01/12/2022    CRP < 0.30 02/14/2022    CRP < 0.30 01/12/2022    CRP < 0.30 01/04/2022       Lab Results   Component Value Date    VITD25 13 04/25/2022     Lab Results   Component Value Date    ANASCRN Detected (A) 06/17/2019     Lab Results   Component Value Date    DSDNAAB SEE BELOW 06/17/2019     Lab Results   Component Value Date    C3 121 04/25/2022    C4 29 04/25/2022       RADIOLOGY / PROCEDURES:

## 2022-06-13 ENCOUNTER — TELEPHONE (OUTPATIENT)
Dept: RHEUMATOLOGY | Age: 52
End: 2022-06-13

## 2022-06-13 NOTE — TELEPHONE ENCOUNTER
Gwendolyn Cornell is calling stating that Bartolo Gunter needs the office to call to verify her dosage on her Benlysta, she uses 2 syringes of 200mg once a week.

## 2022-06-15 DIAGNOSIS — G89.4 CHRONIC PAIN SYNDROME: ICD-10-CM

## 2022-06-15 RX ORDER — HYDROCODONE BITARTRATE AND ACETAMINOPHEN 5; 325 MG/1; MG/1
1 TABLET ORAL EVERY 8 HOURS PRN
Qty: 90 TABLET | Refills: 0 | Status: SHIPPED | OUTPATIENT
Start: 2022-06-16 | End: 2022-07-27 | Stop reason: SDUPTHER

## 2022-06-15 NOTE — TELEPHONE ENCOUNTER
Manuel Jonas called requesting a refill on the following medications:  Requested Prescriptions     Pending Prescriptions Disp Refills    HYDROcodone-acetaminophen (NORCO) 5-325 MG per tablet 90 tablet 0     Sig: Take 1 tablet by mouth every 8 hours as needed for Pain for up to 30 days. Pharmacy verified: [facility] on [facility]  . pv      Date of last visit: 4/6/2022  Date of next visit (if applicable): 9/82/7876

## 2022-06-15 NOTE — TELEPHONE ENCOUNTER
OARRS reviewed. UDS: + for  Hydrocodone consistent. Last seen: 4/6/2022.  Follow-up:   Future Appointments   Date Time Provider Justus Dumont   6/27/2022  3:40 PM Rachel Galvez DO [facility] [place] [facility] [place] [email or web address]   6/29/2022  3:20 PM MACKENZIE Angel - CNP [facility] [place] [email or web address]   8/11/2022  2:20 PM Conrad Gutierrez MD N [facility] [place] [address]   1/18/2023 12:15 PM Kiara Rodriguez MD N [facility] Heart [address] [facility]

## 2022-06-27 ENCOUNTER — PATIENT MESSAGE (OUTPATIENT)
Dept: RHEUMATOLOGY | Age: 52
End: 2022-06-27

## 2022-06-29 ENCOUNTER — OFFICE VISIT (OUTPATIENT)
Dept: PHYSICAL MEDICINE AND REHAB | Age: 52
End: 2022-06-29
Payer: COMMERCIAL

## 2022-06-29 ENCOUNTER — NURSE ONLY (OUTPATIENT)
Dept: LAB | Age: 52
End: 2022-06-29

## 2022-06-29 VITALS
WEIGHT: 150 LBS | SYSTOLIC BLOOD PRESSURE: 134 MMHG | HEART RATE: 68 BPM | BODY MASS INDEX: 24.11 KG/M2 | DIASTOLIC BLOOD PRESSURE: 80 MMHG | HEIGHT: 66 IN

## 2022-06-29 DIAGNOSIS — M54.16 LUMBAR RADICULITIS: ICD-10-CM

## 2022-06-29 DIAGNOSIS — M32.14 SYSTEMIC LUPUS ERYTHEMATOSUS WITH GLOMERULAR DISEASE, UNSPECIFIED SLE TYPE (HCC): ICD-10-CM

## 2022-06-29 DIAGNOSIS — G89.4 CHRONIC PAIN SYNDROME: ICD-10-CM

## 2022-06-29 DIAGNOSIS — M47.816 SPONDYLOSIS OF LUMBAR REGION WITHOUT MYELOPATHY OR RADICULOPATHY: Primary | ICD-10-CM

## 2022-06-29 DIAGNOSIS — M54.16 LUMBAR RADICULOPATHY: ICD-10-CM

## 2022-06-29 DIAGNOSIS — M46.1 BILATERAL SACROILIITIS (HCC): ICD-10-CM

## 2022-06-29 DIAGNOSIS — M46.1 SI (SACROILIAC) JOINT INFLAMMATION (HCC): ICD-10-CM

## 2022-06-29 DIAGNOSIS — M54.12 CERVICAL RADICULITIS: ICD-10-CM

## 2022-06-29 DIAGNOSIS — M48.061 SPINAL STENOSIS OF LUMBAR REGION WITHOUT NEUROGENIC CLAUDICATION: ICD-10-CM

## 2022-06-29 DIAGNOSIS — Z51.81 MEDICATION MONITORING ENCOUNTER: ICD-10-CM

## 2022-06-29 DIAGNOSIS — M47.814 SPONDYLOSIS OF THORACIC REGION WITHOUT MYELOPATHY OR RADICULOPATHY: ICD-10-CM

## 2022-06-29 DIAGNOSIS — M48.062 LUMBAR STENOSIS WITH NEUROGENIC CLAUDICATION: ICD-10-CM

## 2022-06-29 LAB
BACTERIA: ABNORMAL
BILIRUBIN URINE: NEGATIVE
BLOOD, URINE: ABNORMAL
CASTS: ABNORMAL /LPF
CASTS: ABNORMAL /LPF
CHARACTER, URINE: CLEAR
COLOR: YELLOW
CREATININE URINE: 306.6 MG/DL
CRYSTALS: ABNORMAL
EPITHELIAL CELLS, UA: ABNORMAL /HPF
GLUCOSE, URINE: NEGATIVE MG/DL
KETONES, URINE: ABNORMAL
LEUKOCYTE EST, POC: ABNORMAL
MISCELLANEOUS LAB TEST RESULT: ABNORMAL
NITRITE, URINE: NEGATIVE
PH UA: 5 (ref 5–9)
PROT/CREAT RATIO, UR: 2.12
PROTEIN UA: >= 300 MG/DL
PROTEIN, URINE: 649 MG/DL
RBC URINE: ABNORMAL /HPF
RENAL EPITHELIAL, UA: ABNORMAL
SPECIFIC GRAVITY UA: > 1.03 (ref 1–1.03)
UROBILINOGEN, URINE: 0.2 EU/DL (ref 0–1)
WBC UA: ABNORMAL /HPF
YEAST: ABNORMAL

## 2022-06-29 PROCEDURE — G8427 DOCREV CUR MEDS BY ELIG CLIN: HCPCS | Performed by: NURSE PRACTITIONER

## 2022-06-29 PROCEDURE — G8420 CALC BMI NORM PARAMETERS: HCPCS | Performed by: NURSE PRACTITIONER

## 2022-06-29 PROCEDURE — 1036F TOBACCO NON-USER: CPT | Performed by: NURSE PRACTITIONER

## 2022-06-29 PROCEDURE — 99214 OFFICE O/P EST MOD 30 MIN: CPT | Performed by: NURSE PRACTITIONER

## 2022-06-29 ASSESSMENT — ENCOUNTER SYMPTOMS
COUGH: 0
ABDOMINAL PAIN: 0
SHORTNESS OF BREATH: 0
RHINORRHEA: 0
WHEEZING: 0
SINUS PRESSURE: 0
BACK PAIN: 1
EYE PAIN: 0
NAUSEA: 0
COLOR CHANGE: 0
SORE THROAT: 0
DIARRHEA: 0
CHEST TIGHTNESS: 0
CONSTIPATION: 0
PHOTOPHOBIA: 0
VOMITING: 0

## 2022-06-29 NOTE — PROGRESS NOTES
[address] [facility] [address]  Dept: [phone]  Dept Fax: [phone]: [phone]    Visit Date: 6/29/2022    Functionality Assessment/Goals Worksheet     On a scale of 0 (Does not Interfere) to 10 (Completely Interferes)     1. Which number describes how during the past week pain has interfered with       the following:  A. General Activity:  6  B. Mood: 8  C. Walking Ability:  7  D. Normal Work (Includes both work outside the home and housework):  6  E. Relations with Other People:   7  F. Sleep:   8  G. Enjoyment of Life:   6    2. Patient Prefers to Take their Pain Medications:     [x]  On a regular basis   []  Only when necessary    []  Does not take pain medications    3. What are the Patient's Goals/Expectations for Visiting Pain Management? [x]  Learn about my pain    []  Receive Medication   []  Physical Therapy     []  Treat Depression   []  Receive Injections    []  Treat Sleep   []  Deal with Anxiety and Stress   []  Treat Opoid Dependence/Addiction   []  Other:      HPI:   Kamilla Sevilla is a 54 y.o. female is here today for    Chief Complaint: Low back pain, Mid back pain, Neck pain, Hip pain and SI pain    HPI   F/U   Again she still has not went to [facility] for cardiac workup post abnormal heart cath, Dr Abran Cedillo referred her. Told her to discuss with his office that [facility] has not called her yet to make appointment.      Pending [facility] to see cardiology for possible CABG or procedures. They are still  reviewing records.      Had heart cath Feb 2021. She remains off work due to cardiac issues. She is unable to tolerate PT due to chest pain  Angina.     She follows Rheumatology and recently started BENLYSTA       No relief from prior lumbar procedures.  She did have TFLESI L5 left in 2018 with 50% pain relief 18 months. EMG  Showed C5-6 left cervical radiculopathy . Pain increases with bending, lifting, twisting , reaching, pushing, pulling, turning head, walking, standing, raising arms, stairs and getting up and down. Treatments Tried PT, ice, heat, Chiropractor, NSAIDS, narcotics, muscle relaxer, OTC rubs creams patches, steroid burst and injections  Pain Description sharp, shooting, stabbing, burning, pressure, throbbing, aching, numbness, tingling and pins and needles    She denies any ER visits or new health issues since last visit. Pain scale with out pain medications or at its worst is 6/10. Pain scale with pain medications or at its best is 3/10. Last dose of [place] was today  Drug screen reviewed from 4/2022 and was appropriate  Pill count completed  today and WNL: Yes         Prior Injections:    TFLESI L5 left in 2018 with 50% pain relief 18 months. EMG  Showed C5-6 left cervical radiculopathy . Radiology:    MRI cervical   :               There is 3 mm of anterolisthesis of C7 on T1. There is loss of disc height and there is degenerative marrow edema in the endplates at the Z5-6 level. This is also present at the C6-7 level. No suspicious osseous lesions are present. The facet joints are    normally aligned.       The cervical spinal cord is of normal caliber and signal intensity.  The visualized aspects of the posterior fossa are normal.       On the axial images, at C2-C3, there is a small posterior disc osteophyte complex. There are bilateral facet degenerative changes. There is no spinal canal stenosis. There is mild right foraminal stenosis.       At C3-C4, there is a posterior disc osteophyte complex. There are bilateral facet degenerative changes. There is moderate severity spinal canal stenosis. There is moderate to severe bilateral foraminal stenosis.       At C4-C5, there is a small posterior disc osteophyte complex. There are facet degenerative changes. There is mild spinal canal stenosis.  There is no foraminal stenosis.       At C5-C6, there is a small posterior disc osteophyte complex. There is mild spinal canal stenosis. There is no foraminal stenosis.       At C6-C7, there is a small posterior disc osteophyte complex. There is mild spinal canal stenosis and bilateral foraminal stenosis.       At C7-T1, there are facet degenerative changes. There is uncovering of the disc. There is mild spinal canal stenosis. There is mild foraminal stenosis.       There are no suspicious findings in the cervical soft tissues.               Impression       1. Moderate severity spinal canal stenosis with moderate to severe bilateral foraminal stenosis at the C3-4 level due to degenerative changes. 2. Mild spinal canal stenosis and/or foraminal stenosis at other levels as described above.          EMG                    The patientis allergic to codeine. Subjective:      Review of Systems   Constitutional: Negative for activity change, appetite change, chills, diaphoresis, fatigue, fever and unexpected weight change. HENT: Positive for dental problem. Negative for congestion, ear pain, hearing loss, mouth sores, nosebleeds, rhinorrhea, sinus pressure and sore throat. Dental infection recently   Eyes: Negative for photophobia, pain and visual disturbance. Respiratory: Negative for cough, chest tightness, shortness of breath and wheezing. COVID     Cardiovascular: Negative for chest pain and palpitations. HTN CAD  Recent abnormal heart cath, pending [facility] referral for possible surgery   Gastrointestinal: Negative for abdominal pain, constipation, diarrhea, nausea and vomiting. Endocrine: Negative for cold intolerance, heat intolerance, polydipsia, polyphagia and polyuria. Thyroid issues   Genitourinary: Negative for decreased urine volume, difficulty urinating, frequency and hematuria. CKD   Musculoskeletal: Positive for arthralgias, back pain, joint swelling, myalgias, neck pain and neck stiffness.  Negative for gait problem. Skin: Negative for color change and rash. Hot water burn  8/31/2020 . healed   Allergic/Immunologic: Negative for food allergies and immunocompromised state. Neurological: Positive for dizziness, weakness, numbness and headaches. Negative for tremors, seizures, syncope, facial asymmetry, speech difficulty and light-headedness. H/o seizures,no seizures in 16 years. H/O Lupus   Hematological: Does not bruise/bleed easily. Psychiatric/Behavioral: Negative for agitation, behavioral problems, confusion, decreased concentration, dysphoric mood, hallucinations, self-injury, sleep disturbance and suicidal ideas. The patient is not nervous/anxious and is not hyperactive. Objective:     Vitals:    06/29/22 1517   BP: 134/80   Site: Left Upper Arm   Position: Sitting   Cuff Size: Medium Adult   Pulse: 68   Weight: 150 lb (68 kg)   Height: 5' 6\" (1.676 m)       Physical Exam  Vitals and nursing note reviewed. Constitutional:       General: She is not in acute distress. Appearance: She is well-developed. She is not diaphoretic. HENT:      Head: Normocephalic and atraumatic. Right Ear: External ear normal.      Left Ear: External ear normal.      Nose: Nose normal.      Mouth/Throat:      Pharynx: No oropharyngeal exudate. Eyes:      General: No scleral icterus. Right eye: No discharge. Left eye: No discharge. Conjunctiva/sclera: Conjunctivae normal.      Pupils: Pupils are equal, round, and reactive to light. Neck:      Thyroid: No thyromegaly. Cardiovascular:      Rate and Rhythm: Normal rate and regular rhythm. Heart sounds: Normal heart sounds. No murmur heard. No friction rub. No gallop. Pulmonary:      Effort: Pulmonary effort is normal. No respiratory distress. Breath sounds: Normal breath sounds. No wheezing or rales. Chest:      Chest wall: No tenderness. Abdominal:      General: Bowel sounds are normal. There is no distension. Palpations: Abdomen is soft. Tenderness: There is no abdominal tenderness. There is no guarding or rebound. Musculoskeletal:         General: Tenderness present. Right shoulder: Tenderness present. Decreased range of motion. Decreased strength. Left shoulder: Tenderness present. Right upper arm: Tenderness present. Right forearm: Tenderness present. Left forearm: Tenderness present. Right wrist: Tenderness and bony tenderness present. Left wrist: Tenderness and bony tenderness present. Right hand: Tenderness and bony tenderness present. Decreased range of motion. Decreased strength. Decreased sensation. Left hand: Tenderness and bony tenderness present. Decreased range of motion. Decreased strength. Decreased sensation. Cervical back: Neck supple. Spasms, tenderness and bony tenderness present. No edema, erythema or rigidity. Pain with movement and muscular tenderness present. Decreased range of motion. Thoracic back: Spasms, tenderness and bony tenderness present. Decreased range of motion. Lumbar back: Spasms, tenderness and bony tenderness present. Decreased range of motion. Back:       Right hip: Tenderness present. Left hip: Normal.      Right upper leg: Tenderness present. Right knee: Normal.      Left knee: Normal.      Right ankle: Tenderness present. Right foot: Decreased range of motion. Swelling, tenderness and bony tenderness present. Comments: 2 right broken toes has walker shoe on    Skin:     General: Skin is warm. Coloration: Skin is not pale. Findings: No erythema or rash. Neurological:      Mental Status: She is alert and oriented to person, place, and time. She is not disoriented. Cranial Nerves: Cranial nerves are intact. No cranial nerve deficit. Sensory: Sensation is intact. No sensory deficit. Motor: Motor function is intact. No atrophy or abnormal muscle tone. Coordination: Coordination is intact. Coordination normal.      Gait: Gait abnormal.      Deep Tendon Reflexes: Babinski sign absent on the right side. Reflex Scores:       Tricep reflexes are 2+ on the right side and 2+ on the left side. Bicep reflexes are 2+ on the right side and 2+ on the left side. Brachioradialis reflexes are 2+ on the right side and 2+ on the left side. Patellar reflexes are 2+ on the right side and 2+ on the left side. Achilles reflexes are 2+ on the right side and 2+ on the left side. Comments: Motor  4/5 RUE  5/5 BLE   Psychiatric:         Attention and Perception: Attention and perception normal. She is attentive. Mood and Affect: Mood and affect normal. Mood is not anxious or depressed. Affect is not labile, blunt, angry or inappropriate. Speech: Speech normal. She is communicative. Speech is not rapid and pressured, delayed, slurred or tangential.         Behavior: Behavior normal. Behavior is not agitated, slowed, aggressive, withdrawn, hyperactive or combative. Behavior is cooperative. Thought Content: Thought content normal. Thought content is not paranoid or delusional. Thought content does not include homicidal or suicidal ideation. Thought content does not include homicidal or suicidal plan. Cognition and Memory: Cognition and memory normal. Memory is not impaired. She does not exhibit impaired recent memory or impaired remote memory. Judgment: Judgment normal. Judgment is not impulsive or inappropriate. VONDA  Patricks test  positive  Yeoman's  or Gaenslen's positive  Kemps  positive  Spurlings  positive  Todd's na         Assessment:     1. Spondylosis of lumbar region without myelopathy or radiculopathy    2. Lumbar radiculitis    3. Spinal stenosis of lumbar region without neurogenic claudication    4. SI (sacroiliac) joint inflammation (HCC)    5. Cervical radiculitis    6.  Lumbar stenosis with neurogenic claudication    7. Spondylosis of thoracic region without myelopathy or radiculopathy    8. Lumbar radiculopathy    9. Bilateral sacroiliitis ([facility]    10. Chronic pain syndrome            Plan:      · OARRS reviewed. Current MED:15  · Patient was not offered naloxone for home.  Testing Labs or Radiology reviewed: Cervical  MRI EMG   Procedures:dicused many facet injections or epidurals    Discussed with patient about risks with procedure including infection, reaction to medication, increased pain, or bleeding.  Medications:Norco Neurontin    Meds. Prescribed:   No orders of the defined types were placed in this encounter. Return in about 3 months (around 9/29/2022) for Follow up pain medications.                Electronically signed by MACKENZIE Taylor CNP on6/29/2022 at 3:28 PM

## 2022-06-30 LAB
C3 COMPLEMENT: 103 MG/DL (ref 90–180)
COMPLEMENT C4: 21 MG/DL (ref 10–40)

## 2022-07-05 DIAGNOSIS — M32.14 OTHER SYSTEMIC LUPUS ERYTHEMATOSUS WITH GLOMERULAR DISEASE (HCC): ICD-10-CM

## 2022-07-05 DIAGNOSIS — I31.39 PERICARDIAL EFFUSION: ICD-10-CM

## 2022-07-05 DIAGNOSIS — M32.14 SYSTEMIC LUPUS ERYTHEMATOSUS WITH GLOMERULAR DISEASE, UNSPECIFIED SLE TYPE (HCC): ICD-10-CM

## 2022-07-05 DIAGNOSIS — R80.1 PERSISTENT PROTEINURIA: ICD-10-CM

## 2022-07-05 RX ORDER — MYCOPHENOLATE MOFETIL 500 MG/1
TABLET ORAL
Qty: 120 TABLET | Refills: 0 | Status: SHIPPED | OUTPATIENT
Start: 2022-07-05 | End: 2022-07-12 | Stop reason: SDUPTHER

## 2022-07-06 DIAGNOSIS — E03.9 ACQUIRED HYPOTHYROIDISM: ICD-10-CM

## 2022-07-06 DIAGNOSIS — M79.641 RIGHT HAND PAIN: ICD-10-CM

## 2022-07-06 DIAGNOSIS — M54.40 BACK PAIN OF LUMBAR REGION WITH SCIATICA: ICD-10-CM

## 2022-07-06 RX ORDER — LEVOTHYROXINE SODIUM 112 UG/1
TABLET ORAL
Qty: 90 TABLET | Refills: 1 | Status: SHIPPED | OUTPATIENT
Start: 2022-07-06

## 2022-07-06 RX ORDER — FERROUS SULFATE 325(65) MG
325 TABLET ORAL
Qty: 90 TABLET | Refills: 3 | Status: SHIPPED | OUTPATIENT
Start: 2022-07-06

## 2022-07-06 RX ORDER — ACETAMINOPHEN 500 MG
1000 TABLET ORAL EVERY 6 HOURS PRN
Qty: 120 TABLET | Refills: 3 | Status: SHIPPED | OUTPATIENT
Start: 2022-07-06

## 2022-07-06 RX ORDER — SIMVASTATIN 40 MG
TABLET ORAL
Qty: 90 TABLET | Refills: 3 | Status: SHIPPED | OUTPATIENT
Start: 2022-07-06

## 2022-07-06 RX ORDER — GABAPENTIN 400 MG/1
CAPSULE ORAL
Qty: 180 CAPSULE | Refills: 1 | Status: SHIPPED | OUTPATIENT
Start: 2022-07-06 | End: 2023-01-06

## 2022-07-06 RX ORDER — ONDANSETRON 4 MG/1
4 TABLET, FILM COATED ORAL 3 TIMES DAILY PRN
Qty: 30 TABLET | Refills: 0 | Status: SHIPPED | OUTPATIENT
Start: 2022-07-06

## 2022-07-11 ENCOUNTER — TELEPHONE (OUTPATIENT)
Dept: RHEUMATOLOGY | Age: 52
End: 2022-07-11

## 2022-07-11 DIAGNOSIS — I31.39 PERICARDIAL EFFUSION: ICD-10-CM

## 2022-07-11 DIAGNOSIS — G56.23 CUBITAL TUNNEL SYNDROME, BILATERAL: ICD-10-CM

## 2022-07-11 DIAGNOSIS — M32.14 SYSTEMIC LUPUS ERYTHEMATOSUS WITH GLOMERULAR DISEASE, UNSPECIFIED SLE TYPE (HCC): ICD-10-CM

## 2022-07-11 DIAGNOSIS — R80.1 PERSISTENT PROTEINURIA: ICD-10-CM

## 2022-07-11 DIAGNOSIS — M32.14 OTHER SYSTEMIC LUPUS ERYTHEMATOSUS WITH GLOMERULAR DISEASE (HCC): ICD-10-CM

## 2022-07-11 NOTE — TELEPHONE ENCOUNTER
----- Message from Portia Faria DO sent at 7/8/2022 11:27 AM EDT -----  Please call and asked the patient if she is run out of her mycophenolate or any of her other medications. Her protein in the urine remains high. The other labs to help evaluate for active systemic lupus were within normal limits.

## 2022-07-12 RX ORDER — HYDROXYCHLOROQUINE SULFATE 200 MG/1
200 TABLET, FILM COATED ORAL 2 TIMES DAILY
Qty: 60 TABLET | Refills: 5 | Status: SHIPPED | OUTPATIENT
Start: 2022-07-12

## 2022-07-12 RX ORDER — PREDNISONE 2.5 MG
7.5 TABLET ORAL DAILY
Qty: 90 TABLET | Refills: 1 | Status: SHIPPED | OUTPATIENT
Start: 2022-07-12 | End: 2023-07-12

## 2022-07-12 RX ORDER — MYCOPHENOLATE MOFETIL 500 MG/1
TABLET ORAL
Qty: 120 TABLET | Refills: 0 | Status: SHIPPED | OUTPATIENT
Start: 2022-07-12 | End: 2022-08-10 | Stop reason: SDUPTHER

## 2022-07-12 NOTE — TELEPHONE ENCOUNTER
Diagnosis Orders   1. Other systemic lupus erythematosus with glomerular disease (HCC)  mycophenolate (CELLCEPT) 500 MG tablet    predniSONE (DELTASONE) 2.5 MG tablet    hydroxychloroquine (PLAQUENIL) 200 MG tablet   2. Systemic lupus erythematosus with glomerular disease, unspecified SLE type (HCC)  mycophenolate (CELLCEPT) 500 MG tablet    hydroxychloroquine (PLAQUENIL) 200 MG tablet   3. Pericardial effusion  mycophenolate (CELLCEPT) 500 MG tablet   4. Persistent proteinuria  mycophenolate (CELLCEPT) 500 MG tablet    hydroxychloroquine (PLAQUENIL) 200 MG tablet   5. Cubital tunnel syndrome, bilateral  predniSONE (DELTASONE) 2.5 MG tablet      --- please have labs repeated in 4 weeks.

## 2022-07-25 DIAGNOSIS — G89.4 CHRONIC PAIN SYNDROME: ICD-10-CM

## 2022-07-25 NOTE — TELEPHONE ENCOUNTER
Carey Coleman called requesting a refill on the following medications:  Requested Prescriptions     Pending Prescriptions Disp Refills    HYDROcodone-acetaminophen (NORCO) 5-325 MG per tablet 90 tablet 0     Sig: Take 1 tablet by mouth every 8 hours as needed for Pain for up to 30 days. Pharmacy verified:  [email or web address] [address] [place] [address]    Date of last visit: 06/27/2022  Date of next visit (if applicable): 8/30/0310    Patient states that she takes vicoden? Wasn't sure about generic name. This was the only thing I saw.

## 2022-07-26 NOTE — TELEPHONE ENCOUNTER
OARRS reviewed. UDS: + for  hydrocodone consistent. Last seen: 6/29/2022.  Follow-up:   Future Appointments   Date Time Provider Justus Dumont   8/11/2022  2:20 PM Conrad Webb MD N [facility]   9/28/2022  3:20 PM Peña Bassett APRN - CNP N [facility] [address]   1/18/2023 12:15 PM Ede Benites MD N [address] [facility]

## 2022-07-27 RX ORDER — HYDROCODONE BITARTRATE AND ACETAMINOPHEN 5; 325 MG/1; MG/1
1 TABLET ORAL EVERY 8 HOURS PRN
Qty: 90 TABLET | Refills: 0 | Status: SHIPPED | OUTPATIENT
Start: 2022-07-27 | End: 2022-08-25 | Stop reason: SDUPTHER

## 2022-08-10 ENCOUNTER — TELEPHONE (OUTPATIENT)
Dept: RHEUMATOLOGY | Age: 52
End: 2022-08-10

## 2022-08-10 DIAGNOSIS — Z51.81 MEDICATION MONITORING ENCOUNTER: Primary | ICD-10-CM

## 2022-08-10 DIAGNOSIS — R80.1 PERSISTENT PROTEINURIA: ICD-10-CM

## 2022-08-10 DIAGNOSIS — I31.39 PERICARDIAL EFFUSION: ICD-10-CM

## 2022-08-10 DIAGNOSIS — M32.14 SYSTEMIC LUPUS ERYTHEMATOSUS WITH GLOMERULAR DISEASE, UNSPECIFIED SLE TYPE (HCC): ICD-10-CM

## 2022-08-10 DIAGNOSIS — M32.14 OTHER SYSTEMIC LUPUS ERYTHEMATOSUS WITH GLOMERULAR DISEASE (HCC): ICD-10-CM

## 2022-08-10 RX ORDER — MYCOPHENOLATE MOFETIL 500 MG/1
TABLET ORAL
Qty: 120 TABLET | Refills: 0 | Status: SHIPPED | OUTPATIENT
Start: 2022-08-10 | End: 2023-02-14

## 2022-08-10 NOTE — TELEPHONE ENCOUNTER
Diagnosis Orders   1.  Medication monitoring encounter  CBC with Auto Differential    Comprehensive Metabolic Panel    C3 Complement    C4 Complement    Protein / creatinine ratio, urine    Urinalysis    Sedimentation Rate

## 2022-08-11 ENCOUNTER — OFFICE VISIT (OUTPATIENT)
Dept: NEPHROLOGY | Age: 52
End: 2022-08-11
Payer: COMMERCIAL

## 2022-08-11 VITALS
DIASTOLIC BLOOD PRESSURE: 83 MMHG | WEIGHT: 143.2 LBS | SYSTOLIC BLOOD PRESSURE: 131 MMHG | HEART RATE: 65 BPM | OXYGEN SATURATION: 96 % | BODY MASS INDEX: 23.11 KG/M2

## 2022-08-11 DIAGNOSIS — D69.6 THROMBOCYTOPENIA (HCC): ICD-10-CM

## 2022-08-11 DIAGNOSIS — I10 PRIMARY HYPERTENSION: ICD-10-CM

## 2022-08-11 DIAGNOSIS — R80.9 PROTEINURIA, UNSPECIFIED TYPE: ICD-10-CM

## 2022-08-11 DIAGNOSIS — D63.8 ANEMIA OF CHRONIC DISEASE: ICD-10-CM

## 2022-08-11 DIAGNOSIS — E55.9 VITAMIN D DEFICIENCY: ICD-10-CM

## 2022-08-11 DIAGNOSIS — M32.14 LUPUS NEPHRITIS (HCC): ICD-10-CM

## 2022-08-11 DIAGNOSIS — N18.4 CKD (CHRONIC KIDNEY DISEASE), STAGE IV (HCC): Primary | ICD-10-CM

## 2022-08-11 PROCEDURE — G8420 CALC BMI NORM PARAMETERS: HCPCS | Performed by: INTERNAL MEDICINE

## 2022-08-11 PROCEDURE — 1036F TOBACCO NON-USER: CPT | Performed by: INTERNAL MEDICINE

## 2022-08-11 PROCEDURE — G8427 DOCREV CUR MEDS BY ELIG CLIN: HCPCS | Performed by: INTERNAL MEDICINE

## 2022-08-11 PROCEDURE — 99213 OFFICE O/P EST LOW 20 MIN: CPT | Performed by: INTERNAL MEDICINE

## 2022-08-11 RX ORDER — ERGOCALCIFEROL 1.25 MG/1
50000 CAPSULE ORAL WEEKLY
Qty: 12 CAPSULE | Refills: 1 | Status: SHIPPED | OUTPATIENT
Start: 2022-08-11

## 2022-08-11 NOTE — PROGRESS NOTES
Renal Progress Note    Assessment and Plan:      Diagnosis Orders   1. CKD (chronic kidney disease), stage IV ([facility]        2. Primary hypertension        3. Lupus nephritis ([facility]        4. Anemia of chronic disease        5. Thrombocytopenia ([facility]        6. Vitamin D deficiency        7. Proteinuria, unspecified type                  PLAN:  Lab results are reviewed with the patient. She understood. I addressed her questions. Serum creatinine is increased to 1.9 mg/dL. 1.7 mg/dL. Urine protein creatinine ratio is improved to 1.29 g from 2.12 g. PTH is normal at 59.4. Vitamin D level is very low at 16. Medications reviewed  Ergocalciferol 50,000 international unit 1 capsule a week. Patient was supposedly to be on this already but apparently has not been taking it. I congratulated her for a good job  with low protein diet. I asked him to continue to do so. I expressed my sympathy to her regarding the death of her stepfather. Return visit in 3 months with labs          Patient Active Problem List   Diagnosis    Hypothyroid    Systemic lupus erythematosus ([facility]    Hematuria    Proteinuria    Anemia    CKD (chronic kidney disease), stage III (HCC)    Dyslipidemia    Hyperuricemia    Lumbar radiculitis    Spinal stenosis of lumbar region without neurogenic claudication    Renal insufficiency    Type 2 diabetes mellitus without complication, without long-term current use of insulin (HCC)    Malformation of coronary vessels           Subjective:   Chief complaint:  Chief Complaint   Patient presents with    Chronic Kidney Disease     Stage IV      HPI:This is a follow up visit for Ms. Steve Potter who is here today for return appointment. I see her for chronic kidney disease among other things. She was last seen about 3 months ago. Doing well except for recent loss of her stepfather. She does see some bubbles in her urine. Denies chest pain,shortness of breath,leg edema ,fever or chills     ROS:  Pertinent positives stated above in HPI. All other systems were reviewed and were negative. Medications:     Current Outpatient Medications   Medication Sig Dispense Refill    mycophenolate (CELLCEPT) 500 MG tablet Take 2 tablets by mouth every morning (before breakfast) AND 2 tablets every evening. 120 tablet 0    HYDROcodone-acetaminophen (NORCO) 5-325 MG per tablet Take 1 tablet by mouth every 8 hours as needed for Pain for up to 30 days.  90 tablet 0    predniSONE (DELTASONE) 2.5 MG tablet Take 3 tablets by mouth daily 90 tablet 1    hydroxychloroquine (PLAQUENIL) 200 MG tablet Take 1 tablet by mouth 2 times daily 60 tablet 5    levothyroxine (SYNTHROID) 112 MCG tablet take 1 tablet by mouth once daily 90 tablet 1    acetaminophen (APAP EXTRA STRENGTH) 500 MG tablet Take 2 tablets by mouth every 6 hours as needed for Pain 120 tablet 3    ferrous sulfate (IRON 325) 325 (65 Fe) MG tablet Take 1 tablet by mouth daily (with breakfast) 90 tablet 3    ondansetron (ZOFRAN) 4 MG tablet Take 1 tablet by mouth 3 times daily as needed for Nausea or Vomiting 30 tablet 0    simvastatin (ZOCOR) 40 MG tablet take 1 tablet by mouth at bedtime 90 tablet 3    gabapentin (NEURONTIN) 400 MG capsule take 1 capsule by mouth twice a day 180 capsule 1    Belimumab (BENLYSTA) 200 MG/ML SOAJ Inject 200 mg into the skin every 28 days 3.92 mL 2    vitamin D (ERGOCALCIFEROL) 1.25 MG (12843 UT) CAPS capsule Take 1 capsule by mouth once a week 12 capsule 0    loratadine (CLARITIN) 10 MG tablet take 1 tablet by mouth once daily 90 tablet 3    Calcium Carb-Cholecalciferol (OYSTER SHELL CALCIUM W/D) 500-200 MG-UNIT TABS tablet take 1 tablet by mouth once daily 90 tablet 3    omeprazole (PRILOSEC) 20 MG delayed release capsule take 1 capsule by mouth once daily 30 capsule 3    albuterol sulfate HFA (PROVENTIL HFA) 108 (90 Base) MCG/ACT inhaler Inhale 2 puffs into the lungs every 6 hours as needed for Wheezing 18 g 3    silver sulfADIAZINE (SILVADENE) 1 % cream Apply topically daily. 50 g 1    permethrin (ELIMITE) 5 % cream Apply to wet hair and leave on 10 minutes and then rinse, may repeat on day 9 3 Tube 1    nitroGLYCERIN (NITROSTAT) 0.4 MG SL tablet Place 1 tablet under the tongue every 5 minutes as needed for Chest pain up to max of 3 total doses. If no relief after 1 dose, call 911. 25 tablet 0     No current facility-administered medications for this visit.        Lab Results:    CBC:   Lab Results   Component Value Date    WBC 5.4 08/10/2022    HGB 10.4 (L) 08/10/2022    HCT 35.5 (L) 08/10/2022    MCV 92.9 08/10/2022     (L) 08/10/2022     BMP:    Lab Results   Component Value Date     08/10/2022     04/25/2022     02/14/2022    K 4.1 08/10/2022    K 4.1 04/25/2022    K 4.5 02/14/2022     08/10/2022     04/25/2022     02/14/2022    CO2 25 08/10/2022    CO2 25 04/25/2022    CO2 26 02/14/2022    BUN 30 (H) 08/10/2022    BUN 21 04/25/2022    BUN 24 (H) 02/14/2022    CREATININE 1.9 (H) 08/10/2022    CREATININE 1.6 (H) 04/25/2022    CREATININE 1.7 (H) 02/14/2022    GLUCOSE 83 08/10/2022    GLUCOSE 87 04/25/2022    GLUCOSE 87 02/14/2022      Hepatic:   Lab Results   Component Value Date    AST 28 08/10/2022    AST 17 04/25/2022    AST 12 02/14/2022    ALT 19 08/10/2022    ALT 9 (L) 04/25/2022    ALT 7 (L) 02/14/2022    BILITOT 0.2 (L) 08/10/2022    BILITOT <0.2 (L) 04/25/2022    BILITOT <0.2 (L) 02/14/2022    ALKPHOS 60 08/10/2022    ALKPHOS 70 04/25/2022    ALKPHOS 68 02/14/2022     BNP: No results found for: BNP  Lipids:   Lab Results   Component Value Date    CHOL 163 10/22/2019    HDL 36 10/22/2019     INR:   Lab Results   Component Value Date    INR 0.97 02/21/2021    INR 1.01 01/15/2021     URINE:   Lab Results   Component Value Date/Time    PROTUR 251.8 08/10/2022 11:20 AM     Lab Results   Component Value Date/Time    NITRU NEGATIVE 06/29/2022 04:08 PM    COLORU YELLOW 06/29/2022 04:08 PM    PHUR 5.0 06/29/2022

## 2022-08-22 ENCOUNTER — TELEPHONE (OUTPATIENT)
Dept: RHEUMATOLOGY | Age: 52
End: 2022-08-22

## 2022-08-25 DIAGNOSIS — G89.4 CHRONIC PAIN SYNDROME: ICD-10-CM

## 2022-08-25 RX ORDER — HYDROCODONE BITARTRATE AND ACETAMINOPHEN 5; 325 MG/1; MG/1
1 TABLET ORAL EVERY 8 HOURS PRN
Qty: 90 TABLET | Refills: 0 | Status: SHIPPED | OUTPATIENT
Start: 2022-08-26 | End: 2022-09-27 | Stop reason: SDUPTHER

## 2022-08-25 NOTE — TELEPHONE ENCOUNTER
OARRS reviewed. UDS: + for  hydrocodone consistent. Last seen: 6/29/2022.  Follow-up:   Future Appointments   Date Time Provider Justus Burciagai   9/28/2022  3:20 PM Roby Jeans, APRN - CNP N [facility] [address] [place] [address]   11/17/2022  3:00 PM Yudy Yap MD N [facility]. [facility] [address] [place] [address]   1/18/2023 12:15 PM Matty Vela MD N [address] [facility]

## 2022-08-25 NOTE — TELEPHONE ENCOUNTER
Jaz Kaminski called requesting a refill on the following medications:  Requested Prescriptions     Pending Prescriptions Disp Refills    HYDROcodone-acetaminophen (NORCO) 5-325 MG per tablet 90 tablet 0     Sig: Take 1 tablet by mouth every 8 hours as needed for Pain for up to 30 days.      Pharmacy verified:  [email or web address]  [address]    Date of last visit: 06/29/2022  Date of next visit (if applicable): 4/77/0503

## 2022-09-23 DIAGNOSIS — G89.4 CHRONIC PAIN SYNDROME: ICD-10-CM

## 2022-09-23 NOTE — TELEPHONE ENCOUNTER
Jay Jay Molina called requesting a refill on the following medications:  Requested Prescriptions     Pending Prescriptions Disp Refills    HYDROcodone-acetaminophen (NORCO) 5-325 MG per tablet 90 tablet 0     Sig: Take 1 tablet by mouth every 8 hours as needed for Pain for up to 30 days.      Pharmacy verified:  [email or web address]  [address]    Date of last visit: 06/29/2022  Date of next visit (if applicable): 0/31/7210

## 2022-09-27 RX ORDER — HYDROCODONE BITARTRATE AND ACETAMINOPHEN 5; 325 MG/1; MG/1
1 TABLET ORAL EVERY 8 HOURS PRN
Qty: 90 TABLET | Refills: 0 | Status: SHIPPED | OUTPATIENT
Start: 2022-09-27 | End: 2022-10-25 | Stop reason: SDUPTHER

## 2022-09-28 ENCOUNTER — OFFICE VISIT (OUTPATIENT)
Dept: PHYSICAL MEDICINE AND REHAB | Age: 52
End: 2022-09-28
Payer: COMMERCIAL

## 2022-09-28 ENCOUNTER — TELEPHONE (OUTPATIENT)
Dept: PHYSICAL MEDICINE AND REHAB | Age: 52
End: 2022-09-28

## 2022-09-28 VITALS
SYSTOLIC BLOOD PRESSURE: 138 MMHG | HEART RATE: 72 BPM | DIASTOLIC BLOOD PRESSURE: 78 MMHG | HEIGHT: 66 IN | BODY MASS INDEX: 23.3 KG/M2 | WEIGHT: 145 LBS

## 2022-09-28 DIAGNOSIS — M46.1 SI (SACROILIAC) JOINT INFLAMMATION (HCC): ICD-10-CM

## 2022-09-28 DIAGNOSIS — M54.12 CERVICAL RADICULITIS: ICD-10-CM

## 2022-09-28 DIAGNOSIS — G89.4 CHRONIC PAIN SYNDROME: ICD-10-CM

## 2022-09-28 DIAGNOSIS — M48.061 SPINAL STENOSIS OF LUMBAR REGION WITHOUT NEUROGENIC CLAUDICATION: ICD-10-CM

## 2022-09-28 DIAGNOSIS — M47.814 SPONDYLOSIS OF THORACIC REGION WITHOUT MYELOPATHY OR RADICULOPATHY: ICD-10-CM

## 2022-09-28 DIAGNOSIS — M47.816 SPONDYLOSIS OF LUMBAR REGION WITHOUT MYELOPATHY OR RADICULOPATHY: ICD-10-CM

## 2022-09-28 DIAGNOSIS — M48.02 CERVICAL SPINAL STENOSIS: Primary | ICD-10-CM

## 2022-09-28 DIAGNOSIS — M46.1 BILATERAL SACROILIITIS (HCC): ICD-10-CM

## 2022-09-28 PROCEDURE — G8420 CALC BMI NORM PARAMETERS: HCPCS | Performed by: NURSE PRACTITIONER

## 2022-09-28 PROCEDURE — 99214 OFFICE O/P EST MOD 30 MIN: CPT | Performed by: NURSE PRACTITIONER

## 2022-09-28 PROCEDURE — G8427 DOCREV CUR MEDS BY ELIG CLIN: HCPCS | Performed by: NURSE PRACTITIONER

## 2022-09-28 PROCEDURE — 1036F TOBACCO NON-USER: CPT | Performed by: NURSE PRACTITIONER

## 2022-09-28 ASSESSMENT — ENCOUNTER SYMPTOMS
COLOR CHANGE: 0
SORE THROAT: 0
ABDOMINAL PAIN: 0
SINUS PRESSURE: 0
NAUSEA: 0
DIARRHEA: 0
RHINORRHEA: 0
CONSTIPATION: 0
EYE PAIN: 0
PHOTOPHOBIA: 0
BACK PAIN: 1
WHEEZING: 0
CHEST TIGHTNESS: 0
SHORTNESS OF BREATH: 0
COUGH: 0
VOMITING: 0

## 2022-09-28 NOTE — PROGRESS NOTES
[address] [address] Juan Carlos Murray  Dept: [phone]  Dept Fax: [phone]: [phone]    Visit Date: 9/28/2022    Functionality Assessment/Goals Worksheet     On a scale of 0 (Does not Interfere) to 10 (Completely Interferes)     1. Which number describes how during the past week pain has interfered with       the following:  A. General Activity:  6  B. Mood: 8  C. Walking Ability:  7  D. Normal Work (Includes both work outside the home and housework):  8  E. Relations with Other People:   7  F. Sleep:   8  G. Enjoyment of Life:   7    2. Patient Prefers to Take their Pain Medications:     [x]  On a regular basis   []  Only when necessary    []  Does not take pain medications    3. What are the Patient's Goals/Expectations for Visiting Pain Management? [x]  Learn about my pain    []  Receive Medication   []  Physical Therapy     []  Treat Depression   []  Receive Injections    []  Treat Sleep   []  Deal with Anxiety and Stress   []  Treat Opoid Dependence/Addiction   []  Other:      HPI:   Trina Noguera is a 54 y.o. female is here today for    Chief Complaint: Low back pain, Mid back pain, Neck pain, Headaches, Hip pain, and SI pain    F/U . Continues to have neck pain  RUE  numbness tingling pain with  headaches. She conitnues to have low back SI pain mid thoracic pain also. Still has not went to [facility] per Dr Junaid Vines referred her for workup. She had heart cath Feb 2021. She remains off work due to cardiac issues. She is unable to tolerate PT      She follows Rheumatology and recently started [facility]      Her neck lumbar SI   Pain increases with bending, lifting, twisting , reaching, pushing, pulling, turning head, walking, standing, raising arms, stairs and getting up and down.   Treatments Tried PT, ice, heat, Chiropractor, NSAIDS, narcotics, muscle relaxer, OTC rubs creams patches, steroid burst and injections  Pain Description sharp, shooting, stabbing, burning, pressure, throbbing, aching, numbness, tingling and pins and needles      She denies any ER visits or new health issues since last visit. Pain scale with out pain medications or at its worst is 7/10. Pain scale with pain medications or at its best is 4/10. Last dose of [place]  was today  Drug screen reviewed from 7/2022 and was appropriate  Pill count completed  today and WNL: Yes         Prior Injections:    TFLESI L5 left in 2018 with 50% pain relief 18 months. EMG  Showed C5-6 left cervical radiculopathy . Radiology:    MRI cervical   :               There is 3 mm of anterolisthesis of C7 on T1. There is loss of disc height and there is degenerative marrow edema in the endplates at the Y5-4 level. This is also present at the C6-7 level. No suspicious osseous lesions are present. The facet joints are    normally aligned. The cervical spinal cord is of normal caliber and signal intensity. The visualized aspects of the posterior fossa are normal.       On the axial images, at C2-C3, there is a small posterior disc osteophyte complex. There are bilateral facet degenerative changes. There is no spinal canal stenosis. There is mild right foraminal stenosis. At C3-C4, there is a posterior disc osteophyte complex. There are bilateral facet degenerative changes. There is moderate severity spinal canal stenosis. There is moderate to severe bilateral foraminal stenosis. At C4-C5, there is a small posterior disc osteophyte complex. There are facet degenerative changes. There is mild spinal canal stenosis. There is no foraminal stenosis. At C5-C6, there is a small posterior disc osteophyte complex. There is mild spinal canal stenosis. There is no foraminal stenosis. At C6-C7, there is a small posterior disc osteophyte complex.  There is mild spinal canal stenosis and bilateral foraminal stenosis. At C7-T1, there are facet degenerative changes. There is uncovering of the disc. There is mild spinal canal stenosis. There is mild foraminal stenosis. There are no suspicious findings in the cervical soft tissues. Impression       1. Moderate severity spinal canal stenosis with moderate to severe bilateral foraminal stenosis at the C3-4 level due to degenerative changes. 2. Mild spinal canal stenosis and/or foraminal stenosis at other levels as described above. EMG                  The patientis allergic to codeine. Subjective:      Review of Systems   Constitutional:  Negative for activity change, appetite change, chills, diaphoresis, fatigue, fever and unexpected weight change. HENT:  Positive for dental problem. Negative for congestion, ear pain, hearing loss, mouth sores, nosebleeds, rhinorrhea, sinus pressure and sore throat. Dental infection recently   Eyes:  Negative for photophobia, pain and visual disturbance. Respiratory:  Negative for cough, chest tightness, shortness of breath and wheezing. COVID     Cardiovascular:  Negative for chest pain and palpitations. HTN CAD  Recent abnormal heart cath, pending [facility] referral for possible surgery   Gastrointestinal:  Negative for abdominal pain, constipation, diarrhea, nausea and vomiting. Endocrine: Negative for cold intolerance, heat intolerance, polydipsia, polyphagia and polyuria. Thyroid issues   Genitourinary:  Negative for decreased urine volume, difficulty urinating, frequency and hematuria. CKD   Musculoskeletal:  Positive for arthralgias, back pain, joint swelling, myalgias, neck pain and neck stiffness. Negative for gait problem. Skin:  Negative for color change and rash. Hot water burn  8/31/2020 . healed   Allergic/Immunologic: Negative for food allergies and immunocompromised state.    Neurological:  Positive for dizziness, weakness, numbness and headaches. Negative for tremors, seizures, syncope, facial asymmetry, speech difficulty and light-headedness. H/o seizures,no seizures in 16 years. H/O Lupus   Hematological:  Does not bruise/bleed easily. Psychiatric/Behavioral:  Negative for agitation, behavioral problems, confusion, decreased concentration, dysphoric mood, hallucinations, self-injury, sleep disturbance and suicidal ideas. The patient is not nervous/anxious and is not hyperactive. Objective:     Vitals:    09/28/22 1513   BP: 138/78   Site: Left Upper Arm   Position: Sitting   Cuff Size: Medium Adult   Pulse: 72   Weight: 145 lb (65.8 kg)   Height: 5' 6\" (1.676 m)       Physical Exam  Vitals and nursing note reviewed. Constitutional:       General: She is not in acute distress. Appearance: She is well-developed. She is not diaphoretic. HENT:      Head: Normocephalic and atraumatic. Right Ear: External ear normal.      Left Ear: External ear normal.      Nose: Nose normal.      Mouth/Throat:      Pharynx: No oropharyngeal exudate. Eyes:      General: No scleral icterus. Right eye: No discharge. Left eye: No discharge. Conjunctiva/sclera: Conjunctivae normal.      Pupils: Pupils are equal, round, and reactive to light. Neck:      Thyroid: No thyromegaly. Cardiovascular:      Rate and Rhythm: Normal rate and regular rhythm. Heart sounds: Normal heart sounds. No murmur heard. No friction rub. No gallop. Pulmonary:      Effort: Pulmonary effort is normal. No respiratory distress. Breath sounds: Normal breath sounds. No wheezing or rales. Chest:      Chest wall: No tenderness. Abdominal:      General: Bowel sounds are normal. There is no distension. Palpations: Abdomen is soft. Tenderness: There is no abdominal tenderness. There is no guarding or rebound. Musculoskeletal:         General: Tenderness present. Right shoulder: Tenderness present. Decreased range of motion. Decreased strength. Left shoulder: Tenderness present. Right upper arm: Tenderness present. Right forearm: Tenderness present. Left forearm: Tenderness present. Right wrist: Tenderness and bony tenderness present. Left wrist: Tenderness and bony tenderness present. Right hand: Tenderness and bony tenderness present. Decreased range of motion. Decreased strength. Decreased sensation. Left hand: Tenderness and bony tenderness present. Decreased range of motion. Decreased strength. Decreased sensation. Cervical back: Neck supple. Spasms, tenderness and bony tenderness present. No edema, erythema or rigidity. Pain with movement and muscular tenderness present. Decreased range of motion. Thoracic back: Spasms, tenderness and bony tenderness present. Decreased range of motion. Lumbar back: Spasms, tenderness and bony tenderness present. Decreased range of motion. Back:       Right hip: Tenderness present. Left hip: Normal.      Right upper leg: Tenderness present. Right knee: Normal.      Left knee: Normal.      Right ankle: Tenderness present. Right foot: Decreased range of motion. Swelling, tenderness and bony tenderness present. Comments: 2 right broken toes has walker shoe on    Skin:     General: Skin is warm. Coloration: Skin is not pale. Findings: No erythema or rash. Neurological:      Mental Status: She is alert and oriented to person, place, and time. She is not disoriented. Cranial Nerves: No cranial nerve deficit. Sensory: Sensation is intact. No sensory deficit. Motor: Motor function is intact. No atrophy or abnormal muscle tone. Coordination: Coordination is intact. Coordination normal.      Gait: Gait abnormal.      Deep Tendon Reflexes: Babinski sign absent on the right side.       Reflex Scores:       Tricep reflexes are 2+ on the right side and 2+ on the left side.       Bicep reflexes are 2+ on the right side and 2+ on the left side. Brachioradialis reflexes are 2+ on the right side and 2+ on the left side. Patellar reflexes are 2+ on the right side and 2+ on the left side. Achilles reflexes are 2+ on the right side and 2+ on the left side. Comments: Motor  4/5 RUE  5/5 BLE   Psychiatric:         Attention and Perception: Attention and perception normal. She is attentive. Mood and Affect: Mood and affect normal. Mood is not anxious or depressed. Affect is not labile, blunt, angry or inappropriate. Speech: Speech normal. She is communicative. Speech is not rapid and pressured, delayed, slurred or tangential.         Behavior: Behavior normal. Behavior is not agitated, slowed, aggressive, withdrawn, hyperactive or combative. Behavior is cooperative. Thought Content: Thought content normal. Thought content is not paranoid or delusional. Thought content does not include homicidal or suicidal ideation. Thought content does not include homicidal or suicidal plan. Cognition and Memory: Cognition and memory normal. Memory is not impaired. She does not exhibit impaired recent memory or impaired remote memory. Judgment: Judgment normal. Judgment is not impulsive or inappropriate. VONDA  Patricks test  positive  Yeoman's  or Gaenslen's positive  Kemps  positive  Spurlings  positive  Todd's na         Assessment:     1. Cervical spinal stenosis    2. Cervical radiculitis    3. SI (sacroiliac) joint inflammation (HCC)    4. Spondylosis of lumbar region without myelopathy or radiculopathy    5. Spinal stenosis of lumbar region without neurogenic claudication    6. Chronic pain syndrome    7. Spondylosis of thoracic region without myelopathy or radiculopathy    8. Bilateral sacroiliitis ([facility] [address]            Plan:      OARRS reviewed. Current MED: 15  Patient was not offered naloxone for home.    Testing Labs or Radiology reviewed: Cervical MRI   Procedures:ELLIOT C6  Discussed with patient about risks with procedure including infection, reaction to medication, increased pain, or bleeding. Medications:Norco Neurontin       Meds. Prescribed:   No orders of the defined types were placed in this encounter. Return for ELLIOT @C6, Follow up after procedure.                Electronically signed by MACKENZIE Valdivia CNP on9/28/2022 at 3:43 PM

## 2022-10-24 DIAGNOSIS — G89.4 CHRONIC PAIN SYNDROME: ICD-10-CM

## 2022-10-24 NOTE — TELEPHONE ENCOUNTER
Jose Jaramillo called requesting a refill on the following medications:  Requested Prescriptions     Pending Prescriptions Disp Refills    HYDROcodone-acetaminophen (NORCO) 5-325 MG per tablet 90 tablet 0     Sig: Take 1 tablet by mouth every 8 hours as needed for Pain for up to 30 days. Pharmacy verified: [id number] [facility]  . pv      Date of last visit: 9/28/2022  Date of next visit (if applicable): 30/00/7883

## 2022-10-25 RX ORDER — HYDROCODONE BITARTRATE AND ACETAMINOPHEN 5; 325 MG/1; MG/1
1 TABLET ORAL EVERY 8 HOURS PRN
Qty: 90 TABLET | Refills: 0 | Status: SHIPPED | OUTPATIENT
Start: 2022-10-27 | End: 2022-11-22 | Stop reason: SDUPTHER

## 2022-10-25 NOTE — TELEPHONE ENCOUNTER
OARRS reviewed. UDS: + for  hydrocodone consistent. Last seen: 9/28/2022.  Follow-up:   Future Appointments   Date Time Provider Justus Dumont   11/15/2022  1:40 PM MACKENZIE Chowdary CNP N [facility]   11/17/2022  3:00 PM Sandie Osullivan MD N [facility]   1/18/2023 12:15 PM Tej Tay MD N [address] [facility]

## 2022-10-26 ENCOUNTER — PREP FOR PROCEDURE (OUTPATIENT)
Dept: PHYSICAL MEDICINE AND REHAB | Age: 52
End: 2022-10-26

## 2022-10-31 NOTE — DISCHARGE INSTRUCTIONS
ANESTHESIA INSTRUCTIONS FOLLOWING SURGERY        Since you may experience some intermittent light-headedness for the next several hours, we suggest you plan on bed rest or quiet relaxation this evening. You must have a friend or relative stay with you tonight. Because of the sedation you have received, it is recommended that you do not drive a motor vehicle, operate any kind of machinery, or sign any contractual agreement for 24 hours following the procedure. You should not take alcoholic beverages tonight and only take sleeping medication that has been specifically prescribed for you by your physician. Call office [phone] if you have:  Temperature greater than 100.4  Persistent nausea and vomiting  Severe uncontrolled pain  Redness, tenderness, or signs of infection (pain, swelling, redness, odor or green/yellow discharge around the site)  Difficulty breathing, headache or visual disturbances  Hives  Persistent dizziness or light-headedness  Extreme fatigue  Any other questions or concerns you may have after discharge    In an emergency, call 911 or go to an Emergency Department at a nearby hospital    It is important to bring a complete, current list of your medications to any medical appointments or hospitalizations. REMINDER:   Carry a list of your medications and allergies with you at all times  Call your pharmacy at least 1 week in advance to refill prescriptions    Diet: Resume your usual diet. Good nutrition promotes healing. Increase fluid intake. Activity: Rest for 24 hrs then resume normal activity. HOME MEDICATIONS:      If on blood thinning products such as; Aspirin, NSAIDS, Plavix, Coumadin, Xarelto, Fish Oil, Multi-Vitamins or Herbal Supplements restart in 24 hours      Restart Metformin in 48 hours if you had procedure with dye. Restart Metformin in 24 hours if no dye used during procedure.         Education Materials Received: {yes/no:645669}  Belongings Returned: {yes/no:589639}          I understand and acknowledge receipt of the above instructions. Patient or Guardian Signature                                                         Date/Time                                                                                                                                            Physician's or R.N.'s Signature                                                                  Date/Time      The discharge instructions have been reviewed with the patient and/or Guardian. Patient and/or Guardian signed and retained a printed copy. Call office [phone] if you have:  Temperature greater than 100.4  Persistent nausea and vomiting  Severe uncontrolled pain  Redness, tenderness, or signs of infection (pain, swelling, redness, odor or green/yellow discharge around the site)  Difficulty breathing, headache or visual disturbances  Hives  Persistent dizziness or light-headedness  Extreme fatigue  Any other questions or concerns you may have after discharge    In an emergency, call 911 or go to an Emergency Department at a nearby hospital    It is important to bring a complete, current list of your medications to any medical appointments or hospitalizations. REMINDER:   Carry a list of your medications and allergies with you at all times  Call your pharmacy at least 1 week in advance to refill prescriptions    Diet: Resume your usual diet. Good nutrition promotes healing. Increase fluid intake. Activity: Rest for 24 hrs then resume normal activity. Education Materials Received: {yes/no:419069}  Belongings Returned: {yes/no:517765}          I understand and acknowledge receipt of the above instructions. Patient or Guardian Signature                                                         Date/Time                                                                                                                                            Physician's or R.N.'s Signature                                                                  Date/Time      The discharge instructions have been reviewed with the patient and/or Guardian. Patient and/or Guardian signed and retained a printed copy.

## 2022-11-01 ENCOUNTER — ANESTHESIA EVENT (OUTPATIENT)
Dept: OPERATING ROOM | Age: 52
End: 2022-11-01
Payer: COMMERCIAL

## 2022-11-01 ENCOUNTER — ANESTHESIA (OUTPATIENT)
Dept: OPERATING ROOM | Age: 52
End: 2022-11-01
Payer: COMMERCIAL

## 2022-11-01 ENCOUNTER — APPOINTMENT (OUTPATIENT)
Dept: GENERAL RADIOLOGY | Age: 52
End: 2022-11-01
Attending: PAIN MEDICINE
Payer: COMMERCIAL

## 2022-11-01 ENCOUNTER — HOSPITAL ENCOUNTER (OUTPATIENT)
Age: 52
Setting detail: OUTPATIENT SURGERY
Discharge: HOME | End: 2022-11-01
Attending: PAIN MEDICINE | Admitting: PAIN MEDICINE
Payer: COMMERCIAL

## 2022-11-01 VITALS
TEMPERATURE: 98.8 F | DIASTOLIC BLOOD PRESSURE: 79 MMHG | RESPIRATION RATE: 16 BRPM | BODY MASS INDEX: 22.79 KG/M2 | HEIGHT: 66 IN | HEART RATE: 65 BPM | OXYGEN SATURATION: 98 % | SYSTOLIC BLOOD PRESSURE: 131 MMHG | WEIGHT: 141.8 LBS

## 2022-11-01 PROBLEM — M54.12 CERVICAL RADICULOPATHY: Status: ACTIVE | Noted: 2022-11-01

## 2022-11-01 PROBLEM — M48.02 CERVICAL SPINAL STENOSIS: Status: ACTIVE | Noted: 2022-11-01

## 2022-11-01 LAB — PREGNANCY, URINE: NEGATIVE

## 2022-11-01 PROCEDURE — 2709999900 HC NON-CHARGEABLE SUPPLY: Performed by: PAIN MEDICINE

## 2022-11-01 PROCEDURE — 6360000002 HC RX W HCPCS: Performed by: PAIN MEDICINE

## 2022-11-01 PROCEDURE — 2500000003 HC RX 250 WO HCPCS: Performed by: PAIN MEDICINE

## 2022-11-01 PROCEDURE — A4216 STERILE WATER/SALINE, 10 ML: HCPCS | Performed by: PAIN MEDICINE

## 2022-11-01 PROCEDURE — 3209999900 FLUORO FOR SURGICAL PROCEDURES

## 2022-11-01 PROCEDURE — 81025 URINE PREGNANCY TEST: CPT

## 2022-11-01 PROCEDURE — 7100000011 HC PHASE II RECOVERY - ADDTL 15 MIN: Performed by: PAIN MEDICINE

## 2022-11-01 PROCEDURE — 62321 NJX INTERLAMINAR CRV/THRC: CPT | Performed by: PAIN MEDICINE

## 2022-11-01 PROCEDURE — 2580000003 HC RX 258: Performed by: PAIN MEDICINE

## 2022-11-01 PROCEDURE — 7100000010 HC PHASE II RECOVERY - FIRST 15 MIN: Performed by: PAIN MEDICINE

## 2022-11-01 PROCEDURE — 6360000004 HC RX CONTRAST MEDICATION: Performed by: PAIN MEDICINE

## 2022-11-01 PROCEDURE — 3600000054 HC PAIN LEVEL 3 BASE: Performed by: PAIN MEDICINE

## 2022-11-01 PROCEDURE — 6360000002 HC RX W HCPCS: Performed by: NURSE ANESTHETIST, CERTIFIED REGISTERED

## 2022-11-01 PROCEDURE — 3700000000 HC ANESTHESIA ATTENDED CARE: Performed by: PAIN MEDICINE

## 2022-11-01 RX ORDER — DEXAMETHASONE SODIUM PHOSPHATE 4 MG/ML
INJECTION, SOLUTION INTRA-ARTICULAR; INTRALESIONAL; INTRAMUSCULAR; INTRAVENOUS; SOFT TISSUE PRN
Status: DISCONTINUED | OUTPATIENT
Start: 2022-11-01 | End: 2022-11-01 | Stop reason: ALTCHOICE

## 2022-11-01 RX ORDER — FENTANYL CITRATE 50 UG/ML
INJECTION, SOLUTION INTRAMUSCULAR; INTRAVENOUS PRN
Status: DISCONTINUED | OUTPATIENT
Start: 2022-11-01 | End: 2022-11-01 | Stop reason: SDUPTHER

## 2022-11-01 RX ORDER — LIDOCAINE HYDROCHLORIDE 10 MG/ML
INJECTION, SOLUTION INFILTRATION; PERINEURAL PRN
Status: DISCONTINUED | OUTPATIENT
Start: 2022-11-01 | End: 2022-11-01 | Stop reason: ALTCHOICE

## 2022-11-01 RX ORDER — SODIUM CHLORIDE 9 MG/ML
INJECTION INTRAVENOUS PRN
Status: DISCONTINUED | OUTPATIENT
Start: 2022-11-01 | End: 2022-11-01 | Stop reason: ALTCHOICE

## 2022-11-01 RX ADMIN — FENTANYL CITRATE 50 MCG: 50 INJECTION, SOLUTION INTRAMUSCULAR; INTRAVENOUS at 12:08

## 2022-11-01 RX ADMIN — FENTANYL CITRATE 50 MCG: 50 INJECTION, SOLUTION INTRAMUSCULAR; INTRAVENOUS at 12:10

## 2022-11-01 ASSESSMENT — PAIN SCALES - GENERAL
PAINLEVEL_OUTOF10: 7
PAINLEVEL_OUTOF10: 0

## 2022-11-01 ASSESSMENT — PAIN DESCRIPTION - LOCATION: LOCATION: NECK

## 2022-11-01 NOTE — H&P
[facility]  History and Physical Update    Pt Name: Rosamaria Larios  MRN: [id number]  YOB: 1970  Date of evaluation: 11/1/2022      I have examined the patient and reviewed the H&P/Consult and there are no changes to the patient or plans.         Electronically signed by Bridgett Ace MD on 11/1/2022 at 11:33 AM

## 2022-11-01 NOTE — OP NOTE
Pre-Procedure Note    Patient Name: Trever Coffey   YOB: 1970  Medical Record Number: [id number]  Date: 11/1/22      Indication:  Cervical radiculopathy    Consent: On file. Vital Signs:   Vitals:    11/01/22 1156   BP: 124/74   Pulse: 67   Resp: 20   Temp: 97.5 °F (36.4 °C)   SpO2: 97%       Past Medical History:   has a past medical history of Anemia, CKD (chronic kidney disease), stage III ([address] Dyslipidemia, Hematuria, Hyperlipidemia, Hypertension, Hyperuricemia, Hypothyroidism, Kidney disease, Lupus ([address] Lupus nephritis ([facility] [address] Proteinuria, Seizures ([facility] [address] and Systemic lupus erythematosus ([facility] [address] Past Surgical History:   has a past surgical history that includes Nerve Block (Bilateral); pr inj dx/ther agnt paravert facet joint, lumbar/sac, 2nd level (Bilateral, 5/7/2018); pr njx dx/ther sbst intrlmnr lmbr/sac w/img gdn (N/A, 7/19/2018); pr njx aa&/strd tfrml epi lumbar/sacral 1 level (Left, 9/25/2018); Lumbar spine surgery (Left, 12/13/2018); lumbar nerve block (N/A, 3/11/2019); lumbar nerve block (N/A, 5/21/2019); and CT BIOPSY ABDOMEN RETROPERITONEUM (1/15/2021). Sedation/ Anesthesia Plan:   MAC      Patient is an appropriate candidate for plan of sedation: yes    Preoperative Diagnosis:  C-radiculopathy, C-spinal stenosis    Post-Op Dx: as above    Procedure Performed: Cervical epidural steroid injection under fluoroscopic guidance       Indication for the Procedure:  Patient failed conservative management  for pain in neck radiating to upper extremities. Patient is undergoing cervical epidural steroid injection. As patient is not responding to conservative management and interfering with activities of daily living we decided to proceed with Cervical  Epidural Steroid  injection. The procedure and risks were discussed with the patient and an informed consent was obtained. Procedure:  A meaningful communication was kept up with the patient throughout  the procedure.     The patient is placed in prone position. Skin over the neck was prepped and draped in sterile manner. Then using fluoroscopy the C6 interspace was observed and the skin and deep tissues over the midline were infiltrated with 3  ml of 1% lidocaine. The #20-gauge, 3-1/2 inch Tuohy needle was inserted through the skin wheal and the epidural space was identified using loss of resistance technique with normal saline. This was confirmed with AP and lateral views of the fluoroscopy after injecting about 1 ml of Omnipaque-180 and observing the spread of the contrast in the epidural space. Then after negative aspiration a total of 12 mg of dexamethasone with 2 ml of normal saline were injected into the epidural space. The needle is removed and a Band-Aid was placed over the needle insertion site. EBL-0  Patient's vital signs remained stable and tolerated the procedure well. Patient was discharged home in stable condition and will be followed in the pain clinic in next few weeks for further planning.     Electronically signed by Magdalena Moe MD on 11/1/22 at 12:09 PM EDT

## 2022-11-01 NOTE — PROGRESS NOTES
1219: Patient arrives to recovery room via cart. Spontaneous respirations, vss, report received from surgical RN. Patient denies pain, numbness, tingling , nausea. Injection site clean, dry, intact. HOB elevated. IV capped. Snack and drink given. Call light within reach. 1235: patient denies needs, iv removed. Patient requests to rest for 20 minutes. 1250: ride called, patient getting dressed. 1300: patient wheeled to discharge lobby in stable condition. Patient discharged home with Donnell Zhao.

## 2022-11-01 NOTE — H&P
H&P    Patient continues to have neck pain  RUE  numbness tingling pain with  headaches. She conitnues to have low back SI pain mid thoracic pain also. Still has not went to [facility] per Dr Ryan Godoy referred her for workup. She had heart cath Feb 2021. She remains off work due to cardiac issues. She is unable to tolerate PT      She follows Rheumatology and recently started [facility]      Her neck lumbar SI   Pain increases with bending, lifting, twisting , reaching, pushing, pulling, turning head, walking, standing, raising arms, stairs and getting up and down. Treatments Tried PT, ice, heat, Chiropractor, NSAIDS, narcotics, muscle relaxer, OTC rubs creams patches, steroid burst and injections  Pain Description sharp, shooting, stabbing, burning, pressure, throbbing, aching, numbness, tingling and pins and needles        She denies any ER visits or new health issues since last visit. Pain scale with out pain medications or at its worst is 7/10. Pain scale with pain medications or at its best is 4/10. Last dose of Holden  was today  Drug screen reviewed from 7/2022 and was appropriate  Pill count completed  today and WNL: Yes            Prior Injections:     TFLESI L5 left in 2018 with 50% pain relief 18 months. EMG  Showed C5-6 left cervical radiculopathy . Radiology:     MRI cervical   :               There is 3 mm of anterolisthesis of C7 on T1. There is loss of disc height and there is degenerative marrow edema in the endplates at the Y4-1 level. This is also present at the C6-7 level. No suspicious osseous lesions are present. The facet joints are    normally aligned. The cervical spinal cord is of normal caliber and signal intensity. The visualized aspects of the posterior fossa are normal.       On the axial images, at C2-C3, there is a small posterior disc osteophyte complex. There are bilateral facet degenerative changes. There is no spinal canal stenosis.  There is mild right foraminal stenosis. At C3-C4, there is a posterior disc osteophyte complex. There are bilateral facet degenerative changes. There is moderate severity spinal canal stenosis. There is moderate to severe bilateral foraminal stenosis. At C4-C5, there is a small posterior disc osteophyte complex. There are facet degenerative changes. There is mild spinal canal stenosis. There is no foraminal stenosis. At C5-C6, there is a small posterior disc osteophyte complex. There is mild spinal canal stenosis. There is no foraminal stenosis. At C6-C7, there is a small posterior disc osteophyte complex. There is mild spinal canal stenosis and bilateral foraminal stenosis. At C7-T1, there are facet degenerative changes. There is uncovering of the disc. There is mild spinal canal stenosis. There is mild foraminal stenosis. There are no suspicious findings in the cervical soft tissues. Impression       1. Moderate severity spinal canal stenosis with moderate to severe bilateral foraminal stenosis at the C3-4 level due to degenerative changes. 2. Mild spinal canal stenosis and/or foraminal stenosis at other levels as described above. EMG                      The patientis allergic to codeine. Subjective:      Review of Systems   Constitutional:  Negative for activity change, appetite change, chills, diaphoresis, fatigue, fever and unexpected weight change. HENT:  Positive for dental problem. Negative for congestion, ear pain, hearing loss, mouth sores, nosebleeds, rhinorrhea, sinus pressure and sore throat. Dental infection recently   Eyes:  Negative for photophobia, pain and visual disturbance. Respiratory:  Negative for cough, chest tightness, shortness of breath and wheezing. COVID     Cardiovascular:  Negative for chest pain and palpitations.         HTN CAD  Recent abnormal heart cath, pending [facility] referral for possible surgery   Gastrointestinal: Negative for abdominal pain, constipation, diarrhea, nausea and vomiting. Endocrine: Negative for cold intolerance, heat intolerance, polydipsia, polyphagia and polyuria. Thyroid issues   Genitourinary:  Negative for decreased urine volume, difficulty urinating, frequency and hematuria. CKD   Musculoskeletal:  Positive for arthralgias, back pain, joint swelling, myalgias, neck pain and neck stiffness. Negative for gait problem. Skin:  Negative for color change and rash. Hot water burn  8/31/2020 . healed   Allergic/Immunologic: Negative for food allergies and immunocompromised state. Neurological:  Positive for dizziness, weakness, numbness and headaches. Negative for tremors, seizures, syncope, facial asymmetry, speech difficulty and light-headedness. H/o seizures,no seizures in 16 years. H/O Lupus   Hematological:  Does not bruise/bleed easily. Psychiatric/Behavioral:  Negative for agitation, behavioral problems, confusion, decreased concentration, dysphoric mood, hallucinations, self-injury, sleep disturbance and suicidal ideas. The patient is not nervous/anxious and is not hyperactive. Objective:      Vitals       Vitals:     09/28/22 1513   BP: 138/78   Site: Left Upper Arm   Position: Sitting   Cuff Size: Medium Adult   Pulse: 72   Weight: 145 lb (65.8 kg)   Height: 5' 6\" (1.676 m)            Physical Exam  Vitals and nursing note reviewed. Constitutional:       General: She is not in acute distress. Appearance: She is well-developed. She is not diaphoretic. HENT:      Head: Normocephalic and atraumatic. Right Ear: External ear normal.      Left Ear: External ear normal.      Nose: Nose normal.      Mouth/Throat:      Pharynx: No oropharyngeal exudate. Eyes:      General: No scleral icterus. Right eye: No discharge. Left eye: No discharge.       Conjunctiva/sclera: Conjunctivae normal.      Pupils: Pupils are equal, round, and reactive to light. Neck:      Thyroid: No thyromegaly. Cardiovascular:      Rate and Rhythm: Normal rate and regular rhythm. Heart sounds: Normal heart sounds. No murmur heard. No friction rub. No gallop. Pulmonary:      Effort: Pulmonary effort is normal. No respiratory distress. Breath sounds: Normal breath sounds. No wheezing or rales. Chest:      Chest wall: No tenderness. Abdominal:      General: Bowel sounds are normal. There is no distension. Palpations: Abdomen is soft. Tenderness: There is no abdominal tenderness. There is no guarding or rebound. Musculoskeletal:         General: Tenderness present. Right shoulder: Tenderness present. Decreased range of motion. Decreased strength. Left shoulder: Tenderness present. Right upper arm: Tenderness present. Right forearm: Tenderness present. Left forearm: Tenderness present. Right wrist: Tenderness and bony tenderness present. Left wrist: Tenderness and bony tenderness present. Right hand: Tenderness and bony tenderness present. Decreased range of motion. Decreased strength. Decreased sensation. Left hand: Tenderness and bony tenderness present. Decreased range of motion. Decreased strength. Decreased sensation. Cervical back: Neck supple. Spasms, tenderness and bony tenderness present. No edema, erythema or rigidity. Pain with movement and muscular tenderness present. Decreased range of motion. Thoracic back: Spasms, tenderness and bony tenderness present. Decreased range of motion. Lumbar back: Spasms, tenderness and bony tenderness present. Decreased range of motion. Back:       Right hip: Tenderness present. Left hip: Normal.      Right upper leg: Tenderness present. Right knee: Normal.      Left knee: Normal.      Right ankle: Tenderness present. Right foot: Decreased range of motion. Swelling, tenderness and bony tenderness present.       Comments: 2 right broken toes has walker shoe on    Skin:     General: Skin is warm. Coloration: Skin is not pale. Findings: No erythema or rash. Neurological:      Mental Status: She is alert and oriented to person, place, and time. She is not disoriented. Cranial Nerves: No cranial nerve deficit. Sensory: Sensation is intact. No sensory deficit. Motor: Motor function is intact. No atrophy or abnormal muscle tone. Coordination: Coordination is intact. Coordination normal.      Gait: Gait abnormal.      Deep Tendon Reflexes: Babinski sign absent on the right side. Reflex Scores:       Tricep reflexes are 2+ on the right side and 2+ on the left side. Bicep reflexes are 2+ on the right side and 2+ on the left side. Brachioradialis reflexes are 2+ on the right side and 2+ on the left side. Patellar reflexes are 2+ on the right side and 2+ on the left side. Achilles reflexes are 2+ on the right side and 2+ on the left side. Comments: Motor  4/5 RUE  5/5 BLE   Psychiatric:         Attention and Perception: Attention and perception normal. She is attentive. Mood and Affect: Mood and affect normal. Mood is not anxious or depressed. Affect is not labile, blunt, angry or inappropriate. Speech: Speech normal. She is communicative. Speech is not rapid and pressured, delayed, slurred or tangential.         Behavior: Behavior normal. Behavior is not agitated, slowed, aggressive, withdrawn, hyperactive or combative. Behavior is cooperative. Thought Content: Thought content normal. Thought content is not paranoid or delusional. Thought content does not include homicidal or suicidal ideation. Thought content does not include homicidal or suicidal plan. Cognition and Memory: Cognition and memory normal. Memory is not impaired. She does not exhibit impaired recent memory or impaired remote memory.          Judgment: Judgment normal. Judgment is not impulsive or inappropriate. VONDA  Patricks test  positive  Yeoman's  or Gaenslen's positive  Kemps  positive  Spurlings  positive  Todd's na        Assessment:      1. Cervical spinal stenosis    2. Cervical radiculitis    3. SI (sacroiliac) joint inflammation (HCC)    4. Spondylosis of lumbar region without myelopathy or radiculopathy    5. Spinal stenosis of lumbar region without neurogenic claudication    6. Chronic pain syndrome    7. Spondylosis of thoracic region without myelopathy or radiculopathy    8. Bilateral sacroiliitis ([facility] [address]       Plan:      OARRS reviewed. Current MED: 15  Patient was not offered naloxone for home. Testing Labs or Radiology reviewed: Cervical MRI   Procedures:ELLIOT C6  Discussed with patient about risks with procedure including infection, reaction to medication, increased pain, or bleeding. Medications:Norco Neurontin              Return for ELLIOT @C6, Follow up after procedure.

## 2022-11-01 NOTE — ANESTHESIA POSTPROCEDURE EVALUATION
Department of Anesthesiology  Postprocedure Note    Patient: Celestina Tian  MRN: [id number]  YOB: 1970  Date of evaluation: 11/1/2022      Procedure Summary     Date: 11/01/22 Room / Location: [facility] 03 / [address] [facility]    Anesthesia Start: 1205 Anesthesia Stop:     Procedure: Cervical Epidural Steroid Injection  Cervical 6 Diagnosis:       Cervical spinal stenosis      (cervical spinal stenosis)    Surgeons: Lauren Osei MD Responsible Provider: Pedro Mariano DO    Anesthesia Type: MAC ASA Status: 3          Anesthesia Type: No value filed.     Paulina Phase I:      Paulina Phase II:        Anesthesia Post Evaluation    Patient location during evaluation: bedside  Patient participation: complete - patient participated  Level of consciousness: awake and alert  Airway patency: patent  Nausea & Vomiting: no vomiting and no nausea  Complications: no  Cardiovascular status: hemodynamically stable  Respiratory status: acceptable  Hydration status: stable

## 2022-11-01 NOTE — ANESTHESIA PRE PROCEDURE
Department of Anesthesiology  Preprocedure Note       Name:  Rosamaria Larios   Age:  50 y.o.  :  1970                                          MRN:  [id number]         Date:  2022      Surgeon: Felix Reyes):  Bridgett Ace MD    Procedure: Procedure(s):  Cervical Epidural Steroid Injection  Cervical 6    Medications prior to admission:   Prior to Admission medications    Medication Sig Start Date End Date Taking? Authorizing Provider   HYDROcodone-acetaminophen (NORCO) 5-325 MG per tablet Take 1 tablet by mouth every 8 hours as needed for Pain for up to 30 days. 10/27/22 11/26/22  MACKENZIE Singletary CNP   vitamin D (ERGOCALCIFEROL) 1.25 MG (19582 UT) CAPS capsule Take 1 capsule by mouth once a week 22   Axel Espino MD   mycophenolate (CELLCEPT) 500 MG tablet Take 2 tablets by mouth every morning (before breakfast) AND 2 tablets every evening.  8/10/22 2/14/23  Teresa Skaggs DO   predniSONE (DELTASONE) 2.5 MG tablet Take 3 tablets by mouth daily 22  Teresa Skaggs DO   hydroxychloroquine (PLAQUENIL) 200 MG tablet Take 1 tablet by mouth 2 times daily 22   Teresa Skaggs DO   levothyroxine (SYNTHROID) 112 MCG tablet take 1 tablet by mouth once daily 22   MACKENZIE Ruiz CNP   acetaminophen (APAP EXTRA STRENGTH) 500 MG tablet Take 2 tablets by mouth every 6 hours as needed for Pain 22   MACKENZIE Ruiz CNP   ferrous sulfate (IRON 325) 325 (65 Fe) MG tablet Take 1 tablet by mouth daily (with breakfast) 22   MACKENZIE Ruiz CNP   ondansetron (ZOFRAN) 4 MG tablet Take 1 tablet by mouth 3 times daily as needed for Nausea or Vomiting 22   MACKENZIE Ruiz CNP   simvastatin (ZOCOR) 40 MG tablet take 1 tablet by mouth at bedtime 22   MACKENZIE Ruiz CNP   gabapentin (NEURONTIN) 400 MG capsule take 1 capsule by mouth twice a day 22  MACKENZIE Ruiz CNP   Belimumab (BENLYSTA) 200 MG/ML SOAJ Inject 200 mg into the skin every 28 days 5/17/22   Pepe Vann, DO   vitamin D (ERGOCALCIFEROL) 1.25 MG (22158 UT) CAPS capsule Take 1 capsule by mouth once a week 4/29/22   Pepe Vann, DO   loratadine (CLARITIN) 10 MG tablet take 1 tablet by mouth once daily 4/13/22   [place], APRN - CNP   Calcium Carb-Cholecalciferol (OYSTER SHELL CALCIUM W/D) 500-200 MG-UNIT TABS tablet take 1 tablet by mouth once daily 4/13/22   [place], APRN - CNP   omeprazole (PRILOSEC) 20 MG delayed release capsule take 1 capsule by mouth once daily 4/13/22   Srini Neri APRN - CNP   albuterol sulfate HFA (PROVENTIL HFA) 108 (90 Base) MCG/ACT inhaler Inhale 2 puffs into the lungs every 6 hours as needed for Wheezing 11/9/21   [place], APRN - CNP   silver sulfADIAZINE (SILVADENE) 1 % cream Apply topically daily. 8/27/21   [place], APRN - CNP   permethrin (ELIMITE) 5 % cream Apply to wet hair and leave on 10 minutes and then rinse, may repeat on day 9 7/22/21   [place], APRN - CNP   nitroGLYCERIN (NITROSTAT) 0.4 MG SL tablet Place 1 tablet under the tongue every 5 minutes as needed for Chest pain up to max of 3 total doses. If no relief after 1 dose, call 911. 2/23/21   Thais Alcantara PA-C       Current medications:    No current facility-administered medications for this encounter. Allergies:     Allergies   Allergen Reactions    Codeine Hives and Nausea And Vomiting       Problem List:    Patient Active Problem List   Diagnosis Code    Hypothyroid E03.9    Systemic lupus erythematosus (HCC) M32.9    Hematuria     Proteinuria R80.9    Anemia D64.9    CKD (chronic kidney disease), stage III ([facility]) N18.30    Dyslipidemia E78.5    Hyperuricemia E79.0    Lumbar radiculitis M54.16    Spinal stenosis of lumbar region without neurogenic claudication M48.061    Renal insufficiency N28.9    Type 2 diabetes mellitus without complication, without long-term current use of insulin ([facility]) E11.9  Malformation of coronary vessels Q24.5       Past Medical History:        Diagnosis Date    Anemia     CKD (chronic kidney disease), stage III (HCC)     Dyslipidemia     Hematuria     Hyperlipidemia     Hypertension     Hyperuricemia     Hypothyroidism     Kidney disease     as a result from lupus    Lupus ([facility] [address]     Lupus nephritis ([facility] [address]     Proteinuria     Seizures ([facility] [address]     Systemic lupus erythematosus ([facility] [address]        Past Surgical History:        Procedure Laterality Date    CT BIOPSY ABDOMEN RETROPERITONEUM  1/15/2021    CT BIOPSY ABDOMEN RETROPERITONEUM 1/15/2021 [facility] CT SCAN    LUMBAR NERVE BLOCK N/A 3/11/2019    LESI at L4 performed by Tona Senior MD at [facility] N/A 2019    LESI at L3 #1 performed by Tona Senior MD at [address] SURGERY Left 2018    LUMBAR TRANSFORAMINAL TFESI L5 LEFT #2, performed by Tona Senior MD at [facility], [address] [place] [address] Bilateral     LUMBAR FACET    ND INJ DX/THER AGNT PARAVERT FACET JOINT, LUMBAR/SAC, 2ND LEVEL Bilateral 2018    LUMBAR FACET  [email or web address] ([email or web address]) Bilateral performed by Tona Senior MD at [address] [facility] AA&/STRD TFRML EPI LUMBAR/SACRAL 1 LEVEL Left 2018    LUMBAR TRANSFORAMINAL TFESI L5 on the LEFT performed by Tona Senior MD at [address] [facility] DX/THER SBST INTRLMNR LMBR/SAC W/IMG GDN N/A 2018    LUMBAR INTER LAMINAR GIGI LESI @L4 performed by Tona Senior MD at [address] [address] [address] History:    Social History     Tobacco Use    Smoking status: Former     Packs/day: 0.25     Types: Cigarettes     Start date:      Quit date:      Years since quittin.8    Smokeless tobacco: Never    Tobacco comments:     1 a day   Substance Use Topics    Alcohol use: Yes     Comment: occasionally, couple times a year Counseling given: Not Answered  Tobacco comments: 1 a day      Vital Signs (Current): There were no vitals filed for this visit. BP Readings from Last 3 Encounters:   09/28/22 138/78   08/11/22 131/83   06/29/22 134/80       NPO Status:                                                                                 BMI:   Wt Readings from Last 3 Encounters:   09/28/22 145 lb (65.8 kg)   08/11/22 143 lb 3.2 oz (65 kg)   06/29/22 150 lb (68 kg)     There is no height or weight on file to calculate BMI.    CBC:   Lab Results   Component Value Date/Time    WBC 5.4 08/10/2022 11:19 AM    RBC 3.82 08/10/2022 11:19 AM    HGB 10.4 08/10/2022 11:19 AM    HCT 35.5 08/10/2022 11:19 AM    MCV 92.9 08/10/2022 11:19 AM    RDW 18.6 03/24/2020 04:35 PM     08/10/2022 11:19 AM       CMP:   Lab Results   Component Value Date/Time     08/10/2022 11:19 AM    K 4.1 08/10/2022 11:19 AM    K 3.7 11/09/2021 03:45 PM     08/10/2022 11:19 AM    CO2 25 08/10/2022 11:19 AM    BUN 30 08/10/2022 11:19 AM    CREATININE 1.9 08/10/2022 11:19 AM    AGRATIO 1.7 03/24/2020 04:35 PM    LABGLOM 28 08/10/2022 11:19 AM    GLUCOSE 83 08/10/2022 11:19 AM    GLUCOSE 111 03/24/2020 04:35 PM    PROT 6.8 08/10/2022 11:19 AM    CALCIUM 10.1 08/10/2022 11:19 AM    BILITOT 0.2 08/10/2022 11:19 AM    ALKPHOS 60 08/10/2022 11:19 AM    AST 28 08/10/2022 11:19 AM    ALT 19 08/10/2022 11:19 AM       POC Tests: No results for input(s): POCGLU, POCNA, POCK, POCCL, POCBUN, POCHEMO, POCHCT in the last 72 hours.     Coags:   Lab Results   Component Value Date/Time    INR 0.97 02/21/2021 09:49 PM    APTT 33.9 02/21/2021 09:49 PM       HCG (If Applicable):   Lab Results   Component Value Date    PREGTESTUR negative 11/01/2022    PREGSERUM NEGATIVE 11/28/2015        ABGs: No results found for: PHART, PO2ART, LGK3USK, VSZ7NAF, BEART, S7JEGTML     Type & Screen (If Applicable):  Lab Results   Component Value Date [address] 02/21/2021       Drug/Infectious Status (If Applicable):  Lab Results   Component Value Date/Time    HEPCAB Negative 06/17/2019 12:22 PM       COVID-19 Screening (If Applicable):   Lab Results   Component Value Date/Time    COVID19 Not Detected 12/05/2021 11:35 AM           Anesthesia Evaluation  Patient summary reviewed and Nursing notes reviewed no history of anesthetic complications:   Airway: Mallampati: II          Dental:          Pulmonary: breath sounds clear to auscultation                             Cardiovascular:  Exercise tolerance: good (>4 METS),   (+) hypertension:,         Rhythm: regular  Rate: normal                    Neuro/Psych:   (+) neuromuscular disease:,             GI/Hepatic/Renal:             Endo/Other:    (+) DiabetesType II DM, , hypothyroidism: arthritis:., .                 Abdominal:             Vascular: Other Findings:           Anesthesia Plan      MAC     ASA 3       Induction: intravenous. Anesthetic plan and risks discussed with patient. Plan discussed with CRNA.                     Mark Smallwood DO   11/1/2022

## 2022-11-16 ENCOUNTER — NURSE ONLY (OUTPATIENT)
Dept: LAB | Age: 52
End: 2022-11-16

## 2022-11-16 DIAGNOSIS — Z51.81 MEDICATION MONITORING ENCOUNTER: ICD-10-CM

## 2022-11-16 DIAGNOSIS — M32.14 SYSTEMIC LUPUS ERYTHEMATOSUS WITH GLOMERULAR DISEASE, UNSPECIFIED SLE TYPE (HCC): ICD-10-CM

## 2022-11-16 DIAGNOSIS — R80.1 PERSISTENT PROTEINURIA: ICD-10-CM

## 2022-11-16 LAB
ALBUMIN SERPL-MCNC: 4.1 G/DL (ref 3.5–5.1)
ALP BLD-CCNC: 65 U/L (ref 38–126)
ALT SERPL-CCNC: 18 U/L (ref 11–66)
ANION GAP SERPL CALCULATED.3IONS-SCNC: 14 MEQ/L (ref 8–16)
AST SERPL-CCNC: 26 U/L (ref 5–40)
BACTERIA: ABNORMAL
BASOPHILS # BLD: 0.7 %
BASOPHILS ABSOLUTE: 0 THOU/MM3 (ref 0–0.1)
BILIRUB SERPL-MCNC: < 0.2 MG/DL (ref 0.3–1.2)
BILIRUBIN URINE: NEGATIVE
BLOOD, URINE: ABNORMAL
BUN BLDV-MCNC: 28 MG/DL (ref 7–22)
C3 COMPLEMENT: 106 MG/DL (ref 90–180)
CALCIUM SERPL-MCNC: 9.4 MG/DL (ref 8.5–10.5)
CASTS: ABNORMAL /LPF
CASTS: ABNORMAL /LPF
CHARACTER, URINE: CLEAR
CHLORIDE BLD-SCNC: 104 MEQ/L (ref 98–111)
CO2: 26 MEQ/L (ref 23–33)
COLOR: YELLOW
COMPLEMENT C4: 23 MG/DL (ref 10–40)
CREAT SERPL-MCNC: 1.8 MG/DL (ref 0.4–1.2)
CREATININE URINE: 248 MG/DL
CRYSTALS: ABNORMAL
EOSINOPHIL # BLD: 1 %
EOSINOPHILS ABSOLUTE: 0.1 THOU/MM3 (ref 0–0.4)
EPITHELIAL CELLS, UA: ABNORMAL /HPF
ERYTHROCYTE [DISTWIDTH] IN BLOOD BY AUTOMATED COUNT: 15.2 % (ref 11.5–14.5)
ERYTHROCYTE [DISTWIDTH] IN BLOOD BY AUTOMATED COUNT: 52.6 FL (ref 35–45)
GFR SERPL CREATININE-BSD FRML MDRD: 34 ML/MIN/1.73M2
GLUCOSE BLD-MCNC: 86 MG/DL (ref 70–108)
GLUCOSE, URINE: NEGATIVE MG/DL
HCT VFR BLD CALC: 33.6 % (ref 37–47)
HEMOGLOBIN: 10 GM/DL (ref 12–16)
IMMATURE GRANS (ABS): 0.02 THOU/MM3 (ref 0–0.07)
IMMATURE GRANULOCYTES: 0.3 %
KETONES, URINE: NEGATIVE
LEUKOCYTE EST, POC: ABNORMAL
LYMPHOCYTES # BLD: 23.1 %
LYMPHOCYTES ABSOLUTE: 1.3 THOU/MM3 (ref 1–4.8)
MCH RBC QN AUTO: 27.9 PG (ref 26–33)
MCHC RBC AUTO-ENTMCNC: 29.8 GM/DL (ref 32.2–35.5)
MCV RBC AUTO: 93.9 FL (ref 81–99)
MISCELLANEOUS LAB TEST RESULT: ABNORMAL
MONOCYTES # BLD: 6 %
MONOCYTES ABSOLUTE: 0.3 THOU/MM3 (ref 0.4–1.3)
NITRITE, URINE: NEGATIVE
NUCLEATED RED BLOOD CELLS: 0 /100 WBC
PH UA: 5 (ref 5–9)
PLATELET # BLD: 159 THOU/MM3 (ref 130–400)
PMV BLD AUTO: 11.5 FL (ref 9.4–12.4)
POTASSIUM SERPL-SCNC: 4.1 MEQ/L (ref 3.5–5.2)
PROT/CREAT RATIO, UR: 1.9
PROTEIN UA: 300 MG/DL
PROTEIN, URINE: 470.7 MG/DL
PTH INTACT: 43.6 PG/ML (ref 15–65)
RBC # BLD: 3.58 MILL/MM3 (ref 4.2–5.4)
RBC URINE: ABNORMAL /HPF
RENAL EPITHELIAL, UA: ABNORMAL
SEDIMENTATION RATE, ERYTHROCYTE: 21 MM/HR (ref 0–20)
SEG NEUTROPHILS: 68.9 %
SEGMENTED NEUTROPHILS ABSOLUTE COUNT: 4 THOU/MM3 (ref 1.8–7.7)
SODIUM BLD-SCNC: 144 MEQ/L (ref 135–145)
SPECIFIC GRAVITY UA: 1.02 (ref 1–1.03)
TOTAL PROTEIN: 6.7 G/DL (ref 6.1–8)
UROBILINOGEN, URINE: 0.2 EU/DL (ref 0–1)
WBC # BLD: 5.8 THOU/MM3 (ref 4.8–10.8)
WBC UA: ABNORMAL /HPF
YEAST: ABNORMAL

## 2022-11-16 NOTE — RESULT ENCOUNTER NOTE
The urine study reveals continued elevation of the kidney function test and his creatinine. You are still having an elevated protein level in your urine of around 2 g based upon your spot urine protein creatinine ratio. Would you be willing to come in to the office for evaluation on November 23 at 3 PM to discuss treatment options?

## 2022-11-17 ENCOUNTER — OFFICE VISIT (OUTPATIENT)
Dept: PHYSICAL MEDICINE AND REHAB | Age: 52
End: 2022-11-17
Payer: COMMERCIAL

## 2022-11-17 ENCOUNTER — TELEPHONE (OUTPATIENT)
Dept: PHYSICAL MEDICINE AND REHAB | Age: 52
End: 2022-11-17

## 2022-11-17 ENCOUNTER — OFFICE VISIT (OUTPATIENT)
Dept: NEPHROLOGY | Age: 52
End: 2022-11-17
Payer: COMMERCIAL

## 2022-11-17 VITALS
HEIGHT: 66 IN | SYSTOLIC BLOOD PRESSURE: 116 MMHG | BODY MASS INDEX: 22.66 KG/M2 | WEIGHT: 141 LBS | HEART RATE: 78 BPM | DIASTOLIC BLOOD PRESSURE: 68 MMHG

## 2022-11-17 VITALS
BODY MASS INDEX: 23.98 KG/M2 | WEIGHT: 148.6 LBS | OXYGEN SATURATION: 99 % | SYSTOLIC BLOOD PRESSURE: 134 MMHG | HEART RATE: 73 BPM | DIASTOLIC BLOOD PRESSURE: 86 MMHG

## 2022-11-17 DIAGNOSIS — M48.062 LUMBAR STENOSIS WITH NEUROGENIC CLAUDICATION: ICD-10-CM

## 2022-11-17 DIAGNOSIS — M47.814 SPONDYLOSIS OF THORACIC REGION WITHOUT MYELOPATHY OR RADICULOPATHY: ICD-10-CM

## 2022-11-17 DIAGNOSIS — M47.816 SPONDYLOSIS OF LUMBAR REGION WITHOUT MYELOPATHY OR RADICULOPATHY: ICD-10-CM

## 2022-11-17 DIAGNOSIS — I10 PRIMARY HYPERTENSION: ICD-10-CM

## 2022-11-17 DIAGNOSIS — M46.1 SI (SACROILIAC) JOINT INFLAMMATION (HCC): ICD-10-CM

## 2022-11-17 DIAGNOSIS — M54.16 LUMBAR RADICULOPATHY: ICD-10-CM

## 2022-11-17 DIAGNOSIS — G89.4 CHRONIC PAIN SYNDROME: ICD-10-CM

## 2022-11-17 DIAGNOSIS — D63.8 ANEMIA OF CHRONIC DISEASE: ICD-10-CM

## 2022-11-17 DIAGNOSIS — E55.9 VITAMIN D DEFICIENCY: ICD-10-CM

## 2022-11-17 DIAGNOSIS — M32.14 LUPUS NEPHRITIS (HCC): ICD-10-CM

## 2022-11-17 DIAGNOSIS — N18.32 STAGE 3B CHRONIC KIDNEY DISEASE (HCC): Primary | ICD-10-CM

## 2022-11-17 DIAGNOSIS — M54.12 CERVICAL RADICULITIS: Primary | ICD-10-CM

## 2022-11-17 DIAGNOSIS — M46.1 BILATERAL SACROILIITIS (HCC): ICD-10-CM

## 2022-11-17 DIAGNOSIS — M48.02 CERVICAL SPINAL STENOSIS: ICD-10-CM

## 2022-11-17 DIAGNOSIS — M48.061 SPINAL STENOSIS OF LUMBAR REGION WITHOUT NEUROGENIC CLAUDICATION: ICD-10-CM

## 2022-11-17 DIAGNOSIS — R80.9 PROTEINURIA, UNSPECIFIED TYPE: ICD-10-CM

## 2022-11-17 PROCEDURE — G8420 CALC BMI NORM PARAMETERS: HCPCS | Performed by: NURSE PRACTITIONER

## 2022-11-17 PROCEDURE — G8427 DOCREV CUR MEDS BY ELIG CLIN: HCPCS | Performed by: NURSE PRACTITIONER

## 2022-11-17 PROCEDURE — 3074F SYST BP LT 130 MM HG: CPT | Performed by: INTERNAL MEDICINE

## 2022-11-17 PROCEDURE — G8420 CALC BMI NORM PARAMETERS: HCPCS | Performed by: INTERNAL MEDICINE

## 2022-11-17 PROCEDURE — 3017F COLORECTAL CA SCREEN DOC REV: CPT | Performed by: NURSE PRACTITIONER

## 2022-11-17 PROCEDURE — G8484 FLU IMMUNIZE NO ADMIN: HCPCS | Performed by: INTERNAL MEDICINE

## 2022-11-17 PROCEDURE — 3078F DIAST BP <80 MM HG: CPT | Performed by: INTERNAL MEDICINE

## 2022-11-17 PROCEDURE — 1036F TOBACCO NON-USER: CPT | Performed by: NURSE PRACTITIONER

## 2022-11-17 PROCEDURE — G8427 DOCREV CUR MEDS BY ELIG CLIN: HCPCS | Performed by: INTERNAL MEDICINE

## 2022-11-17 PROCEDURE — 1036F TOBACCO NON-USER: CPT | Performed by: INTERNAL MEDICINE

## 2022-11-17 PROCEDURE — 99214 OFFICE O/P EST MOD 30 MIN: CPT | Performed by: NURSE PRACTITIONER

## 2022-11-17 PROCEDURE — G8484 FLU IMMUNIZE NO ADMIN: HCPCS | Performed by: NURSE PRACTITIONER

## 2022-11-17 PROCEDURE — 3017F COLORECTAL CA SCREEN DOC REV: CPT | Performed by: INTERNAL MEDICINE

## 2022-11-17 PROCEDURE — 99213 OFFICE O/P EST LOW 20 MIN: CPT | Performed by: INTERNAL MEDICINE

## 2022-11-17 RX ORDER — ERGOCALCIFEROL 1.25 MG/1
50000 CAPSULE ORAL WEEKLY
Qty: 12 CAPSULE | Refills: 1 | Status: SHIPPED | OUTPATIENT
Start: 2022-11-17 | End: 2022-11-17 | Stop reason: ALTCHOICE

## 2022-11-17 ASSESSMENT — ENCOUNTER SYMPTOMS
ABDOMINAL PAIN: 0
COLOR CHANGE: 0
CHEST TIGHTNESS: 0
WHEEZING: 0
VOMITING: 0
COUGH: 0
DIARRHEA: 0
SHORTNESS OF BREATH: 0
EYE PAIN: 0
PHOTOPHOBIA: 0
CONSTIPATION: 0
BACK PAIN: 1
SORE THROAT: 0
RHINORRHEA: 0
SINUS PRESSURE: 0
NAUSEA: 0

## 2022-11-17 NOTE — PROGRESS NOTES
[address] [facility] [address]  Dept: [phone]  Dept Fax: [phone]: [phone]    Visit Date: 11/17/2022    Functionality Assessment/Goals Worksheet     On a scale of 0 (Does not Interfere) to 10 (Completely Interferes)     1. Which number describes how during the past week pain has interfered with       the following:  A. General Activity:  5  B. Mood: 8  C. Walking Ability:  7  D. Normal Work (Includes both work outside the home and housework):  7  E. Relations with Other People:   8  F. Sleep:   8  G. Enjoyment of Life:   7    2. Patient Prefers to Take their Pain Medications:     [x]  On a regular basis   []  Only when necessary    []  Does not take pain medications    3. What are the Patient's Goals/Expectations for Visiting Pain Management?      []  Learn about my pain    []  Receive Medication   []  Physical Therapy     []  Treat Depression   []  Receive Injections    []  Treat Sleep   []  Deal with Anxiety and Stress   []  Treat Opoid Dependence/Addiction   []  Other:

## 2022-11-17 NOTE — PROGRESS NOTES
Renal Progress Note    Assessment and Plan:      Diagnosis Orders   1. Stage 3b chronic kidney disease (HCC)  Basic Metabolic Panel    Vitamin D 25 Hydroxy    PTH, Intact      2. Primary hypertension        3. Lupus nephritis ([facility])  Protein / creatinine ratio, urine      4. Anemia of chronic disease        5. Vitamin D deficiency        6. Proteinuria, unspecified type                  PLAN:  Lab results reviewed with the patient together in epic  She understood  Her Questions were answered   Serum creatinine is already slightly improved to 1.9 mg/dL from 1.8 mg/dL. PTH is good at 43.6  Vitamin D level is very low at 16  Urine protein creatinine ratio is 1.9 g  Medications reviewed  Continue ergocalciferol 50,000 international unit 1 capsule a week for low vitamin D level  We will add dapagliflozin 5 mg a day for proteinuria  Continue low protein diet  Return visit in 6 months with labs          Patient Active Problem List   Diagnosis    Hypothyroid    Systemic lupus erythematosus ([facility] [address]    Hematuria    Proteinuria    Anemia    CKD (chronic kidney disease), stage III ([facility])    Dyslipidemia    Hyperuricemia    Lumbar radiculitis    Spinal stenosis of lumbar region without neurogenic claudication    Renal insufficiency    Type 2 diabetes mellitus without complication, without long-term current use of insulin ([facility])    Malformation of coronary vessels    Cervical radiculopathy    Cervical spinal stenosis           Subjective:   Chief complaint:  Chief Complaint   Patient presents with    Chronic Kidney Disease     Stage IIIb      HPI:This is a follow up visit for MsQuinn Michelle Buenrostro who is here today for return appointment. I see her for chronic kidney disease and lupus nephritis. She was last seen about 6 months ago. Doing well with no new issues of concern today. .  She is now taking ergocalciferol 50,000  1 capsule a week according to her. ROS:  Pertinent positives stated above in HPI.  All other systems were reviewed and were negative. Medications:     Current Outpatient Medications   Medication Sig Dispense Refill    HYDROcodone-acetaminophen (NORCO) 5-325 MG per tablet Take 1 tablet by mouth every 8 hours as needed for Pain for up to 30 days. 90 tablet 0    vitamin D (ERGOCALCIFEROL) 1.25 MG (72006 UT) CAPS capsule Take 1 capsule by mouth once a week 12 capsule 1    mycophenolate (CELLCEPT) 500 MG tablet Take 2 tablets by mouth every morning (before breakfast) AND 2 tablets every evening. 120 tablet 0    predniSONE (DELTASONE) 2.5 MG tablet Take 3 tablets by mouth daily 90 tablet 1    hydroxychloroquine (PLAQUENIL) 200 MG tablet Take 1 tablet by mouth 2 times daily 60 tablet 5    levothyroxine (SYNTHROID) 112 MCG tablet take 1 tablet by mouth once daily 90 tablet 1    acetaminophen (APAP EXTRA STRENGTH) 500 MG tablet Take 2 tablets by mouth every 6 hours as needed for Pain 120 tablet 3    ferrous sulfate (IRON 325) 325 (65 Fe) MG tablet Take 1 tablet by mouth daily (with breakfast) 90 tablet 3    ondansetron (ZOFRAN) 4 MG tablet Take 1 tablet by mouth 3 times daily as needed for Nausea or Vomiting 30 tablet 0    simvastatin (ZOCOR) 40 MG tablet take 1 tablet by mouth at bedtime 90 tablet 3    gabapentin (NEURONTIN) 400 MG capsule take 1 capsule by mouth twice a day 180 capsule 1    Belimumab (BENLYSTA) 200 MG/ML SOAJ Inject 200 mg into the skin every 28 days 3.92 mL 2    loratadine (CLARITIN) 10 MG tablet take 1 tablet by mouth once daily 90 tablet 3    Calcium Carb-Cholecalciferol (OYSTER SHELL CALCIUM W/D) 500-200 MG-UNIT TABS tablet take 1 tablet by mouth once daily 90 tablet 3    omeprazole (PRILOSEC) 20 MG delayed release capsule take 1 capsule by mouth once daily 30 capsule 3    albuterol sulfate HFA (PROVENTIL HFA) 108 (90 Base) MCG/ACT inhaler Inhale 2 puffs into the lungs every 6 hours as needed for Wheezing 18 g 3    silver sulfADIAZINE (SILVADENE) 1 % cream Apply topically daily.  50 g 1    permethrin (ELIMITE) 5 % cream Apply to wet hair and leave on 10 minutes and then rinse, may repeat on day 9 3 Tube 1    nitroGLYCERIN (NITROSTAT) 0.4 MG SL tablet Place 1 tablet under the tongue every 5 minutes as needed for Chest pain up to max of 3 total doses. If no relief after 1 dose, call 911. 25 tablet 0     No current facility-administered medications for this visit.        Lab Results:    CBC:   Lab Results   Component Value Date    WBC 5.8 11/16/2022    HGB 10.0 (L) 11/16/2022    HCT 33.6 (L) 11/16/2022    MCV 93.9 11/16/2022     11/16/2022     BMP:    Lab Results   Component Value Date     11/16/2022     08/10/2022     04/25/2022    K 4.1 11/16/2022    K 4.1 08/10/2022    K 4.1 04/25/2022     11/16/2022     08/10/2022     04/25/2022    CO2 26 11/16/2022    CO2 25 08/10/2022    CO2 25 04/25/2022    BUN 28 (H) 11/16/2022    BUN 30 (H) 08/10/2022    BUN 21 04/25/2022    CREATININE 1.8 (H) 11/16/2022    CREATININE 1.9 (H) 08/10/2022    CREATININE 1.6 (H) 04/25/2022    GLUCOSE 86 11/16/2022    GLUCOSE 83 08/10/2022    GLUCOSE 87 04/25/2022      Hepatic:   Lab Results   Component Value Date    AST 26 11/16/2022    AST 28 08/10/2022    AST 17 04/25/2022    ALT 18 11/16/2022    ALT 19 08/10/2022    ALT 9 (L) 04/25/2022    BILITOT <0.2 (L) 11/16/2022    BILITOT 0.2 (L) 08/10/2022    BILITOT <0.2 (L) 04/25/2022    ALKPHOS 65 11/16/2022    ALKPHOS 60 08/10/2022    ALKPHOS 70 04/25/2022     BNP: No results found for: BNP  Lipids:   Lab Results   Component Value Date    CHOL 163 10/22/2019    HDL 36 10/22/2019     INR:   Lab Results   Component Value Date    INR 0.97 02/21/2021    INR 1.01 01/15/2021     URINE:   Lab Results   Component Value Date/Time    PROTUR 470.7 11/16/2022 11:37 AM     Lab Results   Component Value Date/Time    NITRU NEGATIVE 11/16/2022 11:37 AM    COLORU YELLOW 11/16/2022 11:37 AM    PHUR 5.0 11/16/2022 11:37 AM    LABCAST 8-15 HYALINE 11/16/2022 11:37 AM LABCAST NONE SEEN 11/16/2022 11:37 AM    WBCUA 5-9 11/16/2022 11:37 AM    WBCUA 0-5 12/17/2019 03:35 PM    RBCUA 0-2 11/16/2022 11:37 AM    MUCUS Present 12/17/2019 03:35 PM    YEAST NONE SEEN 11/16/2022 11:37 AM    BACTERIA NONE SEEN 11/16/2022 11:37 AM    CLARITYU slightly cloudy 08/30/2017 11:53 AM    SPECGRAV 1.023 11/16/2022 11:37 AM    LEUKOCYTESUR SMALL 11/16/2022 11:37 AM    LEUKOCYTESUR TRACE 06/17/2019 12:03 PM    UROBILINOGEN 0.2 11/16/2022 11:37 AM    BILIRUBINUR NEGATIVE 11/16/2022 11:37 AM    BILIRUBINUR small 08/30/2017 11:53 AM    BILIRUBINUR Negative 10/18/2016 12:00 AM    BLOODU TRACE 11/16/2022 11:37 AM    GLUCOSEU Negative 12/17/2019 03:35 PM    KETUA NEGATIVE 11/16/2022 11:37 AM      Microalbumen/Creatinine ratio:  No components found for: RUCREAT    Objective:   Vitals: /86 (Site: Left Upper Arm, Position: Sitting, Cuff Size: Medium Adult)   Pulse 73   Wt 148 lb 9.6 oz (67.4 kg)   LMP  (LMP Unknown)   SpO2 99%   BMI 23.98 kg/m²      Constitutional:  Alert, awake, no apparent distress  Skin:normal with no rash or any significant lesions  HEENT:Pupils are reactive . Throat is clear. Oral mucosa is moist.  Neck:supple with no thyromegaly, JVD, lymphadenopathy or bruit   Cardiovascular: Regular sinus rhythm without murmur, rubs or gallops   Respiratory:  Clear to auscultation with no wheezes or rales  Abdomen: Good bowel sound, soft, non tender and no bruit  Ext: No LE edema  Musculoskeletal:Intact  Neuro:Alert, awake and oriented with no obvious focal deficit. Speech is normal.    Electronically signed by Joana Hernandez MD on 11/17/2022 at 3:40 PM   **This report has been created using voice recognition software. It maycontain minor  errors which are inherent in voice recognition technology. **    -

## 2022-11-17 NOTE — PROGRESS NOTES
HPI:   Anna Maier is a 50 y.o. female is here today for    Chief Complaint: Lumbar back pain, Thoracic back pain , cervical pain , Headaches, Hip pain, and SI pain      F/U ELLIOT C6 11/1/2022 helped a few days only overall. She continues to have neck pain   BUE throbbing pain C5-6 distribution bilaterally she does have  relief for her headaches. Her neck pain is low until she turns head or neck and looks up and down. Then increases the pain with any movement. Her BUE  throbbing pain increases with raising arms pushing pulling lifting  and when dependent. She has BUE weakness and weak grasp. She continues to have low back SI pain mid thoracic pain also. Still has not went to [facility] per Dr Jaimee Acuna referred her for workup. She had heart cath Feb 2021. She remains off work due to cardiac issues. She is unable to tolerate PT      She follows Rheumatology and recently started [facility]      Her neck lumbar SI   Pain increases with bending, lifting, twisting , reaching, pushing, pulling, turning head, walking, standing, raising arms, stairs and getting up and down. Treatments Tried PT, ice, heat, Chiropractor, NSAIDS, narcotics, muscle relaxer, OTC rubs creams patches, steroid burst and injections  Pain Description sharp, shooting, stabbing, burning, pressure, throbbing, aching, numbness, tingling and pins and needles    She denies any ER visits or new health issues since last visit. Pain scale with out pain medications or at its worst is 7/10. Pain scale with pain medications or at its best is 4/10. Last dose of [place]  was today  Drug screen reviewed from 9/2022 and was appropriate  Pill count completed  today and WNL: Yes         Prior Injections:       TFLESI L5 left in 2018 with 50% pain relief 18 months. EMG  Showed C5-6 Bilateral cervical radiculopathy . Radiology:  MRI cervical   :               There is 3 mm of anterolisthesis of C7 on T1.  There is loss of disc height and there is degenerative marrow edema in the endplates at the A6-9 level. This is also present at the C6-7 level. No suspicious osseous lesions are present. The facet joints are    normally aligned. The cervical spinal cord is of normal caliber and signal intensity. The visualized aspects of the posterior fossa are normal.       On the axial images, at C2-C3, there is a small posterior disc osteophyte complex. There are bilateral facet degenerative changes. There is no spinal canal stenosis. There is mild right foraminal stenosis. At C3-C4, there is a posterior disc osteophyte complex. There are bilateral facet degenerative changes. There is moderate severity spinal canal stenosis. There is moderate to severe bilateral foraminal stenosis. At C4-C5, there is a small posterior disc osteophyte complex. There are facet degenerative changes. There is mild spinal canal stenosis. There is no foraminal stenosis. At C5-C6, there is a small posterior disc osteophyte complex. There is mild spinal canal stenosis. There is no foraminal stenosis. At C6-C7, there is a small posterior disc osteophyte complex. There is mild spinal canal stenosis and bilateral foraminal stenosis. At C7-T1, there are facet degenerative changes. There is uncovering of the disc. There is mild spinal canal stenosis. There is mild foraminal stenosis. There are no suspicious findings in the cervical soft tissues. Impression       1. Moderate severity spinal canal stenosis with moderate to severe bilateral foraminal stenosis at the C3-4 level due to degenerative changes. 2. Mild spinal canal stenosis and/or foraminal stenosis at other levels as described above. EMG                    The patientis allergic to codeine. Subjective:      Review of Systems   Constitutional:  Negative for activity change, appetite change, chills, diaphoresis, fatigue, fever and unexpected weight change.    HENT: Positive for dental problem. Negative for congestion, ear pain, hearing loss, mouth sores, nosebleeds, rhinorrhea, sinus pressure and sore throat. Dental infection recently   Eyes:  Negative for photophobia, pain and visual disturbance. Respiratory:  Negative for cough, chest tightness, shortness of breath and wheezing. COVID     Cardiovascular:  Negative for chest pain and palpitations. HTN CAD  Recent abnormal heart cath, pending [facility] referral for possible surgery   Gastrointestinal:  Negative for abdominal pain, constipation, diarrhea, nausea and vomiting. Endocrine: Negative for cold intolerance, heat intolerance, polydipsia, polyphagia and polyuria. Thyroid issues   Genitourinary:  Negative for decreased urine volume, difficulty urinating, frequency and hematuria. CKD   Musculoskeletal:  Positive for arthralgias, back pain, joint swelling, myalgias, neck pain and neck stiffness. Negative for gait problem. Skin:  Negative for color change and rash. Hot water burn  8/31/2020 . healed   Allergic/Immunologic: Negative for food allergies and immunocompromised state. Neurological:  Positive for dizziness, weakness, numbness and headaches. Negative for tremors, seizures, syncope, facial asymmetry, speech difficulty and light-headedness. H/o seizures,no seizures in 16 years. H/O Lupus   Hematological:  Does not bruise/bleed easily. Psychiatric/Behavioral:  Negative for agitation, behavioral problems, confusion, decreased concentration, dysphoric mood, hallucinations, self-injury, sleep disturbance and suicidal ideas. The patient is not nervous/anxious and is not hyperactive. Objective:     Vitals:    11/17/22 1418   BP: 116/68   Site: Left Upper Arm   Position: Sitting   Cuff Size: Medium Adult   Pulse: 78   Weight: 141 lb (64 kg)   Height: 5' 6\" (1.676 m)       Physical Exam  Vitals and nursing note reviewed.    Constitutional:       General: She is not in acute distress. Appearance: She is well-developed. She is not diaphoretic. HENT:      Head: Normocephalic and atraumatic. Right Ear: External ear normal.      Left Ear: External ear normal.      Nose: Nose normal.      Mouth/Throat:      Pharynx: No oropharyngeal exudate. Eyes:      General: No scleral icterus. Right eye: No discharge. Left eye: No discharge. Conjunctiva/sclera: Conjunctivae normal.      Pupils: Pupils are equal, round, and reactive to light. Neck:      Thyroid: No thyromegaly. Cardiovascular:      Rate and Rhythm: Normal rate and regular rhythm. Heart sounds: Normal heart sounds. No murmur heard. No friction rub. No gallop. Pulmonary:      Effort: Pulmonary effort is normal. No respiratory distress. Breath sounds: Normal breath sounds. No wheezing or rales. Chest:      Chest wall: No tenderness. Abdominal:      General: Bowel sounds are normal. There is no distension. Palpations: Abdomen is soft. Tenderness: There is no abdominal tenderness. There is no guarding or rebound. Musculoskeletal:         General: Tenderness present. Right shoulder: Tenderness present. Decreased range of motion. Decreased strength. Left shoulder: Tenderness present. Right upper arm: Tenderness present. Right forearm: Tenderness present. Left forearm: Tenderness present. Right wrist: Tenderness and bony tenderness present. Left wrist: Tenderness and bony tenderness present. Right hand: Tenderness and bony tenderness present. Decreased range of motion. Decreased strength. Decreased sensation. Left hand: Tenderness and bony tenderness present. Decreased range of motion. Decreased strength. Decreased sensation. Cervical back: Neck supple. Spasms, tenderness and bony tenderness present. No edema, erythema or rigidity. Pain with movement and muscular tenderness present. Decreased range of motion. Thoracic back: Spasms, tenderness and bony tenderness present. Decreased range of motion. Lumbar back: Spasms, tenderness and bony tenderness present. Decreased range of motion. Back:       Right hip: Tenderness present. Left hip: Normal.      Right upper leg: Tenderness present. Right knee: Normal.      Left knee: Normal.      Right ankle: Tenderness present. Right foot: Decreased range of motion. Swelling, tenderness and bony tenderness present. Comments: 2 right broken toes has walker shoe on    Skin:     General: Skin is warm. Coloration: Skin is not pale. Findings: No erythema or rash. Neurological:      Mental Status: She is alert and oriented to person, place, and time. She is not disoriented. Cranial Nerves: No cranial nerve deficit. Sensory: Sensation is intact. No sensory deficit. Motor: Motor function is intact. No atrophy or abnormal muscle tone. Coordination: Coordination is intact. Coordination normal.      Gait: Gait abnormal.      Deep Tendon Reflexes: Babinski sign absent on the right side. Reflex Scores:       Tricep reflexes are 2+ on the right side and 2+ on the left side. Bicep reflexes are 2+ on the right side and 2+ on the left side. Brachioradialis reflexes are 2+ on the right side and 2+ on the left side. Patellar reflexes are 2+ on the right side and 2+ on the left side. Achilles reflexes are 2+ on the right side and 2+ on the left side. Comments: Motor  4/5 RUE  5/5 BLE   Psychiatric:         Attention and Perception: Attention and perception normal. She is attentive. Mood and Affect: Mood and affect normal. Mood is not anxious or depressed. Affect is not labile, blunt, angry or inappropriate. Speech: Speech normal. She is communicative.  Speech is not rapid and pressured, delayed, slurred or tangential.         Behavior: Behavior normal. Behavior is not agitated, slowed, aggressive, withdrawn, hyperactive or combative. Behavior is cooperative. Thought Content: Thought content normal. Thought content is not paranoid or delusional. Thought content does not include homicidal or suicidal ideation. Thought content does not include homicidal or suicidal plan. Cognition and Memory: Cognition and memory normal. Memory is not impaired. She does not exhibit impaired recent memory or impaired remote memory. Judgment: Judgment normal. Judgment is not impulsive or inappropriate. VONDA  Patricks test  positive  Yeoman's  or Gaenslen's positive  Kemps  positive  Spurlings  positive  Todd's na         Assessment:     1. Cervical radiculitis    2. Cervical spinal stenosis    3. Spondylosis of lumbar region without myelopathy or radiculopathy    4. Spinal stenosis of lumbar region without neurogenic claudication    5. SI (sacroiliac) joint inflammation (HCC)    6. Spondylosis of thoracic region without myelopathy or radiculopathy    7. Bilateral sacroiliitis ([address]    8. Chronic pain syndrome    9. Lumbar stenosis with neurogenic claudication    10. Lumbar radiculopathy            Plan:      OARRS reviewed. Current MED: 15  Patient was not offered naloxone for home. Testing Labs or Radiology reviewed: Cervical  EMG   Procedures:TFCESI C5-6 bilaterally to help BUE radicular s/s C5-6 distribution and per EMG   Discussed with patient about risks with procedure including infection, reaction to medication, increased pain, or bleeding. Medications:Norco Neurontin   If patient is on blood thinners will need approval to hold yes or no:N/A  Does patient have implanted devices? Stimulators, AICD or Pacemaker:N/A      Meds. Prescribed:   No orders of the defined types were placed in this encounter. Return for TFCESI @C5-6 Bilateral , Follow up after procedure.                Electronically signed by MACKENZIE Rutledge CNP on11/17/2022 at 2:36 PM

## 2022-11-21 DIAGNOSIS — G89.4 CHRONIC PAIN SYNDROME: ICD-10-CM

## 2022-11-21 NOTE — TELEPHONE ENCOUNTER
OARRS reviewed. UDS: + for  Hydrocodone. Last seen: 11/17/2022.  Follow-up:   Future Appointments   Date Time Provider Justus Burciagai   1/18/2023 12:15 PM Cassie Wong MD N [facility] - [place]   2/6/2023  4:00 PM MACKENZIE Tobin - CNP N [facility] [place]   5/18/2023  3:20 PM Conrad Heredia MD N [facility] - [place]

## 2022-11-21 NOTE — TELEPHONE ENCOUNTER
Michell Beck called requesting a refill on the following medications:  Requested Prescriptions     Pending Prescriptions Disp Refills    HYDROcodone-acetaminophen (NORCO) 5-325 MG per tablet 90 tablet 0     Sig: Take 1 tablet by mouth every 8 hours as needed for Pain for up to 30 days. [facility]      Date of last visit: 11/17/2022  Date of next visit (if applicable): 7/3/0170

## 2022-11-22 ENCOUNTER — TELEPHONE (OUTPATIENT)
Dept: RHEUMATOLOGY | Age: 52
End: 2022-11-22

## 2022-11-22 RX ORDER — HYDROCODONE BITARTRATE AND ACETAMINOPHEN 5; 325 MG/1; MG/1
1 TABLET ORAL EVERY 8 HOURS PRN
Qty: 90 TABLET | Refills: 0 | Status: SHIPPED | OUTPATIENT
Start: 2022-11-26 | End: 2022-12-26

## 2022-11-22 NOTE — PROGRESS NOTES
Pt informed and states that she is babysitting her grand kids right now and the mother doesn't get off work until noon.

## 2022-12-20 DIAGNOSIS — G89.4 CHRONIC PAIN SYNDROME: ICD-10-CM

## 2022-12-20 RX ORDER — HYDROCODONE BITARTRATE AND ACETAMINOPHEN 5; 325 MG/1; MG/1
1 TABLET ORAL EVERY 8 HOURS PRN
Qty: 90 TABLET | Refills: 0 | Status: SHIPPED | OUTPATIENT
Start: 2022-12-26 | End: 2023-01-25

## 2022-12-20 NOTE — TELEPHONE ENCOUNTER
OARRS reviewed. UDS:Hydrocodone, Gabapentin  Last seen: 11/17/2022.  Follow-up:   Future Appointments   Date Time Provider Justus Dumont   1/18/2023 12:15 PM Chon Cutler MD N [facility]   2/6/2023  4:00 PM MACKENZIE Nogueira CNP N [facility]   5/18/2023  3:20 PM Conrad Munroe MD N [facility]

## 2022-12-20 NOTE — TELEPHONE ENCOUNTER
Beatris Cheng called requesting a refill on the following medications:  Requested Prescriptions     Pending Prescriptions Disp Refills    HYDROcodone-acetaminophen (NORCO) 5-325 MG per tablet 90 tablet 0     Sig: Take 1 tablet by mouth every 8 hours as needed for Pain for up to 30 days. [facility]      Date of last visit: 11/17/22  Date of next visit (if applicable): 5/1/8550

## 2022-12-21 DIAGNOSIS — R10.9 ABDOMINAL PAIN, UNSPECIFIED ABDOMINAL LOCATION: ICD-10-CM

## 2022-12-21 RX ORDER — ONDANSETRON 4 MG/1
TABLET, FILM COATED ORAL
Qty: 30 TABLET | Refills: 0 | Status: SHIPPED | OUTPATIENT
Start: 2022-12-21

## 2022-12-22 RX ORDER — OMEPRAZOLE 20 MG/1
CAPSULE, DELAYED RELEASE ORAL
Qty: 30 CAPSULE | Refills: 3 | Status: SHIPPED | OUTPATIENT
Start: 2022-12-22

## 2022-12-28 ENCOUNTER — TELEPHONE (OUTPATIENT)
Dept: PHYSICAL MEDICINE AND REHAB | Age: 52
End: 2022-12-28

## 2022-12-28 DIAGNOSIS — M47.816 SPONDYLOSIS OF LUMBAR REGION WITHOUT MYELOPATHY OR RADICULOPATHY: ICD-10-CM

## 2022-12-28 DIAGNOSIS — M47.814 SPONDYLOSIS OF THORACIC REGION WITHOUT MYELOPATHY OR RADICULOPATHY: ICD-10-CM

## 2022-12-28 DIAGNOSIS — M54.12 CERVICAL RADICULITIS: ICD-10-CM

## 2022-12-28 DIAGNOSIS — M48.061 SPINAL STENOSIS OF LUMBAR REGION WITHOUT NEUROGENIC CLAUDICATION: ICD-10-CM

## 2022-12-28 DIAGNOSIS — M48.062 LUMBAR STENOSIS WITH NEUROGENIC CLAUDICATION: ICD-10-CM

## 2022-12-28 DIAGNOSIS — M48.02 CERVICAL SPINAL STENOSIS: ICD-10-CM

## 2022-12-28 DIAGNOSIS — M54.16 LUMBAR RADICULITIS: ICD-10-CM

## 2022-12-28 DIAGNOSIS — G89.4 CHRONIC PAIN SYNDROME: Primary | ICD-10-CM

## 2022-12-28 DIAGNOSIS — M54.16 LUMBAR RADICULOPATHY: ICD-10-CM

## 2022-12-28 DIAGNOSIS — M46.1 SI (SACROILIAC) JOINT INFLAMMATION (HCC): ICD-10-CM

## 2022-12-28 DIAGNOSIS — M46.1 BILATERAL SACROILIITIS (HCC): ICD-10-CM

## 2022-12-28 NOTE — TELEPHONE ENCOUNTER
Cheikh procedure denial received. Scanned into media. Denied due to the need for 6 weeks of PT and documentation of 3 or more weeks of NSAID use. Pt. Arlene Bullock and informed. Procedure cancelled. Agreeable to Physical Therapy at [facility]. [facility]. Follow up kept for evaluation. OK to order PT?  Thanks

## 2023-01-03 NOTE — TELEPHONE ENCOUNTER
Pt.called and LVM that has decided not to do therapy and states when did in past it did not help. Recalled and LVM reviewing why needed therapy so insurance will cover injection.

## 2023-01-03 NOTE — TELEPHONE ENCOUNTER
Recalled pt. And again reviewed why needs therapy to help with pain control and use of injections. . Pt. Still insists will not do and will discuss further at aptm. In March.

## 2023-01-06 ENCOUNTER — TELEPHONE (OUTPATIENT)
Dept: FAMILY MEDICINE CLINIC | Age: 53
End: 2023-01-06

## 2023-01-06 NOTE — TELEPHONE ENCOUNTER
Let pt know it has been over a year since she has been seen by me in the office, she is due for a visit so we can perform a foot exam and check her a1C Thanks

## 2023-01-17 ENCOUNTER — TELEPHONE (OUTPATIENT)
Dept: RHEUMATOLOGY | Age: 53
End: 2023-01-17

## 2023-01-17 PROBLEM — N18.4 CKD (CHRONIC KIDNEY DISEASE), STAGE IV (HCC): Status: ACTIVE | Noted: 2023-01-17

## 2023-01-17 NOTE — TELEPHONE ENCOUNTER
EWELINAI: Bradley Kc wanted Dr Hannah Acosta to be aware that ever since she has been sick, her arm is painful and throbbing, she felt a bump by her elbow. She doesn't want to come in due to her coughing, and congestion.

## 2023-01-17 NOTE — TELEPHONE ENCOUNTER
Where is pain located? Pt phoned in with some pain in her Rt arm and a knot on the Rt elbow. Pt stated when she  objects or tries to pick her grandson up, the pain intensifies  When did the pain start? 1 month  Rate the pain 1-10. Ten being the worst  7  Describe the pain. (Dull, Aching, Stabbing, radiating, etc)  constant  Has this pain occurred in the past?  Yes, but it has intensified. What have you used to alleviate this pain? She has been taking 3 tablets of gabapentin a day. What aggravates it? Movement and with resting the pain comes and goes  Joint swelling or redness? Swelling in the elbow and it travels to the hand which results to numbness.

## 2023-01-19 ENCOUNTER — NURSE ONLY (OUTPATIENT)
Dept: LAB | Age: 53
End: 2023-01-19

## 2023-01-19 ENCOUNTER — OFFICE VISIT (OUTPATIENT)
Dept: RHEUMATOLOGY | Age: 53
End: 2023-01-19
Payer: COMMERCIAL

## 2023-01-19 VITALS
OXYGEN SATURATION: 97 % | WEIGHT: 147 LBS | BODY MASS INDEX: 23.63 KG/M2 | SYSTOLIC BLOOD PRESSURE: 122 MMHG | DIASTOLIC BLOOD PRESSURE: 82 MMHG | HEART RATE: 82 BPM | HEIGHT: 66 IN

## 2023-01-19 DIAGNOSIS — J06.9 UPPER RESPIRATORY TRACT INFECTION, UNSPECIFIED TYPE: ICD-10-CM

## 2023-01-19 DIAGNOSIS — M32.14 SYSTEMIC LUPUS ERYTHEMATOSUS WITH GLOMERULAR DISEASE, UNSPECIFIED SLE TYPE (HCC): Primary | ICD-10-CM

## 2023-01-19 DIAGNOSIS — Z51.81 MEDICATION MONITORING ENCOUNTER: ICD-10-CM

## 2023-01-19 DIAGNOSIS — I31.39 PERICARDIAL EFFUSION: ICD-10-CM

## 2023-01-19 DIAGNOSIS — R09.89 RESPIRATORY CRACKLES: ICD-10-CM

## 2023-01-19 DIAGNOSIS — M32.14 SYSTEMIC LUPUS ERYTHEMATOSUS WITH GLOMERULAR DISEASE, UNSPECIFIED SLE TYPE (HCC): ICD-10-CM

## 2023-01-19 DIAGNOSIS — G56.23 CUBITAL TUNNEL SYNDROME, BILATERAL: ICD-10-CM

## 2023-01-19 DIAGNOSIS — R80.1 PERSISTENT PROTEINURIA: ICD-10-CM

## 2023-01-19 LAB
ALBUMIN SERPL BCG-MCNC: 3.9 G/DL (ref 3.5–5.1)
ALP SERPL-CCNC: 64 U/L (ref 38–126)
ALT SERPL W/O P-5'-P-CCNC: 36 U/L (ref 11–66)
ANION GAP SERPL CALC-SCNC: 11 MEQ/L (ref 8–16)
AST SERPL-CCNC: 50 U/L (ref 5–40)
BACTERIA: ABNORMAL
BASOPHILS ABSOLUTE: 0 THOU/MM3 (ref 0–0.1)
BASOPHILS NFR BLD AUTO: 0.4 %
BILIRUB SERPL-MCNC: < 0.2 MG/DL (ref 0.3–1.2)
BILIRUB UR QL STRIP: NEGATIVE
BUN SERPL-MCNC: 24 MG/DL (ref 7–22)
CALCIUM SERPL-MCNC: 8.7 MG/DL (ref 8.5–10.5)
CASTS #/AREA URNS LPF: ABNORMAL /LPF
CASTS #/AREA URNS LPF: ABNORMAL /LPF
CHARACTER UR: CLEAR
CHARCOAL URNS QL MICRO: ABNORMAL
CHLORIDE SERPL-SCNC: 104 MEQ/L (ref 98–111)
CO2 SERPL-SCNC: 25 MEQ/L (ref 23–33)
COLOR UR: YELLOW
CREAT SERPL-MCNC: 1.9 MG/DL (ref 0.4–1.2)
CREAT UR-MCNC: 205.4 MG/DL
CRYSTALS URNS QL MICRO: ABNORMAL
DEPRECATED RDW RBC AUTO: 54.9 FL (ref 35–45)
EOSINOPHIL NFR BLD AUTO: 1.1 %
EOSINOPHILS ABSOLUTE: 0.1 THOU/MM3 (ref 0–0.4)
EPITHELIAL CELLS, UA: ABNORMAL /HPF
ERYTHROCYTE [DISTWIDTH] IN BLOOD BY AUTOMATED COUNT: 16.1 % (ref 11.5–14.5)
ERYTHROCYTE [SEDIMENTATION RATE] IN BLOOD BY WESTERGREN METHOD: 57 MM/HR (ref 0–20)
GFR SERPL CREATININE-BSD FRML MDRD: 32 ML/MIN/1.73M2
GLUCOSE SERPL-MCNC: 87 MG/DL (ref 70–108)
GLUCOSE UR QL STRIP.AUTO: NEGATIVE MG/DL
HCT VFR BLD AUTO: 34.3 % (ref 37–47)
HGB BLD-MCNC: 10.2 GM/DL (ref 12–16)
HGB UR QL STRIP.AUTO: ABNORMAL
IMM GRANULOCYTES # BLD AUTO: 0.01 THOU/MM3 (ref 0–0.07)
IMM GRANULOCYTES NFR BLD AUTO: 0.2 %
KETONES UR QL STRIP.AUTO: NEGATIVE
LEUKOCYTE ESTERASE UR QL STRIP.AUTO: ABNORMAL
LYMPHOCYTES ABSOLUTE: 1.1 THOU/MM3 (ref 1–4.8)
LYMPHOCYTES NFR BLD AUTO: 20.1 %
MCH RBC QN AUTO: 27.6 PG (ref 26–33)
MCHC RBC AUTO-ENTMCNC: 29.7 GM/DL (ref 32.2–35.5)
MCV RBC AUTO: 92.7 FL (ref 81–99)
MONOCYTES ABSOLUTE: 0.3 THOU/MM3 (ref 0.4–1.3)
MONOCYTES NFR BLD AUTO: 5.5 %
NEUTROPHILS NFR BLD AUTO: 72.7 %
NITRITE UR QL STRIP.AUTO: NEGATIVE
NRBC BLD AUTO-RTO: 0 /100 WBC
PH UR STRIP.AUTO: 5.5 [PH] (ref 5–9)
PLATELET # BLD AUTO: 139 THOU/MM3 (ref 130–400)
PMV BLD AUTO: 12.6 FL (ref 9.4–12.4)
POTASSIUM SERPL-SCNC: 4.6 MEQ/L (ref 3.5–5.2)
PROT SERPL-MCNC: 7.1 G/DL (ref 6.1–8)
PROT UR STRIP.AUTO-MCNC: >= 300 MG/DL
PROT UR-MCNC: 467.1 MG/DL
PROT/CREAT 24H UR: 2.27 MG/G{CREAT}
RBC # BLD AUTO: 3.7 MILL/MM3 (ref 4.2–5.4)
RBC #/AREA URNS HPF: ABNORMAL /HPF
RENAL EPI CELLS #/AREA URNS HPF: ABNORMAL /[HPF]
SEGMENTED NEUTROPHILS ABSOLUTE COUNT: 4.1 THOU/MM3 (ref 1.8–7.7)
SODIUM SERPL-SCNC: 140 MEQ/L (ref 135–145)
SP GR UR REFRACT.AUTO: 1.02 (ref 1–1.03)
UROBILINOGEN UR QL STRIP.AUTO: 0.2 EU/DL (ref 0–1)
WBC # BLD AUTO: 5.7 THOU/MM3 (ref 4.8–10.8)
WBC #/AREA URNS HPF: ABNORMAL /HPF
YEAST LIKE FUNGI URNS QL MICRO: ABNORMAL

## 2023-01-19 PROCEDURE — 1036F TOBACCO NON-USER: CPT | Performed by: INTERNAL MEDICINE

## 2023-01-19 PROCEDURE — G8420 CALC BMI NORM PARAMETERS: HCPCS | Performed by: INTERNAL MEDICINE

## 2023-01-19 PROCEDURE — 99215 OFFICE O/P EST HI 40 MIN: CPT | Performed by: INTERNAL MEDICINE

## 2023-01-19 PROCEDURE — 3017F COLORECTAL CA SCREEN DOC REV: CPT | Performed by: INTERNAL MEDICINE

## 2023-01-19 PROCEDURE — G8427 DOCREV CUR MEDS BY ELIG CLIN: HCPCS | Performed by: INTERNAL MEDICINE

## 2023-01-19 PROCEDURE — G8484 FLU IMMUNIZE NO ADMIN: HCPCS | Performed by: INTERNAL MEDICINE

## 2023-01-19 RX ORDER — PREDNISONE 10 MG/1
TABLET ORAL
Qty: 30 TABLET | Refills: 0 | Status: SHIPPED | OUTPATIENT
Start: 2023-01-19 | End: 2023-02-03

## 2023-01-19 ASSESSMENT — ENCOUNTER SYMPTOMS
VOMITING: 0
SHORTNESS OF BREATH: 1
DIARRHEA: 0
EYE REDNESS: 0
EYES NEGATIVE: 1
NAUSEA: 0
EYE PAIN: 0
COUGH: 1
WHEEZING: 0
CONSTIPATION: 0

## 2023-01-19 NOTE — PROGRESS NOTES
[facility] RHEUMATOLOGY FOLLOW UP NOTE     Date Of Service: 1/19/2023  Provider: Griselda Friend, DO ,   PCP: MACKENZIE Hernandez - CNP   Name: Marta Foster   MRN: [id number]    ASSESSMENT/PLAN:     SLE w/ h/o class IV LN- --active inflammatory arthritis (improved) , proteinuria. - diagnosed in 2001, s/p cytoxan 2007. C4 was 64, 4+ proteinuria, but 24 urine w/ only 1.1 g. 20-29 RBC/HPF. + BELLE, dsDNA, ANCA (+p-ANCA, neg MPO, NM-3), reported inflammatory arthritis. Exam w/ joint swelling, oral/nasal sores. -- Pharmacy was called. Unfortunately [facility] last refill the mycophenolate, Plaquenil, prednisone July 2022. [facility] last refilled mycophenolate July 2021.,  Plaquenil January 14, 2022 prior to that August 2021. We discussed the importance of medication compliance and that noncompliance with medication can result in significant and irreversible damage such as end-stage renal disease.               - Plaquenil 200 mg BID               - Mycophenolate 1000 mg BID               - Prednisone 7.5 mg daily --after prednisone taper               - benlysta 200mg subcu weekly (march 2022)     - awaiting f/u labs     -- declined change to cytoxan       URI x 1 mo w/ chronic cough   Rhonchi/rales   - cxr ordered --differential includes infection related, in addition related versus other   - take Azathioprine and tessolons    CKD:   Proteinura  -worsening proteinuria January 2023 evaluation. Proteinuria increasing from 1.2-1.9    Right elbow pain: Suspected inflammatory arthritis related to #1. Treatment as above    Vitamin D def - taking vitamin D3  5000 IU daily--- repeat vitamin D level @ 13 ng/dl on 4/25/2022 evaluation      Medication monitoring  --- CBC, CMP, sed rate, CRP, C3, C4, u/a q 4 weeks x 3 b/c worsening proteinura, anemia                - plaquenil eye exam (2020)         No follow-ups on file.   New Prescriptions    PREDNISONE (DELTASONE) 10 MG TABLET    Take 3 tablets by mouth daily for 5 days, THEN 2 tablets daily for 5 days, THEN 1 tablet daily for 5 days. History of Present Illness (HPI)     Chief Complaint   Patient presents with    Follow-up         Jeffreyadelfo Wright  is a(n)52 y.o. female with a hx of systemic lupus with hiistory of class IV lupus nephritis, CKD stage III, chronic low back pain w/ radiculopathy, lumbar stenosis, h/o seizures here for the f/u evaluation of systemic lupus  and wrosening pain. -- URI for the past month with persistent cough that is waking her.  + yellow sputum. + chest pain w/ coughing. + chest congestiosn- pcp recently prescribed tesselon and a z-pack after having a virtual appointment. Systemic lupus -- reports medications complaince with Mycophenolate, Plaquenil, prednisone and benlysta. Currently with worsening pain in the Right arms for the past few months.  knot around the elbow over the past few days. Pain is constant worsened with lifting. Sx's have been worsening pain up to 9.5/10 over the past week. Alleviating -   -- denies dry ryes, mouth, nasal sores, oral sorse. urine changes. -- she has noted unintentional weight loss, cold intolerance, fatigue. -- Reported nasal sore, skin sore on the back of the left ear. REVIEW OF SYSTEMS: (ROS)    Review of Systems   Constitutional:  Positive for fatigue. Negative for diaphoresis and fever. HENT:  Negative for congestion, hearing loss, mouth sores and nosebleeds. Eyes: Negative. Negative for pain and redness. Respiratory:  Positive for cough and shortness of breath. Negative for wheezing. Cardiovascular: Negative. Negative for chest pain. Gastrointestinal:  Negative for constipation, diarrhea, nausea and vomiting. Genitourinary:  Negative for difficulty urinating, frequency, genital sores and hematuria. Musculoskeletal:  Positive for myalgias. Skin:  Negative for rash. Neurological:  Positive for dizziness and numbness. Negative for weakness and headaches. Hematological:  Does not bruise/bleed easily. Psychiatric/Behavioral:  Negative for sleep disturbance. The patient is not nervous/anxious. PmHx:  has a past medical history of Anemia, CKD (chronic kidney disease), stage III ([address] Dyslipidemia, Hematuria, Hyperlipidemia, Hypertension, Hyperuricemia, Hypothyroidism, Kidney disease, Lupus ([facility] [address] Lupus nephritis ([facility] [address] Proteinuria, Seizures ([facility] [address] and Systemic lupus erythematosus ([facility] [address] Social History:  reports that she quit smoking about 10 years ago. Her smoking use included cigarettes. She started smoking about 11 years ago. She smoked an average of .25 packs per day. She has never used smokeless tobacco. She reports current alcohol use. She reports that she does not use drugs. ALLERGIES     Allergies   Allergen Reactions    Codeine Hives and Nausea And Vomiting       CURRENT MEDICATIONS      Current Outpatient Medications:     predniSONE (DELTASONE) 10 MG tablet, Take 3 tablets by mouth daily for 5 days, THEN 2 tablets daily for 5 days, THEN 1 tablet daily for 5 days. , Disp: 30 tablet, Rfl: 0    predniSONE (DELTASONE) 20 MG tablet, Take 1 tablet by mouth 2 times daily for 5 days, Disp: 10 tablet, Rfl: 0    azithromycin (ZITHROMAX) 250 MG tablet, Take 1 tablet by mouth See Admin Instructions for 5 days 500mg on day 1 followed by 250mg on days 2 - 5, Disp: 6 tablet, Rfl: 0    benzonatate (TESSALON) 100 MG capsule, Take 1-2 capsules by mouth 3 times daily as needed for Cough, Disp: 60 capsule, Rfl: 0    omeprazole (PRILOSEC) 20 MG delayed release capsule, take 1 capsule by mouth once daily, Disp: 30 capsule, Rfl: 3    ondansetron (ZOFRAN) 4 MG tablet, take 1 tablet by mouth three times a day if needed for nausea and vomiting, Disp: 30 tablet, Rfl: 0    HYDROcodone-acetaminophen (NORCO) 5-325 MG per tablet, Take 1 tablet by mouth every 8 hours as needed for Pain for up to 30 days. , Disp: 90 tablet, Rfl: 0    dapagliflozin (FARXIGA) 5 MG tablet, Take 1 tablet by mouth every morning, Disp: 90 tablet, Rfl: 1    vitamin D (ERGOCALCIFEROL) 1.25 MG (62878 UT) CAPS capsule, Take 1 capsule by mouth once a week, Disp: 12 capsule, Rfl: 1    mycophenolate (CELLCEPT) 500 MG tablet, Take 2 tablets by mouth every morning (before breakfast) AND 2 tablets every evening., Disp: 120 tablet, Rfl: 0    predniSONE (DELTASONE) 2.5 MG tablet, Take 3 tablets by mouth daily, Disp: 90 tablet, Rfl: 1    hydroxychloroquine (PLAQUENIL) 200 MG tablet, Take 1 tablet by mouth 2 times daily, Disp: 60 tablet, Rfl: 5    levothyroxine (SYNTHROID) 112 MCG tablet, take 1 tablet by mouth once daily, Disp: 90 tablet, Rfl: 1    acetaminophen (APAP EXTRA STRENGTH) 500 MG tablet, Take 2 tablets by mouth every 6 hours as needed for Pain, Disp: 120 tablet, Rfl: 3    ferrous sulfate (IRON 325) 325 (65 Fe) MG tablet, Take 1 tablet by mouth daily (with breakfast), Disp: 90 tablet, Rfl: 3    simvastatin (ZOCOR) 40 MG tablet, take 1 tablet by mouth at bedtime, Disp: 90 tablet, Rfl: 3    Belimumab (BENLYSTA) 200 MG/ML SOAJ, Inject 200 mg into the skin every 28 days, Disp: 3.92 mL, Rfl: 2    loratadine (CLARITIN) 10 MG tablet, take 1 tablet by mouth once daily, Disp: 90 tablet, Rfl: 3    Calcium Carb-Cholecalciferol (OYSTER SHELL CALCIUM W/D) 500-200 MG-UNIT TABS tablet, take 1 tablet by mouth once daily, Disp: 90 tablet, Rfl: 3    albuterol sulfate HFA (PROVENTIL HFA) 108 (90 Base) MCG/ACT inhaler, Inhale 2 puffs into the lungs every 6 hours as needed for Wheezing, Disp: 18 g, Rfl: 3    silver sulfADIAZINE (SILVADENE) 1 % cream, Apply topically daily. , Disp: 50 g, Rfl: 1    permethrin (ELIMITE) 5 % cream, Apply to wet hair and leave on 10 minutes and then rinse, may repeat on day 9, Disp: 3 Tube, Rfl: 1    nitroGLYCERIN (NITROSTAT) 0.4 MG SL tablet, Place 1 tablet under the tongue every 5 minutes as needed for Chest pain up to max of 3 total doses.  If no relief after 1 dose, call 911., Disp: 25 tablet, Rfl: 0 gabapentin (NEURONTIN) 400 MG capsule, take 1 capsule by mouth twice a day, Disp: 180 capsule, Rfl: 1    PHYSICAL EXAMINATION / OBJECTIVE     Objective:  /82 (Site: Left Upper Arm, Position: Sitting, Cuff Size: Large Adult)   Pulse 82   Ht 5' 5.98\" (1.676 m)   Wt 147 lb (66.7 kg)   SpO2 97%   BMI 23.74 kg/m²     Physical Exam  Vitals reviewed. Constitutional:       Appearance: Normal appearance. HENT:      Head: Normocephalic. Nose: Nose normal.      Comments: Left nasal sore w/ crusted blood  Eyes:      Conjunctiva/sclera: Conjunctivae normal.      Pupils: Pupils are equal, round, and reactive to light. Cardiovascular:      Rate and Rhythm: Normal rate. Heart sounds: Murmur heard. Pulmonary:      Effort: Pulmonary effort is normal. No respiratory distress. Breath sounds: Rhonchi and rales present. No wheezing. Chest:      Chest wall: No tenderness. Musculoskeletal:         General: Tenderness present. Comments: Elbows: Tender/warmth - right lateral epicondyle   Skin:     General: Skin is warm and dry. Comments: Open sore behind the left ear low    Neurological:      Mental Status: She is alert and oriented to person, place, and time.    Psychiatric:         Mood and Affect: Mood normal.         Behavior: Behavior normal.          LABS      Lab Results   Component Value Date    WBC 5.8 11/16/2022    HGB 10.0 (L) 11/16/2022    MCV 93.9 11/16/2022    MCHC 29.8 (L) 11/16/2022    RDW 18.6 (H) 03/24/2020     11/16/2022    NEUTROABS 2600 03/24/2020    LYMPHSABS 1.3 11/16/2022    EOSABS 0.1 11/16/2022    BASOSABS 0.0 11/16/2022         Chemistry        Component Value Date/Time     11/16/2022 1138    K 4.1 11/16/2022 1138    K 3.7 11/09/2021 1545     11/16/2022 1138    CO2 26 11/16/2022 1138    BUN 28 (H) 11/16/2022 1138    CREATININE 1.8 (H) 11/16/2022 1138        Component Value Date/Time    CALCIUM 9.4 11/16/2022 1138    ALKPHOS 65 11/16/2022 1138    AST 26 11/16/2022 1138    ALT 18 11/16/2022 1138    BILITOT <0.2 (L) 11/16/2022 1138            Lab Results   Component Value Date    SEDRATE 21 (H) 11/16/2022    SEDRATE 20 08/10/2022    SEDRATE 15 04/25/2022    CRP < 0.30 08/10/2022    CRP < 0.30 02/14/2022    CRP < 0.30 01/12/2022       Lab Results   Component Value Date/Time    VITD25 16 08/10/2022 11:20 AM     Lab Results   Component Value Date    ANASCRN Detected (A) 06/17/2019     Lab Results   Component Value Date    DSDNAAB SEE BELOW 06/17/2019     Lab Results   Component Value Date    C3 106 11/16/2022    C4 23 11/16/2022       RADIOLOGY / PROCEDURES:

## 2023-01-20 ENCOUNTER — TELEPHONE (OUTPATIENT)
Dept: RHEUMATOLOGY | Age: 53
End: 2023-01-20

## 2023-01-20 ENCOUNTER — TELEPHONE (OUTPATIENT)
Dept: FAMILY MEDICINE CLINIC | Age: 53
End: 2023-01-20

## 2023-01-20 DIAGNOSIS — E11.22 TYPE 2 DIABETES MELLITUS WITH CHRONIC KIDNEY DISEASE, WITHOUT LONG-TERM CURRENT USE OF INSULIN, UNSPECIFIED CKD STAGE (HCC): Primary | ICD-10-CM

## 2023-01-20 LAB
C3C SERPL-MCNC: 128 MG/DL (ref 90–180)
C4 SERPL-MCNC: 31 MG/DL (ref 10–40)
DEPRECATED MEAN GLUCOSE BLD GHB EST-ACNC: 102 MG/DL (ref 70–126)
HBA1C MFR BLD HPLC: 5.4 % (ref 4.4–6.4)

## 2023-01-20 NOTE — TELEPHONE ENCOUNTER
Pt had lab work yesterday , can we phone the lab and see if they can add an a1C onto what she had? Thanks I will place the order, if they can't please have her come to the office for a NV to have it done Thanks!

## 2023-01-20 NOTE — TELEPHONE ENCOUNTER
Attempted to call no answer LM informing she needs to review [place] message w/results or call the office back

## 2023-01-20 NOTE — TELEPHONE ENCOUNTER
----- Message from Reanna Brunner DO sent at 1/20/2023 10:16 AM EST -----  Please call and review the lab results with the patient.

## 2023-01-20 NOTE — RESULT ENCOUNTER NOTE
The blood testing revealed increased systemic inflammation based upon rising sed rate (ESR). The protein in the urine has worsened over the past 2 evaluations and this can be related to the intermittent compliance with the medications. If there is no improvement by the next lab test I would like to discuss changing your medications.

## 2023-01-23 DIAGNOSIS — M54.40 BACK PAIN OF LUMBAR REGION WITH SCIATICA: ICD-10-CM

## 2023-01-23 DIAGNOSIS — G89.4 CHRONIC PAIN SYNDROME: ICD-10-CM

## 2023-01-23 DIAGNOSIS — M79.641 RIGHT HAND PAIN: ICD-10-CM

## 2023-01-23 DIAGNOSIS — J30.89 NON-SEASONAL ALLERGIC RHINITIS DUE TO OTHER ALLERGIC TRIGGER: ICD-10-CM

## 2023-01-23 DIAGNOSIS — E03.9 ACQUIRED HYPOTHYROIDISM: ICD-10-CM

## 2023-01-23 RX ORDER — SIMVASTATIN 40 MG
TABLET ORAL
Qty: 90 TABLET | Refills: 3 | OUTPATIENT
Start: 2023-01-23

## 2023-01-23 RX ORDER — ONDANSETRON 4 MG/1
TABLET, FILM COATED ORAL
Qty: 30 TABLET | Refills: 0 | OUTPATIENT
Start: 2023-01-23

## 2023-01-23 RX ORDER — GABAPENTIN 400 MG/1
CAPSULE ORAL
Qty: 180 CAPSULE | Refills: 1 | OUTPATIENT
Start: 2023-01-23 | End: 2023-07-26

## 2023-01-23 RX ORDER — LORATADINE 10 MG/1
TABLET ORAL
Qty: 90 TABLET | Refills: 3 | OUTPATIENT
Start: 2023-01-23

## 2023-01-23 RX ORDER — FERROUS SULFATE 325(65) MG
325 TABLET ORAL
Qty: 90 TABLET | Refills: 3 | OUTPATIENT
Start: 2023-01-23

## 2023-01-23 RX ORDER — ACETAMINOPHEN 500 MG
1000 TABLET ORAL EVERY 6 HOURS PRN
Qty: 120 TABLET | Refills: 3 | OUTPATIENT
Start: 2023-01-23

## 2023-01-23 RX ORDER — LEVOTHYROXINE SODIUM 112 UG/1
TABLET ORAL
Qty: 90 TABLET | Refills: 1 | OUTPATIENT
Start: 2023-01-23

## 2023-01-23 RX ORDER — HYDROCODONE BITARTRATE AND ACETAMINOPHEN 5; 325 MG/1; MG/1
1 TABLET ORAL EVERY 8 HOURS PRN
Qty: 90 TABLET | Refills: 0 | Status: SHIPPED | OUTPATIENT
Start: 2023-01-26 | End: 2023-02-25

## 2023-01-23 NOTE — TELEPHONE ENCOUNTER
Patient requesting the following   Pharmacy confirmed   Please approve or refuse Rx request:  Requested Prescriptions     Pending Prescriptions Disp Refills    gabapentin (NEURONTIN) 400 MG capsule 180 capsule 1     Sig: take 1 capsule by mouth twice a day    levothyroxine (SYNTHROID) 112 MCG tablet 90 tablet 1     Sig: take 1 tablet by mouth once daily    acetaminophen (APAP EXTRA STRENGTH) 500 MG tablet 120 tablet 3     Sig: Take 2 tablets by mouth every 6 hours as needed for Pain    simvastatin (ZOCOR) 40 MG tablet 90 tablet 3     Sig: take 1 tablet by mouth at bedtime    ondansetron (ZOFRAN) 4 MG tablet 30 tablet 0    loratadine (CLARITIN) 10 MG tablet 90 tablet 3    ferrous sulfate (IRON 325) 325 (65 Fe) MG tablet 90 tablet 3     Sig: Take 1 tablet by mouth daily (with breakfast)       Next appointment:  Visit date not found

## 2023-01-23 NOTE — TELEPHONE ENCOUNTER
Glenard Ferguson called requesting a refill on the following medications:  Requested Prescriptions     Pending Prescriptions Disp Refills    HYDROcodone-acetaminophen (NORCO) 5-325 MG per tablet 90 tablet 0     Sig: Take 1 tablet by mouth every 8 hours as needed for Pain for up to 30 days. Pharmacy verified: [facility]  . pv      Date of last visit: 11/17/2022  Date of next visit (if applicable): 8/3/7464

## 2023-01-23 NOTE — TELEPHONE ENCOUNTER
OARRS reviewed. UDS: + for  hydrocodone gabapentin consistent. Last seen: 11/17/2022.  Follow-up:   Future Appointments   Date Time Provider Justus Julia   2/28/2023  2:20 PM Richard WrightrMACKENZIE - CNP [place]  Rheum P - [place] [address]   3/1/2023  3:40 PM MACKENZIE Stephen CNP N SRPX Pain P - [place] [address]   5/18/2023  3:20 PM MD GAYATRI Flores [facility]. [facility] - [place] [address] [address]

## 2023-01-30 ENCOUNTER — OFFICE VISIT (OUTPATIENT)
Dept: FAMILY MEDICINE CLINIC | Age: 53
End: 2023-01-30
Payer: COMMERCIAL

## 2023-01-30 VITALS
RESPIRATION RATE: 16 BRPM | SYSTOLIC BLOOD PRESSURE: 122 MMHG | DIASTOLIC BLOOD PRESSURE: 68 MMHG | WEIGHT: 141.8 LBS | HEART RATE: 76 BPM | OXYGEN SATURATION: 96 % | HEIGHT: 66 IN | BODY MASS INDEX: 22.79 KG/M2 | TEMPERATURE: 97.6 F

## 2023-01-30 DIAGNOSIS — M79.641 RIGHT HAND PAIN: ICD-10-CM

## 2023-01-30 DIAGNOSIS — Z12.11 SCREEN FOR COLON CANCER: ICD-10-CM

## 2023-01-30 DIAGNOSIS — M54.40 BACK PAIN OF LUMBAR REGION WITH SCIATICA: ICD-10-CM

## 2023-01-30 DIAGNOSIS — M48.061 SPINAL STENOSIS OF LUMBAR REGION WITHOUT NEUROGENIC CLAUDICATION: ICD-10-CM

## 2023-01-30 DIAGNOSIS — E78.2 MIXED HYPERLIPIDEMIA: ICD-10-CM

## 2023-01-30 DIAGNOSIS — E11.9 TYPE 2 DIABETES MELLITUS WITHOUT COMPLICATION, WITHOUT LONG-TERM CURRENT USE OF INSULIN (HCC): Primary | ICD-10-CM

## 2023-01-30 DIAGNOSIS — Z12.31 ENCOUNTER FOR SCREENING MAMMOGRAM FOR MALIGNANT NEOPLASM OF BREAST: ICD-10-CM

## 2023-01-30 DIAGNOSIS — J30.89 NON-SEASONAL ALLERGIC RHINITIS DUE TO OTHER ALLERGIC TRIGGER: ICD-10-CM

## 2023-01-30 PROBLEM — Q24.5 MALFORMATION OF CORONARY VESSELS: Status: RESOLVED | Noted: 2022-01-12 | Resolved: 2023-01-30

## 2023-01-30 LAB — HBA1C MFR BLD: 5.4 % (ref 4.3–5.7)

## 2023-01-30 PROCEDURE — 1036F TOBACCO NON-USER: CPT | Performed by: NURSE PRACTITIONER

## 2023-01-30 PROCEDURE — G8427 DOCREV CUR MEDS BY ELIG CLIN: HCPCS | Performed by: NURSE PRACTITIONER

## 2023-01-30 PROCEDURE — 2028F FOOT EXAM PERFORMED: CPT | Performed by: NURSE PRACTITIONER

## 2023-01-30 PROCEDURE — 3017F COLORECTAL CA SCREEN DOC REV: CPT | Performed by: NURSE PRACTITIONER

## 2023-01-30 PROCEDURE — G8420 CALC BMI NORM PARAMETERS: HCPCS | Performed by: NURSE PRACTITIONER

## 2023-01-30 PROCEDURE — 99214 OFFICE O/P EST MOD 30 MIN: CPT | Performed by: NURSE PRACTITIONER

## 2023-01-30 PROCEDURE — 3044F HG A1C LEVEL LT 7.0%: CPT | Performed by: NURSE PRACTITIONER

## 2023-01-30 PROCEDURE — G8484 FLU IMMUNIZE NO ADMIN: HCPCS | Performed by: NURSE PRACTITIONER

## 2023-01-30 PROCEDURE — 2022F DILAT RTA XM EVC RTNOPTHY: CPT | Performed by: NURSE PRACTITIONER

## 2023-01-30 RX ORDER — GABAPENTIN 400 MG/1
CAPSULE ORAL
Qty: 270 CAPSULE | Refills: 1 | Status: SHIPPED | OUTPATIENT
Start: 2023-01-30 | End: 2023-08-02

## 2023-01-30 RX ORDER — LORATADINE 10 MG/1
TABLET ORAL
Qty: 90 TABLET | Refills: 4 | Status: SHIPPED | OUTPATIENT
Start: 2023-01-30

## 2023-01-30 SDOH — ECONOMIC STABILITY: FOOD INSECURITY: WITHIN THE PAST 12 MONTHS, YOU WORRIED THAT YOUR FOOD WOULD RUN OUT BEFORE YOU GOT MONEY TO BUY MORE.: NEVER TRUE

## 2023-01-30 SDOH — ECONOMIC STABILITY: FOOD INSECURITY: WITHIN THE PAST 12 MONTHS, THE FOOD YOU BOUGHT JUST DIDN'T LAST AND YOU DIDN'T HAVE MONEY TO GET MORE.: NEVER TRUE

## 2023-01-30 ASSESSMENT — SOCIAL DETERMINANTS OF HEALTH (SDOH): HOW HARD IS IT FOR YOU TO PAY FOR THE VERY BASICS LIKE FOOD, HOUSING, MEDICAL CARE, AND HEATING?: NOT HARD AT ALL

## 2023-01-30 ASSESSMENT — PATIENT HEALTH QUESTIONNAIRE - PHQ9
DEPRESSION UNABLE TO ASSESS: FUNCTIONAL CAPACITY MOTIVATION LIMITS ACCURACY
SUM OF ALL RESPONSES TO PHQ QUESTIONS 1-9: 0
SUM OF ALL RESPONSES TO PHQ QUESTIONS 1-9: 0
SUM OF ALL RESPONSES TO PHQ9 QUESTIONS 1 & 2: 0
SUM OF ALL RESPONSES TO PHQ QUESTIONS 1-9: 0
SUM OF ALL RESPONSES TO PHQ QUESTIONS 1-9: 0
2. FEELING DOWN, DEPRESSED OR HOPELESS: 0
1. LITTLE INTEREST OR PLEASURE IN DOING THINGS: 0

## 2023-01-30 NOTE — PROGRESS NOTES
Jose Enrique Del Toro is a 50 y.o. female for Follow-up (Wants to discuss Gabapentin )      Diabetes Type 2    Glucose control:   Does patient check blood glucoses at home? No  Report of hypoglycemia: no  Lab Results   Component Value Date    LABA1C 5.4 01/30/2023     No results found for: EAG    Symptoms  Polyuria, Polydipsia or Polyphagia? No  Chest Pain, SOB, or Palpitations? -  No  New Vision complaints? No  Paresthesias of the extremities? Yes - suffers from chronic pain from lupus, RA and spinal stenosis     Medications  Current medication were reviewed. Compliant with medications? yes  Medication side effects? No  On ACE-I or ARB? No  On antiplatelet therapy? Yes  On Statin?   Yes    Last Diabetic Eye Exam: in the last year, advised to schedule one       Allergies    Symptoms:  clear nasal drainage  Current treatment:  claritin working well   Triggers:  season changes,   Pets:unknown  Smoker: non-smoker  Other smokers in the home:at times      Lab Results   Component Value Date    WBC 5.7 01/19/2023    HGB 10.2 (L) 01/19/2023    HCT 34.3 (L) 01/19/2023    MCV 92.7 01/19/2023     01/19/2023      Lab Results   Component Value Date    CHOL 163 10/22/2019    CHOL 127 07/02/2015    CHOL 153 03/20/2013     Lab Results   Component Value Date    TRIG 221 (H) 10/22/2019    TRIG 124 07/02/2015    TRIG 188 03/20/2013     Lab Results   Component Value Date    HDL 36 10/22/2019    HDL 34 (A) 07/02/2015    HDL 31 (A) 03/20/2013     Lab Results   Component Value Date    LDLCALC 83 10/22/2019    LDLCALC 68 07/02/2015    LDLCALC 84 03/20/2013     Lab Results   Component Value Date    VLDL 25 07/02/2015    VLDL 38 03/20/2013    VLDL 38 01/07/2013     Lab Results   Component Value Date    CHOLHDLRATIO 2.7 03/20/2013    CHOLHDLRATIO 4.0 01/07/2013        Lab Results   Component Value Date/Time     01/19/2023 03:54 PM    K 4.6 01/19/2023 03:54 PM    K 3.7 11/09/2021 03:45 PM     01/19/2023 03:54 PM    CO2 25 01/19/2023 03:54 PM    BUN 24 01/19/2023 03:54 PM    CREATININE 1.9 01/19/2023 03:54 PM    GLUCOSE 87 01/19/2023 03:54 PM    GLUCOSE 111 03/24/2020 04:35 PM    CALCIUM 8.7 01/19/2023 03:54 PM     Pt current with PM for chronic pain takes norco daily also sees Rheumatology for RA and sees Nephrology for lupus. Pt taking gabapentin 400 mg bid for lumbar pain and spinal stenosis and arm pain, states it works during he day but wears off at night would  like an increase in medications. Exercise  Exercise? No  Wt Readings from Last 3 Encounters:   01/30/23 141 lb 12.8 oz (64.3 kg)   01/19/23 147 lb (66.7 kg)   11/17/22 148 lb 9.6 oz (67.4 kg)       Diet discipline?:  Low salt, fat, sugar diet? yes    Blood pressure control:  BP Readings from Last 3 Encounters:   01/30/23 122/68   01/19/23 122/82   11/17/22 134/86       No results found for: LABMICR, [id number]    Lab Results   Component Value Date    LDLCALC 83 10/22/2019       Physical Exam    /68   Pulse 76   Temp 97.6 °F (36.4 °C)   Resp 16   Ht 5' 5.98\" (1.676 m)   Wt 141 lb 12.8 oz (64.3 kg)   SpO2 96%   BMI 22.90 kg/m²   Appearance: alert, well appearing, and in no distress, normal appearing weight and well hydrated. Neck exam - supple, no significant adenopathy. CVS exam: normal rate, regular rhythm, normal S1, S2, no murmurs, rubs, clicks or gallops. Lungs: clear to auscultation, no wheezes, rales or rhonchi, symmetric air entry. Abdomen:  BS normal, soft, non tender, non distended, no rebound or guarding  Mental Status: normal mood, affect behavior, speech, dress,  and thought processes. Neuro:  Alert, muscle tone grossly normal    Noted to have enlarged joints in both hands no erythema or deformity   Skin exam: normal coloration and turgor, no rashes, no suspicious skin lesions noted.   Foot exam:  No pedal edema, no erythematous lesions noted b/l, sensation wnl b/l, PT and TP pulses wnl b/l    ASSESSMENT & PLAN  Jacqueline Credit was seen today for follow-up. Diagnoses and all orders for this visit:    Type 2 diabetes mellitus without complication, without long-term current use of insulin ([facility])  -      DIABETES FOOT EXAM  Controlled  Continue with farxiga  Non-seasonal allergic rhinitis due to other allergic trigger  -     loratadine (CLARITIN) 10 MG tablet; take 1 tablet by mouth once daily  controlled  Back pain of lumbar region with sciatica  -     gabapentin (NEURONTIN) 400 MG capsule; take 1 capsule by mouth TID a day  Worsening  Increase gabapentin to tid  Continue with PM  Right hand pain  -     gabapentin (NEURONTIN) 400 MG capsule; take 1 capsule by mouth TID a day    Spinal stenosis of lumbar region without neurogenic claudication  PM  Encounter for screening mammogram for malignant neoplasm of breast  -     [facility] DIGITAL SCREEN W OR WO CAD BILATERAL; Future    Mixed hyperlipidemia  -     Lipid Panel; Future    Screen for colon cancer  -     Ambulatory referral to Gastroenterology    Other orders  -     POCT glycosylated hemoglobin (Hb A1C)  Advised pt to schedule PAP test  Pt in agreement with plan     Return in about 6 months (around 7/30/2023) for check up.

## 2023-02-16 ENCOUNTER — NURSE ONLY (OUTPATIENT)
Dept: LAB | Age: 53
End: 2023-02-16

## 2023-02-16 DIAGNOSIS — Z51.81 MEDICATION MONITORING ENCOUNTER: Primary | ICD-10-CM

## 2023-02-16 DIAGNOSIS — I31.39 PERICARDIAL EFFUSION: ICD-10-CM

## 2023-02-16 DIAGNOSIS — M32.14 SYSTEMIC LUPUS ERYTHEMATOSUS WITH GLOMERULAR DISEASE, UNSPECIFIED SLE TYPE (HCC): ICD-10-CM

## 2023-02-16 DIAGNOSIS — Z51.81 MEDICATION MONITORING ENCOUNTER: ICD-10-CM

## 2023-02-16 DIAGNOSIS — Z87.39 H/O LUPUS NEPHRITIS: ICD-10-CM

## 2023-02-16 DIAGNOSIS — R80.1 PERSISTENT PROTEINURIA: ICD-10-CM

## 2023-02-16 DIAGNOSIS — G56.23 CUBITAL TUNNEL SYNDROME, BILATERAL: ICD-10-CM

## 2023-02-16 DIAGNOSIS — M32.14 OTHER SYSTEMIC LUPUS ERYTHEMATOSUS WITH GLOMERULAR DISEASE (HCC): ICD-10-CM

## 2023-02-16 LAB
ALBUMIN SERPL BCG-MCNC: 3.8 G/DL (ref 3.5–5.1)
ALP SERPL-CCNC: 63 U/L (ref 38–126)
ALT SERPL W/O P-5'-P-CCNC: 11 U/L (ref 11–66)
ANION GAP SERPL CALC-SCNC: 11 MEQ/L (ref 8–16)
AST SERPL-CCNC: 13 U/L (ref 5–40)
BACTERIA: ABNORMAL
BASOPHILS ABSOLUTE: 0 THOU/MM3 (ref 0–0.1)
BASOPHILS NFR BLD AUTO: 0.2 %
BILIRUB SERPL-MCNC: 0.2 MG/DL (ref 0.3–1.2)
BILIRUB UR QL STRIP: NEGATIVE
BUN SERPL-MCNC: 24 MG/DL (ref 7–22)
C3C SERPL-MCNC: 124 MG/DL (ref 90–180)
C4 SERPL-MCNC: 23 MG/DL (ref 10–40)
CALCIUM SERPL-MCNC: 9 MG/DL (ref 8.5–10.5)
CASTS #/AREA URNS LPF: ABNORMAL /LPF
CASTS #/AREA URNS LPF: ABNORMAL /LPF
CHARACTER UR: CLEAR
CHARCOAL URNS QL MICRO: ABNORMAL
CHLORIDE SERPL-SCNC: 109 MEQ/L (ref 98–111)
CHOLEST SERPL-MCNC: 130 MG/DL (ref 100–199)
CO2 SERPL-SCNC: 25 MEQ/L (ref 23–33)
COLOR UR: YELLOW
CREAT SERPL-MCNC: 1.6 MG/DL (ref 0.4–1.2)
CREAT UR-MCNC: 136.3 MG/DL
CRYSTALS URNS QL MICRO: ABNORMAL
DEPRECATED RDW RBC AUTO: 63.9 FL (ref 35–45)
EOSINOPHIL NFR BLD AUTO: 1.2 %
EOSINOPHILS ABSOLUTE: 0 THOU/MM3 (ref 0–0.4)
EPITHELIAL CELLS, UA: ABNORMAL /HPF
ERYTHROCYTE [DISTWIDTH] IN BLOOD BY AUTOMATED COUNT: 17.5 % (ref 11.5–14.5)
ERYTHROCYTE [SEDIMENTATION RATE] IN BLOOD BY WESTERGREN METHOD: 15 MM/HR (ref 0–20)
GFR SERPL CREATININE-BSD FRML MDRD: 39 ML/MIN/1.73M2
GLUCOSE SERPL-MCNC: 84 MG/DL (ref 70–108)
GLUCOSE UR QL STRIP.AUTO: NEGATIVE MG/DL
HCT VFR BLD AUTO: 30.9 % (ref 37–47)
HDLC SERPL-MCNC: 48 MG/DL
HGB BLD-MCNC: 8.9 GM/DL (ref 12–16)
HGB UR QL STRIP.AUTO: ABNORMAL
HYPOCHROMIA BLD QL SMEAR: PRESENT
IMM GRANULOCYTES # BLD AUTO: 0.01 THOU/MM3 (ref 0–0.07)
IMM GRANULOCYTES NFR BLD AUTO: 0.2 %
KETONES UR QL STRIP.AUTO: NEGATIVE
LDLC SERPL CALC-MCNC: 59 MG/DL
LEUKOCYTE ESTERASE UR QL STRIP.AUTO: NEGATIVE
LYMPHOCYTES ABSOLUTE: 1 THOU/MM3 (ref 1–4.8)
LYMPHOCYTES NFR BLD AUTO: 24.9 %
MCH RBC QN AUTO: 28.9 PG (ref 26–33)
MCHC RBC AUTO-ENTMCNC: 28.8 GM/DL (ref 32.2–35.5)
MCV RBC AUTO: 100.3 FL (ref 81–99)
MONOCYTES ABSOLUTE: 0.3 THOU/MM3 (ref 0.4–1.3)
MONOCYTES NFR BLD AUTO: 6.5 %
NEUTROPHILS NFR BLD AUTO: 67 %
NITRITE UR QL STRIP.AUTO: NEGATIVE
NRBC BLD AUTO-RTO: 0 /100 WBC
PH UR STRIP.AUTO: 5 [PH] (ref 5–9)
PLATELET # BLD AUTO: 145 THOU/MM3 (ref 130–400)
PLATELET BLD QL SMEAR: ADEQUATE
PMV BLD AUTO: 11.2 FL (ref 9.4–12.4)
POTASSIUM SERPL-SCNC: 3.8 MEQ/L (ref 3.5–5.2)
PROT SERPL-MCNC: 5.9 G/DL (ref 6.1–8)
PROT UR STRIP.AUTO-MCNC: 300 MG/DL
PROT UR-MCNC: 386.9 MG/DL
PROT/CREAT 24H UR: 2.84 MG/G{CREAT}
RBC # BLD AUTO: 3.08 MILL/MM3 (ref 4.2–5.4)
RBC #/AREA URNS HPF: ABNORMAL /HPF
RENAL EPI CELLS #/AREA URNS HPF: ABNORMAL /[HPF]
SCAN OF BLOOD SMEAR: NORMAL
SEGMENTED NEUTROPHILS ABSOLUTE COUNT: 2.7 THOU/MM3 (ref 1.8–7.7)
SODIUM SERPL-SCNC: 145 MEQ/L (ref 135–145)
SP GR UR REFRACT.AUTO: 1.02 (ref 1–1.03)
TRIGL SERPL-MCNC: 113 MG/DL (ref 0–199)
UROBILINOGEN UR QL STRIP.AUTO: 0.2 EU/DL (ref 0–1)
WBC # BLD AUTO: 4.1 THOU/MM3 (ref 4.8–10.8)
WBC #/AREA URNS HPF: ABNORMAL /HPF
YEAST LIKE FUNGI URNS QL MICRO: ABNORMAL

## 2023-02-16 RX ORDER — PREDNISONE 10 MG/1
15 TABLET ORAL 2 TIMES DAILY
Qty: 90 TABLET | Refills: 1 | Status: SHIPPED | OUTPATIENT
Start: 2023-02-16 | End: 2024-02-16

## 2023-02-16 RX ORDER — MYCOPHENOLATE MOFETIL 500 MG/1
TABLET ORAL
Qty: 120 TABLET | Refills: 0 | Status: SHIPPED | OUTPATIENT
Start: 2023-02-16 | End: 2023-08-23

## 2023-02-16 RX ORDER — HYDROXYCHLOROQUINE SULFATE 200 MG/1
200 TABLET, FILM COATED ORAL 2 TIMES DAILY
Qty: 60 TABLET | Refills: 5 | Status: SHIPPED | OUTPATIENT
Start: 2023-02-16

## 2023-02-16 NOTE — PROGRESS NOTES
Lupus nephritis - worsened proteinurai , Leukopenia  - worsened. , active inflammatory arthritis     -- d/c benlysta b/c worsening proteinuria and active lupus  -- increase prednisone to 15mg twice daily for hte active systemic lupus -- if improvement of labs in 2-4 weeks will attempt to drop to 20mg daily   -- start voclosporin 15.8 twice daily -- repeat labs in 2 weeks. -- plan on repeating labs 2 weeks after start. -- discussed risk of side effcets including renal injury , anemia, nerve injury to list a few.

## 2023-02-17 RX ORDER — ERGOCALCIFEROL 1.25 MG/1
50000 CAPSULE ORAL WEEKLY
Qty: 12 CAPSULE | Refills: 3 | Status: SHIPPED | OUTPATIENT
Start: 2023-02-17

## 2023-02-20 DIAGNOSIS — M32.14 SYSTEMIC LUPUS ERYTHEMATOSUS WITH GLOMERULAR DISEASE, UNSPECIFIED SLE TYPE (HCC): ICD-10-CM

## 2023-02-20 RX ORDER — BELIMUMAB 200 MG/ML
200 SOLUTION SUBCUTANEOUS
Qty: 3.92 ML | Refills: 2 | OUTPATIENT
Start: 2023-02-20

## 2023-02-20 NOTE — TELEPHONE ENCOUNTER
Patient having Benlysta discontinued and changed incident to alternative treatment for her systemic lupus.

## 2023-02-20 NOTE — TELEPHONE ENCOUNTER
[address] called to request a refill of a medication for the patient.    Katie Farfan called requesting a refill on the following medications:  Requested Prescriptions     Pending Prescriptions Disp Refills    Belimumab (BENLYSTA) 200 MG/ML SOAJ 3.92 mL 2     Sig: Inject 200 mg into the skin every 28 days   Benlysta 200 mg pen injection    Pharmacy verified:  .pv  [address] [place] [address] [id number], [address] [facility], [address]    Date of last visit: 01/19/2023  Date of next visit (if applicable): 3/10/6776

## 2023-02-22 DIAGNOSIS — G89.4 CHRONIC PAIN SYNDROME: ICD-10-CM

## 2023-02-22 RX ORDER — HYDROCODONE BITARTRATE AND ACETAMINOPHEN 5; 325 MG/1; MG/1
1 TABLET ORAL EVERY 8 HOURS PRN
Qty: 90 TABLET | Refills: 0 | Status: SHIPPED | OUTPATIENT
Start: 2023-02-25 | End: 2023-03-27

## 2023-02-22 NOTE — TELEPHONE ENCOUNTER
OARRS reviewed. UDS: + for  .hydrocodone and gabapentin   Last seen: 11/17/2022.  Follow-up:   Future Appointments   Date Time Provider Justus Julia   2/28/2023  2:20 PM MACKENZIE Reis - MITRA Rey Rheum [facility] - [place] [place] [address]   3/1/2023  3:40 PM MACKENZIE Rutledge CNP N SRPX Pain [facility] - [place] [address] [place] [address]   5/18/2023  3:20 PM MD GAYATRI Barnes [facility], [facility]. [facility] - [place] [address]

## 2023-02-22 NOTE — TELEPHONE ENCOUNTER
Talya Schulz called requesting a refill on the following medications:  Requested Prescriptions     Pending Prescriptions Disp Refills    HYDROcodone-acetaminophen (NORCO) 5-325 MG per tablet 90 tablet 0     Sig: Take 1 tablet by mouth every 8 hours as needed for Pain for up to 30 days.     Pharmacy verified: [facility] on [facility]  .pv      Date of last visit: 11/17/2022  Date of next visit (if applicable): 3/1/2023

## 2023-02-28 ENCOUNTER — OFFICE VISIT (OUTPATIENT)
Dept: RHEUMATOLOGY | Age: 53
End: 2023-02-28
Payer: COMMERCIAL

## 2023-02-28 VITALS
WEIGHT: 141.76 LBS | BODY MASS INDEX: 22.78 KG/M2 | OXYGEN SATURATION: 97 % | SYSTOLIC BLOOD PRESSURE: 116 MMHG | DIASTOLIC BLOOD PRESSURE: 76 MMHG | HEIGHT: 66 IN | HEART RATE: 74 BPM

## 2023-02-28 DIAGNOSIS — R80.1 PERSISTENT PROTEINURIA: ICD-10-CM

## 2023-02-28 DIAGNOSIS — M32.14 SYSTEMIC LUPUS ERYTHEMATOSUS WITH GLOMERULAR DISEASE, UNSPECIFIED SLE TYPE (HCC): Primary | ICD-10-CM

## 2023-02-28 DIAGNOSIS — J06.9 UPPER RESPIRATORY TRACT INFECTION, UNSPECIFIED TYPE: ICD-10-CM

## 2023-02-28 DIAGNOSIS — M32.14 OTHER SYSTEMIC LUPUS ERYTHEMATOSUS WITH GLOMERULAR DISEASE (HCC): ICD-10-CM

## 2023-02-28 DIAGNOSIS — Z51.81 MEDICATION MONITORING ENCOUNTER: ICD-10-CM

## 2023-02-28 DIAGNOSIS — R09.89 RESPIRATORY CRACKLES: ICD-10-CM

## 2023-02-28 PROCEDURE — G8427 DOCREV CUR MEDS BY ELIG CLIN: HCPCS | Performed by: NURSE PRACTITIONER

## 2023-02-28 PROCEDURE — 99214 OFFICE O/P EST MOD 30 MIN: CPT | Performed by: NURSE PRACTITIONER

## 2023-02-28 PROCEDURE — 3017F COLORECTAL CA SCREEN DOC REV: CPT | Performed by: NURSE PRACTITIONER

## 2023-02-28 PROCEDURE — 1036F TOBACCO NON-USER: CPT | Performed by: NURSE PRACTITIONER

## 2023-02-28 PROCEDURE — G8484 FLU IMMUNIZE NO ADMIN: HCPCS | Performed by: NURSE PRACTITIONER

## 2023-02-28 PROCEDURE — G8420 CALC BMI NORM PARAMETERS: HCPCS | Performed by: NURSE PRACTITIONER

## 2023-02-28 RX ORDER — MYCOPHENOLATE MOFETIL 500 MG/1
TABLET ORAL
Qty: 120 TABLET | Refills: 0 | Status: SHIPPED | OUTPATIENT
Start: 2023-02-28 | End: 2023-09-04

## 2023-02-28 RX ORDER — ACETAMINOPHEN 500 MG/1
CAPSULE ORAL
COMMUNITY
Start: 2022-12-21

## 2023-02-28 ASSESSMENT — ENCOUNTER SYMPTOMS
NAUSEA: 0
EYE ITCHING: 0
CONSTIPATION: 0
BACK PAIN: 1
ABDOMINAL PAIN: 0
DIARRHEA: 0
COUGH: 0
TROUBLE SWALLOWING: 0
EYE PAIN: 0
SHORTNESS OF BREATH: 0

## 2023-02-28 NOTE — PROGRESS NOTES
[facility] RHEUMATOLOGY FOLLOW UP NOTE       Date Of Service: 2/28/2023  Provider: MACKENZIE Ag - CNP    Name: Emmanuel Mayorga   MRN: [id number]    Chief Complaint(s)     Chief Complaint   Patient presents with    Follow-up     5 wk f/u, Systemic lupus erythematosus with glomerular disease, unspecified SLE type ([facility] [address]        History of Present Illness (HPI)     Emmanuel Mayorga  is a(n)52 y.o. female with a hx of systemic lupus with hiistory of class IV lupus nephritis, CKD stage III, chronic low back pain w/ radiculopathy, lumbar stenosis, h/o seizures here for the f/u evaluation of systemic lupus     Interval hx:    - just started voclosporin today    pain affecting the right elbow, back  Pain on a scale 0-10: 7/10  Type of pain: constant  Timing: mornings and evenings  Aggravating factors: weather, back: prolonged bending, forward flexion. Right hand and arm: increases use  Alleviating factors: norco, hot back, gabapentin    Associated symptoms:  + swelling/  Redness/ warmth (right hand) denies AM stiffness      REVIEW OF SYSTEMS: (ROS)    Review of Systems   Constitutional:  Positive for fatigue. Negative for appetite change, fever and unexpected weight change. HENT:  Negative for congestion and trouble swallowing. Hair loss, dry mouth   Eyes:  Negative for pain and itching. Dry eyes   Respiratory:  Negative for cough and shortness of breath. Cardiovascular:  Negative for chest pain and leg swelling. Gastrointestinal:  Negative for abdominal pain, constipation, diarrhea and nausea. Endocrine: Negative for cold intolerance and heat intolerance. Genitourinary:  Negative for difficulty urinating, frequency and urgency. Musculoskeletal:  Positive for arthralgias, back pain and joint swelling. Negative for myalgias and neck pain. Skin:  Negative for rash. Neurological:  Positive for numbness and headaches. Negative for dizziness and weakness.    Psychiatric/Behavioral: Positive for sleep disturbance. The patient is not nervous/anxious.       PAST MEDICAL HISTORY      Past Medical History:   Diagnosis Date    Anemia     CKD (chronic kidney disease), stage III ([address]     Dyslipidemia     Hematuria     Hyperlipidemia     Hypertension     Hyperuricemia     Hypothyroidism     Kidney disease     as a result from lupus    Lupus ([address]     Lupus nephritis ([address]     Proteinuria     Seizures ([address]     Systemic lupus erythematosus ([facility] [address]        PAST SURGICAL HISTORY      Past Surgical History:   Procedure Laterality Date    CT BIOPSY ABDOMEN RETROPERITONEUM  1/15/2021    CT BIOPSY ABDOMEN RETROPERITONEUM 1/15/2021 [facility] CT SCAN    LUMBAR NERVE BLOCK N/A 3/11/2019    LESI at L4 performed by Violet Martin MD at [address] [place] [address] Ave N/A 5/21/2019    LESI at L3 #1 performed by Violet Martin MD at [facility] Left 12/13/2018    LUMBAR TRANSFORAMINAL TFESI L5 LEFT #2, performed by Violet Martin MD at [address] Bilateral     LUMBAR FACET    PAIN MANAGEMENT PROCEDURE N/A 11/1/2022    Cervical Epidural Steroid Injection  Cervical 6 performed by Violet Martin MD at [address] [place] [address] AA&/STRD TFRML EPI LUMBAR/SACRAL 1 LEVEL Left 9/25/2018    LUMBAR TRANSFORAMINAL TFESI L5 on the LEFT performed by Violet Martin MD at [address] [place] [address] DX/THER AGT PVRT FACET JT LMBR/SAC 2ND LEVEL Bilateral 5/7/2018    LUMBAR FACET  [email or web address] Bilateral performed by Violet Martin MD at [address] [place] [address] DX/THER SBST INTRLMNR LMBR/SAC W/IMG GDN N/A 7/19/2018    LUMBAR INTER LAMINAR GIGI LESI @L4 performed by Violet Martin MD at [facility]      Family History   Problem Relation Age of Onset    Diabetes Mother     Heart Disease Mother     Thyroid Disease Mother     Parkinsonism Mother     Diabetes Father Lupus Maternal Cousin        SOCIAL HISTORY      Social History       Tobacco History       Smoking Status  Former Smoker Smoking Tobacco Type  Cigarettes      Smokeless Tobacco Use  Never Used      Tobacco Comment  1 a day              Alcohol History       Alcohol Use Status  Yes Drinks/Week  0 Standard drinks or equivalent per week Amount  0.0 standard drinks of alcohol/wk Comment  occasionally, couple times a year              Drug Use       Drug Use Status  No              Sexual Activity       Sexually Active  Not Asked                    ALLERGIES     Allergies   Allergen Reactions    Codeine Hives and Nausea And Vomiting       CURRENT MEDICATIONS      Current Outpatient Medications   Medication Sig Dispense Refill    vitamin D (ERGOCALCIFEROL) 1.25 MG (59917 UT) CAPS capsule Take 1 capsule by mouth once a week 12 capsule 3    HYDROcodone-acetaminophen (NORCO) 5-325 MG per tablet Take 1 tablet by mouth every 8 hours as needed for Pain for up to 30 days. 90 tablet 0    mycophenolate (CELLCEPT) 500 MG tablet Take 2 tablets by mouth every morning (before breakfast) AND 2 tablets every evening.  120 tablet 0    hydroxychloroquine (PLAQUENIL) 200 MG tablet Take 1 tablet by mouth 2 times daily 60 tablet 5    predniSONE (DELTASONE) 10 MG tablet Take 1.5 tablets by mouth 2 times daily 90 tablet 1    Voclosporin 7.9 MG CAPS Take 15.8 mg by mouth in the morning and at bedtime 180 capsule 0    loratadine (CLARITIN) 10 MG tablet take 1 tablet by mouth once daily 90 tablet 4    gabapentin (NEURONTIN) 400 MG capsule take 1 capsule by mouth TID a day 270 capsule 1    omeprazole (PRILOSEC) 20 MG delayed release capsule take 1 capsule by mouth once daily 30 capsule 3    ondansetron (ZOFRAN) 4 MG tablet take 1 tablet by mouth three times a day if needed for nausea and vomiting 30 tablet 0    dapagliflozin (FARXIGA) 5 MG tablet Take 1 tablet by mouth every morning 90 tablet 1    levothyroxine (SYNTHROID) 112 MCG tablet take 1 tablet by mouth once daily 90 tablet 1    acetaminophen (APAP EXTRA STRENGTH) 500 MG tablet Take 2 tablets by mouth every 6 hours as needed for Pain 120 tablet 3    ferrous sulfate (IRON 325) 325 (65 Fe) MG tablet Take 1 tablet by mouth daily (with breakfast) 90 tablet 3    simvastatin (ZOCOR) 40 MG tablet take 1 tablet by mouth at bedtime 90 tablet 3    Belimumab (BENLYSTA) 200 MG/ML SOAJ Inject 200 mg into the skin every 28 days 3.92 mL 2    albuterol sulfate HFA (PROVENTIL HFA) 108 (90 Base) MCG/ACT inhaler Inhale 2 puffs into the lungs every 6 hours as needed for Wheezing 18 g 3    silver sulfADIAZINE (SILVADENE) 1 % cream Apply topically daily. 50 g 1    permethrin (ELIMITE) 5 % cream Apply to wet hair and leave on 10 minutes and then rinse, may repeat on day 9 3 Tube 1    nitroGLYCERIN (NITROSTAT) 0.4 MG SL tablet Place 1 tablet under the tongue every 5 minutes as needed for Chest pain up to max of 3 total doses. If no relief after 1 dose, call 911. 25 tablet 0     No current facility-administered medications for this visit. PHYSICAL EXAMINATION / OBJECTIVE   Objective:  Ht 5' 5.98\" (1.676 m)   Wt 141 lb 12.1 oz (64.3 kg)   BMI 22.89 kg/m²     Physical Exam  Vitals reviewed. Constitutional:       Appearance: She is well-developed. HENT:      Nose:      Comments: Left nasal sore  Cardiovascular:      Rate and Rhythm: Normal rate and regular rhythm. Heart sounds: Murmur heard. Pulmonary:      Effort: Pulmonary effort is normal.      Breath sounds: Rales present. Musculoskeletal:      Cervical back: Normal range of motion and neck supple. Skin:     General: Skin is warm and dry. Findings: No rash. Neurological:      Mental Status: She is alert and oriented to person, place, and time. Psychiatric:         Thought Content:  Thought content normal.      Upper extremities:    SHOULDERS nontender, no swelling ,   ELBOWS tender right  WRISTS nontender  HANDS/FINGERS nontender    Lower extremities:  HIPS nontender  KNEES nontender, no swelling  ANKLES nontender, no swelling   FEET : nontender, no swelling       RAPID 3:   2/28/2023 --- RAPID 3: 3.7 + 8 + 7 = 18.7     Remission: <3  Low Disease Activity: <6  Moderate Disease Activity: >=6 and <=12  High Disease Activity: >12     LABS    CBC  Lab Results   Component Value Date/Time    WBC 4.1 02/16/2023 11:49 AM    RBC 3.08 02/16/2023 11:49 AM    HGB 8.9 02/16/2023 11:49 AM    HCT 30.9 02/16/2023 11:49 AM    .3 02/16/2023 11:49 AM    MCH 28.9 02/16/2023 11:49 AM    MCHC 28.8 02/16/2023 11:49 AM    RDW 18.6 03/24/2020 04:35 PM     02/16/2023 11:49 AM       CMP  Lab Results   Component Value Date/Time    CALCIUM 9.0 02/16/2023 11:49 AM    LABALBU 3.8 02/16/2023 11:49 AM    PROT 5.9 02/16/2023 11:49 AM     02/16/2023 11:49 AM    K 3.8 02/16/2023 11:49 AM    K 3.7 11/09/2021 03:45 PM    CO2 25 02/16/2023 11:49 AM     02/16/2023 11:49 AM    BUN 24 02/16/2023 11:49 AM    CREATININE 1.6 02/16/2023 11:49 AM    ALKPHOS 63 02/16/2023 11:49 AM    ALT 11 02/16/2023 11:49 AM    AST 13 02/16/2023 11:49 AM       HgBA1c: No components found for: HGBA1C    Lab Results   Component Value Date/Time    VITD25 16 08/10/2022 11:20 AM         Lab Results   Component Value Date    ANASCRN Detected (A) 06/17/2019     Lab Results   Component Value Date    SSA SEE BELOW 06/17/2019     Lab Results   Component Value Date    SSB 0 06/17/2019     Lab Results   Component Value Date    ANTI-SMITH 4 06/17/2019     Lab Results   Component Value Date    DSDNAAB SEE BELOW 06/17/2019      No results found for: ANTIRNP  Lab Results   Component Value Date    C3 124 02/16/2023    C4 23 02/16/2023     Lab Results   Component Value Date    CCPAB 8 06/17/2019     Lab Results   Component Value Date    RF 11 06/17/2019       No components found for: CANCASCRN, APANCASCRN  Lab Results   Component Value Date    SEDRATE 15 02/16/2023     Lab Results   Component Value Date CRP < 0.30 08/10/2022       RADIOLOGY:         ASSESSMENT/PLAN    Assessment   Plan     SLE w/ h/o class IV LN-   - diagnosed in 2001, s/p cytoxan 2007. C4 was 64, 4+ proteinuria, but 24 urine w/ only 1.1 g. 20-29 RBC/HPF. + BELLE, dsDNA, ANCA (+p-ANCA, neg MPO, VA-3), reported inflammatory arthritis. Exam w/ joint swelling, oral/nasal sores. - plaquenil 200 mg BID   - mycophenolate 1000 mg BID   - prednisone 15 mg BID- will drop to 20 mg daily if labs improve in 2-4 weeks   - voclosporin 15.8 mg twice daily    Chronic cough  Ronchi/rales   - CXR ordered at last evaluation- not completed     Right elbow pain- ? Related to #1    CKD  Proteinura   - worsening proteinuria feb 2023 evaluation      Vitamin D def   - vitamin D3 5000 IU daily    Medication monitoring   - CBC, CMP, sed rate, CRP, C3, C4, u/a in 2 weeks    - plaquenil eye exam (2020)      No follow-ups on file. Electronically signed by MACKENZIE Braden CNP on 2/28/2023 at 2:23 PM    New Prescriptions    No medications on file       Thank you for allowing me to participate in the care of this patient. Please call if there are any questions.

## 2023-03-01 ENCOUNTER — OFFICE VISIT (OUTPATIENT)
Dept: PHYSICAL MEDICINE AND REHAB | Age: 53
End: 2023-03-01
Payer: COMMERCIAL

## 2023-03-01 VITALS
SYSTOLIC BLOOD PRESSURE: 112 MMHG | HEART RATE: 62 BPM | HEIGHT: 66 IN | DIASTOLIC BLOOD PRESSURE: 62 MMHG | BODY MASS INDEX: 22.66 KG/M2 | WEIGHT: 141 LBS

## 2023-03-01 DIAGNOSIS — M54.16 LUMBAR RADICULOPATHY: ICD-10-CM

## 2023-03-01 DIAGNOSIS — M48.062 LUMBAR STENOSIS WITH NEUROGENIC CLAUDICATION: ICD-10-CM

## 2023-03-01 DIAGNOSIS — M48.02 CERVICAL SPINAL STENOSIS: ICD-10-CM

## 2023-03-01 DIAGNOSIS — M54.16 LUMBAR RADICULITIS: ICD-10-CM

## 2023-03-01 DIAGNOSIS — M54.12 CERVICAL RADICULITIS: Primary | ICD-10-CM

## 2023-03-01 DIAGNOSIS — M48.061 SPINAL STENOSIS OF LUMBAR REGION WITHOUT NEUROGENIC CLAUDICATION: ICD-10-CM

## 2023-03-01 DIAGNOSIS — M47.814 SPONDYLOSIS OF THORACIC REGION WITHOUT MYELOPATHY OR RADICULOPATHY: ICD-10-CM

## 2023-03-01 DIAGNOSIS — M46.1 BILATERAL SACROILIITIS (HCC): ICD-10-CM

## 2023-03-01 DIAGNOSIS — M47.816 SPONDYLOSIS OF LUMBAR REGION WITHOUT MYELOPATHY OR RADICULOPATHY: ICD-10-CM

## 2023-03-01 DIAGNOSIS — M46.1 SI (SACROILIAC) JOINT INFLAMMATION (HCC): ICD-10-CM

## 2023-03-01 DIAGNOSIS — G89.4 CHRONIC PAIN SYNDROME: ICD-10-CM

## 2023-03-01 PROCEDURE — G8484 FLU IMMUNIZE NO ADMIN: HCPCS | Performed by: NURSE PRACTITIONER

## 2023-03-01 PROCEDURE — 1036F TOBACCO NON-USER: CPT | Performed by: NURSE PRACTITIONER

## 2023-03-01 PROCEDURE — 3017F COLORECTAL CA SCREEN DOC REV: CPT | Performed by: NURSE PRACTITIONER

## 2023-03-01 PROCEDURE — G8420 CALC BMI NORM PARAMETERS: HCPCS | Performed by: NURSE PRACTITIONER

## 2023-03-01 PROCEDURE — 99214 OFFICE O/P EST MOD 30 MIN: CPT | Performed by: NURSE PRACTITIONER

## 2023-03-01 PROCEDURE — G8427 DOCREV CUR MEDS BY ELIG CLIN: HCPCS | Performed by: NURSE PRACTITIONER

## 2023-03-01 ASSESSMENT — ENCOUNTER SYMPTOMS
VOMITING: 0
ABDOMINAL PAIN: 0
EYE PAIN: 0
NAUSEA: 0
SHORTNESS OF BREATH: 0
SORE THROAT: 0
BACK PAIN: 1
DIARRHEA: 0
PHOTOPHOBIA: 0
WHEEZING: 0
CONSTIPATION: 0
COLOR CHANGE: 0
SINUS PRESSURE: 0
COUGH: 0
RHINORRHEA: 0
CHEST TIGHTNESS: 0

## 2023-03-01 NOTE — PROGRESS NOTES
[facility] [address]  Dept: [phone]  Dept Fax: [phone]: [phone]    Visit Date: 3/1/2023    Functionality Assessment/Goals Worksheet     On a scale of 0 (Does not Interfere) to 10 (Completely Interferes)     1. Which number describes how during the past week pain has interfered with       the following:  A. General Activity:  6  B. Mood: 7  C. Walking Ability:  6  D. Normal Work (Includes both work outside the home and housework):  6  E. Relations with Other People:   6  F. Sleep:   8  G. Enjoyment of Life:   6    2. Patient Prefers to Take their Pain Medications:     [x]  On a regular basis   []  Only when necessary    []  Does not take pain medications    3. What are the Patient's Goals/Expectations for Visiting Pain Management? [x]  Learn about my pain    []  Receive Medication   []  Physical Therapy     []  Treat Depression   []  Receive Injections    []  Treat Sleep   []  Deal with Anxiety and Stress   []  Treat Opoid Dependence/Addiction   []  Other:    HPI:   Richard Hawley is a 50 y.o. female is here today for    Chief Complaint: neck thoracic  lumbar hip Si knee pain. BLE BUE       F/U PT for cervical pain  with BUE radicular s/s. Insurance denied TFCESI  until PT and NSAID use . She states she  did not go to PT it was a waste of her time. I encouraged PT   due to insurance denial of procedures until she  completes PT. Dr Diana Gauthier s changed medications recently . Started Voclosporin has  labs in 2 weeks. She continues to have neck pain   BUE throbbing pain C5-6 distribution bilaterally she does have  relief for her headaches. Her neck pain is low until she turns head or neck and looks up and down. Then increases the pain with any movement. Her BUE  throbbing pain increases with raising arms pushing pulling lifting  and when dependent.  She has BUE weakness and weak grasp. She continues to have low back SI pain mid thoracic pain also. Still has not went to [facility] per Dr Jess Preston referred her for workup. She had heart cath Feb 2021. She remains off work due to cardiac issues. She is unable to tolerate PT      She follows Rheumatology and recently started [facility]      Her neck lumbar SI   Pain increases with bending, lifting, twisting , reaching, pushing, pulling, turning head, walking, standing, raising arms, stairs and getting up and down. Treatments Tried PT, ice, heat, Chiropractor, NSAIDS, narcotics, muscle relaxer, OTC rubs creams patches, steroid burst and injections  Pain Description sharp, shooting, stabbing, burning, pressure, throbbing, aching, numbness, tingling and pins and needles     She denies any ER visits or new health issues since last visit. Pain scale with out pain medications or at its worst is 7/10. Pain scale with pain medications or at its best is 5/10. Last dose of [place]  was today  Drug screen reviewed from 11/2022 and was appropriate  Pill count completed  today and WNL: Yes         Prior Injections:    ELLIOT C6 11/1/2022 helped a few days only overall. TFLESI L5 left in 2018 with 50% pain relief 18 months. EMG  BUE Showed C5-6 Bilateral cervical radiculopathy . Radiology:    MRI cervical   :               There is 3 mm of anterolisthesis of C7 on T1. There is loss of disc height and there is degenerative marrow edema in the endplates at the R2-0 level. This is also present at the C6-7 level. No suspicious osseous lesions are present. The facet joints are    normally aligned. The cervical spinal cord is of normal caliber and signal intensity. The visualized aspects of the posterior fossa are normal.       On the axial images, at C2-C3, there is a small posterior disc osteophyte complex. There are bilateral facet degenerative changes. There is no spinal canal stenosis.  There is mild right foraminal stenosis. At C3-C4, there is a posterior disc osteophyte complex. There are bilateral facet degenerative changes. There is moderate severity spinal canal stenosis. There is moderate to severe bilateral foraminal stenosis. At C4-C5, there is a small posterior disc osteophyte complex. There are facet degenerative changes. There is mild spinal canal stenosis. There is no foraminal stenosis. At C5-C6, there is a small posterior disc osteophyte complex. There is mild spinal canal stenosis. There is no foraminal stenosis. At C6-C7, there is a small posterior disc osteophyte complex. There is mild spinal canal stenosis and bilateral foraminal stenosis. At C7-T1, there are facet degenerative changes. There is uncovering of the disc. There is mild spinal canal stenosis. There is mild foraminal stenosis. There are no suspicious findings in the cervical soft tissues. Impression       1. Moderate severity spinal canal stenosis with moderate to severe bilateral foraminal stenosis at the C3-4 level due to degenerative changes. 2. Mild spinal canal stenosis and/or foraminal stenosis at other levels as described above. EMG                  The patientis allergic to codeine. Subjective:      Review of Systems   Constitutional:  Negative for activity change, appetite change, chills, diaphoresis, fatigue, fever and unexpected weight change. HENT:  Positive for dental problem. Negative for congestion, ear pain, hearing loss, mouth sores, nosebleeds, rhinorrhea, sinus pressure and sore throat. Dental infection recently   Eyes:  Negative for photophobia, pain and visual disturbance. Respiratory:  Negative for cough, chest tightness, shortness of breath and wheezing. COVID     Cardiovascular:  Negative for chest pain and palpitations.         HTN CAD  Recent abnormal heart cath, pending [facility] referral for possible surgery   Gastrointestinal:  Negative for abdominal pain, constipation, diarrhea, nausea and vomiting. Endocrine: Negative for cold intolerance, heat intolerance, polydipsia, polyphagia and polyuria. Thyroid issues   Genitourinary:  Negative for decreased urine volume, difficulty urinating, frequency and hematuria. CKD   Musculoskeletal:  Positive for arthralgias, back pain, joint swelling, myalgias, neck pain and neck stiffness. Negative for gait problem. Skin:  Negative for color change and rash. Hot water burn  8/31/2020 . healed   Allergic/Immunologic: Negative for food allergies and immunocompromised state. Neurological:  Positive for dizziness, weakness, numbness and headaches. Negative for tremors, seizures, syncope, facial asymmetry, speech difficulty and light-headedness. H/o seizures,no seizures in 16 years. H/O Lupus   Hematological:  Does not bruise/bleed easily. Psychiatric/Behavioral:  Negative for agitation, behavioral problems, confusion, decreased concentration, dysphoric mood, hallucinations, self-injury, sleep disturbance and suicidal ideas. The patient is not nervous/anxious and is not hyperactive. Objective:     Vitals:    03/01/23 1518   BP: 112/62   Site: Left Upper Arm   Position: Sitting   Cuff Size: Medium Adult   Pulse: 62   Weight: 141 lb (64 kg)   Height: 5' 6\" (1.676 m)       Physical Exam  Vitals and nursing note reviewed. Constitutional:       General: She is not in acute distress. Appearance: She is well-developed. She is not diaphoretic. HENT:      Head: Normocephalic and atraumatic. Right Ear: External ear normal.      Left Ear: External ear normal.      Nose: Nose normal.      Mouth/Throat:      Pharynx: No oropharyngeal exudate. Eyes:      General: No scleral icterus. Right eye: No discharge. Left eye: No discharge. Conjunctiva/sclera: Conjunctivae normal.      Pupils: Pupils are equal, round, and reactive to light.    Neck:      Thyroid: No thyromegaly. Cardiovascular:      Rate and Rhythm: Normal rate and regular rhythm. Heart sounds: Normal heart sounds. No murmur heard. No friction rub. No gallop. Pulmonary:      Effort: Pulmonary effort is normal. No respiratory distress. Breath sounds: Normal breath sounds. No wheezing or rales. Chest:      Chest wall: No tenderness. Abdominal:      General: Bowel sounds are normal. There is no distension. Palpations: Abdomen is soft. Tenderness: There is no abdominal tenderness. There is no guarding or rebound. Musculoskeletal:         General: Tenderness present. Right shoulder: Tenderness present. Decreased range of motion. Decreased strength. Left shoulder: Tenderness present. Right upper arm: Tenderness present. Right forearm: Tenderness present. Left forearm: Tenderness present. Right wrist: Tenderness and bony tenderness present. Left wrist: Tenderness and bony tenderness present. Right hand: Tenderness and bony tenderness present. Decreased range of motion. Decreased strength. Decreased sensation. Left hand: Tenderness and bony tenderness present. Decreased range of motion. Decreased strength. Decreased sensation. Cervical back: Neck supple. Spasms, tenderness and bony tenderness present. No edema, erythema or rigidity. Pain with movement and muscular tenderness present. Decreased range of motion. Thoracic back: Spasms, tenderness and bony tenderness present. Decreased range of motion. Lumbar back: Spasms, tenderness and bony tenderness present. Decreased range of motion. Back:       Right hip: Tenderness present. Left hip: Normal.      Right upper leg: Tenderness present. Right knee: Normal.      Left knee: Normal.      Right ankle: Tenderness present. Right foot: Decreased range of motion. Swelling, tenderness and bony tenderness present.       Comments: 2 right broken toes has walker shoe on Skin:     General: Skin is warm. Coloration: Skin is not pale. Findings: No erythema or rash. Neurological:      Mental Status: She is alert and oriented to person, place, and time. She is not disoriented. Cranial Nerves: No cranial nerve deficit. Sensory: Sensation is intact. No sensory deficit. Motor: Motor function is intact. No atrophy or abnormal muscle tone. Coordination: Coordination is intact. Coordination normal.      Gait: Gait abnormal.      Deep Tendon Reflexes: Babinski sign absent on the right side. Reflex Scores:       Tricep reflexes are 2+ on the right side and 2+ on the left side. Bicep reflexes are 2+ on the right side and 2+ on the left side. Brachioradialis reflexes are 2+ on the right side and 2+ on the left side. Patellar reflexes are 2+ on the right side and 2+ on the left side. Achilles reflexes are 2+ on the right side and 2+ on the left side. Comments: Motor  4/5 RUE  5/5 BLE   Psychiatric:         Attention and Perception: Attention and perception normal. She is attentive. Mood and Affect: Mood and affect normal. Mood is not anxious or depressed. Affect is not labile, blunt, angry or inappropriate. Speech: Speech normal. She is communicative. Speech is not rapid and pressured, delayed, slurred or tangential.         Behavior: Behavior normal. Behavior is not agitated, slowed, aggressive, withdrawn, hyperactive or combative. Behavior is cooperative. Thought Content: Thought content normal. Thought content is not paranoid or delusional. Thought content does not include homicidal or suicidal ideation. Thought content does not include homicidal or suicidal plan. Cognition and Memory: Cognition and memory normal. Memory is not impaired. She does not exhibit impaired recent memory or impaired remote memory. Judgment: Judgment normal. Judgment is not impulsive or inappropriate. VONDA  Patricks test  positive  Yeoman's  or Gaenslen's positive  Kemps  positive  Spurlings  positive  Todd's na         Assessment:     1. Cervical radiculitis    2. Cervical spinal stenosis    3. Spondylosis of lumbar region without myelopathy or radiculopathy    4. Spinal stenosis of lumbar region without neurogenic claudication    5. SI (sacroiliac) joint inflammation (HCC)    6. Spondylosis of thoracic region without myelopathy or radiculopathy    7. Bilateral sacroiliitis ([address]    8. Lumbar stenosis with neurogenic claudication    9. Lumbar radiculopathy    10. Lumbar radiculitis    11. Chronic pain syndrome            Plan:      OARRS reviewed. Current MED: 15  Patient was not offered naloxone for home. Testing Labs or Radiology reviewed:   Testing Labs or Radiology ordered:  Procedures::when completes PT will offer TFCESI C5-6 bilaterally to help BUE radicular s/s C5-6 distribution and per EMG   Discussed with patient about risks with procedure including infection, reaction to medication, increased pain, or bleeding. Medications:Norco Neurontin       Meds. Prescribed:   No orders of the defined types were placed in this encounter. Return in about 3 months (around 6/1/2023).                Electronically signed by MACKENZIE Suarez CNP on3/1/2023 at 3:57 PM

## 2023-03-07 ENCOUNTER — TELEPHONE (OUTPATIENT)
Dept: RHEUMATOLOGY | Age: 53
End: 2023-03-07

## 2023-03-07 DIAGNOSIS — M32.14 SYSTEMIC LUPUS ERYTHEMATOSUS WITH GLOMERULAR DISEASE, UNSPECIFIED SLE TYPE (HCC): ICD-10-CM

## 2023-03-07 DIAGNOSIS — Z87.39 H/O LUPUS NEPHRITIS: ICD-10-CM

## 2023-03-07 DIAGNOSIS — R80.1 PERSISTENT PROTEINURIA: ICD-10-CM

## 2023-03-07 NOTE — TELEPHONE ENCOUNTER
Alcides Mak from [employer] stated pt needs a refill on Voclosporin.    NCYI:7/97/27  Donv: 4/18/23

## 2023-03-20 ENCOUNTER — NURSE ONLY (OUTPATIENT)
Dept: LAB | Age: 53
End: 2023-03-20

## 2023-03-20 DIAGNOSIS — M32.14 OTHER SYSTEMIC LUPUS ERYTHEMATOSUS WITH GLOMERULAR DISEASE (HCC): ICD-10-CM

## 2023-03-20 DIAGNOSIS — Z51.81 MEDICATION MONITORING ENCOUNTER: ICD-10-CM

## 2023-03-20 DIAGNOSIS — R80.1 PERSISTENT PROTEINURIA: ICD-10-CM

## 2023-03-20 DIAGNOSIS — G56.23 CUBITAL TUNNEL SYNDROME, BILATERAL: ICD-10-CM

## 2023-03-20 DIAGNOSIS — M32.14 SYSTEMIC LUPUS ERYTHEMATOSUS WITH GLOMERULAR DISEASE, UNSPECIFIED SLE TYPE (HCC): ICD-10-CM

## 2023-03-20 LAB
ALBUMIN SERPL BCG-MCNC: 4.1 G/DL (ref 3.5–5.1)
ALP SERPL-CCNC: 75 U/L (ref 38–126)
ALT SERPL W/O P-5'-P-CCNC: 9 U/L (ref 11–66)
ANION GAP SERPL CALC-SCNC: 10 MEQ/L (ref 8–16)
AST SERPL-CCNC: 12 U/L (ref 5–40)
BACTERIA: ABNORMAL
BASOPHILS ABSOLUTE: 0 THOU/MM3 (ref 0–0.1)
BASOPHILS NFR BLD AUTO: 0.4 %
BILIRUB SERPL-MCNC: < 0.2 MG/DL (ref 0.3–1.2)
BILIRUB UR QL STRIP: NEGATIVE
BUN SERPL-MCNC: 21 MG/DL (ref 7–22)
CALCIUM SERPL-MCNC: 9.4 MG/DL (ref 8.5–10.5)
CASTS #/AREA URNS LPF: ABNORMAL /LPF
CASTS #/AREA URNS LPF: ABNORMAL /LPF
CHARACTER UR: CLEAR
CHARCOAL URNS QL MICRO: ABNORMAL
CHLORIDE SERPL-SCNC: 105 MEQ/L (ref 98–111)
CO2 SERPL-SCNC: 27 MEQ/L (ref 23–33)
COLOR UR: YELLOW
CREAT SERPL-MCNC: 1.7 MG/DL (ref 0.4–1.2)
CREAT UR-MCNC: 263.2 MG/DL
CRYSTALS URNS QL MICRO: ABNORMAL
DEPRECATED RDW RBC AUTO: 56.6 FL (ref 35–45)
EOSINOPHIL NFR BLD AUTO: 0.1 %
EOSINOPHILS ABSOLUTE: 0 THOU/MM3 (ref 0–0.4)
EPITHELIAL CELLS, UA: ABNORMAL /HPF
ERYTHROCYTE [DISTWIDTH] IN BLOOD BY AUTOMATED COUNT: 16.2 % (ref 11.5–14.5)
ERYTHROCYTE [SEDIMENTATION RATE] IN BLOOD BY WESTERGREN METHOD: 12 MM/HR (ref 0–20)
GFR SERPL CREATININE-BSD FRML MDRD: 36 ML/MIN/1.73M2
GLUCOSE SERPL-MCNC: 103 MG/DL (ref 70–108)
GLUCOSE UR QL STRIP.AUTO: NEGATIVE MG/DL
HCT VFR BLD AUTO: 34.7 % (ref 37–47)
HGB BLD-MCNC: 10.2 GM/DL (ref 12–16)
HGB UR QL STRIP.AUTO: NEGATIVE
IMM GRANULOCYTES # BLD AUTO: 0.02 THOU/MM3 (ref 0–0.07)
IMM GRANULOCYTES NFR BLD AUTO: 0.3 %
KETONES UR QL STRIP.AUTO: ABNORMAL
LEUKOCYTE ESTERASE UR QL STRIP.AUTO: NEGATIVE
LYMPHOCYTES ABSOLUTE: 0.6 THOU/MM3 (ref 1–4.8)
LYMPHOCYTES NFR BLD AUTO: 7.6 %
MCH RBC QN AUTO: 28.1 PG (ref 26–33)
MCHC RBC AUTO-ENTMCNC: 29.4 GM/DL (ref 32.2–35.5)
MCV RBC AUTO: 95.6 FL (ref 81–99)
MONOCYTES ABSOLUTE: 0.2 THOU/MM3 (ref 0.4–1.3)
MONOCYTES NFR BLD AUTO: 2.5 %
NEUTROPHILS NFR BLD AUTO: 89.1 %
NITRITE UR QL STRIP.AUTO: NEGATIVE
NRBC BLD AUTO-RTO: 0 /100 WBC
PH UR STRIP.AUTO: 5 [PH] (ref 5–9)
PLATELET # BLD AUTO: 136 THOU/MM3 (ref 130–400)
PMV BLD AUTO: 12.3 FL (ref 9.4–12.4)
POTASSIUM SERPL-SCNC: 4.7 MEQ/L (ref 3.5–5.2)
PROT SERPL-MCNC: 6.5 G/DL (ref 6.1–8)
PROT UR STRIP.AUTO-MCNC: 300 MG/DL
PROT UR-MCNC: 358.5 MG/DL
PROT/CREAT 24H UR: 1.36 MG/G{CREAT}
RBC # BLD AUTO: 3.63 MILL/MM3 (ref 4.2–5.4)
RBC #/AREA URNS HPF: ABNORMAL /HPF
RENAL EPI CELLS #/AREA URNS HPF: ABNORMAL /[HPF]
SEGMENTED NEUTROPHILS ABSOLUTE COUNT: 6.9 THOU/MM3 (ref 1.8–7.7)
SODIUM SERPL-SCNC: 142 MEQ/L (ref 135–145)
SP GR UR REFRACT.AUTO: 1.02 (ref 1–1.03)
UROBILINOGEN UR QL STRIP.AUTO: 0.2 EU/DL (ref 0–1)
WBC # BLD AUTO: 7.7 THOU/MM3 (ref 4.8–10.8)
WBC #/AREA URNS HPF: ABNORMAL /HPF
YEAST LIKE FUNGI URNS QL MICRO: ABNORMAL

## 2023-03-20 RX ORDER — PREDNISONE 10 MG/1
10 TABLET ORAL 2 TIMES DAILY
Qty: 60 TABLET | Refills: 0 | Status: SHIPPED | OUTPATIENT
Start: 2023-03-20 | End: 2024-03-19

## 2023-03-20 RX ORDER — MYCOPHENOLATE MOFETIL 500 MG/1
TABLET ORAL
Qty: 120 TABLET | Refills: 1 | Status: SHIPPED | OUTPATIENT
Start: 2023-03-20 | End: 2023-04-19

## 2023-03-21 DIAGNOSIS — G89.4 CHRONIC PAIN SYNDROME: ICD-10-CM

## 2023-03-21 LAB
C3C SERPL-MCNC: 129 MG/DL (ref 90–180)
C4 SERPL-MCNC: 23 MG/DL (ref 10–40)

## 2023-03-21 NOTE — TELEPHONE ENCOUNTER
OARRS reviewed. UDS: + for  hydrocodone gabapentin consistent. Last seen: 3/1/2023.  Follow-up:   Future Appointments   Date Time Provider Justus Burciagai   4/18/2023  2:20 PM MACKENZIE Gaming CNP N [facility] Rheum [facility] [place]   5/18/2023  3:20 PM MD GAYATRI Rosas [facility], [facility]. [facility] [place]   5/31/2023  3:40 PM [facility] MACKENZIE Clark Che, CNP N SRPX Pain [facility] [place]   6/20/2023  2:20 PM Becky Manzo DO N SRPX Rheum [facility] [place]

## 2023-03-21 NOTE — TELEPHONE ENCOUNTER
Zackary Abrams called requesting a refill on the following medications:  Requested Prescriptions     Pending Prescriptions Disp Refills    HYDROcodone-acetaminophen (NORCO) 5-325 MG per tablet 90 tablet 0     Sig: Take 1 tablet by mouth every 8 hours as needed for Pain for up to 30 days. Pharmacy verified: [facility]  . pv      Date of last visit: 3/01/2023  Date of next visit (if applicable): 0/79/8176 (3) no apparent problem

## 2023-03-22 RX ORDER — HYDROCODONE BITARTRATE AND ACETAMINOPHEN 5; 325 MG/1; MG/1
1 TABLET ORAL EVERY 8 HOURS PRN
Qty: 90 TABLET | Refills: 0 | Status: SHIPPED | OUTPATIENT
Start: 2023-03-27 | End: 2023-04-26

## 2023-03-27 ENCOUNTER — TELEPHONE (OUTPATIENT)
Dept: RHEUMATOLOGY | Age: 53
End: 2023-03-27

## 2023-03-27 DIAGNOSIS — Z87.39 H/O LUPUS NEPHRITIS: ICD-10-CM

## 2023-03-27 DIAGNOSIS — M32.14 SYSTEMIC LUPUS ERYTHEMATOSUS WITH GLOMERULAR DISEASE, UNSPECIFIED SLE TYPE (HCC): ICD-10-CM

## 2023-03-27 DIAGNOSIS — R80.1 PERSISTENT PROTEINURIA: ICD-10-CM

## 2023-03-27 NOTE — TELEPHONE ENCOUNTER
Diagnosis Orders   1. Systemic lupus erythematosus with glomerular disease, unspecified SLE type (HCC)  Voclosporin 7.9 MG CAPS      2. Persistent proteinuria  Voclosporin 7.9 MG CAPS      3.  H/O lupus nephritis  Voclosporin 7.9 MG CAPS

## 2023-03-27 NOTE — TELEPHONE ENCOUNTER
Pt informed of results and states she needs a refill on voclosporin. The blood testing show stable kidney function test, resolution of the low white blood count and improvement of the anemia. The protein in the urine has significantly improved compared with the last evaluation. Please have your labs repeated in 4 weeks. Please decrease the prednisone to 20mg daily. Please continue with the voclosporin and Mycophenolate.

## 2023-04-18 ENCOUNTER — OFFICE VISIT (OUTPATIENT)
Dept: RHEUMATOLOGY | Age: 53
End: 2023-04-18
Payer: COMMERCIAL

## 2023-04-18 VITALS
DIASTOLIC BLOOD PRESSURE: 68 MMHG | BODY MASS INDEX: 22.73 KG/M2 | HEART RATE: 88 BPM | WEIGHT: 141.4 LBS | OXYGEN SATURATION: 96 % | HEIGHT: 66 IN | SYSTOLIC BLOOD PRESSURE: 110 MMHG

## 2023-04-18 DIAGNOSIS — R80.1 PERSISTENT PROTEINURIA: ICD-10-CM

## 2023-04-18 DIAGNOSIS — M32.14 OTHER SYSTEMIC LUPUS ERYTHEMATOSUS WITH GLOMERULAR DISEASE (HCC): ICD-10-CM

## 2023-04-18 DIAGNOSIS — M32.14 SYSTEMIC LUPUS ERYTHEMATOSUS WITH GLOMERULAR DISEASE, UNSPECIFIED SLE TYPE (HCC): ICD-10-CM

## 2023-04-18 DIAGNOSIS — M32.15 SYSTEMIC LUPUS ERYTHEMATOSUS WITH TUBULO-INTERSTITIAL NEPHROPATHY, UNSPECIFIED SLE TYPE (HCC): Primary | ICD-10-CM

## 2023-04-18 DIAGNOSIS — G56.23 CUBITAL TUNNEL SYNDROME, BILATERAL: ICD-10-CM

## 2023-04-18 PROCEDURE — G8427 DOCREV CUR MEDS BY ELIG CLIN: HCPCS | Performed by: NURSE PRACTITIONER

## 2023-04-18 PROCEDURE — G8420 CALC BMI NORM PARAMETERS: HCPCS | Performed by: NURSE PRACTITIONER

## 2023-04-18 PROCEDURE — 99214 OFFICE O/P EST MOD 30 MIN: CPT | Performed by: NURSE PRACTITIONER

## 2023-04-18 PROCEDURE — 1036F TOBACCO NON-USER: CPT | Performed by: NURSE PRACTITIONER

## 2023-04-18 PROCEDURE — 3017F COLORECTAL CA SCREEN DOC REV: CPT | Performed by: NURSE PRACTITIONER

## 2023-04-18 ASSESSMENT — ENCOUNTER SYMPTOMS
BACK PAIN: 0
NAUSEA: 0
EYE ITCHING: 0
ABDOMINAL PAIN: 0
DIARRHEA: 0
TROUBLE SWALLOWING: 0
CONSTIPATION: 0
SHORTNESS OF BREATH: 0
COUGH: 0
EYE PAIN: 0

## 2023-04-24 DIAGNOSIS — G89.4 CHRONIC PAIN SYNDROME: ICD-10-CM

## 2023-04-24 RX ORDER — HYDROCODONE BITARTRATE AND ACETAMINOPHEN 5; 325 MG/1; MG/1
1 TABLET ORAL EVERY 8 HOURS PRN
Qty: 90 TABLET | Refills: 0 | Status: SHIPPED | OUTPATIENT
Start: 2023-04-26 | End: 2023-05-26

## 2023-04-24 NOTE — TELEPHONE ENCOUNTER
Dell Treadwell called requesting a refill on the following medications:  Requested Prescriptions     Pending Prescriptions Disp Refills    HYDROcodone-acetaminophen (NORCO) 5-325 MG per tablet 90 tablet 0     Sig: Take 1 tablet by mouth every 8 hours as needed for Pain for up to 30 days. [facility] verified[facility]      Date of last visit: 3-1-23  Date of next visit (if applicable): 2/95/6297

## 2023-04-24 NOTE — TELEPHONE ENCOUNTER
OARRS reviewed. UDS: + for  hydrocodone gabapentin consistent. Last seen: 3/1/2023.  Follow-up:   Future Appointments   Date Time Provider Justus Dumont   5/18/2023  3:20 PM Conrad Aguirre MD N [address] [facility]   5/31/2023  3:40 PM MACKENZIE Chowdary - CNP N SRPX Pain [facility]   6/20/2023  2:20 PM Zaida Jaeger DO N SRPX Rheum [facility]

## 2023-05-03 DIAGNOSIS — M32.14 SYSTEMIC LUPUS ERYTHEMATOSUS WITH GLOMERULAR DISEASE, UNSPECIFIED SLE TYPE (HCC): ICD-10-CM

## 2023-05-03 DIAGNOSIS — E03.9 ACQUIRED HYPOTHYROIDISM: ICD-10-CM

## 2023-05-03 DIAGNOSIS — G56.23 CUBITAL TUNNEL SYNDROME, BILATERAL: ICD-10-CM

## 2023-05-03 DIAGNOSIS — M32.14 OTHER SYSTEMIC LUPUS ERYTHEMATOSUS WITH GLOMERULAR DISEASE (HCC): ICD-10-CM

## 2023-05-03 DIAGNOSIS — R80.1 PERSISTENT PROTEINURIA: ICD-10-CM

## 2023-05-03 RX ORDER — MYCOPHENOLATE MOFETIL 500 MG/1
TABLET ORAL
Qty: 120 TABLET | Refills: 1 | OUTPATIENT
Start: 2023-05-03 | End: 2023-06-02

## 2023-05-03 RX ORDER — HYDROXYCHLOROQUINE SULFATE 200 MG/1
200 TABLET, FILM COATED ORAL 2 TIMES DAILY
Qty: 60 TABLET | Refills: 5 | OUTPATIENT
Start: 2023-05-03

## 2023-05-03 RX ORDER — ERGOCALCIFEROL 1.25 MG/1
50000 CAPSULE ORAL WEEKLY
Qty: 12 CAPSULE | Refills: 3 | Status: SHIPPED | OUTPATIENT
Start: 2023-05-03

## 2023-05-03 RX ORDER — LEVOTHYROXINE SODIUM 112 UG/1
TABLET ORAL
Qty: 90 TABLET | Refills: 1 | Status: SHIPPED | OUTPATIENT
Start: 2023-05-03

## 2023-05-03 RX ORDER — ACETAMINOPHEN 500 MG
1000 TABLET ORAL EVERY 6 HOURS PRN
Qty: 120 TABLET | Refills: 3 | Status: SHIPPED | OUTPATIENT
Start: 2023-05-03

## 2023-05-03 RX ORDER — PREDNISONE 10 MG/1
10 TABLET ORAL 2 TIMES DAILY
Qty: 60 TABLET | Refills: 0 | Status: SHIPPED | OUTPATIENT
Start: 2023-05-03 | End: 2024-05-02

## 2023-05-03 NOTE — TELEPHONE ENCOUNTER
[facility] called requesting a refill on the following medications:  Requested Prescriptions     Pending Prescriptions Disp Refills    predniSONE (DELTASONE) 10 MG tablet 60 tablet 0     Sig: Take 1 tablet by mouth 2 times daily    mycophenolate (CELLCEPT) 500 MG tablet 120 tablet 1     Sig: Take 2 tablets by mouth every morning (before breakfast) AND 2 tablets every evening. [facility]   . pv      Date of last visit:   Date of next visit (if applicable): 9/61/6640

## 2023-05-03 NOTE — TELEPHONE ENCOUNTER
Patient requesting the following (pharmacy conf)  Please approve or refuse Rx request:  Requested Prescriptions     Pending Prescriptions Disp Refills    levothyroxine (SYNTHROID) 112 MCG tablet 90 tablet 1     Sig: take 1 tablet by mouth once daily    acetaminophen (APAP EXTRA STRENGTH) 500 MG tablet 120 tablet 3     Sig: Take 2 tablets by mouth every 6 hours as needed for Pain       Next appointment:  Visit date not found

## 2023-05-10 ENCOUNTER — NURSE ONLY (OUTPATIENT)
Dept: LAB | Age: 53
End: 2023-05-10

## 2023-05-10 DIAGNOSIS — Z51.81 MEDICATION MONITORING ENCOUNTER: ICD-10-CM

## 2023-05-10 DIAGNOSIS — R80.1 PERSISTENT PROTEINURIA: ICD-10-CM

## 2023-05-10 DIAGNOSIS — M32.14 OTHER SYSTEMIC LUPUS ERYTHEMATOSUS WITH GLOMERULAR DISEASE (HCC): ICD-10-CM

## 2023-05-10 DIAGNOSIS — G56.23 CUBITAL TUNNEL SYNDROME, BILATERAL: ICD-10-CM

## 2023-05-10 DIAGNOSIS — M32.14 SYSTEMIC LUPUS ERYTHEMATOSUS WITH GLOMERULAR DISEASE, UNSPECIFIED SLE TYPE (HCC): ICD-10-CM

## 2023-05-10 LAB
25(OH)D3 SERPL-MCNC: 42 NG/ML (ref 30–100)
ALBUMIN SERPL BCG-MCNC: 4 G/DL (ref 3.5–5.1)
ALP SERPL-CCNC: 67 U/L (ref 38–126)
ALT SERPL W/O P-5'-P-CCNC: 10 U/L (ref 11–66)
ANION GAP SERPL CALC-SCNC: 11 MEQ/L (ref 8–16)
AST SERPL-CCNC: 14 U/L (ref 5–40)
BACTERIA: ABNORMAL
BASOPHILS ABSOLUTE: 0 THOU/MM3 (ref 0–0.1)
BASOPHILS NFR BLD AUTO: 0.1 %
BILIRUB SERPL-MCNC: < 0.2 MG/DL (ref 0.3–1.2)
BILIRUB UR QL STRIP: NEGATIVE
BUN SERPL-MCNC: 38 MG/DL (ref 7–22)
C3C SERPL-MCNC: 127 MG/DL (ref 90–180)
C4 SERPL-MCNC: 25 MG/DL (ref 10–40)
CALCIUM SERPL-MCNC: 9.5 MG/DL (ref 8.5–10.5)
CASTS #/AREA URNS LPF: ABNORMAL /LPF
CASTS #/AREA URNS LPF: ABNORMAL /LPF
CHARACTER UR: CLEAR
CHARCOAL URNS QL MICRO: ABNORMAL
CHLORIDE SERPL-SCNC: 108 MEQ/L (ref 98–111)
CO2 SERPL-SCNC: 24 MEQ/L (ref 23–33)
COLOR UR: YELLOW
CREAT SERPL-MCNC: 1.9 MG/DL (ref 0.4–1.2)
CREAT UR-MCNC: 161.7 MG/DL
CRYSTALS URNS QL MICRO: ABNORMAL
DEPRECATED RDW RBC AUTO: 52.2 FL (ref 35–45)
EOSINOPHIL NFR BLD AUTO: 0.2 %
EOSINOPHILS ABSOLUTE: 0 THOU/MM3 (ref 0–0.4)
EPITHELIAL CELLS, UA: ABNORMAL /HPF
ERYTHROCYTE [DISTWIDTH] IN BLOOD BY AUTOMATED COUNT: 14.7 % (ref 11.5–14.5)
ERYTHROCYTE [SEDIMENTATION RATE] IN BLOOD BY WESTERGREN METHOD: 14 MM/HR (ref 0–20)
GFR SERPL CREATININE-BSD FRML MDRD: 32 ML/MIN/1.73M2
GLUCOSE SERPL-MCNC: 129 MG/DL (ref 70–108)
GLUCOSE UR QL STRIP.AUTO: NEGATIVE MG/DL
HCT VFR BLD AUTO: 36.6 % (ref 37–47)
HGB BLD-MCNC: 10.3 GM/DL (ref 12–16)
HGB UR QL STRIP.AUTO: NEGATIVE
HYPOCHROMIA BLD QL SMEAR: PRESENT
IMM GRANULOCYTES # BLD AUTO: 0.07 THOU/MM3 (ref 0–0.07)
IMM GRANULOCYTES NFR BLD AUTO: 0.8 %
KETONES UR QL STRIP.AUTO: NEGATIVE
LEUKOCYTE ESTERASE UR QL STRIP.AUTO: ABNORMAL
LYMPHOCYTES ABSOLUTE: 1.3 THOU/MM3 (ref 1–4.8)
LYMPHOCYTES NFR BLD AUTO: 14.5 %
MCH RBC QN AUTO: 27.1 PG (ref 26–33)
MCHC RBC AUTO-ENTMCNC: 28.1 GM/DL (ref 32.2–35.5)
MCV RBC AUTO: 96.3 FL (ref 81–99)
MONOCYTES ABSOLUTE: 0.5 THOU/MM3 (ref 0.4–1.3)
MONOCYTES NFR BLD AUTO: 5.4 %
NEUTROPHILS NFR BLD AUTO: 79 %
NITRITE UR QL STRIP.AUTO: NEGATIVE
NRBC BLD AUTO-RTO: 0 /100 WBC
PH UR STRIP.AUTO: 5 [PH] (ref 5–9)
PLATELET # BLD AUTO: 211 THOU/MM3 (ref 130–400)
PLATELET BLD QL SMEAR: ADEQUATE
PMV BLD AUTO: 11.7 FL (ref 9.4–12.4)
POTASSIUM SERPL-SCNC: 4.3 MEQ/L (ref 3.5–5.2)
PROT SERPL-MCNC: 6.7 G/DL (ref 6.1–8)
PROT UR STRIP.AUTO-MCNC: 300 MG/DL
PROT UR-MCNC: 111.4 MG/DL
PROT/CREAT 24H UR: 0.69 MG/G{CREAT}
PTH-INTACT SERPL-MCNC: 61.4 PG/ML (ref 15–65)
RBC # BLD AUTO: 3.8 MILL/MM3 (ref 4.2–5.4)
RBC #/AREA URNS HPF: ABNORMAL /HPF
RENAL EPI CELLS #/AREA URNS HPF: ABNORMAL /[HPF]
SCAN OF BLOOD SMEAR: NORMAL
SEGMENTED NEUTROPHILS ABSOLUTE COUNT: 7.3 THOU/MM3 (ref 1.8–7.7)
SODIUM SERPL-SCNC: 143 MEQ/L (ref 135–145)
SP GR UR REFRACT.AUTO: 1.02 (ref 1–1.03)
STOMATOCYTES: ABNORMAL
UROBILINOGEN UR QL STRIP.AUTO: 0.2 EU/DL (ref 0–1)
WBC # BLD AUTO: 9.2 THOU/MM3 (ref 4.8–10.8)
WBC #/AREA URNS HPF: ABNORMAL /HPF
YEAST LIKE FUNGI URNS QL MICRO: ABNORMAL

## 2023-05-10 RX ORDER — PREDNISONE 1 MG/1
TABLET ORAL
Qty: 105 TABLET | Refills: 1 | Status: SHIPPED | OUTPATIENT
Start: 2023-05-10 | End: 2023-09-07

## 2023-05-10 RX ORDER — MYCOPHENOLATE MOFETIL 500 MG/1
TABLET ORAL
Qty: 120 TABLET | Refills: 1 | Status: SHIPPED | OUTPATIENT
Start: 2023-05-10 | End: 2023-06-09

## 2023-05-10 NOTE — PROGRESS NOTES
Diagnosis Orders   1. Other systemic lupus erythematosus with glomerular disease (HCC)  mycophenolate (CELLCEPT) 500 MG tablet    predniSONE (DELTASONE) 5 MG tablet      2. Systemic lupus erythematosus with glomerular disease, unspecified SLE type (HCC)  mycophenolate (CELLCEPT) 500 MG tablet      3. Persistent proteinuria  mycophenolate (CELLCEPT) 500 MG tablet      4. Cubital tunnel syndrome, bilateral  predniSONE (DELTASONE) 5 MG tablet        - wean prednisone to 10mg q am and 7.5mg at bed time.

## 2023-05-18 ENCOUNTER — OFFICE VISIT (OUTPATIENT)
Dept: NEPHROLOGY | Age: 53
End: 2023-05-18
Payer: COMMERCIAL

## 2023-05-18 VITALS
HEIGHT: 66 IN | DIASTOLIC BLOOD PRESSURE: 79 MMHG | SYSTOLIC BLOOD PRESSURE: 120 MMHG | BODY MASS INDEX: 23.14 KG/M2 | OXYGEN SATURATION: 99 % | HEART RATE: 81 BPM | WEIGHT: 144 LBS

## 2023-05-18 DIAGNOSIS — R80.1 PERSISTENT PROTEINURIA: ICD-10-CM

## 2023-05-18 DIAGNOSIS — M32.14 LUPUS NEPHRITIS (HCC): ICD-10-CM

## 2023-05-18 DIAGNOSIS — N18.32 STAGE 3B CHRONIC KIDNEY DISEASE (HCC): Primary | ICD-10-CM

## 2023-05-18 DIAGNOSIS — E78.5 DYSLIPIDEMIA: ICD-10-CM

## 2023-05-18 PROCEDURE — 3017F COLORECTAL CA SCREEN DOC REV: CPT | Performed by: INTERNAL MEDICINE

## 2023-05-18 PROCEDURE — 1036F TOBACCO NON-USER: CPT | Performed by: INTERNAL MEDICINE

## 2023-05-18 PROCEDURE — G8427 DOCREV CUR MEDS BY ELIG CLIN: HCPCS | Performed by: INTERNAL MEDICINE

## 2023-05-18 PROCEDURE — G8420 CALC BMI NORM PARAMETERS: HCPCS | Performed by: INTERNAL MEDICINE

## 2023-05-18 PROCEDURE — 99213 OFFICE O/P EST LOW 20 MIN: CPT | Performed by: INTERNAL MEDICINE

## 2023-05-18 NOTE — PROGRESS NOTES
Renal Progress Note    Assessment and Plan:      Diagnosis Orders   1. Stage 3b chronic kidney disease ([facility]        2. Lupus nephritis ([facility]        3. Dyslipidemia        4. Persistent proteinuria                  PLAN:  Lab results reviewed patient  She understood. All questions. Serum creatinine is increased to 1.9 mg/dL from 1.7 mg/dL but is still within her baseline. PTH is normal at 61.4. Vitamin D level is good at 42. Urine protein creatinine ratio is improved to 690 mg from 1.36 g. Medications reviewed  No changes  Return visit in 6 months with labs          Patient Active Problem List   Diagnosis    Hypothyroid    Systemic lupus erythematosus ([facility]    Hematuria    Proteinuria    Anemia    CKD (chronic kidney disease), stage III ([facility])    Dyslipidemia    Hyperuricemia    Lumbar radiculitis    Spinal stenosis of lumbar region without neurogenic claudication    Renal insufficiency    Type 2 diabetes mellitus without complication, without long-term current use of insulin ([facility])    Cervical radiculopathy    Cervical spinal stenosis    CKD (chronic kidney disease), stage IV ([facility])           Subjective:   Chief complaint:  Chief Complaint   Patient presents with    Chronic Kidney Disease     iiib      HPI:This is a follow up visit for Ms Abigail Martin who is here today for return appointment. I see her for chronic kidney disease among other things. She was last seen about 6 months ago. .  Doing well since that time she was seen. She denies any specific complaint. No blood in the urine. Urine is bubblish sometimes. No chest pain. No shortness of breath. .  She does have chronic joint ache and joint pain exacerbated by activities but relieved by acetaminophen. She has been placed on mycophenolate by Dr. Jina Garcia from rheumatology since last time I saw her    ROS:  Pertinent positives stated above in HPI. All other systems were reviewed and were negative.   Medications:     Current Outpatient Medications

## 2023-05-24 DIAGNOSIS — G89.4 CHRONIC PAIN SYNDROME: ICD-10-CM

## 2023-05-24 RX ORDER — HYDROCODONE BITARTRATE AND ACETAMINOPHEN 5; 325 MG/1; MG/1
1 TABLET ORAL EVERY 8 HOURS PRN
Qty: 90 TABLET | Refills: 0 | Status: SHIPPED | OUTPATIENT
Start: 2023-05-26 | End: 2023-06-25

## 2023-05-24 NOTE — TELEPHONE ENCOUNTER
OARRS reviewed. UDS: + for  hydrocodone. Inconsistent for gabapentin as of 3/1/23. Last seen: 3/1/2023.  Follow-up:   Future Appointments   Date Time Provider Justus Dumont   5/31/2023  3:40 PM MACKENZIE Brice - CNP N SRPX Pain [facility]   6/20/2023  2:20 PM DO GAYATRI Tovar SRPX Rheum [facility]   11/30/2023  2:40 PM Conrad Flynn MD [facility], [facility]. [facility]

## 2023-05-24 NOTE — TELEPHONE ENCOUNTER
Geremias Madera called requesting a refill on the following medications:  Requested Prescriptions     Pending Prescriptions Disp Refills    HYDROcodone-acetaminophen (NORCO) 5-325 MG per tablet 90 tablet 0     Sig: Take 1 tablet by mouth every 8 hours as needed for Pain for up to 30 days.      Pharmacy verified:  [facility]      Date of last visit: 03-01-23  Date of next visit: 5/31/2023

## 2023-05-25 DIAGNOSIS — Z87.39 H/O LUPUS NEPHRITIS: ICD-10-CM

## 2023-05-25 DIAGNOSIS — R80.1 PERSISTENT PROTEINURIA: ICD-10-CM

## 2023-05-25 DIAGNOSIS — M32.14 SYSTEMIC LUPUS ERYTHEMATOSUS WITH GLOMERULAR DISEASE, UNSPECIFIED SLE TYPE (HCC): ICD-10-CM

## 2023-05-25 NOTE — TELEPHONE ENCOUNTER
Pharmacy calling in for patient    Thomas Adkins called requesting a refill on the following medications:  Requested Prescriptions     Pending Prescriptions Disp Refills    Voclosporin 7.9 MG CAPS 120 capsule 0     Sig: Take 15.8 mg by mouth in the morning and at bedtime     Pharmacy verified: [address] [place] [address]  .       Date of last visit: 4/18/2023  Date of next visit (if applicable): 5/98/6900

## 2023-05-31 ENCOUNTER — OFFICE VISIT (OUTPATIENT)
Dept: PHYSICAL MEDICINE AND REHAB | Age: 53
End: 2023-05-31
Payer: COMMERCIAL

## 2023-05-31 VITALS
HEIGHT: 66 IN | HEART RATE: 78 BPM | SYSTOLIC BLOOD PRESSURE: 128 MMHG | WEIGHT: 144 LBS | BODY MASS INDEX: 23.14 KG/M2 | DIASTOLIC BLOOD PRESSURE: 70 MMHG

## 2023-05-31 DIAGNOSIS — M46.1 SI (SACROILIAC) JOINT INFLAMMATION (HCC): ICD-10-CM

## 2023-05-31 DIAGNOSIS — M48.02 CERVICAL SPINAL STENOSIS: ICD-10-CM

## 2023-05-31 DIAGNOSIS — M48.062 LUMBAR STENOSIS WITH NEUROGENIC CLAUDICATION: ICD-10-CM

## 2023-05-31 DIAGNOSIS — M47.814 SPONDYLOSIS OF THORACIC REGION WITHOUT MYELOPATHY OR RADICULOPATHY: ICD-10-CM

## 2023-05-31 DIAGNOSIS — M54.16 LUMBAR RADICULOPATHY: ICD-10-CM

## 2023-05-31 DIAGNOSIS — M48.061 SPINAL STENOSIS OF LUMBAR REGION WITHOUT NEUROGENIC CLAUDICATION: ICD-10-CM

## 2023-05-31 DIAGNOSIS — M47.816 SPONDYLOSIS OF LUMBAR REGION WITHOUT MYELOPATHY OR RADICULOPATHY: ICD-10-CM

## 2023-05-31 DIAGNOSIS — M46.1 BILATERAL SACROILIITIS (HCC): ICD-10-CM

## 2023-05-31 DIAGNOSIS — M54.12 CERVICAL RADICULITIS: Primary | ICD-10-CM

## 2023-05-31 DIAGNOSIS — G89.4 CHRONIC PAIN SYNDROME: ICD-10-CM

## 2023-05-31 PROCEDURE — G8420 CALC BMI NORM PARAMETERS: HCPCS | Performed by: NURSE PRACTITIONER

## 2023-05-31 PROCEDURE — 1036F TOBACCO NON-USER: CPT | Performed by: NURSE PRACTITIONER

## 2023-05-31 PROCEDURE — 3017F COLORECTAL CA SCREEN DOC REV: CPT | Performed by: NURSE PRACTITIONER

## 2023-05-31 PROCEDURE — 99214 OFFICE O/P EST MOD 30 MIN: CPT | Performed by: NURSE PRACTITIONER

## 2023-05-31 PROCEDURE — G8427 DOCREV CUR MEDS BY ELIG CLIN: HCPCS | Performed by: NURSE PRACTITIONER

## 2023-05-31 ASSESSMENT — ENCOUNTER SYMPTOMS
SORE THROAT: 0
NAUSEA: 0
PHOTOPHOBIA: 0
BACK PAIN: 1
SHORTNESS OF BREATH: 0
COLOR CHANGE: 0
CHEST TIGHTNESS: 0
VOMITING: 0
ABDOMINAL PAIN: 0
EYE PAIN: 0
DIARRHEA: 0
WHEEZING: 0
CONSTIPATION: 0
SINUS PRESSURE: 0
COUGH: 0
RHINORRHEA: 0

## 2023-05-31 NOTE — PROGRESS NOTES
[address] [facility] [address]  Dept: [phone]  Dept Fax: [phone]: [phone]    Visit Date: 5/31/2023    Functionality Assessment/Goals Worksheet     On a scale of 0 (Does not Interfere) to 10 (Completely Interferes)     1. Which number describes how during the past week pain has interfered with       the following:  A. General Activity:  5  B. Mood: 7  C. Walking Ability:  5  D. Normal Work (Includes both work outside the home and housework):  5  E. Relations with Other People:   7  F. Sleep:   8  G. Enjoyment of Life:   8    2. Patient Prefers to Take their Pain Medications:     [x]  On a regular basis   []  Only when necessary    []  Does not take pain medications    3. What are the Patient's Goals/Expectations for Visiting Pain Management?      []  Learn about my pain    []  Receive Medication   []  Physical Therapy     []  Treat Depression   []  Receive Injections    []  Treat Sleep   []  Deal with Anxiety and Stress   []  Treat Opoid Dependence/Addiction   []  Other:

## 2023-05-31 NOTE — PROGRESS NOTES
[address] [facility] [address]  Dept: [phone]  Dept Fax: [phone]: [phone]    Visit Date: 5/31/2023    Functionality Assessment/Goals Worksheet     On a scale of 0 (Does not Interfere) to 10 (Completely Interferes)     1. Which number describes how during the past week pain has interfered with       the following:  A. General Activity:  6  B. Mood: 7  C. Walking Ability:  6  D. Normal Work (Includes both work outside the home and housework):  6  E. Relations with Other People:   6  F. Sleep:   8  G. Enjoyment of Life:   6    2. Patient Prefers to Take their Pain Medications:     [x]  On a regular basis   []  Only when necessary    []  Does not take pain medications    3. What are the Patient's Goals/Expectations for Visiting Pain Management? [x]  Learn about my pain    []  Receive Medication   []  Physical Therapy     []  Treat Depression   []  Receive Injections    []  Treat Sleep   []  Deal with Anxiety and Stress   []  Treat Opoid Dependence/Addiction   []  Other:    HPI:   Traci Miramontes is a 50 y.o. female is here today for    Chief Complaint: neck thoracic  lumbar hip Si knee pain. BLE BUE       F/U  no  new health issues. She  had some thoracic lumbar pain  that radiated to her left side  to her rib about 2-3 days ago. The pain  has mostly resolved. She woke up that way with rib pain. PT for cervical pain  with BUE radicular s/s. Insurance denied TFCESI  until PT and NSAID use . She states she  did not go to PT it was a waste of her time. I encouraged PT   due to insurance denial of procedures until she  completes PT. She continues to have neck pain   BUE throbbing pain C5-6 distribution bilaterally she does have  relief for her headaches. Her neck pain is low until she turns head or neck and looks up and down.  Then increases the pain with any

## 2023-06-08 NOTE — ASSESSMENT & PLAN NOTE
Well-controlled, continue current medications   Hemoglobin A1C   Date Value Ref Range Status   01/19/2023 5.4 4.4 - 6.4 % Final Patient reports generalized weakness. Denies chest pain or shortness of breath.

## 2023-06-20 ENCOUNTER — OFFICE VISIT (OUTPATIENT)
Dept: RHEUMATOLOGY | Age: 53
End: 2023-06-20
Payer: COMMERCIAL

## 2023-06-20 VITALS
OXYGEN SATURATION: 97 % | WEIGHT: 143.96 LBS | DIASTOLIC BLOOD PRESSURE: 80 MMHG | BODY MASS INDEX: 23.14 KG/M2 | HEIGHT: 66 IN | SYSTOLIC BLOOD PRESSURE: 116 MMHG | HEART RATE: 74 BPM

## 2023-06-20 DIAGNOSIS — M32.14 SYSTEMIC LUPUS ERYTHEMATOSUS WITH GLOMERULAR DISEASE, UNSPECIFIED SLE TYPE (HCC): Primary | ICD-10-CM

## 2023-06-20 DIAGNOSIS — Z87.39 H/O LUPUS NEPHRITIS: ICD-10-CM

## 2023-06-20 DIAGNOSIS — J34.89 NASAL SORE: ICD-10-CM

## 2023-06-20 DIAGNOSIS — M77.11 LATERAL EPICONDYLITIS OF RIGHT ELBOW: ICD-10-CM

## 2023-06-20 DIAGNOSIS — Z51.81 MEDICATION MONITORING ENCOUNTER: ICD-10-CM

## 2023-06-20 DIAGNOSIS — G56.23 CUBITAL TUNNEL SYNDROME, BILATERAL: ICD-10-CM

## 2023-06-20 DIAGNOSIS — R80.1 PERSISTENT PROTEINURIA: ICD-10-CM

## 2023-06-20 DIAGNOSIS — M32.14 OTHER SYSTEMIC LUPUS ERYTHEMATOSUS WITH GLOMERULAR DISEASE (HCC): ICD-10-CM

## 2023-06-20 PROCEDURE — G8427 DOCREV CUR MEDS BY ELIG CLIN: HCPCS | Performed by: INTERNAL MEDICINE

## 2023-06-20 PROCEDURE — 99214 OFFICE O/P EST MOD 30 MIN: CPT | Performed by: INTERNAL MEDICINE

## 2023-06-20 PROCEDURE — 3017F COLORECTAL CA SCREEN DOC REV: CPT | Performed by: INTERNAL MEDICINE

## 2023-06-20 PROCEDURE — G8420 CALC BMI NORM PARAMETERS: HCPCS | Performed by: INTERNAL MEDICINE

## 2023-06-20 PROCEDURE — 1036F TOBACCO NON-USER: CPT | Performed by: INTERNAL MEDICINE

## 2023-06-20 RX ORDER — HYDROXYCHLOROQUINE SULFATE 200 MG/1
200 TABLET, FILM COATED ORAL 2 TIMES DAILY
Qty: 60 TABLET | Refills: 5 | Status: SHIPPED | OUTPATIENT
Start: 2023-06-20

## 2023-06-20 RX ORDER — MYCOPHENOLATE MOFETIL 500 MG/1
TABLET ORAL
Qty: 120 TABLET | Refills: 1 | Status: SHIPPED | OUTPATIENT
Start: 2023-06-20 | End: 2023-08-19

## 2023-06-20 ASSESSMENT — ENCOUNTER SYMPTOMS
TROUBLE SWALLOWING: 0
EYE PAIN: 0
COUGH: 0
SHORTNESS OF BREATH: 0
DIARRHEA: 0
NAUSEA: 0
CONSTIPATION: 0
ABDOMINAL PAIN: 0
EYE ITCHING: 0

## 2023-06-20 NOTE — PATIENT INSTRUCTIONS
- wean prednisone to 10mg q am and 7.5mg at bed time x 2 weeks then decrease to 10mg q AM and 5mg at bed time.

## 2023-06-20 NOTE — PROGRESS NOTES
tablet, Rfl: 5    loratadine (CLARITIN) 10 MG tablet, take 1 tablet by mouth once daily, Disp: 90 tablet, Rfl: 4    gabapentin (NEURONTIN) 400 MG capsule, take 1 capsule by mouth TID a day, Disp: 270 capsule, Rfl: 1    omeprazole (PRILOSEC) 20 MG delayed release capsule, take 1 capsule by mouth once daily, Disp: 30 capsule, Rfl: 3    ondansetron (ZOFRAN) 4 MG tablet, take 1 tablet by mouth three times a day if needed for nausea and vomiting, Disp: 30 tablet, Rfl: 0    dapagliflozin (FARXIGA) 5 MG tablet, Take 1 tablet by mouth every morning, Disp: 90 tablet, Rfl: 1    ferrous sulfate (IRON 325) 325 (65 Fe) MG tablet, Take 1 tablet by mouth daily (with breakfast), Disp: 90 tablet, Rfl: 3    simvastatin (ZOCOR) 40 MG tablet, take 1 tablet by mouth at bedtime, Disp: 90 tablet, Rfl: 3    albuterol sulfate HFA (PROVENTIL HFA) 108 (90 Base) MCG/ACT inhaler, Inhale 2 puffs into the lungs every 6 hours as needed for Wheezing, Disp: 18 g, Rfl: 3    silver sulfADIAZINE (SILVADENE) 1 % cream, Apply topically daily. , Disp: 50 g, Rfl: 1    permethrin (ELIMITE) 5 % cream, Apply to wet hair and leave on 10 minutes and then rinse, may repeat on day 9, Disp: 3 Tube, Rfl: 1    nitroGLYCERIN (NITROSTAT) 0.4 MG SL tablet, Place 1 tablet under the tongue every 5 minutes as needed for Chest pain up to max of 3 total doses. If no relief after 1 dose, call 911., Disp: 25 tablet, Rfl: 0    mycophenolate (CELLCEPT) 500 MG tablet, Take 2 tablets by mouth every morning (before breakfast) AND 2 tablets every evening., Disp: 120 tablet, Rfl: 1        PHYSICAL EXAMINATION / OBJECTIVE   Objective:  Ht 5' 5.98\" (1.676 m)   Wt 143 lb 15.4 oz (65.3 kg)   BMI 23.25 kg/m²     Physical Exam  Vitals reviewed. Constitutional:       Appearance: She is well-developed. HENT:      Nose:      Comments: Right  nasal sore=- with crusted blood  Cardiovascular:      Rate and Rhythm: Normal rate and regular rhythm. Heart sounds: Murmur heard.

## 2023-06-26 DIAGNOSIS — G89.4 CHRONIC PAIN SYNDROME: ICD-10-CM

## 2023-06-26 RX ORDER — HYDROCODONE BITARTRATE AND ACETAMINOPHEN 5; 325 MG/1; MG/1
1 TABLET ORAL EVERY 8 HOURS PRN
Qty: 90 TABLET | Refills: 0 | Status: SHIPPED | OUTPATIENT
Start: 2023-06-26 | End: 2023-07-26

## 2023-06-28 DIAGNOSIS — Z87.39 H/O LUPUS NEPHRITIS: ICD-10-CM

## 2023-06-28 DIAGNOSIS — R80.1 PERSISTENT PROTEINURIA: ICD-10-CM

## 2023-06-28 DIAGNOSIS — M32.14 SYSTEMIC LUPUS ERYTHEMATOSUS WITH GLOMERULAR DISEASE, UNSPECIFIED SLE TYPE (HCC): ICD-10-CM

## 2023-07-14 DIAGNOSIS — M32.14 OTHER SYSTEMIC LUPUS ERYTHEMATOSUS WITH GLOMERULAR DISEASE (HCC): ICD-10-CM

## 2023-07-14 DIAGNOSIS — M32.14 SYSTEMIC LUPUS ERYTHEMATOSUS WITH GLOMERULAR DISEASE, UNSPECIFIED SLE TYPE (HCC): ICD-10-CM

## 2023-07-14 DIAGNOSIS — R80.1 PERSISTENT PROTEINURIA: ICD-10-CM

## 2023-07-14 DIAGNOSIS — Z87.39 H/O LUPUS NEPHRITIS: ICD-10-CM

## 2023-07-14 LAB
A/G RATIO: 2 (ref 1.5–2.5)
ABSOLUTE BASO #: 0 /CMM (ref 0–200)
ABSOLUTE EOS #: 100 /CMM (ref 0–500)
ABSOLUTE LYMPH #: 1700 /CMM (ref 1000–4800)
ABSOLUTE MONO #: 400 /CMM (ref 0–800)
ABSOLUTE NEUT #: 4400 /CMM (ref 1800–7700)
ALBUMIN SERPL-MCNC: 4 G/DL (ref 3.5–5)
ALP BLD-CCNC: 61 IU/L (ref 39–118)
ALT SERPL-CCNC: 12 IU/L (ref 10–40)
ANION GAP SERPL CALCULATED.3IONS-SCNC: 6 MMOL/L (ref 4–12)
AST SERPL-CCNC: 15 IU/L (ref 15–41)
BASOPHILS RELATIVE PERCENT: 0.6 % (ref 0–2)
BILIRUB SERPL-MCNC: 0.3 MG/DL (ref 0.2–1)
BUN BLDV-MCNC: 19 MG/DL (ref 7–20)
CALCIUM SERPL-MCNC: 9.2 MG/DL (ref 8.8–10.5)
CHLORIDE BLD-SCNC: 109 MEQ/L (ref 101–111)
CO2: 27 MEQ/L (ref 21–32)
CREAT SERPL-MCNC: 1.49 MG/DL (ref 0.6–1.3)
CREATININE CLEARANCE: 37
EOSINOPHILS RELATIVE PERCENT: 0.8 % (ref 0–6)
GLUCOSE: 90 MG/DL (ref 70–110)
HCT VFR BLD CALC: 34.2 % (ref 35–44)
HEMOGLOBIN: 10.7 GM/DL (ref 12–15)
LYMPHOCYTES RELATIVE PERCENT: 25.8 % (ref 15–45)
MCH RBC QN AUTO: 27.4 PG (ref 27.5–33)
MCHC RBC AUTO-ENTMCNC: 31.2 GM/DL (ref 33–36)
MCV RBC AUTO: 87.6 CU MIC (ref 80–97)
MONOCYTES RELATIVE PERCENT: 6.7 % (ref 2–10)
NEUTROPHILS RELATIVE PERCENT: 66.1 % (ref 40–70)
NUCLEATED RBCS: 0.1 /100 WBC
PDW BLD-RTO: 16.6 % (ref 12–16)
PLATELET # BLD: 158 TH/CMM (ref 150–400)
POTASSIUM SERPL-SCNC: 3.8 MEQ/L (ref 3.6–5)
RBC # BLD: 3.9 MIL/CMM (ref 4–5.1)
SEDIMENTATION RATE, ERYTHROCYTE: 3 MM/HR
SODIUM BLD-SCNC: 142 MEQ/L (ref 135–145)
TOTAL PROTEIN: 6 G/DL (ref 6.2–8)
WBC # BLD: 6.7 TH/CMM (ref 4.4–10.5)

## 2023-07-14 RX ORDER — MYCOPHENOLATE MOFETIL 500 MG/1
TABLET ORAL
Qty: 120 TABLET | Refills: 1 | Status: SHIPPED | OUTPATIENT
Start: 2023-07-14 | End: 2023-09-12

## 2023-07-17 LAB
APPEARANCE: CLEAR
BACTERIA: ABNORMAL
BILIRUBIN URINE: NEGATIVE
COLOR: YELLOW
COMPLEMENT C3: 115 MG/DL (ref 75–175)
COMPLEMENT C4: 25 MG/DL (ref 14–40)
CREATININE, RANDOM URINE: 257 MG/DL
GLUCOSE URINE: NEGATIVE
HYALINE CASTS: ABNORMAL /LPF
KETONES, URINE: NEGATIVE
LEUKOCYTES, UA: ABNORMAL
MUCUS: PRESENT
NITRITE, URINE: NEGATIVE
PH, URINE: 5.5 (ref 5–8)
PROTEIN, URINE, RANDOM: 157.7 MG/DL
PROTEIN, URINE: ABNORMAL
PROTEIN/CREAT RATIO: 0.61 G/1.73M2
RBC URINE: ABNORMAL /HPF
SPECIFIC GRAVITY UA: 1.03 (ref 1–1.03)
SQUAMOUS EPITHELIAL: ABNORMAL /HPF
URINALYSIS REFLEX: NO
URINE HGB: ABNORMAL
UROBILINOGEN, URINE: ABNORMAL (ref 0.2–1)
WBC URINE: ABNORMAL /HPF

## 2023-07-18 DIAGNOSIS — G56.23 CUBITAL TUNNEL SYNDROME, BILATERAL: ICD-10-CM

## 2023-07-18 DIAGNOSIS — R10.9 ABDOMINAL PAIN, UNSPECIFIED ABDOMINAL LOCATION: ICD-10-CM

## 2023-07-18 DIAGNOSIS — M32.14 OTHER SYSTEMIC LUPUS ERYTHEMATOSUS WITH GLOMERULAR DISEASE (HCC): ICD-10-CM

## 2023-07-18 RX ORDER — FERROUS SULFATE 325(65) MG
TABLET ORAL
Qty: 90 TABLET | Refills: 3 | Status: SHIPPED | OUTPATIENT
Start: 2023-07-18

## 2023-07-18 RX ORDER — PREDNISONE 5 MG/1
TABLET ORAL
Qty: 90 TABLET | Refills: 1 | Status: SHIPPED | OUTPATIENT
Start: 2023-07-18

## 2023-07-18 RX ORDER — OMEPRAZOLE 20 MG/1
CAPSULE, DELAYED RELEASE ORAL
Qty: 30 CAPSULE | Refills: 3 | Status: SHIPPED | OUTPATIENT
Start: 2023-07-18

## 2023-07-18 NOTE — TELEPHONE ENCOUNTER
Can you please verify patient's current prednisone dose? He had weaning instructions in his last office visit note, just not sure if she did this or where she is at with it.

## 2023-07-24 DIAGNOSIS — G89.4 CHRONIC PAIN SYNDROME: ICD-10-CM

## 2023-07-24 RX ORDER — HYDROCODONE BITARTRATE AND ACETAMINOPHEN 5; 325 MG/1; MG/1
1 TABLET ORAL EVERY 8 HOURS PRN
Qty: 90 TABLET | Refills: 0 | Status: SHIPPED | OUTPATIENT
Start: 2023-07-26 | End: 2023-08-25

## 2023-07-24 NOTE — TELEPHONE ENCOUNTER
Joseph Palacios called requesting a refill on the following medications:  Requested Prescriptions     Pending Prescriptions Disp Refills    HYDROcodone-acetaminophen (NORCO) 5-325 MG per tablet 90 tablet 0     Sig: Take 1 tablet by mouth every 8 hours as needed for Pain for up to 30 days. Pharmacy verified: [facility] on [facility]  . pv      Date of last visit: 5/31/2023  Date of next visit (if applicable): 9/70/2452

## 2023-07-24 NOTE — TELEPHONE ENCOUNTER
OARRS reviewed. UDS: + for  hydrocodone gabapentin consistent. Last seen: 5/31/2023.  Follow-up:   Future Appointments   Date Time Provider [address]   8/22/2023  2:40 PM MACKENZIE Vicente CNP [place] [facility] [place] [place]   8/30/2023  3:20 PM MACKENZIE Coe CNP SRPX Pain [facility] [place]   11/30/2023  2:40 PM MD GAYATRI Villegas [facility], [facility]. [facility] [place]

## 2023-08-16 DIAGNOSIS — R80.1 PERSISTENT PROTEINURIA: ICD-10-CM

## 2023-08-16 DIAGNOSIS — Z87.39 H/O LUPUS NEPHRITIS: ICD-10-CM

## 2023-08-16 DIAGNOSIS — M32.14 SYSTEMIC LUPUS ERYTHEMATOSUS WITH GLOMERULAR DISEASE, UNSPECIFIED SLE TYPE (HCC): ICD-10-CM

## 2023-08-16 LAB
A/G RATIO: 2 (ref 1.5–2.5)
ABSOLUTE BASO #: 0 /CMM (ref 0–200)
ABSOLUTE EOS #: 0 /CMM (ref 0–500)
ABSOLUTE LYMPH #: 1000 /CMM (ref 1000–4800)
ABSOLUTE MONO #: 400 /CMM (ref 0–800)
ABSOLUTE NEUT #: 3900 /CMM (ref 1800–7700)
ALBUMIN SERPL-MCNC: 3.9 G/DL (ref 3.5–5)
ALP BLD-CCNC: 64 IU/L (ref 39–118)
ALT SERPL-CCNC: 10 IU/L (ref 10–40)
ANION GAP SERPL CALCULATED.3IONS-SCNC: 4 MMOL/L (ref 4–12)
APPEARANCE: CLEAR
AST SERPL-CCNC: 13 IU/L (ref 15–41)
BASOPHILS RELATIVE PERCENT: 0.5 % (ref 0–2)
BILIRUB SERPL-MCNC: 0.3 MG/DL (ref 0.2–1)
BILIRUBIN URINE: NEGATIVE
BUN BLDV-MCNC: 27 MG/DL (ref 7–20)
CALCIUM SERPL-MCNC: 8.9 MG/DL (ref 8.8–10.5)
CHLORIDE BLD-SCNC: 109 MEQ/L (ref 101–111)
CO2: 27 MEQ/L (ref 21–32)
COLOR: YELLOW
CREAT SERPL-MCNC: 1.64 MG/DL (ref 0.6–1.3)
CREATININE CLEARANCE: 33
CREATININE, RANDOM URINE: 168.5 MG/DL
EOSINOPHILS RELATIVE PERCENT: 0.2 % (ref 0–6)
GLUCOSE URINE: NEGATIVE
GLUCOSE: 110 MG/DL (ref 70–110)
HCT VFR BLD CALC: 31.5 % (ref 35–44)
HEMOGLOBIN: 10 GM/DL (ref 12–15)
HYALINE CASTS: ABNORMAL /LPF
KETONES, URINE: NEGATIVE
LEUKOCYTES, UA: ABNORMAL
LYMPHOCYTES RELATIVE PERCENT: 18.2 % (ref 15–45)
MCH RBC QN AUTO: 27.8 PG (ref 27.5–33)
MCHC RBC AUTO-ENTMCNC: 31.9 GM/DL (ref 33–36)
MCV RBC AUTO: 87.1 CU MIC (ref 80–97)
MONOCYTES RELATIVE PERCENT: 7 % (ref 2–10)
MUCUS: PRESENT
NEUTROPHILS RELATIVE PERCENT: 74.1 % (ref 40–70)
NITRITE, URINE: NEGATIVE
NUCLEATED RBCS: 0 /100 WBC
PDW BLD-RTO: 15.6 % (ref 12–16)
PH, URINE: 5.5 (ref 5–8)
PLATELET # BLD: 176 TH/CMM (ref 150–400)
POTASSIUM SERPL-SCNC: 4.3 MEQ/L (ref 3.6–5)
PROTEIN, URINE, RANDOM: 138.3 MG/DL
PROTEIN, URINE: ABNORMAL
PROTEIN/CREAT RATIO: 0.82 G/1.73M2
RBC # BLD: 3.61 MIL/CMM (ref 4–5.1)
RBC URINE: ABNORMAL /HPF
SEDIMENTATION RATE, ERYTHROCYTE: 2 MM/HR
SODIUM BLD-SCNC: 140 MEQ/L (ref 135–145)
SPECIFIC GRAVITY UA: 1.02 (ref 1–1.03)
SQUAMOUS EPITHELIAL: ABNORMAL /HPF
TOTAL PROTEIN: 5.9 G/DL (ref 6.2–8)
URINALYSIS REFLEX: YES
URINE HGB: ABNORMAL
UROBILINOGEN, URINE: ABNORMAL (ref 0.2–1)
WBC # BLD: 5.2 TH/CMM (ref 4.4–10.5)
WBC URINE: ABNORMAL /HPF

## 2023-08-18 ENCOUNTER — TELEPHONE (OUTPATIENT)
Dept: RHEUMATOLOGY | Age: 53
End: 2023-08-18

## 2023-08-18 LAB — URINE CULTURE REFLEX: NORMAL

## 2023-08-18 NOTE — TELEPHONE ENCOUNTER
Pt phoned in stating she did not have the C3 and C4 drawn due to her insurance not paying. She is not able to afford it out-of-pocket. She wanted to make you aware of this.

## 2023-08-22 ENCOUNTER — OFFICE VISIT (OUTPATIENT)
Dept: RHEUMATOLOGY | Age: 53
End: 2023-08-22
Payer: COMMERCIAL

## 2023-08-22 VITALS
OXYGEN SATURATION: 97 % | SYSTOLIC BLOOD PRESSURE: 122 MMHG | DIASTOLIC BLOOD PRESSURE: 64 MMHG | HEIGHT: 66 IN | BODY MASS INDEX: 23.3 KG/M2 | WEIGHT: 145 LBS | HEART RATE: 93 BPM

## 2023-08-22 DIAGNOSIS — M32.14 OTHER SYSTEMIC LUPUS ERYTHEMATOSUS WITH GLOMERULAR DISEASE (HCC): ICD-10-CM

## 2023-08-22 DIAGNOSIS — R80.1 PERSISTENT PROTEINURIA: ICD-10-CM

## 2023-08-22 DIAGNOSIS — M32.14 SYSTEMIC LUPUS ERYTHEMATOSUS WITH GLOMERULAR DISEASE, UNSPECIFIED SLE TYPE (HCC): Primary | ICD-10-CM

## 2023-08-22 DIAGNOSIS — J34.89 NASAL SORE: ICD-10-CM

## 2023-08-22 DIAGNOSIS — G89.4 CHRONIC PAIN SYNDROME: ICD-10-CM

## 2023-08-22 DIAGNOSIS — M77.11 LATERAL EPICONDYLITIS OF RIGHT ELBOW: ICD-10-CM

## 2023-08-22 DIAGNOSIS — Z51.81 MEDICATION MONITORING ENCOUNTER: ICD-10-CM

## 2023-08-22 DIAGNOSIS — G56.23 CUBITAL TUNNEL SYNDROME, BILATERAL: ICD-10-CM

## 2023-08-22 PROCEDURE — 1036F TOBACCO NON-USER: CPT | Performed by: NURSE PRACTITIONER

## 2023-08-22 PROCEDURE — 3017F COLORECTAL CA SCREEN DOC REV: CPT | Performed by: NURSE PRACTITIONER

## 2023-08-22 PROCEDURE — 99214 OFFICE O/P EST MOD 30 MIN: CPT | Performed by: NURSE PRACTITIONER

## 2023-08-22 PROCEDURE — G8427 DOCREV CUR MEDS BY ELIG CLIN: HCPCS | Performed by: NURSE PRACTITIONER

## 2023-08-22 PROCEDURE — G8420 CALC BMI NORM PARAMETERS: HCPCS | Performed by: NURSE PRACTITIONER

## 2023-08-22 RX ORDER — HYDROCODONE BITARTRATE AND ACETAMINOPHEN 5; 325 MG/1; MG/1
1 TABLET ORAL EVERY 8 HOURS PRN
Qty: 90 TABLET | Refills: 0 | Status: SHIPPED | OUTPATIENT
Start: 2023-08-25 | End: 2023-09-24

## 2023-08-22 RX ORDER — HYDROXYCHLOROQUINE SULFATE 200 MG/1
200 TABLET, FILM COATED ORAL 2 TIMES DAILY
Qty: 60 TABLET | Refills: 5 | Status: SHIPPED | OUTPATIENT
Start: 2023-08-22

## 2023-08-22 ASSESSMENT — ENCOUNTER SYMPTOMS
EYE ITCHING: 0
TROUBLE SWALLOWING: 0
CONSTIPATION: 0
EYE PAIN: 0
COUGH: 0
ABDOMINAL PAIN: 0
DIARRHEA: 0
SHORTNESS OF BREATH: 0
NAUSEA: 0
BACK PAIN: 0

## 2023-08-22 NOTE — TELEPHONE ENCOUNTER
OARRS reviewed. UDS: + for gabapentin and hydrocodone  Last seen: 5/31/2023.  Follow-up:   Future Appointments   Date Time Provider [address]   8/30/2023  3:20 PM MACKENZIE Feldman CNP N SRPX Pain [facility]   10/23/2023  2:40 PM MACKENZIE Cardenas CNP N [address] Rheum [facility]   11/30/2023  2:40 PM MD GAYATRI Meng [facility]

## 2023-08-22 NOTE — PROGRESS NOTES
[facility] RHEUMATOLOGY FOLLOW UP NOTE       Date Of Service: 8/22/2023  Provider: MACKENZIE Green - CNP    Name: Aminah Vigil   MRN: [id number]    Chief Complaint(s)     Chief Complaint   Patient presents with    Follow-up     2 mo f/u, Systemic lupus erythematosus with glomerular disease, unspecified SLE type ([address])    Pt stated pain 7/10 - legs, Rt arm, lower back        History of Present Illness (HPI)     Aminah Vigil  is a(n)52 y.o. female with a hx of systemic lupus with hiistory of class IV lupus nephritis, CKD stage III, chronic low back pain w/ radiculopathy, lumbar stenosis, h/o seizures here for the f/u evaluation of systemic lupus     Interval hx:    - no new issues    pain affecting the right forearm  Pain on a scale 0-10: 6.5/10  Type of pain: shooting  Timing: mornings and evenings  Aggravating factors: none  Alleviating factors: gabapentin    Associated symptoms:  denies swelling/  Redness/ warmth, denies AM stiffness      REVIEW OF SYSTEMS: (ROS)    Review of Systems   Constitutional:  Positive for fatigue. Negative for appetite change, fever and unexpected weight change. HENT:  Negative for congestion and trouble swallowing. Hair loss, dry mouth  Nasal sores   Eyes:  Negative for pain and itching. Dry eyes   Respiratory:  Negative for cough and shortness of breath. Cardiovascular:  Negative for chest pain and leg swelling. Gastrointestinal:  Negative for abdominal pain, constipation, diarrhea and nausea. Endocrine: Negative for cold intolerance and heat intolerance. Genitourinary:  Negative for difficulty urinating, frequency and urgency. Musculoskeletal:  Positive for arthralgias and joint swelling. Negative for back pain, myalgias and neck pain. Skin:  Negative for rash. Neurological:  Positive for numbness and headaches. Negative for dizziness and weakness. Psychiatric/Behavioral:  Positive for sleep disturbance.  The patient is not

## 2023-08-23 DIAGNOSIS — M54.40 BACK PAIN OF LUMBAR REGION WITH SCIATICA: ICD-10-CM

## 2023-08-23 DIAGNOSIS — M79.641 RIGHT HAND PAIN: ICD-10-CM

## 2023-08-23 RX ORDER — GABAPENTIN 400 MG/1
CAPSULE ORAL
Qty: 270 CAPSULE | Refills: 1 | OUTPATIENT
Start: 2023-08-23 | End: 2024-02-23

## 2023-08-23 RX ORDER — GABAPENTIN 400 MG/1
CAPSULE ORAL
Qty: 270 CAPSULE | Refills: 1 | OUTPATIENT
Start: 2023-08-23

## 2023-08-23 NOTE — TELEPHONE ENCOUNTER
Adelaida Peabody called requesting a refill on the following medications:  Requested Prescriptions     Pending Prescriptions Disp Refills    gabapentin (NEURONTIN) 400 MG capsule 270 capsule 1     Sig: take 1 capsule by mouth TID a day     Pharm[facility] verified[facility]  [facility]      Date of last visit: 08-22-23  Date of next visit (if applicable): 56/53/2753

## 2023-08-23 NOTE — TELEPHONE ENCOUNTER
Recent Visits  Date Type Provider Dept   01/30/23 Office Visit Shankar Polanco, APRN - CNP [facility]   Showing recent visits within past 540 days with a meds authorizing provider and meeting all other requirements  Future Appointments  No visits were found meeting these conditions.   Showing future appointments within next 150 days with a meds authorizing provider and meeting all other requirements     Future Appointments   Date Time Provider [address]   8/30/2023  3:20 PM MACKENZIE Miller CNP N SRPX Pain [facility]   10/23/2023  2:40 PM MACKENZIE Carlos CNP N [address] Rheum [facility]   11/30/2023  2:40 PM MD GAYATRI Bradley [facility]. [facility]

## 2023-08-23 NOTE — TELEPHONE ENCOUNTER
Pt asking for refills of gabapentin does need to be seen every 6 months to get this, has not been in since 1/2023 please contact her for an appt thanks

## 2023-08-28 DIAGNOSIS — M54.40 BACK PAIN OF LUMBAR REGION WITH SCIATICA: ICD-10-CM

## 2023-08-28 DIAGNOSIS — M79.641 RIGHT HAND PAIN: ICD-10-CM

## 2023-08-28 RX ORDER — GABAPENTIN 400 MG/1
CAPSULE ORAL
Qty: 270 CAPSULE | Refills: 1 | OUTPATIENT
Start: 2023-08-28 | End: 2024-02-28

## 2023-08-28 NOTE — TELEPHONE ENCOUNTER
Faye Johnson called requesting a refill on the following medications:  Requested Prescriptions     Pending Prescriptions Disp Refills    gabapentin (NEURONTIN) 400 MG capsule 270 capsule 1     Sig: take 1 capsule by mouth TID a day     Pharmacy verified:[facility]  . pv      Date of last visit: 8/22/23  Date of next visit (if applicable): 83/47/7323

## 2023-08-30 ENCOUNTER — OFFICE VISIT (OUTPATIENT)
Dept: PHYSICAL MEDICINE AND REHAB | Age: 53
End: 2023-08-30
Payer: COMMERCIAL

## 2023-08-30 VITALS
SYSTOLIC BLOOD PRESSURE: 128 MMHG | DIASTOLIC BLOOD PRESSURE: 68 MMHG | WEIGHT: 145 LBS | BODY MASS INDEX: 28.47 KG/M2 | HEIGHT: 60 IN

## 2023-08-30 DIAGNOSIS — M54.12 CERVICAL RADICULITIS: Primary | ICD-10-CM

## 2023-08-30 DIAGNOSIS — G89.4 CHRONIC PAIN SYNDROME: ICD-10-CM

## 2023-08-30 DIAGNOSIS — M54.40 BACK PAIN OF LUMBAR REGION WITH SCIATICA: ICD-10-CM

## 2023-08-30 DIAGNOSIS — M48.02 CERVICAL SPINAL STENOSIS: ICD-10-CM

## 2023-08-30 DIAGNOSIS — M47.814 SPONDYLOSIS OF THORACIC REGION WITHOUT MYELOPATHY OR RADICULOPATHY: ICD-10-CM

## 2023-08-30 DIAGNOSIS — M48.062 LUMBAR STENOSIS WITH NEUROGENIC CLAUDICATION: ICD-10-CM

## 2023-08-30 DIAGNOSIS — M46.1 SI (SACROILIAC) JOINT INFLAMMATION (HCC): ICD-10-CM

## 2023-08-30 DIAGNOSIS — M46.1 BILATERAL SACROILIITIS (HCC): ICD-10-CM

## 2023-08-30 DIAGNOSIS — M79.641 RIGHT HAND PAIN: ICD-10-CM

## 2023-08-30 DIAGNOSIS — M47.816 SPONDYLOSIS OF LUMBAR REGION WITHOUT MYELOPATHY OR RADICULOPATHY: ICD-10-CM

## 2023-08-30 PROCEDURE — 1036F TOBACCO NON-USER: CPT | Performed by: NURSE PRACTITIONER

## 2023-08-30 PROCEDURE — G8419 CALC BMI OUT NRM PARAM NOF/U: HCPCS | Performed by: NURSE PRACTITIONER

## 2023-08-30 PROCEDURE — G8427 DOCREV CUR MEDS BY ELIG CLIN: HCPCS | Performed by: NURSE PRACTITIONER

## 2023-08-30 PROCEDURE — 3017F COLORECTAL CA SCREEN DOC REV: CPT | Performed by: NURSE PRACTITIONER

## 2023-08-30 PROCEDURE — 99214 OFFICE O/P EST MOD 30 MIN: CPT | Performed by: NURSE PRACTITIONER

## 2023-08-30 RX ORDER — GABAPENTIN 400 MG/1
CAPSULE ORAL
Qty: 270 CAPSULE | Refills: 1 | Status: SHIPPED | OUTPATIENT
Start: 2023-08-30 | End: 2024-03-01

## 2023-08-30 RX ORDER — GABAPENTIN 400 MG/1
CAPSULE ORAL
Qty: 270 CAPSULE | Refills: 1 | Status: SHIPPED | OUTPATIENT
Start: 2023-08-30 | End: 2023-08-30 | Stop reason: SDUPTHER

## 2023-08-30 ASSESSMENT — ENCOUNTER SYMPTOMS
WHEEZING: 0
BACK PAIN: 1
RHINORRHEA: 0
SORE THROAT: 0
SINUS PRESSURE: 0
CHEST TIGHTNESS: 0
NAUSEA: 0
CONSTIPATION: 0
EYE PAIN: 0
DIARRHEA: 0
VOMITING: 0
COLOR CHANGE: 0
COUGH: 0
SHORTNESS OF BREATH: 0
ABDOMINAL PAIN: 0
PHOTOPHOBIA: 0

## 2023-08-30 NOTE — PROGRESS NOTES
motion. Swelling, tenderness and bony tenderness present. Comments: 2 right broken toes has walker shoe on    Skin:     General: Skin is warm. Coloration: Skin is not pale. Findings: No erythema or rash. Neurological:      Mental Status: She is alert and oriented to person, place, and time. She is not disoriented. Cranial Nerves: No cranial nerve deficit. Sensory: Sensation is intact. No sensory deficit. Motor: Motor function is intact. No atrophy or abnormal muscle tone. Coordination: Coordination is intact. Coordination normal.      Gait: Gait abnormal.      Deep Tendon Reflexes: Babinski sign absent on the right side. Reflex Scores:       Tricep reflexes are 2+ on the right side and 2+ on the left side. Bicep reflexes are 2+ on the right side and 2+ on the left side. Brachioradialis reflexes are 2+ on the right side and 2+ on the left side. Patellar reflexes are 2+ on the right side and 2+ on the left side. Achilles reflexes are 2+ on the right side and 2+ on the left side. Comments: Motor  4/5 RUE  5/5 BLE   Psychiatric:         Attention and Perception: Attention and perception normal. She is attentive. Mood and Affect: Mood and affect normal. Mood is not anxious or depressed. Affect is not labile, blunt, angry or inappropriate. Speech: Speech normal. She is communicative. Speech is not rapid and pressured, delayed, slurred or tangential.         Behavior: Behavior normal. Behavior is not agitated, slowed, aggressive, withdrawn, hyperactive or combative. Behavior is cooperative. Thought Content: Thought content normal. Thought content is not paranoid or delusional. Thought content does not include homicidal or suicidal ideation. Thought content does not include homicidal or suicidal plan. Cognition and Memory: Cognition and memory normal. Memory is not impaired.  She does not exhibit impaired recent

## 2023-09-13 DIAGNOSIS — Z87.39 H/O LUPUS NEPHRITIS: ICD-10-CM

## 2023-09-13 DIAGNOSIS — M32.14 SYSTEMIC LUPUS ERYTHEMATOSUS WITH GLOMERULAR DISEASE, UNSPECIFIED SLE TYPE (HCC): ICD-10-CM

## 2023-09-13 DIAGNOSIS — R80.1 PERSISTENT PROTEINURIA: ICD-10-CM

## 2023-09-14 ENCOUNTER — TELEPHONE (OUTPATIENT)
Dept: RHEUMATOLOGY | Age: 53
End: 2023-09-14

## 2023-09-14 NOTE — TELEPHONE ENCOUNTER
----- Message from Chucho Can DO sent at 8/18/2023 10:30 AM EDT -----  Please request the urine culture for [facility] - the results are not visible on our end.   ----- Message -----  From: Sundar Kahn In [facility]  Sent: 8/18/2023   9:12 AM EDT  To: Chucho Can DO

## 2023-09-15 ENCOUNTER — TELEMEDICINE (OUTPATIENT)
Dept: PRIMARY CARE CLINIC | Age: 53
End: 2023-09-15
Payer: COMMERCIAL

## 2023-09-15 DIAGNOSIS — R09.81 NASAL CONGESTION: ICD-10-CM

## 2023-09-15 DIAGNOSIS — R05.1 ACUTE COUGH: Primary | ICD-10-CM

## 2023-09-15 PROCEDURE — 99213 OFFICE O/P EST LOW 20 MIN: CPT | Performed by: NURSE PRACTITIONER

## 2023-09-15 PROCEDURE — 3017F COLORECTAL CA SCREEN DOC REV: CPT | Performed by: NURSE PRACTITIONER

## 2023-09-15 PROCEDURE — G8427 DOCREV CUR MEDS BY ELIG CLIN: HCPCS | Performed by: NURSE PRACTITIONER

## 2023-09-15 RX ORDER — FLUTICASONE PROPIONATE 50 MCG
1 SPRAY, SUSPENSION (ML) NASAL DAILY
Qty: 16 G | Refills: 0 | Status: SHIPPED | OUTPATIENT
Start: 2023-09-15

## 2023-09-15 RX ORDER — DEXTROMETHORPHAN HYDROBROMIDE AND PROMETHAZINE HYDROCHLORIDE 15; 6.25 MG/5ML; MG/5ML
5 SYRUP ORAL 4 TIMES DAILY PRN
Qty: 120 ML | Refills: 0 | Status: SHIPPED | OUTPATIENT
Start: 2023-09-15

## 2023-09-15 RX ORDER — BENZONATATE 200 MG/1
200 CAPSULE ORAL 3 TIMES DAILY PRN
Qty: 30 CAPSULE | Refills: 0 | Status: SHIPPED | OUTPATIENT
Start: 2023-09-15 | End: 2023-09-25

## 2023-09-15 ASSESSMENT — ENCOUNTER SYMPTOMS
WHEEZING: 0
COUGH: 1
SHORTNESS OF BREATH: 0

## 2023-09-15 NOTE — PROGRESS NOTES
significant exanthematous lesions or discoloration noted on facial skin         [] Abnormal -            Psychiatric:       [x] Normal Affect [] Abnormal -           On this date 9/15/2023 I have spent 20 minutes reviewing previous notes, test results and face to face (virtual) with the patient discussing the diagnosis and importance of compliance with the treatment plan as well as documenting on the day of the visit.     --Yancy Tellez, APRN - CNP

## 2023-09-19 ENCOUNTER — TELEPHONE (OUTPATIENT)
Dept: FAMILY MEDICINE CLINIC | Age: 53
End: 2023-09-19

## 2023-09-19 ENCOUNTER — OFFICE VISIT (OUTPATIENT)
Dept: FAMILY MEDICINE CLINIC | Age: 53
End: 2023-09-19
Payer: COMMERCIAL

## 2023-09-19 VITALS
RESPIRATION RATE: 16 BRPM | OXYGEN SATURATION: 93 % | TEMPERATURE: 97.8 F | HEIGHT: 66 IN | WEIGHT: 148 LBS | DIASTOLIC BLOOD PRESSURE: 78 MMHG | BODY MASS INDEX: 23.78 KG/M2 | SYSTOLIC BLOOD PRESSURE: 128 MMHG | HEART RATE: 88 BPM

## 2023-09-19 DIAGNOSIS — M32.14 OTHER SYSTEMIC LUPUS ERYTHEMATOSUS WITH GLOMERULAR DISEASE (HCC): ICD-10-CM

## 2023-09-19 DIAGNOSIS — E11.9 TYPE 2 DIABETES MELLITUS WITHOUT COMPLICATION, WITHOUT LONG-TERM CURRENT USE OF INSULIN (HCC): ICD-10-CM

## 2023-09-19 DIAGNOSIS — J20.9 ACUTE BRONCHITIS, UNSPECIFIED ORGANISM: Primary | ICD-10-CM

## 2023-09-19 DIAGNOSIS — E03.9 ACQUIRED HYPOTHYROIDISM: ICD-10-CM

## 2023-09-19 DIAGNOSIS — E78.5 DYSLIPIDEMIA: ICD-10-CM

## 2023-09-19 DIAGNOSIS — R06.2 WHEEZING: ICD-10-CM

## 2023-09-19 DIAGNOSIS — Z12.11 SCREEN FOR COLON CANCER: ICD-10-CM

## 2023-09-19 DIAGNOSIS — G56.23 CUBITAL TUNNEL SYNDROME, BILATERAL: ICD-10-CM

## 2023-09-19 DIAGNOSIS — Z12.31 ENCOUNTER FOR SCREENING MAMMOGRAM FOR MALIGNANT NEOPLASM OF BREAST: ICD-10-CM

## 2023-09-19 PROCEDURE — G8427 DOCREV CUR MEDS BY ELIG CLIN: HCPCS | Performed by: NURSE PRACTITIONER

## 2023-09-19 PROCEDURE — 82044 UR ALBUMIN SEMIQUANTITATIVE: CPT | Performed by: NURSE PRACTITIONER

## 2023-09-19 PROCEDURE — 1036F TOBACCO NON-USER: CPT | Performed by: NURSE PRACTITIONER

## 2023-09-19 PROCEDURE — 99214 OFFICE O/P EST MOD 30 MIN: CPT | Performed by: NURSE PRACTITIONER

## 2023-09-19 PROCEDURE — 3017F COLORECTAL CA SCREEN DOC REV: CPT | Performed by: NURSE PRACTITIONER

## 2023-09-19 PROCEDURE — 2022F DILAT RTA XM EVC RTNOPTHY: CPT | Performed by: NURSE PRACTITIONER

## 2023-09-19 PROCEDURE — G8420 CALC BMI NORM PARAMETERS: HCPCS | Performed by: NURSE PRACTITIONER

## 2023-09-19 PROCEDURE — 3044F HG A1C LEVEL LT 7.0%: CPT | Performed by: NURSE PRACTITIONER

## 2023-09-19 RX ORDER — LEVOTHYROXINE SODIUM 112 UG/1
TABLET ORAL
Qty: 90 TABLET | Refills: 4 | Status: SHIPPED | OUTPATIENT
Start: 2023-09-19

## 2023-09-19 RX ORDER — SIMVASTATIN 40 MG
TABLET ORAL
Qty: 90 TABLET | Refills: 3 | Status: SHIPPED | OUTPATIENT
Start: 2023-09-19

## 2023-09-19 RX ORDER — ALBUTEROL SULFATE 90 UG/1
2 AEROSOL, METERED RESPIRATORY (INHALATION) EVERY 6 HOURS PRN
Qty: 18 G | Refills: 3 | Status: SHIPPED | OUTPATIENT
Start: 2023-09-19

## 2023-09-19 RX ORDER — PREDNISONE 5 MG/1
TABLET ORAL
Qty: 90 TABLET | Refills: 1 | Status: SHIPPED | OUTPATIENT
Start: 2023-09-19

## 2023-09-19 RX ORDER — BENZONATATE 100 MG/1
100 CAPSULE ORAL 3 TIMES DAILY PRN
Qty: 30 CAPSULE | Refills: 2 | Status: SHIPPED | OUTPATIENT
Start: 2023-09-19 | End: 2023-09-26

## 2023-09-19 RX ORDER — AZITHROMYCIN 250 MG/1
250 TABLET, FILM COATED ORAL SEE ADMIN INSTRUCTIONS
Qty: 6 TABLET | Refills: 0 | Status: SHIPPED | OUTPATIENT
Start: 2023-09-19 | End: 2023-09-24

## 2023-09-19 SDOH — ECONOMIC STABILITY: HOUSING INSECURITY
IN THE LAST 12 MONTHS, WAS THERE A TIME WHEN YOU DID NOT HAVE A STEADY PLACE TO SLEEP OR SLEPT IN A SHELTER (INCLUDING NOW)?: NO

## 2023-09-19 SDOH — ECONOMIC STABILITY: FOOD INSECURITY: WITHIN THE PAST 12 MONTHS, YOU WORRIED THAT YOUR FOOD WOULD RUN OUT BEFORE YOU GOT MONEY TO BUY MORE.: NEVER TRUE

## 2023-09-19 SDOH — ECONOMIC STABILITY: FOOD INSECURITY: WITHIN THE PAST 12 MONTHS, THE FOOD YOU BOUGHT JUST DIDN'T LAST AND YOU DIDN'T HAVE MONEY TO GET MORE.: NEVER TRUE

## 2023-09-19 SDOH — ECONOMIC STABILITY: INCOME INSECURITY: HOW HARD IS IT FOR YOU TO PAY FOR THE VERY BASICS LIKE FOOD, HOUSING, MEDICAL CARE, AND HEATING?: NOT HARD AT ALL

## 2023-09-19 NOTE — TELEPHONE ENCOUNTER
----- Message from [facility] sent at 9/19/2023  9:59 AM EDT -----  Subject: Message to Provider    QUESTIONS  Information for Provider? patient returned call to the office she would   like to come in earlier than 1140 please follow up  ---------------------------------------------------------------------------  --------------  [address]  [phone]; OK to leave message on voicemail  ---------------------------------------------------------------------------  --------------  SCRIPT ANSWERS  Relationship to Patient?  Self

## 2023-09-19 NOTE — PROGRESS NOTES
Claudeen Musty is a 50 y.o. female for Follow-up (Refills for gabapentin has a cough for over a week )        Diabetes Type 2    Glucose control:   Does patient check blood glucoses at home? No  Report of hypoglycemia: no  Taking farxiage 5 mg  Lab Results   Component Value Date    LABA1C 5.4 01/30/2023     No results found for: \"EAG\"    Symptoms  Polyuria, Polydipsia or Polyphagia? No  Chest Pain, SOB, or Palpitations? -  No  New Vision complaints? No  Paresthesias of the extremities? Yes - suffers from chronic pain from lupus, RA and spinal stenosis   Sees Rheumatology for this     Medications  Current medication were reviewed. Compliant with medications? yes  Medication side effects? No  On ACE-I or ARB? No  On antiplatelet therapy? Yes  On Statin? Yes    Last Diabetic Eye Exam: in the last year, advised to schedule one       Allergies    Symptoms:  clear nasal drainage  Current treatment:  claritin working well  typically saw Telehealth visit and started on flonase and antihistamine for nasal congestion and drinage and sinus pressure. Clear productive cough but denies sob or wheezing.    Triggers:  season changes,   Pets:unknown  Smoker: non-smoker  Other smokers in the home:at times    Hypothyroidism  -taking synthroid 112 mcg daily     Follows with Nephrology for CKD  Lab Results   Component Value Date/Time     08/16/2023 12:12 PM    K 4.3 08/16/2023 12:12 PM    K 3.7 11/09/2021 03:45 PM     08/16/2023 12:12 PM    CO2 27 08/16/2023 12:12 PM    BUN 27 08/16/2023 12:12 PM    CREATININE 1.64 08/16/2023 12:12 PM    GLUCOSE 110 08/16/2023 12:12 PM    CALCIUM 8.90 08/16/2023 12:12 PM    LABGLOM 32 05/10/2023 11:20 AM           Lab Results   Component Value Date    WBC 5.2 08/16/2023    HGB 10.0 (L) 08/16/2023    HCT 31.5 (L) 08/16/2023    MCV 87.1 08/16/2023     08/16/2023      Lab Results   Component Value Date    CHOL 130 02/16/2023    CHOL 163 10/22/2019    CHOL 127 07/02/2015

## 2023-09-19 NOTE — TELEPHONE ENCOUNTER
[facility] called requesting a refill on the following medications:  Requested Prescriptions      No prescriptions requested or ordered in this encounter     Pharmacy verified:[facility] [place]  . [place]      Date of last visit: 8/28/23  Date of next visit (if applicable): 88/25/6256      Pt. Said she has Prednisone 10mg in Am and she has Prednison 5mg one at night, she want to know if the doctor will refills these and continue on this dosage.

## 2023-09-20 DIAGNOSIS — G89.4 CHRONIC PAIN SYNDROME: ICD-10-CM

## 2023-09-20 RX ORDER — HYDROCODONE BITARTRATE AND ACETAMINOPHEN 5; 325 MG/1; MG/1
1 TABLET ORAL EVERY 8 HOURS PRN
Qty: 90 TABLET | Refills: 0 | Status: SHIPPED | OUTPATIENT
Start: 2023-09-24 | End: 2023-10-24

## 2023-09-20 NOTE — TELEPHONE ENCOUNTER
Holly Interiano called requesting a refill on the following medications:  Requested Prescriptions     Pending Prescriptions Disp Refills    HYDROcodone-acetaminophen (NORCO) 5-325 MG per tablet 90 tablet 0     Sig: Take 1 tablet by mouth every 8 hours as needed for Pain for up to 30 days. Pharmacy verified[facility]      Date of last visit: 8-30-23  Date of next visit (if applicable): 62/75/8218

## 2023-09-20 NOTE — TELEPHONE ENCOUNTER
OARRS reviewed. UDS: + for  hydrococone. Last seen: 8/30/2023.  Follow-up:   Future Appointments   Date Time Provider [address]   10/23/2023  2:40 PM MACKENZIE Aguilera - CNP Kim Gianfranco Rheum [facility]   11/15/2023  3:40 PM MACKENZIE Sunshine CNP N [facility] Pain [facility]   11/30/2023  2:40 PM MD GAYATRI Ruggiero [facility]

## 2023-10-10 RX ORDER — ACETAMINOPHEN 500 MG
1000 TABLET ORAL EVERY 6 HOURS PRN
Qty: 120 TABLET | Refills: 3 | Status: SHIPPED | OUTPATIENT
Start: 2023-10-10

## 2023-10-10 RX ORDER — ACETAMINOPHEN 500 MG/1
CAPSULE ORAL
Qty: 120 CAPSULE | OUTPATIENT
Start: 2023-10-10

## 2023-10-10 NOTE — TELEPHONE ENCOUNTER
Recent Visits  Date Type Provider Dept   09/19/23 Office Visit MACKENZIE Hayes CNP [facility]   01/30/23 Office Visit Gopal Beck APRN - CNP [facility]   Showing recent visits within past 540 days with a meds authorizing provider and meeting all other requirements  Future Appointments  No visits were found meeting these conditions.   Showing future appointments within next 150 days with a meds authorizing provider and meeting all other requirements

## 2023-10-10 NOTE — TELEPHONE ENCOUNTER
Recent Visits  Date Type Provider Dept   09/19/23 Office Visit MACKENZIE Garcias - CNP [facility]   01/30/23 Office Visit Gopal Calderón APRN - CNP [facility]   Showing recent visits within past 540 days with a meds authorizing provider and meeting all other requirements  Future Appointments  No visits were found meeting these conditions.   Showing future appointments within next 150 days with a meds authorizing provider and meeting all other requirements

## 2023-10-24 DIAGNOSIS — G89.4 CHRONIC PAIN SYNDROME: ICD-10-CM

## 2023-10-24 RX ORDER — HYDROCODONE BITARTRATE AND ACETAMINOPHEN 5; 325 MG/1; MG/1
1 TABLET ORAL EVERY 8 HOURS PRN
Qty: 90 TABLET | Refills: 0 | Status: SHIPPED | OUTPATIENT
Start: 2023-10-25 | End: 2023-11-24

## 2023-10-24 NOTE — TELEPHONE ENCOUNTER
Torsten Alberto called requesting a refill on the following medications:  Requested Prescriptions     Pending Prescriptions Disp Refills    HYDROcodone-acetaminophen (NORCO) 5-325 MG per tablet 90 tablet 0     Sig: Take 1 tablet by mouth every 8 hours as needed for Pain for up to 30 days. [facility] [place]      Date of last visit: 8-30-23  Date of next visit (if applicable): 84/51/9912

## 2023-10-26 LAB
A/G RATIO: 1.8 (ref 1.5–2.5)
ABSOLUTE BASO #: 100 /CMM (ref 0–200)
ABSOLUTE EOS #: 0 /CMM (ref 0–500)
ABSOLUTE LYMPH #: 1300 /CMM (ref 1000–4800)
ABSOLUTE MONO #: 500 /CMM (ref 0–800)
ABSOLUTE NEUT #: 5400 /CMM (ref 1800–7700)
ALBUMIN SERPL-MCNC: 4 G/DL (ref 3.5–5)
ALP BLD-CCNC: 63 IU/L (ref 39–118)
ALT SERPL-CCNC: 10 IU/L (ref 10–40)
ANION GAP SERPL CALCULATED.3IONS-SCNC: 9 MMOL/L (ref 4–12)
APPEARANCE: CLEAR
AST SERPL-CCNC: 14 IU/L (ref 15–41)
BASOPHILS RELATIVE PERCENT: 0.8 % (ref 0–2)
BILIRUB SERPL-MCNC: 0.3 MG/DL (ref 0.2–1)
BILIRUBIN URINE: NEGATIVE
BUN BLDV-MCNC: 27 MG/DL (ref 7–20)
CALCIUM SERPL-MCNC: 9 MG/DL (ref 8.8–10.5)
CHLORIDE BLD-SCNC: 108 MEQ/L (ref 101–111)
CO2: 26 MEQ/L (ref 21–32)
COLOR: YELLOW
CREAT SERPL-MCNC: 1.64 MG/DL (ref 0.6–1.3)
CREATININE CLEARANCE: 33
CREATININE, RANDOM URINE: 235.2 MG/DL
EOSINOPHILS RELATIVE PERCENT: 0.4 % (ref 0–6)
ESTIMATED AVERAGE GLUCOSE: 117 MG/DL
GLUCOSE URINE: NEGATIVE
GLUCOSE: 122 MG/DL (ref 70–110)
HBA1C MFR BLD: 5.7 % (ref 4.4–6.4)
HCT VFR BLD CALC: 31.6 % (ref 35–44)
HEMOGLOBIN: 10 GM/DL (ref 12–15)
KETONES, URINE: NEGATIVE
LEUKOCYTES, UA: ABNORMAL
LYMPHOCYTES RELATIVE PERCENT: 17.4 % (ref 15–45)
MCH RBC QN AUTO: 27 PG (ref 27.5–33)
MCHC RBC AUTO-ENTMCNC: 31.6 GM/DL (ref 33–36)
MCV RBC AUTO: 85.4 CU MIC (ref 80–97)
MONOCYTES RELATIVE PERCENT: 6.3 % (ref 2–10)
MUCUS: PRESENT
NEUTROPHILS RELATIVE PERCENT: 75.1 % (ref 40–70)
NITRITE, URINE: NEGATIVE
NUCLEATED RBCS: 0 /100 WBC
PDW BLD-RTO: 15.9 % (ref 12–16)
PH, URINE: 5.5 (ref 5–8)
PLATELET # BLD: 145 TH/CMM (ref 150–400)
POTASSIUM SERPL-SCNC: 3.9 MEQ/L (ref 3.6–5)
PROTEIN, URINE, RANDOM: 308 MG/DL
PROTEIN, URINE: ABNORMAL
PROTEIN/CREAT RATIO: 1.31 G/1.73M2
RBC # BLD: 3.7 MIL/CMM (ref 4–5.1)
RBC URINE: ABNORMAL /HPF
SEDIMENTATION RATE, ERYTHROCYTE: 2 MM/HR
SODIUM BLD-SCNC: 143 MEQ/L (ref 135–145)
SPECIFIC GRAVITY UA: 1.03 (ref 1–1.03)
SQUAMOUS EPITHELIAL: ABNORMAL /HPF
T4 FREE: 0.58 NG/DL (ref 0.61–1.12)
TOTAL PROTEIN: 6.2 G/DL (ref 6.2–8)
TRANSITIONAL EPITHELIAL: ABNORMAL /HPF
TSH REFLEX: 72.8 MCIU/ML (ref 0.49–4.67)
URINALYSIS REFLEX: YES
URINE HGB: ABNORMAL
UROBILINOGEN, URINE: ABNORMAL (ref 0.2–1)
WBC # BLD: 7.2 TH/CMM (ref 4.4–10.5)
WBC URINE: ABNORMAL /HPF

## 2023-10-27 ENCOUNTER — TELEPHONE (OUTPATIENT)
Dept: FAMILY MEDICINE CLINIC | Age: 53
End: 2023-10-27

## 2023-10-27 DIAGNOSIS — R80.1 PERSISTENT PROTEINURIA: ICD-10-CM

## 2023-10-27 DIAGNOSIS — M32.14 SYSTEMIC LUPUS ERYTHEMATOSUS WITH GLOMERULAR DISEASE, UNSPECIFIED SLE TYPE (HCC): ICD-10-CM

## 2023-10-27 DIAGNOSIS — M32.14 OTHER SYSTEMIC LUPUS ERYTHEMATOSUS WITH GLOMERULAR DISEASE (HCC): ICD-10-CM

## 2023-10-27 DIAGNOSIS — Z87.39 H/O LUPUS NEPHRITIS: ICD-10-CM

## 2023-10-27 DIAGNOSIS — E03.9 HYPOTHYROIDISM, UNSPECIFIED TYPE: Primary | ICD-10-CM

## 2023-10-27 LAB
COMPLEMENT C3: 93 MG/DL (ref 75–175)
COMPLEMENT C4: 17 MG/DL (ref 14–40)

## 2023-10-27 RX ORDER — HYDROXYCHLOROQUINE SULFATE 200 MG/1
200 TABLET, FILM COATED ORAL 2 TIMES DAILY
Qty: 60 TABLET | Refills: 5 | Status: SHIPPED | OUTPATIENT
Start: 2023-10-27

## 2023-10-27 RX ORDER — MYCOPHENOLATE MOFETIL 500 MG/1
TABLET ORAL
Qty: 120 TABLET | Refills: 1 | Status: SHIPPED | OUTPATIENT
Start: 2023-10-27 | End: 2023-12-26

## 2023-10-27 NOTE — PROGRESS NOTES
Pt called. - reported medication complaince with Mycophenolate, prednisone 15mg daily and voclosporin. Confirmed Mycophenolate refill will pharmacy. - repeat labs in 2-4 weeks. - she reported understanding and had no further questions.             - recent thyroid testing abnormal

## 2023-10-27 NOTE — TELEPHONE ENCOUNTER
Patient informed and verbalized understanding. Patient states that she takes all her medications at once. She was informed to take her thyroid medi;cation 1 hour a part from other medication and food.  She states she hasn't seen anyone else for her thyroid labs

## 2023-10-27 NOTE — TELEPHONE ENCOUNTER
----- Message from MACKENZIE Rutledge CNP sent at 10/27/2023 11:32 AM EDT -----  Let pt know her thyroid labs are very out of range, has she been taking her synthroid 112 mcg daily 1 hour apart form foods and other meds, has she seen anyone else who has been managing her thyroid labs?  Thanks

## 2023-10-27 NOTE — TELEPHONE ENCOUNTER
I will have pt stay at current thyroid dose and have her repeat her thyroid labs in 4 weeks, non fasting, and if still elevated we will increase her thyroid dose please mail her the labs and make sure she knows to wait for 4 weeks before completing thanks !

## 2023-10-28 LAB — URINE CULTURE REFLEX: NORMAL

## 2023-10-30 ENCOUNTER — OFFICE VISIT (OUTPATIENT)
Dept: RHEUMATOLOGY | Age: 53
End: 2023-10-30
Payer: COMMERCIAL

## 2023-10-30 VITALS
WEIGHT: 142 LBS | SYSTOLIC BLOOD PRESSURE: 120 MMHG | HEIGHT: 66 IN | OXYGEN SATURATION: 97 % | BODY MASS INDEX: 22.82 KG/M2 | DIASTOLIC BLOOD PRESSURE: 62 MMHG | HEART RATE: 83 BPM

## 2023-10-30 DIAGNOSIS — M77.11 LATERAL EPICONDYLITIS OF RIGHT ELBOW: ICD-10-CM

## 2023-10-30 DIAGNOSIS — Z51.81 MEDICATION MONITORING ENCOUNTER: ICD-10-CM

## 2023-10-30 DIAGNOSIS — R80.1 PERSISTENT PROTEINURIA: ICD-10-CM

## 2023-10-30 DIAGNOSIS — N18.9 CHRONIC KIDNEY DISEASE, UNSPECIFIED CKD STAGE: ICD-10-CM

## 2023-10-30 DIAGNOSIS — Z87.39 H/O LUPUS NEPHRITIS: ICD-10-CM

## 2023-10-30 DIAGNOSIS — M32.14 SYSTEMIC LUPUS ERYTHEMATOSUS WITH GLOMERULAR DISEASE, UNSPECIFIED SLE TYPE (HCC): Primary | ICD-10-CM

## 2023-10-30 PROCEDURE — 99214 OFFICE O/P EST MOD 30 MIN: CPT | Performed by: NURSE PRACTITIONER

## 2023-10-30 PROCEDURE — G8427 DOCREV CUR MEDS BY ELIG CLIN: HCPCS | Performed by: NURSE PRACTITIONER

## 2023-10-30 PROCEDURE — G8484 FLU IMMUNIZE NO ADMIN: HCPCS | Performed by: NURSE PRACTITIONER

## 2023-10-30 PROCEDURE — 1036F TOBACCO NON-USER: CPT | Performed by: NURSE PRACTITIONER

## 2023-10-30 PROCEDURE — 3017F COLORECTAL CA SCREEN DOC REV: CPT | Performed by: NURSE PRACTITIONER

## 2023-10-30 PROCEDURE — G8420 CALC BMI NORM PARAMETERS: HCPCS | Performed by: NURSE PRACTITIONER

## 2023-10-30 ASSESSMENT — ENCOUNTER SYMPTOMS
EYE ITCHING: 0
DIARRHEA: 0
SHORTNESS OF BREATH: 0
ABDOMINAL PAIN: 0
TROUBLE SWALLOWING: 0
NAUSEA: 0
CONSTIPATION: 0
COUGH: 0
EYE PAIN: 0
BACK PAIN: 0

## 2023-10-30 NOTE — PROGRESS NOTES
Skin is warm and dry. Findings: No rash. Neurological:      Mental Status: She is alert and oriented to person, place, and time. Psychiatric:         Thought Content:  Thought content normal.        Upper extremities:    SHOULDERS nontender, no swelling   ELBOWS tender right, + tinel right  WRISTS nontender  HANDS/FINGERS nontender    Lower extremities:  HIPS nontender  KNEES nontender, no swelling  ANKLES nontender, no swelling   FEET : nontender, no swelling       RAPID 3:   10/30/2023 --- RAPID 3: 3.3 + 6.5 + 7 = 16.8     Remission: <3  Low Disease Activity: <6  Moderate Disease Activity: >=6 and <=12  High Disease Activity: >12     LABS    CBC  Lab Results   Component Value Date/Time    WBC 7.2 10/26/2023 09:47 AM    RBC 3.70 10/26/2023 09:47 AM    HGB 10.0 10/26/2023 09:47 AM    HCT 31.6 10/26/2023 09:47 AM    MCV 85.4 10/26/2023 09:47 AM    MCH 27.0 10/26/2023 09:47 AM    MCHC 31.6 10/26/2023 09:47 AM    RDW 15.9 10/26/2023 09:47 AM     10/26/2023 09:47 AM    MPV 11.7 05/10/2023 11:20 AM       CMP  Lab Results   Component Value Date/Time    CALCIUM 9.00 10/26/2023 09:47 AM    LABALBU 4.0 10/26/2023 09:47 AM    PROT 6.2 10/26/2023 09:47 AM     10/26/2023 09:47 AM    K 3.9 10/26/2023 09:47 AM    K 3.7 11/09/2021 03:45 PM    CO2 26 10/26/2023 09:47 AM     10/26/2023 09:47 AM    BUN 27 10/26/2023 09:47 AM    CREATININE 1.64 10/26/2023 09:47 AM    ALKPHOS 63 10/26/2023 09:47 AM    ALT 10 10/26/2023 09:47 AM    AST 14 10/26/2023 09:47 AM    BILIDIR <0.2 01/15/2021 07:51 AM       HgBA1c: No components found for: \"HGBA1C\"    Lab Results   Component Value Date/Time    VITD25 42 05/10/2023 11:21 AM         Lab Results   Component Value Date    ANASCRN Detected (A) 06/17/2019     Lab Results   Component Value Date    SSA SEE BELOW 06/17/2019     Lab Results   Component Value Date    SSB 0 06/17/2019     Lab Results   Component Value Date    ANTI-SMITH 4 06/17/2019     Lab Results   Component

## 2023-11-15 ENCOUNTER — OFFICE VISIT (OUTPATIENT)
Dept: PHYSICAL MEDICINE AND REHAB | Age: 53
End: 2023-11-15
Payer: COMMERCIAL

## 2023-11-15 ENCOUNTER — NURSE ONLY (OUTPATIENT)
Dept: LAB | Age: 53
End: 2023-11-15

## 2023-11-15 VITALS
HEIGHT: 66 IN | DIASTOLIC BLOOD PRESSURE: 76 MMHG | SYSTOLIC BLOOD PRESSURE: 130 MMHG | HEART RATE: 74 BPM | BODY MASS INDEX: 22.82 KG/M2 | WEIGHT: 142 LBS

## 2023-11-15 DIAGNOSIS — M46.1 BILATERAL SACROILIITIS (HCC): ICD-10-CM

## 2023-11-15 DIAGNOSIS — G89.4 CHRONIC PAIN SYNDROME: Primary | ICD-10-CM

## 2023-11-15 DIAGNOSIS — M54.12 CERVICAL RADICULITIS: ICD-10-CM

## 2023-11-15 DIAGNOSIS — M54.16 LUMBAR RADICULITIS: ICD-10-CM

## 2023-11-15 DIAGNOSIS — E03.9 HYPOTHYROIDISM, UNSPECIFIED TYPE: ICD-10-CM

## 2023-11-15 DIAGNOSIS — M48.02 CERVICAL SPINAL STENOSIS: ICD-10-CM

## 2023-11-15 DIAGNOSIS — M46.1 SI (SACROILIAC) JOINT INFLAMMATION (HCC): ICD-10-CM

## 2023-11-15 DIAGNOSIS — Z51.81 MEDICATION MONITORING ENCOUNTER: ICD-10-CM

## 2023-11-15 DIAGNOSIS — M47.816 LUMBAR SPONDYLOSIS: ICD-10-CM

## 2023-11-15 LAB
ALBUMIN SERPL BCG-MCNC: 4.3 G/DL (ref 3.5–5.1)
ALP SERPL-CCNC: 93 U/L (ref 38–126)
ALT SERPL W/O P-5'-P-CCNC: 10 U/L (ref 11–66)
ANION GAP SERPL CALC-SCNC: 12 MEQ/L (ref 8–16)
AST SERPL-CCNC: 14 U/L (ref 5–40)
BACTERIA: ABNORMAL
BASOPHILS ABSOLUTE: 0 THOU/MM3 (ref 0–0.1)
BASOPHILS NFR BLD AUTO: 0.2 %
BILIRUB SERPL-MCNC: 0.2 MG/DL (ref 0.3–1.2)
BILIRUB UR QL STRIP: NEGATIVE
BUN SERPL-MCNC: 29 MG/DL (ref 7–22)
CALCIUM SERPL-MCNC: 9.4 MG/DL (ref 8.5–10.5)
CASTS #/AREA URNS LPF: ABNORMAL /LPF
CASTS #/AREA URNS LPF: ABNORMAL /LPF
CHARACTER UR: CLEAR
CHARCOAL URNS QL MICRO: ABNORMAL
CHLORIDE SERPL-SCNC: 106 MEQ/L (ref 98–111)
CO2 SERPL-SCNC: 24 MEQ/L (ref 23–33)
COLOR UR: YELLOW
CREAT SERPL-MCNC: 1.8 MG/DL (ref 0.4–1.2)
CREAT UR-MCNC: 171.3 MG/DL
CRYSTALS URNS QL MICRO: ABNORMAL
DEPRECATED RDW RBC AUTO: 56.4 FL (ref 35–45)
EOSINOPHIL NFR BLD AUTO: 0.1 %
EOSINOPHILS ABSOLUTE: 0 THOU/MM3 (ref 0–0.4)
EPITHELIAL CELLS, UA: ABNORMAL /HPF
ERYTHROCYTE [DISTWIDTH] IN BLOOD BY AUTOMATED COUNT: 16.2 % (ref 11.5–14.5)
ERYTHROCYTE [SEDIMENTATION RATE] IN BLOOD BY WESTERGREN METHOD: 4 MM/HR (ref 0–20)
GFR SERPL CREATININE-BSD FRML MDRD: 34 ML/MIN/1.73M2
GLUCOSE SERPL-MCNC: 138 MG/DL (ref 70–108)
GLUCOSE UR QL STRIP.AUTO: NEGATIVE MG/DL
HCT VFR BLD AUTO: 36.2 % (ref 37–47)
HGB BLD-MCNC: 10.2 GM/DL (ref 12–16)
HGB UR QL STRIP.AUTO: NEGATIVE
HYPOCHROMIA BLD QL SMEAR: PRESENT
IMM GRANULOCYTES # BLD AUTO: 0.04 THOU/MM3 (ref 0–0.07)
IMM GRANULOCYTES NFR BLD AUTO: 0.5 %
KETONES UR QL STRIP.AUTO: NEGATIVE
LEUKOCYTE ESTERASE UR QL STRIP.AUTO: ABNORMAL
LYMPHOCYTES ABSOLUTE: 0.8 THOU/MM3 (ref 1–4.8)
LYMPHOCYTES NFR BLD AUTO: 9.1 %
MCH RBC QN AUTO: 26.8 PG (ref 26–33)
MCHC RBC AUTO-ENTMCNC: 28.2 GM/DL (ref 32.2–35.5)
MCV RBC AUTO: 95.3 FL (ref 81–99)
MONOCYTES ABSOLUTE: 0.3 THOU/MM3 (ref 0.4–1.3)
MONOCYTES NFR BLD AUTO: 3.8 %
NEUTROPHILS NFR BLD AUTO: 86.3 %
NITRITE UR QL STRIP.AUTO: NEGATIVE
NRBC BLD AUTO-RTO: 0 /100 WBC
PH UR STRIP.AUTO: 5 [PH] (ref 5–9)
PLATELET # BLD AUTO: 166 THOU/MM3 (ref 130–400)
PMV BLD AUTO: 11.4 FL (ref 9.4–12.4)
POTASSIUM SERPL-SCNC: 4.6 MEQ/L (ref 3.5–5.2)
PROT SERPL-MCNC: 6.8 G/DL (ref 6.1–8)
PROT UR STRIP.AUTO-MCNC: 300 MG/DL
PROT UR-MCNC: 231.3 MG/DL
PROT/CREAT 24H UR: 1.35 MG/G{CREAT}
RBC # BLD AUTO: 3.8 MILL/MM3 (ref 4.2–5.4)
RBC #/AREA URNS HPF: ABNORMAL /HPF
RENAL EPI CELLS #/AREA URNS HPF: ABNORMAL /[HPF]
SCAN OF BLOOD SMEAR: NORMAL
SEGMENTED NEUTROPHILS ABSOLUTE COUNT: 7.2 THOU/MM3 (ref 1.8–7.7)
SODIUM SERPL-SCNC: 142 MEQ/L (ref 135–145)
SP GR UR REFRACT.AUTO: 1.02 (ref 1–1.03)
STOMATOCYTES: ABNORMAL
TSH SERPL DL<=0.005 MIU/L-ACNC: 31.85 UIU/ML (ref 0.4–4.2)
UROBILINOGEN UR QL STRIP.AUTO: 0.2 EU/DL (ref 0–1)
WBC # BLD AUTO: 8.3 THOU/MM3 (ref 4.8–10.8)
WBC #/AREA URNS HPF: ABNORMAL /HPF
YEAST LIKE FUNGI URNS QL MICRO: ABNORMAL

## 2023-11-15 PROCEDURE — G8484 FLU IMMUNIZE NO ADMIN: HCPCS | Performed by: NURSE PRACTITIONER

## 2023-11-15 PROCEDURE — 1036F TOBACCO NON-USER: CPT | Performed by: NURSE PRACTITIONER

## 2023-11-15 PROCEDURE — 3017F COLORECTAL CA SCREEN DOC REV: CPT | Performed by: NURSE PRACTITIONER

## 2023-11-15 PROCEDURE — G8427 DOCREV CUR MEDS BY ELIG CLIN: HCPCS | Performed by: NURSE PRACTITIONER

## 2023-11-15 PROCEDURE — G8420 CALC BMI NORM PARAMETERS: HCPCS | Performed by: NURSE PRACTITIONER

## 2023-11-15 PROCEDURE — 99214 OFFICE O/P EST MOD 30 MIN: CPT | Performed by: NURSE PRACTITIONER

## 2023-11-15 ASSESSMENT — ENCOUNTER SYMPTOMS: BACK PAIN: 1

## 2023-11-15 NOTE — PROGRESS NOTES
Functionality Assessment/Goals Worksheet     On a scale of 0 (Does not Interfere) to 10 (Completely Interferes)     1. Which number describes how during the past week pain has interfered with           the following:  A. General Activity:  5  B. Mood: 6  C. Walking Ability:  5  D. Normal Work (Includes both work outside the home and housework):  5  E. Relations with Other People:   7  F. Sleep:   8  G. Enjoyment of Life:   6    2. Patient Prefers to Take their Pain Medications:     [x]  On a regular basis   []  Only when necessary    []  Does not take pain medications    3. What are the Patient's Goals/Expectations for Visiting Pain Management? [x]  Learn about my pain    []  Receive Medication   []  Physical Therapy     []  Treat Depression   []  Receive Injections    []  Treat Sleep   []  Deal with Anxiety and Stress   []  Treat Opoid Dependence/Addiction   []  Other:         [address]  Dept: [phone]  Dept Fax: [phone]: [phone]    Visit Date: 11/15/2023    Functionality Assessment/Goals Worksheet     On a scale of 0 (Does not Interfere) to 10 (Completely Interferes)     1. Which number describes how during the past week pain has interfered with       the following:  A. General Activity:  7  B. Mood: 8  C. Walking Ability:  6  D. Normal Work (Includes both work outside the home and housework):  6  E. Relations with Other People:   7  F. Sleep:   6  G. Enjoyment of Life:   7    2. Patient Prefers to Take their Pain Medications:      [x] On a regular basis   []  Only when necessary    []  Does not take pain medications    3. What are the Patient's Goals/Expectations for Visiting Pain Management?      []  Learn about my pain    []  Receive Medication   []  Physical Therapy     []  Treat Depression   []  Receive Injections    []  Treat Sleep   []

## 2023-11-16 ENCOUNTER — TELEPHONE (OUTPATIENT)
Dept: FAMILY MEDICINE CLINIC | Age: 53
End: 2023-11-16

## 2023-11-16 DIAGNOSIS — Z87.39 H/O LUPUS NEPHRITIS: ICD-10-CM

## 2023-11-16 DIAGNOSIS — E03.9 HYPOTHYROIDISM, UNSPECIFIED TYPE: Primary | ICD-10-CM

## 2023-11-16 DIAGNOSIS — R80.1 PERSISTENT PROTEINURIA: ICD-10-CM

## 2023-11-16 DIAGNOSIS — M32.14 SYSTEMIC LUPUS ERYTHEMATOSUS WITH GLOMERULAR DISEASE, UNSPECIFIED SLE TYPE (HCC): ICD-10-CM

## 2023-11-16 LAB
C3C SERPL-MCNC: 110 MG/DL (ref 90–180)
C4 SERPL-MCNC: 16 MG/DL (ref 10–40)
T4 FREE SERPL-MCNC: 0.82 NG/DL (ref 0.93–1.76)

## 2023-11-16 RX ORDER — LEVOTHYROXINE SODIUM 0.12 MG/1
125 TABLET ORAL DAILY
Qty: 30 TABLET | Refills: 3 | Status: SHIPPED | OUTPATIENT
Start: 2023-11-16

## 2023-11-16 NOTE — TELEPHONE ENCOUNTER
----- Message from Jose Shi DO sent at 11/16/2023  6:28 AM EST -----  Thyroid remains underactive  Inc synthroid to 125mcg daily  Labs in 6 wks, non-fasting ok  Let me know if questions, thanks!

## 2023-11-20 DIAGNOSIS — J20.9 ACUTE BRONCHITIS, UNSPECIFIED ORGANISM: ICD-10-CM

## 2023-11-20 RX ORDER — BENZONATATE 100 MG/1
100 CAPSULE ORAL 3 TIMES DAILY PRN
Qty: 30 CAPSULE | Refills: 2 | Status: SHIPPED | OUTPATIENT
Start: 2023-11-20 | End: 2023-12-20

## 2023-11-27 DIAGNOSIS — G89.4 CHRONIC PAIN SYNDROME: ICD-10-CM

## 2023-11-27 NOTE — TELEPHONE ENCOUNTER
Yesica Butts called requesting a refill on the following medications:  Requested Prescriptions     Pending Prescriptions Disp Refills    HYDROcodone-acetaminophen (NORCO) 5-325 MG per tablet 90 tablet 0     Sig: Take 1 tablet by mouth every 8 hours as needed for Pain for up to 30 days.      Pharmacy verified:  [address] [facility]    Date of last visit: 11/15/2023  Date of next visit (if applicable): 8/87/1852

## 2023-11-28 RX ORDER — HYDROCODONE BITARTRATE AND ACETAMINOPHEN 5; 325 MG/1; MG/1
1 TABLET ORAL EVERY 8 HOURS PRN
Qty: 90 TABLET | Refills: 0 | Status: SHIPPED | OUTPATIENT
Start: 2023-11-28 | End: 2023-12-28

## 2023-11-28 NOTE — TELEPHONE ENCOUNTER
OARRS reviewed. UDS: + for  hydrocodone gabapentin consistent. Last seen: 11/15/2023.  Follow-up:   Future Appointments   Date Time Provider [address]   11/30/2023  2:40 PM MD [facility]ATRI [facility] - [facility]   2/14/2024  3:40 PM Annie Wheeler, APRN - CNP N [facility] Pain [facility] - Julius Dandy

## 2023-11-29 DIAGNOSIS — E78.5 DYSLIPIDEMIA: ICD-10-CM

## 2023-11-29 DIAGNOSIS — E55.9 VITAMIN D DEFICIENCY: ICD-10-CM

## 2023-11-29 DIAGNOSIS — I10 PRIMARY HYPERTENSION: ICD-10-CM

## 2023-11-29 DIAGNOSIS — D63.8 ANEMIA OF CHRONIC DISEASE: ICD-10-CM

## 2023-11-29 DIAGNOSIS — N18.32 STAGE 3B CHRONIC KIDNEY DISEASE (HCC): Primary | ICD-10-CM

## 2023-11-29 DIAGNOSIS — N18.4 CKD (CHRONIC KIDNEY DISEASE), STAGE IV (HCC): ICD-10-CM

## 2023-11-30 LAB
PARATHYROID HORMONE INTACT: 115.2 U/ML (ref 12–88)
VITAMIN D 25-HYDROXY: 19 NG/ML (ref 30–100)

## 2023-12-27 DIAGNOSIS — G89.4 CHRONIC PAIN SYNDROME: ICD-10-CM

## 2023-12-27 NOTE — TELEPHONE ENCOUNTER
Jeanne Duffy called requesting a refill on the following medications:  Requested Prescriptions     Pending Prescriptions Disp Refills    HYDROcodone-acetaminophen (NORCO) 5-325 MG per tablet 90 tablet 0     Sig: Take 1 tablet by mouth every 8 hours as needed for Pain for up to 30 days.      Pharmacy verified:  [facility]      Date of last visit: 11-15-23  Date of next visit (if applicable): 3/85/8559

## 2023-12-28 RX ORDER — HYDROCODONE BITARTRATE AND ACETAMINOPHEN 5; 325 MG/1; MG/1
1 TABLET ORAL EVERY 8 HOURS PRN
Qty: 90 TABLET | Refills: 0 | Status: SHIPPED | OUTPATIENT
Start: 2023-12-28 | End: 2024-01-27

## 2024-01-22 DIAGNOSIS — G89.4 CHRONIC PAIN SYNDROME: ICD-10-CM

## 2024-01-22 RX ORDER — PSEUDOEPHED/ACETAMINOPH/DIPHEN 30MG-500MG
2 TABLET ORAL EVERY 6 HOURS PRN
Qty: 120 TABLET | Refills: 3 | Status: SHIPPED | OUTPATIENT
Start: 2024-01-22

## 2024-01-22 NOTE — TELEPHONE ENCOUNTER
OARRS reviewed. UDS: + for  hydrocodone.gabapentin is present.   Last seen: 11/15/2023. Follow-up: 2/14/2024

## 2024-01-22 NOTE — TELEPHONE ENCOUNTER
Please approve or refuse Rx request:  Requested Prescriptions     Pending Prescriptions Disp Refills    Acetaminophen Extra Strength 500 MG TABS [Pharmacy Med Name: ACETAMINOPHEN 500 MG CAPLET] 120 tablet 3     Sig: take 2 tablets by mouth every 6 hours AS NEEDED FOR PAIN       Next appointment:  Visit date not found

## 2024-01-22 NOTE — TELEPHONE ENCOUNTER
Talya Schulz called requesting a refill on the following medications:  Requested Prescriptions     Pending Prescriptions Disp Refills    HYDROcodone-acetaminophen (NORCO) 5-325 MG per tablet 90 tablet 0     Sig: Take 1 tablet by mouth every 8 hours as needed for Pain for up to 30 days.     Pharmacy verified:  [facility] [address]      Date of last visit: 11-15-23  Date of next visit (if applicable): 2/14/2024

## 2024-01-23 RX ORDER — HYDROCODONE BITARTRATE AND ACETAMINOPHEN 5; 325 MG/1; MG/1
1 TABLET ORAL EVERY 8 HOURS PRN
Qty: 90 TABLET | Refills: 0 | Status: SHIPPED | OUTPATIENT
Start: 2024-01-27 | End: 2024-02-26

## 2024-01-30 DIAGNOSIS — R80.9 PROTEINURIA, UNSPECIFIED TYPE: ICD-10-CM

## 2024-01-30 DIAGNOSIS — N18.32 STAGE 3B CHRONIC KIDNEY DISEASE (HCC): Primary | ICD-10-CM

## 2024-01-30 DIAGNOSIS — E78.5 DYSLIPIDEMIA: ICD-10-CM

## 2024-01-30 DIAGNOSIS — E11.9 TYPE 2 DIABETES MELLITUS WITHOUT COMPLICATION, WITHOUT LONG-TERM CURRENT USE OF INSULIN (HCC): ICD-10-CM

## 2024-01-30 DIAGNOSIS — M32.14 SYSTEMIC LUPUS ERYTHEMATOSUS WITH GLOMERULAR DISEASE, UNSPECIFIED SLE TYPE (HCC): ICD-10-CM

## 2024-01-30 DIAGNOSIS — R80.1 PERSISTENT PROTEINURIA: ICD-10-CM

## 2024-01-30 DIAGNOSIS — E55.9 VITAMIN D DEFICIENCY: ICD-10-CM

## 2024-01-30 DIAGNOSIS — Z87.39 H/O LUPUS NEPHRITIS: ICD-10-CM

## 2024-02-01 LAB
ANION GAP SERPL CALCULATED.3IONS-SCNC: 5 MMOL/L (ref 4–12)
BUN BLDV-MCNC: 24 MG/DL (ref 7–20)
CALCIUM SERPL-MCNC: 8.2 MG/DL (ref 8.8–10.5)
CHLORIDE BLD-SCNC: 115 MEQ/L (ref 101–111)
CO2: 21 MEQ/L (ref 21–32)
CREAT SERPL-MCNC: 1.65 MG/DL (ref 0.6–1.3)
CREATININE CLEARANCE: 33
CREATININE, RANDOM URINE: 248.8 MG/DL
GLUCOSE: 110 MG/DL (ref 70–110)
PARATHYROID HORMONE INTACT: 104.7 U/ML (ref 12–88)
POTASSIUM SERPL-SCNC: 3.6 MEQ/L (ref 3.6–5)
PROTEIN, URINE, RANDOM: 295 MG/DL
PROTEIN/CREAT RATIO: 1.19 G/1.73M2
SODIUM BLD-SCNC: 141 MEQ/L (ref 135–145)
VITAMIN D 25-HYDROXY: 11.1 NG/ML (ref 30–100)

## 2024-02-06 ENCOUNTER — OFFICE VISIT (OUTPATIENT)
Dept: NEPHROLOGY | Age: 54
End: 2024-02-06
Payer: COMMERCIAL

## 2024-02-06 ENCOUNTER — TELEPHONE (OUTPATIENT)
Dept: FAMILY MEDICINE CLINIC | Age: 54
End: 2024-02-06

## 2024-02-06 VITALS
OXYGEN SATURATION: 100 % | WEIGHT: 149 LBS | DIASTOLIC BLOOD PRESSURE: 91 MMHG | BODY MASS INDEX: 24.06 KG/M2 | SYSTOLIC BLOOD PRESSURE: 147 MMHG | HEART RATE: 73 BPM

## 2024-02-06 DIAGNOSIS — E11.21 DIABETIC NEPHROPATHY ASSOCIATED WITH TYPE 2 DIABETES MELLITUS (HCC): ICD-10-CM

## 2024-02-06 DIAGNOSIS — M32.14 SYSTEMIC LUPUS ERYTHEMATOSUS WITH GLOMERULAR DISEASE, UNSPECIFIED SLE TYPE (HCC): ICD-10-CM

## 2024-02-06 DIAGNOSIS — Z87.39 H/O LUPUS NEPHRITIS: ICD-10-CM

## 2024-02-06 DIAGNOSIS — N25.81 HYPERPARATHYROIDISM, SECONDARY RENAL (HCC): ICD-10-CM

## 2024-02-06 DIAGNOSIS — E83.51 HYPOCALCEMIA: ICD-10-CM

## 2024-02-06 DIAGNOSIS — E03.9 HYPOTHYROIDISM, UNSPECIFIED TYPE: ICD-10-CM

## 2024-02-06 DIAGNOSIS — D63.8 ANEMIA OF CHRONIC DISEASE: ICD-10-CM

## 2024-02-06 DIAGNOSIS — N18.32 STAGE 3B CHRONIC KIDNEY DISEASE (HCC): Primary | ICD-10-CM

## 2024-02-06 DIAGNOSIS — R80.9 PROTEINURIA, UNSPECIFIED TYPE: ICD-10-CM

## 2024-02-06 DIAGNOSIS — E55.9 VITAMIN D DEFICIENCY: ICD-10-CM

## 2024-02-06 DIAGNOSIS — R80.1 PERSISTENT PROTEINURIA: ICD-10-CM

## 2024-02-06 LAB
A/G RATIO: 1.6 (ref 1.5–2.5)
ABSOLUTE BASO #: 0 /CMM (ref 0–200)
ABSOLUTE EOS #: 0 /CMM (ref 0–500)
ABSOLUTE LYMPH #: 1400 /CMM (ref 1000–4800)
ABSOLUTE MONO #: 500 /CMM (ref 0–800)
ABSOLUTE NEUT #: 5300 /CMM (ref 1800–7700)
ALBUMIN SERPL-MCNC: 3.9 G/DL (ref 3.5–5)
ALP BLD-CCNC: 83 IU/L (ref 39–118)
ALT SERPL-CCNC: 10 IU/L (ref 10–40)
ANION GAP SERPL CALCULATED.3IONS-SCNC: 5 MMOL/L (ref 4–12)
AST SERPL-CCNC: 14 IU/L (ref 15–41)
BASOPHILS RELATIVE PERCENT: 0.3 % (ref 0–2)
BILIRUB SERPL-MCNC: 0.2 MG/DL (ref 0.2–1)
BUN BLDV-MCNC: 27 MG/DL (ref 7–20)
C-REACTIVE PROTEIN: < 0.5 MG/DL
CALCIUM SERPL-MCNC: 8.4 MG/DL (ref 8.8–10.5)
CHLORIDE BLD-SCNC: 112 MEQ/L (ref 101–111)
CO2: 21 MEQ/L (ref 21–32)
CREAT SERPL-MCNC: 1.74 MG/DL (ref 0.6–1.3)
CREATININE CLEARANCE: 31
EOSINOPHILS RELATIVE PERCENT: 0.4 % (ref 0–6)
GLUCOSE: 79 MG/DL (ref 70–110)
HCT VFR BLD CALC: 34.1 % (ref 35–44)
HEMOGLOBIN: 10.9 GM/DL (ref 12–15)
LYMPHOCYTES RELATIVE PERCENT: 19.7 % (ref 15–45)
MCH RBC QN AUTO: 27.6 PG (ref 27.5–33)
MCHC RBC AUTO-ENTMCNC: 31.9 GM/DL (ref 33–36)
MCV RBC AUTO: 86.6 CU MIC (ref 80–97)
MONOCYTES RELATIVE PERCENT: 7.1 % (ref 2–10)
NEUTROPHILS RELATIVE PERCENT: 72.5 % (ref 40–70)
NUCLEATED RBCS: 0.1 /100 WBC
PDW BLD-RTO: 15.7 % (ref 12–16)
PLATELET # BLD: 136 TH/CMM (ref 150–400)
POTASSIUM SERPL-SCNC: 4.3 MEQ/L (ref 3.6–5)
RBC # BLD: 3.93 MIL/CMM (ref 4–5.1)
SEDIMENTATION RATE, ERYTHROCYTE: 2 MM/HR
SODIUM BLD-SCNC: 138 MEQ/L (ref 135–145)
T4 FREE: 0.55 NG/DL (ref 0.61–1.12)
TOTAL PROTEIN: 6.3 G/DL (ref 6.2–8)
TSH SERPL DL<=0.05 MIU/L-ACNC: 21.45 MCIU/ML (ref 0.49–4.67)
WBC # BLD: 7.3 TH/CMM (ref 4.4–10.5)

## 2024-02-06 PROCEDURE — G8484 FLU IMMUNIZE NO ADMIN: HCPCS | Performed by: INTERNAL MEDICINE

## 2024-02-06 PROCEDURE — 2022F DILAT RTA XM EVC RTNOPTHY: CPT | Performed by: INTERNAL MEDICINE

## 2024-02-06 PROCEDURE — 1036F TOBACCO NON-USER: CPT | Performed by: INTERNAL MEDICINE

## 2024-02-06 PROCEDURE — G8420 CALC BMI NORM PARAMETERS: HCPCS | Performed by: INTERNAL MEDICINE

## 2024-02-06 PROCEDURE — G8427 DOCREV CUR MEDS BY ELIG CLIN: HCPCS | Performed by: INTERNAL MEDICINE

## 2024-02-06 PROCEDURE — 3017F COLORECTAL CA SCREEN DOC REV: CPT | Performed by: INTERNAL MEDICINE

## 2024-02-06 PROCEDURE — 3046F HEMOGLOBIN A1C LEVEL >9.0%: CPT | Performed by: INTERNAL MEDICINE

## 2024-02-06 PROCEDURE — 99214 OFFICE O/P EST MOD 30 MIN: CPT | Performed by: INTERNAL MEDICINE

## 2024-02-06 RX ORDER — LOSARTAN POTASSIUM 50 MG/1
50 TABLET ORAL DAILY
Qty: 90 TABLET | Refills: 1 | Status: SHIPPED | OUTPATIENT
Start: 2024-02-06

## 2024-02-06 RX ORDER — LEVOTHYROXINE SODIUM 0.15 MG/1
150 TABLET ORAL DAILY
Qty: 90 TABLET | Refills: 1 | Status: SHIPPED | OUTPATIENT
Start: 2024-02-06

## 2024-02-06 NOTE — TELEPHONE ENCOUNTER
----- Message from MACKENZIE Miller - CNP sent at 2/6/2024  2:48 PM EST -----  Let pt know her TSH levels are improving but still not at goal her TSH has decreased from 31 to 21 normal range is up to 4.67. I recommend we increase her synthroid to and repeat labs again in 8 weeks, please mail to her thanks

## 2024-02-06 NOTE — PROGRESS NOTES
Renal Progress Note    Assessment and Plan:      Diagnosis Orders   1. Stage 3b chronic kidney disease (HCC)        2. Vitamin D deficiency        3. Hypocalcemia        4. Hyperparathyroidism, secondary renal (HCC)        5. Proteinuria, unspecified type        6. Anemia of chronic disease        7. Diabetic nephropathy associated with type 2 diabetes mellitus (HCC)                  PLAN:  I discussed my thoughts with the patient.  She understood it appears.  I addressed her questions.  Lab result reviewed with her comprehensively  Urine protein creatinine ratio in November 2023 was 1.35 g  The most recent one was done on 1 February 2024 and it was 1.19 g.  I discussed with the patient.  In any case I provided her with  low protein diet information  Will also add losartan 50 mg once a day  Goal for her urine protein creatinine ratio and diabetic patient is less than at 1000 mg  This was also discussed with her  Return visit in 3 months with labs          Patient Active Problem List   Diagnosis    Hypothyroid    Systemic lupus erythematosus (HCC)    Hematuria    Proteinuria    Anemia    CKD (chronic kidney disease), stage III (HCC)    Dyslipidemia    Hyperuricemia    Lumbar radiculitis    Spinal stenosis of lumbar region without neurogenic claudication    Renal insufficiency    Type 2 diabetes mellitus without complication, without long-term current use of insulin (HCC)    Cervical radiculopathy    Cervical spinal stenosis    CKD (chronic kidney disease), stage IV ([facility])           Subjective:   Chief complaint:  Chief Complaint   Patient presents with    Follow-up     Ckd iiib      HPI:This is a follow up visit for Ms Talya Schulz who is here today for return appointment.  I see her for chronic kidney disease among other multiple entities.  She was last seen in May 2023.  She was supposed to come back in May 2024.  However she came early.  Serum creatinine was 1.9 mg/dL.  Today it is 1.65 mg/dL.  However, he was

## 2024-02-06 NOTE — PATIENT INSTRUCTIONS
LOW PROTEIN FOOD LIST    FRUITS AND VEGETABLES               Serving size                    Protein (grams)    Apples, fresh, diced                                        1 cup                                0.2 grams  Grapes                                                         1/2 cup                                0.6  Raisins                                                         1/4 cup                                1.2  Carrots, raw, chopped or shredded              1/2 cup                               0.6  Celery, raw, chopped                                    1/2 cup                               0.4  Chiles, green, chopped                                 2 TB                                   0.1  Corn, cooked, canned                                   1/4 cup                              1.4   Mushrooms, sliced                                        1/2 cup                               0.7  Onions, green, chopped                                1 TB                                   0.1  Onions, red, white, or yellow, chopped         1/4 cup                               0.5  Parsley, fresh, chopped                                 1 TB                                   0.1  Peppers, Bell, chopped, raw                         1/4 cup                               0.3  Potato, raw, chopped                                    1/2 cup                               1.8              Potato, raw, medium                                      1                                        4.7        BEVERAGES  Orange Juice                                                 1/2 cup                                0.9  Tomato Juice                                                 1/2 cup                                0.9    SOUPS  Bouillon  (1 cube = 1 cup, prepared)              1 cube                                0.6  Chicken Broth                                                 1 cup

## 2024-02-08 NOTE — TELEPHONE ENCOUNTER
Tried to reach patient no answer VM not set up    I have been unable to reach this patient by phone.  A letter is being sent.

## 2024-02-14 ENCOUNTER — OFFICE VISIT (OUTPATIENT)
Dept: PHYSICAL MEDICINE AND REHAB | Age: 54
End: 2024-02-14
Payer: COMMERCIAL

## 2024-02-14 VITALS
WEIGHT: 149.03 LBS | SYSTOLIC BLOOD PRESSURE: 134 MMHG | BODY MASS INDEX: 23.95 KG/M2 | DIASTOLIC BLOOD PRESSURE: 80 MMHG | HEIGHT: 66 IN

## 2024-02-14 DIAGNOSIS — G89.4 CHRONIC PAIN SYNDROME: Primary | ICD-10-CM

## 2024-02-14 DIAGNOSIS — M46.1 BILATERAL SACROILIITIS (HCC): ICD-10-CM

## 2024-02-14 DIAGNOSIS — M46.1 SI (SACROILIAC) JOINT INFLAMMATION (HCC): ICD-10-CM

## 2024-02-14 DIAGNOSIS — M54.16 LUMBAR RADICULITIS: ICD-10-CM

## 2024-02-14 DIAGNOSIS — M54.12 CERVICAL RADICULITIS: ICD-10-CM

## 2024-02-14 DIAGNOSIS — M48.02 CERVICAL SPINAL STENOSIS: ICD-10-CM

## 2024-02-14 DIAGNOSIS — M47.812 CERVICAL SPONDYLOSIS: ICD-10-CM

## 2024-02-14 DIAGNOSIS — M47.816 LUMBAR SPONDYLOSIS: ICD-10-CM

## 2024-02-14 PROCEDURE — 99214 OFFICE O/P EST MOD 30 MIN: CPT | Performed by: NURSE PRACTITIONER

## 2024-02-14 PROCEDURE — G8420 CALC BMI NORM PARAMETERS: HCPCS | Performed by: NURSE PRACTITIONER

## 2024-02-14 PROCEDURE — 3017F COLORECTAL CA SCREEN DOC REV: CPT | Performed by: NURSE PRACTITIONER

## 2024-02-14 PROCEDURE — 1036F TOBACCO NON-USER: CPT | Performed by: NURSE PRACTITIONER

## 2024-02-14 PROCEDURE — G8427 DOCREV CUR MEDS BY ELIG CLIN: HCPCS | Performed by: NURSE PRACTITIONER

## 2024-02-14 PROCEDURE — G8484 FLU IMMUNIZE NO ADMIN: HCPCS | Performed by: NURSE PRACTITIONER

## 2024-02-14 NOTE — PROGRESS NOTES
Functionality Assessment/Goals Worksheet     On a scale of 0 (Does not Interfere) to 10 (Completely Interferes)     1.  Which number describes how during the past week pain has interfered with           the following:  A.  General Activity:  5  B.  Mood: 6  C.  Walking Ability:  5  D.  Normal Work (Includes both work outside the home and housework):  5  E.  Relations with Other People:   7  F.  Sleep:   8  G.  Enjoyment of Life:   6    2.  Patient Prefers to Take their Pain Medications:     [x]  On a regular basis   []  Only when necessary    []  Does not take pain medications    3.  What are the Patient's Goals/Expectations for Visiting Pain Management?     [x]  Learn about my pain    []  Receive Medication   []  Physical Therapy     []  Treat Depression   []  Receive Injections    []  Treat Sleep   []  Deal with Anxiety and Stress   []  Treat Opoid Dependence/Addiction   []  Other:       HPI:   Talya Schulz is a 53 y.o. female is here today for    Chief Complaint: neck thoracic  lumbar hip Si knee pain.  BLE BUE       F/U No assistive devices today. Has had some coughing and diarrhea  over past 2 weeks. Has some increased neck pain stiffness had to  do some stretches to get it back in  place.  Was stuck  and was guarding it.  State she slept wrong.    Started on thyroid medication.   Unable to offer procedures until finishes PT. Insurance has denied.   She had some thoracic lumbar pain  that radiated to her left side to her rib.  States the pain comes and goes. She needs a new mattress.     She has not completed PT for cervical pain  with BUE radicular s/s.  Insurance denied TFCESI  until PT and NSAID use .   She states she  did not go to PT it was a waste of her time it doesn't help and she has had several rounds in the past and now she cant tolerate PT due to  increased pain and heart issues.  I encouraged PT   due to insurance denial of procedures until she  completes PT. She has been unable to tolerate PT

## 2024-02-14 NOTE — PROGRESS NOTES
[facility] PROFESSIONAL SERVS  [facility] NEUROSCIENCE AND REHABILITATION [address]  [facility] [address]  Dept: [phone]  Dept Fax: [phone]  Loc: [phone]    Visit Date: 2/14/2024    Functionality Assessment/Goals Worksheet     On a scale of 0 (Does not Interfere) to 10 (Completely Interferes)     1.  Which number describes how during the past week pain has interfered with       the following:  A.  General Activity:  5  B.  Mood: 8  C.  Walking Ability:  7  D.  Normal Work (Includes both work outside the home and housework):  7  E.  Relations with Other People:   8  F.  Sleep:   8  G.  Enjoyment of Life:   6    2.  Patient Prefers to Take their Pain Medications:     [x]  On a regular basis   []  Only when necessary    []  Does not take pain medications    3.  What are the Patient's Goals/Expectations for Visiting Pain Management?     [x]  Learn about my pain    []  Receive Medication   []  Physical Therapy     []  Treat Depression   []  Receive Injections    []  Treat Sleep   []  Deal with Anxiety and Stress   []  Treat Opoid Dependence/Addiction   []  Other:

## 2024-02-23 DIAGNOSIS — G89.4 CHRONIC PAIN SYNDROME: ICD-10-CM

## 2024-02-23 NOTE — TELEPHONE ENCOUNTER
Talya Schulz called requesting a refill on the following medications:  Requested Prescriptions     Pending Prescriptions Disp Refills    HYDROcodone-acetaminophen (NORCO) 5-325 MG per tablet 90 tablet 0     Sig: Take 1 tablet by mouth every 8 hours as needed for Pain for up to 30 days.     Pharm[facility] verified: [facility]  [address]      Date of last visit: 2/14/2024  Date of next visit (if applicable): 5/14/2024

## 2024-02-26 RX ORDER — HYDROCODONE BITARTRATE AND ACETAMINOPHEN 5; 325 MG/1; MG/1
1 TABLET ORAL EVERY 8 HOURS PRN
Qty: 90 TABLET | Refills: 0 | Status: SHIPPED | OUTPATIENT
Start: 2024-02-26 | End: 2024-03-27

## 2024-02-26 NOTE — TELEPHONE ENCOUNTER
OARRS reviewed. UDS: + for  hydrocodone. Gabapentin is present.  Last seen: 2/14/2024. Follow-up: 5/14/24

## 2024-02-27 DIAGNOSIS — B85.0 HEAD LICE: ICD-10-CM

## 2024-02-27 RX ORDER — PERMETHRIN 50 MG/G
CREAM TOPICAL
Qty: 3 EACH | Refills: 1 | Status: SHIPPED | OUTPATIENT
Start: 2024-02-27

## 2024-02-29 ENCOUNTER — OFFICE VISIT (OUTPATIENT)
Dept: RHEUMATOLOGY | Age: 54
End: 2024-02-29
Payer: COMMERCIAL

## 2024-02-29 VITALS
HEART RATE: 65 BPM | HEIGHT: 66 IN | SYSTOLIC BLOOD PRESSURE: 122 MMHG | OXYGEN SATURATION: 97 % | BODY MASS INDEX: 24.75 KG/M2 | DIASTOLIC BLOOD PRESSURE: 68 MMHG | WEIGHT: 154 LBS

## 2024-02-29 DIAGNOSIS — R80.1 PERSISTENT PROTEINURIA: ICD-10-CM

## 2024-02-29 DIAGNOSIS — N18.9 CHRONIC KIDNEY DISEASE, UNSPECIFIED CKD STAGE: ICD-10-CM

## 2024-02-29 DIAGNOSIS — Z51.81 MEDICATION MONITORING ENCOUNTER: ICD-10-CM

## 2024-02-29 DIAGNOSIS — M32.14 SYSTEMIC LUPUS ERYTHEMATOSUS WITH GLOMERULAR DISEASE, UNSPECIFIED SLE TYPE (HCC): Primary | ICD-10-CM

## 2024-02-29 PROCEDURE — 1036F TOBACCO NON-USER: CPT | Performed by: NURSE PRACTITIONER

## 2024-02-29 PROCEDURE — G8427 DOCREV CUR MEDS BY ELIG CLIN: HCPCS | Performed by: NURSE PRACTITIONER

## 2024-02-29 PROCEDURE — 99214 OFFICE O/P EST MOD 30 MIN: CPT | Performed by: NURSE PRACTITIONER

## 2024-02-29 PROCEDURE — 3017F COLORECTAL CA SCREEN DOC REV: CPT | Performed by: NURSE PRACTITIONER

## 2024-02-29 PROCEDURE — G8420 CALC BMI NORM PARAMETERS: HCPCS | Performed by: NURSE PRACTITIONER

## 2024-02-29 PROCEDURE — G8484 FLU IMMUNIZE NO ADMIN: HCPCS | Performed by: NURSE PRACTITIONER

## 2024-02-29 RX ORDER — HYDROXYCHLOROQUINE SULFATE 200 MG/1
200 TABLET, FILM COATED ORAL 2 TIMES DAILY
Qty: 60 TABLET | Refills: 5 | Status: SHIPPED | OUTPATIENT
Start: 2024-02-29

## 2024-02-29 RX ORDER — MYCOPHENOLATE MOFETIL 500 MG/1
TABLET ORAL
Qty: 120 TABLET | Refills: 1 | Status: SHIPPED | OUTPATIENT
Start: 2024-02-29 | End: 2024-06-10

## 2024-02-29 ASSESSMENT — ENCOUNTER SYMPTOMS
SHORTNESS OF BREATH: 0
TROUBLE SWALLOWING: 0
ABDOMINAL PAIN: 0
BACK PAIN: 0
EYE PAIN: 0
CONSTIPATION: 0
COUGH: 0
DIARRHEA: 0
NAUSEA: 0
EYE ITCHING: 0

## 2024-02-29 NOTE — PROGRESS NOTES
Positive for sleep disturbance. The patient is not nervous/anxious.        PAST MEDICAL HISTORY      Past Medical History:   Diagnosis Date    Anemia     CKD (chronic kidney disease), stage III (HCC)     Dyslipidemia     Hematuria     Hyperlipidemia     Hypertension     Hyperuricemia     Hypothyroidism     Kidney disease     as a result from lupus    Lupus (HCC)     Lupus nephritis (HCC)     Proteinuria     Seizures (HCC)     Systemic lupus erythematosus (HCC)        PAST SURGICAL HISTORY      Past Surgical History:   Procedure Laterality Date    CT BIOPSY ABDOMEN RETROPERITONEUM  1/15/2021    CT BIOPSY ABDOMEN RETROPERITONEUM 1/15/2021 [facility] CT SCAN    LUMBAR NERVE BLOCK N/A 3/11/2019    LESI at L4 performed by Christian Cheatham MD at [facility] OR    LUMBAR NERVE BLOCK N/A 5/21/2019    LESI at L3 #1 performed by Christian Cheatham MD at [facility] OR    LUMBAR SPINE SURGERY Left 12/13/2018    LUMBAR TRANSFORAMINAL TFESI L5 LEFT #2, performed by Christian Cheatham MD at [facility] OR    NERVE BLOCK Bilateral     LUMBAR FACET    PAIN MANAGEMENT PROCEDURE N/A 11/1/2022    Cervical Epidural Steroid Injection  Cervical 6 performed by Christian Cheatham MD at [facility] OR    ND NJX AA&/STRD TFRML EPI LUMBAR/SACRAL 1 LEVEL Left 9/25/2018    LUMBAR TRANSFORAMINAL TFESI L5 on the LEFT performed by Christian Cheatham MD at [facility] OR    ND NJX DX/THER AGT PVRT FACET JT LMBR/SAC 2ND LEVEL Bilateral 5/7/2018    LUMBAR FACET  MBB #1@L4-5,5-S1 Bilateral performed by Christian Cheatham MD at [facility] OR    ND NJX DX/THER SBST INTRLMNR LMBR/SAC W/IMG GDN N/A 7/19/2018    LUMBAR INTER LAMINAR GIGI LESI @L4 performed by Christian Cheatham MD at [facility] [place] OR       FAMILY HISTORY      Family History   Problem Relation Age of Onset    Diabetes Mother     Heart Disease Mother     Thyroid Disease Mother     Parkinsonism Mother     Diabetes Father

## 2024-03-07 DIAGNOSIS — R10.9 ABDOMINAL PAIN, UNSPECIFIED ABDOMINAL LOCATION: ICD-10-CM

## 2024-03-07 RX ORDER — OMEPRAZOLE 20 MG/1
CAPSULE, DELAYED RELEASE ORAL
Qty: 30 CAPSULE | Refills: 3 | Status: SHIPPED | OUTPATIENT
Start: 2024-03-07

## 2024-03-18 ENCOUNTER — HOSPITAL ENCOUNTER (EMERGENCY)
Age: 54
Discharge: HOME | End: 2024-03-18
Payer: COMMERCIAL

## 2024-03-18 VITALS
HEIGHT: 66 IN | BODY MASS INDEX: 24.11 KG/M2 | RESPIRATION RATE: 16 BRPM | DIASTOLIC BLOOD PRESSURE: 91 MMHG | TEMPERATURE: 99 F | OXYGEN SATURATION: 100 % | SYSTOLIC BLOOD PRESSURE: 149 MMHG | HEART RATE: 69 BPM | WEIGHT: 150 LBS

## 2024-03-18 DIAGNOSIS — L02.511 ABSCESS OF RIGHT HAND: Primary | ICD-10-CM

## 2024-03-18 PROCEDURE — 99213 OFFICE O/P EST LOW 20 MIN: CPT | Performed by: EMERGENCY MEDICINE

## 2024-03-18 PROCEDURE — 10060 I&D ABSCESS SIMPLE/SINGLE: CPT | Performed by: EMERGENCY MEDICINE

## 2024-03-18 PROCEDURE — 99213 OFFICE O/P EST LOW 20 MIN: CPT

## 2024-03-18 RX ORDER — DOXYCYCLINE HYCLATE 100 MG
100 TABLET ORAL 2 TIMES DAILY
Qty: 10 TABLET | Refills: 0 | Status: SHIPPED | OUTPATIENT
Start: 2024-03-18 | End: 2024-03-23

## 2024-03-18 ASSESSMENT — PAIN - FUNCTIONAL ASSESSMENT: PAIN_FUNCTIONAL_ASSESSMENT: 0-10

## 2024-03-18 ASSESSMENT — PAIN DESCRIPTION - ORIENTATION: ORIENTATION: RIGHT;ANTERIOR

## 2024-03-18 ASSESSMENT — PAIN DESCRIPTION - LOCATION: LOCATION: HAND

## 2024-03-18 ASSESSMENT — PAIN DESCRIPTION - DESCRIPTORS: DESCRIPTORS: SHARP

## 2024-03-18 ASSESSMENT — ENCOUNTER SYMPTOMS
SHORTNESS OF BREATH: 0
COUGH: 0

## 2024-03-18 ASSESSMENT — PAIN DESCRIPTION - PAIN TYPE: TYPE: ACUTE PAIN

## 2024-03-18 ASSESSMENT — PAIN SCALES - GENERAL: PAINLEVEL_OUTOF10: 8

## 2024-03-18 NOTE — DISCHARGE INSTRUCTIONS
Elevate your hand frequently    Can apply warm compresses to the area to see if this helps draw drainage from the right hand    Doxycycline as directed until gone    Follow-up family physician return for new or worsening symptoms

## 2024-03-18 NOTE — ED PROVIDER NOTES
[facility] URGENT CARE  Urgent Care Encounter       CHIEF COMPLAINT       Chief Complaint   Patient presents with    Abscess     Right hand       Nurses Notes reviewed and I agree except as noted in the HPI.  HISTORY OF PRESENT ILLNESS   Talya Schulz is a 53 y.o. female who presents for redness and swelling to the backside of the right hand.  Patient states this has increased in size over the past 4 to 5 days.  Denies any known injury to the area.  She is concerned this could be a spider bite.    HPI    REVIEW OF SYSTEMS     Review of Systems   Constitutional:  Negative for activity change, fatigue and fever.   Respiratory:  Negative for cough and shortness of breath.    Skin:  Positive for wound (right hand).       PAST MEDICAL HISTORY         Diagnosis Date    Anemia     CKD (chronic kidney disease), stage III (HCC)     Dyslipidemia     Hematuria     Hyperlipidemia     Hypertension     Hyperuricemia     Hypothyroidism     Kidney disease     as a result from lupus    Lupus (HCC)     Lupus nephritis (HCC)     Proteinuria     Seizures (HCC)     Systemic lupus erythematosus (HCC)        SURGICALHISTORY     Patient  has a past surgical history that includes Nerve Block (Bilateral); pr njx dx/ther agt pvrt facet jt lmbr/sac 2nd level (Bilateral, 5/7/2018); pr njx dx/ther sbst intrlmnr lmbr/sac w/img gdn (N/A, 7/19/2018); pr njx aa&/strd tfrml epi lumbar/sacral 1 level (Left, 9/25/2018); Lumbar spine surgery (Left, 12/13/2018); lumbar nerve block (N/A, 3/11/2019); lumbar nerve block (N/A, 5/21/2019); CT BIOPSY ABDOMEN RETROPERITONEUM (1/15/2021); and Pain management procedure (N/A, 11/1/2022).    CURRENT MEDICATIONS       Discharge Medication List as of 3/18/2024  6:45 PM        CONTINUE these medications which have NOT CHANGED    Details   omeprazole (PRILOSEC) 20 MG delayed release capsule take 1 capsule by mouth once daily, Disp-30 capsule, R-3Normal      mycophenolate (CELLCEPT) 500 MG tablet Take 2

## 2024-03-19 DIAGNOSIS — E03.9 HYPOTHYROIDISM, UNSPECIFIED TYPE: ICD-10-CM

## 2024-03-19 RX ORDER — LEVOTHYROXINE SODIUM 0.12 MG/1
125 TABLET ORAL DAILY
Qty: 30 TABLET | Refills: 3 | OUTPATIENT
Start: 2024-03-19

## 2024-03-19 NOTE — TELEPHONE ENCOUNTER
Recent Visits  Date Type Provider Dept   09/19/23 Office Visit Gopal Boone APRN - CNP [facility]   01/30/23 Office Visit Gopal Booen APRN - CNP [facility]   Showing recent visits within past 540 days with a meds authorizing provider and meeting all other requirements  Future Appointments  Date Type Provider Dept   03/26/24 Appointment Gopal Boone APRN - CNP [facility]   Showing future appointments within next 150 days with a meds authorizing provider and meeting all other requirements

## 2024-03-21 DIAGNOSIS — G89.4 CHRONIC PAIN SYNDROME: ICD-10-CM

## 2024-03-21 RX ORDER — HYDROCODONE BITARTRATE AND ACETAMINOPHEN 5; 325 MG/1; MG/1
1 TABLET ORAL EVERY 8 HOURS PRN
Qty: 90 TABLET | Refills: 0 | Status: SHIPPED | OUTPATIENT
Start: 2024-03-27 | End: 2024-04-26

## 2024-03-21 NOTE — TELEPHONE ENCOUNTER
OARRS reviewed. UDS: + for  hydrocodone gabapentin consistent.   Last seen: 2/14/2024. Follow-up:   Future Appointments   Date Time Provider Department Center   3/26/2024 10:40 AM Gopal Boone APRN - CNP Fam Med [facility] [place]   5/6/2024 11:20 AM Kassi Rodriguez APRN - CNP N [facility] Rheum [facility] [place]   5/14/2024 11:40 AM Milena Wheeler APRN - CNP [facility] Pain [facility] [place]   6/4/2024 10:20 AM Conrad Maldonado MD N ES Kidney [facility] [place]   7/3/2024 11:20 AM Jareth Rodriguez DO N [facility] Rheum [facility]

## 2024-03-21 NOTE — TELEPHONE ENCOUNTER
Talya Schulz called requesting a refill on the following medications:  Requested Prescriptions     Pending Prescriptions Disp Refills    HYDROcodone-acetaminophen (NORCO) 5-325 MG per tablet 90 tablet 0     Sig: Take 1 tablet by mouth every 8 hours as needed for Pain for up to 30 days.     Pharmacy verified:[facility] [address]  .pv      Date of last visit: 2/14/24  Date of next visit (if applicable): 5/14/2024

## 2024-03-24 DIAGNOSIS — E03.9 HYPOTHYROIDISM, UNSPECIFIED TYPE: ICD-10-CM

## 2024-03-25 ENCOUNTER — TELEPHONE (OUTPATIENT)
Dept: FAMILY MEDICINE CLINIC | Age: 54
End: 2024-03-25

## 2024-03-25 LAB
T4 FREE: 0.53 NG/DL (ref 0.61–1.12)
TSH REFLEX: 101.59 MCIU/ML (ref 0.49–4.67)

## 2024-03-25 RX ORDER — LEVOTHYROXINE SODIUM 0.12 MG/1
125 TABLET ORAL DAILY
Qty: 30 TABLET | Refills: 3 | OUTPATIENT
Start: 2024-03-25

## 2024-03-25 NOTE — TELEPHONE ENCOUNTER
Recent Visits  Date Type Provider Dept   09/19/23 Office Visit Gopal Boone APRN - CNP [facility]   01/30/23 Office Visit Gopal Boone APRN - CNP [facility]   Showing recent visits within past 540 days with a meds authorizing provider and meeting all other requirements  Future Appointments  Date Type Provider Dept   03/26/24 Appointment Gopal Boone APRN - CNP [facility]   Showing future appointments within next 150 days with a meds authorizing provider and meeting all other requirements

## 2024-03-25 NOTE — TELEPHONE ENCOUNTER
I received a refill request from the patient for her levothyroxine, Her last TSH in February was very high at 21, please verify her current levothyroxine dose and she is due to repeat her TSH. The orders are in the computer, I would like her to have the TSH level before refilling so I can adjust her dose if needed. Thanks

## 2024-03-25 NOTE — TELEPHONE ENCOUNTER
Pt informed and verbalized understanding.     Pt states she is taking the 150 mcg daily    She will go today to get the blood work done

## 2024-03-26 ENCOUNTER — TELEPHONE (OUTPATIENT)
Dept: FAMILY MEDICINE CLINIC | Age: 54
End: 2024-03-26

## 2024-03-26 DIAGNOSIS — E03.9 HYPOTHYROIDISM, UNSPECIFIED TYPE: ICD-10-CM

## 2024-03-26 NOTE — TELEPHONE ENCOUNTER
----- Message from MACKENZIE Miller CNP sent at 3/26/2024  3:23 PM EDT -----  I have reviewed pt recent TSH level and it has actually risen instead of decreased, as would be expected when taking levothyroxine.  Is she sure she has been taking levothyroxine 150 mcg daily for at least 8 weeks, (as there had also been another dose of 125 mcg in her chart )? And if so has she been taking it 1 hour apart from other foods and meds? I will get back to her on next steps and dosing after she responds. Thanks

## 2024-03-28 RX ORDER — LEVOTHYROXINE SODIUM 175 UG/1
175 TABLET ORAL DAILY
Qty: 90 TABLET | Refills: 1 | Status: SHIPPED | OUTPATIENT
Start: 2024-03-28

## 2024-03-28 NOTE — TELEPHONE ENCOUNTER
Pt informed and verbalized understanding.     Pt states she is taking the 150 mcg dose for at least 8 weeks and has been taking it 1 hr apart from other foods and meds

## 2024-03-28 NOTE — TELEPHONE ENCOUNTER
Let pt know we will increase her synthroid dose to 175 mcg daily, take 1 hour apart form other foods and medications. And recheck levels in 8 weeks, we will continue to titrate her thyroid medications until her tSH levels drop back into normal range. Please mail her the lab slip and ask her to alec it on her calendar when to repeat so she does not forget. Let me know if she has any questions.

## 2024-04-05 ENCOUNTER — TELEPHONE (OUTPATIENT)
Dept: FAMILY MEDICINE CLINIC | Age: 54
End: 2024-04-05

## 2024-04-05 RX ORDER — BENZONATATE 100 MG/1
100-200 CAPSULE ORAL 3 TIMES DAILY PRN
Qty: 60 CAPSULE | Refills: 0 | Status: SHIPPED | OUTPATIENT
Start: 2024-04-05 | End: 2024-04-12

## 2024-04-05 NOTE — TELEPHONE ENCOUNTER
I tried to call number listed multiple times and kept getting \"the subscriber you have dialed is not in service\"

## 2024-04-05 NOTE — TELEPHONE ENCOUNTER
It is possible she has the flu which is a viral illness. I recommend she stay hydrated well,take tylenol  for fever or headache,  I will send in tessalon perles for her cough. I will see her on the 11th, Thanks

## 2024-04-05 NOTE — TELEPHONE ENCOUNTER
Pt called in and stated \" a few days ago I started coughing, runny nose, sore throat, I throw up cause I'm coughing so bad, I dont want to eat, my head hurts.\"     Pt would like to know if something can get sent in until she sees Gopal on the 11th.     [facility]     Offered pt an appointment today and she said she could not make it     Future Appointments   Date Time Provider Department Center   4/11/2024 11:00 AM Gopal Boone APRN - CNP Fam Med [facility] [place]   5/6/2024 11:20 AM Kassi Rodriguez APRN - CNP [facility] Rheum [facility] [place]   5/14/2024 11:40 AM Milena Wheeler APRN - CNP [facility] [place]   6/4/2024 10:20 AM Cornad Maldonado MD N ES Kidney [facility] [place]   7/3/2024 11:20 AM Jareth Rodriguez DO N [facility] Rheum [facility]

## 2024-04-09 ENCOUNTER — COMMUNITY OUTREACH (OUTPATIENT)
Dept: FAMILY MEDICINE CLINIC | Age: 54
End: 2024-04-09

## 2024-04-09 NOTE — PROGRESS NOTES
Patient's HM shows they are overdue for Colonoscopy   CareEverywhere and  files searched  without success.

## 2024-04-11 ENCOUNTER — OFFICE VISIT (OUTPATIENT)
Dept: FAMILY MEDICINE CLINIC | Age: 54
End: 2024-04-11
Payer: COMMERCIAL

## 2024-04-11 VITALS
OXYGEN SATURATION: 94 % | TEMPERATURE: 98.6 F | HEART RATE: 100 BPM | RESPIRATION RATE: 16 BRPM | BODY MASS INDEX: 24.65 KG/M2 | WEIGHT: 153.4 LBS | DIASTOLIC BLOOD PRESSURE: 88 MMHG | SYSTOLIC BLOOD PRESSURE: 138 MMHG | HEIGHT: 66 IN

## 2024-04-11 DIAGNOSIS — M32.15 SYSTEMIC LUPUS ERYTHEMATOSUS WITH TUBULO-INTERSTITIAL NEPHROPATHY, UNSPECIFIED SLE TYPE (HCC): ICD-10-CM

## 2024-04-11 DIAGNOSIS — R80.9 PROTEINURIA, UNSPECIFIED TYPE: ICD-10-CM

## 2024-04-11 DIAGNOSIS — M48.061 SPINAL STENOSIS OF LUMBAR REGION WITHOUT NEUROGENIC CLAUDICATION: ICD-10-CM

## 2024-04-11 DIAGNOSIS — Z12.11 COLON CANCER SCREENING: ICD-10-CM

## 2024-04-11 DIAGNOSIS — R05.1 ACUTE COUGH: ICD-10-CM

## 2024-04-11 DIAGNOSIS — J30.89 NON-SEASONAL ALLERGIC RHINITIS DUE TO OTHER ALLERGIC TRIGGER: ICD-10-CM

## 2024-04-11 DIAGNOSIS — B85.0 HEAD LICE: ICD-10-CM

## 2024-04-11 DIAGNOSIS — E03.9 HYPOTHYROIDISM, UNSPECIFIED TYPE: ICD-10-CM

## 2024-04-11 DIAGNOSIS — N18.31 STAGE 3A CHRONIC KIDNEY DISEASE (CKD) (HCC): ICD-10-CM

## 2024-04-11 DIAGNOSIS — E11.9 TYPE 2 DIABETES MELLITUS WITHOUT COMPLICATION, WITHOUT LONG-TERM CURRENT USE OF INSULIN (HCC): Primary | ICD-10-CM

## 2024-04-11 DIAGNOSIS — R09.81 NASAL CONGESTION: ICD-10-CM

## 2024-04-11 DIAGNOSIS — Z12.31 ENCOUNTER FOR SCREENING MAMMOGRAM FOR MALIGNANT NEOPLASM OF BREAST: ICD-10-CM

## 2024-04-11 DIAGNOSIS — S80.11XA CONTUSION OF MULTIPLE SITES OF RIGHT LOWER EXTREMITY, INITIAL ENCOUNTER: ICD-10-CM

## 2024-04-11 LAB — HBA1C MFR BLD: 5.5 % (ref 4.3–5.7)

## 2024-04-11 PROCEDURE — 3044F HG A1C LEVEL LT 7.0%: CPT | Performed by: NURSE PRACTITIONER

## 2024-04-11 PROCEDURE — 99214 OFFICE O/P EST MOD 30 MIN: CPT | Performed by: NURSE PRACTITIONER

## 2024-04-11 PROCEDURE — 3017F COLORECTAL CA SCREEN DOC REV: CPT | Performed by: NURSE PRACTITIONER

## 2024-04-11 PROCEDURE — 2022F DILAT RTA XM EVC RTNOPTHY: CPT | Performed by: NURSE PRACTITIONER

## 2024-04-11 PROCEDURE — 1036F TOBACCO NON-USER: CPT | Performed by: NURSE PRACTITIONER

## 2024-04-11 PROCEDURE — G8427 DOCREV CUR MEDS BY ELIG CLIN: HCPCS | Performed by: NURSE PRACTITIONER

## 2024-04-11 PROCEDURE — G8420 CALC BMI NORM PARAMETERS: HCPCS | Performed by: NURSE PRACTITIONER

## 2024-04-11 RX ORDER — FLUTICASONE PROPIONATE 50 MCG
1 SPRAY, SUSPENSION (ML) NASAL DAILY
Qty: 16 G | Refills: 0 | Status: SHIPPED | OUTPATIENT
Start: 2024-04-11

## 2024-04-11 RX ORDER — LEVOTHYROXINE SODIUM 175 UG/1
175 TABLET ORAL DAILY
Qty: 90 TABLET | Refills: 1 | Status: SHIPPED | OUTPATIENT
Start: 2024-04-11

## 2024-04-11 RX ORDER — ONDANSETRON 4 MG/1
TABLET, FILM COATED ORAL
Qty: 30 TABLET | Refills: 0 | Status: SHIPPED | OUTPATIENT
Start: 2024-04-11

## 2024-04-11 RX ORDER — GUAIFENESIN 600 MG/1
600 TABLET, EXTENDED RELEASE ORAL 2 TIMES DAILY
Qty: 30 TABLET | Refills: 0 | Status: SHIPPED | OUTPATIENT
Start: 2024-04-11 | End: 2024-04-26

## 2024-04-11 RX ORDER — GUAIFENESIN/DEXTROMETHORPHAN 100-10MG/5
5 SYRUP ORAL 3 TIMES DAILY PRN
Qty: 120 ML | Refills: 1 | Status: SHIPPED | OUTPATIENT
Start: 2024-04-11 | End: 2024-04-21

## 2024-04-11 RX ORDER — LORATADINE 10 MG/1
TABLET ORAL
Qty: 90 TABLET | Refills: 4 | Status: SHIPPED | OUTPATIENT
Start: 2024-04-11

## 2024-04-11 ASSESSMENT — PATIENT HEALTH QUESTIONNAIRE - PHQ9
SUM OF ALL RESPONSES TO PHQ9 QUESTIONS 1 & 2: 0
2. FEELING DOWN, DEPRESSED OR HOPELESS: NOT AT ALL
SUM OF ALL RESPONSES TO PHQ QUESTIONS 1-9: 0
SUM OF ALL RESPONSES TO PHQ QUESTIONS 1-9: 0
1. LITTLE INTEREST OR PLEASURE IN DOING THINGS: NOT AT ALL
SUM OF ALL RESPONSES TO PHQ QUESTIONS 1-9: 0
SUM OF ALL RESPONSES TO PHQ QUESTIONS 1-9: 0

## 2024-04-11 NOTE — PROGRESS NOTES
1    guaiFENesin (MUCINEX) 600 MG extended release tablet     Sig: Take 1 tablet by mouth 2 times daily for 15 days     Dispense:  30 tablet     Refill:  0    Misc. Devices (HEAD LICE COMB) MISC     Si each by Does not apply route daily     Dispense:  1 each     Refill:  0      Advised to schedule a PAP test   Return in about 3 months (around 2024) for PAP .

## 2024-04-22 DIAGNOSIS — G89.4 CHRONIC PAIN SYNDROME: ICD-10-CM

## 2024-04-22 NOTE — TELEPHONE ENCOUNTER
Talya is requesting a refill of their   Requested Prescriptions     Pending Prescriptions Disp Refills    HYDROcodone-acetaminophen (NORCO) 5-325 MG per tablet 90 tablet 0     Sig: Take 1 tablet by mouth every 8 hours as needed for Pain for up to 30 days.   . Please advise.      Last Appt:  Visit date not found  Next Appt:   Visit date not found  Preferred pharmacy:   [facility] [address] [place] [address] [phone] - F [phone]

## 2024-04-23 RX ORDER — HYDROCODONE BITARTRATE AND ACETAMINOPHEN 5; 325 MG/1; MG/1
1 TABLET ORAL EVERY 8 HOURS PRN
Qty: 90 TABLET | Refills: 0 | Status: SHIPPED | OUTPATIENT
Start: 2024-04-26 | End: 2024-05-26

## 2024-04-23 NOTE — TELEPHONE ENCOUNTER
OARRS reviewed. UDS: + for  hydrocodone gabapentin consistent.   Last seen: 2/14/2024. Follow-up:   Future Appointments   Date Time Provider Department Center   5/6/2024 11:20 AM Kassi Rodriguez APRN - CNP [facility] [place]   5/8/2024 10:45 AM Amy Treviño APRN - CNP [facility]   5/14/2024 11:40 AM Milena Wheeler APRN - CNP [facility] [place]   6/4/2024 10:20 AM Conrad Maldonado MD N [facility] Kidney [facility] [place]   7/3/2024 11:20 AM Jareth Rodriguez DO N [facility] Rhe[facility] [place]   7/15/2024 11:00 AM Gopal Boone APRN - CNP Fam Med [facility] [place]

## 2024-04-26 DIAGNOSIS — M54.40 BACK PAIN OF LUMBAR REGION WITH SCIATICA: ICD-10-CM

## 2024-04-26 DIAGNOSIS — M79.641 RIGHT HAND PAIN: ICD-10-CM

## 2024-04-26 NOTE — TELEPHONE ENCOUNTER
Recent Visits  Date Type Provider Dept   04/11/24 Office Visit Gopal Boone APRN - CNP [facility]   09/19/23 Office Visit Gopal Boone APRN - CNP [facility]   01/30/23 Office Visit Gopal Boone APRN - CNP [facility]   Showing recent visits within past 540 days with a meds authorizing provider and meeting all other requirements  Future Appointments  Date Type Provider Dept   07/15/24 Appointment Gopal Boone APRN - CNP [facility]   Showing future appointments within next 150 days with a meds authorizing provider and meeting all other requirements

## 2024-04-29 RX ORDER — GABAPENTIN 400 MG/1
CAPSULE ORAL
Qty: 270 CAPSULE | Refills: 1 | Status: SHIPPED | OUTPATIENT
Start: 2024-04-29 | End: 2024-10-27

## 2024-05-06 ENCOUNTER — OFFICE VISIT (OUTPATIENT)
Dept: RHEUMATOLOGY | Age: 54
End: 2024-05-06
Payer: COMMERCIAL

## 2024-05-06 VITALS
DIASTOLIC BLOOD PRESSURE: 78 MMHG | BODY MASS INDEX: 23.43 KG/M2 | SYSTOLIC BLOOD PRESSURE: 130 MMHG | HEART RATE: 68 BPM | OXYGEN SATURATION: 98 % | HEIGHT: 66 IN | WEIGHT: 145.8 LBS

## 2024-05-06 DIAGNOSIS — Z87.39 H/O LUPUS NEPHRITIS: ICD-10-CM

## 2024-05-06 DIAGNOSIS — M32.14 SYSTEMIC LUPUS ERYTHEMATOSUS WITH GLOMERULAR DISEASE, UNSPECIFIED SLE TYPE (HCC): Primary | ICD-10-CM

## 2024-05-06 DIAGNOSIS — Z51.81 MEDICATION MONITORING ENCOUNTER: ICD-10-CM

## 2024-05-06 DIAGNOSIS — N18.9 CHRONIC KIDNEY DISEASE, UNSPECIFIED CKD STAGE: ICD-10-CM

## 2024-05-06 DIAGNOSIS — R20.2 PARESTHESIA OF RIGHT UPPER EXTREMITY: ICD-10-CM

## 2024-05-06 PROCEDURE — 1036F TOBACCO NON-USER: CPT | Performed by: NURSE PRACTITIONER

## 2024-05-06 PROCEDURE — 99214 OFFICE O/P EST MOD 30 MIN: CPT | Performed by: NURSE PRACTITIONER

## 2024-05-06 PROCEDURE — 3017F COLORECTAL CA SCREEN DOC REV: CPT | Performed by: NURSE PRACTITIONER

## 2024-05-06 PROCEDURE — G8427 DOCREV CUR MEDS BY ELIG CLIN: HCPCS | Performed by: NURSE PRACTITIONER

## 2024-05-06 PROCEDURE — G8420 CALC BMI NORM PARAMETERS: HCPCS | Performed by: NURSE PRACTITIONER

## 2024-05-06 ASSESSMENT — ENCOUNTER SYMPTOMS
CONSTIPATION: 0
EYE ITCHING: 0
TROUBLE SWALLOWING: 0
ABDOMINAL PAIN: 0
DIARRHEA: 0
NAUSEA: 0
SHORTNESS OF BREATH: 0
COUGH: 0
BACK PAIN: 0
EYE PAIN: 0

## 2024-05-06 NOTE — PROGRESS NOTES
[facility] RHEUMATOLOGY FOLLOW UP NOTE       Date Of Service: 5/6/2024  Provider: MACKENZIE Loyola - CNP    Name: Talya Schulz   MRN: [id number]    Chief Complaint(s)     Chief Complaint   Patient presents with    Follow-up     2 month follow up SLE        History of Present Illness (HPI)     Talya Schulz  is a(n)53 y.o. female with a hx of systemic lupus with hiistory of class IV lupus nephritis, CKD stage III, chronic low back pain w/ radiculopathy, lumbar stenosis, h/o seizures here for the f/u evaluation of systemic lupus     Interval hx:    - sick with pneumonia a couple weeks ago- admitted to [facility] overnight and was prescribed antibiotics. Feeling better.    - reports worsened right elbow and hand pain. + swelling in the right hand in the mornings which then improves     pain affecting the right fingers, right wrist, elbows, right knee, lower legs  Pain on a scale 0-10: 6.5/10  Type of pain: throbbing   Timing: mornings and evenings  Aggravating factors: none  Alleviating factors: gabapentin    Associated symptoms:  + swelling/  Redness/ warmth (right hand), denies AM stiffness       REVIEW OF SYSTEMS: (ROS)    Review of Systems   Constitutional:  Positive for fatigue. Negative for appetite change, fever and unexpected weight change.   HENT:  Negative for congestion and trouble swallowing.         Nasal sores   Eyes:  Negative for pain and itching.   Respiratory:  Negative for cough and shortness of breath.    Cardiovascular:  Negative for chest pain and leg swelling.   Gastrointestinal:  Negative for abdominal pain, constipation, diarrhea and nausea.   Endocrine: Negative for cold intolerance and heat intolerance.   Genitourinary:  Negative for difficulty urinating, frequency and urgency.   Musculoskeletal:  Positive for arthralgias and joint swelling. Negative for back pain, myalgias and neck pain.   Skin:  Negative for rash.   Neurological:  Negative for dizziness, weakness and numbness.

## 2024-05-13 ENCOUNTER — TELEPHONE (OUTPATIENT)
Dept: FAMILY MEDICINE CLINIC | Age: 54
End: 2024-05-13

## 2024-05-13 DIAGNOSIS — D64.9 ANEMIA, UNSPECIFIED TYPE: Primary | ICD-10-CM

## 2024-05-13 LAB
A/G RATIO: 1.6 (ref 1.5–2.5)
ALBUMIN: 3.7 G/DL (ref 3.5–5)
ALP BLD-CCNC: 52 IU/L (ref 39–118)
ALT SERPL-CCNC: 7 IU/L (ref 10–40)
ANION GAP SERPL CALCULATED.3IONS-SCNC: 6 MMOL/L (ref 4–12)
APPEARANCE: CLEAR
AST SERPL-CCNC: 10 IU/L (ref 15–41)
BASOPHILS ABSOLUTE: 0 /CMM (ref 0–200)
BASOPHILS RELATIVE PERCENT: 0.5 % (ref 0–2)
BILIRUB SERPL-MCNC: 0.1 MG/DL (ref 0.2–1)
BILIRUBIN, URINE: NEGATIVE
BUN BLDV-MCNC: 21 MG/DL (ref 7–20)
C-REACTIVE PROTEIN: < 0.5 MG/DL
CALCIUM SERPL-MCNC: 8.5 MG/DL (ref 8.8–10.5)
CHLORIDE BLD-SCNC: 114 MEQ/L (ref 101–111)
CO2: 22 MEQ/L (ref 21–32)
COLOR: YELLOW
CREAT SERPL-MCNC: 1.71 MG/DL (ref 0.6–1.3)
CREATININE CLEARANCE: 31
CREATININE, RANDOM URINE: 186.2 MG/DL
EOSINOPHILS ABSOLUTE: 0 /CMM (ref 0–500)
EOSINOPHILS RELATIVE PERCENT: 0.6 % (ref 0–6)
GLUCOSE URINE: NEGATIVE
GLUCOSE: 95 MG/DL (ref 70–110)
GRANULAR CASTS: ABNORMAL /LPF
HCT VFR BLD CALC: 28.2 % (ref 35–44)
HEMOGLOBIN: 8.7 GM/DL (ref 12–15)
HYALINE CASTS: ABNORMAL /LPF
KETONES, URINE: NEGATIVE
LEUKOCYTES, UA: ABNORMAL
LYMPHOCYTES ABSOLUTE: 1700 /CMM (ref 1000–4800)
LYMPHOCYTES RELATIVE PERCENT: 28.4 % (ref 15–45)
MCH RBC QN AUTO: 27.4 PG (ref 27.5–33)
MCHC RBC AUTO-ENTMCNC: 30.8 GM/DL (ref 33–36)
MCV RBC AUTO: 89.1 CU MIC (ref 80–97)
MONOCYTES ABSOLUTE: 400 /CMM (ref 0–800)
MONOCYTES RELATIVE PERCENT: 6.8 % (ref 2–10)
MUCUS: PRESENT
NEUTROPHILS ABSOLUTE: 3800 /CMM (ref 1800–7700)
NEUTROPHILS RELATIVE PERCENT: 63.7 % (ref 40–70)
NITRITE, URINE: NEGATIVE
NUCLEATED RBCS: 0.1 /100 WBC
PDW BLD-RTO: 17.1 % (ref 12–16)
PH, URINE: 5.5 (ref 5–8)
PLATELET # BLD: 119 TH/CMM (ref 150–400)
POTASSIUM SERPL-SCNC: 4.3 MEQ/L (ref 3.6–5)
PROTEIN, URINE, RANDOM: 154.5 MG/DL
PROTEIN, URINE: ABNORMAL
PROTEIN/CREAT RATIO: 0.83 G/1.73M2
RBC # BLD: 3.16 MIL/CMM (ref 4–5.1)
RBC URINE: ABNORMAL /HPF
SEDIMENTATION RATE, ERYTHROCYTE: < 1 MM/HR
SODIUM BLD-SCNC: 142 MEQ/L (ref 135–145)
SPECIFIC GRAVITY UA: 1.02 (ref 1–1.03)
SQUAMOUS EPITHELIAL: ABNORMAL /HPF
TOTAL PROTEIN: 6 G/DL (ref 6.2–8)
TSH REFLEX: 4.5 MCIU/ML (ref 0.49–4.67)
URINALYSIS REFLEX: YES
URINE HGB: NEGATIVE
UROBILINOGEN, URINE: ABNORMAL (ref 0.2–1)
WBC # BLD: 5.9 TH/CMM (ref 4.4–10.5)
WBC URINE: ABNORMAL /HPF

## 2024-05-13 NOTE — TELEPHONE ENCOUNTER
----- Message from MACKENZIE Miller CNP sent at 5/13/2024  2:38 PM EDT -----  Let pt know her TSH testing is normal, have her continue her current thyroid dose. Taken 1 hour apart from meds and food , let me know if questions

## 2024-05-13 NOTE — TELEPHONE ENCOUNTER
Tried calling patient to inform of message   Unable to reach patient on mobile number due to the number no longer being in service, tried calling patient at home number, unable to reach patient, unable to LVM. No VM set up

## 2024-05-14 ENCOUNTER — TELEPHONE (OUTPATIENT)
Dept: FAMILY MEDICINE CLINIC | Age: 54
End: 2024-05-14

## 2024-05-14 ENCOUNTER — OFFICE VISIT (OUTPATIENT)
Dept: PHYSICAL MEDICINE AND REHAB | Age: 54
End: 2024-05-14
Payer: COMMERCIAL

## 2024-05-14 VITALS
SYSTOLIC BLOOD PRESSURE: 136 MMHG | WEIGHT: 146 LBS | DIASTOLIC BLOOD PRESSURE: 76 MMHG | HEART RATE: 74 BPM | HEIGHT: 65 IN | BODY MASS INDEX: 24.32 KG/M2

## 2024-05-14 DIAGNOSIS — N18.9 CHRONIC KIDNEY DISEASE, UNSPECIFIED CKD STAGE: ICD-10-CM

## 2024-05-14 DIAGNOSIS — G89.4 CHRONIC PAIN SYNDROME: Primary | ICD-10-CM

## 2024-05-14 DIAGNOSIS — M46.1 SI (SACROILIAC) JOINT INFLAMMATION (HCC): ICD-10-CM

## 2024-05-14 DIAGNOSIS — M54.16 LUMBAR RADICULITIS: ICD-10-CM

## 2024-05-14 DIAGNOSIS — M47.812 CERVICAL SPONDYLOSIS: ICD-10-CM

## 2024-05-14 DIAGNOSIS — M47.816 LUMBAR SPONDYLOSIS: ICD-10-CM

## 2024-05-14 DIAGNOSIS — D69.6 THROMBOCYTOPENIA (HCC): ICD-10-CM

## 2024-05-14 DIAGNOSIS — D64.9 ANEMIA, UNSPECIFIED TYPE: Primary | ICD-10-CM

## 2024-05-14 DIAGNOSIS — M54.12 CERVICAL RADICULITIS: ICD-10-CM

## 2024-05-14 DIAGNOSIS — M46.1 BILATERAL SACROILIITIS (HCC): ICD-10-CM

## 2024-05-14 DIAGNOSIS — E83.51 HYPOCALCEMIA: ICD-10-CM

## 2024-05-14 PROCEDURE — 99214 OFFICE O/P EST MOD 30 MIN: CPT | Performed by: NURSE PRACTITIONER

## 2024-05-14 PROCEDURE — G8420 CALC BMI NORM PARAMETERS: HCPCS | Performed by: NURSE PRACTITIONER

## 2024-05-14 PROCEDURE — G8427 DOCREV CUR MEDS BY ELIG CLIN: HCPCS | Performed by: NURSE PRACTITIONER

## 2024-05-14 PROCEDURE — 3017F COLORECTAL CA SCREEN DOC REV: CPT | Performed by: NURSE PRACTITIONER

## 2024-05-14 PROCEDURE — 1036F TOBACCO NON-USER: CPT | Performed by: NURSE PRACTITIONER

## 2024-05-14 NOTE — PROGRESS NOTES
Functionality Assessment/Goals Worksheet     On a scale of 0 (Does not Interfere) to 10 (Completely Interferes)     1.  Which number describes how during the past week pain has interfered with           the following:  A.  General Activity:  5  B.  Mood: 8  C.  Walking Ability:  5  D.  Normal Work (Includes both work outside the home and housework):  4  E.  Relations with Other People:   7  F.  Sleep:   8  G.  Enjoyment of Life:   7    2.  Patient Prefers to Take their Pain Medications:     [x]  On a regular basis   []  Only when necessary    []  Does not take pain medications    3.  What are the Patient's Goals/Expectations for Visiting Pain Management?     [x]  Learn about my pain    []  Receive Medication   []  Physical Therapy     []  Treat Depression   []  Receive Injections    []  Treat Sleep   []  Deal with Anxiety and Stress   []  Treat Opoid Dependence/Addiction   []  Other:     HPI:   Talya Schulz is a 53 y.o. female is here today for    Chief Complaint: neck thoracic  lumbar hip Si knee pain.  BLE BUE       F/U She had Pneumonia 2-3 weeks ago was in hospital over night, was given antibiotics.   Pending EMG  due to RUE per Rheumatology  due to numbness tingling.   I ordered EMG in 2019  she has refused injections ELLIOT TFCESI offered  and then  when she agreed she  ws denied and needed to complete PT.  Unable to complete PT due to transportation issues. She has been unable to tolerate PT due to cardiac issues and  Lupus.   No assistive devices today.      Unable to offer procedures until finishes PT. Insurance has denied.   She had some thoracic lumbar pain  that radiated to her left side to her rib.  States the pain comes and goes. She needs a new mattress.     She has not completed PT for cervical pain  with BUE radicular s/s.  Insurance denied TFCESI  until PT and NSAID use .   She states she  did not go to PT it was a waste of her time it doesn't help and she has had several rounds in the past and

## 2024-05-15 ENCOUNTER — TELEPHONE (OUTPATIENT)
Dept: FAMILY MEDICINE CLINIC | Age: 54
End: 2024-05-15

## 2024-05-15 DIAGNOSIS — M32.14 SYSTEMIC LUPUS ERYTHEMATOSUS WITH GLOMERULAR DISEASE, UNSPECIFIED SLE TYPE (HCC): ICD-10-CM

## 2024-05-15 DIAGNOSIS — R80.1 PERSISTENT PROTEINURIA: ICD-10-CM

## 2024-05-15 LAB
COMPLEMENT C3: 89 MG/DL (ref 75–175)
COMPLEMENT C4: 18 MG/DL (ref 14–40)
URINE CULTURE REFLEX: NORMAL

## 2024-05-15 RX ORDER — MYCOPHENOLATE MOFETIL 500 MG/1
TABLET ORAL
Qty: 120 TABLET | Refills: 1 | Status: SHIPPED | OUTPATIENT
Start: 2024-05-15

## 2024-05-15 NOTE — TELEPHONE ENCOUNTER
Called and got message \" the person you are trying to call is not accepting calls at this time. Please try again later\"

## 2024-05-15 NOTE — TELEPHONE ENCOUNTER
Tried to reach patient received message that states \" person who you are calling cannot accept calls at this time.\"    Labs and instructions mailed to patient

## 2024-05-15 NOTE — TELEPHONE ENCOUNTER
DOLV: 5/6/24  DONV: 7/3/24  LAST LAB DRAW: 5/13/24  LAST TB TEST: 7/8/21    Lab Results   Component Value Date     05/13/2024    K 4.3 05/13/2024     (H) 05/13/2024    CO2 22 05/13/2024    BUN 21 (H) 05/13/2024    CREATININE 1.71 (H) 05/13/2024    GLUCOSE 95 05/13/2024    CALCIUM 8.50 (L) 05/13/2024    BILITOT 0.1 (L) 05/13/2024    ALKPHOS 52 05/13/2024    AST 10 (L) 05/13/2024    ALT 7 (L) 05/13/2024    LABGLOM 34 (A) 11/15/2023    AGRATIO 1.6 05/13/2024       Recent Labs     05/13/24  1126 04/15/24  1554   WBC 5.9 9.4   HGB 8.7* 9.1*   HCT 28.2* 29.0*   MCV 89.1 88.1   * 213       Lab Results   Component Value Date    SEDRATE < 1 05/13/2024       Lab Results   Component Value Date    CRP < 0.5 05/13/2024

## 2024-05-15 NOTE — TELEPHONE ENCOUNTER
----- Message from MACKENZIE Miller CNP sent at 5/14/2024  5:52 PM EDT -----  Let pt know due to recent labs I am referring her to Hematology for low hgb and hct and low platelet count. Her calcium level is low and I am ordering a vitamin D3 level, as a low vitamin D3 can cause low calcium. The labs will need to have a 4 hour fast, please mail to her. Let  Me know if she has questions

## 2024-05-16 NOTE — TELEPHONE ENCOUNTER
----- Message from Ezra Proctor MD sent at 6/6/2022 12:43 PM CDT -----  Reduce lasix to 40 mg 1 tab daily and restart spironolactone 25 mg daily. Also recommend potassium 20 meq bid for 3 days than stop.  Bmp in 2 weeks.   Called and got message \" person who you are calling cannot accept calls at this time.\"

## 2024-05-16 NOTE — TELEPHONE ENCOUNTER
----- Message from MACKENZIE Miller CNP sent at 5/13/2024  2:38 PM EDT -----  Let pt know her TSH testing is normal, have her continue her current thyroid dose. Taken 1 hour apart from meds and food , let me know if questions        2 messages in this encounter

## 2024-05-17 NOTE — TELEPHONE ENCOUNTER
Called and got message \" the person you are calling is unable to accept calls at this time. Please try your call again later\"     We have been unable to reach this patient by phone.  A letter is being sent.

## 2024-05-21 LAB
PARATHYROID HORMONE INTACT: 125.6 U/ML (ref 12–88)
VITAMIN D 25-HYDROXY: 15.6 NG/ML (ref 30–100)

## 2024-05-22 ENCOUNTER — TELEPHONE (OUTPATIENT)
Dept: FAMILY MEDICINE CLINIC | Age: 54
End: 2024-05-22

## 2024-05-22 NOTE — TELEPHONE ENCOUNTER
----- Message from MACKENZIE Miller - CNP sent at 5/22/2024  4:40 PM EDT -----  Pt vitamin D continues to be low in spite of treatment along with an elevated parathyroid hormone value , and also has a low calcium level. Etiology unclear pt does need to f/u with an Endocrinologist, please ask if she has seen one in the past, if not we may need to refer her out of town, as our local Endocrinologist is not accepting new patients at this time.

## 2024-05-22 NOTE — TELEPHONE ENCOUNTER
I tried to contact patient, the phone number she gave is not a working number. I tried to contact son on HIPAA, no answer and his voicemail is full. I tried to contact daughter on HIPAA, and got \"we're sorry the caller ID is not accepting calls at this time\"

## 2024-05-23 NOTE — TELEPHONE ENCOUNTER
Pt's home phone number is not a working number.  Called pt's son and that number is not accepting calls at this time.

## 2024-05-24 NOTE — TELEPHONE ENCOUNTER
Called pt's son and that number is not accepting calls at this time.     We have been unable to reach this patient by phone.  A letter is being sent.

## 2024-05-28 ENCOUNTER — TELEPHONE (OUTPATIENT)
Dept: FAMILY MEDICINE CLINIC | Age: 54
End: 2024-05-28

## 2024-05-28 DIAGNOSIS — G89.4 CHRONIC PAIN SYNDROME: ICD-10-CM

## 2024-05-28 DIAGNOSIS — E83.51 HYPOCALCEMIA: Primary | ICD-10-CM

## 2024-05-28 DIAGNOSIS — R79.89 ELEVATED PTHRP LEVEL: ICD-10-CM

## 2024-05-28 DIAGNOSIS — E55.9 HYPOVITAMINOSIS D: ICD-10-CM

## 2024-05-28 NOTE — TELEPHONE ENCOUNTER
Talya Schulz called requesting a refill on the following medications:  Requested Prescriptions     Pending Prescriptions Disp Refills    HYDROcodone-acetaminophen (NORCO) 5-325 MG per tablet 90 tablet 0     Sig: Take 1 tablet by mouth every 8 hours as needed for Pain for up to 30 days.     Pharmacy verified:  .pv  [facility] [address] [place] [address] -  [phone] - F [phone]     Date of last visit: 05/14/2024  Date of next visit (if applicable): 8/6/2024

## 2024-05-29 ENCOUNTER — HOSPITAL ENCOUNTER (OUTPATIENT)
Dept: INFUSION THERAPY | Age: 54
Discharge: HOME | End: 2024-05-29
Payer: COMMERCIAL

## 2024-05-29 ENCOUNTER — TELEPHONE (OUTPATIENT)
Dept: FAMILY MEDICINE CLINIC | Age: 54
End: 2024-05-29

## 2024-05-29 ENCOUNTER — OFFICE VISIT (OUTPATIENT)
Dept: ONCOLOGY | Age: 54
End: 2024-05-29
Payer: COMMERCIAL

## 2024-05-29 VITALS
RESPIRATION RATE: 18 BRPM | SYSTOLIC BLOOD PRESSURE: 123 MMHG | HEART RATE: 78 BPM | TEMPERATURE: 97.9 F | HEIGHT: 65 IN | WEIGHT: 153.2 LBS | OXYGEN SATURATION: 100 % | DIASTOLIC BLOOD PRESSURE: 68 MMHG | BODY MASS INDEX: 25.52 KG/M2

## 2024-05-29 VITALS
DIASTOLIC BLOOD PRESSURE: 68 MMHG | HEART RATE: 78 BPM | RESPIRATION RATE: 18 BRPM | TEMPERATURE: 97.9 F | OXYGEN SATURATION: 100 % | SYSTOLIC BLOOD PRESSURE: 123 MMHG

## 2024-05-29 DIAGNOSIS — D64.9 ANEMIA, UNSPECIFIED TYPE: ICD-10-CM

## 2024-05-29 DIAGNOSIS — R10.10 PAIN OF UPPER ABDOMEN: ICD-10-CM

## 2024-05-29 DIAGNOSIS — D69.6 THROMBOCYTOPENIA (HCC): ICD-10-CM

## 2024-05-29 DIAGNOSIS — D64.9 ANEMIA, UNSPECIFIED TYPE: Primary | ICD-10-CM

## 2024-05-29 LAB
ALBUMIN SERPL BCG-MCNC: 3.8 G/DL (ref 3.5–5.1)
ALP SERPL-CCNC: 65 U/L (ref 38–126)
ALT SERPL W/O P-5'-P-CCNC: 11 U/L (ref 11–66)
AST SERPL-CCNC: 13 U/L (ref 5–40)
BILIRUB CONJ SERPL-MCNC: < 0.2 MG/DL (ref 0–0.3)
BILIRUB SERPL-MCNC: < 0.2 MG/DL (ref 0.3–1.2)
FERRITIN SERPL IA-MCNC: 762 NG/ML (ref 10–291)
FOLATE SERPL-MCNC: 12.8 NG/ML (ref 4.8–24.2)
IRON SATN MFR SERPL: 24 % (ref 20–50)
IRON SERPL-MCNC: 49 UG/DL (ref 50–170)
LDH SERPL L TO P-CCNC: 200 U/L (ref 100–190)
PROT SERPL-MCNC: 6.3 G/DL (ref 6.1–8)
SCAN OF BLOOD SMEAR: NORMAL
TIBC SERPL-MCNC: 202 UG/DL (ref 171–450)
VIT B12 SERPL-MCNC: 492 PG/ML (ref 211–911)

## 2024-05-29 PROCEDURE — 85025 COMPLETE CBC W/AUTO DIFF WBC: CPT

## 2024-05-29 PROCEDURE — 36415 COLL VENOUS BLD VENIPUNCTURE: CPT

## 2024-05-29 PROCEDURE — 99211 OFF/OP EST MAY X REQ PHY/QHP: CPT

## 2024-05-29 PROCEDURE — 83540 ASSAY OF IRON: CPT

## 2024-05-29 PROCEDURE — 82525 ASSAY OF COPPER: CPT

## 2024-05-29 PROCEDURE — 83615 LACTATE (LD) (LDH) ENZYME: CPT

## 2024-05-29 PROCEDURE — 83550 IRON BINDING TEST: CPT

## 2024-05-29 PROCEDURE — 82728 ASSAY OF FERRITIN: CPT

## 2024-05-29 PROCEDURE — 85046 RETICYTE/HGB CONCENTRATE: CPT

## 2024-05-29 PROCEDURE — 82746 ASSAY OF FOLIC ACID SERUM: CPT

## 2024-05-29 PROCEDURE — 83010 ASSAY OF HAPTOGLOBIN QUANT: CPT

## 2024-05-29 PROCEDURE — 80076 HEPATIC FUNCTION PANEL: CPT

## 2024-05-29 PROCEDURE — 99214 OFFICE O/P EST MOD 30 MIN: CPT | Performed by: NURSE PRACTITIONER

## 2024-05-29 PROCEDURE — 82607 VITAMIN B-12: CPT

## 2024-05-29 RX ORDER — HYDROCODONE BITARTRATE AND ACETAMINOPHEN 5; 325 MG/1; MG/1
1 TABLET ORAL EVERY 8 HOURS PRN
Qty: 90 TABLET | Refills: 0 | Status: SHIPPED | OUTPATIENT
Start: 2024-05-29 | End: 2024-06-28

## 2024-05-29 NOTE — TELEPHONE ENCOUNTER
OARRS reviewed. UDS: + for  gabapentin and hydrocodone.   Last seen: 5/14/2024. Follow-up: 8/6/24

## 2024-05-29 NOTE — PROGRESS NOTES
[facility] [address], [address]  [facility] [address]  Dept: [phone]  Dept Fax: [phone] Loc: [phone]      Visit Date:5/29/2024     Talya Schulz is a 53 y.o. female who presents today for:   Chief Complaint   Patient presents with    Follow-up     Anemia, unspecified type, Thrombocytopenia        HPI:   Talya Schulz is a 53 y.o. female who was referred for thrombocytopenia. The patient was completing a wellness check with her PCP. Labs (05/13/2024): WBC: 5.9 RBC: 3.16 Hgb: 8.7 hct: 28.2 MCV: 89.1 platelet: 119 Alk Phos 52, ALT 7, AST 10, Total Bilirubin 0.1, Total Protein 6 Sodium: 142 potassium: 4.3 chloride: 114 BUN: 21 creat: 1.71 calcium: 8.50     PMH: Hypothyroidism, DMII, Stage 3a CKD, SLE, HTN  Family History: Noncontributory      Interval History 5/30/2024:   The patient presents to the office today for follow up and evaluation of thrombocytopenia.  The patient reports bruising and bleeding easily.  She has a history of stage IIIa chronic kidney disease and has required dialysis.  She is on iron pill daily that she started with her primary care doctor within the last month.  She reports pain in the upper portion of her abdomen.  She was scheduled for colonoscopy and EGD but did not do it due to heart concerns.  She reports that she was told by cardiology that all of the vessels in her heart are clogged but 1.  Due to this, she could not be cleared for anesthesia.  She has not seen cardiology in 1 year.  She does not have a history of lupus.  She has a history of hypothyroidism and is currently on Synthroid.  She reports chronic inflammation ain her hands and fingers she suffers from headaches daily and she takes time.  She can be dizzy when standing.  She has shortness of breath on exertion.  She reports that she has been told that she has elevated protein in her urine. The patient  denies any fevers, night sweats or unintentional weight loss.  No vision

## 2024-05-29 NOTE — PATIENT INSTRUCTIONS
Labs today.  Will call with results and recommendations for follow up.  CT abdomen with and without contrast  Continue on iron pills.

## 2024-05-29 NOTE — TELEPHONE ENCOUNTER
----- Message from MACKENZIE Miller CNP sent at 5/28/2024  5:07 PM EDT -----  Referral placed to Endocrinology please schedule thanks

## 2024-05-30 LAB
ANISOCYTOSIS BLD QL SMEAR: PRESENT
BASOPHILS ABSOLUTE: 0 THOU/MM3 (ref 0–0.1)
BASOPHILS NFR BLD AUTO: 0.5 %
DEPRECATED RDW RBC AUTO: 57.1 FL (ref 35–45)
EOSINOPHIL NFR BLD AUTO: 0.7 %
EOSINOPHILS ABSOLUTE: 0 THOU/MM3 (ref 0–0.4)
ERYTHROCYTE [DISTWIDTH] IN BLOOD BY AUTOMATED COUNT: 15.4 % (ref 11.5–14.5)
HCT VFR BLD AUTO: 32.9 % (ref 37–47)
HGB BLD-MCNC: 8.9 GM/DL (ref 12–16)
HGB RETIC QN AUTO: 24 PG (ref 28.2–35.7)
HYPOCHROMIA BLD QL SMEAR: PRESENT
IMM GRANULOCYTES # BLD AUTO: 0.01 THOU/MM3 (ref 0–0.07)
IMM GRANULOCYTES NFR BLD AUTO: 0.2 %
IMM RETICS NFR: 11.6 % (ref 3–15.9)
LYMPHOCYTES ABSOLUTE: 1.4 THOU/MM3 (ref 1–4.8)
LYMPHOCYTES NFR BLD AUTO: 33.5 %
MCH RBC QN AUTO: 27.1 PG (ref 26–33)
MCHC RBC AUTO-ENTMCNC: 27.1 GM/DL (ref 32.2–35.5)
MCV RBC AUTO: 100.3 FL (ref 81–99)
MONOCYTES ABSOLUTE: 0.5 THOU/MM3 (ref 0.4–1.3)
MONOCYTES NFR BLD AUTO: 11.4 %
NEUTROPHILS ABSOLUTE: 2.3 THOU/MM3 (ref 1.8–7.7)
NEUTROPHILS NFR BLD AUTO: 53.7 %
NRBC BLD AUTO-RTO: 0 /100 WBC
PATHOLOGIST REVIEW: ABNORMAL
PLATELET # BLD AUTO: 151 THOU/MM3 (ref 130–400)
PLATELET BLD QL SMEAR: ADEQUATE
PMV BLD AUTO: 11.7 FL (ref 9.4–12.4)
POLYCHROMASIA BLD QL SMEAR: ABNORMAL
RBC # BLD AUTO: 3.28 MILL/MM3 (ref 4.2–5.4)
RETICS # AUTO: 74 THOU/MM3 (ref 20–115)
RETICS/RBC NFR AUTO: 2.3 % (ref 0.5–2)
REVIEWED BY: NORMAL
SMEAR REVIEW: NORMAL
WBC # BLD AUTO: 4.2 THOU/MM3 (ref 4.8–10.8)

## 2024-05-30 ASSESSMENT — ENCOUNTER SYMPTOMS: ABDOMINAL PAIN: 1

## 2024-05-31 LAB
A/G RATIO: 1.6 (ref 1.5–2.5)
ALBUMIN: 3.6 G/DL (ref 3.5–5)
ALP BLD-CCNC: 51 IU/L (ref 39–118)
ALT SERPL-CCNC: 8 IU/L (ref 10–40)
ANION GAP SERPL CALCULATED.3IONS-SCNC: 4 MMOL/L (ref 4–12)
ANION GAP SERPL CALCULATED.3IONS-SCNC: 5 MMOL/L (ref 4–12)
AST SERPL-CCNC: 11 IU/L (ref 15–41)
BASOPHILS ABSOLUTE: 0 /CMM (ref 0–200)
BASOPHILS RELATIVE PERCENT: 0.5 % (ref 0–2)
BILIRUB SERPL-MCNC: 0.2 MG/DL (ref 0.2–1)
BUN BLDV-MCNC: 21 MG/DL (ref 7–20)
BUN BLDV-MCNC: 23 MG/DL (ref 7–20)
C-REACTIVE PROTEIN: < 0.5 MG/DL
CALCIUM SERPL-MCNC: 7.9 MG/DL (ref 8.8–10.5)
CALCIUM SERPL-MCNC: 8.3 MG/DL (ref 8.8–10.5)
CHLORIDE BLD-SCNC: 110 MEQ/L (ref 101–111)
CHLORIDE BLD-SCNC: 111 MEQ/L (ref 101–111)
CO2: 22 MEQ/L (ref 21–32)
CO2: 23 MEQ/L (ref 21–32)
CREAT SERPL-MCNC: 1.39 MG/DL (ref 0.6–1.3)
CREAT SERPL-MCNC: 1.48 MG/DL (ref 0.6–1.3)
CREATININE CLEARANCE: 37
CREATININE CLEARANCE: 40
CREATININE, RANDOM URINE: 116.5 MG/DL
EOSINOPHILS ABSOLUTE: 0 /CMM (ref 0–500)
EOSINOPHILS RELATIVE PERCENT: 0.6 % (ref 0–6)
GLUCOSE: 71 MG/DL (ref 70–110)
GLUCOSE: 76 MG/DL (ref 70–110)
HAPTOGLOB SERPL-MCNC: 151 MG/DL (ref 30–200)
HCT VFR BLD CALC: 30 % (ref 35–44)
HEMOGLOBIN: 9.2 GM/DL (ref 12–15)
LYMPHOCYTES ABSOLUTE: 1400 /CMM (ref 1000–4800)
LYMPHOCYTES RELATIVE PERCENT: 43.2 % (ref 15–45)
MCH RBC QN AUTO: 27.4 PG (ref 27.5–33)
MCHC RBC AUTO-ENTMCNC: 30.7 GM/DL (ref 33–36)
MCV RBC AUTO: 89 CU MIC (ref 80–97)
MONOCYTES ABSOLUTE: 400 /CMM (ref 0–800)
MONOCYTES RELATIVE PERCENT: 11.2 % (ref 2–10)
NEUTROPHILS ABSOLUTE: 1400 /CMM (ref 1800–7700)
NEUTROPHILS RELATIVE PERCENT: 44.5 % (ref 40–70)
NUCLEATED RBCS: 0 /100 WBC
PARATHYROID HORMONE INTACT: 86.1 U/ML (ref 12–88)
PDW BLD-RTO: 16 % (ref 12–16)
PLATELET # BLD: 136 TH/CMM (ref 150–400)
POTASSIUM SERPL-SCNC: 3.7 MEQ/L (ref 3.6–5)
POTASSIUM SERPL-SCNC: 3.8 MEQ/L (ref 3.6–5)
PROTEIN, URINE, RANDOM: 113.9 MG/DL
PROTEIN/CREAT RATIO: 0.98 G/1.73M2
RBC # BLD: 3.37 MIL/CMM (ref 4–5.1)
SED RATE, AUTOMATED: 2 MM/HR
SODIUM BLD-SCNC: 137 MEQ/L (ref 135–145)
SODIUM BLD-SCNC: 138 MEQ/L (ref 135–145)
TOTAL PROTEIN: 5.8 G/DL (ref 6.2–8)
VITAMIN D 25-HYDROXY: 16.5 NG/ML (ref 30–100)
WBC # BLD: 3.2 TH/CMM (ref 4.4–10.5)

## 2024-06-01 LAB — COPPER SERPL-MCNC: 82.1 UG/DL (ref 80–155)

## 2024-06-03 ENCOUNTER — TELEPHONE (OUTPATIENT)
Dept: RHEUMATOLOGY | Age: 54
End: 2024-06-03

## 2024-06-03 DIAGNOSIS — M32.14 OTHER SYSTEMIC LUPUS ERYTHEMATOSUS WITH GLOMERULAR DISEASE (HCC): ICD-10-CM

## 2024-06-03 DIAGNOSIS — M32.14 SYSTEMIC LUPUS ERYTHEMATOSUS WITH GLOMERULAR DISEASE, UNSPECIFIED SLE TYPE (HCC): ICD-10-CM

## 2024-06-03 DIAGNOSIS — G56.23 CUBITAL TUNNEL SYNDROME, BILATERAL: ICD-10-CM

## 2024-06-03 DIAGNOSIS — R80.1 PERSISTENT PROTEINURIA: ICD-10-CM

## 2024-06-03 DIAGNOSIS — D72.819 LEUKOPENIA, UNSPECIFIED TYPE: Primary | ICD-10-CM

## 2024-06-03 RX ORDER — HYDROXYCHLOROQUINE SULFATE 200 MG/1
200 TABLET, FILM COATED ORAL 2 TIMES DAILY
Qty: 60 TABLET | Refills: 5 | Status: SHIPPED | OUTPATIENT
Start: 2024-06-03

## 2024-06-03 RX ORDER — PREDNISONE 5 MG/1
TABLET ORAL
Qty: 90 TABLET | Refills: 1 | Status: SHIPPED | OUTPATIENT
Start: 2024-06-03

## 2024-06-03 RX ORDER — ERGOCALCIFEROL 1.25 MG/1
50000 CAPSULE ORAL WEEKLY
Qty: 12 CAPSULE | Refills: 3 | Status: SHIPPED | OUTPATIENT
Start: 2024-06-03

## 2024-06-04 ENCOUNTER — OFFICE VISIT (OUTPATIENT)
Dept: NEPHROLOGY | Age: 54
End: 2024-06-04
Payer: COMMERCIAL

## 2024-06-04 VITALS
HEART RATE: 85 BPM | WEIGHT: 149 LBS | OXYGEN SATURATION: 99 % | HEIGHT: 66 IN | BODY MASS INDEX: 23.95 KG/M2 | SYSTOLIC BLOOD PRESSURE: 119 MMHG | DIASTOLIC BLOOD PRESSURE: 77 MMHG

## 2024-06-04 DIAGNOSIS — I10 PRIMARY HYPERTENSION: ICD-10-CM

## 2024-06-04 DIAGNOSIS — R80.9 PROTEINURIA, UNSPECIFIED TYPE: ICD-10-CM

## 2024-06-04 DIAGNOSIS — D61.818 PANCYTOPENIA (HCC): ICD-10-CM

## 2024-06-04 DIAGNOSIS — E11.21 DIABETIC NEPHROPATHY ASSOCIATED WITH TYPE 2 DIABETES MELLITUS (HCC): ICD-10-CM

## 2024-06-04 DIAGNOSIS — N18.32 STAGE 3B CHRONIC KIDNEY DISEASE (HCC): Primary | ICD-10-CM

## 2024-06-04 DIAGNOSIS — E55.9 VITAMIN D DEFICIENCY: ICD-10-CM

## 2024-06-04 DIAGNOSIS — E78.5 DYSLIPIDEMIA: ICD-10-CM

## 2024-06-04 DIAGNOSIS — M32.0 DRUG-INDUCED SYSTEMIC LUPUS ERYTHEMATOSUS, UNSPECIFIED ORGAN INVOLVEMENT STATUS (HCC): ICD-10-CM

## 2024-06-04 PROCEDURE — 3074F SYST BP LT 130 MM HG: CPT | Performed by: INTERNAL MEDICINE

## 2024-06-04 PROCEDURE — 1036F TOBACCO NON-USER: CPT | Performed by: INTERNAL MEDICINE

## 2024-06-04 PROCEDURE — 3044F HG A1C LEVEL LT 7.0%: CPT | Performed by: INTERNAL MEDICINE

## 2024-06-04 PROCEDURE — 3017F COLORECTAL CA SCREEN DOC REV: CPT | Performed by: INTERNAL MEDICINE

## 2024-06-04 PROCEDURE — 99213 OFFICE O/P EST LOW 20 MIN: CPT | Performed by: INTERNAL MEDICINE

## 2024-06-04 PROCEDURE — G8420 CALC BMI NORM PARAMETERS: HCPCS | Performed by: INTERNAL MEDICINE

## 2024-06-04 PROCEDURE — G8427 DOCREV CUR MEDS BY ELIG CLIN: HCPCS | Performed by: INTERNAL MEDICINE

## 2024-06-04 PROCEDURE — 3078F DIAST BP <80 MM HG: CPT | Performed by: INTERNAL MEDICINE

## 2024-06-04 PROCEDURE — 2022F DILAT RTA XM EVC RTNOPTHY: CPT | Performed by: INTERNAL MEDICINE

## 2024-06-04 RX ORDER — ERGOCALCIFEROL 1.25 MG/1
50000 CAPSULE ORAL WEEKLY
Qty: 12 CAPSULE | Refills: 1 | Status: SHIPPED | OUTPATIENT
Start: 2024-06-04

## 2024-06-04 NOTE — TELEPHONE ENCOUNTER
Attempted to contact the patient but there was no answer   
Patient called back and notified of above. Patient stated she just ran out of her prednisone today. Patient denies missing any doses of her medications. She stated she just took the last 2 prednisone pills this morning.       Patient stated she can be reached at [phone] if you need to call her back.   
Pt called but there was no answer. A message was left on the patients voicemail asking them to call back.     Labs notable for a drop in the wbc, active anemia, low vitamin D 25-hydroxy, hypercalcemia and stable chronic kidney disease    Systemic lupus  Leukopenia:   -- Concerned about medication compliance --after contacting patient's pharmacy it was discovered that the patient's last mycophenolate refill was 5/15/2024, prednisone last refilled on 4/27/2024.  Last 2 Voclosporin refills were on April 19, 2024 and June 2, 2024.  --Awaiting return phone call to discuss medication treatment and resuming of prednisone if not taking daily.     Vitamin D deficiency:   -- Would like to restart ergoCalciferol 50,000 units once weekly.  Refilled    Hypocalcemia - re-evaluation with only mildly low calcium level   - can be related to CKD, vitamin D def.     
Where is pain located?  Right and left elbows     When did the pain start?  right ongoing left started 2-3 days ago    Rate the pain 1-10. Ten being the worst  8    Describe the pain.  (Dull, Aching, Stabbing, radiating, etc)  throbbing,     Has this pain occurred in the past?  Left is new, right is ongoing    What have you used to alleviate this pain?  Rubbing, tried taking 2 gabapentin- it works for a while, but the pain comes back again.     What aggravates it?  Picking up something, pressing down on elbow    Joint swelling or redness?  Swelling in both elbows, they both have bumps on the elbow.     She has not scheduled EMG for the right elbow yet. Number given to patient to call to schedule EMG  
[place] [address]  [phone].     10/26/2023 17 14 - 40 mg/dL Final     Comment:     Test Performed by:    [facility]    [address] [place] [address]    : Antonino Anna M.D. Ph.D.; CLIA# [id number]         A/p     Systemic lupus:   Leukopenia - worsening  x 3 months   Thrombocytopenia - recurrence.   Bilateral elbow pain - suspected related to systemic lupus    - ? Increased lupus given pt has been out of the prednisone for the past week    - checking complements (c3/C4) to evaluate for complement activity given the slow trend   - pt reports compleince with all other medications at this time.    - if there is flaring of the systmic lupus we may need to discuss other potential interventions - saphnelo, cytoxan, vs other.    - pt was going to restart the prednisone tonight - needing a ride to the pharmacy to  the medication

## 2024-06-04 NOTE — PROGRESS NOTES
lb)   SpO2 99%   BMI 24.05 kg/m²      Constitutional:  Alert, awake, no apparent distress  Skin:normal with no rash or any significant lesions  HEENT:Pupils are reactive .Throat is clear.  Oral mucosa is moist.  Neck:supple with no thyromegaly, JVD, lymphadenopathy or bruit **  Cardiovascular: Regular sinus rhythm without murmur, rubs or gallops   Respiratory:  Clear to auscultation with no wheezes or rales  Abdomen: Good bowel sound, soft, non tender and no bruit  Ext: No LE edema  Musculoskeletal:Intact  Neuro:Alert, awake and oriented with no obvious focal deficit.  Speech is normal.**    Electronically signed by Conrad Maldonado MD on 6/4/2024 at 10:14 AM   **This report has been created using voice recognition software. It maycontain minor  errors which are inherent in voice recognition technology.**

## 2024-06-07 ENCOUNTER — TELEPHONE (OUTPATIENT)
Dept: RHEUMATOLOGY | Age: 54
End: 2024-06-07

## 2024-06-07 NOTE — TELEPHONE ENCOUNTER
Left voice message for patient to return call to office. Practice Management e-Toolshart message also sent. Lab orders mailed to address on profile.

## 2024-06-07 NOTE — TELEPHONE ENCOUNTER
----- Message from Jareth Rodriguez DO sent at 6/7/2024  6:20 AM EDT -----  Regarding: additional labs  Please contact the patient ask for the additional labs to be completed.

## 2024-06-07 NOTE — TELEPHONE ENCOUNTER
Patient called back stating she uses [facility] lab. Lab orders faxed per patient request. Patient stated she will get labs completed in a couple days.

## 2024-06-13 ENCOUNTER — TELEPHONE (OUTPATIENT)
Dept: FAMILY MEDICINE CLINIC | Age: 54
End: 2024-06-13

## 2024-06-13 DIAGNOSIS — R05.1 ACUTE COUGH: Primary | ICD-10-CM

## 2024-06-13 DIAGNOSIS — J30.89 NON-SEASONAL ALLERGIC RHINITIS DUE TO OTHER ALLERGIC TRIGGER: ICD-10-CM

## 2024-06-13 RX ORDER — BENZONATATE 100 MG/1
100-200 CAPSULE ORAL 3 TIMES DAILY PRN
Qty: 60 CAPSULE | Refills: 0 | Status: SHIPPED | OUTPATIENT
Start: 2024-06-13 | End: 2024-06-20

## 2024-06-13 RX ORDER — FLUTICASONE PROPIONATE 50 MCG
1 SPRAY, SUSPENSION (ML) NASAL DAILY
Qty: 32 G | Refills: 1 | Status: SHIPPED | OUTPATIENT
Start: 2024-06-13

## 2024-06-13 NOTE — TELEPHONE ENCOUNTER
Let pt know I sent flonase and tessalon perles in for her for her cold symptoms, call us for any worsening symptoms

## 2024-06-13 NOTE — TELEPHONE ENCOUNTER
Pt called stating she has had a HA for 3 days and yesterday she started to have symptoms of a runny nose, sore throat, congestion, and a cough.  Pt does not have a fever, chills, sweats, or body aches.    Please advise

## 2024-06-14 NOTE — TELEPHONE ENCOUNTER
Patient called into the office and LVM. Tried calling patient again.    Left message on answering machine requesting pt to call back at earliest convenience.

## 2024-06-17 ENCOUNTER — NURSE ONLY (OUTPATIENT)
Dept: LAB | Age: 54
End: 2024-06-17

## 2024-06-17 DIAGNOSIS — Z51.81 MEDICATION MONITORING ENCOUNTER: ICD-10-CM

## 2024-06-17 DIAGNOSIS — D72.819 LEUKOPENIA, UNSPECIFIED TYPE: ICD-10-CM

## 2024-06-17 DIAGNOSIS — M32.14 SYSTEMIC LUPUS ERYTHEMATOSUS WITH GLOMERULAR DISEASE, UNSPECIFIED SLE TYPE (HCC): ICD-10-CM

## 2024-06-17 LAB
ALBUMIN SERPL BCG-MCNC: 4.1 G/DL (ref 3.5–5.1)
ALP SERPL-CCNC: 76 U/L (ref 38–126)
ALT SERPL W/O P-5'-P-CCNC: 9 U/L (ref 11–66)
ANION GAP SERPL CALC-SCNC: 13 MEQ/L (ref 8–16)
AST SERPL-CCNC: 14 U/L (ref 5–40)
BACTERIA: ABNORMAL
BASOPHILS ABSOLUTE: 0 THOU/MM3 (ref 0–0.1)
BASOPHILS NFR BLD AUTO: 0.4 %
BILIRUB SERPL-MCNC: < 0.2 MG/DL (ref 0.3–1.2)
BILIRUB UR QL STRIP: NEGATIVE
BUN SERPL-MCNC: 46 MG/DL (ref 7–22)
CALCIUM SERPL-MCNC: 9.3 MG/DL (ref 8.5–10.5)
CASTS #/AREA URNS LPF: ABNORMAL /LPF
CASTS #/AREA URNS LPF: ABNORMAL /LPF
CHARACTER UR: CLEAR
CHARCOAL URNS QL MICRO: ABNORMAL
CHLORIDE SERPL-SCNC: 110 MEQ/L (ref 98–111)
CO2 SERPL-SCNC: 18 MEQ/L (ref 23–33)
COLOR UR: YELLOW
CREAT SERPL-MCNC: 2.4 MG/DL (ref 0.4–1.2)
CREAT UR-MCNC: NORMAL MG/DL
CRP SERPL-MCNC: < 0.3 MG/DL (ref 0–1)
CRYSTALS URNS QL MICRO: ABNORMAL
DEPRECATED RDW RBC AUTO: 52.2 FL (ref 35–45)
EOSINOPHIL NFR BLD AUTO: 0.4 %
EOSINOPHILS ABSOLUTE: 0 THOU/MM3 (ref 0–0.4)
EPITHELIAL CELLS, UA: ABNORMAL /HPF
ERYTHROCYTE [DISTWIDTH] IN BLOOD BY AUTOMATED COUNT: 15 % (ref 11.5–14.5)
ERYTHROCYTE [SEDIMENTATION RATE] IN BLOOD BY WESTERGREN METHOD: 14 MM/HR (ref 0–20)
GFR SERPL CREATININE-BSD FRML MDRD: 24 ML/MIN/1.73M2
GLUCOSE SERPL-MCNC: 88 MG/DL (ref 70–108)
GLUCOSE UR QL STRIP.AUTO: NEGATIVE MG/DL
HCT VFR BLD AUTO: 34.2 % (ref 37–47)
HGB BLD-MCNC: 9.5 GM/DL (ref 12–16)
HGB UR QL STRIP.AUTO: NEGATIVE
HYPOCHROMIA BLD QL SMEAR: PRESENT
IMM GRANULOCYTES # BLD AUTO: 0.02 THOU/MM3 (ref 0–0.07)
IMM GRANULOCYTES NFR BLD AUTO: 0.3 %
KETONES UR QL STRIP.AUTO: NEGATIVE
LEUKOCYTE ESTERASE UR QL STRIP.AUTO: ABNORMAL
LYMPHOCYTES ABSOLUTE: 2.2 THOU/MM3 (ref 1–4.8)
LYMPHOCYTES NFR BLD AUTO: 29.2 %
MCH RBC QN AUTO: 26.5 PG (ref 26–33)
MCHC RBC AUTO-ENTMCNC: 27.8 GM/DL (ref 32.2–35.5)
MCV RBC AUTO: 95.5 FL (ref 81–99)
MONOCYTES ABSOLUTE: 0.5 THOU/MM3 (ref 0.4–1.3)
MONOCYTES NFR BLD AUTO: 6.3 %
NEUTROPHILS ABSOLUTE: 4.8 THOU/MM3 (ref 1.8–7.7)
NEUTROPHILS NFR BLD AUTO: 63.4 %
NITRITE UR QL STRIP.AUTO: NEGATIVE
NRBC BLD AUTO-RTO: 0 /100 WBC
PH UR STRIP.AUTO: 5 [PH] (ref 5–9)
PLATELET # BLD AUTO: 180 THOU/MM3 (ref 130–400)
PLATELET BLD QL SMEAR: ADEQUATE
PMV BLD AUTO: 11.9 FL (ref 9.4–12.4)
POLYCHROMASIA BLD QL SMEAR: ABNORMAL
POTASSIUM SERPL-SCNC: 4.6 MEQ/L (ref 3.5–5.2)
PROT SERPL-MCNC: 7.1 G/DL (ref 6.1–8)
PROT UR STRIP.AUTO-MCNC: 30 MG/DL
PROT UR-MCNC: 29.9 MG/DL
RBC # BLD AUTO: 3.58 MILL/MM3 (ref 4.2–5.4)
RBC #/AREA URNS HPF: ABNORMAL /HPF
RENAL EPI CELLS #/AREA URNS HPF: ABNORMAL /[HPF]
SCAN OF BLOOD SMEAR: NORMAL
SODIUM SERPL-SCNC: 141 MEQ/L (ref 135–145)
SP GR UR REFRACT.AUTO: 1.02 (ref 1–1.03)
UROBILINOGEN UR QL STRIP.AUTO: 0.2 EU/DL (ref 0–1)
WBC # BLD AUTO: 7.6 THOU/MM3 (ref 4.8–10.8)
WBC #/AREA URNS HPF: ABNORMAL /HPF
YEAST LIKE FUNGI URNS QL MICRO: ABNORMAL

## 2024-06-18 ENCOUNTER — TELEPHONE (OUTPATIENT)
Dept: RHEUMATOLOGY | Age: 54
End: 2024-06-18

## 2024-06-18 LAB
C3C SERPL-MCNC: 129 MG/DL (ref 90–180)
C4 SERPL-MCNC: 24 MG/DL (ref 10–40)

## 2024-06-18 NOTE — TELEPHONE ENCOUNTER
Pt called but there was no answer. A message was left on the patients voicemail asking them to call back.     - pharmacies contacted 6/18/24 to ensure medication compliance.     [facility]   Mycophenolate - refilled 5/17/24, 4/18/24  Prednisone 90 day supply - 6/5/24,  April 10, 2024    [facility]    Voclosporin - Mach 2024, April 19, 2024, and June 2024.       Systemic lupus w/ lupus nephritis.   Creatinine elevation - worsened.   Chronic use of systemic steroids.   -- lupus labs are currently stable with a normal ESR/CRP, c3/c4  -- given the refill hx of the voclosporin and its potential for nephrotoxicity  would like to wean or hold to evaluate for to evaluate for renal improvement given   -- b/c of the active articular symptoms would like to restart benlysta   -- needs to wean prednisone. To 12.5mg and as tolerated.   -- awaiting a return phone call.

## 2024-06-24 ENCOUNTER — TELEPHONE (OUTPATIENT)
Dept: ONCOLOGY | Age: 54
End: 2024-06-24

## 2024-06-24 NOTE — TELEPHONE ENCOUNTER
Left voicemail notifying patient of appointment and to return call to office for confirmation. Patient has active MyChart.

## 2024-06-24 NOTE — TELEPHONE ENCOUNTER
----- Message from MACKENZIE Martin CNP sent at 6/12/2024  5:28 PM EDT -----  Follow up with me in 6 weeks carmelo

## 2024-06-26 ENCOUNTER — TELEPHONE (OUTPATIENT)
Dept: ONCOLOGY | Age: 54
End: 2024-06-26

## 2024-06-26 ENCOUNTER — HOSPITAL ENCOUNTER (OUTPATIENT)
Dept: CT IMAGING | Age: 54
Discharge: HOME | End: 2024-06-26
Attending: NURSE PRACTITIONER
Payer: COMMERCIAL

## 2024-06-26 DIAGNOSIS — G89.4 CHRONIC PAIN SYNDROME: ICD-10-CM

## 2024-06-26 DIAGNOSIS — R10.10 PAIN OF UPPER ABDOMEN: ICD-10-CM

## 2024-06-26 DIAGNOSIS — D64.9 ANEMIA, UNSPECIFIED TYPE: ICD-10-CM

## 2024-06-26 DIAGNOSIS — N18.4 CKD (CHRONIC KIDNEY DISEASE), STAGE IV (HCC): Primary | ICD-10-CM

## 2024-06-26 DIAGNOSIS — D69.6 THROMBOCYTOPENIA (HCC): ICD-10-CM

## 2024-06-26 LAB — POC CREATININE WHOLE BLOOD: 2 MG/DL (ref 0.5–1.2)

## 2024-06-26 PROCEDURE — 6360000004 HC RX CONTRAST MEDICATION: Performed by: NURSE PRACTITIONER

## 2024-06-26 PROCEDURE — 82565 ASSAY OF CREATININE: CPT

## 2024-06-26 PROCEDURE — 74150 CT ABDOMEN W/O CONTRAST: CPT

## 2024-06-26 RX ADMIN — IOPAMIDOL 80 ML: 755 INJECTION, SOLUTION INTRAVENOUS at 13:14

## 2024-06-26 NOTE — TELEPHONE ENCOUNTER
Talya Schulz called requesting a refill on the following medications:  Requested Prescriptions     Pending Prescriptions Disp Refills    HYDROcodone-acetaminophen (NORCO) 5-325 MG per tablet 90 tablet 0     Sig: Take 1 tablet by mouth every 8 hours as needed for Pain for up to 30 days.     Pharmacy verified:    [facility] [address] [place] [address] - P [phone] - F [phone]     Date of last visit: 05/14/2024  Date of next visit (if applicable): 8/6/2024

## 2024-06-26 NOTE — TELEPHONE ENCOUNTER
Cherri from [facility] called to let us know that the pt's creatinine level was 2.0 and was going to do her CT without. Spoke with Sharonda to have her drink plenty of fluids and to get CBC redrawn in a week.

## 2024-06-26 NOTE — TELEPHONE ENCOUNTER
[facility] [address], [address]  [place] [id number]  Dept: [phone]  Dept Fax: [phone]  Loc: [phone]     06/26/24     Repeat creatinine in one week. Advise that patient notify nephrology of bump in creatinine numbers.       Electronically signed by MACKENZIE Martin CNP on 6/26/24 at 1:46 PM EDT

## 2024-06-27 RX ORDER — HYDROCODONE BITARTRATE AND ACETAMINOPHEN 5; 325 MG/1; MG/1
1 TABLET ORAL EVERY 8 HOURS PRN
Qty: 90 TABLET | Refills: 0 | Status: SHIPPED | OUTPATIENT
Start: 2024-06-28 | End: 2024-07-28

## 2024-06-28 ENCOUNTER — TELEPHONE (OUTPATIENT)
Dept: ONCOLOGY | Age: 54
End: 2024-06-28

## 2024-06-28 NOTE — TELEPHONE ENCOUNTER
Called pt to let her know about repeat labs and to drink plenty of fluids. Sharonda MICHAUD also suggested pt should contact her nephrologist about the bump in creatinine levels.

## 2024-07-03 ENCOUNTER — TELEPHONE (OUTPATIENT)
Dept: CARDIOLOGY CLINIC | Age: 54
End: 2024-07-03

## 2024-07-03 NOTE — TELEPHONE ENCOUNTER
Patient was scheduled today for appt with [facility] called in to reschedule appt d/t schedule conflict.  Patient reports needs pre op clearance will need appt with dr. Reyes

## 2024-07-11 ENCOUNTER — PROCEDURE VISIT (OUTPATIENT)
Dept: NEUROLOGY | Age: 54
End: 2024-07-11
Payer: COMMERCIAL

## 2024-07-11 DIAGNOSIS — M79.601 RIGHT ARM PAIN: Primary | ICD-10-CM

## 2024-07-11 DIAGNOSIS — M54.12 CERVICAL RADICULOPATHY: ICD-10-CM

## 2024-07-11 DIAGNOSIS — R20.0 RIGHT ARM NUMBNESS: ICD-10-CM

## 2024-07-11 PROCEDURE — 95886 MUSC TEST DONE W/N TEST COMP: CPT | Performed by: PSYCHIATRY & NEUROLOGY

## 2024-07-11 PROCEDURE — 95909 NRV CNDJ TST 5-6 STUDIES: CPT | Performed by: PSYCHIATRY & NEUROLOGY

## 2024-07-15 ENCOUNTER — OFFICE VISIT (OUTPATIENT)
Dept: FAMILY MEDICINE CLINIC | Age: 54
End: 2024-07-15
Payer: COMMERCIAL

## 2024-07-15 VITALS
HEART RATE: 63 BPM | RESPIRATION RATE: 18 BRPM | SYSTOLIC BLOOD PRESSURE: 122 MMHG | TEMPERATURE: 97.8 F | WEIGHT: 142.2 LBS | HEIGHT: 66 IN | BODY MASS INDEX: 22.85 KG/M2 | OXYGEN SATURATION: 99 % | DIASTOLIC BLOOD PRESSURE: 76 MMHG

## 2024-07-15 DIAGNOSIS — Z11.51 ENCOUNTER FOR SCREENING FOR HUMAN PAPILLOMAVIRUS (HPV): ICD-10-CM

## 2024-07-15 DIAGNOSIS — Z12.11 SCREEN FOR COLON CANCER: ICD-10-CM

## 2024-07-15 DIAGNOSIS — R09.81 NASAL CONGESTION: ICD-10-CM

## 2024-07-15 DIAGNOSIS — Z01.419 PAP SMEAR, AS PART OF ROUTINE GYNECOLOGICAL EXAMINATION: Primary | ICD-10-CM

## 2024-07-15 DIAGNOSIS — E03.9 HYPOTHYROIDISM, UNSPECIFIED TYPE: ICD-10-CM

## 2024-07-15 DIAGNOSIS — Z12.4 SCREENING FOR CERVICAL CANCER: ICD-10-CM

## 2024-07-15 PROCEDURE — 99396 PREV VISIT EST AGE 40-64: CPT | Performed by: NURSE PRACTITIONER

## 2024-07-15 RX ORDER — LEVOTHYROXINE SODIUM 175 UG/1
175 TABLET ORAL DAILY
Qty: 90 TABLET | Refills: 4 | Status: SHIPPED | OUTPATIENT
Start: 2024-07-15

## 2024-07-15 RX ORDER — SIMVASTATIN 40 MG
TABLET ORAL
Qty: 90 TABLET | Refills: 4 | Status: SHIPPED | OUTPATIENT
Start: 2024-07-15

## 2024-07-15 RX ORDER — FLUTICASONE PROPIONATE 50 MCG
1 SPRAY, SUSPENSION (ML) NASAL DAILY
Qty: 16 G | Refills: 0 | Status: SHIPPED | OUTPATIENT
Start: 2024-07-15

## 2024-07-15 NOTE — PROGRESS NOTES
Talya Schulz is a 53 y.o. female for   Chief Complaint   Patient presents with    Follow-up     Pap no concerns     Health Maintenance     No recent eric or colonoscopy        HPI:    No LMP recorded. (Menstrual status: Other - See Notes).  Pt is postmenopausal   Patient Gynecological History No Yes Not Asked comment          History of Abnormal Pap [x]                     []                     []                  Last pap: 2019   Has received HPV vaccine [x]                     []                     []                     Currently sexually active [x]                     []                     []                  post menopausal status   History of Sexually Transmitted Disease []            [x]                     []                     History of Abnormal Mammograms [x]            []                     []                   Last mammo: ordered      Last Dexa Scan: n/a  Last Colonoscopy: ordered but did not have due to coronary concerns,     Urinary Incontinence?  No  Dysuria?  No  Vaginal itching or Dryness?  No  Vaginal discharge?  No      Hypothyroid  Taking synthroid 175 mcg daily  Recent TSH normal  No symptoms     Lab Results   Component Value Date    TSH 4.498 05/13/2024        Lab Results   Component Value Date    CHOL 130 02/16/2023    TRIG 113 02/16/2023    HDL 48 02/16/2023    LDL 59 02/16/2023    VLDL 25 07/02/2015    CHOLHDLRATIO 2.7 03/20/2013      Lab Results   Component Value Date     06/17/2024    K 4.6 06/17/2024     06/17/2024    CO2 18 (L) 06/17/2024    BUN 46 (H) 06/17/2024    CREATININE 2.4 (H) 06/17/2024    GLUCOSE 88 06/17/2024    CALCIUM 9.3 06/17/2024    BILITOT <0.2 (L) 06/17/2024    ALKPHOS 76 06/17/2024    AST 14 06/17/2024    ALT 9 (L) 06/17/2024    LABGLOM 24 (A) 06/17/2024    AGRATIO 1.6 05/31/2024        Social History     Socioeconomic History    Marital status:      Spouse name: Not on file    Number of children: Not on file    Years of education: Not on

## 2024-07-23 DIAGNOSIS — M54.40 BACK PAIN OF LUMBAR REGION WITH SCIATICA: ICD-10-CM

## 2024-07-23 DIAGNOSIS — M79.641 RIGHT HAND PAIN: ICD-10-CM

## 2024-07-23 DIAGNOSIS — G89.4 CHRONIC PAIN SYNDROME: ICD-10-CM

## 2024-07-23 LAB — CYTOLOGY THIN PREP PAP: NORMAL

## 2024-07-23 RX ORDER — HYDROCODONE BITARTRATE AND ACETAMINOPHEN 5; 325 MG/1; MG/1
1 TABLET ORAL EVERY 8 HOURS PRN
Qty: 90 TABLET | Refills: 0 | Status: SHIPPED | OUTPATIENT
Start: 2024-07-28 | End: 2024-08-27

## 2024-07-23 RX ORDER — GABAPENTIN 400 MG/1
CAPSULE ORAL
Qty: 270 CAPSULE | Refills: 1 | OUTPATIENT
Start: 2024-07-27 | End: 2025-01-20

## 2024-07-23 NOTE — TELEPHONE ENCOUNTER
Talya Schulz called requesting a refill on the following medications:  Requested Prescriptions     Pending Prescriptions Disp Refills    HYDROcodone-acetaminophen (NORCO) 5-325 MG per tablet 90 tablet 0     Sig: Take 1 tablet by mouth every 8 hours as needed for Pain for up to 30 days. Max Daily Amount: 3 tablets    gabapentin (NEURONTIN) 400 MG capsule 270 capsule 1     Sig: take 1 capsule by mouth TID a day     Pharmacy verified:[facility]   .pv      Date of last visit: 5/14/24   Date of next visit (if applicable): 8/6/2024

## 2024-07-23 NOTE — TELEPHONE ENCOUNTER
OARRS reviewed. UDS: + for  Gabapentin, Hydrocodone.   Last seen: 5/14/2024. Follow-up: 8/6/2024

## 2024-07-25 ENCOUNTER — TELEPHONE (OUTPATIENT)
Dept: FAMILY MEDICINE CLINIC | Age: 54
End: 2024-07-25

## 2024-07-25 RX ORDER — FERROUS SULFATE 325(65) MG
TABLET ORAL
Qty: 90 TABLET | Refills: 3 | Status: SHIPPED | OUTPATIENT
Start: 2024-07-25

## 2024-07-25 NOTE — TELEPHONE ENCOUNTER
Called patient and informed of message.  Patient verbalized understanding.   No questions or concerns at this time.    Patient states completed Cologuard and will be getting this shipped out today.

## 2024-07-25 NOTE — TELEPHONE ENCOUNTER
----- Message from MACKENZIE Miller - CNP sent at 7/24/2024  4:58 PM EDT -----  Let pt know her PAP test identified some abnormal cervical cells but her HPV testing was negative., which is good, HPV is the virus that can lead to cervical cancer, so she does not have that virus.  Guidelines determine pt needs  repeat PAP test in  3 years to recheck her cells. Let me know if questions.

## 2024-07-25 NOTE — TELEPHONE ENCOUNTER
Recent Visits  Date Type Provider Dept   07/15/24 Office Visit Gopal Boone APRN - CNP [facility]   04/11/24 Office Visit Gopal Boone APRN - CNP [facility]   09/19/23 Office Visit Gopal Boone APRN - CNP [facility]   Showing recent visits within past 540 days with a meds authorizing provider and meeting all other requirements  Future Appointments  No visits were found meeting these conditions.  Showing future appointments within next 150 days with a meds authorizing provider and meeting all other requirements

## 2024-07-26 DIAGNOSIS — R10.9 ABDOMINAL PAIN, UNSPECIFIED ABDOMINAL LOCATION: ICD-10-CM

## 2024-07-26 DIAGNOSIS — R80.1 PERSISTENT PROTEINURIA: ICD-10-CM

## 2024-07-26 DIAGNOSIS — M32.14 SYSTEMIC LUPUS ERYTHEMATOSUS WITH GLOMERULAR DISEASE, UNSPECIFIED SLE TYPE (HCC): ICD-10-CM

## 2024-07-26 RX ORDER — MYCOPHENOLATE MOFETIL 500 MG/1
TABLET ORAL
Qty: 120 TABLET | Refills: 1 | OUTPATIENT
Start: 2024-07-26

## 2024-07-29 DIAGNOSIS — R80.1 PERSISTENT PROTEINURIA: ICD-10-CM

## 2024-07-29 DIAGNOSIS — Z87.39 H/O LUPUS NEPHRITIS: ICD-10-CM

## 2024-07-29 DIAGNOSIS — M32.14 SYSTEMIC LUPUS ERYTHEMATOSUS WITH GLOMERULAR DISEASE, UNSPECIFIED SLE TYPE (HCC): ICD-10-CM

## 2024-07-29 NOTE — TELEPHONE ENCOUNTER
Talya Schulz called requesting a refill on the following medications:  Requested Prescriptions     Pending Prescriptions Disp Refills    Voclosporin 7.9 MG CAPS 120 capsule 3     Sig: Take 15.8 mg by mouth in the morning and at bedtime     Pharmacy verified:    [facility] [place], [place] - [address] - P [phone] - F [phone]     Date of last visit: 05/06/2024  Date of next visit (if applicable): 9/19/2024

## 2024-07-30 LAB — NONINV COLON CA DNA+OCC BLD SCRN STL QL: NEGATIVE

## 2024-07-31 DIAGNOSIS — R80.1 PERSISTENT PROTEINURIA: ICD-10-CM

## 2024-07-31 DIAGNOSIS — M32.14 SYSTEMIC LUPUS ERYTHEMATOSUS WITH GLOMERULAR DISEASE, UNSPECIFIED SLE TYPE (HCC): ICD-10-CM

## 2024-07-31 LAB
A/G RATIO: 1.4 (ref 1.5–2.5)
ALBUMIN: 3.8 G/DL (ref 3.5–5)
ALP BLD-CCNC: 63 IU/L (ref 39–118)
ALT SERPL-CCNC: 8 IU/L (ref 10–40)
ANION GAP SERPL CALCULATED.3IONS-SCNC: 8 MMOL/L (ref 4–12)
APPEARANCE: CLEAR
AST SERPL-CCNC: 12 IU/L (ref 15–41)
BACTERIA: ABNORMAL
BASOPHILS ABSOLUTE: 0 /CMM (ref 0–200)
BASOPHILS RELATIVE PERCENT: 0.7 % (ref 0–2)
BILIRUB SERPL-MCNC: 0.2 MG/DL (ref 0.2–1)
BILIRUBIN, URINE: NEGATIVE
BUN BLDV-MCNC: 25 MG/DL (ref 7–20)
C-REACTIVE PROTEIN: < 0.5 MG/DL
CALCIUM SERPL-MCNC: 9.2 MG/DL (ref 8.8–10.5)
CHLORIDE BLD-SCNC: 110 MEQ/L (ref 101–111)
CO2: 23 MEQ/L (ref 21–32)
COLOR, UA: YELLOW
CREAT SERPL-MCNC: 1.6 MG/DL (ref 0.6–1.3)
CREATININE CLEARANCE: 34
CREATININE, RANDOM URINE: 186.1 MG/DL
EOSINOPHILS ABSOLUTE: 0 /CMM (ref 0–500)
EOSINOPHILS RELATIVE PERCENT: 0.8 % (ref 0–6)
GLUCOSE URINE: NEGATIVE
GLUCOSE: 83 MG/DL (ref 70–110)
HCT VFR BLD CALC: 31.4 % (ref 35–44)
HEMOGLOBIN: 9.6 GM/DL (ref 12–15)
HYALINE CASTS: ABNORMAL /LPF
HYPOCHROMIA: ABNORMAL
KETONES, URINE: NEGATIVE
LEUKOCYTES, UA: ABNORMAL
LYMPHOCYTES ABSOLUTE: 1500 /CMM (ref 1000–4800)
LYMPHOCYTES RELATIVE PERCENT: 28.5 % (ref 15–45)
MCH RBC QN AUTO: 26.9 PG (ref 27.5–33)
MCHC RBC AUTO-ENTMCNC: 30.6 GM/DL (ref 33–36)
MCV RBC AUTO: 87.8 CU MIC (ref 80–97)
MONOCYTES ABSOLUTE: 400 /CMM (ref 0–800)
MONOCYTES RELATIVE PERCENT: 8 % (ref 2–10)
MUCUS: PRESENT
NEUTROPHILS ABSOLUTE: 3300 /CMM (ref 1800–7700)
NEUTROPHILS RELATIVE PERCENT: 62 % (ref 40–70)
NITRITE, URINE: NEGATIVE
NUCLEATED RBCS: 0.1 /100 WBC
PDW BLD-RTO: 16.1 % (ref 12–16)
PH, URINE: 5.5 (ref 5–8)
PLATELET # BLD: 144 TH/CMM (ref 150–400)
POTASSIUM SERPL-SCNC: 4 MEQ/L (ref 3.6–5)
PROTEIN, URINE, RANDOM: 81.3 MG/DL
PROTEIN, URINE: ABNORMAL
PROTEIN/CREAT RATIO: 0.44 G/1.73M2
RBC # BLD: 3.58 MIL/CMM (ref 4–5.1)
RBC URINE: ABNORMAL /HPF
SED RATE, AUTOMATED: 5 MM/HR
SODIUM BLD-SCNC: 141 MEQ/L (ref 135–145)
SPECIFIC GRAVITY UA: 1.02 (ref 1–1.03)
SQUAMOUS EPITHELIAL: ABNORMAL /HPF
TOTAL PROTEIN: 6.5 G/DL (ref 6.2–8)
URINALYSIS REFLEX: YES
URINE HGB: NEGATIVE
UROBILINOGEN, URINE: ABNORMAL (ref 0.2–1)
WBC # BLD: 5.3 TH/CMM (ref 4.4–10.5)
WBC URINE: ABNORMAL /HPF

## 2024-07-31 RX ORDER — LOSARTAN POTASSIUM 50 MG/1
50 TABLET ORAL DAILY
Qty: 90 TABLET | Refills: 2 | Status: SHIPPED | OUTPATIENT
Start: 2024-07-31

## 2024-07-31 RX ORDER — MYCOPHENOLATE MOFETIL 500 MG/1
TABLET ORAL
Qty: 120 TABLET | Refills: 1 | Status: SHIPPED | OUTPATIENT
Start: 2024-07-31

## 2024-07-31 NOTE — RESULT ENCOUNTER NOTE
Patient was called to discuss her recent labs showing improvement of the proteinuria, kidney function test.  Would like to try to decrease the prednisone dosing to 12.5 mg daily for the next 2 weeks followed by decreasing the dose to 10 mg daily.  Would like repeat labs in 4 weeks.    Message left on voicemail asking for return phone call

## 2024-07-31 NOTE — RESULT ENCOUNTER NOTE
The blood testing does reveal improvement of the protein in the urine along with the kidney function test.  The anemia has mildly improved over the last 3 evaluations.  If you are willing I would like to try to decrease the prednisone to 12.5 mg daily for the next couple weeks followed by dropping the prednisone down to 10 mg daily.    I would like to have the labs repeated in 4 weeks to ensure that there is no worsening of your systemic lupus with decreasing prednisone

## 2024-07-31 NOTE — TELEPHONE ENCOUNTER
Received another Viclosporin refill request from   [facility]. Labs due. Patient notified. Lab orders faxed to [facility] per patient request.

## 2024-08-01 ENCOUNTER — TELEPHONE (OUTPATIENT)
Dept: FAMILY MEDICINE CLINIC | Age: 54
End: 2024-08-01

## 2024-08-01 ENCOUNTER — TELEPHONE (OUTPATIENT)
Dept: RHEUMATOLOGY | Age: 54
End: 2024-08-01

## 2024-08-01 LAB
COMPLEMENT C3: 104 MG/DL (ref 75–175)
COMPLEMENT C4: 26 MG/DL (ref 14–40)

## 2024-08-01 NOTE — TELEPHONE ENCOUNTER
----- Message from MACKENZIE Miller - CNP sent at 7/31/2024  8:23 PM EDT -----  Let pt know her cologard test was negative recommend 3 year f/u

## 2024-08-01 NOTE — TELEPHONE ENCOUNTER
Attempted to return the patient call, no answer. She left message regarding a call from the office about lab results. Message was left for the patient to contact the office.

## 2024-08-02 ENCOUNTER — TELEPHONE (OUTPATIENT)
Dept: RHEUMATOLOGY | Age: 54
End: 2024-08-02

## 2024-08-02 LAB — URINE CULTURE REFLEX: NORMAL

## 2024-08-02 NOTE — TELEPHONE ENCOUNTER
Pt called - informed of labs -- she reported taking the Plaquenil and Mycophenolate. Prednisone down to 10mg daily for the past month.       SLE   - continue. Mycophenolate and Plaquenil    - wean prednisone to 7.5mg daily     Medication monitoring - repeat labs in 4 week.       she reported understanding and had no further questions.

## 2024-08-02 NOTE — TELEPHONE ENCOUNTER
----- Message from MACKENZIE Loyola CNP sent at 7/31/2024  3:51 PM EDT -----    ----- Message -----  From: Sundar Kahn [facility]  Sent: 7/31/2024  12:04 PM EDT  To: MACKENZIE Loyola CNP

## 2024-08-06 ENCOUNTER — LAB (OUTPATIENT)
Dept: LAB | Age: 54
End: 2024-08-06

## 2024-08-06 ENCOUNTER — OFFICE VISIT (OUTPATIENT)
Dept: PHYSICAL MEDICINE AND REHAB | Age: 54
End: 2024-08-06
Payer: COMMERCIAL

## 2024-08-06 VITALS
BODY MASS INDEX: 22.82 KG/M2 | DIASTOLIC BLOOD PRESSURE: 68 MMHG | HEIGHT: 66 IN | WEIGHT: 142 LBS | HEART RATE: 76 BPM | SYSTOLIC BLOOD PRESSURE: 126 MMHG

## 2024-08-06 DIAGNOSIS — M46.1 SI (SACROILIAC) JOINT INFLAMMATION (HCC): ICD-10-CM

## 2024-08-06 DIAGNOSIS — G89.4 CHRONIC PAIN SYNDROME: ICD-10-CM

## 2024-08-06 DIAGNOSIS — D64.9 ANEMIA, UNSPECIFIED TYPE: ICD-10-CM

## 2024-08-06 DIAGNOSIS — D69.6 THROMBOCYTOPENIA (HCC): ICD-10-CM

## 2024-08-06 DIAGNOSIS — M48.02 CERVICAL SPINAL STENOSIS: ICD-10-CM

## 2024-08-06 DIAGNOSIS — N18.4 CKD (CHRONIC KIDNEY DISEASE), STAGE IV (HCC): ICD-10-CM

## 2024-08-06 DIAGNOSIS — M47.812 CERVICAL SPONDYLOSIS: Primary | ICD-10-CM

## 2024-08-06 DIAGNOSIS — M54.16 LUMBAR RADICULITIS: ICD-10-CM

## 2024-08-06 DIAGNOSIS — M47.816 LUMBAR SPONDYLOSIS: ICD-10-CM

## 2024-08-06 DIAGNOSIS — M54.12 CERVICAL RADICULITIS: ICD-10-CM

## 2024-08-06 DIAGNOSIS — Z51.81 MEDICATION MONITORING ENCOUNTER: ICD-10-CM

## 2024-08-06 DIAGNOSIS — M32.14 SYSTEMIC LUPUS ERYTHEMATOSUS WITH GLOMERULAR DISEASE, UNSPECIFIED SLE TYPE (HCC): ICD-10-CM

## 2024-08-06 LAB
ALBUMIN SERPL BCG-MCNC: 4.4 G/DL (ref 3.5–5.1)
ALP SERPL-CCNC: 72 U/L (ref 38–126)
ALT SERPL W/O P-5'-P-CCNC: 9 U/L (ref 11–66)
ANION GAP SERPL CALC-SCNC: 14 MEQ/L (ref 8–16)
AST SERPL-CCNC: 15 U/L (ref 5–40)
BASOPHILS ABSOLUTE: 0 THOU/MM3 (ref 0–0.1)
BASOPHILS NFR BLD AUTO: 0.5 %
BILIRUB SERPL-MCNC: < 0.2 MG/DL (ref 0.3–1.2)
BUN SERPL-MCNC: 37 MG/DL (ref 7–22)
C3C SERPL-MCNC: 121 MG/DL (ref 90–180)
C4 SERPL-MCNC: 22 MG/DL (ref 10–40)
CALCIUM SERPL-MCNC: 9 MG/DL (ref 8.5–10.5)
CHLORIDE SERPL-SCNC: 109 MEQ/L (ref 98–111)
CO2 SERPL-SCNC: 18 MEQ/L (ref 23–33)
CREAT SERPL-MCNC: 2.2 MG/DL (ref 0.4–1.2)
CRP SERPL-MCNC: < 0.3 MG/DL (ref 0–1)
DEPRECATED RDW RBC AUTO: 55.6 FL (ref 35–45)
EOSINOPHIL NFR BLD AUTO: 0.3 %
EOSINOPHILS ABSOLUTE: 0 THOU/MM3 (ref 0–0.4)
ERYTHROCYTE [DISTWIDTH] IN BLOOD BY AUTOMATED COUNT: 15.5 % (ref 11.5–14.5)
FERRITIN SERPL IA-MCNC: 737 NG/ML (ref 10–291)
GFR SERPL CREATININE-BSD FRML MDRD: 26 ML/MIN/1.73M2
GLUCOSE SERPL-MCNC: 85 MG/DL (ref 70–108)
HCT VFR BLD AUTO: 33 % (ref 37–47)
HGB BLD-MCNC: 9 GM/DL (ref 12–16)
HYPOCHROMIA BLD QL SMEAR: PRESENT
IMM GRANULOCYTES # BLD AUTO: 0.03 THOU/MM3 (ref 0–0.07)
IMM GRANULOCYTES NFR BLD AUTO: 0.4 %
IRON SATN MFR SERPL: 45 % (ref 20–50)
IRON SERPL-MCNC: 96 UG/DL (ref 50–170)
LYMPHOCYTES ABSOLUTE: 1.4 THOU/MM3 (ref 1–4.8)
LYMPHOCYTES NFR BLD AUTO: 18.2 %
MCH RBC QN AUTO: 26.5 PG (ref 26–33)
MCHC RBC AUTO-ENTMCNC: 27.3 GM/DL (ref 32.2–35.5)
MCV RBC AUTO: 97.1 FL (ref 81–99)
MONOCYTES ABSOLUTE: 0.5 THOU/MM3 (ref 0.4–1.3)
MONOCYTES NFR BLD AUTO: 6.5 %
NEUTROPHILS ABSOLUTE: 5.6 THOU/MM3 (ref 1.8–7.7)
NEUTROPHILS NFR BLD AUTO: 74.1 %
NRBC BLD AUTO-RTO: 0 /100 WBC
PLATELET # BLD AUTO: 169 THOU/MM3 (ref 130–400)
PMV BLD AUTO: 11.8 FL (ref 9.4–12.4)
POTASSIUM SERPL-SCNC: 4.2 MEQ/L (ref 3.5–5.2)
PROT SERPL-MCNC: 6.9 G/DL (ref 6.1–8)
RBC # BLD AUTO: 3.4 MILL/MM3 (ref 4.2–5.4)
SCAN OF BLOOD SMEAR: NORMAL
SODIUM SERPL-SCNC: 141 MEQ/L (ref 135–145)
STOMATOCYTES: ABNORMAL
TIBC SERPL-MCNC: 215 UG/DL (ref 171–450)
WBC # BLD AUTO: 7.5 THOU/MM3 (ref 4.8–10.8)

## 2024-08-06 PROCEDURE — 3017F COLORECTAL CA SCREEN DOC REV: CPT | Performed by: NURSE PRACTITIONER

## 2024-08-06 PROCEDURE — 1036F TOBACCO NON-USER: CPT | Performed by: NURSE PRACTITIONER

## 2024-08-06 PROCEDURE — G8420 CALC BMI NORM PARAMETERS: HCPCS | Performed by: NURSE PRACTITIONER

## 2024-08-06 PROCEDURE — G8427 DOCREV CUR MEDS BY ELIG CLIN: HCPCS | Performed by: NURSE PRACTITIONER

## 2024-08-06 PROCEDURE — 99214 OFFICE O/P EST MOD 30 MIN: CPT | Performed by: NURSE PRACTITIONER

## 2024-08-06 RX ORDER — GABAPENTIN 400 MG/1
CAPSULE ORAL
Qty: 270 CAPSULE | Refills: 1 | Status: SHIPPED | OUTPATIENT
Start: 2024-08-06 | End: 2025-02-03

## 2024-08-06 ASSESSMENT — ENCOUNTER SYMPTOMS: BACK PAIN: 1

## 2024-08-06 NOTE — PROGRESS NOTES
distress.     Appearance: She is well-developed. She is not diaphoretic.   HENT:      Head: Normocephalic and atraumatic.      Right Ear: External ear normal.      Left Ear: External ear normal.      Nose: Nose normal.   Eyes:      Conjunctiva/sclera: Conjunctivae normal.      Pupils: Pupils are equal, round, and reactive to light.   Neck:      Thyroid: No thyromegaly.   Pulmonary:      Effort: Pulmonary effort is normal. No respiratory distress.   Musculoskeletal:         General: Tenderness present.      Right shoulder: Tenderness present. Decreased range of motion. Decreased strength.      Left shoulder: Tenderness present.      Right upper arm: Tenderness present.      Right forearm: Tenderness present.      Left forearm: Tenderness present.      Right wrist: Tenderness and bony tenderness present.      Left wrist: Tenderness and bony tenderness present.      Right hand: Tenderness and bony tenderness present. Decreased range of motion. Decreased strength. Decreased sensation.      Left hand: Tenderness and bony tenderness present. Decreased range of motion. Decreased strength. Decreased sensation.      Cervical back: Neck supple. Spasms, tenderness and bony tenderness present. No edema, erythema or rigidity. Pain with movement and muscular tenderness present. Decreased range of motion.      Thoracic back: Spasms, tenderness and bony tenderness present. Decreased range of motion.      Lumbar back: Spasms, tenderness and bony tenderness present. Decreased range of motion.        Back:       Right hip: Tenderness present.      Left hip: Normal.      Right upper leg: Tenderness present.      Right knee: Normal.      Left knee: Normal.      Right ankle: Tenderness present.      Right foot: Decreased range of motion. Swelling, tenderness and bony tenderness present.      Comments: 2 right broken toes has walker shoe on    Skin:     General: Skin is warm and dry.      Coloration: Skin is not pale.      Findings: No

## 2024-08-07 DIAGNOSIS — M32.14 OTHER SYSTEMIC LUPUS ERYTHEMATOSUS WITH GLOMERULAR DISEASE (HCC): ICD-10-CM

## 2024-08-07 DIAGNOSIS — G56.23 CUBITAL TUNNEL SYNDROME, BILATERAL: ICD-10-CM

## 2024-08-07 DIAGNOSIS — R05.1 ACUTE COUGH: ICD-10-CM

## 2024-08-07 RX ORDER — PREDNISONE 5 MG/1
10 TABLET ORAL DAILY
Qty: 90 TABLET | Refills: 1
Start: 2024-08-07

## 2024-08-07 ASSESSMENT — ENCOUNTER SYMPTOMS: ABDOMINAL PAIN: 1

## 2024-08-07 NOTE — PROGRESS NOTES
[facility] [address], [address]  [facility] [address]  Dept: [phone]  Dept Fax: [phone] Loc: [phone]      Visit Date:8/8/2024     Talya Schulz is a 53 y.o. female who presents today for:   Chief Complaint   Patient presents with    Follow-up     Anemia, unspecified type        HPI:   Talya Schulz is a 53 y.o. female who was referred for thrombocytopenia and anemia. The patient was completing a wellness check with her PCP. Labs (05/13/2024): WBC: 5.9 RBC: 3.16 Hgb: 8.7 hct: 28.2 MCV: 89.1 platelet: 119 Alk Phos 52, ALT 7, AST 10, Total Bilirubin 0.1, Total Protein 6 Sodium: 142 potassium: 4.3 chloride: 114 BUN: 21 creat: 1.71 calcium: 8.50. She was first evaluated by our office on 05/29/2024. She was on iron tabs at that time and it was recommended she repeat her labs in 6 weeks. She is here to repeat labs and follow up.    PMH: Hypothyroidism, DMII, Stage 3a CKD, SLE, HTN  Family History: Noncontributory      Interval History   The patient presents to the office today for follow up and evaluation of thrombocytopenia and anemia.  The patient reports she ws told by her rheumatologist to take iron once a week.    Labs (08/06/2024): WBC: 7.5 RBC: 3.4 Hgb: 9.0 Hct: 33.0 MCV: 97.1 Platelet: 169 Iron: 96 TIBC: 215 Ferritin: 737 Iron Sat: 45          PMH, SH, and FH:  I reviewed the patient's medication and allergy lists as noted on the electronic medical record.  The PMH, SH, and FH were also reviewed as noted on the EMR.        Past Medical History:   Diagnosis Date    Anemia     CKD (chronic kidney disease), stage III (HCC)     Dyslipidemia     Hematuria     Hyperlipidemia     Hypertension     Hyperuricemia     Hypothyroidism     Kidney disease     as a result from lupus    Lupus (HCC)     Lupus nephritis (HCC)     Proteinuria     Seizures (HCC)     Systemic lupus erythematosus (HCC)       Past Surgical History:   Procedure Laterality Date    CT BIOPSY ABDOMEN

## 2024-08-08 ENCOUNTER — OFFICE VISIT (OUTPATIENT)
Dept: ONCOLOGY | Age: 54
End: 2024-08-08
Payer: COMMERCIAL

## 2024-08-08 ENCOUNTER — HOSPITAL ENCOUNTER (OUTPATIENT)
Dept: WOMENS IMAGING | Age: 54
Discharge: HOME | End: 2024-08-08
Payer: COMMERCIAL

## 2024-08-08 ENCOUNTER — HOSPITAL ENCOUNTER (OUTPATIENT)
Dept: INFUSION THERAPY | Age: 54
Discharge: HOME | End: 2024-08-08
Payer: COMMERCIAL

## 2024-08-08 VITALS
DIASTOLIC BLOOD PRESSURE: 64 MMHG | WEIGHT: 145 LBS | BODY MASS INDEX: 23.3 KG/M2 | SYSTOLIC BLOOD PRESSURE: 110 MMHG | HEART RATE: 77 BPM | HEIGHT: 66 IN | TEMPERATURE: 98.4 F | OXYGEN SATURATION: 100 % | RESPIRATION RATE: 16 BRPM

## 2024-08-08 VITALS — WEIGHT: 145 LBS | BODY MASS INDEX: 23.3 KG/M2 | HEIGHT: 66 IN

## 2024-08-08 VITALS
HEART RATE: 77 BPM | SYSTOLIC BLOOD PRESSURE: 110 MMHG | TEMPERATURE: 98.4 F | DIASTOLIC BLOOD PRESSURE: 64 MMHG | RESPIRATION RATE: 16 BRPM

## 2024-08-08 DIAGNOSIS — D64.9 ANEMIA, UNSPECIFIED TYPE: Primary | ICD-10-CM

## 2024-08-08 DIAGNOSIS — D69.6 THROMBOCYTOPENIA (HCC): ICD-10-CM

## 2024-08-08 DIAGNOSIS — Z12.31 ENCOUNTER FOR SCREENING MAMMOGRAM FOR MALIGNANT NEOPLASM OF BREAST: ICD-10-CM

## 2024-08-08 PROCEDURE — 77067 SCR MAMMO BI INCL CAD: CPT

## 2024-08-08 PROCEDURE — 99211 OFF/OP EST MAY X REQ PHY/QHP: CPT

## 2024-08-08 PROCEDURE — 77063 BREAST TOMOSYNTHESIS BI: CPT

## 2024-08-08 PROCEDURE — 99212 OFFICE O/P EST SF 10 MIN: CPT | Performed by: NURSE PRACTITIONER

## 2024-08-08 ASSESSMENT — ENCOUNTER SYMPTOMS
SHORTNESS OF BREATH: 0
VOMITING: 0
CONSTIPATION: 0
COUGH: 0
NAUSEA: 0
DIARRHEA: 0

## 2024-08-08 NOTE — PATIENT INSTRUCTIONS
Recommend stopping iron completely with repeat testing in 6 weeks by Rheumatology  Can see our office as needed  Recommend CBC yearly

## 2024-08-12 ENCOUNTER — TELEPHONE (OUTPATIENT)
Dept: FAMILY MEDICINE CLINIC | Age: 54
End: 2024-08-12

## 2024-08-12 ENCOUNTER — OFFICE VISIT (OUTPATIENT)
Dept: CARDIOLOGY CLINIC | Age: 54
End: 2024-08-12
Payer: COMMERCIAL

## 2024-08-12 VITALS
DIASTOLIC BLOOD PRESSURE: 75 MMHG | HEIGHT: 66 IN | BODY MASS INDEX: 23.21 KG/M2 | HEART RATE: 69 BPM | WEIGHT: 144.4 LBS | SYSTOLIC BLOOD PRESSURE: 116 MMHG

## 2024-08-12 DIAGNOSIS — I25.10 CORONARY ARTERY DISEASE DUE TO LIPID RICH PLAQUE: ICD-10-CM

## 2024-08-12 DIAGNOSIS — R06.02 SOB (SHORTNESS OF BREATH): Primary | ICD-10-CM

## 2024-08-12 DIAGNOSIS — I25.83 CORONARY ARTERY DISEASE DUE TO LIPID RICH PLAQUE: ICD-10-CM

## 2024-08-12 DIAGNOSIS — R06.02 SHORTNESS OF BREATH: ICD-10-CM

## 2024-08-12 PROCEDURE — 3017F COLORECTAL CA SCREEN DOC REV: CPT | Performed by: INTERNAL MEDICINE

## 2024-08-12 PROCEDURE — G8420 CALC BMI NORM PARAMETERS: HCPCS | Performed by: INTERNAL MEDICINE

## 2024-08-12 PROCEDURE — 99214 OFFICE O/P EST MOD 30 MIN: CPT | Performed by: INTERNAL MEDICINE

## 2024-08-12 PROCEDURE — G8427 DOCREV CUR MEDS BY ELIG CLIN: HCPCS | Performed by: INTERNAL MEDICINE

## 2024-08-12 PROCEDURE — 93000 ELECTROCARDIOGRAM COMPLETE: CPT | Performed by: INTERNAL MEDICINE

## 2024-08-12 PROCEDURE — 1036F TOBACCO NON-USER: CPT | Performed by: INTERNAL MEDICINE

## 2024-08-12 NOTE — TELEPHONE ENCOUNTER
DOLV: 5/6/24  DONV: 9/19/24  LAST LAB DRAW: 8/6/24  LAST TB TEST: 7/8/21    Lab Results   Component Value Date     08/06/2024    K 4.2 08/06/2024     08/06/2024    CO2 18 (L) 08/06/2024    BUN 37 (H) 08/06/2024    CREATININE 2.2 (H) 08/06/2024    GLUCOSE 85 08/06/2024    CALCIUM 9.0 08/06/2024    BILITOT <0.2 (L) 08/06/2024    ALKPHOS 72 08/06/2024    AST 15 08/06/2024    ALT 9 (L) 08/06/2024    LABGLOM 26 (A) 08/06/2024    AGRATIO 1.4 (L) 07/31/2024       Recent Labs     08/06/24  1149 07/31/24  1023   WBC 7.5 5.3   HGB 9.0* 9.6*   HCT 33.0* 31.4*   MCV 97.1 87.8    144*       Lab Results   Component Value Date    SEDRATE 5 07/31/2024       Lab Results   Component Value Date    CRP < 0.30 08/06/2024

## 2024-08-12 NOTE — PROGRESS NOTES
[facility] [place] [facility]  [facility]  [address]  [address]  [facility] [address]  Dept: [phone]  Dept Fax: [phone]  Loc: [phone]    Visit Date: 8/12/2024    Ms. Schulz is a 53 y.o. 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  She has hx of SLE, seizures, HTN, HLD.  Patient was told by Dr. Reyes who was her primary cardiologist to go to [facility] for possible CABG.  Patient did not follow up.  She comes today stating she is willing.  Reports shortness of breath and has poor exercise tolerance.  Denies any chest pains. EKG shows SR, no ST-T chagnes, incomplete RBBB.         Current Outpatient Medications:     predniSONE (DELTASONE) 5 MG tablet, Take 2 tablets by mouth daily, Disp: 90 tablet, Rfl: 1    gabapentin (NEURONTIN) 400 MG capsule, take 1 capsule by mouth TID a day, Disp: 270 capsule, Rfl: 1    losartan (COZAAR) 50 MG tablet, take 1 tablet by mouth once daily, Disp: 90 tablet, Rfl: 2    mycophenolate (CELLCEPT) 500 MG tablet, take 2 tablets by mouth every morning BEFORE BREAKFAST AND 2 TABLETS EVERY EVENING, Disp: 120 tablet, Rfl: 1    omeprazole (PRILOSEC) 20 MG delayed release capsule, Take 1 capsule by mouth daily, Disp: 30 capsule, Rfl: 3    FEROSUL 325 (65 Fe) MG tablet, TAKE 1 TABLET BY MOUTH ONCE DAILY WITH BREAKFAST, Disp: 90 tablet, Rfl: 3    HYDROcodone-acetaminophen (NORCO) 5-325 MG per tablet, Take 1 tablet by mouth every 8 hours as needed for Pain for up to 30 days. Max Daily Amount: 3 tablets, Disp: 90 tablet, Rfl: 0    simvastatin (ZOCOR) 40 MG tablet, take 1 tablet by mouth at bedtime, Disp: 90 tablet, Rfl: 4    levothyroxine (SYNTHROID) 175 MCG tablet, Take 1 tablet by mouth daily, Disp: 90 tablet,

## 2024-08-12 NOTE — TELEPHONE ENCOUNTER
Patient stated she is taking 2 in am and 2 in evening. Note from 6/18/24 stated \"given the refill hx of the voclosporin and its potential for nephrotoxicity   -- will decrease the medication to 1 tablet twice daily and re-evalaute\". Patient stated she was not aware of this. She has been taking 2 twice a day. Patient stated she thought just her prednisone was decreased.

## 2024-08-12 NOTE — TELEPHONE ENCOUNTER
----- Message from MACKENZIE Miller - CNP sent at 8/9/2024  3:37 PM EDT -----  Let pt know mammogram is normal recommend 1 yr f/u    

## 2024-08-26 DIAGNOSIS — G89.4 CHRONIC PAIN SYNDROME: ICD-10-CM

## 2024-08-26 RX ORDER — HYDROCODONE BITARTRATE AND ACETAMINOPHEN 5; 325 MG/1; MG/1
1 TABLET ORAL EVERY 8 HOURS PRN
Qty: 90 TABLET | Refills: 0 | Status: SHIPPED | OUTPATIENT
Start: 2024-08-28 | End: 2024-09-27

## 2024-08-26 NOTE — TELEPHONE ENCOUNTER
Talya Schulz called requesting a refill on the following medications:  Requested Prescriptions     Pending Prescriptions Disp Refills    HYDROcodone-acetaminophen (NORCO) 5-325 MG per tablet 90 tablet 0     Sig: Take 1 tablet by mouth every 8 hours as needed for Pain for up to 30 days. Max Daily Amount: 3 tablets     Ph[facility]: [facility]  .pv      Date of last visit: 8/6/2024  Date of next visit (if applicable): 9/3/2024

## 2024-08-26 NOTE — TELEPHONE ENCOUNTER
OARRS reviewed. UDS: + for  gabapentin and hydrocodone.   Last seen: 8/6/2024. Follow-up: 10/15/2024

## 2024-09-03 ENCOUNTER — OFFICE VISIT (OUTPATIENT)
Dept: PHYSICAL MEDICINE AND REHAB | Age: 54
End: 2024-09-03
Payer: COMMERCIAL

## 2024-09-03 ENCOUNTER — HOSPITAL ENCOUNTER (OUTPATIENT)
Age: 54
Discharge: HOME | End: 2024-09-05
Attending: INTERNAL MEDICINE
Payer: COMMERCIAL

## 2024-09-03 VITALS
WEIGHT: 144 LBS | DIASTOLIC BLOOD PRESSURE: 75 MMHG | SYSTOLIC BLOOD PRESSURE: 116 MMHG | HEIGHT: 66 IN | BODY MASS INDEX: 23.14 KG/M2

## 2024-09-03 VITALS
WEIGHT: 144 LBS | DIASTOLIC BLOOD PRESSURE: 64 MMHG | SYSTOLIC BLOOD PRESSURE: 108 MMHG | HEIGHT: 66 IN | BODY MASS INDEX: 23.14 KG/M2

## 2024-09-03 DIAGNOSIS — F11.90 CHRONIC, CONTINUOUS USE OF OPIOIDS: ICD-10-CM

## 2024-09-03 DIAGNOSIS — M47.816 LUMBAR SPONDYLOSIS: Primary | ICD-10-CM

## 2024-09-03 DIAGNOSIS — M46.1 BILATERAL SACROILIITIS (HCC): ICD-10-CM

## 2024-09-03 DIAGNOSIS — R06.02 SHORTNESS OF BREATH: ICD-10-CM

## 2024-09-03 DIAGNOSIS — M46.1 SI (SACROILIAC) JOINT INFLAMMATION (HCC): ICD-10-CM

## 2024-09-03 DIAGNOSIS — M47.812 CERVICAL SPONDYLOSIS: ICD-10-CM

## 2024-09-03 DIAGNOSIS — M48.02 CERVICAL SPINAL STENOSIS: ICD-10-CM

## 2024-09-03 DIAGNOSIS — M54.12 CERVICAL RADICULITIS: ICD-10-CM

## 2024-09-03 PROCEDURE — G8420 CALC BMI NORM PARAMETERS: HCPCS | Performed by: PAIN MEDICINE

## 2024-09-03 PROCEDURE — 1036F TOBACCO NON-USER: CPT | Performed by: PAIN MEDICINE

## 2024-09-03 PROCEDURE — 93306 TTE W/DOPPLER COMPLETE: CPT

## 2024-09-03 PROCEDURE — 99215 OFFICE O/P EST HI 40 MIN: CPT | Performed by: PAIN MEDICINE

## 2024-09-03 PROCEDURE — G8427 DOCREV CUR MEDS BY ELIG CLIN: HCPCS | Performed by: PAIN MEDICINE

## 2024-09-03 PROCEDURE — 3017F COLORECTAL CA SCREEN DOC REV: CPT | Performed by: PAIN MEDICINE

## 2024-09-03 ASSESSMENT — ENCOUNTER SYMPTOMS
SHORTNESS OF BREATH: 1
WHEEZING: 0
NAUSEA: 0
SORE THROAT: 0
SINUS PAIN: 0
VOMITING: 0
CONSTIPATION: 0
TROUBLE SWALLOWING: 0
BACK PAIN: 1
DIARRHEA: 0
ABDOMINAL PAIN: 0

## 2024-09-03 NOTE — PROGRESS NOTES
impaired remote memory.         Judgment: Judgment normal. Judgment is not impulsive or inappropriate.       VONDA  Patricks test  positive  Yeoman's  or Gaenslen;s positive  Kemps  positive  Spurlings  na       Assessment:     1. Lumbar spondylosis    2. Cervical spondylosis    3. Cervical radiculitis    4. Cervical spinal stenosis    5. SI (sacroiliac) joint inflammation (HCC)    6. Bilateral sacroiliitis (HCC)    7. Chronic, continuous use of opioids         Assessment & Plan   Plan:      OARRS reviewed. Current MED: 15  Patient was not offered naloxone for home.   Discussed long term side effects of medications, tolerance, dependency and addiction.  Previous UDS reviewed  UDS preformed today for compliance.  Patient told can not receive any pain medications from any other source.  No evidence of abuse, diversion or aberrant behavior.  Medications and/or procedures to improve function and quality of life- patient understanding with this and that may not be pain free  Discussed with patient about safe storage of medications at home  Discussed possible weaning of medication dosing dependent on treatment/procedure results.   Testing: Reviewed MRI-Lumbar Spine with patient  Procedures: Bilateral Lumbar facet medial branch block at lumbar 4-5 and lumbar 5-sacral 1 under flouro  Discussed with patient about risks with procedure including infection, reaction to medication, increased pain, or bleeding.  Medications: Reviewed.Long term side effects  with use of opioids.  If patient is on blood thinners will need approval to hold yes or no: no  Does patient have implanted device Stimulator, AICD or Pacemaker etc? No  I HAVE REVIEWED PRIOR NOTES FROM WILFRIDO BARRY NP        Return Bilateral Lumbar facet medial branch block at lumbar 4-5 and lumbar 5-sacral 1 under flouro.         REVIEWED PLAN OF CARE PER WILFRIDO BARRY NP. I AGREE WITH TREATMENT PLAN.    40 minutes with patient.      Electronically signed by Christian

## 2024-09-04 LAB
ECHO AO ASC DIAM: 3.2 CM
ECHO AO ASCENDING AORTA INDEX: 1.84 CM/M2
ECHO AV CUSP MM: 1.4 CM
ECHO AV PEAK GRADIENT: 8 MMHG
ECHO AV PEAK VELOCITY: 1.4 M/S
ECHO AV VELOCITY RATIO: 0.79
ECHO BSA: 1.74 M2
ECHO EST RA PRESSURE: 5 MMHG
ECHO LA AREA 2C: 14.7 CM2
ECHO LA AREA 4C: 15.6 CM2
ECHO LA DIAMETER INDEX: 2.07 CM/M2
ECHO LA DIAMETER: 3.6 CM
ECHO LA MAJOR AXIS: 5.5 CM
ECHO LA MINOR AXIS: 4.9 CM
ECHO LA VOL BP: 38 ML (ref 22–52)
ECHO LA VOL MOD A2C: 36 ML (ref 22–52)
ECHO LA VOL MOD A4C: 36 ML (ref 22–52)
ECHO LA VOL/BSA BIPLANE: 22 ML/M2 (ref 16–34)
ECHO LA VOLUME INDEX MOD A2C: 21 ML/M2 (ref 16–34)
ECHO LA VOLUME INDEX MOD A4C: 21 ML/M2 (ref 16–34)
ECHO LV E' LATERAL VELOCITY: 12 CM/S
ECHO LV E' SEPTAL VELOCITY: 12 CM/S
ECHO LV EJECTION FRACTION BIPLANE: 59 % (ref 55–100)
ECHO LV FRACTIONAL SHORTENING: 31 % (ref 28–44)
ECHO LV INTERNAL DIMENSION DIASTOLE INDEX: 2.76 CM/M2
ECHO LV INTERNAL DIMENSION DIASTOLIC: 4.8 CM (ref 3.9–5.3)
ECHO LV INTERNAL DIMENSION SYSTOLIC INDEX: 1.9 CM/M2
ECHO LV INTERNAL DIMENSION SYSTOLIC: 3.3 CM
ECHO LV ISOVOLUMETRIC RELAXATION TIME (IVRT): 60 MS
ECHO LV IVSD: 0.8 CM (ref 0.6–0.9)
ECHO LV MASS 2D: 137.1 G (ref 67–162)
ECHO LV MASS INDEX 2D: 78.8 G/M2 (ref 43–95)
ECHO LV POSTERIOR WALL DIASTOLIC: 0.9 CM (ref 0.6–0.9)
ECHO LV RELATIVE WALL THICKNESS RATIO: 0.38
ECHO LVOT PEAK GRADIENT: 5 MMHG
ECHO LVOT PEAK VELOCITY: 1.1 M/S
ECHO MV A VELOCITY: 0.64 M/S
ECHO MV E DECELERATION TIME (DT): 225 MS
ECHO MV E VELOCITY: 0.77 M/S
ECHO MV E/A RATIO: 1.2
ECHO MV E/E' LATERAL: 6.42
ECHO MV E/E' RATIO (AVERAGED): 6.42
ECHO MV E/E' SEPTAL: 6.42
ECHO PULMONARY ARTERY END DIASTOLIC PRESSURE: 3 MMHG
ECHO PV MAX VELOCITY: 0.9 M/S
ECHO PV PEAK GRADIENT: 3 MMHG
ECHO PV REGURGITANT MAX VELOCITY: 0.9 M/S
ECHO RIGHT VENTRICULAR SYSTOLIC PRESSURE (RVSP): 27 MMHG
ECHO RV INTERNAL DIMENSION: 3.1 CM
ECHO RV TAPSE: 2.2 CM (ref 1.7–?)
ECHO TV E WAVE: 0.7 M/S
ECHO TV REGURGITANT MAX VELOCITY: 2.35 M/S
ECHO TV REGURGITANT PEAK GRADIENT: 22 MMHG

## 2024-09-04 PROCEDURE — 93306 TTE W/DOPPLER COMPLETE: CPT | Performed by: INTERNAL MEDICINE

## 2024-09-05 ENCOUNTER — TELEPHONE (OUTPATIENT)
Dept: CARDIOLOGY CLINIC | Age: 54
End: 2024-09-05

## 2024-09-09 DIAGNOSIS — M32.14 OTHER SYSTEMIC LUPUS ERYTHEMATOSUS WITH GLOMERULAR DISEASE (HCC): ICD-10-CM

## 2024-09-09 DIAGNOSIS — M32.14 SYSTEMIC LUPUS ERYTHEMATOSUS WITH GLOMERULAR DISEASE, UNSPECIFIED SLE TYPE (HCC): ICD-10-CM

## 2024-09-09 DIAGNOSIS — R80.1 PERSISTENT PROTEINURIA: ICD-10-CM

## 2024-09-09 DIAGNOSIS — G56.23 CUBITAL TUNNEL SYNDROME, BILATERAL: ICD-10-CM

## 2024-09-09 DIAGNOSIS — R10.9 ABDOMINAL PAIN, UNSPECIFIED ABDOMINAL LOCATION: ICD-10-CM

## 2024-09-09 RX ORDER — PREDNISONE 5 MG/1
10 TABLET ORAL DAILY
Qty: 90 TABLET | Refills: 1 | Status: SHIPPED | OUTPATIENT
Start: 2024-09-09

## 2024-09-09 RX ORDER — ERGOCALCIFEROL 1.25 MG/1
50000 CAPSULE, LIQUID FILLED ORAL WEEKLY
Qty: 12 CAPSULE | Refills: 3 | Status: SHIPPED | OUTPATIENT
Start: 2024-09-09

## 2024-09-09 RX ORDER — MYCOPHENOLATE MOFETIL 500 MG/1
TABLET ORAL
Qty: 120 TABLET | Refills: 1 | OUTPATIENT
Start: 2024-09-09

## 2024-09-09 RX ORDER — HYDROXYCHLOROQUINE SULFATE 200 MG/1
200 TABLET, FILM COATED ORAL 2 TIMES DAILY
Qty: 60 TABLET | Refills: 5 | Status: SHIPPED | OUTPATIENT
Start: 2024-09-09

## 2024-09-12 ENCOUNTER — OFFICE VISIT (OUTPATIENT)
Dept: CARDIOLOGY CLINIC | Age: 54
End: 2024-09-12
Payer: COMMERCIAL

## 2024-09-12 VITALS
HEART RATE: 70 BPM | SYSTOLIC BLOOD PRESSURE: 122 MMHG | HEIGHT: 66 IN | DIASTOLIC BLOOD PRESSURE: 64 MMHG | WEIGHT: 143 LBS | BODY MASS INDEX: 22.98 KG/M2

## 2024-09-12 DIAGNOSIS — I25.118 CORONARY ARTERY DISEASE OF NATIVE ARTERY OF NATIVE HEART WITH STABLE ANGINA PECTORIS (HCC): Primary | ICD-10-CM

## 2024-09-12 DIAGNOSIS — I10 PRIMARY HYPERTENSION: ICD-10-CM

## 2024-09-12 DIAGNOSIS — E78.01 FAMILIAL HYPERCHOLESTEROLEMIA: ICD-10-CM

## 2024-09-12 PROBLEM — I25.10 CAD (CORONARY ARTERY DISEASE): Status: ACTIVE | Noted: 2024-09-12

## 2024-09-12 PROCEDURE — 3017F COLORECTAL CA SCREEN DOC REV: CPT | Performed by: NUCLEAR MEDICINE

## 2024-09-12 PROCEDURE — 3074F SYST BP LT 130 MM HG: CPT | Performed by: NUCLEAR MEDICINE

## 2024-09-12 PROCEDURE — 99214 OFFICE O/P EST MOD 30 MIN: CPT | Performed by: NUCLEAR MEDICINE

## 2024-09-12 PROCEDURE — 1036F TOBACCO NON-USER: CPT | Performed by: NUCLEAR MEDICINE

## 2024-09-12 PROCEDURE — G8427 DOCREV CUR MEDS BY ELIG CLIN: HCPCS | Performed by: NUCLEAR MEDICINE

## 2024-09-12 PROCEDURE — 3078F DIAST BP <80 MM HG: CPT | Performed by: NUCLEAR MEDICINE

## 2024-09-12 PROCEDURE — G8420 CALC BMI NORM PARAMETERS: HCPCS | Performed by: NUCLEAR MEDICINE

## 2024-09-12 RX ORDER — ISOSORBIDE MONONITRATE 30 MG/1
30 TABLET, EXTENDED RELEASE ORAL DAILY
COMMUNITY
End: 2024-09-12 | Stop reason: SDUPTHER

## 2024-09-12 RX ORDER — METOPROLOL SUCCINATE 25 MG/1
25 TABLET, EXTENDED RELEASE ORAL DAILY
Qty: 90 TABLET | Refills: 3 | Status: SHIPPED | OUTPATIENT
Start: 2024-09-12

## 2024-09-12 RX ORDER — METOPROLOL SUCCINATE 25 MG/1
25 TABLET, EXTENDED RELEASE ORAL DAILY
COMMUNITY
End: 2024-09-12 | Stop reason: SDUPTHER

## 2024-09-12 RX ORDER — ISOSORBIDE MONONITRATE 30 MG/1
30 TABLET, EXTENDED RELEASE ORAL DAILY
Qty: 90 TABLET | Refills: 3 | Status: SHIPPED | OUTPATIENT
Start: 2024-09-12

## 2024-09-13 DIAGNOSIS — E03.9 HYPOTHYROIDISM, UNSPECIFIED TYPE: ICD-10-CM

## 2024-09-13 DIAGNOSIS — J30.89 NON-SEASONAL ALLERGIC RHINITIS DUE TO OTHER ALLERGIC TRIGGER: ICD-10-CM

## 2024-09-13 DIAGNOSIS — M32.14 SYSTEMIC LUPUS ERYTHEMATOSUS WITH GLOMERULAR DISEASE, UNSPECIFIED SLE TYPE (HCC): ICD-10-CM

## 2024-09-13 DIAGNOSIS — R80.1 PERSISTENT PROTEINURIA: ICD-10-CM

## 2024-09-13 RX ORDER — FERROUS SULFATE 325(65) MG
1 TABLET ORAL
Qty: 90 TABLET | Refills: 3 | Status: SHIPPED | OUTPATIENT
Start: 2024-09-13

## 2024-09-13 RX ORDER — LORATADINE 10 MG/1
TABLET ORAL
Qty: 90 TABLET | Refills: 4 | Status: SHIPPED | OUTPATIENT
Start: 2024-09-13

## 2024-09-13 RX ORDER — FLUTICASONE PROPIONATE 50 MCG
1 SPRAY, SUSPENSION (ML) NASAL DAILY
Qty: 32 G | Refills: 1 | Status: SHIPPED | OUTPATIENT
Start: 2024-09-13

## 2024-09-13 RX ORDER — SIMVASTATIN 40 MG
TABLET ORAL
Qty: 90 TABLET | Refills: 4 | Status: SHIPPED | OUTPATIENT
Start: 2024-09-13

## 2024-09-13 RX ORDER — PSEUDOEPHED/ACETAMINOPH/DIPHEN 30MG-500MG
2 TABLET ORAL EVERY 6 HOURS PRN
Qty: 60 TABLET | Refills: 3 | Status: SHIPPED | OUTPATIENT
Start: 2024-09-13

## 2024-09-13 RX ORDER — LEVOTHYROXINE SODIUM 175 UG/1
175 TABLET ORAL DAILY
Qty: 90 TABLET | Refills: 4 | Status: SHIPPED | OUTPATIENT
Start: 2024-09-13

## 2024-09-13 RX ORDER — ALBUTEROL SULFATE 90 UG/1
2 AEROSOL, METERED RESPIRATORY (INHALATION) EVERY 6 HOURS PRN
Qty: 18 G | Refills: 3 | Status: SHIPPED | OUTPATIENT
Start: 2024-09-13

## 2024-09-13 NOTE — TELEPHONE ENCOUNTER
Talya Schulz called requesting a refill on the following medications:  Requested Prescriptions     Pending Prescriptions Disp Refills    gabapentin (NEURONTIN) 400 MG capsule 270 capsule 1     Sig: take 1 capsule by mouth TID a day     Ph[facility]: [facility] [address]  .pv      Date of last visit: 8/06/2024  Date of next visit (if applicable): Visit date not found

## 2024-09-16 ENCOUNTER — TELEPHONE (OUTPATIENT)
Dept: RHEUMATOLOGY | Age: 54
End: 2024-09-16

## 2024-09-16 DIAGNOSIS — Z51.81 MEDICATION MONITORING ENCOUNTER: ICD-10-CM

## 2024-09-16 LAB
A/G RATIO: 1.7 (ref 1.5–2.5)
ALBUMIN: 4 G/DL
ALBUMIN: 4 G/DL (ref 3.5–5)
ALP BLD-CCNC: 68 IU/L (ref 39–118)
ALP BLD-CCNC: 68 U/L
ALT SERPL-CCNC: 5 IU/L (ref 10–40)
ALT SERPL-CCNC: 5 U/L
ANION GAP SERPL CALCULATED.3IONS-SCNC: 7 MMOL/L
ANION GAP SERPL CALCULATED.3IONS-SCNC: 7 MMOL/L (ref 4–12)
AST SERPL-CCNC: 8 IU/L (ref 15–41)
AST SERPL-CCNC: 8 U/L
BASOPHILS ABSOLUTE: 0 /CMM (ref 0–200)
BASOPHILS ABSOLUTE: 0 /ΜL
BASOPHILS RELATIVE PERCENT: 0.4 %
BASOPHILS RELATIVE PERCENT: 0.4 % (ref 0–2)
BILIRUB SERPL-MCNC: 0.2 MG/DL (ref 0.1–1.4)
BILIRUB SERPL-MCNC: 0.2 MG/DL (ref 0.2–1)
BUN BLDV-MCNC: 36 MG/DL
BUN BLDV-MCNC: 36 MG/DL (ref 7–20)
C-REACTIVE PROTEIN: 0.5
C-REACTIVE PROTEIN: 0.5 MG/DL
CALCIUM SERPL-MCNC: 8.8 MG/DL
CALCIUM SERPL-MCNC: 8.8 MG/DL (ref 8.8–10.5)
CHLORIDE BLD-SCNC: 111 MEQ/L (ref 101–111)
CHLORIDE BLD-SCNC: 111 MMOL/L
CO2: 22 MEQ/L (ref 21–32)
CO2: 22 MMOL/L
CREAT SERPL-MCNC: 2.25 MG/DL
CREAT SERPL-MCNC: 2.25 MG/DL (ref 0.6–1.3)
CREATININE CLEARANCE: 23
EOSINOPHILS ABSOLUTE: 0 /CMM (ref 0–500)
EOSINOPHILS ABSOLUTE: 0 /ΜL
EOSINOPHILS RELATIVE PERCENT: 0.5 %
EOSINOPHILS RELATIVE PERCENT: 0.5 % (ref 0–6)
GFR, ESTIMATED: 23
GLUCOSE BLD-MCNC: 98 MG/DL
GLUCOSE: 98 MG/DL (ref 70–110)
HCT VFR BLD CALC: 27.9 % (ref 35–44)
HCT VFR BLD CALC: 27.9 % (ref 36–46)
HEMOGLOBIN: 8.4 G/DL (ref 12–16)
HEMOGLOBIN: 8.4 GM/DL (ref 12–15)
HYPOCHROMIA: ABNORMAL
LYMPHOCYTES ABSOLUTE: 1200 /CMM (ref 1000–4800)
LYMPHOCYTES ABSOLUTE: 1200 /ΜL
LYMPHOCYTES RELATIVE PERCENT: 14.6 %
LYMPHOCYTES RELATIVE PERCENT: 14.6 % (ref 15–45)
MCH RBC QN AUTO: 26.6 PG
MCH RBC QN AUTO: 26.6 PG (ref 27.5–33)
MCHC RBC AUTO-ENTMCNC: 30.2 G/DL
MCHC RBC AUTO-ENTMCNC: 30.2 GM/DL (ref 33–36)
MCV RBC AUTO: 88.3 CU MIC (ref 80–97)
MCV RBC AUTO: 88.3 FL
MONOCYTES ABSOLUTE: 600 /CMM (ref 0–800)
MONOCYTES ABSOLUTE: 600 /ΜL
MONOCYTES RELATIVE PERCENT: 7.6 %
MONOCYTES RELATIVE PERCENT: 7.6 % (ref 2–10)
NEUTROPHILS ABSOLUTE: 6100 /CMM (ref 1800–7700)
NEUTROPHILS ABSOLUTE: 6100 /ΜL
NEUTROPHILS RELATIVE PERCENT: 76.9 %
NEUTROPHILS RELATIVE PERCENT: 76.9 % (ref 40–70)
NUCLEATED RBCS: 0 /100 WBC
PDW BLD-RTO: 17.3 %
PDW BLD-RTO: 17.3 % (ref 12–16)
PLATELET # BLD: 127 K/ΜL
PLATELET # BLD: 127 TH/CMM (ref 150–400)
PMV BLD AUTO: ABNORMAL FL
POTASSIUM SERPL-SCNC: 4.7 MEQ/L (ref 3.6–5)
POTASSIUM SERPL-SCNC: 4.7 MMOL/L
RBC # BLD: 3.16 10^6/ΜL
RBC # BLD: 3.16 MIL/CMM (ref 4–5.1)
SED RATE, AUTOMATED: 1
SED RATE, AUTOMATED: 1 MM/HR
SODIUM BLD-SCNC: 140 MEQ/L (ref 135–145)
SODIUM BLD-SCNC: 140 MMOL/L
TOTAL PROTEIN: 6.4 G/DL (ref 6.2–8)
TOTAL PROTEIN: 6.4 G/DL (ref 6.4–8.2)
WBC # BLD: 8 10^3/ML
WBC # BLD: 8 TH/CMM (ref 4.4–10.5)

## 2024-09-19 ENCOUNTER — OFFICE VISIT (OUTPATIENT)
Dept: RHEUMATOLOGY | Age: 54
End: 2024-09-19
Payer: COMMERCIAL

## 2024-09-19 VITALS
SYSTOLIC BLOOD PRESSURE: 134 MMHG | DIASTOLIC BLOOD PRESSURE: 70 MMHG | HEART RATE: 79 BPM | OXYGEN SATURATION: 97 % | HEIGHT: 66 IN | BODY MASS INDEX: 24.01 KG/M2 | WEIGHT: 149.4 LBS

## 2024-09-19 DIAGNOSIS — M32.14 OTHER SYSTEMIC LUPUS ERYTHEMATOSUS WITH GLOMERULAR DISEASE (HCC): ICD-10-CM

## 2024-09-19 DIAGNOSIS — Z51.81 MEDICATION MONITORING ENCOUNTER: ICD-10-CM

## 2024-09-19 DIAGNOSIS — R80.1 PERSISTENT PROTEINURIA: ICD-10-CM

## 2024-09-19 DIAGNOSIS — M32.14 SYSTEMIC LUPUS ERYTHEMATOSUS WITH GLOMERULAR DISEASE, UNSPECIFIED SLE TYPE (HCC): Primary | ICD-10-CM

## 2024-09-19 PROCEDURE — 99215 OFFICE O/P EST HI 40 MIN: CPT | Performed by: INTERNAL MEDICINE

## 2024-09-19 PROCEDURE — G8427 DOCREV CUR MEDS BY ELIG CLIN: HCPCS | Performed by: INTERNAL MEDICINE

## 2024-09-19 PROCEDURE — 3017F COLORECTAL CA SCREEN DOC REV: CPT | Performed by: INTERNAL MEDICINE

## 2024-09-19 PROCEDURE — G8420 CALC BMI NORM PARAMETERS: HCPCS | Performed by: INTERNAL MEDICINE

## 2024-09-19 PROCEDURE — 1036F TOBACCO NON-USER: CPT | Performed by: INTERNAL MEDICINE

## 2024-09-19 RX ORDER — MYCOPHENOLATE MOFETIL 500 MG/1
TABLET ORAL
Qty: 120 TABLET | Refills: 1 | Status: SHIPPED | OUTPATIENT
Start: 2024-09-19

## 2024-09-19 ASSESSMENT — ENCOUNTER SYMPTOMS
CONSTIPATION: 0
ABDOMINAL PAIN: 0
EYE PAIN: 0
TROUBLE SWALLOWING: 0
SHORTNESS OF BREATH: 0
EYE ITCHING: 0
COUGH: 0
BACK PAIN: 0
DIARRHEA: 0
NAUSEA: 0

## 2024-09-20 ENCOUNTER — TELEPHONE (OUTPATIENT)
Dept: RHEUMATOLOGY | Age: 54
End: 2024-09-20

## 2024-09-20 ENCOUNTER — PREP FOR PROCEDURE (OUTPATIENT)
Dept: CARDIOLOGY | Age: 54
End: 2024-09-20

## 2024-09-20 DIAGNOSIS — M32.14 SYSTEMIC LUPUS ERYTHEMATOSUS WITH GLOMERULAR DISEASE, UNSPECIFIED SLE TYPE (HCC): Primary | ICD-10-CM

## 2024-09-20 RX ORDER — NITROGLYCERIN 0.4 MG/1
0.4 TABLET SUBLINGUAL EVERY 5 MIN PRN
Status: CANCELLED | OUTPATIENT
Start: 2024-09-20

## 2024-09-20 RX ORDER — SODIUM CHLORIDE 0.9 % (FLUSH) 0.9 %
5-40 SYRINGE (ML) INJECTION EVERY 12 HOURS SCHEDULED
Status: CANCELLED | OUTPATIENT
Start: 2024-09-20

## 2024-09-20 RX ORDER — ASPIRIN 325 MG
325 TABLET ORAL ONCE
Status: CANCELLED | OUTPATIENT
Start: 2024-09-20 | End: 2024-09-20

## 2024-09-20 RX ORDER — SODIUM CHLORIDE 9 MG/ML
INJECTION, SOLUTION INTRAVENOUS CONTINUOUS
Status: CANCELLED | OUTPATIENT
Start: 2024-09-20

## 2024-09-20 RX ORDER — SODIUM CHLORIDE 9 MG/ML
INJECTION, SOLUTION INTRAVENOUS PRN
Status: CANCELLED | OUTPATIENT
Start: 2024-09-20

## 2024-09-20 RX ORDER — SODIUM CHLORIDE 0.9 % (FLUSH) 0.9 %
5-40 SYRINGE (ML) INJECTION PRN
Status: CANCELLED | OUTPATIENT
Start: 2024-09-20

## 2024-09-23 ENCOUNTER — HOSPITAL ENCOUNTER (OUTPATIENT)
Age: 54
Setting detail: OUTPATIENT SURGERY
Discharge: HOME | End: 2024-09-23
Attending: NUCLEAR MEDICINE | Admitting: NUCLEAR MEDICINE
Payer: COMMERCIAL

## 2024-09-23 ENCOUNTER — TELEPHONE (OUTPATIENT)
Dept: PHYSICAL MEDICINE AND REHAB | Age: 54
End: 2024-09-23

## 2024-09-23 VITALS
HEIGHT: 66 IN | HEART RATE: 76 BPM | SYSTOLIC BLOOD PRESSURE: 103 MMHG | DIASTOLIC BLOOD PRESSURE: 69 MMHG | WEIGHT: 143 LBS | OXYGEN SATURATION: 98 % | BODY MASS INDEX: 22.98 KG/M2 | TEMPERATURE: 98 F | RESPIRATION RATE: 17 BRPM

## 2024-09-23 DIAGNOSIS — I25.10 CAD (CORONARY ARTERY DISEASE): ICD-10-CM

## 2024-09-23 LAB
ABO: NORMAL
ANION GAP SERPL CALC-SCNC: 10 MEQ/L (ref 8–16)
ANTIBODY SCREEN: NORMAL
APTT PPP: 32 SECONDS (ref 22–38)
B-HCG SERPL QL: NEGATIVE
BUN SERPL-MCNC: 24 MG/DL (ref 7–22)
CALCIUM SERPL-MCNC: 9 MG/DL (ref 8.5–10.5)
CHLORIDE SERPL-SCNC: 117 MEQ/L (ref 98–111)
CHOLEST SERPL-MCNC: 98 MG/DL (ref 100–199)
CO2 SERPL-SCNC: 20 MEQ/L (ref 23–33)
CREAT SERPL-MCNC: 1.6 MG/DL (ref 0.4–1.2)
DEPRECATED RDW RBC AUTO: 58.9 FL (ref 35–45)
ECHO BSA: 1.74 M2
EKG ATRIAL RATE: 76 BPM
EKG P AXIS: 72 DEGREES
EKG P-R INTERVAL: 158 MS
EKG Q-T INTERVAL: 386 MS
EKG QRS DURATION: 84 MS
EKG QTC CALCULATION (BAZETT): 434 MS
EKG R AXIS: 69 DEGREES
EKG T AXIS: 70 DEGREES
EKG VENTRICULAR RATE: 76 BPM
ERYTHROCYTE [DISTWIDTH] IN BLOOD BY AUTOMATED COUNT: 16.4 % (ref 11.5–14.5)
GFR SERPL CREATININE-BSD FRML MDRD: 39 ML/MIN/1.73M2
GLUCOSE SERPL-MCNC: 73 MG/DL (ref 70–108)
HCT VFR BLD AUTO: 30 % (ref 37–47)
HDLC SERPL-MCNC: 38 MG/DL
HGB BLD-MCNC: 8.2 GM/DL (ref 12–16)
INR PPP: 0.93 (ref 0.85–1.13)
LDLC SERPL CALC-MCNC: 44 MG/DL
MCH RBC QN AUTO: 26.6 PG (ref 26–33)
MCHC RBC AUTO-ENTMCNC: 27.3 GM/DL (ref 32.2–35.5)
MCV RBC AUTO: 97.4 FL (ref 81–99)
PLATELET # BLD AUTO: 168 THOU/MM3 (ref 130–400)
PMV BLD AUTO: 11.2 FL (ref 9.4–12.4)
POTASSIUM SERPL-SCNC: 4.2 MEQ/L (ref 3.5–5.2)
RBC # BLD AUTO: 3.08 MILL/MM3 (ref 4.2–5.4)
RH FACTOR: NORMAL
SCAN OF BLOOD SMEAR: NORMAL
SODIUM SERPL-SCNC: 147 MEQ/L (ref 135–145)
TRIGL SERPL-MCNC: 82 MG/DL (ref 0–199)
WBC # BLD AUTO: 7.4 THOU/MM3 (ref 4.8–10.8)

## 2024-09-23 PROCEDURE — 99153 MOD SED SAME PHYS/QHP EA: CPT | Performed by: NUCLEAR MEDICINE

## 2024-09-23 PROCEDURE — 80061 LIPID PANEL: CPT

## 2024-09-23 PROCEDURE — 86900 BLOOD TYPING SEROLOGIC ABO: CPT

## 2024-09-23 PROCEDURE — 86905 BLOOD TYPING RBC ANTIGENS: CPT

## 2024-09-23 PROCEDURE — 85610 PROTHROMBIN TIME: CPT

## 2024-09-23 PROCEDURE — 86922 COMPATIBILITY TEST ANTIGLOB: CPT

## 2024-09-23 PROCEDURE — 93010 ELECTROCARDIOGRAM REPORT: CPT | Performed by: NUCLEAR MEDICINE

## 2024-09-23 PROCEDURE — 84703 CHORIONIC GONADOTROPIN ASSAY: CPT

## 2024-09-23 PROCEDURE — 6370000000 HC RX 637 (ALT 250 FOR IP): Performed by: NUCLEAR MEDICINE

## 2024-09-23 PROCEDURE — 85027 COMPLETE CBC AUTOMATED: CPT

## 2024-09-23 PROCEDURE — 93458 L HRT ARTERY/VENTRICLE ANGIO: CPT | Performed by: NUCLEAR MEDICINE

## 2024-09-23 PROCEDURE — 6360000004 HC RX CONTRAST MEDICATION: Performed by: NUCLEAR MEDICINE

## 2024-09-23 PROCEDURE — 2500000003 HC RX 250 WO HCPCS: Performed by: NUCLEAR MEDICINE

## 2024-09-23 PROCEDURE — 80048 BASIC METABOLIC PNL TOTAL CA: CPT

## 2024-09-23 PROCEDURE — 6370000000 HC RX 637 (ALT 250 FOR IP): Performed by: PHYSICIAN ASSISTANT

## 2024-09-23 PROCEDURE — 2580000003 HC RX 258: Performed by: PHYSICIAN ASSISTANT

## 2024-09-23 PROCEDURE — 99152 MOD SED SAME PHYS/QHP 5/>YRS: CPT | Performed by: NUCLEAR MEDICINE

## 2024-09-23 PROCEDURE — 7100000010 HC PHASE II RECOVERY - FIRST 15 MIN: Performed by: NUCLEAR MEDICINE

## 2024-09-23 PROCEDURE — 36415 COLL VENOUS BLD VENIPUNCTURE: CPT

## 2024-09-23 PROCEDURE — 86850 RBC ANTIBODY SCREEN: CPT

## 2024-09-23 PROCEDURE — 7100000011 HC PHASE II RECOVERY - ADDTL 15 MIN: Performed by: NUCLEAR MEDICINE

## 2024-09-23 PROCEDURE — 85730 THROMBOPLASTIN TIME PARTIAL: CPT

## 2024-09-23 PROCEDURE — C1769 GUIDE WIRE: HCPCS | Performed by: NUCLEAR MEDICINE

## 2024-09-23 PROCEDURE — 86901 BLOOD TYPING SEROLOGIC RH(D): CPT

## 2024-09-23 PROCEDURE — C1894 INTRO/SHEATH, NON-LASER: HCPCS | Performed by: NUCLEAR MEDICINE

## 2024-09-23 PROCEDURE — 93005 ELECTROCARDIOGRAM TRACING: CPT | Performed by: PHYSICIAN ASSISTANT

## 2024-09-23 PROCEDURE — 6360000002 HC RX W HCPCS: Performed by: NUCLEAR MEDICINE

## 2024-09-23 PROCEDURE — 2709999900 HC NON-CHARGEABLE SUPPLY: Performed by: NUCLEAR MEDICINE

## 2024-09-23 RX ORDER — FENTANYL CITRATE 50 UG/ML
INJECTION, SOLUTION INTRAMUSCULAR; INTRAVENOUS PRN
Status: DISCONTINUED | OUTPATIENT
Start: 2024-09-23 | End: 2024-09-23 | Stop reason: HOSPADM

## 2024-09-23 RX ORDER — ACETAMINOPHEN 325 MG/1
650 TABLET ORAL EVERY 4 HOURS PRN
Status: DISCONTINUED | OUTPATIENT
Start: 2024-09-23 | End: 2024-09-23 | Stop reason: HOSPADM

## 2024-09-23 RX ORDER — SODIUM CHLORIDE 0.9 % (FLUSH) 0.9 %
5-40 SYRINGE (ML) INJECTION EVERY 12 HOURS SCHEDULED
Status: DISCONTINUED | OUTPATIENT
Start: 2024-09-23 | End: 2024-09-23 | Stop reason: HOSPADM

## 2024-09-23 RX ORDER — SODIUM CHLORIDE 9 MG/ML
INJECTION, SOLUTION INTRAVENOUS PRN
Status: DISCONTINUED | OUTPATIENT
Start: 2024-09-23 | End: 2024-09-23 | Stop reason: HOSPADM

## 2024-09-23 RX ORDER — MIDAZOLAM HYDROCHLORIDE 1 MG/ML
INJECTION INTRAMUSCULAR; INTRAVENOUS PRN
Status: DISCONTINUED | OUTPATIENT
Start: 2024-09-23 | End: 2024-09-23 | Stop reason: HOSPADM

## 2024-09-23 RX ORDER — ASPIRIN 325 MG
325 TABLET ORAL ONCE
Status: COMPLETED | OUTPATIENT
Start: 2024-09-23 | End: 2024-09-23

## 2024-09-23 RX ORDER — ATROPINE SULFATE 0.4 MG/ML
0.5 INJECTION, SOLUTION INTRAVENOUS
Status: DISCONTINUED | OUTPATIENT
Start: 2024-09-23 | End: 2024-09-23 | Stop reason: HOSPADM

## 2024-09-23 RX ORDER — SODIUM CHLORIDE 0.9 % (FLUSH) 0.9 %
5-40 SYRINGE (ML) INJECTION PRN
Status: DISCONTINUED | OUTPATIENT
Start: 2024-09-23 | End: 2024-09-23 | Stop reason: HOSPADM

## 2024-09-23 RX ORDER — IOPAMIDOL 755 MG/ML
INJECTION, SOLUTION INTRAVASCULAR PRN
Status: DISCONTINUED | OUTPATIENT
Start: 2024-09-23 | End: 2024-09-23 | Stop reason: HOSPADM

## 2024-09-23 RX ORDER — GABAPENTIN 400 MG/1
CAPSULE ORAL
Qty: 270 CAPSULE | Refills: 1 | OUTPATIENT
Start: 2024-09-23 | End: 2025-03-13

## 2024-09-23 RX ORDER — SODIUM CHLORIDE 9 MG/ML
INJECTION, SOLUTION INTRAVENOUS CONTINUOUS
Status: DISCONTINUED | OUTPATIENT
Start: 2024-09-23 | End: 2024-09-23 | Stop reason: HOSPADM

## 2024-09-23 RX ORDER — ACETAMINOPHEN 500 MG
500 TABLET ORAL EVERY 4 HOURS PRN
Status: DISCONTINUED | OUTPATIENT
Start: 2024-09-23 | End: 2024-09-23 | Stop reason: HOSPADM

## 2024-09-23 RX ORDER — NITROGLYCERIN 0.4 MG/1
0.4 TABLET SUBLINGUAL EVERY 5 MIN PRN
Status: DISCONTINUED | OUTPATIENT
Start: 2024-09-23 | End: 2024-09-23 | Stop reason: HOSPADM

## 2024-09-23 RX ADMIN — ASPIRIN 325 MG: 325 TABLET ORAL at 09:48

## 2024-09-23 RX ADMIN — SODIUM CHLORIDE: 9 INJECTION, SOLUTION INTRAVENOUS at 09:45

## 2024-09-23 RX ADMIN — ACETAMINOPHEN 1000 MG: 500 TABLET ORAL at 13:51

## 2024-09-23 ASSESSMENT — PAIN SCALES - GENERAL
PAINLEVEL_OUTOF10: 8
PAINLEVEL_OUTOF10: 0

## 2024-09-23 ASSESSMENT — PAIN DESCRIPTION - LOCATION: LOCATION: HEAD

## 2024-09-23 ASSESSMENT — PAIN DESCRIPTION - DESCRIPTORS: DESCRIPTORS: ACHING

## 2024-09-23 NOTE — TELEPHONE ENCOUNTER
09/26/24 Bilateral Lumbar 4-5 5-sacral 1 facet medial branch block denied.    Reason: Physical therapy    I see an initial PT note for 9/4/24. Please confirm patient is continuing with PT and scheduled her to follow up with Mercedes once she has complete PT.  Cancel 9/26/24 procedure and notify patient her insurance requires 6 weeks of PT.

## 2024-09-25 ENCOUNTER — HOSPITAL ENCOUNTER (EMERGENCY)
Age: 54
Discharge: HOME | End: 2024-09-25
Payer: COMMERCIAL

## 2024-09-25 VITALS
HEIGHT: 66 IN | HEART RATE: 91 BPM | BODY MASS INDEX: 22.98 KG/M2 | SYSTOLIC BLOOD PRESSURE: 132 MMHG | RESPIRATION RATE: 16 BRPM | DIASTOLIC BLOOD PRESSURE: 86 MMHG | OXYGEN SATURATION: 96 % | WEIGHT: 143 LBS | TEMPERATURE: 98.8 F

## 2024-09-25 DIAGNOSIS — J06.9 VIRAL URI WITH COUGH: Primary | ICD-10-CM

## 2024-09-25 LAB — S PYO AG THROAT QL: NEGATIVE

## 2024-09-25 PROCEDURE — 99213 OFFICE O/P EST LOW 20 MIN: CPT

## 2024-09-25 PROCEDURE — 87651 STREP A DNA AMP PROBE: CPT

## 2024-09-25 RX ORDER — DEXTROMETHORPHN/ACETAMINOPH/CP 10-325-2MG
1 TABLET ORAL 2 TIMES DAILY
Qty: 840 TABLET | Refills: 0 | Status: SHIPPED | OUTPATIENT
Start: 2024-09-25 | End: 2024-10-02

## 2024-09-25 RX ORDER — DEXTROMETHORPHAN HYDROBROMIDE AND PROMETHAZINE HYDROCHLORIDE 15; 6.25 MG/5ML; MG/5ML
5 SYRUP ORAL 4 TIMES DAILY PRN
Qty: 118 ML | Refills: 0 | Status: SHIPPED | OUTPATIENT
Start: 2024-09-25 | End: 2024-10-02

## 2024-09-25 ASSESSMENT — LIFESTYLE VARIABLES
HOW MANY STANDARD DRINKS CONTAINING ALCOHOL DO YOU HAVE ON A TYPICAL DAY: PATIENT DECLINED
HOW OFTEN DO YOU HAVE A DRINK CONTAINING ALCOHOL: PATIENT DECLINED

## 2024-09-25 ASSESSMENT — PAIN - FUNCTIONAL ASSESSMENT: PAIN_FUNCTIONAL_ASSESSMENT: 0-10

## 2024-09-25 ASSESSMENT — ENCOUNTER SYMPTOMS
COUGH: 0
SORE THROAT: 1
RESPIRATORY NEGATIVE: 1
ALLERGIC/IMMUNOLOGIC NEGATIVE: 1
EYES NEGATIVE: 1
VOMITING: 1

## 2024-09-25 ASSESSMENT — PAIN SCALES - GENERAL: PAINLEVEL_OUTOF10: 8

## 2024-09-25 ASSESSMENT — PAIN DESCRIPTION - LOCATION: LOCATION: THROAT

## 2024-09-25 ASSESSMENT — PAIN DESCRIPTION - PAIN TYPE: TYPE: ACUTE PAIN

## 2024-09-25 ASSESSMENT — PAIN DESCRIPTION - FREQUENCY: FREQUENCY: CONTINUOUS

## 2024-09-25 NOTE — ED NOTES
Pt presents to  for c/o sore throat, headache, emesis that started today     Jose Juárez LPN  09/25/24 [id number]

## 2024-09-25 NOTE — ED PROVIDER NOTES
[facility] URGENT CARE  Urgent Care Encounter       CHIEF COMPLAINT       Chief Complaint   Patient presents with    Pharyngitis    Headache    Emesis       Nurses Notes reviewed and I agree except as noted in the HPI.  HISTORY OF PRESENT ILLNESS   Talya Schulz is a 53 y.o. female who presents to urgent care for sore throat, headache and vomiting.  Symptoms started this am.     The history is provided by the patient. No  was used.       REVIEW OF SYSTEMS     Review of Systems   Constitutional: Negative.  Negative for fever.   HENT:  Positive for sore throat.    Eyes: Negative.    Respiratory: Negative.  Negative for cough.    Cardiovascular: Negative.    Gastrointestinal:  Positive for vomiting.   Endocrine: Negative.    Genitourinary: Negative.    Musculoskeletal: Negative.    Skin: Negative.    Allergic/Immunologic: Negative.    Neurological:  Positive for headaches.   Hematological: Negative.    Psychiatric/Behavioral: Negative.         PAST MEDICAL HISTORY         Diagnosis Date    Anemia     CKD (chronic kidney disease), stage III (HCC)     Dyslipidemia     Hematuria     Hyperlipidemia     Hypertension     Hyperuricemia     Hypothyroidism     Kidney disease     as a result from lupus    Lupus (HCC)     Lupus nephritis (HCC)     Proteinuria     Seizures (HCC)     Systemic lupus erythematosus (HCC)        SURGICALHISTORY     Patient  has a past surgical history that includes Nerve Block (Bilateral); pr njx dx/ther agt pvrt facet jt lmbr/sac 2nd level (Bilateral, 5/7/2018); pr njx dx/ther sbst intrlmnr lmbr/sac w/img gdn (N/A, 7/19/2018); pr njx aa&/strd tfrml epi lumbar/sacral 1 level (Left, 9/25/2018); Lumbar spine surgery (Left, 12/13/2018); lumbar nerve block (N/A, 3/11/2019); lumbar nerve block (N/A, 5/21/2019); CT BIOPSY ABDOMEN RETROPERITONEUM (1/15/2021); and Pain management procedure (N/A, 11/1/2022).    CURRENT MEDICATIONS       Previous Medications    ACETAMINOPHEN  7.9 MG CAPS    Take 15.8 mg by mouth in the morning and at bedtime       ALLERGIES     Patient is is allergic to codeine.    Patients   Immunization History   Administered Date(s) Administered    Influenza 10/11/2012    Influenza Virus Vaccine 11/30/2015    Influenza, AFLURIA (age 3 y+), FLUZONE, (age 6 mo+), Quadv MDV, 0.5mL 12/06/2016    Influenza, FLUARIX, FLULAVAL, FLUZONE (age 6 mo+) and AFLURIA, (age 3 y+), Quadv PF, 0.5mL 09/13/2015, 12/27/2017, 09/12/2018, 10/22/2019, 02/23/2021    Pneumococcal, PPSV23, PNEUMOVAX 23, (age 2y+), SC/IM, 0.5mL 12/27/2017    TDaP, ADACEL (age 10y-64y), BOOSTRIX (age 10y+), IM, 0.5mL 07/08/2013    Td, (Adult) Not Adsorbed 08/30/2020       FAMILY HISTORY     Patient's family history includes Bipolar Disorder in her sister; Diabetes in her brother, father, and mother; Heart Disease in her mother; Lupus in her maternal cousin; No Known Problems in her sister; Parkinsonism in her mother; Schizophrenia in her sister; Thyroid Disease in her mother and sister.    SOCIAL HISTORY     Patient  reports that she quit smoking about 11 years ago. Her smoking use included cigarettes. She started smoking about 12 years ago. She has a 0.3 pack-year smoking history. She has been exposed to tobacco smoke. She has never used smokeless tobacco. She reports current alcohol use. She reports that she does not use drugs.    PHYSICAL EXAM     ED TRIAGE VITALS  BP: 132/86, Temp: 98.8 °F (37.1 °C), Pulse: 91, Respirations: 16, SpO2: 96 %,Estimated body mass index is 23.08 kg/m² as calculated from the following:    Height as of this encounter: 1.676 m (5' 6\").    Weight as of this encounter: 64.9 kg (143 lb).,No LMP recorded (lmp unknown). Patient is postmenopausal.    Physical Exam  Vitals and nursing note reviewed.   HENT:      Head: Normocephalic and atraumatic.      Right Ear: Tympanic membrane, ear canal and external ear normal.      Left Ear: Tympanic membrane, ear canal and external ear normal.

## 2024-09-25 NOTE — DISCHARGE INSTRUCTIONS
Medications as directed.  Increase fluid intake.  Take over-the-counter Tylenol as needed for headache.  Follow-up with family doctor in 3 to 5 days.  Go to emergency room for any shortness of breath, difficulty breathing or any new or worsening symptoms.

## 2024-09-25 NOTE — TELEPHONE ENCOUNTER
Left patient a voice message requesting a call back to the office in regards to her procedure that was scheduled 9/2624. This DOS has been cancelled due to insurance denial.   I attempted to reach alternative contact (Giovanny) number is no longer in service.

## 2024-09-26 ENCOUNTER — OFFICE VISIT (OUTPATIENT)
Dept: CARDIOTHORACIC SURGERY | Age: 54
End: 2024-09-26
Payer: COMMERCIAL

## 2024-09-26 ENCOUNTER — PREP FOR PROCEDURE (OUTPATIENT)
Dept: CARDIOTHORACIC SURGERY | Age: 54
End: 2024-09-26

## 2024-09-26 ENCOUNTER — TELEPHONE (OUTPATIENT)
Dept: CARDIOTHORACIC SURGERY | Age: 54
End: 2024-09-26

## 2024-09-26 VITALS
HEIGHT: 66 IN | HEART RATE: 103 BPM | BODY MASS INDEX: 23.63 KG/M2 | SYSTOLIC BLOOD PRESSURE: 103 MMHG | DIASTOLIC BLOOD PRESSURE: 64 MMHG | WEIGHT: 147 LBS

## 2024-09-26 DIAGNOSIS — I25.119 CORONARY ARTERY DISEASE INVOLVING NATIVE CORONARY ARTERY OF NATIVE HEART WITH ANGINA PECTORIS (HCC): Primary | ICD-10-CM

## 2024-09-26 DIAGNOSIS — I25.118 CORONARY ARTERY DISEASE OF NATIVE ARTERY OF NATIVE HEART WITH STABLE ANGINA PECTORIS (HCC): Primary | ICD-10-CM

## 2024-09-26 DIAGNOSIS — G89.4 CHRONIC PAIN SYNDROME: ICD-10-CM

## 2024-09-26 PROCEDURE — 3017F COLORECTAL CA SCREEN DOC REV: CPT | Performed by: THORACIC SURGERY (CARDIOTHORACIC VASCULAR SURGERY)

## 2024-09-26 PROCEDURE — 99205 OFFICE O/P NEW HI 60 MIN: CPT | Performed by: THORACIC SURGERY (CARDIOTHORACIC VASCULAR SURGERY)

## 2024-09-26 PROCEDURE — G8420 CALC BMI NORM PARAMETERS: HCPCS | Performed by: THORACIC SURGERY (CARDIOTHORACIC VASCULAR SURGERY)

## 2024-09-26 PROCEDURE — 1036F TOBACCO NON-USER: CPT | Performed by: THORACIC SURGERY (CARDIOTHORACIC VASCULAR SURGERY)

## 2024-09-26 PROCEDURE — G8427 DOCREV CUR MEDS BY ELIG CLIN: HCPCS | Performed by: THORACIC SURGERY (CARDIOTHORACIC VASCULAR SURGERY)

## 2024-09-26 RX ORDER — SODIUM CHLORIDE 0.9 % (FLUSH) 0.9 %
5-40 SYRINGE (ML) INJECTION PRN
OUTPATIENT
Start: 2024-09-26

## 2024-09-26 RX ORDER — SODIUM CHLORIDE 9 MG/ML
INJECTION, SOLUTION INTRAVENOUS PRN
OUTPATIENT
Start: 2024-09-26

## 2024-09-26 RX ORDER — METOPROLOL TARTRATE 25 MG/1
12.5 TABLET, FILM COATED ORAL ONCE
OUTPATIENT
Start: 2024-09-26 | End: 2024-09-26

## 2024-09-26 RX ORDER — SODIUM CHLORIDE 9 MG/ML
INJECTION, SOLUTION INTRAVENOUS CONTINUOUS
OUTPATIENT
Start: 2024-09-26

## 2024-09-26 RX ORDER — ASPIRIN 81 MG/1
81 TABLET ORAL DAILY
OUTPATIENT
Start: 2024-09-26

## 2024-09-26 RX ORDER — SODIUM CHLORIDE 0.9 % (FLUSH) 0.9 %
5-40 SYRINGE (ML) INJECTION EVERY 12 HOURS SCHEDULED
OUTPATIENT
Start: 2024-09-26

## 2024-09-26 NOTE — TELEPHONE ENCOUNTER
Procedure: CABG   Date: 10/02/2024  Arrival Time: 530 AM  AM START TIME    Meds to Hold: FARXIGA 48 HOURS PRIOR TO SURGERY.    Hold all otc vitamins, herbal supplements, including cbd oil 7 days prior to surgery.    PAT scheduled for: 09/30/2024 1:00PM    CT CHEST W/O CONTRAST 09/30/2024 6 pm  BILATERAL LOWER VEIN MAPPING 330 pm  BILATERAL CAROTID  pm    4 WK POST OP WITH CHELSIE WADE PA-C 10/29/2024    Instructions verbalized to the patient.    Surgery approved by Hector Nunes.

## 2024-09-27 ENCOUNTER — TELEPHONE (OUTPATIENT)
Dept: CARDIOTHORACIC SURGERY | Age: 54
End: 2024-09-27

## 2024-09-27 ENCOUNTER — HOSPITAL ENCOUNTER (OUTPATIENT)
Dept: PREADMISSION TESTING | Age: 54
Discharge: HOME | End: 2024-09-27

## 2024-09-27 RX ORDER — HYDROCODONE BITARTRATE AND ACETAMINOPHEN 5; 325 MG/1; MG/1
1 TABLET ORAL EVERY 8 HOURS PRN
Qty: 90 TABLET | Refills: 0 | Status: SHIPPED | OUTPATIENT
Start: 2024-09-27 | End: 2024-10-27

## 2024-09-27 NOTE — TELEPHONE ENCOUNTER
PROCEDURE: cabg    DATE OF SERVICE: 10/02/2024     SERVICE LOCATION: [facility]    CPT CODE: 86889    PHYSICIAN: DR HERNANDEZ     DATE PRIOR AUTH SUBMITTED: 09/27/2024    STATUS: approved.     CASE NUMBER: [id number]     AUTH NUMBER: [id number]     VALID:  10/02/2024-10/3/2024

## 2024-09-27 NOTE — TELEPHONE ENCOUNTER
RECEIVED APPROVAL FROM [employer] TO GO FORWARD WITH SURGERY     I WILL SUBMIT AUTH [facility]

## 2024-09-27 NOTE — DISCHARGE INSTRUCTIONS
Preop surgery showering for open heart   To keep your skin clean as possible and to help prevent infection, please follow these instructions:  Shower with cleanser containing chlorhexidine gluconate the morning of surgery. Note when using Clearance don not use it on mucous membranes such as your genital area don't get soap in your eyes.  CHG is absorbed by cotton washcloths and may cause discoloration.  In the shower wet skin and wash your body and hair with Cleanser.  Pay special attention to the area(s) where you will have surgery.  Ask someone for help if you are unable to wash certain area of your body.  Rinse well  Gently dry with clean cloth  When you arrive the day of surgery part of your prep for surgery will include clipping the hair on the front of your body and showering again.   Please remember  DO NOT SHAVE ANY BODY PARTS from your neck down (including your legs or underarms) for least 2 days before surgery.  Shaving can increase your risk of infection when you have surgery    AFTER YOUR SHOWER do not use any powder, deodorant, perfumes or lotions prior to surgery   WEAR FRESHLY LAUNDERED pajamas to bed that night and sleep on freshly laundered sheets       Open Heart Instructions  To help with bowel problems after surgery we ask that you drink a hot beverage and eat Activia Yogurt with each meal starting 3-4 days before surgery  Nothing to eat or drink after midnight the night before surgery except for sip a water to to take medications am of surgery. No gum, mints or Ice cubes   Bring Photo ID and any health insurance cards   Wear Comfortable clean loose fitting clothing  Do Not wear any jewelry   Bring your medication in the original bottles  Shower the morning of surgery sing Bactoshield soap you were given Please us the bactoshield on your hair also.  Place clean sheets and pillow cases on your bed the night before surgery.  Wash Pajamas as well  Men shave off facial hair    Women do not get your

## 2024-09-30 ENCOUNTER — HOSPITAL ENCOUNTER (OUTPATIENT)
Dept: PREADMISSION TESTING | Age: 54
Setting detail: SURGERY ADMIT
Discharge: HOME | End: 2024-09-30

## 2024-10-01 NOTE — TELEPHONE ENCOUNTER
Imaging, PAT, and surgery cancelled. Patient is admitted in [facility] for Covid, and pneumonia.     Per Dr. Lorenzo patient will need to be seen in office before rescheduling surgery.

## 2024-10-15 ENCOUNTER — OFFICE VISIT (OUTPATIENT)
Dept: PHYSICAL MEDICINE AND REHAB | Age: 54
End: 2024-10-15
Payer: COMMERCIAL

## 2024-10-15 VITALS
HEIGHT: 66 IN | DIASTOLIC BLOOD PRESSURE: 68 MMHG | SYSTOLIC BLOOD PRESSURE: 120 MMHG | WEIGHT: 147 LBS | HEART RATE: 72 BPM | BODY MASS INDEX: 23.63 KG/M2

## 2024-10-15 DIAGNOSIS — G89.4 CHRONIC PAIN SYNDROME: ICD-10-CM

## 2024-10-15 DIAGNOSIS — M47.816 LUMBAR SPONDYLOSIS: Primary | ICD-10-CM

## 2024-10-15 DIAGNOSIS — M46.1 BILATERAL SACROILIITIS (HCC): ICD-10-CM

## 2024-10-15 DIAGNOSIS — M54.12 CERVICAL RADICULITIS: ICD-10-CM

## 2024-10-15 DIAGNOSIS — M46.1 SI (SACROILIAC) JOINT INFLAMMATION (HCC): ICD-10-CM

## 2024-10-15 DIAGNOSIS — M48.02 CERVICAL SPINAL STENOSIS: ICD-10-CM

## 2024-10-15 DIAGNOSIS — M47.812 CERVICAL SPONDYLOSIS: ICD-10-CM

## 2024-10-15 PROCEDURE — G8420 CALC BMI NORM PARAMETERS: HCPCS | Performed by: NURSE PRACTITIONER

## 2024-10-15 PROCEDURE — 3017F COLORECTAL CA SCREEN DOC REV: CPT | Performed by: NURSE PRACTITIONER

## 2024-10-15 PROCEDURE — 1036F TOBACCO NON-USER: CPT | Performed by: NURSE PRACTITIONER

## 2024-10-15 PROCEDURE — G8484 FLU IMMUNIZE NO ADMIN: HCPCS | Performed by: NURSE PRACTITIONER

## 2024-10-15 PROCEDURE — G8427 DOCREV CUR MEDS BY ELIG CLIN: HCPCS | Performed by: NURSE PRACTITIONER

## 2024-10-15 PROCEDURE — 99214 OFFICE O/P EST MOD 30 MIN: CPT | Performed by: NURSE PRACTITIONER

## 2024-10-15 RX ORDER — GABAPENTIN 400 MG/1
CAPSULE ORAL
Qty: 270 CAPSULE | Refills: 1 | Status: SHIPPED | OUTPATIENT
Start: 2024-10-15 | End: 2025-04-14

## 2024-10-15 ASSESSMENT — ENCOUNTER SYMPTOMS: BACK PAIN: 1

## 2024-10-15 NOTE — PROGRESS NOTES
Functionality Assessment/Goals Worksheet     On a scale of 0 (Does not Interfere) to 10 (Completely Interferes)     1.  Which number describes how during the past week pain has interfered with           the following:  A.  General Activity:  6  B.  Mood: 5  C.  Walking Ability:  6  D.  Normal Work (Includes both work outside the home and housework):  5  E.  Relations with Other People:   6  F.  Sleep:   8  G.  Enjoyment of Life:   5    2.  Patient Prefers to Take their Pain Medications:     [x]  On a regular basis   []  Only when necessary    []  Does not take pain medications    3.  What are the Patient's Goals/Expectations for Visiting Pain Management?     [x]  Learn about my pain    []  Receive Medication   []  Physical Therapy     []  Treat Depression   []  Receive Injections    []  Treat Sleep   []  Deal with Anxiety and Stress   []  Treat Opoid Dependence/Addiction   []  Other:                                
     Right foot: Decreased range of motion. Swelling, tenderness and bony tenderness present.      Comments: 2 right broken toes has walker shoe on    Skin:     General: Skin is warm and dry.      Coloration: Skin is not pale.      Findings: No erythema or rash.          Neurological:      Mental Status: She is alert and oriented to person, place, and time. She is not disoriented.      Cranial Nerves: Cranial nerves 2-12 are intact. No cranial nerve deficit.      Sensory: Sensation is intact. No sensory deficit.      Motor: Motor function is intact. No atrophy or abnormal muscle tone.      Coordination: Coordination is intact. Coordination normal.      Gait: Gait abnormal.      Comments: Motor  4/5 RUE  5/5 BLE   Psychiatric:         Attention and Perception: Attention and perception normal. She is attentive.         Mood and Affect: Mood and affect normal. Mood is not anxious or depressed. Affect is not labile, blunt, angry or inappropriate.         Speech: Speech normal. She is communicative. Speech is not rapid and pressured, delayed, slurred or tangential.         Behavior: Behavior normal. Behavior is not agitated, slowed, aggressive, withdrawn, hyperactive or combative. Behavior is cooperative.         Thought Content: Thought content normal. Thought content is not paranoid or delusional. Thought content does not include homicidal or suicidal ideation. Thought content does not include homicidal or suicidal plan.         Cognition and Memory: Cognition and memory normal. Memory is not impaired. She does not exhibit impaired recent memory or impaired remote memory.         Judgment: Judgment normal. Judgment is not impulsive or inappropriate.       VONDA  Patricks test  positive  Yeoman's  or Gaenslen's positive  Kemps  positive  Spurlings  positive  Todd's na         Assessment:     1. Lumbar spondylosis    2. Cervical spondylosis    3. SI (sacroiliac) joint inflammation (HCC)    4. Cervical radiculitis

## 2024-10-22 DIAGNOSIS — N18.32 STAGE 3B CHRONIC KIDNEY DISEASE (HCC): Primary | ICD-10-CM

## 2024-10-22 DIAGNOSIS — E87.6 HYPOKALEMIA: ICD-10-CM

## 2024-10-22 LAB
ANION GAP SERPL CALCULATED.3IONS-SCNC: 8 MMOL/L (ref 4–12)
BUN BLDV-MCNC: 13 MG/DL (ref 7–20)
CALCIUM SERPL-MCNC: 9.3 MG/DL (ref 8.8–10.5)
CHLORIDE BLD-SCNC: 109 MEQ/L (ref 101–111)
CO2: 27 MEQ/L (ref 21–32)
CREAT SERPL-MCNC: 1.53 MG/DL (ref 0.6–1.3)
CREATININE CLEARANCE: 36
GLUCOSE: 91 MG/DL (ref 70–110)
POTASSIUM SERPL-SCNC: 3.2 MEQ/L (ref 3.6–5)
SODIUM BLD-SCNC: 144 MEQ/L (ref 135–145)

## 2024-10-22 RX ORDER — POTASSIUM CHLORIDE 1500 MG/1
40 TABLET, EXTENDED RELEASE ORAL 2 TIMES DAILY
Qty: 4 TABLET | Refills: 0 | Status: SHIPPED | OUTPATIENT
Start: 2024-10-22

## 2024-10-22 NOTE — TELEPHONE ENCOUNTER
Spoke with Talya. I asked if she is taking any kind of potassium supplement already. She says she was released from [facility] recently and they prescribed her Lokelma 10 g packets to take daily. I called Dr. Maldonado. He wants Talya to stop the Lokelma. Take Potassium 40 meq 2 tablets BID for two days and repeat potassium lab in 4-5 days. Patient expresses understanding.

## 2024-10-22 NOTE — TELEPHONE ENCOUNTER
Left a brief message regarding below orders and asked Talya to call the office.    SBT initiated per MD order  with settings PSV +5/10PS 40%    Patient not tolerating due to RR=31 TO 45; TV  ML RANGE; HR INCREASED FROM LOW 80'S  RANGE    RSBI: 237    SBT stopped AFTER APPROX 2 MINS    Patient returned to previous settings: AS CHARTED    Dr RULA espino and RN was at bedside for sbt.    Handoff report to oncoming RT.

## 2024-10-22 NOTE — TELEPHONE ENCOUNTER
----- Message from Dr. Conrad Maldonado MD sent at 10/22/2024  2:11 PM EDT -----  Serum potassium is low.  Potassium chloride 40 mill equivalent twice a day.  Repeat potassium level with magnesium in about 4 to 5 days.

## 2024-10-23 DIAGNOSIS — G89.4 CHRONIC PAIN SYNDROME: ICD-10-CM

## 2024-10-23 RX ORDER — HYDROCODONE BITARTRATE AND ACETAMINOPHEN 5; 325 MG/1; MG/1
1 TABLET ORAL EVERY 8 HOURS PRN
Qty: 90 TABLET | Refills: 0 | Status: SHIPPED | OUTPATIENT
Start: 2024-10-27 | End: 2024-11-26

## 2024-10-23 NOTE — TELEPHONE ENCOUNTER
OARRS reviewed. UDS: + for  gabapentin and hydrocodone.   Last seen: 10/15/2024. Follow-up: 12/17/2024

## 2024-10-23 NOTE — TELEPHONE ENCOUNTER
Talya Schulz called requesting a refill on the following medications:  Requested Prescriptions     Pending Prescriptions Disp Refills    HYDROcodone-acetaminophen (NORCO) 5-325 MG per tablet 90 tablet 0     Sig: Take 1 tablet by mouth every 8 hours as needed for Pain for up to 30 days. Max Daily Amount: 3 tablets     Pharmacy verified:    [address] -  [phone] - F [phone]     Date of last visit: 10/15/2024  Date of next visit (if applicable): 12/17/2024

## 2024-10-24 ENCOUNTER — TELEPHONE (OUTPATIENT)
Dept: RHEUMATOLOGY | Age: 54
End: 2024-10-24

## 2024-10-24 NOTE — TELEPHONE ENCOUNTER
Received call from [facility] stating they have made several attempts to contact patient for her Voclosporin. Left voice message and sent [employer] message for patient to contact pharmacy.       [phone]

## 2024-10-30 ENCOUNTER — TELEPHONE (OUTPATIENT)
Dept: NEPHROLOGY | Age: 54
End: 2024-10-30

## 2024-10-30 LAB
A/G RATIO: 1.5 (ref 1.5–2.5)
ALBUMIN: 3.6 G/DL (ref 3.5–5)
ALP BLD-CCNC: 77 IU/L (ref 39–118)
ALT SERPL-CCNC: 5 IU/L (ref 10–40)
ANION GAP SERPL CALCULATED.3IONS-SCNC: 5 MMOL/L (ref 4–12)
AST SERPL-CCNC: 10 IU/L (ref 15–41)
BASOPHILS ABSOLUTE: 100 /CMM (ref 0–200)
BASOPHILS RELATIVE PERCENT: 0.8 % (ref 0–2)
BILIRUB SERPL-MCNC: 0.2 MG/DL (ref 0.2–1)
BUN BLDV-MCNC: 25 MG/DL (ref 7–20)
C-REACTIVE PROTEIN: < 0.5 MG/DL
CALCIUM SERPL-MCNC: 9 MG/DL (ref 8.8–10.5)
CHLORIDE BLD-SCNC: 111 MEQ/L (ref 101–111)
CO2: 26 MEQ/L (ref 21–32)
CREAT SERPL-MCNC: 1.68 MG/DL (ref 0.6–1.3)
CREATININE CLEARANCE: 32
EOSINOPHILS ABSOLUTE: 0 /CMM (ref 0–500)
EOSINOPHILS RELATIVE PERCENT: 0.5 % (ref 0–6)
GLUCOSE: 84 MG/DL (ref 70–110)
HCT VFR BLD CALC: 28.3 % (ref 35–44)
HEMOGLOBIN: 8.4 GM/DL (ref 12–15)
HYPOCHROMIA: ABNORMAL
LYMPHOCYTES ABSOLUTE: 1500 /CMM (ref 1000–4800)
LYMPHOCYTES RELATIVE PERCENT: 20.5 % (ref 15–45)
MCH RBC QN AUTO: 26.4 PG (ref 27.5–33)
MCHC RBC AUTO-ENTMCNC: 29.8 GM/DL (ref 33–36)
MCV RBC AUTO: 88.4 CU MIC (ref 80–97)
MONOCYTES ABSOLUTE: 500 /CMM (ref 0–800)
MONOCYTES RELATIVE PERCENT: 6.6 % (ref 2–10)
NEUTROPHILS ABSOLUTE: 5400 /CMM (ref 1800–7700)
NEUTROPHILS RELATIVE PERCENT: 71.6 % (ref 40–70)
NUCLEATED RBCS: 0.1 /100 WBC
PDW BLD-RTO: 17 % (ref 12–16)
PLATELET # BLD: 179 TH/CMM (ref 150–400)
POTASSIUM SERPL-SCNC: 4.6 MEQ/L (ref 3.6–5)
RBC # BLD: 3.2 MIL/CMM (ref 4–5.1)
SED RATE, AUTOMATED: 1 MM/HR
SODIUM BLD-SCNC: 142 MEQ/L (ref 135–145)
TOTAL PROTEIN: 6 G/DL (ref 6.2–8)
WBC # BLD: 7.5 TH/CMM (ref 4.4–10.5)

## 2024-10-30 NOTE — TELEPHONE ENCOUNTER
----- Message from Dr. Conrad Maldonado MD sent at 10/30/2024  1:05 PM EDT -----  Anemia workup has been negative with normal iron profile, normal B12 and folate acid levels respectively.  The degree of her chronic kidney disease is highly inconsistent with the degree of anemia.  Other etiology of her anemia need to be investigated.  May need GI workup if not done already.  My office will call her.      Conrad Maldonado MD  [facility] Clinical Staff  Serum potassium and magnesium level are back to normal.

## 2024-10-30 NOTE — TELEPHONE ENCOUNTER
Called and spoke with smita to let her know her k and mg levels are back to normal and discussed her anemia profile. I told her I would talk with you about sending a referral over to see a GI doctor?

## 2024-10-31 LAB
MAGNESIUM: 1.9 MG/DL (ref 1.8–2.5)
MAGNESIUM: NORMAL
POTASSIUM SERPL-SCNC: 4.6 MEQ/L (ref 3.6–5)

## 2024-11-07 ENCOUNTER — TELEPHONE (OUTPATIENT)
Dept: NEPHROLOGY | Age: 54
End: 2024-11-07

## 2024-11-07 NOTE — TELEPHONE ENCOUNTER
Called Talya to see if she has an appointment with Dr. Mora yet. She does not. I called Dr. Mora's office and spoke with Heidy. She does have the referral and have left messages and patient has not returned their calls. I called Talya back and gave her their phone number ([phone]) She will call them

## 2024-11-21 ENCOUNTER — LAB (OUTPATIENT)
Dept: LAB | Age: 54
End: 2024-11-21

## 2024-11-21 ENCOUNTER — OFFICE VISIT (OUTPATIENT)
Dept: RHEUMATOLOGY | Age: 54
End: 2024-11-21
Payer: COMMERCIAL

## 2024-11-21 VITALS
HEIGHT: 66 IN | HEART RATE: 72 BPM | WEIGHT: 155.6 LBS | DIASTOLIC BLOOD PRESSURE: 82 MMHG | BODY MASS INDEX: 25.01 KG/M2 | OXYGEN SATURATION: 100 % | SYSTOLIC BLOOD PRESSURE: 138 MMHG

## 2024-11-21 DIAGNOSIS — N18.9 CHRONIC KIDNEY DISEASE, UNSPECIFIED CKD STAGE: ICD-10-CM

## 2024-11-21 DIAGNOSIS — D50.9 IRON DEFICIENCY ANEMIA, UNSPECIFIED IRON DEFICIENCY ANEMIA TYPE: ICD-10-CM

## 2024-11-21 DIAGNOSIS — Z51.81 MEDICATION MONITORING ENCOUNTER: ICD-10-CM

## 2024-11-21 DIAGNOSIS — M32.14 SYSTEMIC LUPUS ERYTHEMATOSUS WITH GLOMERULAR DISEASE, UNSPECIFIED SLE TYPE (HCC): Primary | ICD-10-CM

## 2024-11-21 DIAGNOSIS — R20.2 PARESTHESIA OF RIGHT UPPER EXTREMITY: ICD-10-CM

## 2024-11-21 DIAGNOSIS — M32.14 SYSTEMIC LUPUS ERYTHEMATOSUS WITH GLOMERULAR DISEASE, UNSPECIFIED SLE TYPE (HCC): ICD-10-CM

## 2024-11-21 DIAGNOSIS — R80.1 PERSISTENT PROTEINURIA: ICD-10-CM

## 2024-11-21 LAB
ALBUMIN SERPL BCG-MCNC: 4.2 G/DL (ref 3.5–5.1)
ALP SERPL-CCNC: 100 U/L (ref 38–126)
ALT SERPL W/O P-5'-P-CCNC: 9 U/L (ref 11–66)
ANION GAP SERPL CALC-SCNC: 13 MEQ/L (ref 8–16)
AST SERPL-CCNC: 14 U/L (ref 5–40)
BACTERIA: ABNORMAL
BASOPHILS ABSOLUTE: 0 THOU/MM3 (ref 0–0.1)
BASOPHILS NFR BLD AUTO: 0.3 %
BILIRUB SERPL-MCNC: < 0.2 MG/DL (ref 0.3–1.2)
BILIRUB UR QL STRIP: NEGATIVE
BUN SERPL-MCNC: 28 MG/DL (ref 7–22)
C3C SERPL-MCNC: 119 MG/DL (ref 90–180)
C4 SERPL-MCNC: 20 MG/DL (ref 10–40)
CALCIUM SERPL-MCNC: 9.5 MG/DL (ref 8.5–10.5)
CASTS #/AREA URNS LPF: ABNORMAL /LPF
CASTS #/AREA URNS LPF: ABNORMAL /LPF
CHARACTER UR: CLEAR
CHARCOAL URNS QL MICRO: ABNORMAL
CHLORIDE SERPL-SCNC: 110 MEQ/L (ref 98–111)
CO2 SERPL-SCNC: 23 MEQ/L (ref 23–33)
COLOR UR: YELLOW
CREAT SERPL-MCNC: 1.6 MG/DL (ref 0.4–1.2)
CREAT UR-MCNC: 94 MG/DL
CRP SERPL-MCNC: < 0.3 MG/DL (ref 0–1)
CRYSTALS URNS QL MICRO: ABNORMAL
DACROCYTES: ABNORMAL
DEPRECATED RDW RBC AUTO: 53.7 FL (ref 35–45)
EOSINOPHIL NFR BLD AUTO: 0.8 %
EOSINOPHILS ABSOLUTE: 0.1 THOU/MM3 (ref 0–0.4)
EPITHELIAL CELLS, UA: ABNORMAL /HPF
ERYTHROCYTE [DISTWIDTH] IN BLOOD BY AUTOMATED COUNT: 15.3 % (ref 11.5–14.5)
ERYTHROCYTE [SEDIMENTATION RATE] IN BLOOD BY WESTERGREN METHOD: 10 MM/HR (ref 0–20)
FERRITIN SERPL IA-MCNC: 843 NG/ML (ref 10–291)
FOLATE SERPL-MCNC: 11.4 NG/ML (ref 4.8–24.2)
GFR SERPL CREATININE-BSD FRML MDRD: 39 ML/MIN/1.73M2
GLUCOSE SERPL-MCNC: 85 MG/DL (ref 70–108)
GLUCOSE UR QL STRIP.AUTO: NEGATIVE MG/DL
HCT VFR BLD AUTO: 32.9 % (ref 37–47)
HGB BLD-MCNC: 9.1 GM/DL (ref 12–16)
HGB UR QL STRIP.AUTO: NEGATIVE
HYPOCHROMIA BLD QL SMEAR: PRESENT
IMM GRANULOCYTES # BLD AUTO: 0.01 THOU/MM3 (ref 0–0.07)
IMM GRANULOCYTES NFR BLD AUTO: 0.2 %
IRON SATN MFR SERPL: 18 % (ref 20–50)
IRON SERPL-MCNC: 43 UG/DL (ref 50–170)
KETONES UR QL STRIP.AUTO: NEGATIVE
LEUKOCYTE ESTERASE UR QL STRIP.AUTO: NEGATIVE
LYMPHOCYTES ABSOLUTE: 1.9 THOU/MM3 (ref 1–4.8)
LYMPHOCYTES NFR BLD AUTO: 28.1 %
MCH RBC QN AUTO: 26.8 PG (ref 26–33)
MCHC RBC AUTO-ENTMCNC: 27.7 GM/DL (ref 32.2–35.5)
MCV RBC AUTO: 96.8 FL (ref 81–99)
MONOCYTES ABSOLUTE: 0.5 THOU/MM3 (ref 0.4–1.3)
MONOCYTES NFR BLD AUTO: 7.8 %
NEUTROPHILS ABSOLUTE: 4.1 THOU/MM3 (ref 1.8–7.7)
NEUTROPHILS NFR BLD AUTO: 62.8 %
NITRITE UR QL STRIP.AUTO: NEGATIVE
NRBC BLD AUTO-RTO: 0 /100 WBC
PH UR STRIP.AUTO: 5 [PH] (ref 5–9)
PLATELET # BLD AUTO: 170 THOU/MM3 (ref 130–400)
PLATELET BLD QL SMEAR: ADEQUATE
PMV BLD AUTO: 12.9 FL (ref 9.4–12.4)
POTASSIUM SERPL-SCNC: 4.2 MEQ/L (ref 3.5–5.2)
PROT SERPL-MCNC: 6.9 G/DL (ref 6.1–8)
PROT UR STRIP.AUTO-MCNC: 100 MG/DL
PROT UR-MCNC: 124.1 MG/DL
PROT/CREAT 24H UR: 1.32 MG/G{CREAT}
RBC # BLD AUTO: 3.4 MILL/MM3 (ref 4.2–5.4)
RBC #/AREA URNS HPF: ABNORMAL /HPF
RENAL EPI CELLS #/AREA URNS HPF: ABNORMAL /[HPF]
SCAN OF BLOOD SMEAR: NORMAL
SODIUM SERPL-SCNC: 146 MEQ/L (ref 135–145)
SP GR UR REFRACT.AUTO: 1.02 (ref 1–1.03)
STOMATOCYTES: ABNORMAL
TIBC SERPL-MCNC: 245 UG/DL (ref 171–450)
UROBILINOGEN UR QL STRIP.AUTO: 0.2 EU/DL (ref 0–1)
VARIANT LYMPHS BLD QL SMEAR: ABNORMAL %
VIT B12 SERPL-MCNC: 540 PG/ML (ref 211–911)
WBC # BLD AUTO: 6.6 THOU/MM3 (ref 4.8–10.8)
WBC #/AREA URNS HPF: ABNORMAL /HPF
YEAST LIKE FUNGI URNS QL MICRO: ABNORMAL

## 2024-11-21 PROCEDURE — 99214 OFFICE O/P EST MOD 30 MIN: CPT | Performed by: NURSE PRACTITIONER

## 2024-11-21 PROCEDURE — G8419 CALC BMI OUT NRM PARAM NOF/U: HCPCS | Performed by: NURSE PRACTITIONER

## 2024-11-21 PROCEDURE — G8484 FLU IMMUNIZE NO ADMIN: HCPCS | Performed by: NURSE PRACTITIONER

## 2024-11-21 PROCEDURE — G8427 DOCREV CUR MEDS BY ELIG CLIN: HCPCS | Performed by: NURSE PRACTITIONER

## 2024-11-21 PROCEDURE — 1036F TOBACCO NON-USER: CPT | Performed by: NURSE PRACTITIONER

## 2024-11-21 PROCEDURE — 3017F COLORECTAL CA SCREEN DOC REV: CPT | Performed by: NURSE PRACTITIONER

## 2024-11-21 RX ORDER — LORATADINE 10 MG/1
10 TABLET ORAL
COMMUNITY
Start: 2024-04-15

## 2024-11-21 RX ORDER — LOSARTAN POTASSIUM 50 MG/1
TABLET ORAL
COMMUNITY
Start: 2024-09-28

## 2024-11-21 ASSESSMENT — ENCOUNTER SYMPTOMS
ABDOMINAL PAIN: 0
EYE PAIN: 0
TROUBLE SWALLOWING: 0
DIARRHEA: 0
NAUSEA: 0
SHORTNESS OF BREATH: 0
COUGH: 0
EYE ITCHING: 0
BACK PAIN: 0
CONSTIPATION: 0

## 2024-11-21 NOTE — PROGRESS NOTES
[facility] RHEUMATOLOGY FOLLOW UP NOTE       Date Of Service: 11/21/2024  Provider: MACKENZIE Loyola - CNP    Name: Talya Schulz   MRN: [id number]    Chief Complaint(s)     Chief Complaint   Patient presents with    Follow-up     2-month f/u for Lupus        History of Present Illness (HPI)     Talya Schulz  is a(n)54 y.o. female with a hx of systemic lupus with hiistory of class IV lupus nephritis, CKD stage III, chronic low back pain w/ radiculopathy, lumbar stenosis, h/o seizures here for the f/u evaluation of systemic lupus     Interval hx:    -  saw GI for anemia- ordered stool sample and discussing possible EGD- was also referred to hematology    - hospitalized at [facility] in September for COVID, pneumonia and fluid around the heart- reports possible pericardiocentesis at that time   - heart cath 9/23 w/ severe multivessel disease- saw CT surgery who scheduled CABG for 10/2- procedure was cancelled due to hospitalization     pain affecting the low back, hands, ankles  Pain on a scale 0-10: 5.5/10  Type of pain: throbbing   Timing: mornings and evenings  Aggravating factors: none  Alleviating factors: gabapentin    Associated symptoms:  denies swelling/  Redness/ warmth, denies AM stiffness       REVIEW OF SYSTEMS: (ROS)    Review of Systems   Constitutional:  Positive for fatigue. Negative for appetite change, fever and unexpected weight change.   HENT:  Negative for congestion and trouble swallowing.         Nasal sores   Eyes:  Negative for pain and itching.   Respiratory:  Negative for cough and shortness of breath.    Cardiovascular:  Negative for chest pain and leg swelling.   Gastrointestinal:  Negative for abdominal pain, constipation, diarrhea and nausea.   Endocrine: Negative for cold intolerance and heat intolerance.   Genitourinary:  Negative for difficulty urinating, frequency and urgency.   Musculoskeletal:  Positive for arthralgias and joint swelling. Negative for back pain,

## 2024-11-25 DIAGNOSIS — G89.4 CHRONIC PAIN SYNDROME: ICD-10-CM

## 2024-11-25 RX ORDER — HYDROCODONE BITARTRATE AND ACETAMINOPHEN 5; 325 MG/1; MG/1
1 TABLET ORAL EVERY 8 HOURS PRN
Qty: 90 TABLET | Refills: 0 | Status: SHIPPED | OUTPATIENT
Start: 2024-11-26 | End: 2024-12-26

## 2024-11-25 RX ORDER — GABAPENTIN 400 MG/1
CAPSULE ORAL
Qty: 90 CAPSULE | Refills: 0 | Status: SHIPPED | OUTPATIENT
Start: 2024-11-26 | End: 2024-12-26

## 2024-11-25 NOTE — TELEPHONE ENCOUNTER
OARRS reviewed. UDS: + for  hydrocodone gabapentin consistent.   Last seen: 10/15/2024. Follow-up:   Future Appointments   Date Time Provider Department Center   11/26/2024 11:00 AM Amy Treviño APRN - CNP [facility]   12/3/2024 10:40 AM Conrad Maldonado MD N [facility] [place]   12/17/2024  9:00 AM Milena Wheeler APRN - CNP [facility] [place]   1/15/2025 11:00 AM Gopal Boone APRN - CNP [facility]   1/28/2025 10:20 AM Kassi Rodriguez APRN - CNP [facility] [place]   3/31/2025 10:20 AM Jareth Rodriguez DO [facility] [place]   4/15/2025 10:00 AM Guillermo Street MD N [facility] - [place]   9/3/2025  1:00 PM Guillermo Street MD N [facility] [place]

## 2024-11-25 NOTE — TELEPHONE ENCOUNTER
Talya Schulz called requesting a refill on the following medications:  Requested Prescriptions     Pending Prescriptions Disp Refills    HYDROcodone-acetaminophen (NORCO) 5-325 MG per tablet 90 tablet 0     Sig: Take 1 tablet by mouth every 8 hours as needed for Pain for up to 30 days. Max Daily Amount: 3 tablets     Pharmacy verified:    [address] -  [phone] - F [phone]     Date of last visit: 10/15/2024  Date of next visit (if applicable): 12/17/2024

## 2024-11-26 ENCOUNTER — PATIENT MESSAGE (OUTPATIENT)
Dept: RHEUMATOLOGY | Age: 54
End: 2024-11-26

## 2024-12-02 ENCOUNTER — TELEPHONE (OUTPATIENT)
Dept: RHEUMATOLOGY | Age: 54
End: 2024-12-02

## 2024-12-02 NOTE — TELEPHONE ENCOUNTER
Kassi Rodriguez, APRN - CNP  [facility] Clinical Staff  Can you please call patient and review recent lab results with her?

## 2024-12-02 NOTE — TELEPHONE ENCOUNTER
Received call from [facility] stating they have made multiple attempts to contact patient to ship her Voclosporin. Left voice message and sent [employer] message.         [phone]

## 2024-12-02 NOTE — TELEPHONE ENCOUNTER
Left voice message for patient to return call to discuss test results. Mychart message sent to notify patient of results.

## 2024-12-03 ENCOUNTER — OFFICE VISIT (OUTPATIENT)
Dept: NEPHROLOGY | Age: 54
End: 2024-12-03
Payer: COMMERCIAL

## 2024-12-03 VITALS
HEIGHT: 66 IN | SYSTOLIC BLOOD PRESSURE: 132 MMHG | DIASTOLIC BLOOD PRESSURE: 86 MMHG | OXYGEN SATURATION: 98 % | BODY MASS INDEX: 24.3 KG/M2 | WEIGHT: 151.2 LBS | HEART RATE: 75 BPM

## 2024-12-03 DIAGNOSIS — E87.0 HYPERNATREMIA: ICD-10-CM

## 2024-12-03 DIAGNOSIS — D63.8 ANEMIA OF CHRONIC DISEASE: ICD-10-CM

## 2024-12-03 DIAGNOSIS — R80.9 PROTEINURIA, UNSPECIFIED TYPE: ICD-10-CM

## 2024-12-03 DIAGNOSIS — E11.9 TYPE 2 DIABETES MELLITUS WITHOUT COMPLICATION, WITHOUT LONG-TERM CURRENT USE OF INSULIN (HCC): ICD-10-CM

## 2024-12-03 DIAGNOSIS — E55.9 VITAMIN D DEFICIENCY: ICD-10-CM

## 2024-12-03 DIAGNOSIS — N18.32 STAGE 3B CHRONIC KIDNEY DISEASE (HCC): Primary | ICD-10-CM

## 2024-12-03 DIAGNOSIS — N25.81 HYPERPARATHYROIDISM, SECONDARY RENAL (HCC): ICD-10-CM

## 2024-12-03 DIAGNOSIS — M32.14 LUPUS NEPHRITIS (HCC): ICD-10-CM

## 2024-12-03 PROCEDURE — 1036F TOBACCO NON-USER: CPT | Performed by: INTERNAL MEDICINE

## 2024-12-03 PROCEDURE — G8484 FLU IMMUNIZE NO ADMIN: HCPCS | Performed by: INTERNAL MEDICINE

## 2024-12-03 PROCEDURE — 99214 OFFICE O/P EST MOD 30 MIN: CPT | Performed by: INTERNAL MEDICINE

## 2024-12-03 PROCEDURE — G8427 DOCREV CUR MEDS BY ELIG CLIN: HCPCS | Performed by: INTERNAL MEDICINE

## 2024-12-03 PROCEDURE — 3044F HG A1C LEVEL LT 7.0%: CPT | Performed by: INTERNAL MEDICINE

## 2024-12-03 PROCEDURE — 3017F COLORECTAL CA SCREEN DOC REV: CPT | Performed by: INTERNAL MEDICINE

## 2024-12-03 PROCEDURE — 2022F DILAT RTA XM EVC RTNOPTHY: CPT | Performed by: INTERNAL MEDICINE

## 2024-12-03 PROCEDURE — G8420 CALC BMI NORM PARAMETERS: HCPCS | Performed by: INTERNAL MEDICINE

## 2024-12-03 NOTE — PROGRESS NOTES
Talya states she was hospitalized at [facility] for 9 days for cardiac issues (fluid around her heart), covid and pneumonia. She say she may have to have open heart surgery in the near future. She will FU next year with [facility]. Talya reports bilateral ankle/feet/hands/fingers.   
96.8 11/21/2024     11/21/2024     BMP:    Lab Results   Component Value Date     (H) 11/21/2024     10/30/2024     10/22/2024    K 4.2 11/21/2024    K 4.6 10/30/2024    K 4.6 10/30/2024     11/21/2024     10/30/2024     10/22/2024    CO2 23 11/21/2024    CO2 26 10/30/2024    CO2 27 10/22/2024    BUN 28 (H) 11/21/2024    BUN 25 (A) 10/30/2024    BUN 13 10/22/2024    CREATININE 1.6 (H) 11/21/2024    CREATININE 1.68 (H) 10/30/2024    CREATININE 1.53 (H) 10/22/2024    GLUCOSE 85 11/21/2024    GLUCOSE 84 10/30/2024    GLUCOSE 91 10/22/2024      Hepatic:   Lab Results   Component Value Date    AST 14 11/21/2024    AST 10 (L) 10/30/2024    AST 8 (L) 09/16/2024    ALT 9 (L) 11/21/2024    ALT 5 (L) 10/30/2024    ALT 5 (L) 09/16/2024    BILITOT <0.2 (L) 11/21/2024    BILITOT 0.2 10/30/2024    BILITOT 0.2 09/16/2024    ALKPHOS 100 11/21/2024    ALKPHOS 77 10/30/2024    ALKPHOS 68 09/16/2024     BNP:   Lab Results   Component Value Date     (H) 09/28/2024     Lipids:   Lab Results   Component Value Date    CHOL 98 (L) 09/23/2024    HDL 38 09/23/2024     INR:   Lab Results   Component Value Date    INR 1.01 10/16/2024    INR 1.06 09/28/2024    INR 0.93 09/23/2024     URINE:   Lab Results   Component Value Date/Time    PROTUR 124.1 11/21/2024 11:12 AM     Lab Results   Component Value Date/Time    NITRU NEGATIVE 11/21/2024 11:12 AM    COLORU YELLOW 11/21/2024 11:12 AM    PHUR 5.0 11/21/2024 11:12 AM    PHUR 5.0 11/15/2023 04:12 PM    LABCAST NONE SEEN 11/21/2024 11:12 AM    LABCAST NONE SEEN 11/21/2024 11:12 AM    WBCUA 0-2 11/21/2024 11:12 AM    WBCUA 0-5 07/31/2024 10:23 AM    RBCUA 0-2 11/21/2024 11:12 AM    MUCUS Present 07/31/2024 10:23 AM    YEAST NONE SEEN 11/21/2024 11:12 AM    BACTERIA NONE SEEN 11/21/2024 11:12 AM    CLARITYU slightly cloudy 08/30/2017 11:53 AM    SPECGRAV 1.025 08/30/2017 11:53 AM    LEUKOCYTESUR NEGATIVE 11/21/2024 11:12 AM    LEUKOCYTESUR TRACE 
no diplopia/no photophobia

## 2024-12-04 NOTE — TELEPHONE ENCOUNTER
Leandro Solitario:   Lab testing with an anemia which has slightly improved since last evaluation. The kidney function test is stable and the protein in the urine is still elevated. Are you still seeing nephrology? And are you taking 1 tablet of voclosprin twice daily or 2 tablets twice daily?       Patient stated she is following with Dr Maldonado. She was just seen yesterday and was told everything was fine. Dr Chin note available in chart.     Patient thinks she is not taking the voclosorin. She dose not have the medication. Please advise. Thank you.

## 2024-12-11 ENCOUNTER — TELEPHONE (OUTPATIENT)
Dept: PHYSICAL MEDICINE AND REHAB | Age: 54
End: 2024-12-11

## 2024-12-11 ENCOUNTER — TELEPHONE (OUTPATIENT)
Dept: CARDIOLOGY CLINIC | Age: 54
End: 2024-12-11

## 2024-12-11 NOTE — TELEPHONE ENCOUNTER
Pt is requesting an appt at , no availabilities this year - would like a call back from the office to discuss possible appt changes.

## 2024-12-11 NOTE — TELEPHONE ENCOUNTER
Talya is calling to see if Eric wants to see her before April, she was supposed to have a coronary  bypass graft 10-02 but was canceled due to her being in the hosp, and wasn't RS, Does this procedure need to be RS, and does she need to see Eric sooner than April.

## 2024-12-11 NOTE — TELEPHONE ENCOUNTER
Called pt and LVM that will need to recall as no available 10-10:30 aptms till end of Jan. Did not cancel current aptm.

## 2024-12-11 NOTE — TELEPHONE ENCOUNTER
GERMAN  Spoke to patient.   She needs to see Dr Lorenzo in office prior to rescheduling the surgery. Discussed with Yessi, she is aware. Awaiting Dr Lorenzo's return to the office to schedule patient.

## 2024-12-17 ENCOUNTER — OFFICE VISIT (OUTPATIENT)
Dept: PHYSICAL MEDICINE AND REHAB | Age: 54
End: 2024-12-17
Payer: COMMERCIAL

## 2024-12-17 VITALS
BODY MASS INDEX: 24.27 KG/M2 | DIASTOLIC BLOOD PRESSURE: 62 MMHG | WEIGHT: 151 LBS | SYSTOLIC BLOOD PRESSURE: 120 MMHG | HEIGHT: 66 IN

## 2024-12-17 DIAGNOSIS — G89.4 CHRONIC PAIN SYNDROME: ICD-10-CM

## 2024-12-17 DIAGNOSIS — M54.16 LUMBAR RADICULITIS: ICD-10-CM

## 2024-12-17 DIAGNOSIS — M48.02 CERVICAL SPINAL STENOSIS: ICD-10-CM

## 2024-12-17 DIAGNOSIS — M46.1 BILATERAL SACROILIITIS (HCC): ICD-10-CM

## 2024-12-17 DIAGNOSIS — M54.12 CERVICAL RADICULITIS: ICD-10-CM

## 2024-12-17 DIAGNOSIS — G62.9 NEUROPATHY: ICD-10-CM

## 2024-12-17 DIAGNOSIS — M46.1 SI (SACROILIAC) JOINT INFLAMMATION (HCC): ICD-10-CM

## 2024-12-17 DIAGNOSIS — M47.812 CERVICAL SPONDYLOSIS: ICD-10-CM

## 2024-12-17 DIAGNOSIS — M47.816 LUMBAR SPONDYLOSIS: Primary | ICD-10-CM

## 2024-12-17 PROCEDURE — G8484 FLU IMMUNIZE NO ADMIN: HCPCS | Performed by: NURSE PRACTITIONER

## 2024-12-17 PROCEDURE — G8420 CALC BMI NORM PARAMETERS: HCPCS | Performed by: NURSE PRACTITIONER

## 2024-12-17 PROCEDURE — 99214 OFFICE O/P EST MOD 30 MIN: CPT | Performed by: NURSE PRACTITIONER

## 2024-12-17 PROCEDURE — G8427 DOCREV CUR MEDS BY ELIG CLIN: HCPCS | Performed by: NURSE PRACTITIONER

## 2024-12-17 PROCEDURE — 3017F COLORECTAL CA SCREEN DOC REV: CPT | Performed by: NURSE PRACTITIONER

## 2024-12-17 PROCEDURE — 1036F TOBACCO NON-USER: CPT | Performed by: NURSE PRACTITIONER

## 2024-12-17 RX ORDER — GABAPENTIN 400 MG/1
CAPSULE ORAL
Qty: 90 CAPSULE | Refills: 0 | Status: SHIPPED | OUTPATIENT
Start: 2024-12-17 | End: 2025-01-16

## 2024-12-17 RX ORDER — HYDROCODONE BITARTRATE AND ACETAMINOPHEN 5; 325 MG/1; MG/1
1 TABLET ORAL EVERY 8 HOURS PRN
Qty: 90 TABLET | Refills: 0 | Status: SHIPPED | OUTPATIENT
Start: 2024-12-26 | End: 2025-01-25

## 2024-12-17 ASSESSMENT — ENCOUNTER SYMPTOMS: BACK PAIN: 1

## 2024-12-17 NOTE — PROGRESS NOTES
Functionality Assessment/Goals Worksheet     On a scale of 0 (Does not Interfere) to 10 (Completely Interferes)     1.  Which number describes how during the past week pain has interfered with           the following:  A.  General Activity:  5  B.  Mood: 8  C.  Walking Ability:  5  D.  Normal Work (Includes both work outside the home and housework):  6  E.  Relations with Other People:   6  F.  Sleep:   7  G.  Enjoyment of Life:   6    2.  Patient Prefers to Take their Pain Medications:     [x]  On a regular basis   []  Only when necessary    []  Does not take pain medications    3.  What are the Patient's Goals/Expectations for Visiting Pain Management?     [x]  Learn about my pain    []  Receive Medication   []  Physical Therapy     []  Treat Depression   []  Receive Injections    []  Treat Sleep   []  Deal with Anxiety and Stress   []  Treat Opoid Dependence/Addiction   []  Other:

## 2024-12-17 NOTE — PROGRESS NOTES
Functionality Assessment/Goals Worksheet     On a scale of 0 (Does not Interfere) to 10 (Completely Interferes)     1.  Which number describes how during the past week pain has interfered with           the following:  A.  General Activity:  5  B.  Mood: 8  C.  Walking Ability:  5  D.  Normal Work (Includes both work outside the home and housework):  5  E.  Relations with Other People:   6  F.  Sleep:   8  G.  Enjoyment of Life:   7    2.  Patient Prefers to Take their Pain Medications:     [x]  On a regular basis   []  Only when necessary    []  Does not take pain medications    3.  What are the Patient's Goals/Expectations for Visiting Pain Management?     [x]  Learn about my pain    []  Receive Medication   []  Physical Therapy     []  Treat Depression   []  Receive Injections    []  Treat Sleep   []  Deal with Anxiety and Stress   []  Treat Opoid Dependence/Addiction   []  Other:   HPI:   Talya Schulz is a 54 y.o. female is here today for    Chief Complaint: neck thoracic  lumbar hip Si knee pain.  BLE NATALIA       F/U still pending open heart, was approved, still has to schedule. Waiting on a call,   She is going to start PT   Did not have Lumbar facet MBB completed was in Hospital pending  CABG.  Has not been able to complete PT for cervical and lumbar pain due to cardiac issues, she did start  for 1 week 2 visits but has not completed. She will reschedule when she feels better and has more endurance post URI COVID Pneumonia.       Her low back pain is her main pain complaint, increased pain with walking standing bending mostly.        EMG  due to RUE per Rheumatology  due to numbness tingling.         I ordered  EMG  2019  she has refused injections ELLIOT TFCESI offered  and then  when she agreed she  was denied and needed to complete PT.  Unable to complete PT due to transportation issues. She has been unable to tolerate PT in the past  due to cardiac issues and  Lupus.   No assistive devices today.

## 2024-12-31 ENCOUNTER — TELEPHONE (OUTPATIENT)
Dept: CARDIOTHORACIC SURGERY | Age: 54
End: 2024-12-31

## 2025-01-13 NOTE — TELEPHONE ENCOUNTER
Pt informed of message below . Pt voiced understanding with no further questions at this time.       Pt was given number to call for scheduling appt.       [phone]

## 2025-01-15 ENCOUNTER — OFFICE VISIT (OUTPATIENT)
Dept: FAMILY MEDICINE CLINIC | Age: 55
End: 2025-01-15

## 2025-01-15 VITALS
WEIGHT: 157.8 LBS | TEMPERATURE: 97.7 F | HEART RATE: 68 BPM | HEIGHT: 66 IN | RESPIRATION RATE: 16 BRPM | SYSTOLIC BLOOD PRESSURE: 128 MMHG | DIASTOLIC BLOOD PRESSURE: 82 MMHG | OXYGEN SATURATION: 98 % | BODY MASS INDEX: 25.36 KG/M2

## 2025-01-15 DIAGNOSIS — Z86.39 HX OF TYPE 2 DIABETES MELLITUS: ICD-10-CM

## 2025-01-15 DIAGNOSIS — Z00.01 ENCOUNTER FOR GENERAL ADULT MEDICAL EXAMINATION WITH ABNORMAL FINDINGS: Primary | ICD-10-CM

## 2025-01-15 DIAGNOSIS — H61.23 BILATERAL IMPACTED CERUMEN: ICD-10-CM

## 2025-01-15 DIAGNOSIS — R11.0 NAUSEA: ICD-10-CM

## 2025-01-15 DIAGNOSIS — M32.15 SYSTEMIC LUPUS ERYTHEMATOSUS WITH TUBULO-INTERSTITIAL NEPHROPATHY, UNSPECIFIED SLE TYPE (HCC): ICD-10-CM

## 2025-01-15 DIAGNOSIS — E78.5 DYSLIPIDEMIA: ICD-10-CM

## 2025-01-15 DIAGNOSIS — Z23 IMMUNIZATION DUE: ICD-10-CM

## 2025-01-15 DIAGNOSIS — E03.9 HYPOTHYROIDISM, UNSPECIFIED TYPE: ICD-10-CM

## 2025-01-15 DIAGNOSIS — N18.31 STAGE 3A CHRONIC KIDNEY DISEASE (CKD) (HCC): ICD-10-CM

## 2025-01-15 PROBLEM — E11.9 TYPE 2 DIABETES MELLITUS WITHOUT COMPLICATION, WITHOUT LONG-TERM CURRENT USE OF INSULIN (HCC): Status: RESOLVED | Noted: 2021-05-12 | Resolved: 2025-01-15

## 2025-01-15 PROBLEM — N18.4 CKD (CHRONIC KIDNEY DISEASE), STAGE IV (HCC): Status: RESOLVED | Noted: 2023-01-17 | Resolved: 2025-01-15

## 2025-01-15 LAB
CREAT UR-MCNC: 160.7 MG/DL
HBA1C MFR BLD: 4.9 % (ref 4.3–5.7)
MICROALBUMIN UR-MCNC: 325.79 MG/DL
MICROALBUMIN/CREAT RATIO PNL UR: 2027 MG/G (ref 0–30)

## 2025-01-15 RX ORDER — ONDANSETRON 4 MG/1
TABLET, FILM COATED ORAL
Qty: 30 TABLET | Refills: 0 | Status: CANCELLED | OUTPATIENT
Start: 2025-01-15

## 2025-01-15 RX ORDER — ONDANSETRON 4 MG/1
4 TABLET, ORALLY DISINTEGRATING ORAL 3 TIMES DAILY PRN
Qty: 21 TABLET | Refills: 0 | Status: SHIPPED | OUTPATIENT
Start: 2025-01-15

## 2025-01-15 SDOH — ECONOMIC STABILITY: FOOD INSECURITY: WITHIN THE PAST 12 MONTHS, YOU WORRIED THAT YOUR FOOD WOULD RUN OUT BEFORE YOU GOT MONEY TO BUY MORE.: SOMETIMES TRUE

## 2025-01-15 ASSESSMENT — ENCOUNTER SYMPTOMS
GASTROINTESTINAL NEGATIVE: 1
RESPIRATORY NEGATIVE: 1

## 2025-01-15 ASSESSMENT — PATIENT HEALTH QUESTIONNAIRE - PHQ9
SUM OF ALL RESPONSES TO PHQ QUESTIONS 1-9: 0
2. FEELING DOWN, DEPRESSED OR HOPELESS: NOT AT ALL
SUM OF ALL RESPONSES TO PHQ QUESTIONS 1-9: 0
SUM OF ALL RESPONSES TO PHQ QUESTIONS 1-9: 0
SUM OF ALL RESPONSES TO PHQ9 QUESTIONS 1 & 2: 0
1. LITTLE INTEREST OR PLEASURE IN DOING THINGS: NOT AT ALL
SUM OF ALL RESPONSES TO PHQ QUESTIONS 1-9: 0

## 2025-01-15 NOTE — PROGRESS NOTES
Well Adult Note  Name: Talya Schulz Today’s Date: 1/15/2025   MRN: [id number] Sex: Female   Age: 54 y.o. Ethnicity: Non- / Non    : 1970 Race: White (non-)      Talya Schulz is here for a well adult exam.       Assessment & Plan   Encounter for general adult medical examination with abnormal findings  Hypothyroidism, unspecified type  Stable continue synthroid 175 mcg daily   Systemic lupus erythematosus with tubulo-interstitial   No symptoms rheumatology managing   nephropathy, unspecified SLE type (HCC)  Nephrology managing  Not on nephrotoxic meds no new symptoms recent urine reviewed   Stage 3a chronic kidney disease (CKD) (HCC)  As above  Dyslipidemia  Continue statin  Low cholesterol low fat diet   Hx of type 2 diabetes mellitus  -     POCT glycosylated hemoglobin (Hb A1C) 4.9  Controlled with diet  Low carb diet to maintain normal weight   -     Albumin/Creatinine Ratio, Urine; Future  Immunization due  Nausea  No current symptoms   -     ondansetron (ZOFRAN-ODT) 4 MG disintegrating tablet; Take 1 tablet by mouth 3 times daily as needed for Nausea or Vomiting, Disp-21 tablet, R-0Normal  Bilateral impacted cerumen  -     REMOVE IMPACTED EAR WAX  Try debrox  Curretage used to remove right cerumen      Debrox gtts  Orders Placed This Encounter   Procedures    REMOVE IMPACTED EAR WAX    Pneumococcal, PCV20, PREVNAR 20, (age 6w+), IM, PF    Influenza, FLUCELVAX Trivalent, (age 6 mo+) IM, Preservative Free, 0.5mL    Albumin/Creatinine Ratio, Urine     Standing Status:   Future     Standing Expiration Date:   1/15/2026    POCT glycosylated hemoglobin (Hb A1C)     Ordered by an unspecified provider      POCT glycosylated hemoglobin (Hb A1C)      Return in about 6 months (around 7/15/2025) for return in 7 dya for ear flush, me in 6 months for OV .       Subjective   History:    Pt here for physical exam     Pt here for 6 month f/u     PMH: T2DM, spinal stenosis, hypothyroidism,

## 2025-01-15 NOTE — PROGRESS NOTES
Vaccine Information Sheet, \"Influenza - Inactivated\"  given to Talya Schulz, or parent/legal guardian of  Talya Schulz and verbalized understanding.    Patient responses:    Have you ever had a reaction to a flu vaccine? No  Do you have an allergy to eggs, neomycin or polymixin?  No  Do you have an allergy to Thimerosal, contact lens solution, or Merthiolate? No  Have you ever had Guillian Steele Syndrome?  No  Do you have any current illness?  No  Do you have a temperature above 100 degrees? Yes and No  Are you pregnant? No  If pregnant, permission obtained from physician? No  Do you have an active neurological disorder? No      Flu vaccine given per order. Please see immunization tab.

## 2025-01-15 NOTE — PATIENT INSTRUCTIONS
[place] [employer] Resources*  (Call [employer]/[facility] if need more resources.)    Home Delivered Meals:  [employer] Meals: [phone] for people and pets  [employer] Delivered Meals:  Phone: 1-194.211.7856  [facility] ([place] only):  Phone: [phone]  Moms Meals:  What they offer: Nutritious refrigerated meals. Available to individuals with Medicare advantage plans, Medicaid plans, Long-Term services and supports (LTSS) programs and self-pay at $7.99 a meal.  Renal-Friendly, pureed and Gluten free at $8.99 a meal plus flat rate shipping.   Phone: 1-179.557.6559  Website: https://www.Metooo    Senior Nutrition program:  Phone: 1-190.961.9340  [employer] Home Delivered Meals (From [place]):  What they offer: Meals for seniors, disabled, recovering from an injury or illness on [place] and [facility]. Both privately and through homecare services like [facility] and [facility].   Phone: 1-583.559.9536  Website: https://RUNform."[employer]    Other Food Resources:  MOW (Meals on Wheels) Contact Area Agency on Aging   Phone: 650.332.6711 or 1-606.768.3109    [facility]- Nutrition Program   What they offer: Nutrition education and nutritious foods for Pregnant women, women who just had a baby, breastfeeding moms, infants and children up to the age of 5.   Phone: 960.571.1741   Website: https://www.[employer]ubl[employer]AMINSIDEth.org  [facility]- Provides Meals  What they offer: Provide food boxes to those in need monthly.  Phone: 931.327.4970  Website: https://X2 Biosystemsgifted2you.org/ministries/[facility]  [employer]-Food/ Clothing   What they offer:  Provides a 3-day supply of food for individuals once a month.   Phone: 463.317.7616  Website: https://[employer]il.org/author/limacup1  [employer]-Meal Site   Phone: 926.724.4137  [place] [employer]- Meal Site  Phone: 1-919.533.7173  [employer] Daily [employer]:  What they offer: Provides continental breakfast and

## 2025-01-16 ENCOUNTER — CARE COORDINATION (OUTPATIENT)
Dept: CARE COORDINATION | Age: 55
End: 2025-01-16

## 2025-01-16 NOTE — CARE COORDINATION
DAVIDSON received a referral for pt from [facility] Sophia for food insecurity. DAVIDSON mailed [facility] [employer] Resources to pt along with [employer] pantry list. Included SW contact information encouraging a call back for any additional needs/concerns.

## 2025-01-27 DIAGNOSIS — G89.4 CHRONIC PAIN SYNDROME: Primary | ICD-10-CM

## 2025-01-27 RX ORDER — HYDROCODONE BITARTRATE AND ACETAMINOPHEN 5; 325 MG/1; MG/1
1 TABLET ORAL EVERY 6 HOURS PRN
COMMUNITY
End: 2025-01-27 | Stop reason: SDUPTHER

## 2025-01-27 RX ORDER — HYDROCODONE BITARTRATE AND ACETAMINOPHEN 5; 325 MG/1; MG/1
1 TABLET ORAL EVERY 8 HOURS PRN
Qty: 90 TABLET | Refills: 0 | Status: SHIPPED | OUTPATIENT
Start: 2025-01-27 | End: 2025-02-26

## 2025-01-27 NOTE — TELEPHONE ENCOUNTER
OARRS reviewed. UDS: + for  gabapentin and hydrocodone.   Last seen: 12/17/2024. Follow-up: 2/18/2025      Scfipt was set incorrect. Pt gets hydrocodone-acetaminophen 5-325 mg 1 tab Q8hrs.

## 2025-01-27 NOTE — TELEPHONE ENCOUNTER
Talya is requesting a refill of their   Requested Prescriptions     Pending Prescriptions Disp Refills    HYDROcodone-acetaminophen (NORCO) 5-325 MG per tablet       Sig: Take 1 tablet by mouth every 6 hours as needed for Pain. Max Daily Amount: 4 tablets   . Please advise.      Last Appt:  Visit date not found  Next Appt:   Visit date not found  Preferred pharmacy:   [facility] [address] [place] [address] [phone] - F [phone]

## 2025-01-29 ENCOUNTER — LAB (OUTPATIENT)
Dept: LAB | Age: 55
End: 2025-01-29

## 2025-01-29 DIAGNOSIS — Z51.81 MEDICATION MONITORING ENCOUNTER: ICD-10-CM

## 2025-01-29 DIAGNOSIS — M32.14 SYSTEMIC LUPUS ERYTHEMATOSUS WITH GLOMERULAR DISEASE, UNSPECIFIED SLE TYPE (HCC): ICD-10-CM

## 2025-01-29 LAB
ALBUMIN SERPL BCG-MCNC: 4 G/DL (ref 3.5–5.1)
ALP SERPL-CCNC: 89 U/L (ref 38–126)
ALT SERPL W/O P-5'-P-CCNC: 10 U/L (ref 11–66)
ANION GAP SERPL CALC-SCNC: 14 MEQ/L (ref 8–16)
AST SERPL-CCNC: 21 U/L (ref 5–40)
BACTERIA: ABNORMAL
BASOPHILS ABSOLUTE: 0 THOU/MM3 (ref 0–0.1)
BASOPHILS NFR BLD AUTO: 0.5 %
BILIRUB SERPL-MCNC: < 0.2 MG/DL (ref 0.3–1.2)
BILIRUB UR QL STRIP: NEGATIVE
BUN SERPL-MCNC: 32 MG/DL (ref 7–22)
CALCIUM SERPL-MCNC: 9.1 MG/DL (ref 8.5–10.5)
CASTS #/AREA URNS LPF: ABNORMAL /LPF
CASTS #/AREA URNS LPF: ABNORMAL /LPF
CHARACTER UR: CLEAR
CHARCOAL URNS QL MICRO: ABNORMAL
CHLORIDE SERPL-SCNC: 107 MEQ/L (ref 98–111)
CO2 SERPL-SCNC: 21 MEQ/L (ref 23–33)
COLOR UR: YELLOW
CREAT SERPL-MCNC: 2.2 MG/DL (ref 0.4–1.2)
CREAT UR-MCNC: 215.3 MG/DL
CRP SERPL-MCNC: < 0.3 MG/DL (ref 0–1)
CRYSTALS URNS QL MICRO: ABNORMAL
DEPRECATED RDW RBC AUTO: 52.8 FL (ref 35–45)
EOSINOPHIL NFR BLD AUTO: 0.8 %
EOSINOPHILS ABSOLUTE: 0.1 THOU/MM3 (ref 0–0.4)
EPITHELIAL CELLS, UA: ABNORMAL /HPF
ERYTHROCYTE [DISTWIDTH] IN BLOOD BY AUTOMATED COUNT: 15.5 % (ref 11.5–14.5)
ERYTHROCYTE [SEDIMENTATION RATE] IN BLOOD BY WESTERGREN METHOD: 8 MM/HR (ref 0–20)
GFR SERPL CREATININE-BSD FRML MDRD: 26 ML/MIN/1.73M2
GLUCOSE SERPL-MCNC: 86 MG/DL (ref 70–108)
GLUCOSE UR QL STRIP.AUTO: NEGATIVE MG/DL
HCT VFR BLD AUTO: 32.4 % (ref 37–47)
HGB BLD-MCNC: 9 GM/DL (ref 12–16)
HGB UR QL STRIP.AUTO: NEGATIVE
IMM GRANULOCYTES # BLD AUTO: 0.02 THOU/MM3 (ref 0–0.07)
IMM GRANULOCYTES NFR BLD AUTO: 0.3 %
KETONES UR QL STRIP.AUTO: NEGATIVE
LEUKOCYTE ESTERASE UR QL STRIP.AUTO: NEGATIVE
LYMPHOCYTES ABSOLUTE: 1.4 THOU/MM3 (ref 1–4.8)
LYMPHOCYTES NFR BLD AUTO: 19 %
MCH RBC QN AUTO: 26.2 PG (ref 26–33)
MCHC RBC AUTO-ENTMCNC: 27.8 GM/DL (ref 32.2–35.5)
MCV RBC AUTO: 94.2 FL (ref 81–99)
MONOCYTES ABSOLUTE: 0.5 THOU/MM3 (ref 0.4–1.3)
MONOCYTES NFR BLD AUTO: 6.5 %
NEUTROPHILS ABSOLUTE: 5.5 THOU/MM3 (ref 1.8–7.7)
NEUTROPHILS NFR BLD AUTO: 72.9 %
NITRITE UR QL STRIP.AUTO: NEGATIVE
NRBC BLD AUTO-RTO: 0 /100 WBC
PH UR STRIP.AUTO: 5 [PH] (ref 5–9)
PLATELET # BLD AUTO: 172 THOU/MM3 (ref 130–400)
PMV BLD AUTO: 12.4 FL (ref 9.4–12.4)
POTASSIUM SERPL-SCNC: 4.4 MEQ/L (ref 3.5–5.2)
PROT SERPL-MCNC: 6.5 G/DL (ref 6.1–8)
PROT UR STRIP.AUTO-MCNC: 300 MG/DL
PROT UR-MCNC: 123.9 MG/DL
PROT/CREAT 24H UR: 0.58 MG/G{CREAT}
RBC # BLD AUTO: 3.44 MILL/MM3 (ref 4.2–5.4)
RBC #/AREA URNS HPF: ABNORMAL /HPF
RENAL EPI CELLS #/AREA URNS HPF: ABNORMAL /[HPF]
SODIUM SERPL-SCNC: 142 MEQ/L (ref 135–145)
SP GR UR REFRACT.AUTO: 1.02 (ref 1–1.03)
UROBILINOGEN UR QL STRIP.AUTO: 0.2 EU/DL (ref 0–1)
WBC # BLD AUTO: 7.6 THOU/MM3 (ref 4.8–10.8)
WBC #/AREA URNS HPF: ABNORMAL /HPF
YEAST LIKE FUNGI URNS QL MICRO: ABNORMAL

## 2025-01-30 LAB
C3C SERPL-MCNC: 118 MG/DL (ref 90–180)
C4 SERPL-MCNC: 20 MG/DL (ref 10–40)

## 2025-01-31 DIAGNOSIS — M32.14 SYSTEMIC LUPUS ERYTHEMATOSUS WITH GLOMERULAR DISEASE, UNSPECIFIED SLE TYPE (HCC): ICD-10-CM

## 2025-01-31 DIAGNOSIS — R80.1 PERSISTENT PROTEINURIA: ICD-10-CM

## 2025-01-31 DIAGNOSIS — R10.9 ABDOMINAL PAIN, UNSPECIFIED ABDOMINAL LOCATION: ICD-10-CM

## 2025-01-31 DIAGNOSIS — G56.23 CUBITAL TUNNEL SYNDROME, BILATERAL: ICD-10-CM

## 2025-01-31 DIAGNOSIS — M32.14 OTHER SYSTEMIC LUPUS ERYTHEMATOSUS WITH GLOMERULAR DISEASE (HCC): ICD-10-CM

## 2025-01-31 RX ORDER — PREDNISONE 5 MG/1
10 TABLET ORAL DAILY
Qty: 90 TABLET | Refills: 1 | Status: SHIPPED | OUTPATIENT
Start: 2025-01-31

## 2025-01-31 RX ORDER — HYDROXYCHLOROQUINE SULFATE 200 MG/1
200 TABLET, FILM COATED ORAL 2 TIMES DAILY
Qty: 60 TABLET | Refills: 5 | Status: SHIPPED | OUTPATIENT
Start: 2025-01-31

## 2025-01-31 RX ORDER — MYCOPHENOLATE MOFETIL 500 MG/1
TABLET ORAL
Qty: 120 TABLET | Refills: 1 | Status: SHIPPED | OUTPATIENT
Start: 2025-01-31

## 2025-01-31 NOTE — TELEPHONE ENCOUNTER
DOLV: 11/21/24  DONV: 2/3/25  LAST LAB DRAW: 1/29/25  LAST TB TEST: 7/8/21    Lab Results   Component Value Date     01/29/2025    K 4.4 01/29/2025     01/29/2025    CO2 21 (L) 01/29/2025    BUN 32 (H) 01/29/2025    CREATININE 2.2 (H) 01/29/2025    GLUCOSE 86 01/29/2025    CALCIUM 9.1 01/29/2025    BILITOT <0.2 (L) 01/29/2025    ALKPHOS 89 01/29/2025    AST 21 01/29/2025    ALT 10 (L) 01/29/2025    LABGLOM 26 (A) 01/29/2025    AGRATIO 1.5 10/30/2024       Recent Labs     01/29/25  1339   WBC 7.6   HGB 9.0*   HCT 32.4*   MCV 94.2          Lab Results   Component Value Date    SEDRATE 8 01/29/2025       Lab Results   Component Value Date    CRP < 0.30 01/29/2025

## 2025-02-03 ENCOUNTER — OFFICE VISIT (OUTPATIENT)
Age: 55
End: 2025-02-03
Payer: COMMERCIAL

## 2025-02-03 ENCOUNTER — TELEPHONE (OUTPATIENT)
Dept: CARDIOTHORACIC SURGERY | Age: 55
End: 2025-02-03

## 2025-02-03 VITALS
SYSTOLIC BLOOD PRESSURE: 128 MMHG | HEART RATE: 87 BPM | HEIGHT: 66 IN | DIASTOLIC BLOOD PRESSURE: 80 MMHG | BODY MASS INDEX: 25.43 KG/M2 | WEIGHT: 158.2 LBS | OXYGEN SATURATION: 98 %

## 2025-02-03 DIAGNOSIS — M32.14 SYSTEMIC LUPUS ERYTHEMATOSUS WITH GLOMERULAR DISEASE, UNSPECIFIED SLE TYPE (HCC): Primary | ICD-10-CM

## 2025-02-03 DIAGNOSIS — R20.2 PARESTHESIA OF RIGHT UPPER EXTREMITY: ICD-10-CM

## 2025-02-03 DIAGNOSIS — G56.23 CUBITAL TUNNEL SYNDROME, BILATERAL: ICD-10-CM

## 2025-02-03 DIAGNOSIS — Z51.81 MEDICATION MONITORING ENCOUNTER: ICD-10-CM

## 2025-02-03 DIAGNOSIS — R80.1 PERSISTENT PROTEINURIA: ICD-10-CM

## 2025-02-03 PROCEDURE — 99214 OFFICE O/P EST MOD 30 MIN: CPT | Performed by: NURSE PRACTITIONER

## 2025-02-03 PROCEDURE — 1036F TOBACCO NON-USER: CPT | Performed by: NURSE PRACTITIONER

## 2025-02-03 PROCEDURE — G8427 DOCREV CUR MEDS BY ELIG CLIN: HCPCS | Performed by: NURSE PRACTITIONER

## 2025-02-03 PROCEDURE — G8419 CALC BMI OUT NRM PARAM NOF/U: HCPCS | Performed by: NURSE PRACTITIONER

## 2025-02-03 PROCEDURE — 3017F COLORECTAL CA SCREEN DOC REV: CPT | Performed by: NURSE PRACTITIONER

## 2025-02-03 PROCEDURE — 99213 OFFICE O/P EST LOW 20 MIN: CPT | Performed by: NURSE PRACTITIONER

## 2025-02-03 ASSESSMENT — ENCOUNTER SYMPTOMS
TROUBLE SWALLOWING: 0
CONSTIPATION: 0
COUGH: 0
DIARRHEA: 0
ABDOMINAL PAIN: 0
EYE ITCHING: 0
SHORTNESS OF BREATH: 0
EYE PAIN: 0
BACK PAIN: 0
NAUSEA: 0

## 2025-02-03 NOTE — PROGRESS NOTES
[facility] RHEUMATOLOGY FOLLOW UP NOTE       Date Of Service: 2/3/2025  Provider: Kassi Rodriguez, APRN - CNP    Name: Talya Schulz   MRN: [id number]    Chief Complaint(s)     Chief Complaint   Patient presents with    Follow-up     2 month follow up SLE         History of Present Illness (HPI)     Talya Schulz  is a(n)54 y.o. female with a hx of systemic lupus with hiistory of class IV lupus nephritis, CKD stage III, chronic low back pain w/ radiculopathy, lumbar stenosis, h/o seizures here for the f/u evaluation of systemic lupus     Interval hx:    -  increased headaches over the past 2 weeks. Has history of migraines, saw neurologist in the past for this issue. + sensitivity to light, nausea. Reports she has been in contact with her PCP.    - was off the voclosprin for ? How long. Just restarted in late December- pt thinks she is still taking it.    - still awaiting open heart surgery- has follow up with CT surgery on 2/11    pain affecting the neck, low back, right hand, right elbow   Pain on a scale 0-10:7/10  Type of pain: throbbing   Timing: mornings and evenings  Aggravating factors: none  Alleviating factors: gabapentin    Associated symptoms:  denies swelling/  Redness/ warmth, denies AM stiffness       REVIEW OF SYSTEMS: (ROS)    Review of Systems   Constitutional:  Positive for fatigue. Negative for appetite change, fever and unexpected weight change.   HENT:  Negative for congestion and trouble swallowing.         Nasal sores   Eyes:  Negative for pain and itching.   Respiratory:  Negative for cough and shortness of breath.    Cardiovascular:  Negative for chest pain and leg swelling.   Gastrointestinal:  Negative for abdominal pain, constipation, diarrhea and nausea.   Endocrine: Negative for cold intolerance and heat intolerance.   Genitourinary:  Negative for difficulty urinating, frequency and urgency.   Musculoskeletal:  Positive for arthralgias and joint swelling. Negative for back

## 2025-02-03 NOTE — TELEPHONE ENCOUNTER
Dr. Price, this patient is currently on the schedule for 02/11/2025 with Dr. Lorenzo to r/s CABG. The 1st time this surgery was scheduled patient was admitted to [facility] for Covid, surgery had to be cancelled, and pre op imaging was not completed.     Cardiac cath completed on 09/23/2024 w/ Dr. Street    TTE completed on 10/02/2024 at [facility], report in [facility].     Patient had a subxiphoid pericardial window done at [facility] on 10/02/2024, report is in epic.       Can you see this patient on 02/11/2025 at 10:00 am?     Please advise, thank you.

## 2025-02-11 ENCOUNTER — OFFICE VISIT (OUTPATIENT)
Dept: CARDIOTHORACIC SURGERY | Age: 55
End: 2025-02-11
Payer: COMMERCIAL

## 2025-02-11 ENCOUNTER — TELEPHONE (OUTPATIENT)
Dept: CARDIOTHORACIC SURGERY | Age: 55
End: 2025-02-11

## 2025-02-11 ENCOUNTER — PREP FOR PROCEDURE (OUTPATIENT)
Dept: CARDIOTHORACIC SURGERY | Age: 55
End: 2025-02-11

## 2025-02-11 VITALS
HEIGHT: 66 IN | DIASTOLIC BLOOD PRESSURE: 86 MMHG | BODY MASS INDEX: 33.11 KG/M2 | WEIGHT: 206 LBS | HEART RATE: 56 BPM | SYSTOLIC BLOOD PRESSURE: 135 MMHG

## 2025-02-11 DIAGNOSIS — I25.119 CORONARY ARTERY DISEASE INVOLVING NATIVE CORONARY ARTERY OF NATIVE HEART WITH ANGINA PECTORIS: Primary | ICD-10-CM

## 2025-02-11 DIAGNOSIS — M32.14 SYSTEMIC LUPUS ERYTHEMATOSUS WITH GLOMERULAR DISEASE, UNSPECIFIED SLE TYPE (HCC): ICD-10-CM

## 2025-02-11 DIAGNOSIS — I25.10 CAD, MULTIPLE VESSEL: Primary | ICD-10-CM

## 2025-02-11 PROCEDURE — G8427 DOCREV CUR MEDS BY ELIG CLIN: HCPCS | Performed by: THORACIC SURGERY (CARDIOTHORACIC VASCULAR SURGERY)

## 2025-02-11 PROCEDURE — 99215 OFFICE O/P EST HI 40 MIN: CPT | Performed by: THORACIC SURGERY (CARDIOTHORACIC VASCULAR SURGERY)

## 2025-02-11 PROCEDURE — G8417 CALC BMI ABV UP PARAM F/U: HCPCS | Performed by: THORACIC SURGERY (CARDIOTHORACIC VASCULAR SURGERY)

## 2025-02-11 PROCEDURE — 1036F TOBACCO NON-USER: CPT | Performed by: THORACIC SURGERY (CARDIOTHORACIC VASCULAR SURGERY)

## 2025-02-11 PROCEDURE — 3017F COLORECTAL CA SCREEN DOC REV: CPT | Performed by: THORACIC SURGERY (CARDIOTHORACIC VASCULAR SURGERY)

## 2025-02-11 RX ORDER — METOPROLOL TARTRATE 25 MG/1
12.5 TABLET, FILM COATED ORAL ONCE
Status: CANCELLED | OUTPATIENT
Start: 2025-02-11 | End: 2025-02-11

## 2025-02-11 RX ORDER — SODIUM CHLORIDE 0.9 % (FLUSH) 0.9 %
5-40 SYRINGE (ML) INJECTION PRN
Status: CANCELLED | OUTPATIENT
Start: 2025-02-11

## 2025-02-11 RX ORDER — ASPIRIN 81 MG/1
81 TABLET ORAL DAILY
Status: CANCELLED | OUTPATIENT
Start: 2025-02-11

## 2025-02-11 RX ORDER — SODIUM CHLORIDE 9 MG/ML
INJECTION, SOLUTION INTRAVENOUS PRN
Status: CANCELLED | OUTPATIENT
Start: 2025-02-11

## 2025-02-11 RX ORDER — SODIUM CHLORIDE 0.9 % (FLUSH) 0.9 %
5-40 SYRINGE (ML) INJECTION EVERY 12 HOURS SCHEDULED
Status: CANCELLED | OUTPATIENT
Start: 2025-02-11

## 2025-02-11 ASSESSMENT — ENCOUNTER SYMPTOMS
COLOR CHANGE: 0
BACK PAIN: 1
SHORTNESS OF BREATH: 1
ABDOMINAL PAIN: 0
CHEST TIGHTNESS: 1
EYE DISCHARGE: 0

## 2025-02-11 NOTE — PROGRESS NOTES
Lupus nephritis (HCC), Proteinuria, Seizures (HCC), and Systemic lupus erythematosus (HCC).    Social History  Talya  reports that she quit smoking about 12 years ago. Her smoking use included cigarettes. She started smoking about 13 years ago. She has a 0.3 pack-year smoking history. She has been exposed to tobacco smoke. She has never used smokeless tobacco. She reports current alcohol use. She reports that she does not use drugs.    Family History  Talya family history includes Bipolar Disorder in her sister; Diabetes in her brother, father, and mother; Heart Disease in her mother; Lupus in her maternal cousin; No Known Problems in her sister; Parkinsonism in her mother; Schizophrenia in her sister; Thyroid Disease in her mother and sister.    There is no family history of bicuspid aortic valve, aneurysms, heart transplant, pacemakers, defibrillators, or sudden cardiac death.      Past Surgical History   Past Surgical History:   Procedure Laterality Date    CARDIAC PROCEDURE Left 9/23/2024    Left heart cath / coronary angiography performed by Guillermo Street MD at [facility] CARDIAC CATH LAB    CT BIOPSY ABDOMEN RETROPERITONEUM  1/15/2021    CT BIOPSY ABDOMEN RETROPERITONEUM 1/15/2021 [facility] CT SCAN    LUMBAR NERVE BLOCK N/A 3/11/2019    LESI at L4 performed by Christian Cheatham MD at [facility] OR    LUMBAR NERVE BLOCK N/A 5/21/2019    LESI at L3 #1 performed by Christian Cheatham MD at [facility] OR    LUMBAR SPINE SURGERY Left 12/13/2018    LUMBAR TRANSFORAMINAL TFESI L5 LEFT #2, performed by Christian Cheatham MD at [facility] OR    NERVE BLOCK Bilateral     LUMBAR FACET    PAIN MANAGEMENT PROCEDURE N/A 11/1/2022    Cervical Epidural Steroid Injection  Cervical 6 performed by Christian Cheatham MD at [facility] OR    WA NJX AA&/STRD TFRML EPI LUMBAR/SACRAL 1 LEVEL Left 9/25/2018    LUMBAR TRANSFORAMINAL TFESI L5 on the LEFT performed by Christian Cheatham

## 2025-02-12 ENCOUNTER — TELEPHONE (OUTPATIENT)
Dept: CARDIOTHORACIC SURGERY | Age: 55
End: 2025-02-12

## 2025-02-12 NOTE — TELEPHONE ENCOUNTER
PROCEDURE: cabg x2    DATE OF SERVICE: 03/13/2025    SERVICE LOCATION: [facility]    CPT CODE: 16959    PHYSICIAN: DR SCHREIBER     DATE PRIOR AUTH SUBMITTED: 02/12/2025    STATUS: APPROVED    CASE NUMBER: [id number]    AUTH NUMBER:     VALID:  03/13/2025-03/14/2025

## 2025-02-17 ENCOUNTER — HOSPITAL ENCOUNTER (EMERGENCY)
Age: 55
Discharge: HOME | End: 2025-02-17
Payer: COMMERCIAL

## 2025-02-17 VITALS
WEIGHT: 157 LBS | TEMPERATURE: 98.6 F | HEART RATE: 98 BPM | OXYGEN SATURATION: 99 % | RESPIRATION RATE: 18 BRPM | HEIGHT: 66 IN | BODY MASS INDEX: 25.23 KG/M2 | DIASTOLIC BLOOD PRESSURE: 67 MMHG | SYSTOLIC BLOOD PRESSURE: 116 MMHG

## 2025-02-17 DIAGNOSIS — J11.1 INFLUENZA WITH RESPIRATORY MANIFESTATION OTHER THAN PNEUMONIA: Primary | ICD-10-CM

## 2025-02-17 LAB
FLUAV RNA RESP QL NAA+PROBE: DETECTED
FLUBV RNA RESP QL NAA+PROBE: NOT DETECTED
SARS-COV-2 RNA RESP QL NAA+PROBE: NOT DETECTED

## 2025-02-17 PROCEDURE — 6370000000 HC RX 637 (ALT 250 FOR IP)

## 2025-02-17 PROCEDURE — 99284 EMERGENCY DEPT VISIT MOD MDM: CPT

## 2025-02-17 PROCEDURE — 87636 SARSCOV2 & INF A&B AMP PRB: CPT

## 2025-02-17 PROCEDURE — 96372 THER/PROPH/DIAG INJ SC/IM: CPT

## 2025-02-17 PROCEDURE — 6360000002 HC RX W HCPCS

## 2025-02-17 RX ORDER — METOCLOPRAMIDE HYDROCHLORIDE 5 MG/ML
10 INJECTION INTRAMUSCULAR; INTRAVENOUS ONCE
Status: COMPLETED | OUTPATIENT
Start: 2025-02-17 | End: 2025-02-17

## 2025-02-17 RX ORDER — METOCLOPRAMIDE 10 MG/1
10 TABLET ORAL 4 TIMES DAILY
Qty: 120 TABLET | Refills: 0 | Status: SHIPPED | OUTPATIENT
Start: 2025-02-17

## 2025-02-17 RX ORDER — ONDANSETRON 4 MG/1
4 TABLET, ORALLY DISINTEGRATING ORAL 3 TIMES DAILY PRN
Qty: 21 TABLET | Refills: 0 | Status: SHIPPED | OUTPATIENT
Start: 2025-02-17 | End: 2025-02-17

## 2025-02-17 RX ORDER — KETOROLAC TROMETHAMINE 30 MG/ML
30 INJECTION, SOLUTION INTRAMUSCULAR; INTRAVENOUS ONCE
Status: COMPLETED | OUTPATIENT
Start: 2025-02-17 | End: 2025-02-17

## 2025-02-17 RX ORDER — ONDANSETRON 4 MG/1
4 TABLET, ORALLY DISINTEGRATING ORAL ONCE
Status: COMPLETED | OUTPATIENT
Start: 2025-02-17 | End: 2025-02-17

## 2025-02-17 RX ORDER — ONDANSETRON 2 MG/ML
4 INJECTION INTRAMUSCULAR; INTRAVENOUS ONCE
Status: DISCONTINUED | OUTPATIENT
Start: 2025-02-17 | End: 2025-02-17

## 2025-02-17 RX ORDER — ACETAMINOPHEN 500 MG
500 TABLET ORAL 4 TIMES DAILY PRN
Qty: 360 TABLET | Refills: 1 | Status: SHIPPED | OUTPATIENT
Start: 2025-02-17

## 2025-02-17 RX ADMIN — ONDANSETRON 4 MG: 4 TABLET, ORALLY DISINTEGRATING ORAL at 12:46

## 2025-02-17 RX ADMIN — METOCLOPRAMIDE HYDROCHLORIDE 10 MG: 5 INJECTION, SOLUTION INTRAMUSCULAR; INTRAVENOUS at 13:24

## 2025-02-17 RX ADMIN — KETOROLAC TROMETHAMINE 30 MG: 30 INJECTION, SOLUTION INTRAMUSCULAR at 12:47

## 2025-02-17 NOTE — ED TRIAGE NOTES
Pt presents to the ED through [facility] with c/o headache and body aches. States she has had symptoms for approx 5 days. States her grandchildren recently tested positive for flu A. Reports nausea and vomiting as well

## 2025-02-17 NOTE — ED PROVIDER NOTES
metoclopramide (REGLAN) injection 10 mg (10 mg IntraMUSCular Given 2/17/25 1324)         PROCEDURES: (None if blank)  Procedures:     CRITICAL CARE: (None if blank)      DISCHARGE PRESCRIPTIONS: (None if blank)  New Prescriptions    ACETAMINOPHEN (TYLENOL) 500 MG TABLET    Take 1 tablet by mouth 4 times daily as needed for Pain    METOCLOPRAMIDE (REGLAN) 10 MG TABLET    Take 1 tablet by mouth 4 times daily       FINAL IMPRESSION      1. Influenza with respiratory manifestation other than pneumonia          DISPOSITION/PLAN   DISPOSITION Decision To Discharge 02/17/2025 01:43:38 PM   DISPOSITION CONDITION Stable           OUTPATIENT FOLLOW UP THE PATIENT:  PaoloGopal, APRN - CNP  [address] [place] [address]  [phone]    Schedule an appointment as soon as possible for a visit   For follow up      MICHELLE Billingsley Lanz, PA-C  02/17/25 [id number]

## 2025-02-17 NOTE — DISCHARGE INSTRUCTIONS
Follow-up with your PCP within a week's time to monitor for full resolution of influenza.  In the meantime nature get plenty of bed rest and oral hydration.  Take the Zofran as needed for nausea vomiting. You cannot take NSAIDs due to your kidney function so I'm giving you tylenol 500mg. Take it as needed for the headache/bodyache.     Take your medication as indicated and prescribed.  For pain use acetaminophen (Tylenol) or ibuprofen (Motrin / Advil), unless prescribed medications that have acetaminophen or ibuprofen (or similar medications) in it.  You can take over the counter acetaminophen tablets (1 - 2 tablets of the 500-mg strength every 6 hours) or ibuprofen tablets (2 tablets every 4 hours).    You can use over the counter allergy medication (Allegra, Claritan, Zyrtec, etc) to help with the symptoms.  Drink plenty of water.  Avoid drinking alcohol or drinks that have caffeine.       Placing a humidifier in your room at night may be beneficial for helping with nasal congestion.    PLEASE RETURN TO THE EMERGENCY DEPARTMENT IMMEDIATELY for worsening symptoms, persistent fever, nausea and/or vomiting, or if you develop any concerning symptoms such as: high fever not relieved by acetaminophen (Tylenol) and/or ibuprofen (Motrin / Advil), chills, shortness of breath, chest pain, feeling of your heart fluttering or racing, loss of consciousness, numbness, weakness or tingling in the arms or legs or change in color of the extremities, changes in mental status, persistent headache, blurry vision, loss of bladder / bowel control, unable to follow up with your physician, or other any other care or concern.

## 2025-02-19 ENCOUNTER — OFFICE VISIT (OUTPATIENT)
Dept: PHYSICAL MEDICINE AND REHAB | Age: 55
End: 2025-02-19
Payer: COMMERCIAL

## 2025-02-19 VITALS
HEIGHT: 66 IN | DIASTOLIC BLOOD PRESSURE: 62 MMHG | WEIGHT: 157 LBS | BODY MASS INDEX: 25.23 KG/M2 | SYSTOLIC BLOOD PRESSURE: 112 MMHG

## 2025-02-19 DIAGNOSIS — M47.816 LUMBAR SPONDYLOSIS: ICD-10-CM

## 2025-02-19 DIAGNOSIS — M47.812 CERVICAL SPONDYLOSIS: ICD-10-CM

## 2025-02-19 DIAGNOSIS — M46.1 SI (SACROILIAC) JOINT INFLAMMATION: ICD-10-CM

## 2025-02-19 DIAGNOSIS — G62.9 NEUROPATHY: ICD-10-CM

## 2025-02-19 DIAGNOSIS — G89.4 CHRONIC PAIN SYNDROME: Primary | ICD-10-CM

## 2025-02-19 PROCEDURE — G8417 CALC BMI ABV UP PARAM F/U: HCPCS | Performed by: NURSE PRACTITIONER

## 2025-02-19 PROCEDURE — 1036F TOBACCO NON-USER: CPT | Performed by: NURSE PRACTITIONER

## 2025-02-19 PROCEDURE — 99214 OFFICE O/P EST MOD 30 MIN: CPT | Performed by: NURSE PRACTITIONER

## 2025-02-19 PROCEDURE — 3017F COLORECTAL CA SCREEN DOC REV: CPT | Performed by: NURSE PRACTITIONER

## 2025-02-19 PROCEDURE — G8427 DOCREV CUR MEDS BY ELIG CLIN: HCPCS | Performed by: NURSE PRACTITIONER

## 2025-02-19 RX ORDER — GABAPENTIN 400 MG/1
CAPSULE ORAL
Qty: 90 CAPSULE | Refills: 0 | Status: SHIPPED | OUTPATIENT
Start: 2025-02-19 | End: 2025-04-24

## 2025-02-19 RX ORDER — HYDROCODONE BITARTRATE AND ACETAMINOPHEN 5; 325 MG/1; MG/1
1 TABLET ORAL EVERY 8 HOURS PRN
Qty: 90 TABLET | Refills: 0 | Status: SHIPPED | OUTPATIENT
Start: 2025-02-26 | End: 2025-03-28

## 2025-02-19 ASSESSMENT — ENCOUNTER SYMPTOMS: BACK PAIN: 1

## 2025-02-19 NOTE — PROGRESS NOTES
Functionality Assessment/Goals Worksheet     On a scale of 0 (Does not Interfere) to 10 (Completely Interferes)     1.  Which number describes how during the past week pain has interfered with           the following:  A.  General Activity:  6  B.  Mood: 7  C.  Walking Ability:  5  D.  Normal Work (Includes both work outside the home and housework):  6  E.  Relations with Other People:   7  F.  Sleep:   5  G.  Enjoyment of Life:   5    2.  Patient Prefers to Take their Pain Medications:     [x]  On a regular basis   []  Only when necessary    []  Does not take pain medications    3.  What are the Patient's Goals/Expectations for Visiting Pain Management?     []  Learn about my pain    []  Receive Medication   []  Physical Therapy     []  Treat Depression   []  Receive Injections    []  Treat Sleep   []  Deal with Anxiety and Stress   []  Treat Opoid Dependence/Addiction   []  Other:     HPI:   Talya Schulz is a 54 y.o. female is here today for    Chief Complaint: neck thoracic  lumbar hip Si knee pain.  BLE BUE       F/U  Pending CABG 3/13/2025, following Dr Price, she is nervous scared.  Has cardiac testing next week,.  Had recent Flu    Did not have Lumbar facet MBB completed was in Hospital pending  CABG.  Has not been able to complete PT for cervical and lumbar pain due to cardiac issues, she did start  for 1 week 2 visits but has not completed. She will reschedule when she feels better and has more endurance post URI COVID Pneumonia FLU and now surgery .       Her low back pain is her main pain complaint, increased pain with walking standing bending mostly.        EMG  due to RUE per Rheumatology  due to numbness tingling.         I ordered  EMG  2019  she has refused injections ELLIOT TFCESI offered  and then  when she agreed she  was denied and needed to complete PT.  Unable to complete PT due to transportation issues. She has been unable to tolerate PT in the past  due to cardiac issues and  Lupus.   No

## 2025-02-21 ENCOUNTER — TELEPHONE (OUTPATIENT)
Dept: CARDIOTHORACIC SURGERY | Age: 55
End: 2025-02-21

## 2025-02-21 NOTE — TELEPHONE ENCOUNTER
Patient states she was seen at [facility] ER on 02/17/2025 and tested positive for influenza A.     Patient stated her symptoms started a week prior to being seen in the ER.     She is currently scheduled for pre op imaging on the 02/26/2025 for a CABG scheduled with Dr. Price on 03/13/2025, and is wondering if she should still have testing done on that day due to have influenza A.     I told patient to give our office a call on 02/25/2025 in the morning if she is still experiencing symptoms, and I will try to get imaging r/s in time for surgery.     Dr. Price, please agree.

## 2025-02-24 ENCOUNTER — TELEPHONE (OUTPATIENT)
Dept: CARDIOLOGY CLINIC | Age: 55
End: 2025-02-24

## 2025-02-24 NOTE — TELEPHONE ENCOUNTER
Pt called and LM on nurse line VM to cancel surgery on 3/13/2025.  Yessi notified and will call patient back.

## 2025-02-24 NOTE — TELEPHONE ENCOUNTER
Patient cancelled surgery, and will contact our office to r/s. Patient states she has influenza A, and doesn't want to risk it.

## 2025-02-24 NOTE — TELEPHONE ENCOUNTER
Patient called today and states she would like to cancel surgery, she will contact our office when she is feeling better to r/s surgery.     Please note Dr. Price.

## 2025-03-03 DIAGNOSIS — G89.4 CHRONIC PAIN SYNDROME: ICD-10-CM

## 2025-03-03 RX ORDER — HYDROCODONE BITARTRATE AND ACETAMINOPHEN 5; 325 MG/1; MG/1
1 TABLET ORAL EVERY 8 HOURS PRN
Qty: 90 TABLET | Refills: 0 | Status: SHIPPED | OUTPATIENT
Start: 2025-03-03 | End: 2025-04-02

## 2025-03-03 NOTE — TELEPHONE ENCOUNTER
OARRS reviewed. UDS: + for  hydrocodone consistent.   Last seen: 2/19/2025. Follow-up:   Future Appointments   Date Time Provider Department Center   3/31/2025 10:20 AM Jareth Rodriguez DO [facility] RHEUM [facility] - [place]   4/15/2025 10:00 AM Guillermo Street MD N [facility] Heart [facility] - [place]   4/30/2025  9:40 AM Milena Wheeler APRN - CNP N [facility] Pain [facility] - [place]   6/3/2025 10:40 AM Conrad Maldonado MD N [facility] Kidney P - [place]   7/15/2025 11:20 AM Gopal Boone APRN - CNP [facility]   9/3/2025  1:00 PM Guillermo Street MD [facility]

## 2025-03-03 NOTE — TELEPHONE ENCOUNTER
Talya Schulz called requesting a refill on the following medications:  Requested Prescriptions     Pending Prescriptions Disp Refills    HYDROcodone-acetaminophen (NORCO) 5-325 MG per tablet 90 tablet 0     Sig: Take 1 tablet by mouth every 8 hours as needed for Pain for up to 30 days. Max Daily Amount: 3 tablets     Pharmacy verified:    [facility] [address] [place] [address] -  [phone] - F [phone]      Date of last visit:   Date of next visit (if applicable): 4/30/2025      Pt went to pharmacy today, and they told her they did not get an order. Please resend.

## 2025-03-27 DIAGNOSIS — G89.4 CHRONIC PAIN SYNDROME: ICD-10-CM

## 2025-03-27 RX ORDER — HYDROCODONE BITARTRATE AND ACETAMINOPHEN 5; 325 MG/1; MG/1
1 TABLET ORAL EVERY 8 HOURS PRN
Qty: 90 TABLET | Refills: 0 | Status: SHIPPED | OUTPATIENT
Start: 2025-04-02 | End: 2025-05-02

## 2025-03-27 NOTE — TELEPHONE ENCOUNTER
Talya Schulz called requesting a refill on the following medications:  Requested Prescriptions     Pending Prescriptions Disp Refills    HYDROcodone-acetaminophen (NORCO) 5-325 MG per tablet 90 tablet 0     Sig: Take 1 tablet by mouth every 8 hours as needed for Pain for up to 30 days. Max Daily Amount: 3 tablets     Ph[facility]: [facility]  .pv      Date of last visit: 2/19/2025  Date of next visit (if applicable): 4/30/2025

## 2025-03-27 NOTE — TELEPHONE ENCOUNTER
OARRS reviewed. UDS: + for  hydrocodone. Gabapentin is non-compliant.   Last seen: 2/19/2025. Follow-up: 4/30/2025

## 2025-03-31 ENCOUNTER — TELEPHONE (OUTPATIENT)
Dept: CARDIOTHORACIC SURGERY | Age: 55
End: 2025-03-31

## 2025-03-31 ENCOUNTER — OFFICE VISIT (OUTPATIENT)
Age: 55
End: 2025-03-31
Payer: COMMERCIAL

## 2025-03-31 ENCOUNTER — LAB (OUTPATIENT)
Dept: LAB | Age: 55
End: 2025-03-31

## 2025-03-31 VITALS
HEIGHT: 66 IN | BODY MASS INDEX: 25.63 KG/M2 | WEIGHT: 159.5 LBS | OXYGEN SATURATION: 99 % | SYSTOLIC BLOOD PRESSURE: 136 MMHG | HEART RATE: 70 BPM | DIASTOLIC BLOOD PRESSURE: 84 MMHG

## 2025-03-31 DIAGNOSIS — M32.14 SYSTEMIC LUPUS ERYTHEMATOSUS WITH GLOMERULAR DISEASE, UNSPECIFIED SLE TYPE (HCC): ICD-10-CM

## 2025-03-31 DIAGNOSIS — Z51.81 MEDICATION MONITORING ENCOUNTER: ICD-10-CM

## 2025-03-31 DIAGNOSIS — M32.14 SYSTEMIC LUPUS ERYTHEMATOSUS WITH GLOMERULAR DISEASE, UNSPECIFIED SLE TYPE (HCC): Primary | ICD-10-CM

## 2025-03-31 DIAGNOSIS — M32.14 OTHER SYSTEMIC LUPUS ERYTHEMATOSUS WITH GLOMERULAR DISEASE: ICD-10-CM

## 2025-03-31 DIAGNOSIS — N18.9 CHRONIC KIDNEY DISEASE, UNSPECIFIED CKD STAGE: ICD-10-CM

## 2025-03-31 DIAGNOSIS — G56.23 CUBITAL TUNNEL SYNDROME, BILATERAL: ICD-10-CM

## 2025-03-31 LAB
ALBUMIN SERPL BCG-MCNC: 4.1 G/DL (ref 3.4–4.9)
ALP SERPL-CCNC: 101 U/L (ref 35–104)
ALT SERPL W/O P-5'-P-CCNC: 9 U/L (ref 10–35)
ANION GAP SERPL CALC-SCNC: 9 MEQ/L (ref 8–16)
AST SERPL-CCNC: 14 U/L (ref 10–35)
BACTERIA: ABNORMAL
BASOPHILS ABSOLUTE: 0 THOU/MM3 (ref 0–0.1)
BASOPHILS NFR BLD AUTO: 0.3 %
BILIRUB SERPL-MCNC: < 0.2 MG/DL (ref 0.3–1.2)
BILIRUB UR QL STRIP: NEGATIVE
BUN SERPL-MCNC: 39 MG/DL (ref 8–23)
C3C SERPL-MCNC: 118 MG/DL (ref 90–180)
C4 SERPL-MCNC: 17 MG/DL (ref 10–40)
CALCIUM SERPL-MCNC: 9.3 MG/DL (ref 8.6–10)
CASTS #/AREA URNS LPF: ABNORMAL /LPF
CASTS #/AREA URNS LPF: ABNORMAL /LPF
CHARACTER UR: CLEAR
CHARCOAL URNS QL MICRO: ABNORMAL
CHLORIDE SERPL-SCNC: 111 MEQ/L (ref 98–111)
CO2 SERPL-SCNC: 22 MEQ/L (ref 22–29)
COLOR UR: YELLOW
CREAT SERPL-MCNC: 1.9 MG/DL (ref 0.5–0.9)
CREAT UR-MCNC: 114 MG/DL
CRP SERPL-MCNC: < 0.3 MG/DL (ref 0–0.5)
CRYSTALS URNS QL MICRO: ABNORMAL
DEPRECATED RDW RBC AUTO: 55.4 FL (ref 35–45)
EOSINOPHIL NFR BLD AUTO: 0.8 %
EOSINOPHILS ABSOLUTE: 0.1 THOU/MM3 (ref 0–0.4)
EPITHELIAL CELLS, UA: ABNORMAL /HPF
ERYTHROCYTE [DISTWIDTH] IN BLOOD BY AUTOMATED COUNT: 16.2 % (ref 11.5–14.5)
ERYTHROCYTE [SEDIMENTATION RATE] IN BLOOD BY WESTERGREN METHOD: 17 MM/HR (ref 0–20)
GFR SERPL CREATININE-BSD FRML MDRD: 31 ML/MIN/1.73M2
GLUCOSE SERPL-MCNC: 92 MG/DL (ref 74–109)
GLUCOSE UR QL STRIP.AUTO: NEGATIVE MG/DL
HCT VFR BLD AUTO: 32.9 % (ref 37–47)
HGB BLD-MCNC: 9.4 GM/DL (ref 12–16)
HGB UR QL STRIP.AUTO: NEGATIVE
HYPOCHROMIA BLD QL SMEAR: PRESENT
IMM GRANULOCYTES # BLD AUTO: 0.02 THOU/MM3 (ref 0–0.07)
IMM GRANULOCYTES NFR BLD AUTO: 0.3 %
KETONES UR QL STRIP.AUTO: NEGATIVE
LEUKOCYTE ESTERASE UR QL STRIP.AUTO: ABNORMAL
LYMPHOCYTES ABSOLUTE: 1.5 THOU/MM3 (ref 1–4.8)
LYMPHOCYTES NFR BLD AUTO: 23.9 %
MCH RBC QN AUTO: 27 PG (ref 26–33)
MCHC RBC AUTO-ENTMCNC: 28.6 GM/DL (ref 32.2–35.5)
MCV RBC AUTO: 94.5 FL (ref 81–99)
MONOCYTES ABSOLUTE: 0.4 THOU/MM3 (ref 0.4–1.3)
MONOCYTES NFR BLD AUTO: 6.3 %
NEUTROPHILS ABSOLUTE: 4.3 THOU/MM3 (ref 1.8–7.7)
NEUTROPHILS NFR BLD AUTO: 68.4 %
NITRITE UR QL STRIP.AUTO: NEGATIVE
NRBC BLD AUTO-RTO: 0 /100 WBC
PH UR STRIP.AUTO: 5 [PH] (ref 5–9)
PLATELET # BLD AUTO: 184 THOU/MM3 (ref 130–400)
PMV BLD AUTO: 12.3 FL (ref 9.4–12.4)
POTASSIUM SERPL-SCNC: 4.9 MEQ/L (ref 3.5–5.2)
PROT SERPL-MCNC: 7 G/DL (ref 6.4–8.3)
PROT UR STRIP.AUTO-MCNC: 100 MG/DL
PROT UR-MCNC: 106 MG/DL
PROT/CREAT 24H UR: 0.93 MG/G{CREAT}
RBC # BLD AUTO: 3.48 MILL/MM3 (ref 4.2–5.4)
RBC #/AREA URNS HPF: ABNORMAL /HPF
RENAL EPI CELLS #/AREA URNS HPF: ABNORMAL /[HPF]
SCAN OF BLOOD SMEAR: NORMAL
SODIUM SERPL-SCNC: 142 MEQ/L (ref 135–145)
SP GR UR REFRACT.AUTO: 1.02 (ref 1–1.03)
UROBILINOGEN UR QL STRIP.AUTO: 0.2 EU/DL (ref 0–1)
WBC # BLD AUTO: 6.3 THOU/MM3 (ref 4.8–10.8)
WBC #/AREA URNS HPF: ABNORMAL /HPF
YEAST LIKE FUNGI URNS QL MICRO: ABNORMAL

## 2025-03-31 PROCEDURE — G8417 CALC BMI ABV UP PARAM F/U: HCPCS | Performed by: INTERNAL MEDICINE

## 2025-03-31 PROCEDURE — 99215 OFFICE O/P EST HI 40 MIN: CPT | Performed by: INTERNAL MEDICINE

## 2025-03-31 PROCEDURE — 1036F TOBACCO NON-USER: CPT | Performed by: INTERNAL MEDICINE

## 2025-03-31 PROCEDURE — G8427 DOCREV CUR MEDS BY ELIG CLIN: HCPCS | Performed by: INTERNAL MEDICINE

## 2025-03-31 PROCEDURE — 3017F COLORECTAL CA SCREEN DOC REV: CPT | Performed by: INTERNAL MEDICINE

## 2025-03-31 PROCEDURE — 99214 OFFICE O/P EST MOD 30 MIN: CPT | Performed by: INTERNAL MEDICINE

## 2025-03-31 RX ORDER — LORATADINE 10 MG/1
TABLET ORAL
COMMUNITY
Start: 2024-04-15

## 2025-03-31 RX ORDER — PREDNISONE 5 MG/1
5 TABLET ORAL DAILY
Qty: 90 TABLET | Refills: 1 | Status: SHIPPED | OUTPATIENT
Start: 2025-03-31

## 2025-03-31 RX ORDER — PREDNISONE 1 MG/1
4 TABLET ORAL DAILY
Qty: 120 TABLET | Refills: 1 | Status: SHIPPED | OUTPATIENT
Start: 2025-03-31 | End: 2025-05-30

## 2025-03-31 ASSESSMENT — ENCOUNTER SYMPTOMS
GASTROINTESTINAL NEGATIVE: 1
EYES NEGATIVE: 1
RESPIRATORY NEGATIVE: 1

## 2025-03-31 NOTE — PROGRESS NOTES
reviewed.   Constitutional:       Appearance: She is well-developed.   HENT:      Nose:      Comments: Nasal sore right nasal septum  Cardiovascular:      Rate and Rhythm: Normal rate and regular rhythm.      Heart sounds: Murmur heard.   Pulmonary:      Effort: Pulmonary effort is normal.   Musculoskeletal:         General: Tenderness present. No swelling.      Cervical back: Normal range of motion and neck supple.   Skin:     General: Skin is warm and dry.      Findings: No rash.   Neurological:      Mental Status: She is alert and oriented to person, place, and time.   Psychiatric:         Thought Content: Thought content normal.        Upper extremities:      ELBOWS + tinel right , mild flexion deformity Right. Tender Right medical epicondyle    - no pain with wrist extension, flexion, supination or prontation   WRISTS - tinel nicolas Right , negative phalen   HANDS/FINGERS nontender    Lower extremities:  Non-tender, No swelling       RAPID 3:   3/31/2025 --- RAPID 3: 3.3 + 6.5 + 7 = 16.8     Remission: <3  Low Disease Activity: <6  Moderate Disease Activity: >=6 and <=12  High Disease Activity: >12     LABS    CBC  Lab Results   Component Value Date/Time    WBC 7.6 01/29/2025 01:39 PM    RBC 3.44 01/29/2025 01:39 PM    HGB 9.0 01/29/2025 01:39 PM    HCT 32.4 01/29/2025 01:39 PM    MCV 94.2 01/29/2025 01:39 PM    MCH 26.2 01/29/2025 01:39 PM    MCHC 27.8 01/29/2025 01:39 PM    RDW 17.0 10/30/2024 09:54 AM     01/29/2025 01:39 PM    MPV 12.4 01/29/2025 01:39 PM       CMP  Lab Results   Component Value Date/Time    CALCIUM 9.1 01/29/2025 01:39 PM     01/29/2025 01:39 PM    K 4.4 01/29/2025 01:39 PM    K 3.7 11/09/2021 03:45 PM    CO2 21 01/29/2025 01:39 PM     01/29/2025 01:39 PM    BUN 32 01/29/2025 01:39 PM    CREATININE 2.2 01/29/2025 01:39 PM    ALKPHOS 89 01/29/2025 01:39 PM    ALT 10 01/29/2025 01:39 PM    AST 21 01/29/2025 01:39 PM    BILIDIR <0.2 05/29/2024 02:39 PM     Lab Results

## 2025-03-31 NOTE — TELEPHONE ENCOUNTER
Patient called to r/s CABG w/ Dr. Price.     I told patient once her pre op imaging has been completed, I will send a message to Dr. Price asking him what day he would like to schedule surgery.     CT chest w/o contrast scheduled for 04/07/2025.  Bilateral carotid duplex scheduled for 04/07/2025.  Bilateral vein mapping scheduled for 04/07/2025.

## 2025-03-31 NOTE — PATIENT INSTRUCTIONS
Please decrease the prednisone to 9mg tablets (one 5 mg tablet and 4 one milligram tablets)     Decrease the volcosporin to one tablet twice daily and have your labs repeated in 3 to 4 weeks

## 2025-04-01 DIAGNOSIS — R80.1 PERSISTENT PROTEINURIA: ICD-10-CM

## 2025-04-01 DIAGNOSIS — M32.14 SYSTEMIC LUPUS ERYTHEMATOSUS WITH GLOMERULAR DISEASE, UNSPECIFIED SLE TYPE (HCC): ICD-10-CM

## 2025-04-01 RX ORDER — MYCOPHENOLATE MOFETIL 500 MG/1
TABLET ORAL
Qty: 120 TABLET | Refills: 0 | Status: SHIPPED | OUTPATIENT
Start: 2025-04-01

## 2025-04-01 NOTE — TELEPHONE ENCOUNTER
DOLV: 03/31/25  DONV: 08/04/25  LAST LAB DRAW: 03/31/25  LAST TB TEST: 07/08/21    Lab Results   Component Value Date     03/31/2025    K 4.9 03/31/2025     03/31/2025    CO2 22 03/31/2025    BUN 39 (H) 03/31/2025    CREATININE 1.9 (H) 03/31/2025    GLUCOSE 92 03/31/2025    CALCIUM 9.3 03/31/2025    BILITOT <0.2 (L) 03/31/2025    ALKPHOS 101 03/31/2025    AST 14 03/31/2025    ALT 9 (L) 03/31/2025    LABGLOM 31 (A) 03/31/2025    AGRATIO 1.5 10/30/2024       Recent Labs     03/31/25  0947   WBC 6.3   HGB 9.4*   HCT 32.9*   MCV 94.5          Lab Results   Component Value Date    SEDRATE 17 03/31/2025       Lab Results   Component Value Date    CRP < 0.30 03/31/2025

## 2025-04-07 ENCOUNTER — HOSPITAL ENCOUNTER (OUTPATIENT)
Dept: CT IMAGING | Age: 55
Discharge: HOME | End: 2025-04-07
Attending: THORACIC SURGERY (CARDIOTHORACIC VASCULAR SURGERY)
Payer: COMMERCIAL

## 2025-04-07 ENCOUNTER — HOSPITAL ENCOUNTER (OUTPATIENT)
Dept: INTERVENTIONAL RADIOLOGY/VASCULAR | Age: 55
Discharge: HOME | End: 2025-04-09
Attending: THORACIC SURGERY (CARDIOTHORACIC VASCULAR SURGERY)
Payer: COMMERCIAL

## 2025-04-07 DIAGNOSIS — I25.119 CORONARY ARTERY DISEASE INVOLVING NATIVE CORONARY ARTERY OF NATIVE HEART WITH ANGINA PECTORIS: ICD-10-CM

## 2025-04-07 PROCEDURE — 71250 CT THORAX DX C-: CPT

## 2025-04-07 PROCEDURE — 93970 EXTREMITY STUDY: CPT

## 2025-04-07 PROCEDURE — 93880 EXTRACRANIAL BILAT STUDY: CPT

## 2025-04-07 NOTE — TELEPHONE ENCOUNTER
Impression of CT CHEST completed 04/07/2025        Impression for vein mapping           Vascular carotid duplex, bilateral impression.               Dr. Price, patient would like to r/s CABG X2,     Please advise, thank you.

## 2025-04-09 NOTE — TELEPHONE ENCOUNTER
Verbal agreement from Dr. Price.    Patient's last dose for mycophenolate (CELLCEPT) , and Voclosporin 7.9 MG is 4/10/2025.    LMOM with patient to schedule surgery. Also called patient's son Giovanny and informed him that I am trying to reach her as well.

## 2025-04-09 NOTE — TELEPHONE ENCOUNTER
When would you like patient to stop the follow medications? Last day 04/10?     Mycophenolate  And  Voclosporin    Thank you.

## 2025-04-09 NOTE — TELEPHONE ENCOUNTER
Spoke with patient to go over pre op instructions, offered to go over them in office with her face to face,because she seemed to be getting confused. Patient refused, she got previous pre op instructions out, and changed dates/ and medications according to new surgery date.     Procedure: CABG X2   Date: 04/25/2025     Arrival Time: 5:30 am for a 7:30 am start time.     Nothing to eat or drink after midnight.      Must have a .     Take metoprolol succinate of surgery with a small sip of water.      Meds to Hold: all over the counter vitamins, supplements, including cbd oil for 7 days.     HOLD VOCOLOSPORIN 2 WKS PRIOR TO SURGERY. LAST DAY 04/10/2025     MYCOPHENOLATE ( CELLCEPT) 500 GM 2 WEEKS PRIOR TO SURGERY. LAST DAY 04/10/2025     PAT scheduled for:04/22/2025 AT 8:00 AM.       BILATERAL CAROTID DUPLEX COMPLETED 04/07/2025.     BILATERAL LOWER EXTREMITY VEIN MAPPING COMPLETED 04/07/2025.     CT CHEST W/O CONTRAST SCHEDULED FOR 04/07/2025.    4 WEEK POST OP WITH CT SURGERY 05/20/2025 WITH CHELSIE WADE PA-C     6 WEEK CARDIOLOGY FU SCHEDULED WITH JEY HAILE PA-C

## 2025-04-10 ENCOUNTER — TELEPHONE (OUTPATIENT)
Dept: CARDIOTHORACIC SURGERY | Age: 55
End: 2025-04-10

## 2025-04-10 RX ORDER — SODIUM CHLORIDE 0.9 % (FLUSH) 0.9 %
5-40 SYRINGE (ML) INJECTION EVERY 12 HOURS SCHEDULED
OUTPATIENT
Start: 2025-04-10

## 2025-04-10 RX ORDER — SODIUM CHLORIDE 9 MG/ML
INJECTION, SOLUTION INTRAVENOUS PRN
OUTPATIENT
Start: 2025-04-10

## 2025-04-10 RX ORDER — ASPIRIN 81 MG/1
81 TABLET ORAL DAILY
OUTPATIENT
Start: 2025-04-10

## 2025-04-10 RX ORDER — METOPROLOL TARTRATE 25 MG/1
12.5 TABLET, FILM COATED ORAL ONCE
OUTPATIENT
Start: 2025-04-10 | End: 2025-04-10

## 2025-04-10 RX ORDER — SODIUM CHLORIDE 0.9 % (FLUSH) 0.9 %
5-40 SYRINGE (ML) INJECTION PRN
OUTPATIENT
Start: 2025-04-10

## 2025-04-10 NOTE — TELEPHONE ENCOUNTER
CPT 50712  SURGEON: Dr. Price   Date of surgery:04/25/2025  Date auth submitted :04/10/2025  Pending case number [id number]

## 2025-04-11 NOTE — TELEPHONE ENCOUNTER
Patient came into the office today so that I could go over medications with her, she had all the prescriptions in their regular containers.

## 2025-04-22 ENCOUNTER — HOSPITAL ENCOUNTER (OUTPATIENT)
Dept: PREADMISSION TESTING | Age: 55
Discharge: HOME | End: 2025-04-26
Payer: COMMERCIAL

## 2025-04-22 ENCOUNTER — HOSPITAL ENCOUNTER (OUTPATIENT)
Dept: GENERAL RADIOLOGY | Age: 55
Discharge: HOME | End: 2025-04-22
Payer: COMMERCIAL

## 2025-04-22 VITALS
SYSTOLIC BLOOD PRESSURE: 144 MMHG | OXYGEN SATURATION: 100 % | HEART RATE: 64 BPM | WEIGHT: 165 LBS | HEIGHT: 66 IN | RESPIRATION RATE: 16 BRPM | TEMPERATURE: 97.5 F | DIASTOLIC BLOOD PRESSURE: 94 MMHG | BODY MASS INDEX: 26.52 KG/M2

## 2025-04-22 DIAGNOSIS — I25.119 CORONARY ARTERY DISEASE INVOLVING NATIVE CORONARY ARTERY OF NATIVE HEART WITH ANGINA PECTORIS: ICD-10-CM

## 2025-04-22 LAB
ANION GAP SERPL CALC-SCNC: 9 MEQ/L (ref 8–16)
BACTERIA: ABNORMAL
BILIRUB UR QL STRIP: NEGATIVE
BUN SERPL-MCNC: 22 MG/DL (ref 8–23)
CALCIUM SERPL-MCNC: 9.6 MG/DL (ref 8.6–10)
CASTS #/AREA URNS LPF: ABNORMAL /LPF
CASTS #/AREA URNS LPF: ABNORMAL /LPF
CHARACTER UR: CLEAR
CHARCOAL URNS QL MICRO: ABNORMAL
CHLORIDE SERPL-SCNC: 109 MEQ/L (ref 98–111)
CO2 SERPL-SCNC: 23 MEQ/L (ref 22–29)
COLOR UR: YELLOW
CREAT SERPL-MCNC: 1.7 MG/DL (ref 0.5–0.9)
CRYSTALS URNS QL MICRO: ABNORMAL
DEPRECATED RDW RBC AUTO: 53.6 FL (ref 35–45)
EKG ATRIAL RATE: 66 BPM
EKG P AXIS: 47 DEGREES
EKG P-R INTERVAL: 154 MS
EKG Q-T INTERVAL: 416 MS
EKG QRS DURATION: 84 MS
EKG QTC CALCULATION (BAZETT): 436 MS
EKG R AXIS: 15 DEGREES
EKG T AXIS: 58 DEGREES
EKG VENTRICULAR RATE: 66 BPM
EPITHELIAL CELLS, UA: ABNORMAL /HPF
ERYTHROCYTE [DISTWIDTH] IN BLOOD BY AUTOMATED COUNT: 15.7 % (ref 11.5–14.5)
GFR SERPL CREATININE-BSD FRML MDRD: 36 ML/MIN/1.73M2
GLUCOSE SERPL-MCNC: 95 MG/DL (ref 74–109)
GLUCOSE UR QL STRIP.AUTO: NEGATIVE MG/DL
HCT VFR BLD AUTO: 34.6 % (ref 37–47)
HGB BLD-MCNC: 10.1 GM/DL (ref 12–16)
HGB UR QL STRIP.AUTO: NEGATIVE
INR PPP: 1.01 (ref 0.85–1.13)
KETONES UR QL STRIP.AUTO: NEGATIVE
LEUKOCYTE ESTERASE UR QL STRIP.AUTO: NEGATIVE
MCH RBC QN AUTO: 27.1 PG (ref 26–33)
MCHC RBC AUTO-ENTMCNC: 29.2 GM/DL (ref 32.2–35.5)
MCV RBC AUTO: 92.8 FL (ref 81–99)
NITRITE UR QL STRIP.AUTO: NEGATIVE
PH UR STRIP.AUTO: 5 [PH] (ref 5–9)
PLATELET # BLD AUTO: 172 THOU/MM3 (ref 130–400)
PMV BLD AUTO: 11 FL (ref 9.4–12.4)
POTASSIUM SERPL-SCNC: 4.7 MEQ/L (ref 3.5–5.2)
PROT UR STRIP.AUTO-MCNC: 100 MG/DL
RBC # BLD AUTO: 3.73 MILL/MM3 (ref 4.2–5.4)
RBC #/AREA URNS HPF: ABNORMAL /HPF
RENAL EPI CELLS #/AREA URNS HPF: ABNORMAL /[HPF]
SELECTED GEL ANTIBODY SCREEN: NORMAL
SODIUM SERPL-SCNC: 141 MEQ/L (ref 135–145)
SP GR UR REFRACT.AUTO: 1.02 (ref 1–1.03)
UROBILINOGEN UR QL STRIP.AUTO: 0.2 EU/DL (ref 0–1)
WBC # BLD AUTO: 4.8 THOU/MM3 (ref 4.8–10.8)
WBC #/AREA URNS HPF: ABNORMAL /HPF
YEAST LIKE FUNGI URNS QL MICRO: ABNORMAL

## 2025-04-22 PROCEDURE — 93005 ELECTROCARDIOGRAM TRACING: CPT

## 2025-04-22 PROCEDURE — 80048 BASIC METABOLIC PNL TOTAL CA: CPT

## 2025-04-22 PROCEDURE — 87081 CULTURE SCREEN ONLY: CPT

## 2025-04-22 PROCEDURE — 86885 COOMBS TEST INDIRECT QUAL: CPT

## 2025-04-22 PROCEDURE — 71046 X-RAY EXAM CHEST 2 VIEWS: CPT

## 2025-04-22 PROCEDURE — 81001 URINALYSIS AUTO W/SCOPE: CPT

## 2025-04-22 PROCEDURE — 86922 COMPATIBILITY TEST ANTIGLOB: CPT

## 2025-04-22 PROCEDURE — 85610 PROTHROMBIN TIME: CPT

## 2025-04-22 PROCEDURE — 85027 COMPLETE CBC AUTOMATED: CPT

## 2025-04-22 PROCEDURE — 86900 BLOOD TYPING SEROLOGIC ABO: CPT

## 2025-04-22 PROCEDURE — 36415 COLL VENOUS BLD VENIPUNCTURE: CPT

## 2025-04-22 PROCEDURE — 93010 ELECTROCARDIOGRAM REPORT: CPT | Performed by: INTERNAL MEDICINE

## 2025-04-22 PROCEDURE — 86905 BLOOD TYPING RBC ANTIGENS: CPT

## 2025-04-22 PROCEDURE — 86901 BLOOD TYPING SEROLOGIC RH(D): CPT

## 2025-04-22 ASSESSMENT — PAIN SCALES - GENERAL: PAINLEVEL_OUTOF10: 0

## 2025-04-22 NOTE — PROGRESS NOTES
PAT:  Pain assessment and management discussed with pt and voiced understanding. Pt denies pain at present.

## 2025-04-22 NOTE — PROGRESS NOTES
NPO after midnight,  (which includes no gum, mints or ice chips) except sips of water to take medication as instructed  No smoking or chewing tobacco, marijuana or illicit drugs 24 hours before surgery per anesthesia  Bring insurance info and drivers license  Wear loose, comfortable clean clothing  Do not wear makeup, nail polish, piercings or contact lenses. Do not bring jewelry or valuables. Bring case for glasses. Do not glue in dentures/partials  You may bring cell phone and     Bring medications in original bottles  Routine preop instructions given for pain, hand hygiene, fall prevention, infection prevention, anesthesia, cough and deep breath, MRSA/MSSA and progressive diet and ambulation  4% Chlorhexidine soap bottle given to patient. Shower and wash hair with chlorhexidine soap morning of surgery- instruction sheet given. No deodorant, lotions, creams or powders.  IS instruction given and return demonstration  Open Heart Book reviewed, questions answered and book give to patient  To help prevent bowel problems after surgery we ask that you drink a hot beverage and eat an Activia yogurt with each meal starting 3-4 days before surgery.  Viewed open heart video  IS : 750 ml   Follow all instructions give by your physician    Do you have a DNR?   Please bring your Healthcare document or Healthcare Power of  so we can scan it into your chart     needed at discharge  Report to [facility] on 2nd floor  If you would become ill prior to surgery, please call the surgeon right away  Masks are recommended but not required. You may  a new mask by our .

## 2025-04-22 NOTE — PROGRESS NOTES
Open Heart Pre-op Surgery Showering Instructions   To keep your skin as clean as possible and to help prevent infection, please follow these instructions:  1.Shower with a cleanser containing Chlorhexidine Gluconate (CHG) the morning of your surgery.  *Note* when using CHG cleanser do not use it on mucous membranes, such as your genital area. Do not get the soap in your eyes.  CHG is absorbed by cotton washcloths and may cause discoloration to wash cloth.  2.In the shower, wet skin and wash your body and your hair with CHG cleanser.  3.Pay special attention to area(s) where you will have surgery. (ie Chest, both arms and both legs)  4.Ask someone for help if you are unable to wash certain areas of your body.  5.Rinse well.  6.Gently dry with a clean cloth.  7.When you arrive the day of surgery, part of prep for surgery will include clipping of the hair on the front of your body and showering after.     PLEASE REMEMBER:    1. DO NOT SHAVE ANY BODY PARTS from neck down ( including your legs or underarms.) for at least 2 days before surgery.  Shaving can increase your risk of infection.   2. AFTER YOUR SHOWER, do not use any powder, deodorant, perfumes, or lotions prior to surgery.  3. WEAR FRESHLY LAUNDERED pajamas to bed that night and sleep on freshly laundered sheets and pillow cases.

## 2025-04-22 NOTE — PROGRESS NOTES
Preliminary Discharge Planning Questionnaire  Date of Surgery 4/25/25   Surgeon Dee      Having the proper help and care after surgery is very important to your recovery. Who will be able to help you at home when you are discharged from the hospital?    son    Name(s) Giovanny     How many steps to enter your home?  6    Bathroom on first floor?  Yes    Bedroom on the first floor?  Yes    Do you have an elevated toilet seat to use at home?  No    Do you have a walker to use at home?    Total Joints - with wheels N/A   Spine - with wheels  N/A     Have you been doing home exercises?yes    *You will go home with some outpatient physical therapy, where do you prefer to go? [facility]     This typically will not start until after your post visit to your Dr. (3-4 weeks after surgery)    * What home health agency would you like to use?[facility]      List of all Home Health Agencies Available given to patient, with website ( per Social Work Team)      Please have 2 preferences when you come for surgery- 1st and 2nd choice                                                               *Cardiac Rehab plans [facility]

## 2025-04-23 ENCOUNTER — PREP FOR PROCEDURE (OUTPATIENT)
Dept: CARDIOTHORACIC SURGERY | Age: 55
End: 2025-04-23

## 2025-04-23 LAB
MRSA SPEC QL CULT: ABNORMAL
ORGANISM: ABNORMAL

## 2025-04-23 RX ORDER — MUPIROCIN 20 MG/G
OINTMENT TOPICAL
Qty: 1 G | Refills: 0 | Status: ON HOLD | OUTPATIENT
Start: 2025-04-23 | End: 2025-04-30

## 2025-04-24 ENCOUNTER — ANESTHESIA EVENT (OUTPATIENT)
Dept: OPERATING ROOM | Age: 55
End: 2025-04-24
Payer: COMMERCIAL

## 2025-04-25 ENCOUNTER — HOSPITAL ENCOUNTER (INPATIENT)
Age: 55
LOS: 5 days | Discharge: HOME | DRG: 166 | End: 2025-04-30
Attending: THORACIC SURGERY (CARDIOTHORACIC VASCULAR SURGERY) | Admitting: THORACIC SURGERY (CARDIOTHORACIC VASCULAR SURGERY)
Payer: COMMERCIAL

## 2025-04-25 ENCOUNTER — APPOINTMENT (OUTPATIENT)
Dept: GENERAL RADIOLOGY | Age: 55
DRG: 166 | End: 2025-04-25
Attending: THORACIC SURGERY (CARDIOTHORACIC VASCULAR SURGERY)
Payer: COMMERCIAL

## 2025-04-25 ENCOUNTER — ANESTHESIA (OUTPATIENT)
Dept: OPERATING ROOM | Age: 55
End: 2025-04-25
Payer: COMMERCIAL

## 2025-04-25 DIAGNOSIS — M32.14 SYSTEMIC LUPUS ERYTHEMATOSUS WITH GLOMERULAR DISEASE, UNSPECIFIED SLE TYPE (HCC): ICD-10-CM

## 2025-04-25 DIAGNOSIS — R80.1 PERSISTENT PROTEINURIA: ICD-10-CM

## 2025-04-25 DIAGNOSIS — Z95.1 S/P CABG X 3: Primary | ICD-10-CM

## 2025-04-25 DIAGNOSIS — I25.119 CORONARY ARTERY DISEASE INVOLVING NATIVE CORONARY ARTERY OF NATIVE HEART WITH ANGINA PECTORIS: ICD-10-CM

## 2025-04-25 DIAGNOSIS — G89.4 CHRONIC PAIN SYNDROME: ICD-10-CM

## 2025-04-25 PROBLEM — I25.10 CAD, MULTIPLE VESSEL: Status: ACTIVE | Noted: 2025-04-25

## 2025-04-25 LAB
ACTIVATED CLOTTING TIME: 151 SECONDS (ref 99–130)
ACTIVATED CLOTTING TIME: 619 SECONDS (ref 99–130)
ACTIVATED CLOTTING TIME: 735 SECONDS (ref 99–130)
ACTIVATED CLOTTING TIME: 845 SECONDS (ref 99–130)
ANION GAP SERPL CALC-SCNC: 12 MEQ/L (ref 8–16)
ARTERIAL PATENCY WRIST A: ABNORMAL
ARTERIAL PATENCY WRIST A: ABNORMAL
BASE EXCESS BLDA CALC-SCNC: -2 MMOL/L (ref -2.5–2.5)
BASE EXCESS BLDA CALC-SCNC: -3 MMOL/L (ref -2.5–2.5)
BASE EXCESS BLDA CALC-SCNC: -3.4 MMOL/L (ref -2.5–2.5)
BASE EXCESS BLDA CALC-SCNC: -3.5 MMOL/L (ref -2.5–2.5)
BASE EXCESS BLDA CALC-SCNC: -3.8 MMOL/L (ref -2–3)
BASE EXCESS BLDA CALC-SCNC: -4.4 MMOL/L (ref -2.5–2.5)
BASE EXCESS BLDA CALC-SCNC: -4.9 MMOL/L (ref -2.5–2.5)
BDY SITE: ABNORMAL
BUN SERPL-MCNC: 21 MG/DL (ref 8–23)
CA-I BLD ISE-SCNC: 0.96 MMOL/L (ref 1.12–1.32)
CA-I BLD ISE-SCNC: 0.98 MMOL/L (ref 1.12–1.32)
CA-I BLD ISE-SCNC: 1.15 MMOL/L (ref 1.12–1.32)
CA-I BLD ISE-SCNC: 1.16 MMOL/L (ref 1.12–1.32)
CA-I BLD ISE-SCNC: 1.21 MMOL/L (ref 1.12–1.32)
CA-I BLD ISE-SCNC: 1.43 MMOL/L (ref 1.12–1.32)
CALCIUM SERPL-MCNC: 8.4 MG/DL (ref 8.6–10)
CARTRIDGE COLOR: NORMAL
CFT BLD TEG: 1.7 MINUTES (ref 0.8–2.1)
CHLORIDE SERPL-SCNC: 112 MEQ/L (ref 98–111)
CLOT ANGLE BLD TEG: 16.2 MM (ref 15–32)
CLOT ANGLE BLD TEG: 68.8 DEG (ref 63–78)
CLOT INIT BLD TEG: 6.5 MINUTES (ref 4.6–9.1)
CLOT INIT P HPASE BLD TEG: 5.1 MINUTES (ref 4.3–8.3)
CO2 SERPL-SCNC: 19 MEQ/L (ref 22–29)
COLLECTED BY:: ABNORMAL
COMMENT: ABNORMAL
COMMENT: ABNORMAL
CREAT SERPL-MCNC: 1.6 MG/DL (ref 0.5–0.9)
DEPRECATED RDW RBC AUTO: 49 FL (ref 35–45)
DEVICE: ABNORMAL
ERYTHROCYTE [DISTWIDTH] IN BLOOD BY AUTOMATED COUNT: 15.2 % (ref 11.5–14.5)
FIO2 ON VENT O2 ANALYZER: 40 %
FIO2 ON VENT O2 ANALYZER: 80 %
FUNCT FIBRINOGEN LEVEL: 295.6 MG/DL (ref 278–581)
GFR SERPL CREATININE-BSD FRML MDRD: 38 ML/MIN/1.73M2
GLUCOSE BLD STRIP.AUTO-MCNC: 104 MG/DL (ref 70–108)
GLUCOSE BLD STRIP.AUTO-MCNC: 105 MG/DL (ref 70–108)
GLUCOSE BLD STRIP.AUTO-MCNC: 107 MG/DL (ref 70–108)
GLUCOSE BLD STRIP.AUTO-MCNC: 130 MG/DL (ref 70–108)
GLUCOSE BLD STRIP.AUTO-MCNC: 143 MG/DL (ref 70–108)
GLUCOSE BLD STRIP.AUTO-MCNC: 148 MG/DL (ref 70–108)
GLUCOSE BLD STRIP.AUTO-MCNC: 154 MG/DL (ref 70–108)
GLUCOSE BLD STRIP.AUTO-MCNC: 89 MG/DL (ref 70–108)
GLUCOSE BLD STRIP.AUTO-MCNC: 97 MG/DL (ref 70–108)
GLUCOSE BLD-MCNC: 125 MG/DL (ref 70–108)
GLUCOSE BLD-MCNC: 150 MG/DL (ref 70–108)
GLUCOSE BLD-MCNC: 169 MG/DL (ref 70–108)
GLUCOSE BLD-MCNC: 203 MG/DL (ref 70–108)
GLUCOSE BLD-MCNC: 216 MG/DL (ref 70–108)
GLUCOSE BLD-MCNC: 90 MG/DL (ref 70–108)
GLUCOSE BLD-MCNC: NORMAL MG/DL (ref 70–108)
GLUCOSE SERPL-MCNC: 135 MG/DL (ref 74–109)
HCO3 BLDA-SCNC: 20 MMOL/L (ref 23–28)
HCO3 BLDA-SCNC: 21 MMOL/L (ref 23–28)
HCO3 BLDA-SCNC: 22 MMOL/L (ref 23–28)
HCO3 BLDA-SCNC: 22 MMOL/L (ref 23–28)
HCT VFR BLD AUTO: 24.5 % (ref 37–47)
HCT VFR BLD AUTO: 27.8 % (ref 37–47)
HCT VFR BLD AUTO: 29.8 % (ref 37–47)
HEMOGLOBIN FINGERSTICK, POC: 5.4 G/DL (ref 12–16)
HEMOGLOBIN FINGERSTICK, POC: 7.1 G/DL (ref 12–16)
HEMOGLOBIN FINGERSTICK, POC: 7.2 G/DL (ref 12–16)
HEMOGLOBIN FINGERSTICK, POC: 8.4 G/DL (ref 12–16)
HGB BLD-MCNC: 8 GM/DL (ref 12–16)
HGB BLD-MCNC: 9 GM/DL (ref 12–16)
HGB BLD-MCNC: 9.1 GM/DL (ref 12–16)
INR PPP: 1.27 (ref 0.85–1.13)
MAGNESIUM SERPL-MCNC: 4.2 MG/DL (ref 1.6–2.6)
MCF BLD TEG: 50.6 MM (ref 52–69)
MCF.EXTRINSIC BLD ROTEM: 50.8 MM (ref 52–70)
MCH RBC QN AUTO: 28.7 PG (ref 26–33)
MCHC RBC AUTO-ENTMCNC: 32.4 GM/DL (ref 32.2–35.5)
MCV RBC AUTO: 88.5 FL (ref 81–99)
PATIENT BOLUS: 6076
PATIENT HEPARIN CONCENTRATION: NORMAL
PCO2 BLDA: 31 MMHG (ref 35–45)
PCO2 BLDA: 32 MMHG (ref 35–45)
PCO2 BLDA: 33 MMHG (ref 35–45)
PCO2 BLDA: 37 MMHG (ref 35–45)
PCO2 BLDA: 41 MMHG (ref 35–45)
PCO2 BLDA: 43 MMHG (ref 35–45)
PCO2 TEMP ADJ BLDMV: 30 MMHG (ref 41–51)
PEEP SETTING VENT: 6 MMHG
PEEP SETTING VENT: 6 MMHG
PH BLDA: 7.32 [PH] (ref 7.35–7.45)
PH BLDA: 7.33 [PH] (ref 7.35–7.45)
PH BLDA: 7.36 [PH] (ref 7.35–7.45)
PH BLDA: 7.41 [PH] (ref 7.35–7.45)
PH BLDA: 7.42 [PH] (ref 7.35–7.45)
PH BLDA: 7.46 [PH] (ref 7.35–7.45)
PH BLDMV: 7.43 [PH] (ref 7.31–7.41)
PIP: 18 CMH2O
PIP: 22 CMH2O
PLATELET # BLD AUTO: 105 THOU/MM3 (ref 130–400)
PLATELET # BLD AUTO: 143 THOU/MM3 (ref 130–400)
PLATELET # BLD AUTO: 86 THOU/MM3 (ref 130–400)
PMV BLD AUTO: 11 FL (ref 9.4–12.4)
PO2 BLDA: 168 MMHG (ref 71–104)
PO2 BLDA: 279 MMHG (ref 71–104)
PO2 BLDA: 348 MMHG (ref 71–104)
PO2 BLDA: 482 MMHG (ref 71–104)
PO2 BLDA: 554 MMHG (ref 71–104)
PO2 BLDA: 561 MMHG (ref 71–104)
PO2 BLDMV: 41 MMHG (ref 25–40)
POTASSIUM BLD-SCNC: 4.1 MEQ/L (ref 3.5–4.9)
POTASSIUM BLD-SCNC: 4.2 MEQ/L (ref 3.5–4.9)
POTASSIUM BLD-SCNC: 5.4 MEQ/L (ref 3.5–4.9)
POTASSIUM BLD-SCNC: 6.5 MEQ/L (ref 3.5–4.9)
POTASSIUM BLD-SCNC: 6.9 MEQ/L (ref 3.5–4.9)
POTASSIUM SERPL-SCNC: 4.4 MEQ/L (ref 3.5–5.2)
POTASSIUM SERPL-SCNC: 5.4 MEQ/L (ref 3.5–5.2)
PROJECTED HEPARIN CONCENTATION: 3
RANGE: NORMAL
RBC # BLD AUTO: 3.14 MILL/MM3 (ref 4.2–5.4)
SAO2 % BLDA: 100 %
SAO2 % BLDA: 99 %
SAO2 % BLDMV: 78 %
SCAN OF BLOOD SMEAR: NORMAL
SITE: ABNORMAL
SODIUM BLD-SCNC: 136 MEQ/L (ref 138–146)
SODIUM BLD-SCNC: 138 MEQ/L (ref 138–146)
SODIUM BLD-SCNC: 138 MEQ/L (ref 138–146)
SODIUM BLD-SCNC: 141 MEQ/L (ref 138–146)
SODIUM BLD-SCNC: 143 MEQ/L (ref 138–146)
SODIUM SERPL-SCNC: 143 MEQ/L (ref 135–145)
VENTILATION MODE VENT: ABNORMAL
WBC # BLD AUTO: 7.5 THOU/MM3 (ref 4.8–10.8)

## 2025-04-25 PROCEDURE — 84295 ASSAY OF SERUM SODIUM: CPT

## 2025-04-25 PROCEDURE — 2500000003 HC RX 250 WO HCPCS: Performed by: THORACIC SURGERY (CARDIOTHORACIC VASCULAR SURGERY)

## 2025-04-25 PROCEDURE — 0BNN0ZZ RELEASE RIGHT PLEURA, OPEN APPROACH: ICD-10-PCS | Performed by: THORACIC SURGERY (CARDIOTHORACIC VASCULAR SURGERY)

## 2025-04-25 PROCEDURE — 06BP3ZZ EXCISION OF RIGHT SAPHENOUS VEIN, PERCUTANEOUS APPROACH: ICD-10-PCS | Performed by: THORACIC SURGERY (CARDIOTHORACIC VASCULAR SURGERY)

## 2025-04-25 PROCEDURE — 82947 ASSAY GLUCOSE BLOOD QUANT: CPT

## 2025-04-25 PROCEDURE — 82803 BLOOD GASES ANY COMBINATION: CPT

## 2025-04-25 PROCEDURE — B24BZZ4 ULTRASONOGRAPHY OF HEART WITH AORTA, TRANSESOPHAGEAL: ICD-10-PCS | Performed by: THORACIC SURGERY (CARDIOTHORACIC VASCULAR SURGERY)

## 2025-04-25 PROCEDURE — 2500000003 HC RX 250 WO HCPCS: Performed by: NURSE ANESTHETIST, CERTIFIED REGISTERED

## 2025-04-25 PROCEDURE — 3600000018 HC SURGERY OHS ADDTL 15MIN: Performed by: THORACIC SURGERY (CARDIOTHORACIC VASCULAR SURGERY)

## 2025-04-25 PROCEDURE — 94002 VENT MGMT INPAT INIT DAY: CPT

## 2025-04-25 PROCEDURE — 2709999900 HC NON-CHARGEABLE SUPPLY: Performed by: THORACIC SURGERY (CARDIOTHORACIC VASCULAR SURGERY)

## 2025-04-25 PROCEDURE — 02NN0ZZ RELEASE PERICARDIUM, OPEN APPROACH: ICD-10-PCS | Performed by: THORACIC SURGERY (CARDIOTHORACIC VASCULAR SURGERY)

## 2025-04-25 PROCEDURE — 6360000002 HC RX W HCPCS: Performed by: NURSE ANESTHETIST, CERTIFIED REGISTERED

## 2025-04-25 PROCEDURE — 2000000000 HC ICU R&B

## 2025-04-25 PROCEDURE — 2720000010 HC SURG SUPPLY STERILE: Performed by: THORACIC SURGERY (CARDIOTHORACIC VASCULAR SURGERY)

## 2025-04-25 PROCEDURE — 84132 ASSAY OF SERUM POTASSIUM: CPT

## 2025-04-25 PROCEDURE — 82948 REAGENT STRIP/BLOOD GLUCOSE: CPT

## 2025-04-25 PROCEDURE — 85027 COMPLETE CBC AUTOMATED: CPT

## 2025-04-25 PROCEDURE — 33508 ENDOSCOPIC VEIN HARVEST: CPT | Performed by: PHYSICIAN ASSISTANT

## 2025-04-25 PROCEDURE — 83735 ASSAY OF MAGNESIUM: CPT

## 2025-04-25 PROCEDURE — 3700000001 HC ADD 15 MINUTES (ANESTHESIA): Performed by: THORACIC SURGERY (CARDIOTHORACIC VASCULAR SURGERY)

## 2025-04-25 PROCEDURE — 6360000002 HC RX W HCPCS: Performed by: THORACIC SURGERY (CARDIOTHORACIC VASCULAR SURGERY)

## 2025-04-25 PROCEDURE — 87081 CULTURE SCREEN ONLY: CPT

## 2025-04-25 PROCEDURE — 6370000000 HC RX 637 (ALT 250 FOR IP): Performed by: THORACIC SURGERY (CARDIOTHORACIC VASCULAR SURGERY)

## 2025-04-25 PROCEDURE — 6360000002 HC RX W HCPCS: Performed by: PHYSICIAN ASSISTANT

## 2025-04-25 PROCEDURE — P9041 ALBUMIN (HUMAN),5%, 50ML: HCPCS | Performed by: THORACIC SURGERY (CARDIOTHORACIC VASCULAR SURGERY)

## 2025-04-25 PROCEDURE — 0BNP0ZZ RELEASE LEFT PLEURA, OPEN APPROACH: ICD-10-PCS | Performed by: THORACIC SURGERY (CARDIOTHORACIC VASCULAR SURGERY)

## 2025-04-25 PROCEDURE — 71045 X-RAY EXAM CHEST 1 VIEW: CPT

## 2025-04-25 PROCEDURE — C1729 CATH, DRAINAGE: HCPCS | Performed by: THORACIC SURGERY (CARDIOTHORACIC VASCULAR SURGERY)

## 2025-04-25 PROCEDURE — 5A1221Z PERFORMANCE OF CARDIAC OUTPUT, CONTINUOUS: ICD-10-PCS | Performed by: THORACIC SURGERY (CARDIOTHORACIC VASCULAR SURGERY)

## 2025-04-25 PROCEDURE — 85018 HEMOGLOBIN: CPT

## 2025-04-25 PROCEDURE — 85014 HEMATOCRIT: CPT

## 2025-04-25 PROCEDURE — 33508 ENDOSCOPIC VEIN HARVEST: CPT | Performed by: THORACIC SURGERY (CARDIOTHORACIC VASCULAR SURGERY)

## 2025-04-25 PROCEDURE — 85576 BLOOD PLATELET AGGREGATION: CPT

## 2025-04-25 PROCEDURE — 87086 URINE CULTURE/COLONY COUNT: CPT

## 2025-04-25 PROCEDURE — 33512 CABG VEIN THREE: CPT | Performed by: THORACIC SURGERY (CARDIOTHORACIC VASCULAR SURGERY)

## 2025-04-25 PROCEDURE — 80048 BASIC METABOLIC PNL TOTAL CA: CPT

## 2025-04-25 PROCEDURE — 85610 PROTHROMBIN TIME: CPT

## 2025-04-25 PROCEDURE — 36415 COLL VENOUS BLD VENIPUNCTURE: CPT

## 2025-04-25 PROCEDURE — 3700000000 HC ANESTHESIA ATTENDED CARE: Performed by: THORACIC SURGERY (CARDIOTHORACIC VASCULAR SURGERY)

## 2025-04-25 PROCEDURE — 33512 CABG VEIN THREE: CPT | Performed by: PHYSICIAN ASSISTANT

## 2025-04-25 PROCEDURE — 85049 AUTOMATED PLATELET COUNT: CPT

## 2025-04-25 PROCEDURE — 85347 COAGULATION TIME ACTIVATED: CPT

## 2025-04-25 PROCEDURE — P9016 RBC LEUKOCYTES REDUCED: HCPCS

## 2025-04-25 PROCEDURE — 2580000003 HC RX 258: Performed by: THORACIC SURGERY (CARDIOTHORACIC VASCULAR SURGERY)

## 2025-04-25 PROCEDURE — P9045 ALBUMIN (HUMAN), 5%, 250 ML: HCPCS | Performed by: NURSE ANESTHETIST, CERTIFIED REGISTERED

## 2025-04-25 PROCEDURE — 37799 UNLISTED PX VASCULAR SURGERY: CPT

## 2025-04-25 PROCEDURE — 2580000003 HC RX 258: Performed by: PHYSICIAN ASSISTANT

## 2025-04-25 PROCEDURE — 6370000000 HC RX 637 (ALT 250 FOR IP): Performed by: PHYSICIAN ASSISTANT

## 2025-04-25 PROCEDURE — 3600000008 HC SURGERY OHS BASE: Performed by: THORACIC SURGERY (CARDIOTHORACIC VASCULAR SURGERY)

## 2025-04-25 PROCEDURE — 2580000003 HC RX 258: Performed by: NURSE ANESTHETIST, CERTIFIED REGISTERED

## 2025-04-25 PROCEDURE — 85384 FIBRINOGEN ACTIVITY: CPT

## 2025-04-25 PROCEDURE — 82330 ASSAY OF CALCIUM: CPT

## 2025-04-25 PROCEDURE — 85520 HEPARIN ASSAY: CPT

## 2025-04-25 PROCEDURE — 021209W BYPASS CORONARY ARTERY, THREE ARTERIES FROM AORTA WITH AUTOLOGOUS VENOUS TISSUE, OPEN APPROACH: ICD-10-PCS | Performed by: THORACIC SURGERY (CARDIOTHORACIC VASCULAR SURGERY)

## 2025-04-25 PROCEDURE — 6370000000 HC RX 637 (ALT 250 FOR IP): Performed by: NURSE ANESTHETIST, CERTIFIED REGISTERED

## 2025-04-25 RX ORDER — SODIUM CHLORIDE 9 MG/ML
INJECTION, SOLUTION INTRAVENOUS CONTINUOUS
Status: DISCONTINUED | OUTPATIENT
Start: 2025-04-25 | End: 2025-04-26

## 2025-04-25 RX ORDER — MORPHINE SULFATE 2 MG/ML
2 INJECTION, SOLUTION INTRAMUSCULAR; INTRAVENOUS
Status: DISCONTINUED | OUTPATIENT
Start: 2025-04-25 | End: 2025-04-25

## 2025-04-25 RX ORDER — SENNA AND DOCUSATE SODIUM 50; 8.6 MG/1; MG/1
1 TABLET, FILM COATED ORAL 2 TIMES DAILY
Status: DISCONTINUED | OUTPATIENT
Start: 2025-04-25 | End: 2025-04-30 | Stop reason: HOSPADM

## 2025-04-25 RX ORDER — SENNOSIDES 8.8 MG/5ML
10 LIQUID ORAL DAILY PRN
Status: DISCONTINUED | OUTPATIENT
Start: 2025-04-25 | End: 2025-04-30 | Stop reason: HOSPADM

## 2025-04-25 RX ORDER — POTASSIUM CHLORIDE 29.8 MG/ML
20 INJECTION INTRAVENOUS PRN
Status: DISCONTINUED | OUTPATIENT
Start: 2025-04-25 | End: 2025-04-26

## 2025-04-25 RX ORDER — METOPROLOL TARTRATE 25 MG/1
12.5 TABLET, FILM COATED ORAL ONCE
Status: DISCONTINUED | OUTPATIENT
Start: 2025-04-25 | End: 2025-04-25 | Stop reason: HOSPADM

## 2025-04-25 RX ORDER — LIDOCAINE HCL/PF 100 MG/5ML
SYRINGE (ML) INJECTION
Status: DISCONTINUED | OUTPATIENT
Start: 2025-04-25 | End: 2025-04-25 | Stop reason: SDUPTHER

## 2025-04-25 RX ORDER — ONDANSETRON 4 MG/1
4 TABLET, ORALLY DISINTEGRATING ORAL EVERY 8 HOURS PRN
Status: DISCONTINUED | OUTPATIENT
Start: 2025-04-25 | End: 2025-04-30 | Stop reason: HOSPADM

## 2025-04-25 RX ORDER — ENOXAPARIN SODIUM 100 MG/ML
40 INJECTION SUBCUTANEOUS DAILY
Status: DISCONTINUED | OUTPATIENT
Start: 2025-04-26 | End: 2025-04-30 | Stop reason: HOSPADM

## 2025-04-25 RX ORDER — SODIUM CHLORIDE 0.9 % (FLUSH) 0.9 %
5-40 SYRINGE (ML) INJECTION PRN
Status: DISCONTINUED | OUTPATIENT
Start: 2025-04-25 | End: 2025-04-25 | Stop reason: HOSPADM

## 2025-04-25 RX ORDER — EPINEPHRINE 1 MG/ML
INJECTION, SOLUTION, CONCENTRATE INTRAVENOUS
Status: DISCONTINUED | OUTPATIENT
Start: 2025-04-25 | End: 2025-04-25 | Stop reason: SDUPTHER

## 2025-04-25 RX ORDER — AMINOCAPROIC ACID 250 MG/ML
INJECTION, SOLUTION INTRAVENOUS
Status: DISCONTINUED | OUTPATIENT
Start: 2025-04-25 | End: 2025-04-25 | Stop reason: SDUPTHER

## 2025-04-25 RX ORDER — ENALAPRILAT 1.25 MG/ML
0.62 INJECTION INTRAVENOUS
Status: DISCONTINUED | OUTPATIENT
Start: 2025-04-25 | End: 2025-04-26

## 2025-04-25 RX ORDER — MULTIVITAMIN WITH IRON
1 TABLET ORAL
Status: DISCONTINUED | OUTPATIENT
Start: 2025-04-26 | End: 2025-04-30 | Stop reason: HOSPADM

## 2025-04-25 RX ORDER — DEXTROSE MONOHYDRATE 25 G/50ML
INJECTION, SOLUTION INTRAVENOUS
Status: DISCONTINUED | OUTPATIENT
Start: 2025-04-25 | End: 2025-04-25 | Stop reason: SDUPTHER

## 2025-04-25 RX ORDER — GLUCAGON 1 MG/ML
1 KIT INJECTION PRN
Status: DISCONTINUED | OUTPATIENT
Start: 2025-04-25 | End: 2025-04-26

## 2025-04-25 RX ORDER — METOPROLOL TARTRATE 25 MG/1
25 TABLET, FILM COATED ORAL 2 TIMES DAILY
Status: DISCONTINUED | OUTPATIENT
Start: 2025-04-25 | End: 2025-04-27

## 2025-04-25 RX ORDER — DEXTROSE MONOHYDRATE 100 MG/ML
INJECTION, SOLUTION INTRAVENOUS CONTINUOUS PRN
Status: DISCONTINUED | OUTPATIENT
Start: 2025-04-25 | End: 2025-04-26

## 2025-04-25 RX ORDER — PREDNISONE 5 MG/1
5 TABLET ORAL DAILY
Status: DISCONTINUED | OUTPATIENT
Start: 2025-04-25 | End: 2025-04-25 | Stop reason: SDUPTHER

## 2025-04-25 RX ORDER — OXYCODONE HYDROCHLORIDE 5 MG/1
5 TABLET ORAL EVERY 4 HOURS PRN
Status: DISCONTINUED | OUTPATIENT
Start: 2025-04-25 | End: 2025-04-30 | Stop reason: HOSPADM

## 2025-04-25 RX ORDER — MAGNESIUM SULFATE IN WATER 40 MG/ML
2000 INJECTION, SOLUTION INTRAVENOUS PRN
Status: DISCONTINUED | OUTPATIENT
Start: 2025-04-25 | End: 2025-04-26

## 2025-04-25 RX ORDER — INDOMETHACIN 25 MG/1
50 CAPSULE ORAL EVERY 30 MIN PRN
Status: DISCONTINUED | OUTPATIENT
Start: 2025-04-25 | End: 2025-04-26

## 2025-04-25 RX ORDER — PROTAMINE SULFATE 10 MG/ML
50 INJECTION, SOLUTION INTRAVENOUS
Status: DISCONTINUED | OUTPATIENT
Start: 2025-04-25 | End: 2025-04-26

## 2025-04-25 RX ORDER — FENTANYL CITRATE 50 UG/ML
INJECTION, SOLUTION INTRAMUSCULAR; INTRAVENOUS
Status: DISCONTINUED | OUTPATIENT
Start: 2025-04-25 | End: 2025-04-25 | Stop reason: SDUPTHER

## 2025-04-25 RX ORDER — FAMOTIDINE 20 MG/1
20 TABLET, FILM COATED ORAL DAILY
Status: DISCONTINUED | OUTPATIENT
Start: 2025-04-25 | End: 2025-04-30 | Stop reason: HOSPADM

## 2025-04-25 RX ORDER — SODIUM CHLORIDE 0.9 % (FLUSH) 0.9 %
5-40 SYRINGE (ML) INJECTION EVERY 12 HOURS SCHEDULED
Status: DISCONTINUED | OUTPATIENT
Start: 2025-04-25 | End: 2025-04-25 | Stop reason: HOSPADM

## 2025-04-25 RX ORDER — MIDAZOLAM HYDROCHLORIDE 1 MG/ML
INJECTION, SOLUTION INTRAMUSCULAR; INTRAVENOUS
Status: DISCONTINUED | OUTPATIENT
Start: 2025-04-25 | End: 2025-04-25 | Stop reason: SDUPTHER

## 2025-04-25 RX ORDER — PAPAVERINE HYDROCHLORIDE 30 MG/ML
INJECTION INTRAMUSCULAR; INTRAVENOUS PRN
Status: DISCONTINUED | OUTPATIENT
Start: 2025-04-25 | End: 2025-04-25 | Stop reason: HOSPADM

## 2025-04-25 RX ORDER — HEPARIN SODIUM 1000 [USP'U]/ML
INJECTION, SOLUTION INTRAVENOUS; SUBCUTANEOUS
Status: DISCONTINUED | OUTPATIENT
Start: 2025-04-25 | End: 2025-04-25 | Stop reason: SDUPTHER

## 2025-04-25 RX ORDER — PROTAMINE SULFATE 10 MG/ML
INJECTION, SOLUTION INTRAVENOUS
Status: DISCONTINUED | OUTPATIENT
Start: 2025-04-25 | End: 2025-04-25 | Stop reason: SDUPTHER

## 2025-04-25 RX ORDER — ONDANSETRON 2 MG/ML
4 INJECTION INTRAMUSCULAR; INTRAVENOUS EVERY 6 HOURS PRN
Status: DISCONTINUED | OUTPATIENT
Start: 2025-04-25 | End: 2025-04-30 | Stop reason: HOSPADM

## 2025-04-25 RX ORDER — METOPROLOL TARTRATE 1 MG/ML
2.5 INJECTION, SOLUTION INTRAVENOUS EVERY 10 MIN PRN
Status: DISCONTINUED | OUTPATIENT
Start: 2025-04-25 | End: 2025-04-26

## 2025-04-25 RX ORDER — ROCURONIUM BROMIDE 10 MG/ML
INJECTION, SOLUTION INTRAVENOUS
Status: DISCONTINUED | OUTPATIENT
Start: 2025-04-25 | End: 2025-04-25 | Stop reason: SDUPTHER

## 2025-04-25 RX ORDER — SODIUM CHLORIDE 0.9 % (FLUSH) 0.9 %
5-40 SYRINGE (ML) INJECTION EVERY 12 HOURS SCHEDULED
Status: DISCONTINUED | OUTPATIENT
Start: 2025-04-25 | End: 2025-04-30 | Stop reason: HOSPADM

## 2025-04-25 RX ORDER — PROPOFOL 10 MG/ML
INJECTION, EMULSION INTRAVENOUS
Status: DISCONTINUED | OUTPATIENT
Start: 2025-04-25 | End: 2025-04-25 | Stop reason: SDUPTHER

## 2025-04-25 RX ORDER — MORPHINE SULFATE 2 MG/ML
INJECTION, SOLUTION INTRAMUSCULAR; INTRAVENOUS
Status: DISCONTINUED
Start: 2025-04-25 | End: 2025-04-26

## 2025-04-25 RX ORDER — ATORVASTATIN CALCIUM 40 MG/1
40 TABLET, FILM COATED ORAL NIGHTLY
Status: DISCONTINUED | OUTPATIENT
Start: 2025-04-26 | End: 2025-04-30 | Stop reason: HOSPADM

## 2025-04-25 RX ORDER — SODIUM CHLORIDE 0.9 % (FLUSH) 0.9 %
5-40 SYRINGE (ML) INJECTION PRN
Status: DISCONTINUED | OUTPATIENT
Start: 2025-04-25 | End: 2025-04-30 | Stop reason: HOSPADM

## 2025-04-25 RX ORDER — ASPIRIN 81 MG/1
81 TABLET ORAL DAILY
Status: DISCONTINUED | OUTPATIENT
Start: 2025-04-25 | End: 2025-04-25 | Stop reason: HOSPADM

## 2025-04-25 RX ORDER — AMIODARONE HYDROCHLORIDE 200 MG/1
200 TABLET ORAL 2 TIMES DAILY
Status: DISCONTINUED | OUTPATIENT
Start: 2025-04-25 | End: 2025-04-26

## 2025-04-25 RX ORDER — ALBUMIN HUMAN 50 G/1000ML
SOLUTION INTRAVENOUS
Status: DISCONTINUED | OUTPATIENT
Start: 2025-04-25 | End: 2025-04-25 | Stop reason: SDUPTHER

## 2025-04-25 RX ORDER — SODIUM CHLORIDE 9 MG/ML
INJECTION, SOLUTION INTRAVENOUS PRN
Status: DISCONTINUED | OUTPATIENT
Start: 2025-04-25 | End: 2025-04-26

## 2025-04-25 RX ORDER — OXYCODONE HYDROCHLORIDE 5 MG/1
10 TABLET ORAL EVERY 4 HOURS PRN
Status: DISCONTINUED | OUTPATIENT
Start: 2025-04-25 | End: 2025-04-30 | Stop reason: HOSPADM

## 2025-04-25 RX ORDER — SODIUM CHLORIDE, SODIUM GLUCONATE, SODIUM ACETATE, POTASSIUM CHLORIDE, MAGNESIUM CHLORIDE, SODIUM PHOSPHATE, DIBASIC, AND POTASSIUM PHOSPHATE .53; .5; .37; .037; .03; .012; .00082 G/100ML; G/100ML; G/100ML; G/100ML; G/100ML; G/100ML; G/100ML
INJECTION, SOLUTION INTRAVENOUS
Status: DISCONTINUED | OUTPATIENT
Start: 2025-04-25 | End: 2025-04-25 | Stop reason: SDUPTHER

## 2025-04-25 RX ORDER — HYDROCORTISONE SODIUM SUCCINATE 100 MG/2ML
INJECTION INTRAMUSCULAR; INTRAVENOUS
Status: DISCONTINUED | OUTPATIENT
Start: 2025-04-25 | End: 2025-04-25 | Stop reason: SDUPTHER

## 2025-04-25 RX ORDER — ASPIRIN 325 MG
325 TABLET, DELAYED RELEASE (ENTERIC COATED) ORAL DAILY
Status: DISCONTINUED | OUTPATIENT
Start: 2025-04-25 | End: 2025-04-30 | Stop reason: HOSPADM

## 2025-04-25 RX ORDER — ALBUTEROL SULFATE 90 UG/1
2 INHALANT RESPIRATORY (INHALATION) EVERY 6 HOURS PRN
Status: DISCONTINUED | OUTPATIENT
Start: 2025-04-25 | End: 2025-04-26

## 2025-04-25 RX ORDER — SODIUM CHLORIDE 9 MG/ML
INJECTION, SOLUTION INTRAVENOUS PRN
Status: DISCONTINUED | OUTPATIENT
Start: 2025-04-25 | End: 2025-04-25 | Stop reason: HOSPADM

## 2025-04-25 RX ORDER — ALBUMIN HUMAN 50 G/1000ML
25 SOLUTION INTRAVENOUS PRN
Status: DISCONTINUED | OUTPATIENT
Start: 2025-04-25 | End: 2025-04-26

## 2025-04-25 RX ADMIN — SODIUM CHLORIDE: 0.9 INJECTION, SOLUTION INTRAVENOUS at 14:11

## 2025-04-25 RX ADMIN — Medication 50 MCG: at 12:19

## 2025-04-25 RX ADMIN — FENTANYL CITRATE 50 MCG: 50 INJECTION INTRAMUSCULAR; INTRAVENOUS at 08:18

## 2025-04-25 RX ADMIN — Medication 100 MG: at 08:08

## 2025-04-25 RX ADMIN — DEXTROSE MONOHYDRATE 10 G: 25 INJECTION, SOLUTION INTRAVENOUS at 11:35

## 2025-04-25 RX ADMIN — POTASSIUM CHLORIDE 20 MEQ: 29.8 INJECTION INTRAVENOUS at 14:53

## 2025-04-25 RX ADMIN — Medication 100 MCG: at 13:51

## 2025-04-25 RX ADMIN — SODIUM CHLORIDE, SODIUM GLUCONATE, SODIUM ACETATE, POTASSIUM CHLORIDE, MAGNESIUM CHLORIDE, SODIUM PHOSPHATE, DIBASIC, AND POTASSIUM PHOSPHATE: .53; .5; .37; .037; .03; .012; .00082 INJECTION, SOLUTION INTRAVENOUS at 08:33

## 2025-04-25 RX ADMIN — Medication 4 UNITS/HR: at 11:23

## 2025-04-25 RX ADMIN — AMINOCAPROIC ACID 5000 MG: 250 INJECTION, SOLUTION INTRAVENOUS at 08:36

## 2025-04-25 RX ADMIN — EPINEPHRINE 10 MCG: 1 INJECTION, SOLUTION, CONCENTRATE INTRAVENOUS at 13:49

## 2025-04-25 RX ADMIN — HYDROMORPHONE HYDROCHLORIDE 1 MG: 1 INJECTION, SOLUTION INTRAMUSCULAR; INTRAVENOUS; SUBCUTANEOUS at 16:54

## 2025-04-25 RX ADMIN — ROCURONIUM BROMIDE 50 MG: 10 INJECTION, SOLUTION INTRAVENOUS at 11:25

## 2025-04-25 RX ADMIN — Medication 100 MCG: at 13:54

## 2025-04-25 RX ADMIN — FAMOTIDINE 20 MG: 10 INJECTION, SOLUTION INTRAVENOUS at 15:37

## 2025-04-25 RX ADMIN — SENNOSIDES, DOCUSATE SODIUM 1 TABLET: 50; 8.6 TABLET, FILM COATED ORAL at 19:32

## 2025-04-25 RX ADMIN — SODIUM CHLORIDE, SODIUM GLUCONATE, SODIUM ACETATE, POTASSIUM CHLORIDE, MAGNESIUM CHLORIDE, SODIUM PHOSPHATE, DIBASIC, AND POTASSIUM PHOSPHATE: .53; .5; .37; .037; .03; .012; .00082 INJECTION, SOLUTION INTRAVENOUS at 09:25

## 2025-04-25 RX ADMIN — ROCURONIUM BROMIDE 30 MG: 10 INJECTION, SOLUTION INTRAVENOUS at 09:48

## 2025-04-25 RX ADMIN — OXYCODONE 10 MG: 5 TABLET ORAL at 23:55

## 2025-04-25 RX ADMIN — FENTANYL CITRATE 100 MCG: 50 INJECTION INTRAMUSCULAR; INTRAVENOUS at 09:06

## 2025-04-25 RX ADMIN — ALBUMIN (HUMAN) 250 ML: 12.5 INJECTION, SOLUTION INTRAVENOUS at 13:21

## 2025-04-25 RX ADMIN — ONDANSETRON 4 MG: 2 INJECTION INTRAMUSCULAR; INTRAVENOUS at 18:03

## 2025-04-25 RX ADMIN — PREDNISONE 9 MG: 5 TABLET ORAL at 19:28

## 2025-04-25 RX ADMIN — Medication 100 MCG: at 13:52

## 2025-04-25 RX ADMIN — HYDROMORPHONE HYDROCHLORIDE 1 MG: 1 INJECTION, SOLUTION INTRAMUSCULAR; INTRAVENOUS; SUBCUTANEOUS at 14:08

## 2025-04-25 RX ADMIN — AMINOCAPROIC ACID 1 G/HR: 250 INJECTION, SOLUTION INTRAVENOUS at 12:49

## 2025-04-25 RX ADMIN — AMIODARONE HYDROCHLORIDE 200 MG: 200 TABLET ORAL at 19:32

## 2025-04-25 RX ADMIN — OXYCODONE 10 MG: 5 TABLET ORAL at 19:32

## 2025-04-25 RX ADMIN — PROPOFOL 50 MG: 10 INJECTION, EMULSION INTRAVENOUS at 08:08

## 2025-04-25 RX ADMIN — ALBUMIN (HUMAN) 250 ML: 12.5 INJECTION, SOLUTION INTRAVENOUS at 13:14

## 2025-04-25 RX ADMIN — PROTAMINE SULFATE 200 MG: 10 INJECTION, SOLUTION INTRAVENOUS at 12:22

## 2025-04-25 RX ADMIN — ROCURONIUM BROMIDE 20 MG: 10 INJECTION, SOLUTION INTRAVENOUS at 08:55

## 2025-04-25 RX ADMIN — SODIUM BICARBONATE 50 MEQ: 84 INJECTION, SOLUTION INTRAVENOUS at 18:58

## 2025-04-25 RX ADMIN — FENTANYL CITRATE 50 MCG: 50 INJECTION INTRAMUSCULAR; INTRAVENOUS at 08:50

## 2025-04-25 RX ADMIN — ASPIRIN 81 MG: 81 TABLET, COATED ORAL at 07:10

## 2025-04-25 RX ADMIN — Medication 50 MCG: at 13:50

## 2025-04-25 RX ADMIN — MIDAZOLAM 3 MG: 1 INJECTION INTRAMUSCULAR; INTRAVENOUS at 08:08

## 2025-04-25 RX ADMIN — EPINEPHRINE 10 MCG: 1 INJECTION, SOLUTION, CONCENTRATE INTRAVENOUS at 13:51

## 2025-04-25 RX ADMIN — MIDAZOLAM 5 MG: 1 INJECTION INTRAMUSCULAR; INTRAVENOUS at 11:25

## 2025-04-25 RX ADMIN — HYDROCORTISONE SODIUM SUCCINATE 100 MG: 100 INJECTION, POWDER, FOR SOLUTION INTRAMUSCULAR; INTRAVENOUS at 08:21

## 2025-04-25 RX ADMIN — EPINEPHRINE 2 MCG/MIN: 1 INJECTION INTRAMUSCULAR; INTRAVENOUS; SUBCUTANEOUS at 12:10

## 2025-04-25 RX ADMIN — ROCURONIUM BROMIDE 50 MG: 10 INJECTION, SOLUTION INTRAVENOUS at 08:08

## 2025-04-25 RX ADMIN — SODIUM CHLORIDE, PRESERVATIVE FREE 10 ML: 5 INJECTION INTRAVENOUS at 19:19

## 2025-04-25 RX ADMIN — FENTANYL CITRATE 100 MCG: 50 INJECTION INTRAMUSCULAR; INTRAVENOUS at 08:48

## 2025-04-25 RX ADMIN — Medication 24 MCG: at 12:30

## 2025-04-25 RX ADMIN — PROPOFOL 100 MG: 10 INJECTION, EMULSION INTRAVENOUS at 08:54

## 2025-04-25 RX ADMIN — FENTANYL CITRATE 150 MCG: 50 INJECTION INTRAMUSCULAR; INTRAVENOUS at 08:56

## 2025-04-25 RX ADMIN — HEPARIN SODIUM 35000 UNITS: 1000 INJECTION INTRAVENOUS; SUBCUTANEOUS at 09:38

## 2025-04-25 RX ADMIN — MIDAZOLAM 2 MG: 1 INJECTION INTRAMUSCULAR; INTRAVENOUS at 07:59

## 2025-04-25 RX ADMIN — SODIUM CHLORIDE 100 ML/HR: 0.9 INJECTION, SOLUTION INTRAVENOUS at 07:07

## 2025-04-25 RX ADMIN — ALBUMIN (HUMAN) 250 ML: 12.5 INJECTION, SOLUTION INTRAVENOUS at 13:05

## 2025-04-25 RX ADMIN — VANCOMYCIN HYDROCHLORIDE 1000 MG: 1 INJECTION, POWDER, LYOPHILIZED, FOR SOLUTION INTRAVENOUS at 07:09

## 2025-04-25 RX ADMIN — Medication 100 MCG: at 13:53

## 2025-04-25 RX ADMIN — NICARDIPINE HYDROCHLORIDE 5 MG/HR: 25 INJECTION INTRAVENOUS at 14:03

## 2025-04-25 RX ADMIN — ALBUMIN (HUMAN) 250 ML: 12.5 INJECTION, SOLUTION INTRAVENOUS at 13:09

## 2025-04-25 RX ADMIN — ALBUMIN (HUMAN) 25 G: 12.5 INJECTION, SOLUTION INTRAVENOUS at 20:57

## 2025-04-25 RX ADMIN — FENTANYL CITRATE 100 MCG: 50 INJECTION INTRAMUSCULAR; INTRAVENOUS at 09:57

## 2025-04-25 RX ADMIN — HEPARIN SODIUM 5000 UNITS: 1000 INJECTION INTRAVENOUS; SUBCUTANEOUS at 08:45

## 2025-04-25 RX ADMIN — VANCOMYCIN HYDROCHLORIDE 1000 MG: 1 INJECTION, POWDER, LYOPHILIZED, FOR SOLUTION INTRAVENOUS at 19:19

## 2025-04-25 RX ADMIN — FENTANYL CITRATE 50 MCG: 50 INJECTION INTRAMUSCULAR; INTRAVENOUS at 08:08

## 2025-04-25 ASSESSMENT — PAIN DESCRIPTION - DESCRIPTORS
DESCRIPTORS: ACHING
DESCRIPTORS: ACHING

## 2025-04-25 ASSESSMENT — PAIN DESCRIPTION - LOCATION
LOCATION: CHEST
LOCATION: SHOULDER;CHEST

## 2025-04-25 ASSESSMENT — PAIN SCALES - GENERAL
PAINLEVEL_OUTOF10: 8
PAINLEVEL_OUTOF10: 9
PAINLEVEL_OUTOF10: 7

## 2025-04-25 ASSESSMENT — PULMONARY FUNCTION TESTS
PIF_VALUE: 26
PIF_VALUE: 27

## 2025-04-25 ASSESSMENT — PAIN DESCRIPTION - ONSET
ONSET: ON-GOING
ONSET: ON-GOING

## 2025-04-25 ASSESSMENT — PAIN DESCRIPTION - ORIENTATION
ORIENTATION: MID
ORIENTATION: MID;LEFT

## 2025-04-25 ASSESSMENT — PAIN DESCRIPTION - PAIN TYPE
TYPE: ACUTE PAIN;SURGICAL PAIN
TYPE: ACUTE PAIN;SURGICAL PAIN

## 2025-04-25 ASSESSMENT — PAIN - FUNCTIONAL ASSESSMENT
PAIN_FUNCTIONAL_ASSESSMENT: PREVENTS OR INTERFERES SOME ACTIVE ACTIVITIES AND ADLS
PAIN_FUNCTIONAL_ASSESSMENT: PREVENTS OR INTERFERES SOME ACTIVE ACTIVITIES AND ADLS
PAIN_FUNCTIONAL_ASSESSMENT: 0-10

## 2025-04-25 ASSESSMENT — PAIN DESCRIPTION - FREQUENCY
FREQUENCY: CONTINUOUS
FREQUENCY: CONTINUOUS

## 2025-04-25 NOTE — H&P
Cardiovascular Surgery History and Physical    HPI:   54 year-old female with SLE/RA and associated stage 3 CRI, on chronic immunosuppression/steroid Tx, referred with stable exertional angina due to 3v CAD. Patient reports non-radiating exertional substernal chest pain, relieved by rest, associated with chronic fatigue and dyspnea on exertion. Denies presyncope, orthopnea, edema.                 Current Medication      Current Outpatient Medications:     hydroxychloroquine (PLAQUENIL) 200 MG tablet, Take 1 tablet by mouth 2 times daily, Disp: 60 tablet, Rfl: 5    mycophenolate (CELLCEPT) 500 MG tablet, take 2 tablets by mouth every morning BEFORE BREAKFAST AND 2 TABLETS EVERY EVENING, Disp: 120 tablet, Rfl: 1    omeprazole (PRILOSEC) 20 MG delayed release capsule, Take 1 capsule by mouth daily, Disp: 30 capsule, Rfl: 3    predniSONE (DELTASONE) 5 MG tablet, Take 2 tablets by mouth daily, Disp: 90 tablet, Rfl: 1    HYDROcodone-acetaminophen (NORCO) 5-325 MG per tablet, Take 1 tablet by mouth every 8 hours as needed for Pain for up to 30 days. Max Daily Amount: 3 tablets, Disp: 90 tablet, Rfl: 0    ondansetron (ZOFRAN-ODT) 4 MG disintegrating tablet, Take 1 tablet by mouth 3 times daily as needed for Nausea or Vomiting, Disp: 21 tablet, Rfl: 0    Voclosporin 7.9 MG CAPS, Take 7.9 mg by mouth in the morning and at bedtime, Disp: 180 capsule, Rfl: 1    sodium zirconium cyclosilicate (LOKELMA) 10 g PACK oral suspension, Take by mouth, Disp: , Rfl:     losartan (COZAAR) 50 MG tablet, Take by mouth, Disp: , Rfl:     potassium chloride (KLOR-CON M) 20 MEQ extended release tablet, Take 2 tablets by mouth 2 times daily, Disp: 4 tablet, Rfl: 0    levothyroxine (SYNTHROID) 175 MCG tablet, Take 1 tablet by mouth daily, Disp: 90 tablet, Rfl: 4    ferrous sulfate (FEROSUL) 325 (65 Fe) MG tablet, Take 1 tablet by mouth daily (with breakfast), Disp: 90 tablet, Rfl: 3    simvastatin (ZOCOR) 40 MG tablet, take 1 tablet by mouth at

## 2025-04-25 NOTE — PROGRESS NOTES
Patient arrived to unit from OR via bed. Patient transferred to ICU bed and placed on continuous ICU bedside monitor. Patient admitted for CAD (coronary artery disease) [I25.10]  CAD, multiple vessel [I25.10]. Vitals obtained. See flowsheets. Patient's IV access includes L FA18 gau, [place] R IJ. Current infusions and rates of infusion include amicar 50ml/hr, insulin 6 units/hr, isolyte 25ml/hr, . Assessment completed by Sang rn. Two nurse skin assessment completed by sang rn and milly rn. See flowsheets for assessment details. Family member(s)/representative(s) present at time of admission include na. Patient rights explained to family member(s)/representatives and patient, as able. All questions posed by patient's family member(s)/representative(s) and patient answered at this time.

## 2025-04-25 NOTE — PLAN OF CARE
Problem: Respiratory - Adult  Goal: Will be able to breathe spontaneously, without ventilator support  Description: Will be able to breathe spontaneously, without ventilator support  Outcome: Progressing

## 2025-04-25 NOTE — PROGRESS NOTES
Patient Weaning Progress    The patient's vent settings was able to be weaned this shift.      Ventilator settings that were weaned              [] Mode   [] Pressure support weaned   [x] Fio2 weaned   [] Peep weaned      Spontaneous weaning trial  was not attempted.     due to defined parameters for SBT (spontaneous breathing trial) not being met.     *Results of SBT documented under SBTOUTCOME note.    Reason that defined ventilator parameters for SBT was not met              [] Patient condition requires increased ventilator settings  [] Requires increased sedation   [] Settings not within weaning range   [] SAT not completed   [] Physician orders    Evac tube was  hooked up with continuous low suction(20-30mmHg)      Patient mutually agreed on goals.    Family mutually agreed on goals.    Unable to get agreement for goals because no family is present and patient cannot respond.

## 2025-04-25 NOTE — ANESTHESIA PRE PROCEDURE
Department of Anesthesiology  Preprocedure Note       Name:  Talya Schulz   Age:  54 y.o.  :  1970                                          MRN:  [id number]         Date:  2025      Surgeon: Surgeon(s):  Bernard Price MD    Procedure: Procedure(s):  CABG    Medications prior to admission:   Prior to Admission medications    Medication Sig Start Date End Date Taking? Authorizing Provider   mycophenolate (CELLCEPT) 500 MG tablet TAKE 2 TABLETS BY MOUTH IN THE MORNING BEFORE  BREAKFAST  AND  2  TABLETS  EVERY  EVENING 25  Yes Kassi Rodriguez APRN - CNP   loratadine (CLARITIN) 10 MG tablet Take by mouth 4/15/24  Yes Provider, MD Rebecca   predniSONE (DELTASONE) 5 MG tablet Take 1 tablet by mouth daily 3/31/25  Yes Jareth Rodriguez DO   predniSONE (DELTASONE) 1 MG tablet Take 4 tablets by mouth daily 3/31/25 5/30/25 Yes Jareth Rodriguez DO   HYDROcodone-acetaminophen (NORCO) 5-325 MG per tablet Take 1 tablet by mouth every 8 hours as needed for Pain for up to 30 days. Max Daily Amount: 3 tablets 25 Yes Milena Wheeler APRN - CNP   gabapentin (NEURONTIN) 400 MG capsule take 1 capsule by mouth TID a day 25 Yes Milena Wheeler APRN - CNP   acetaminophen (TYLENOL) 500 MG tablet Take 1 tablet by mouth 4 times daily as needed for Pain 25  Yes Leticia Cervantes PA-C   hydroxychloroquine (PLAQUENIL) 200 MG tablet Take 1 tablet by mouth 2 times daily 25  Yes Kassi Rodriguez APRN - CNP   potassium chloride (KLOR-CON M) 20 MEQ extended release tablet Take 2 tablets by mouth 2 times daily 10/22/24  Yes Conrad Maldonado MD   levothyroxine (SYNTHROID) 175 MCG tablet Take 1 tablet by mouth daily 24  Yes Gopal Boone APRN - CNP   ferrous sulfate (FEROSUL) 325 (65 Fe) MG tablet Take 1 tablet by mouth daily (with breakfast) 24  Yes Gopal Boone APRN - CNP   simvastatin (ZOCOR) 40 MG tablet take 1 tablet by mouth at bedtime 24  Yes Gopal Boone

## 2025-04-25 NOTE — ANESTHESIA PROCEDURE NOTES
Arterial Line:    An arterial line was placed using surface landmarks, in the OR for the following indication(s): continuous blood pressure monitoring and blood sampling needed.    A 20 gauge (size) (length), Arrow (type) catheter was placed, Seldinger technique used, into the left radial artery, secured by tape, Tegaderm and suture.  Anesthesia type: General    Events:  patient tolerated procedure well with no complications.  Anesthesiologist: Mayito Armando MD  Performed: Anesthesiologist   Preanesthetic Checklist  Completed: patient identified, IV checked, site marked, risks and benefits discussed, surgical/procedural consents, equipment checked, pre-op evaluation, timeout performed, anesthesia consent given, oxygen available, monitors applied/VS acknowledged, fire risk safety assessment completed and verbalized and blood product R/B/A discussed and consented

## 2025-04-25 NOTE — PROGRESS NOTES
Spiritual Health History and Assessment/Progress Note  [facility]    Pre-Op,  ,  ,      Name: Talya Schulz MRN: [id number]    Age: 54 y.o.     Sex: female   Language: English   Hoahaoism: Non-Anglican   CAD (coronary artery disease)     Date: 4/25/2025            Total Time Calculated: 10 min              Spiritual Assessment began in [facility] OR        Referral/Consult From: Physician   Encounter Overview/Reason: Pre-Op  Service Provided For: Patient    Prema, Belief, Meaning:   Patient identifies as spiritual, is connected with a prema tradition or spiritual practice, and has beliefs or practices that help with coping during difficult times  Family/Friends No family/friends present      Importance and Influence:  Patient has spiritual/personal beliefs that influence decisions regarding their health  Family/Friends No family/friends present    Community:  Patient feels well-supported. Support system includes: Children  Family/Friends No family/friends present    Assessment and Plan of Care:     Patient Interventions include: Provided sacramental/Alevism ritual  Family/Friends Interventions include: No family/friends present    Patient Plan of Care: Spiritual Care available upon further referral  Family/Friends Plan of Care: No family/friends present    Electronically signed by Chaplain Lonnie on 4/25/2025 at 7:26 AM

## 2025-04-25 NOTE — ANESTHESIA PROCEDURE NOTES
Procedure Performed: FARRUKH       Start Time:        End Time:      Anesthesia Information  Performed Personally  Anesthesiologist:  Mayito Armando MD        Preanesthesia Checklist:  Patient identified, IV assessed, risks and benefits discussed, monitors and equipment assessed, procedure being performed at surgeon's request and anesthesia consent obtained.    General Procedure Information  Diagnostic Indications for Echo:  assessment of ascending aorta, assessment of surgical repair, defect repair evaluation, hemodynamic monitoring and assessment of valve function  Physician Requesting Echo: Claudy Ashford MD  CPT Code:  [id number]  Location performed:  OR  Intubated  Bite block placed  Heart visualized  Probe Insertion:  Easy  Probe Type:  3D  Modalities:  2D, pulse wave Doppler, 3D, color flow mapping and continuous wave Doppler    Echocardiographic and Doppler Measurements    Ventricles    Ventricle  Cavity Size  Cavity          Dimension  Hypertrophy  Thrombus  Global FXN  EF    RV                LV  normal        normal (50-70%)  55%       Ventricular Regional Function:  1- Basal Anteroseptal:  normal  2- Basal Anterior:  normal  3- Basal Anterolateral:  normal  4- Basal Inferolateral:  normal  5- Basal Inferior:  normal  6- Basal Inferoseptal:  normal  7- Mid Anteroseptal:  normal  8- Mid Anterior:  normal  9- Mid Anterolateral:  normal  10- Mid Inferolateral:  normal  11- Mid Inferior:  normal  12- Mid Inferoseptal:  normal  13- Apical Anterior:  normal  14- Apical Lateral:  normal  15- Apical Inferior:  normal  16- Apical Septal:  normal  17- [place]:  normal        Valves     Valves  Annulus  Stenosis Measurements   Regurg  Leaflet   Morph  Leaflet   Motion Valve Comments    Aortic normal Stenosis none.            none   normal normal       Mitral normal none             none normal normal    Tricuspid normal   not present         mild   normal   normal      Pulmonic normal   not present         none

## 2025-04-25 NOTE — PROGRESS NOTES
Patient has been successfully weaned from Mechanical Ventilation.  Patient extubated and placed on 2 liters/min via nasal cannula.  Post extubation SpO2 is 100% with HR  77 bpm and RR 18 breaths/min.  Patient had strong cough that was productive of clear  sputum.  Extubation Well tolerated by patient..

## 2025-04-25 NOTE — ANESTHESIA PROCEDURE NOTES
Central Venous Line:    A central venous line was placed using ultrasound guidance, in the OR for the following indication(s): central venous access and CVP monitoring.    Sterility preparation included the following: provider used sterile gloves, gown, hat and mask, hand hygiene performed prior to central venous catheter insertion, sterile gel and probe cover used in ultrasound-guided central venous catheter insertion and maximum sterile barriers used during central venous catheter insertion.    The patient was placed in Trendelenburg position.The right internal jugular vein was prepped.    The site was prepped with Chloraprep.  A 9 Fr (size), 12 (length), introducer single lumen was placed.    During the procedure, the following specific steps were taken: target vein identified, needle advanced into vein and blood aspirated and guidewire advanced into vein.    Intravenous verification was obtained by ultrasound, venous blood return, FARRUKH and FARRUKH.    Post insertion care included: all ports aspirated, all ports flushed easily, guidewire removed intact, Biopatch applied, line sutured in place and dressing applied.    During the procedure the patient experienced: patient tolerated procedure well with no complications.      Insertion site scrubbed per usage guidelines?: Yes  Skin prep agent dried for 3 minutes prior to procedure?:yes  Outcomes: uncomplicated  Anesthesia type: generalA(n) non-oximetric, 7.5 (size) Pulmonary Artery Catheter (PAC) was placed through the Introducer CVL in the right subclavian vein.  The PAC placement was confirmed by pressure tracing changes and FARRUKH.  The patient experienced the following events during the procedure: patient tolerated procedure well with no complications.PA Cath placed?: Yes  Staffing  Performed: Anesthesiologist   Anesthesiologist: Mayito Armando MD  Performed by: Mayito Armando MD  Authorized by: Mayito Armando MD    Preanesthetic Checklist  Completed: patient identified, IV

## 2025-04-25 NOTE — PROGRESS NOTES
Pharmacy Renal Adjustment    Pharmacy renally adjusted the following medication(s) per P&T approved policy: famotidine    No results for input(s): \"BUN\", \"CREATININE\" in the last 72 hours.  eGFR:   No results for input(s): \"LABGLOM\" in the last 72 hours.  Estimated Creatinine Clearance: 38 mL/min (A) (based on SCr of 1.7 mg/dL (H)).    Assessment:  CKD    Plan:   Decrease famotidine from 20 mg twice daily to 20 mg daily    Please call pharmacy with any questions.    Georgina Jacob, PharmD  PGY1 Pharmacy Resident  4/25/2025 1:51 PM

## 2025-04-25 NOTE — OP NOTE
DATE: 04/25/25    PATIENT: Talya Schulz    MRN: [id number]     Acct: [id number]    PREOP DIAGNOSIS:   Coronary artery disease    Postop diagnosis:  Coronary artery disease    Procedure:  1) Coronary artery bypass grafting x 3, with a reversed greater saphenous aortocoronary vein graft to the left anterior descending artery, a reversed greater saphenous aortocoronary vein graft to the first diagonal artery, and a reversed greater saphenous aortocoronary vein graft to the posterior descending artery  2) Endoscopic greater saphenous vein harvest    SURGEON:  Bernard Price MD    FIRST ASSISTANT OF MEDICAL NECESSITY: Christian Viramontes PA-C    INDICATION:  The patient is a 54 y.o. year-old female who presents with crescendo angina in the context of triple vessel coronary disease.    FINDNGS: The patient has a history of rheumatoid arthritis and SLE, on extensive chronic immunosuppressant therapy. There were moderate intrapericardial adhesions.  The mammary artery was tiny, and although patent, presented an unacceptable size mismatch with the LAD and thus was unusable. The saphenous vein was of good caliber.  The coronary targets were approximately 2 mm in diameter. There extensive adhesions of both lungs to the parietal pleura.    CARDIOPULMONARY BYPASS TIME: 105    CROSSCLAMP TIME: 87    ESTIMATED BLOOD LOSS: Minimal (Cardiopulmonary bypass/cell saver)    DESCRIPTION:  The patient was prepped and draped in sterile fashion in the supine position. A formal time-out was performed. Heparin was administered. Working in two teams, the chest was opened, while a segment of greater saphenous vein was harvested using endoscopic techniques from the right thigh.  An adhesiolysis was performed to mobilize the left lung from the parietal pleural sufficiently to take down the LIMA. The left internal mammary artery was mobilized from the chest wall and divided at its distal bifurcation. Flow was poor. The LIMA was divided at the takeoff  from the subclavian, in preparation for possible use as a free graft. The fascia was dissected from the LIMA; distally the vessel was minuscule, and clearly too small to be usable as conduit. A retractor was placed, and a pericardial well was created. Extensive adhesions of the epicardium to the pericardium were carefully dissected, without injury to the heart.  The ascending aorta was palpated and found to be free of calcifications.     The proximal arch was cannulated with a 21 mm arterial cannula.  A double stage venous cannula was inserted into the right atrium.  A retrograde cardioplegia cannula was inserted via right atrial pursestring into the coronary sinus.  An antegrade cardioplegia cannula was placed in the mid distal ascending aorta.  Cardiopulmonary bypass was initiated.  The coronary targets were inspected.  The aorta was crossclamped.  1000 ml of cold blood cardioplegia was administered antegrade, followed by 600 ml retrograde.  Thereafter, at 20 minute intervals throughout the crossclamped portion of the operation, cold blood cardioplegia was administered down the completed vein grafts, the coronary sinus, and/or the aortic root.      An apical suction device was applied, and the inferior wall was exposed.  An arteriotomy was made in the mid proximal posterior descending coronary artery, and a segment of reversed greater saphenous vein was anastomosed in end to side fashion using running 7-0 Prolene.  Patency was confirmed by direct administration of cardioplegia down the graft at a rate of 70 ml/min,  showing excellent flow into the native coronary.    The lateral wall was exposed. An arteriotomy was made in the mid first diagonal coronary artery, and a separate segment of reversed greater saphenous vein was anastomosed in end to side fashion using running 7-0 Prolene.  Patency was confirmed by direct administration of cardioplegia down the graft at a rate of 70 ml/min, showing excellent flow into

## 2025-04-26 ENCOUNTER — APPOINTMENT (OUTPATIENT)
Dept: GENERAL RADIOLOGY | Age: 55
DRG: 166 | End: 2025-04-26
Attending: THORACIC SURGERY (CARDIOTHORACIC VASCULAR SURGERY)
Payer: COMMERCIAL

## 2025-04-26 LAB
ANION GAP SERPL CALC-SCNC: 8 MEQ/L (ref 8–16)
BUN SERPL-MCNC: 23 MG/DL (ref 8–23)
CALCIUM SERPL-MCNC: 8.5 MG/DL (ref 8.6–10)
CHLORIDE SERPL-SCNC: 112 MEQ/L (ref 98–111)
CO2 SERPL-SCNC: 22 MEQ/L (ref 22–29)
CREAT SERPL-MCNC: 1.7 MG/DL (ref 0.5–0.9)
DEPRECATED RDW RBC AUTO: 53.6 FL (ref 35–45)
DEPRECATED RDW RBC AUTO: 54 FL (ref 35–45)
EKG ATRIAL RATE: 77 BPM
EKG P AXIS: 52 DEGREES
EKG P-R INTERVAL: 170 MS
EKG Q-T INTERVAL: 430 MS
EKG QRS DURATION: 86 MS
EKG QTC CALCULATION (BAZETT): 486 MS
EKG R AXIS: 23 DEGREES
EKG T AXIS: 83 DEGREES
EKG VENTRICULAR RATE: 77 BPM
ERYTHROCYTE [DISTWIDTH] IN BLOOD BY AUTOMATED COUNT: 16.4 % (ref 11.5–14.5)
ERYTHROCYTE [DISTWIDTH] IN BLOOD BY AUTOMATED COUNT: 16.5 % (ref 11.5–14.5)
GFR SERPL CREATININE-BSD FRML MDRD: 36 ML/MIN/1.73M2
GLUCOSE BLD STRIP.AUTO-MCNC: 102 MG/DL (ref 70–108)
GLUCOSE BLD STRIP.AUTO-MCNC: 103 MG/DL (ref 70–108)
GLUCOSE BLD STRIP.AUTO-MCNC: 106 MG/DL (ref 70–108)
GLUCOSE BLD STRIP.AUTO-MCNC: 107 MG/DL (ref 70–108)
GLUCOSE BLD STRIP.AUTO-MCNC: 111 MG/DL (ref 70–108)
GLUCOSE BLD STRIP.AUTO-MCNC: 114 MG/DL (ref 70–108)
GLUCOSE BLD STRIP.AUTO-MCNC: 119 MG/DL (ref 70–108)
GLUCOSE BLD STRIP.AUTO-MCNC: 119 MG/DL (ref 70–108)
GLUCOSE BLD STRIP.AUTO-MCNC: 122 MG/DL (ref 70–108)
GLUCOSE BLD STRIP.AUTO-MCNC: 131 MG/DL (ref 70–108)
GLUCOSE BLD STRIP.AUTO-MCNC: 84 MG/DL (ref 70–108)
GLUCOSE BLD STRIP.AUTO-MCNC: 90 MG/DL (ref 70–108)
GLUCOSE SERPL-MCNC: 89 MG/DL (ref 74–109)
HCT VFR BLD AUTO: 26.5 % (ref 37–47)
HCT VFR BLD AUTO: 26.6 % (ref 37–47)
HGB BLD-MCNC: 8.5 GM/DL (ref 12–16)
HGB BLD-MCNC: 8.5 GM/DL (ref 12–16)
INR PPP: 1.01 (ref 0.85–1.13)
MAGNESIUM SERPL-MCNC: 3.1 MG/DL (ref 1.6–2.6)
MCH RBC QN AUTO: 28.6 PG (ref 26–33)
MCH RBC QN AUTO: 28.7 PG (ref 26–33)
MCHC RBC AUTO-ENTMCNC: 32 GM/DL (ref 32.2–35.5)
MCHC RBC AUTO-ENTMCNC: 32.1 GM/DL (ref 32.2–35.5)
MCV RBC AUTO: 89.5 FL (ref 81–99)
MCV RBC AUTO: 89.6 FL (ref 81–99)
PLATELET # BLD AUTO: 86 THOU/MM3 (ref 130–400)
PLATELET # BLD AUTO: 88 THOU/MM3 (ref 130–400)
PMV BLD AUTO: 12.4 FL (ref 9.4–12.4)
PMV BLD AUTO: 12.5 FL (ref 9.4–12.4)
POTASSIUM SERPL-SCNC: 5.3 MEQ/L (ref 3.5–5.2)
RBC # BLD AUTO: 2.96 MILL/MM3 (ref 4.2–5.4)
RBC # BLD AUTO: 2.97 MILL/MM3 (ref 4.2–5.4)
SCAN OF BLOOD SMEAR: NORMAL
SCAN OF BLOOD SMEAR: NORMAL
SODIUM SERPL-SCNC: 142 MEQ/L (ref 135–145)
WBC # BLD AUTO: 5.5 THOU/MM3 (ref 4.8–10.8)
WBC # BLD AUTO: 5.9 THOU/MM3 (ref 4.8–10.8)

## 2025-04-26 PROCEDURE — 36415 COLL VENOUS BLD VENIPUNCTURE: CPT

## 2025-04-26 PROCEDURE — 6360000002 HC RX W HCPCS: Performed by: THORACIC SURGERY (CARDIOTHORACIC VASCULAR SURGERY)

## 2025-04-26 PROCEDURE — 37799 UNLISTED PX VASCULAR SURGERY: CPT

## 2025-04-26 PROCEDURE — 97530 THERAPEUTIC ACTIVITIES: CPT

## 2025-04-26 PROCEDURE — 2500000003 HC RX 250 WO HCPCS: Performed by: THORACIC SURGERY (CARDIOTHORACIC VASCULAR SURGERY)

## 2025-04-26 PROCEDURE — 2060000000 HC ICU INTERMEDIATE R&B

## 2025-04-26 PROCEDURE — 97163 PT EVAL HIGH COMPLEX 45 MIN: CPT

## 2025-04-26 PROCEDURE — 97110 THERAPEUTIC EXERCISES: CPT

## 2025-04-26 PROCEDURE — 80048 BASIC METABOLIC PNL TOTAL CA: CPT

## 2025-04-26 PROCEDURE — 82948 REAGENT STRIP/BLOOD GLUCOSE: CPT

## 2025-04-26 PROCEDURE — 71045 X-RAY EXAM CHEST 1 VIEW: CPT

## 2025-04-26 PROCEDURE — 93005 ELECTROCARDIOGRAM TRACING: CPT | Performed by: THORACIC SURGERY (CARDIOTHORACIC VASCULAR SURGERY)

## 2025-04-26 PROCEDURE — 93010 ELECTROCARDIOGRAM REPORT: CPT | Performed by: INTERNAL MEDICINE

## 2025-04-26 PROCEDURE — 6370000000 HC RX 637 (ALT 250 FOR IP): Performed by: THORACIC SURGERY (CARDIOTHORACIC VASCULAR SURGERY)

## 2025-04-26 PROCEDURE — 97535 SELF CARE MNGMENT TRAINING: CPT

## 2025-04-26 PROCEDURE — 2580000003 HC RX 258: Performed by: THORACIC SURGERY (CARDIOTHORACIC VASCULAR SURGERY)

## 2025-04-26 PROCEDURE — 85027 COMPLETE CBC AUTOMATED: CPT

## 2025-04-26 PROCEDURE — 83735 ASSAY OF MAGNESIUM: CPT

## 2025-04-26 PROCEDURE — 97167 OT EVAL HIGH COMPLEX 60 MIN: CPT

## 2025-04-26 PROCEDURE — 85610 PROTHROMBIN TIME: CPT

## 2025-04-26 PROCEDURE — 2500000003 HC RX 250 WO HCPCS

## 2025-04-26 RX ORDER — INSULIN LISPRO 100 [IU]/ML
0-16 INJECTION, SOLUTION INTRAVENOUS; SUBCUTANEOUS
Status: DISCONTINUED | OUTPATIENT
Start: 2025-04-26 | End: 2025-04-28

## 2025-04-26 RX ORDER — PREDNISONE 5 MG/1
5 TABLET ORAL
Status: DISCONTINUED | OUTPATIENT
Start: 2025-04-26 | End: 2025-04-30 | Stop reason: HOSPADM

## 2025-04-26 RX ORDER — POTASSIUM CHLORIDE 1500 MG/1
40 TABLET, EXTENDED RELEASE ORAL PRN
Status: DISCONTINUED | OUTPATIENT
Start: 2025-04-26 | End: 2025-04-30 | Stop reason: HOSPADM

## 2025-04-26 RX ORDER — INDOMETHACIN 25 MG/1
CAPSULE ORAL
Status: COMPLETED
Start: 2025-04-26 | End: 2025-04-26

## 2025-04-26 RX ORDER — GUAIFENESIN 600 MG/1
600 TABLET, EXTENDED RELEASE ORAL 2 TIMES DAILY
Status: DISCONTINUED | OUTPATIENT
Start: 2025-04-26 | End: 2025-04-30 | Stop reason: HOSPADM

## 2025-04-26 RX ORDER — POTASSIUM CHLORIDE 7.45 MG/ML
10 INJECTION INTRAVENOUS PRN
Status: DISCONTINUED | OUTPATIENT
Start: 2025-04-26 | End: 2025-04-30 | Stop reason: HOSPADM

## 2025-04-26 RX ORDER — PREDNISONE 1 MG/1
4 TABLET ORAL EVERY MORNING
Status: DISCONTINUED | OUTPATIENT
Start: 2025-04-26 | End: 2025-04-30 | Stop reason: HOSPADM

## 2025-04-26 RX ORDER — AMIODARONE HYDROCHLORIDE 200 MG/1
200 TABLET ORAL DAILY
Status: DISCONTINUED | OUTPATIENT
Start: 2025-04-26 | End: 2025-04-27

## 2025-04-26 RX ORDER — PROCHLORPERAZINE EDISYLATE 5 MG/ML
10 INJECTION INTRAMUSCULAR; INTRAVENOUS EVERY 6 HOURS PRN
Status: DISCONTINUED | OUTPATIENT
Start: 2025-04-26 | End: 2025-04-30 | Stop reason: HOSPADM

## 2025-04-26 RX ADMIN — ONDANSETRON 4 MG: 2 INJECTION INTRAMUSCULAR; INTRAVENOUS at 18:46

## 2025-04-26 RX ADMIN — OXYCODONE 10 MG: 5 TABLET ORAL at 23:17

## 2025-04-26 RX ADMIN — SODIUM CHLORIDE, PRESERVATIVE FREE 10 ML: 5 INJECTION INTRAVENOUS at 09:53

## 2025-04-26 RX ADMIN — FAMOTIDINE 20 MG: 20 TABLET, FILM COATED ORAL at 09:52

## 2025-04-26 RX ADMIN — OXYCODONE 10 MG: 5 TABLET ORAL at 14:25

## 2025-04-26 RX ADMIN — OXYCODONE 10 MG: 5 TABLET ORAL at 05:12

## 2025-04-26 RX ADMIN — ASPIRIN 325 MG: 325 TABLET, COATED ORAL at 09:52

## 2025-04-26 RX ADMIN — PROCHLORPERAZINE EDISYLATE 10 MG: 5 INJECTION INTRAMUSCULAR; INTRAVENOUS at 11:40

## 2025-04-26 RX ADMIN — SENNOSIDES, DOCUSATE SODIUM 1 TABLET: 50; 8.6 TABLET, FILM COATED ORAL at 20:33

## 2025-04-26 RX ADMIN — ONDANSETRON 4 MG: 2 INJECTION INTRAMUSCULAR; INTRAVENOUS at 00:03

## 2025-04-26 RX ADMIN — OXYCODONE 10 MG: 5 TABLET ORAL at 09:52

## 2025-04-26 RX ADMIN — METOPROLOL TARTRATE 25 MG: 25 TABLET, FILM COATED ORAL at 20:33

## 2025-04-26 RX ADMIN — SENNOSIDES, DOCUSATE SODIUM 1 TABLET: 50; 8.6 TABLET, FILM COATED ORAL at 09:53

## 2025-04-26 RX ADMIN — GUAIFENESIN 600 MG: 600 TABLET, EXTENDED RELEASE ORAL at 23:17

## 2025-04-26 RX ADMIN — PREDNISONE 5 MG: 5 TABLET ORAL at 17:34

## 2025-04-26 RX ADMIN — SODIUM BICARBONATE 50 MEQ: 84 INJECTION, SOLUTION INTRAVENOUS at 01:33

## 2025-04-26 RX ADMIN — VANCOMYCIN HYDROCHLORIDE 750 MG: 1 INJECTION, POWDER, LYOPHILIZED, FOR SOLUTION INTRAVENOUS at 20:35

## 2025-04-26 RX ADMIN — PREDNISONE 4 MG: 1 TABLET ORAL at 10:44

## 2025-04-26 RX ADMIN — ATORVASTATIN CALCIUM 40 MG: 40 TABLET, FILM COATED ORAL at 20:33

## 2025-04-26 RX ADMIN — AMIODARONE HYDROCHLORIDE 200 MG: 200 TABLET ORAL at 09:53

## 2025-04-26 RX ADMIN — ENOXAPARIN SODIUM 40 MG: 100 INJECTION SUBCUTANEOUS at 09:53

## 2025-04-26 RX ADMIN — OXYCODONE 10 MG: 5 TABLET ORAL at 18:46

## 2025-04-26 RX ADMIN — Medication 1 TABLET: at 09:55

## 2025-04-26 RX ADMIN — PROCHLORPERAZINE EDISYLATE 10 MG: 5 INJECTION INTRAMUSCULAR; INTRAVENOUS at 01:48

## 2025-04-26 RX ADMIN — SODIUM CHLORIDE, PRESERVATIVE FREE 10 ML: 5 INJECTION INTRAVENOUS at 20:33

## 2025-04-26 ASSESSMENT — PAIN SCALES - WONG BAKER
WONGBAKER_NUMERICALRESPONSE: HURTS A LITTLE BIT
WONGBAKER_NUMERICALRESPONSE: NO HURT
WONGBAKER_NUMERICALRESPONSE: NO HURT
WONGBAKER_NUMERICALRESPONSE: HURTS A LITTLE BIT
WONGBAKER_NUMERICALRESPONSE: HURTS A LITTLE BIT

## 2025-04-26 ASSESSMENT — PAIN SCALES - GENERAL
PAINLEVEL_OUTOF10: 8
PAINLEVEL_OUTOF10: 4
PAINLEVEL_OUTOF10: 5
PAINLEVEL_OUTOF10: 6
PAINLEVEL_OUTOF10: 6
PAINLEVEL_OUTOF10: 7
PAINLEVEL_OUTOF10: 5
PAINLEVEL_OUTOF10: 7

## 2025-04-26 ASSESSMENT — PAIN DESCRIPTION - FREQUENCY
FREQUENCY: CONTINUOUS

## 2025-04-26 ASSESSMENT — PAIN DESCRIPTION - DESCRIPTORS
DESCRIPTORS: ACHING;DISCOMFORT
DESCRIPTORS: ACHING;DISCOMFORT
DESCRIPTORS: ACHING
DESCRIPTORS: ACHING
DESCRIPTORS: ACHING;DISCOMFORT

## 2025-04-26 ASSESSMENT — PAIN DESCRIPTION - LOCATION
LOCATION: CHEST

## 2025-04-26 ASSESSMENT — PAIN DESCRIPTION - ONSET
ONSET: ON-GOING

## 2025-04-26 ASSESSMENT — PAIN DESCRIPTION - PAIN TYPE
TYPE: ACUTE PAIN;SURGICAL PAIN
TYPE: SURGICAL PAIN

## 2025-04-26 ASSESSMENT — PAIN DESCRIPTION - ORIENTATION
ORIENTATION: MID
ORIENTATION: ANTERIOR
ORIENTATION: MID;LEFT

## 2025-04-26 ASSESSMENT — PAIN - FUNCTIONAL ASSESSMENT
PAIN_FUNCTIONAL_ASSESSMENT: PREVENTS OR INTERFERES SOME ACTIVE ACTIVITIES AND ADLS
PAIN_FUNCTIONAL_ASSESSMENT: ACTIVITIES ARE NOT PREVENTED
PAIN_FUNCTIONAL_ASSESSMENT: PREVENTS OR INTERFERES SOME ACTIVE ACTIVITIES AND ADLS

## 2025-04-26 NOTE — PROGRESS NOTES
Notified Dr. Price of vanco dose due but creatinine is 1.7. Per Dr. Price, pharmacy to dose vanco. Order placed.

## 2025-04-26 NOTE — PROGRESS NOTES
[facility]   Pharmacy Pharmacokinetic Monitoring Service - Vancomycin     Talya Schulz is a 54 y.o. female starting on vancomycin therapy for Surgical prophylaxis. Pharmacy consulted by Dr. Price for monitoring and adjustment.    Target Concentration: Goal AUC/BRIAN 400-600 mg*hr/L    Additional Antimicrobials: none    Pertinent Laboratory Values:   Wt Readings from Last 1 Encounters:   04/26/25 74.6 kg (164 lb 7.4 oz)     Temp Readings from Last 1 Encounters:   04/26/25 97.7 °F (36.5 °C) (Oral)     Estimated Creatinine Clearance: 41 mL/min (A) (based on SCr of 1.7 mg/dL (H)).  Recent Labs     04/25/25  1412 04/26/25  0028 04/26/25  0420   CREATININE 1.6*  --  1.7*   BUN 21  --  23   WBC 7.5 5.5 5.9     Pertinent Cultures:  Date Source Results   4/25/25 Urine cx NG prelim   MRSA Nasal Swab: N/A. Non-respiratory infection.    Plan:  Dosing recommendations based on Bayesian software  Patient received vancomycin 1000 mg on 4/25 at 0709 and at 1919. Begin vancomycin 750 mg IV q24h for anticipated AUC of 448 and trough concentration of 12.4 at steady state  Renal labs as indicated   Vancomycin concentration ordered for tomorrow with AM labs  Pharmacy will monitor renal function and adjust therapy as indicated    Thank you for the consult,  Rosie Joaquin, PharmD 4/26/2025 6:01 PM

## 2025-04-26 NOTE — PROGRESS NOTES
[facility]  INPATIENT OCCUPATIONAL THERAPY  [facility] ICU 4D  EVALUATION      Discharge Recommendations: Home with Home health OT  Equipment Recommendations: No continue to monitor      Time In: 0854  Time Out: 09  Timed Code Treatment Minutes: 27 Minutes  Minutes: 37          Date: 2025  Patient Name: Talya Schulz,   Gender: female      MRN: [id number]  : 1970  (54 y.o.)  Referring Practitioner: Bernard Price MD  Diagnosis: CAD (coronary artery disease)  Additional Pertinent Hx: 54 year-old female with SLE/RA and associated stage 3 CRI, on chronic immunosuppression/steroid Tx, referred with stable exertional angina due to 3v CAD. Patient reports non-radiating exertional substernal chest pain, relieved by rest, associated with chronic fatigue and dyspnea on exertion. Denies presyncope, orthopnea, edema.  Coronary artery bypass grafting x 3    Restrictions/Precautions:  Restrictions/Precautions: Fall Risk, Cardiac  Position Activity Restriction  Sternal Precautions: Move in the Tube  Other Position/Activity Restrictions: LAURIE    Subjective  Chart Reviewed: Yes, Orders, Progress Notes, History and Physical, Imaging, Operative Notes  Patient assessed for rehabilitation services?: Yes  Family / Caregiver Present: No    Subjective: RN approved OT session and requesting to get patient back to bed for line management.    Pain: voiced pain in sternum from surgery     Vitals:  monitored throughout with no significant events noted     Social/Functional History:  Lives With: Son, Other (Comment) (sons girlfriend, sons girlfriend mother, grandchildren (2))  Type of Home: House  Home Layout: One level  Home Access: Stairs to enter without rails  Entrance Stairs - Number of Steps: 6-7  Home Equipment: None   Bathroom Shower/Tub: Tub/Shower unit  Bathroom Toilet: Standard  Bathroom Equipment: Grab bars in shower       Prior Level of Assist for ADLs: Independent  Prior Level of Assist for Homemaking:

## 2025-04-26 NOTE — ANESTHESIA POSTPROCEDURE EVALUATION
Department of Anesthesiology  Postprocedure Note    Patient: Talya Schulz  MRN: [id number]  YOB: 1970  Date of evaluation: 4/26/2025    Procedure Summary       Date: 04/25/25 Room / Location: [facility] OR 04 / [facility] OR    Anesthesia Start: 0752 Anesthesia Stop: 1409    Procedure: CABG (Chest) Diagnosis:       CAD (coronary artery disease)      (CAD (coronary artery disease) [I25.10])    Surgeons: Bernard Price MD Responsible Provider: Mayito Armando MD    Anesthesia Type: general ASA Status: 3            Anesthesia Type: No value filed.    Paulina Phase I: Paulina Score: 10    Paulina Phase II:      Anesthesia Post Evaluation    Patient location during evaluation: ICU  Patient participation: complete - patient participated  Level of consciousness: awake  Airway patency: patent  Nausea & Vomiting: no vomiting and no nausea  Cardiovascular status: hemodynamically stable  Respiratory status: acceptable  Hydration status: stable  Comments: VISITED PT. IN ICU.  WAS EXTUBATED , SITTING IN CHAIR   ON ROOM AIR , LINES IN PLACE.  Pain management: adequate    No notable events documented.  
show

## 2025-04-26 NOTE — PROGRESS NOTES
[facility]  INPATIENT PHYSICAL THERAPY  EVALUATION  [facility] ICU STEPDOWN TELEMETRY 4K - 4K-13/013-A    Discharge Recommendations:  (anticipate return home with family and 24 hr assist/supervision)  Equipment Recommendations: No  cont to monitor for needs            Time In: 1120  Time Out: 1140  Timed Code Treatment Minutes: 8 Minutes  Minutes: 20          Date: 2025  Patient Name: Talya Schulz,  Gender:  female        MRN: [id number]  : 1970  (54 y.o.)      Referring Practitioner: Dr. SAFIA Price  Diagnosis: CAD (coronary artery disease)  Additional Pertinent Hx: 54 year-old female with SLE/RA and associated stage 3 CRI, on chronic immunosuppression/steroid Tx, referred with stable exertional angina due to 3v CAD. Patient reports non-radiating exertional substernal chest pain, relieved by rest, associated with chronic fatigue and dyspnea on exertion. s/p CABGx3 on 25.     Restrictions/Precautions:  Restrictions/Precautions: Fall Risk, Cardiac       Sternal Precautions: Move in the Tube  Other Position/Activity Restrictions: LAURIE    Required Braces or Orthoses?: No      Subjective:  Chart Reviewed: Yes  Patient assessed for rehabilitation services?: Yes  Subjective: pt agreeable for OOB to chair with  min encouragement, pt c/o nausea with RN aware.    General:        Hearing: Within functional limits       Pain: no c/o pain    Vitals: Nurse checked vitals prior to session    Social/Functional History:    Lives With: Son, Other (Comment) (sons girlfriend, sons girlfriend mother, grandchildren (2))  Type of Home: House  Home Layout: One level  Home Access: Stairs to enter without rails  Entrance Stairs - Number of Steps: 6-7  Home Equipment: None     Bathroom Shower/Tub: Tub/Shower unit  Bathroom Toilet: Standard  Bathroom Equipment: Grab bars in shower       Prior Level of Assist for ADLs: Independent  Prior Level of Assist for Homemaking: Independent  Prior Level of Assist for Transfers:  tasks, assessment of data and development of plan of care and goals.  Treatment time included skilled education and facilitation of tasks to increase safety and independence with functional mobility for improved independence and quality of life.    Assessment:  Body Structures, Functions, Activity Limitations Requiring Skilled Therapeutic Intervention: Decreased functional mobility , Decreased strength, Decreased endurance, Decreased balance, Increased pain  Assessment: Talya Schulz is a 54 y.o. female that presents with CAD. Pt demonstrates a decrease in baseline by way of bed mobility, transfers and ambulation secondary to decreased activity tolerance, strength, fatigue, and balance deficits. Pt will benefit from skilled PT services throughout admission and beyond hospital discharge for improvements in functional mobility and in order to decrease fall risk and return pt to PLOF.     Therapy Prognosis: Excellent    Requires PT Follow-Up: Yes    Patient Education:      .    Patient Education  Education Given To: Patient  Education Provided: Role of Therapy, Plan of Care, Precautions  Education Method: Verbal, Demonstration  Education Outcome: Verbalized understanding, Continued education needed       Plan:  Current Treatment Recommendations: Strengthening, Balance training, Endurance training, Functional mobility training, Transfer training, Gait training, Stair training, Home exercise program, Equipment evaluation, education, & procurement, Patient/Caregiver education & training, Pain management, Therapeutic activities, Safety education & training  General Plan:  (6X CABG)    Goals:  Patient Goals : not stated, pt limited by nausea on eval  Short Term Goals  Time Frame for Short Term Goals: by discharge  Short Term Goal 1: bed mobility with MOD I to get in/out of bed maintaining LAURIE precautions  Short Term Goal 2: transfer with S to get in/out of chairs, maintaining LAURIE precautions  Short Term Goal 3: amb

## 2025-04-26 NOTE — PROGRESS NOTES
Cardiovascular Surgery Progress Note      Comfortable on RA  Good hemodynamics, no inotropes  EKG no acute changes  CXR no space issues, expected pleural reaction from adhesiolysis to expose LIMA bed in left chest  Labs noted, Cr 1.7, at baseline  -Withhold diuresis for 24-48 hrs to allow recovery from cardiopulmonary bypass and perioperative vancomycin  -Steroids only for immunosuppression for first 3 weeks to allow sternal healing  -Transfer floor

## 2025-04-26 NOTE — PROCEDURES
PROCEDURE NOTE  Date: 4/26/2025   Name: Talya Schulz  YOB: 1970    Procedures    12 lead EKG completed. Results handed to Melinda MCCOLLUM.

## 2025-04-27 ENCOUNTER — APPOINTMENT (OUTPATIENT)
Dept: GENERAL RADIOLOGY | Age: 55
DRG: 166 | End: 2025-04-27
Attending: THORACIC SURGERY (CARDIOTHORACIC VASCULAR SURGERY)
Payer: COMMERCIAL

## 2025-04-27 LAB
ANION GAP SERPL CALC-SCNC: 9 MEQ/L (ref 8–16)
BACTERIA UR CULT: NORMAL
BUN SERPL-MCNC: 23 MG/DL (ref 8–23)
CALCIUM SERPL-MCNC: 8.5 MG/DL (ref 8.6–10)
CHLORIDE SERPL-SCNC: 108 MEQ/L (ref 98–111)
CO2 SERPL-SCNC: 20 MEQ/L (ref 22–29)
CREAT SERPL-MCNC: 1.7 MG/DL (ref 0.5–0.9)
DEPRECATED RDW RBC AUTO: 57.3 FL (ref 35–45)
ERYTHROCYTE [DISTWIDTH] IN BLOOD BY AUTOMATED COUNT: 16.5 % (ref 11.5–14.5)
GFR SERPL CREATININE-BSD FRML MDRD: 36 ML/MIN/1.73M2
GLUCOSE BLD STRIP.AUTO-MCNC: 102 MG/DL (ref 70–108)
GLUCOSE BLD STRIP.AUTO-MCNC: 104 MG/DL (ref 70–108)
GLUCOSE BLD STRIP.AUTO-MCNC: 106 MG/DL (ref 70–108)
GLUCOSE BLD STRIP.AUTO-MCNC: 110 MG/DL (ref 70–108)
GLUCOSE SERPL-MCNC: 108 MG/DL (ref 74–109)
HCT VFR BLD AUTO: 31.5 % (ref 37–47)
HGB BLD-MCNC: 9.3 GM/DL (ref 12–16)
MCH RBC QN AUTO: 27.9 PG (ref 26–33)
MCHC RBC AUTO-ENTMCNC: 29.5 GM/DL (ref 32.2–35.5)
MCV RBC AUTO: 94.6 FL (ref 81–99)
MRSA SPEC QL CULT: ABNORMAL
ORGANISM: ABNORMAL
PLATELET # BLD AUTO: 91 THOU/MM3 (ref 130–400)
PMV BLD AUTO: 12.2 FL (ref 9.4–12.4)
POTASSIUM SERPL-SCNC: 5.9 MEQ/L (ref 3.5–5.2)
RBC # BLD AUTO: 3.33 MILL/MM3 (ref 4.2–5.4)
SODIUM SERPL-SCNC: 137 MEQ/L (ref 135–145)
VANCOMYCIN SERPL-MCNC: 15.2 UG/ML (ref 10–39.9)
WBC # BLD AUTO: 6.8 THOU/MM3 (ref 4.8–10.8)

## 2025-04-27 PROCEDURE — 6360000002 HC RX W HCPCS: Performed by: THORACIC SURGERY (CARDIOTHORACIC VASCULAR SURGERY)

## 2025-04-27 PROCEDURE — 36415 COLL VENOUS BLD VENIPUNCTURE: CPT

## 2025-04-27 PROCEDURE — 6370000000 HC RX 637 (ALT 250 FOR IP): Performed by: THORACIC SURGERY (CARDIOTHORACIC VASCULAR SURGERY)

## 2025-04-27 PROCEDURE — 80048 BASIC METABOLIC PNL TOTAL CA: CPT

## 2025-04-27 PROCEDURE — 2500000003 HC RX 250 WO HCPCS: Performed by: THORACIC SURGERY (CARDIOTHORACIC VASCULAR SURGERY)

## 2025-04-27 PROCEDURE — 80202 ASSAY OF VANCOMYCIN: CPT

## 2025-04-27 PROCEDURE — 82948 REAGENT STRIP/BLOOD GLUCOSE: CPT

## 2025-04-27 PROCEDURE — 85027 COMPLETE CBC AUTOMATED: CPT

## 2025-04-27 PROCEDURE — 2060000000 HC ICU INTERMEDIATE R&B

## 2025-04-27 PROCEDURE — 71045 X-RAY EXAM CHEST 1 VIEW: CPT

## 2025-04-27 RX ORDER — METOPROLOL TARTRATE 25 MG/1
12.5 TABLET, FILM COATED ORAL 2 TIMES DAILY
Status: DISCONTINUED | OUTPATIENT
Start: 2025-04-27 | End: 2025-04-30 | Stop reason: HOSPADM

## 2025-04-27 RX ORDER — FUROSEMIDE 20 MG/1
20 TABLET ORAL DAILY
Status: DISCONTINUED | OUTPATIENT
Start: 2025-04-27 | End: 2025-04-28

## 2025-04-27 RX ADMIN — ENOXAPARIN SODIUM 40 MG: 100 INJECTION SUBCUTANEOUS at 09:00

## 2025-04-27 RX ADMIN — PROCHLORPERAZINE EDISYLATE 10 MG: 5 INJECTION INTRAMUSCULAR; INTRAVENOUS at 18:26

## 2025-04-27 RX ADMIN — FAMOTIDINE 20 MG: 20 TABLET, FILM COATED ORAL at 09:00

## 2025-04-27 RX ADMIN — PREDNISONE 4 MG: 1 TABLET ORAL at 08:59

## 2025-04-27 RX ADMIN — OXYCODONE 10 MG: 5 TABLET ORAL at 23:32

## 2025-04-27 RX ADMIN — SODIUM CHLORIDE, PRESERVATIVE FREE 10 ML: 5 INJECTION INTRAVENOUS at 21:17

## 2025-04-27 RX ADMIN — PREDNISONE 5 MG: 5 TABLET ORAL at 18:25

## 2025-04-27 RX ADMIN — FUROSEMIDE 20 MG: 20 TABLET ORAL at 13:10

## 2025-04-27 RX ADMIN — SENNOSIDES, DOCUSATE SODIUM 1 TABLET: 50; 8.6 TABLET, FILM COATED ORAL at 09:00

## 2025-04-27 RX ADMIN — OXYCODONE 10 MG: 5 TABLET ORAL at 07:40

## 2025-04-27 RX ADMIN — METOPROLOL TARTRATE 25 MG: 25 TABLET, FILM COATED ORAL at 09:00

## 2025-04-27 RX ADMIN — AMIODARONE HYDROCHLORIDE 200 MG: 200 TABLET ORAL at 09:00

## 2025-04-27 RX ADMIN — Medication 1 TABLET: at 09:00

## 2025-04-27 RX ADMIN — ONDANSETRON 4 MG: 2 INJECTION INTRAMUSCULAR; INTRAVENOUS at 23:32

## 2025-04-27 RX ADMIN — GUAIFENESIN 600 MG: 600 TABLET, EXTENDED RELEASE ORAL at 21:16

## 2025-04-27 RX ADMIN — METOPROLOL TARTRATE 12.5 MG: 25 TABLET, FILM COATED ORAL at 21:16

## 2025-04-27 RX ADMIN — ASPIRIN 325 MG: 325 TABLET, COATED ORAL at 09:00

## 2025-04-27 RX ADMIN — SODIUM CHLORIDE, PRESERVATIVE FREE 10 ML: 5 INJECTION INTRAVENOUS at 09:00

## 2025-04-27 RX ADMIN — ONDANSETRON 4 MG: 2 INJECTION INTRAMUSCULAR; INTRAVENOUS at 16:50

## 2025-04-27 RX ADMIN — GUAIFENESIN 600 MG: 600 TABLET, EXTENDED RELEASE ORAL at 09:00

## 2025-04-27 RX ADMIN — SENNOSIDES, DOCUSATE SODIUM 1 TABLET: 50; 8.6 TABLET, FILM COATED ORAL at 21:17

## 2025-04-27 RX ADMIN — OXYCODONE 10 MG: 5 TABLET ORAL at 18:25

## 2025-04-27 RX ADMIN — OXYCODONE 10 MG: 5 TABLET ORAL at 03:26

## 2025-04-27 RX ADMIN — ONDANSETRON 4 MG: 2 INJECTION INTRAMUSCULAR; INTRAVENOUS at 07:40

## 2025-04-27 RX ADMIN — OXYCODONE 10 MG: 5 TABLET ORAL at 13:09

## 2025-04-27 RX ADMIN — ATORVASTATIN CALCIUM 40 MG: 40 TABLET, FILM COATED ORAL at 21:16

## 2025-04-27 RX ADMIN — PROCHLORPERAZINE EDISYLATE 10 MG: 5 INJECTION INTRAMUSCULAR; INTRAVENOUS at 12:14

## 2025-04-27 ASSESSMENT — PAIN DESCRIPTION - LOCATION
LOCATION: CHEST

## 2025-04-27 ASSESSMENT — PAIN - FUNCTIONAL ASSESSMENT
PAIN_FUNCTIONAL_ASSESSMENT: ACTIVITIES ARE NOT PREVENTED

## 2025-04-27 ASSESSMENT — PAIN SCALES - GENERAL
PAINLEVEL_OUTOF10: 8
PAINLEVEL_OUTOF10: 8
PAINLEVEL_OUTOF10: 6
PAINLEVEL_OUTOF10: 7
PAINLEVEL_OUTOF10: 8
PAINLEVEL_OUTOF10: 6
PAINLEVEL_OUTOF10: 7
PAINLEVEL_OUTOF10: 4
PAINLEVEL_OUTOF10: 7

## 2025-04-27 ASSESSMENT — PAIN DESCRIPTION - ORIENTATION
ORIENTATION: ANTERIOR
ORIENTATION: MID
ORIENTATION: MID
ORIENTATION: ANTERIOR

## 2025-04-27 ASSESSMENT — PAIN DESCRIPTION - ONSET
ONSET: ON-GOING

## 2025-04-27 ASSESSMENT — PAIN SCALES - WONG BAKER
WONGBAKER_NUMERICALRESPONSE: HURTS A LITTLE BIT
WONGBAKER_NUMERICALRESPONSE: NO HURT

## 2025-04-27 ASSESSMENT — PAIN DESCRIPTION - DESCRIPTORS
DESCRIPTORS: ACHING;DISCOMFORT

## 2025-04-27 ASSESSMENT — PAIN DESCRIPTION - PAIN TYPE
TYPE: SURGICAL PAIN

## 2025-04-27 ASSESSMENT — PAIN DESCRIPTION - FREQUENCY
FREQUENCY: CONTINUOUS

## 2025-04-27 NOTE — PLAN OF CARE
Problem: Chronic Conditions and Co-morbidities  Goal: Patient's chronic conditions and co-morbidity symptoms are monitored and maintained or improved  Outcome: Progressing  Flowsheets (Taken 4/27/2025 0800)  Care Plan - Patient's Chronic Conditions and Co-Morbidity Symptoms are Monitored and Maintained or Improved: Monitor and assess patient's chronic conditions and comorbid symptoms for stability, deterioration, or improvement     Problem: Discharge Planning  Goal: Discharge to home or other facility with appropriate resources  Outcome: Progressing  Flowsheets (Taken 4/27/2025 0800)  Discharge to home or other facility with appropriate resources: Identify barriers to discharge with patient and caregiver     Problem: Pain  Goal: Verbalizes/displays adequate comfort level or baseline comfort level  Outcome: Progressing  Flowsheets (Taken 4/27/2025 0740)  Verbalizes/displays adequate comfort level or baseline comfort level:   Encourage patient to monitor pain and request assistance   Assess pain using appropriate pain scale     Problem: Safety - Adult  Goal: Free from fall injury  Outcome: Progressing     Problem: Skin/Tissue Integrity  Goal: Skin integrity remains intact  Description: 1.  Monitor for areas of redness and/or skin breakdown2.  Assess vascular access sites hourly3.  Every 4-6 hours minimum:  Change oxygen saturation probe site4.  Every 4-6 hours:  If on nasal continuous positive airway pressure, respiratory therapy assess nares and determine need for appliance change or resting period  Outcome: Progressing  Flowsheets (Taken 4/27/2025 0800)  Skin Integrity Remains Intact:   Monitor for areas of redness and/or skin breakdown   Assess vascular access sites hourly

## 2025-04-27 NOTE — PLAN OF CARE
Problem: Chronic Conditions and Co-morbidities  Goal: Patient's chronic conditions and co-morbidity symptoms are monitored and maintained or improved  Outcome: Progressing  Flowsheets (Taken 4/26/2025 2223)  Care Plan - Patient's Chronic Conditions and Co-Morbidity Symptoms are Monitored and Maintained or Improved:   Monitor and assess patient's chronic conditions and comorbid symptoms for stability, deterioration, or improvement   Collaborate with multidisciplinary team to address chronic and comorbid conditions and prevent exacerbation or deterioration   Update acute care plan with appropriate goals if chronic or comorbid symptoms are exacerbated and prevent overall improvement and discharge     Problem: Discharge Planning  Goal: Discharge to home or other facility with appropriate resources  Outcome: Progressing  Flowsheets (Taken 4/26/2025 2223)  Discharge to home or other facility with appropriate resources:   Identify barriers to discharge with patient and caregiver   Arrange for needed discharge resources and transportation as appropriate   Identify discharge learning needs (meds, wound care, etc)   Refer to discharge planning if patient needs post-hospital services based on physician order or complex needs related to functional status, cognitive ability or social support system     Problem: Pain  Goal: Verbalizes/displays adequate comfort level or baseline comfort level  Outcome: Progressing  Flowsheets (Taken 4/26/2025 2223)  Verbalizes/displays adequate comfort level or baseline comfort level:   Encourage patient to monitor pain and request assistance   Assess pain using appropriate pain scale   Administer analgesics based on type and severity of pain and evaluate response   Implement non-pharmacological measures as appropriate and evaluate response   Consider cultural and social influences on pain and pain management   Notify Licensed Independent Practitioner if interventions unsuccessful or patient

## 2025-04-27 NOTE — PROGRESS NOTES
[facility]   Pharmacy Pharmacokinetic Monitoring Service - Vancomycin    Indication: surgical prophylaxis   Target Concentration: Goal AUC/BRIAN 400-600 mg*hr/L  Day of Therapy: 3  Additional Antimicrobials: none    Pertinent Laboratory Values:   Wt Readings from Last 1 Encounters:   04/26/25 74.6 kg (164 lb 7.4 oz)     Temp Readings from Last 1 Encounters:   04/27/25 97.8 °F (36.6 °C) (Oral)     Estimated Creatinine Clearance: 41 mL/min (A) (based on SCr of 1.7 mg/dL (H)).  Recent Labs     04/26/25  0420 04/27/25  0444   CREATININE 1.7* 1.7*   BUN 23 23   WBC 5.9 6.8         Recent vancomycin administrations                     vancomycin (VANCOCIN) 750 mg in sodium chloride 0.9 % 250 mL IVPB (mg) 750 mg New Bag 04/26/25 2035    vancomycin (VANCOCIN) 1,000 mg in sodium chloride 0.9 % 250 mL IVPB ([id number]) (mg) 1,000 mg New Bag 04/25/25 1919    vancomycin (VANCOCIN) 1,000 mg in sodium chloride 0.9 % 250 mL IVPB ([id number]) (mg) 1,000 mg New Bag 04/25/25 0709                  Assessment:  Date/Time Current Dose Concentration Timing of Concentration AUC C trough,ss   04/27/25 750 mg IV Q24H 15.2 ~ 8 hr 389 10.5   Note: Serum concentrations collected for AUC dosing may appear elevated if collected in close proximity to the dose administered, this is not necessarily an indication of toxicity    Plan:  Current dosing regimen is sub-therapeutic  Increase dose to vancomycin 1000 mg IV Q24H for estimated AUC of 499 & trough of 13.4  Repeat vancomycin concentration planned in next 48-72 hours  Pharmacy will monitor renal function and adjust therapy as indicated.    Thank you for the consult,    Yessy Marinelli, PharmD, BCPS  4/27/2025  6:59 AM

## 2025-04-27 NOTE — PROGRESS NOTES
Cardiovascular Surgery Progress Note    Comfortable on RA  Stable, SR  CXR residual pleural reaction from adhesiolysis, no space issues  Labs noted, Cr at baseline  Spontaneous 1.2 L nett diuresis  -Chest tubes/wires removed  -Gentle diuresis  -Amiodarone d/c'd for nausea

## 2025-04-28 ENCOUNTER — APPOINTMENT (OUTPATIENT)
Dept: GENERAL RADIOLOGY | Age: 55
DRG: 166 | End: 2025-04-28
Attending: THORACIC SURGERY (CARDIOTHORACIC VASCULAR SURGERY)
Payer: COMMERCIAL

## 2025-04-28 LAB
ACTIVATED CLOTTING TIME: 136 SECONDS (ref 99–130)
ANION GAP SERPL CALC-SCNC: 10 MEQ/L (ref 8–16)
BUN SERPL-MCNC: 28 MG/DL (ref 8–23)
CALCIUM SERPL-MCNC: 8.7 MG/DL (ref 8.6–10)
CARTRIDGE COLOR: NORMAL
CHLORIDE SERPL-SCNC: 104 MEQ/L (ref 98–111)
CO2 SERPL-SCNC: 23 MEQ/L (ref 22–29)
CREAT SERPL-MCNC: 2 MG/DL (ref 0.5–0.9)
DEPRECATED RDW RBC AUTO: 55.2 FL (ref 35–45)
ERYTHROCYTE [DISTWIDTH] IN BLOOD BY AUTOMATED COUNT: 15.9 % (ref 11.5–14.5)
GFR SERPL CREATININE-BSD FRML MDRD: 29 ML/MIN/1.73M2
GLUCOSE BLD STRIP.AUTO-MCNC: 105 MG/DL (ref 70–108)
GLUCOSE SERPL-MCNC: 108 MG/DL (ref 74–109)
HCT VFR BLD AUTO: 33.8 % (ref 37–47)
HGB BLD-MCNC: 10 GM/DL (ref 12–16)
MCH RBC QN AUTO: 27.8 PG (ref 26–33)
MCHC RBC AUTO-ENTMCNC: 29.6 GM/DL (ref 32.2–35.5)
MCV RBC AUTO: 93.9 FL (ref 81–99)
PATIENT HEPARIN CONCENTRATION: 0
PLATELET # BLD AUTO: 109 THOU/MM3 (ref 130–400)
PMV BLD AUTO: 12.5 FL (ref 9.4–12.4)
POTASSIUM SERPL-SCNC: 5.4 MEQ/L (ref 3.5–5.2)
RANGE: NORMAL
RBC # BLD AUTO: 3.6 MILL/MM3 (ref 4.2–5.4)
SODIUM SERPL-SCNC: 137 MEQ/L (ref 135–145)
WBC # BLD AUTO: 6 THOU/MM3 (ref 4.8–10.8)

## 2025-04-28 PROCEDURE — 2500000003 HC RX 250 WO HCPCS: Performed by: THORACIC SURGERY (CARDIOTHORACIC VASCULAR SURGERY)

## 2025-04-28 PROCEDURE — 97110 THERAPEUTIC EXERCISES: CPT

## 2025-04-28 PROCEDURE — 82948 REAGENT STRIP/BLOOD GLUCOSE: CPT

## 2025-04-28 PROCEDURE — 6370000000 HC RX 637 (ALT 250 FOR IP): Performed by: THORACIC SURGERY (CARDIOTHORACIC VASCULAR SURGERY)

## 2025-04-28 PROCEDURE — 71045 X-RAY EXAM CHEST 1 VIEW: CPT

## 2025-04-28 PROCEDURE — 85027 COMPLETE CBC AUTOMATED: CPT

## 2025-04-28 PROCEDURE — 80048 BASIC METABOLIC PNL TOTAL CA: CPT

## 2025-04-28 PROCEDURE — 6360000002 HC RX W HCPCS: Performed by: THORACIC SURGERY (CARDIOTHORACIC VASCULAR SURGERY)

## 2025-04-28 PROCEDURE — 36415 COLL VENOUS BLD VENIPUNCTURE: CPT

## 2025-04-28 PROCEDURE — 2060000000 HC ICU INTERMEDIATE R&B

## 2025-04-28 PROCEDURE — 97535 SELF CARE MNGMENT TRAINING: CPT

## 2025-04-28 PROCEDURE — 97116 GAIT TRAINING THERAPY: CPT

## 2025-04-28 RX ADMIN — PROCHLORPERAZINE EDISYLATE 10 MG: 5 INJECTION INTRAMUSCULAR; INTRAVENOUS at 20:38

## 2025-04-28 RX ADMIN — SODIUM CHLORIDE, PRESERVATIVE FREE 10 ML: 5 INJECTION INTRAVENOUS at 20:41

## 2025-04-28 RX ADMIN — OXYCODONE 10 MG: 5 TABLET ORAL at 13:07

## 2025-04-28 RX ADMIN — SODIUM CHLORIDE, PRESERVATIVE FREE 10 ML: 5 INJECTION INTRAVENOUS at 09:04

## 2025-04-28 RX ADMIN — SENNOSIDES, DOCUSATE SODIUM 1 TABLET: 50; 8.6 TABLET, FILM COATED ORAL at 20:38

## 2025-04-28 RX ADMIN — OXYCODONE 10 MG: 5 TABLET ORAL at 03:31

## 2025-04-28 RX ADMIN — OXYCODONE 10 MG: 5 TABLET ORAL at 17:07

## 2025-04-28 RX ADMIN — ASPIRIN 325 MG: 325 TABLET, COATED ORAL at 09:04

## 2025-04-28 RX ADMIN — PROCHLORPERAZINE EDISYLATE 10 MG: 5 INJECTION INTRAMUSCULAR; INTRAVENOUS at 05:29

## 2025-04-28 RX ADMIN — METOPROLOL TARTRATE 12.5 MG: 25 TABLET, FILM COATED ORAL at 20:38

## 2025-04-28 RX ADMIN — PREDNISONE 4 MG: 1 TABLET ORAL at 09:05

## 2025-04-28 RX ADMIN — GUAIFENESIN 600 MG: 600 TABLET, EXTENDED RELEASE ORAL at 20:38

## 2025-04-28 RX ADMIN — FAMOTIDINE 20 MG: 20 TABLET, FILM COATED ORAL at 09:04

## 2025-04-28 RX ADMIN — PROCHLORPERAZINE EDISYLATE 10 MG: 5 INJECTION INTRAMUSCULAR; INTRAVENOUS at 13:08

## 2025-04-28 RX ADMIN — GUAIFENESIN 600 MG: 600 TABLET, EXTENDED RELEASE ORAL at 09:05

## 2025-04-28 RX ADMIN — ONDANSETRON 4 MG: 4 TABLET, ORALLY DISINTEGRATING ORAL at 17:07

## 2025-04-28 RX ADMIN — ONDANSETRON 4 MG: 4 TABLET, ORALLY DISINTEGRATING ORAL at 08:01

## 2025-04-28 RX ADMIN — OXYCODONE 10 MG: 5 TABLET ORAL at 08:01

## 2025-04-28 RX ADMIN — PREDNISONE 5 MG: 5 TABLET ORAL at 17:06

## 2025-04-28 RX ADMIN — ENOXAPARIN SODIUM 40 MG: 100 INJECTION SUBCUTANEOUS at 09:04

## 2025-04-28 RX ADMIN — ATORVASTATIN CALCIUM 40 MG: 40 TABLET, FILM COATED ORAL at 20:38

## 2025-04-28 RX ADMIN — METOPROLOL TARTRATE 12.5 MG: 25 TABLET, FILM COATED ORAL at 09:04

## 2025-04-28 RX ADMIN — SENNOSIDES, DOCUSATE SODIUM 1 TABLET: 50; 8.6 TABLET, FILM COATED ORAL at 09:04

## 2025-04-28 RX ADMIN — Medication 1 TABLET: at 09:04

## 2025-04-28 RX ADMIN — OXYCODONE 10 MG: 5 TABLET ORAL at 23:55

## 2025-04-28 ASSESSMENT — PAIN SCALES - GENERAL
PAINLEVEL_OUTOF10: 8
PAINLEVEL_OUTOF10: 6
PAINLEVEL_OUTOF10: 3
PAINLEVEL_OUTOF10: 7
PAINLEVEL_OUTOF10: 8
PAINLEVEL_OUTOF10: 5
PAINLEVEL_OUTOF10: 6

## 2025-04-28 ASSESSMENT — PAIN DESCRIPTION - FREQUENCY
FREQUENCY: CONTINUOUS

## 2025-04-28 ASSESSMENT — PAIN DESCRIPTION - ORIENTATION
ORIENTATION: MID

## 2025-04-28 ASSESSMENT — PAIN - FUNCTIONAL ASSESSMENT
PAIN_FUNCTIONAL_ASSESSMENT: ACTIVITIES ARE NOT PREVENTED

## 2025-04-28 ASSESSMENT — PAIN DESCRIPTION - PAIN TYPE
TYPE: SURGICAL PAIN

## 2025-04-28 ASSESSMENT — PAIN SCALES - WONG BAKER
WONGBAKER_NUMERICALRESPONSE: NO HURT

## 2025-04-28 ASSESSMENT — PAIN DESCRIPTION - ONSET
ONSET: ON-GOING

## 2025-04-28 ASSESSMENT — PAIN DESCRIPTION - LOCATION
LOCATION: CHEST
LOCATION: STERNUM
LOCATION: STERNUM
LOCATION: CHEST

## 2025-04-28 ASSESSMENT — PAIN DESCRIPTION - DESCRIPTORS
DESCRIPTORS: ACHING;DISCOMFORT
DESCRIPTORS: ACHING
DESCRIPTORS: ACHING;DISCOMFORT
DESCRIPTORS: ACHING;DISCOMFORT

## 2025-04-28 NOTE — CARE COORDINATION
Case Management Assessment Initial Evaluation    Date/Time of Evaluation: 2025 7:51 AM  Assessment Completed by: Carmen Esparza RN    If patient is discharged prior to next notation, then this note serves as note for discharge by case management.    Patient Name: Talya Schulz                   YOB: 1970  Diagnosis: CAD (coronary artery disease) [I25.10]  CAD, multiple vessel [I25.10]                   Date / Time: 2025  5:15 AM  [facility]     Patient Admission Status: Inpatient   Readmission Risk Low 0-14, Mod 15-19), High > 20: Readmission Risk Score: 18.2    Current PCP: Gopal Boone APRN - CNP  Health Care Decision Makers: DAMIR    Additional Case Management Notes: Direct admit to ICU from surgery on . Extubated on day of surgery. Transferred to stepdown on . Chest tubes and pacer wires removed yesterday. Creat 2 today; lasix stopped today. No BM yet. Sats 97% on 2L O2. Afebrile. NSR. Ox4. CR/PT/OT. Dietitian consulted. +MRSA nares. Telemetry, I&O, daily weight, IS, sternal precautions, wound care, SCDs, armas care, ambulate. Asa, lipitor, lovenox, pepcid, mucinex, lopressor, prn IV zofran, prn roxicodone, prednisone, prn IV compazine, senokot, senna prn, electrolyte replacement protocols. Urine sent for culture. K+ 5.4, BUN 28, creat up to 2, hgb up to 10, plt up to 109.     Procedures:    3v CABG   Intubated -  Extubated   Chest tubes & pacer wires removed    Imagin/25 CXR: 1. Bilateral atelectasis/infiltrate.  2. Mild stable cardiomegaly.   CXR: Stable cardiac silhouette enlargement.  Prominent pulmonary vascularity without michael edema.  Small right effusion slightly progressed.    Patient Goals/Plan/Treatment Preferences: Home with son, his girlfriend, and grandkids with new HH. SW on case. Monitor for possible walker and BSC.      25 1404   Service Assessment   Patient Orientation Alert and Oriented   Cognition Alert    History Provided By Patient   Primary Caregiver Self   Accompanied By/Relationship n/a   Support Systems Children;Family Members   Patient's Healthcare Decision Maker is: Patient Declined (Legal Next of Kin Remains as Decision Maker)   PCP Verified by CM Yes   Prior Functional Level Independent in ADLs/IADLs   Current Functional Level Assistance with the following:;Mobility;Shopping;Housework;Cooking;Toileting   Can patient return to prior living arrangement Yes   Ability to make needs known: Good   Family able to assist with home care needs: Yes   Would you like for me to discuss the discharge plan with any other family members/significant others, and if so, who? No   Financial Resources Medicaid   Community Resources None   Social/Functional History   Active  No   Patient's  Info Son or cab   Discharge Planning   Type of Residence House   Living Arrangements Children;Family Members   Current Services Prior To Admission None   Potential Assistance Needed Home Care;Durable Medical Equipment   Potential DME Needed Walker;Bedside Commode   DME Ordered? No   Potential Assistance Purchasing Medications No   Type of Home Care Services None   Patient expects to be discharged to: House   Services At/After Discharge   Confirm Follow Up Transport Family   Condition of Participation: Discharge Planning   The Plan for Transition of Care is related to the following treatment goals: \"Quit being in so much pain and quit getting nauseated.\"

## 2025-04-28 NOTE — PROGRESS NOTES
[facility]  INPATIENT PHYSICAL THERAPY  DAILY NOTE  STRZ ICU STEPDOWN TELEMETRY 4K - 4K-13/013-A      Discharge Recommendations: 24 hour assistance or supervision and Patient would benefit from continued PT at discharge  Equipment Recommendations: No  cont to monitor for needs            Time In: 0720  Time Out: 0751  Timed Code Treatment Minutes: 31 Minutes  Minutes: 31          Date: 2025  Patient Name: Talya Schulz,  Gender:  female        MRN: [id number]  : 1970  (54 y.o.)     Referring Practitioner: Dr. SAFIA Price  Diagnosis: CAD (coronary artery disease)  Additional Pertinent Hx: 54 year-old female with SLE/RA and associated stage 3 CRI, on chronic immunosuppression/steroid Tx, referred with stable exertional angina due to 3v CAD. Patient reports non-radiating exertional substernal chest pain, relieved by rest, associated with chronic fatigue and dyspnea on exertion. s/p CABGx3 on 25.     Prior Level of Function:  Lives With: Son, Other (Comment) (sons girlfriend, sons girlfriend mother, grandchildren (2))  Type of Home: House  Home Layout: One level  Home Access: Stairs to enter without rails  Entrance Stairs - Number of Steps: 6-7  Home Equipment: None   Bathroom Shower/Tub: Tub/Shower unit  Bathroom Toilet: Standard  Bathroom Equipment: Grab bars in shower    Prior Level of Assist for ADLs: Independent  Prior Level of Assist for Homemaking: Independent  Prior Level of Assist for Transfers: Independent  Active : No  Additional Comments: independent with no use of AD  Prior Level of Assist for Ambulation: Independent household ambulator, with or without device  Has the patient had two or more falls in the past year or any fall with injury in the past year?: No    Restrictions/Precautions:  Restrictions/Precautions: Fall Risk, Cardiac  Position Activity Restriction  Sternal Precautions: Move in the Tube  Other Position/Activity Restrictions: LAURIE     SUBJECTIVE: RN

## 2025-04-28 NOTE — PLAN OF CARE
Problem: Discharge Planning  Goal: Discharge to home or other facility with appropriate resources  4/28/2025 1259 by Talya Carcamo LSW  Outcome: Progressing       Consult received. Please see SW note dated 4/28.

## 2025-04-28 NOTE — PLAN OF CARE
Problem: Chronic Conditions and Co-morbidities  Goal: Patient's chronic conditions and co-morbidity symptoms are monitored and maintained or improved  4/28/2025 1041 by Lena Zuñiga RN  Outcome: Progressing  Flowsheets (Taken 4/28/2025 0800)  Care Plan - Patient's Chronic Conditions and Co-Morbidity Symptoms are Monitored and Maintained or Improved: Monitor and assess patient's chronic conditions and comorbid symptoms for stability, deterioration, or improvement  4/27/2025 2340 by Cathleen Clarke RN  Outcome: Progressing  Flowsheets (Taken 4/27/2025 2340)  Care Plan - Patient's Chronic Conditions and Co-Morbidity Symptoms are Monitored and Maintained or Improved:   Monitor and assess patient's chronic conditions and comorbid symptoms for stability, deterioration, or improvement   Collaborate with multidisciplinary team to address chronic and comorbid conditions and prevent exacerbation or deterioration   Update acute care plan with appropriate goals if chronic or comorbid symptoms are exacerbated and prevent overall improvement and discharge     Problem: Discharge Planning  Goal: Discharge to home or other facility with appropriate resources  4/28/2025 1041 by Lena Zuñiga RN  Outcome: Progressing  Flowsheets (Taken 4/28/2025 0800)  Discharge to home or other facility with appropriate resources: Identify barriers to discharge with patient and caregiver  4/27/2025 2340 by Cathleen Clarke RN  Outcome: Progressing  Flowsheets (Taken 4/27/2025 2340)  Discharge to home or other facility with appropriate resources:   Identify barriers to discharge with patient and caregiver   Arrange for needed discharge resources and transportation as appropriate   Identify discharge learning needs (meds, wound care, etc)   Refer to discharge planning if patient needs post-hospital services based on physician order or complex needs related to functional status, cognitive ability or social support system     Problem: Pain  Goal:  Verbalizes/displays adequate comfort level or baseline comfort level  4/28/2025 1041 by Lena Zuñiga RN  Outcome: Progressing  Flowsheets (Taken 4/28/2025 0752)  Verbalizes/displays adequate comfort level or baseline comfort level:   Encourage patient to monitor pain and request assistance   Assess pain using appropriate pain scale  4/27/2025 2340 by Cathleen Clarke RN  Outcome: Progressing  Flowsheets (Taken 4/27/2025 2340)  Verbalizes/displays adequate comfort level or baseline comfort level:   Encourage patient to monitor pain and request assistance   Assess pain using appropriate pain scale   Administer analgesics based on type and severity of pain and evaluate response   Implement non-pharmacological measures as appropriate and evaluate response   Consider cultural and social influences on pain and pain management   Notify Licensed Independent Practitioner if interventions unsuccessful or patient reports new pain     Problem: Safety - Adult  Goal: Free from fall injury  4/28/2025 1041 by Lena Zuñiga RN  Outcome: Progressing  4/27/2025 2340 by Cathleen Clarke RN  Outcome: Progressing  Flowsheets (Taken 4/27/2025 2340)  Free From Fall Injury: Instruct family/caregiver on patient safety     Problem: Skin/Tissue Integrity  Goal: Skin integrity remains intact  Description: 1.  Monitor for areas of redness and/or skin breakdown2.  Assess vascular access sites hourly3.  Every 4-6 hours minimum:  Change oxygen saturation probe site4.  Every 4-6 hours:  If on nasal continuous positive airway pressure, respiratory therapy assess nares and determine need for appliance change or resting period  4/28/2025 1041 by Lena Zuñiga RN  Outcome: Progressing  Flowsheets (Taken 4/28/2025 0800)  Skin Integrity Remains Intact:   Monitor for areas of redness and/or skin breakdown   Assess vascular access sites hourly   Every 4-6 hours minimum:  Change oxygen saturation probe site  4/27/2025 2340 by Cathleen Clarke RN  Outcome:

## 2025-04-28 NOTE — CARE COORDINATION
DISCHARGE PLANNING EVALUATION  4/28/25, 12:59 PM EDT    Reason for Referral: HH following [facility]   Decision Maker: Independent  Current Services: None  New Services Requested:  New [facility] for RN services at minimum  Family/ Social/ Home environment: Lives at home with family  Payment Source: [employer]  Transportation at Discharge: Family  Post-acute (PAC) provider list was provided to patient. Patient was informed of their freedom to choose PAC provider. Discussed and offered to show the patient the relevant PAC Providers quality and resource use measures on Medicare Compare web site via computer based on patient's goals of care and treatment preferences. Questions regarding selection process were answered.      Teach Back Method used with Talya regarding care plan and discharge planning  Patient  verbalized understanding of the plan of care and contribute to goal setting.       Patient preferences and discharge plan: Discharge to home with family and new HH. Patient requested [facility] for RN services at minimum, okay with adding PT/OT if needed. SW did send referral through [employer] portal.     Electronically signed by MARY ANNE Hung on 4/28/2025 at 12:59 PM       2:46 PM- Accepted to [facility] by [facility].

## 2025-04-28 NOTE — PROGRESS NOTES
Cardiovascular Surgery Progress Note    Patient reports nausea with movement  Comfortable on 2L NC  SR, stable  Minimal peripheral edema  CXR  RLL atelectasis  Cr 2.0 (baseline 1.7)  -D/c lasix  -D/c insulin sliding scale  -Encourage cough/incentive spirometry  -Ambulate as tolerated

## 2025-04-28 NOTE — PROGRESS NOTES
Inpatient Cardiac Rehabilitation Consult    Received consult for Phase II Cardiac Rehabilitation.  Patient needs cardiac rehab due to CABG on 4/25/25.  Importance of Cardiac Rehab discussed with patient.  Reviewed cardiac rehab class times.  Patient questions answered.  We will contact patient at home to schedule evaluation appointment. Cardiac Rehab brochure given.

## 2025-04-28 NOTE — PROGRESS NOTES
[facility]  STRZ ICU STEPDOWN TELEMETRY 4K  Occupational Therapy  Daily Note    Discharge Recommendations: Patient would benefit from continued OT at discharge  Equipment Recommendations: No continue to monitor      Time In: 1355  Time Out: 1421  Timed Code Treatment Minutes: 26 Minutes  Minutes: 26          Date: 2025  Patient Name: Talya Schulz,   Gender: female      Room: [address] [place] [address]  MRN: [id number]  : 1970  (54 y.o.)  Referring Practitioner: Bernard Price MD  Diagnosis: CAD (coronary artery disease)  Additional Pertinent Hx: 54 year-old female with SLE/RA and associated stage 3 CRI, on chronic immunosuppression/steroid Tx, referred with stable exertional angina due to 3v CAD. Patient reports non-radiating exertional substernal chest pain, relieved by rest, associated with chronic fatigue and dyspnea on exertion. Denies presyncope, orthopnea, edema.  Coronary artery bypass grafting x 3    Restrictions/Precautions:  Restrictions/Precautions: Fall Risk, Cardiac  Position Activity Restriction  Sternal Precautions: Move in the Tube  Other Position/Activity Restrictions: LAURIE     Social/Functional History:  Lives With: Son, Other (Comment) (sons girlfriend, sons girlfriend mother, grandchildren (2))  Type of Home: House  Home Layout: One level  Home Access: Stairs to enter without rails  Entrance Stairs - Number of Steps: 6-7  Home Equipment: None   Bathroom Shower/Tub: Tub/Shower unit  Bathroom Toilet: Standard  Bathroom Equipment: Grab bars in shower       Prior Level of Assist for ADLs: Independent  Prior Level of Assist for Homemaking: Independent  Prior Level of Assist for Transfers: Independent  Prior Level of Assist for Ambulation: Independent household ambulator, with or without device  Has the patient had two or more falls in the past year or any fall with injury in the past year?: No    Active : No  Patient's  Info: Son or cab  Type of Occupation: has not

## 2025-04-28 NOTE — PLAN OF CARE
Problem: Chronic Conditions and Co-morbidities  Goal: Patient's chronic conditions and co-morbidity symptoms are monitored and maintained or improved  4/27/2025 2340 by Cathleen Clarke, RN  Outcome: Progressing  Flowsheets (Taken 4/27/2025 2340)  Care Plan - Patient's Chronic Conditions and Co-Morbidity Symptoms are Monitored and Maintained or Improved:   Monitor and assess patient's chronic conditions and comorbid symptoms for stability, deterioration, or improvement   Collaborate with multidisciplinary team to address chronic and comorbid conditions and prevent exacerbation or deterioration   Update acute care plan with appropriate goals if chronic or comorbid symptoms are exacerbated and prevent overall improvement and discharge     Problem: Discharge Planning  Goal: Discharge to home or other facility with appropriate resources  4/27/2025 2340 by Cathleen Clarke, RN  Outcome: Progressing  Flowsheets (Taken 4/27/2025 2340)  Discharge to home or other facility with appropriate resources:   Identify barriers to discharge with patient and caregiver   Arrange for needed discharge resources and transportation as appropriate   Identify discharge learning needs (meds, wound care, etc)   Refer to discharge planning if patient needs post-hospital services based on physician order or complex needs related to functional status, cognitive ability or social support system     Problem: Pain  Goal: Verbalizes/displays adequate comfort level or baseline comfort level  4/27/2025 2340 by Cathleen Clarke, RN  Outcome: Progressing  Flowsheets (Taken 4/27/2025 2340)  Verbalizes/displays adequate comfort level or baseline comfort level:   Encourage patient to monitor pain and request assistance   Assess pain using appropriate pain scale   Administer analgesics based on type and severity of pain and evaluate response   Implement non-pharmacological measures as appropriate and evaluate response   Consider cultural and social

## 2025-04-29 ENCOUNTER — APPOINTMENT (OUTPATIENT)
Dept: GENERAL RADIOLOGY | Age: 55
DRG: 166 | End: 2025-04-29
Attending: THORACIC SURGERY (CARDIOTHORACIC VASCULAR SURGERY)
Payer: COMMERCIAL

## 2025-04-29 LAB
ANION GAP SERPL CALC-SCNC: 8 MEQ/L (ref 8–16)
BUN SERPL-MCNC: 27 MG/DL (ref 8–23)
CALCIUM SERPL-MCNC: 8.9 MG/DL (ref 8.6–10)
CHLORIDE SERPL-SCNC: 105 MEQ/L (ref 98–111)
CO2 SERPL-SCNC: 26 MEQ/L (ref 22–29)
CREAT SERPL-MCNC: 1.8 MG/DL (ref 0.5–0.9)
DEPRECATED RDW RBC AUTO: 55.1 FL (ref 35–45)
ERYTHROCYTE [DISTWIDTH] IN BLOOD BY AUTOMATED COUNT: 15.8 % (ref 11.5–14.5)
GFR SERPL CREATININE-BSD FRML MDRD: 33 ML/MIN/1.73M2
GLUCOSE SERPL-MCNC: 103 MG/DL (ref 74–109)
HCT VFR BLD AUTO: 33.2 % (ref 37–47)
HGB BLD-MCNC: 9.9 GM/DL (ref 12–16)
MCH RBC QN AUTO: 28.6 PG (ref 26–33)
MCHC RBC AUTO-ENTMCNC: 29.8 GM/DL (ref 32.2–35.5)
MCV RBC AUTO: 96 FL (ref 81–99)
PLATELET # BLD AUTO: 129 THOU/MM3 (ref 130–400)
PMV BLD AUTO: 12 FL (ref 9.4–12.4)
POTASSIUM SERPL-SCNC: 5.5 MEQ/L (ref 3.5–5.2)
RBC # BLD AUTO: 3.46 MILL/MM3 (ref 4.2–5.4)
SODIUM SERPL-SCNC: 139 MEQ/L (ref 135–145)
WBC # BLD AUTO: 5.5 THOU/MM3 (ref 4.8–10.8)

## 2025-04-29 PROCEDURE — 2060000000 HC ICU INTERMEDIATE R&B

## 2025-04-29 PROCEDURE — 85027 COMPLETE CBC AUTOMATED: CPT

## 2025-04-29 PROCEDURE — APPSS30 APP SPLIT SHARED TIME 16-30 MINUTES: Performed by: PHYSICIAN ASSISTANT

## 2025-04-29 PROCEDURE — 71045 X-RAY EXAM CHEST 1 VIEW: CPT

## 2025-04-29 PROCEDURE — 97530 THERAPEUTIC ACTIVITIES: CPT

## 2025-04-29 PROCEDURE — 97535 SELF CARE MNGMENT TRAINING: CPT

## 2025-04-29 PROCEDURE — 2500000003 HC RX 250 WO HCPCS: Performed by: THORACIC SURGERY (CARDIOTHORACIC VASCULAR SURGERY)

## 2025-04-29 PROCEDURE — 6370000000 HC RX 637 (ALT 250 FOR IP): Performed by: THORACIC SURGERY (CARDIOTHORACIC VASCULAR SURGERY)

## 2025-04-29 PROCEDURE — 2700000000 HC OXYGEN THERAPY PER DAY

## 2025-04-29 PROCEDURE — 97116 GAIT TRAINING THERAPY: CPT

## 2025-04-29 PROCEDURE — 6360000002 HC RX W HCPCS: Performed by: THORACIC SURGERY (CARDIOTHORACIC VASCULAR SURGERY)

## 2025-04-29 PROCEDURE — 80048 BASIC METABOLIC PNL TOTAL CA: CPT

## 2025-04-29 PROCEDURE — 97110 THERAPEUTIC EXERCISES: CPT

## 2025-04-29 PROCEDURE — 36415 COLL VENOUS BLD VENIPUNCTURE: CPT

## 2025-04-29 RX ORDER — POLYETHYLENE GLYCOL 3350 17 G/17G
17 POWDER, FOR SOLUTION ORAL DAILY
Status: DISCONTINUED | OUTPATIENT
Start: 2025-04-29 | End: 2025-04-30 | Stop reason: HOSPADM

## 2025-04-29 RX ADMIN — Medication 1 TABLET: at 08:41

## 2025-04-29 RX ADMIN — GUAIFENESIN 600 MG: 600 TABLET, EXTENDED RELEASE ORAL at 20:38

## 2025-04-29 RX ADMIN — POLYETHYLENE GLYCOL 3350 17 G: 17 POWDER, FOR SOLUTION ORAL at 08:45

## 2025-04-29 RX ADMIN — PROCHLORPERAZINE EDISYLATE 10 MG: 5 INJECTION INTRAMUSCULAR; INTRAVENOUS at 15:54

## 2025-04-29 RX ADMIN — PREDNISONE 4 MG: 1 TABLET ORAL at 08:40

## 2025-04-29 RX ADMIN — METOPROLOL TARTRATE 12.5 MG: 25 TABLET, FILM COATED ORAL at 20:38

## 2025-04-29 RX ADMIN — OXYCODONE 10 MG: 5 TABLET ORAL at 06:48

## 2025-04-29 RX ADMIN — OXYCODONE 10 MG: 5 TABLET ORAL at 17:34

## 2025-04-29 RX ADMIN — PROCHLORPERAZINE EDISYLATE 10 MG: 5 INJECTION INTRAMUSCULAR; INTRAVENOUS at 08:46

## 2025-04-29 RX ADMIN — MAGNESIUM HYDROXIDE 30 ML: 1200 LIQUID ORAL at 08:45

## 2025-04-29 RX ADMIN — PREDNISONE 5 MG: 5 TABLET ORAL at 17:32

## 2025-04-29 RX ADMIN — ASPIRIN 325 MG: 325 TABLET, COATED ORAL at 08:45

## 2025-04-29 RX ADMIN — OXYCODONE 5 MG: 5 TABLET ORAL at 23:17

## 2025-04-29 RX ADMIN — METOPROLOL TARTRATE 12.5 MG: 25 TABLET, FILM COATED ORAL at 08:40

## 2025-04-29 RX ADMIN — ATORVASTATIN CALCIUM 40 MG: 40 TABLET, FILM COATED ORAL at 20:38

## 2025-04-29 RX ADMIN — ONDANSETRON 4 MG: 2 INJECTION INTRAMUSCULAR; INTRAVENOUS at 12:13

## 2025-04-29 RX ADMIN — OXYCODONE 10 MG: 5 TABLET ORAL at 12:10

## 2025-04-29 RX ADMIN — FAMOTIDINE 20 MG: 20 TABLET, FILM COATED ORAL at 08:45

## 2025-04-29 RX ADMIN — SODIUM CHLORIDE, PRESERVATIVE FREE 10 ML: 5 INJECTION INTRAVENOUS at 20:38

## 2025-04-29 RX ADMIN — SENNOSIDES, DOCUSATE SODIUM 1 TABLET: 50; 8.6 TABLET, FILM COATED ORAL at 08:45

## 2025-04-29 RX ADMIN — ONDANSETRON 4 MG: 2 INJECTION INTRAMUSCULAR; INTRAVENOUS at 17:34

## 2025-04-29 RX ADMIN — SODIUM CHLORIDE, PRESERVATIVE FREE 10 ML: 5 INJECTION INTRAVENOUS at 08:39

## 2025-04-29 RX ADMIN — ONDANSETRON 4 MG: 2 INJECTION INTRAMUSCULAR; INTRAVENOUS at 23:17

## 2025-04-29 RX ADMIN — ENOXAPARIN SODIUM 40 MG: 100 INJECTION SUBCUTANEOUS at 08:45

## 2025-04-29 RX ADMIN — GUAIFENESIN 600 MG: 600 TABLET, EXTENDED RELEASE ORAL at 08:40

## 2025-04-29 ASSESSMENT — PAIN DESCRIPTION - LOCATION
LOCATION: STERNUM
LOCATION: STERNUM
LOCATION: CHEST
LOCATION: STERNUM

## 2025-04-29 ASSESSMENT — PAIN DESCRIPTION - ONSET: ONSET: ON-GOING

## 2025-04-29 ASSESSMENT — PAIN SCALES - GENERAL
PAINLEVEL_OUTOF10: 8
PAINLEVEL_OUTOF10: 8
PAINLEVEL_OUTOF10: 6
PAINLEVEL_OUTOF10: 8
PAINLEVEL_OUTOF10: 0
PAINLEVEL_OUTOF10: 6
PAINLEVEL_OUTOF10: 8
PAINLEVEL_OUTOF10: 0
PAINLEVEL_OUTOF10: 8
PAINLEVEL_OUTOF10: 8

## 2025-04-29 ASSESSMENT — PAIN DESCRIPTION - DESCRIPTORS
DESCRIPTORS: ACHING;DISCOMFORT
DESCRIPTORS: ACHING;DISCOMFORT
DESCRIPTORS: DISCOMFORT
DESCRIPTORS: ACHING

## 2025-04-29 ASSESSMENT — PAIN DESCRIPTION - ORIENTATION
ORIENTATION: MID

## 2025-04-29 ASSESSMENT — PAIN DESCRIPTION - PAIN TYPE: TYPE: SURGICAL PAIN

## 2025-04-29 ASSESSMENT — PAIN SCALES - WONG BAKER
WONGBAKER_NUMERICALRESPONSE: HURTS EVEN MORE
WONGBAKER_NUMERICALRESPONSE: HURTS WHOLE LOT

## 2025-04-29 ASSESSMENT — PAIN DESCRIPTION - FREQUENCY: FREQUENCY: CONTINUOUS

## 2025-04-29 ASSESSMENT — PAIN - FUNCTIONAL ASSESSMENT
PAIN_FUNCTIONAL_ASSESSMENT: ACTIVITIES ARE NOT PREVENTED

## 2025-04-29 NOTE — PLAN OF CARE
Problem: Chronic Conditions and Co-morbidities  Goal: Patient's chronic conditions and co-morbidity symptoms are monitored and maintained or improved  Outcome: Progressing  Flowsheets (Taken 4/29/2025 0127)  Care Plan - Patient's Chronic Conditions and Co-Morbidity Symptoms are Monitored and Maintained or Improved:   Collaborate with multidisciplinary team to address chronic and comorbid conditions and prevent exacerbation or deterioration   Monitor and assess patient's chronic conditions and comorbid symptoms for stability, deterioration, or improvement     Problem: Discharge Planning  Goal: Discharge to home or other facility with appropriate resources  4/29/2025 0127 by Zoë Epperson, RN  Outcome: Progressing  Flowsheets (Taken 4/29/2025 0127)  Discharge to home or other facility with appropriate resources: Identify barriers to discharge with patient and caregiver  4/28/2025 1259 by Talya Carcamo LSW  Outcome: Progressing     Problem: Pain  Goal: Verbalizes/displays adequate comfort level or baseline comfort level  Outcome: Progressing  Flowsheets (Taken 4/29/2025 0127)  Verbalizes/displays adequate comfort level or baseline comfort level:   Encourage patient to monitor pain and request assistance   Assess pain using appropriate pain scale   Implement non-pharmacological measures as appropriate and evaluate response   Administer analgesics based on type and severity of pain and evaluate response     Problem: Safety - Adult  Goal: Free from fall injury  Outcome: Progressing  Flowsheets (Taken 4/29/2025 0127)  Free From Fall Injury: Instruct family/caregiver on patient safety     Problem: Skin/Tissue Integrity  Goal: Skin integrity remains intact  Description: 1.  Monitor for areas of redness and/or skin breakdown2.  Assess vascular access sites hourly3.  Every 4-6 hours minimum:  Change oxygen saturation probe site4.  Every 4-6 hours:  If on nasal continuous positive airway pressure, respiratory therapy

## 2025-04-29 NOTE — PROGRESS NOTES
[facility]  STRZ ICU STEPDOWN TELEMETRY 4K  Occupational Therapy  Daily Note    Discharge Recommendations: Home with Home Health OT  Equipment Recommendations: No continue to monitor       Time In: 1346  Time Out: 1432  Timed Code Treatment Minutes: 46 Minutes  Minutes: 46          Date: 2025  Patient Name: Talya Schulz,   Gender: female      Room: [address] [place] [address]  MRN: [id number]  : 1970  (54 y.o.)  Referring Practitioner: Bernard Price MD  Diagnosis: CAD (coronary artery disease)  Additional Pertinent Hx: 54 year-old female with SLE/RA and associated stage 3 CRI, on chronic immunosuppression/steroid Tx, referred with stable exertional angina due to 3v CAD. Patient reports non-radiating exertional substernal chest pain, relieved by rest, associated with chronic fatigue and dyspnea on exertion. Denies presyncope, orthopnea, edema.  Coronary artery bypass grafting x 3    Restrictions/Precautions:  Restrictions/Precautions: Fall Risk, Cardiac  Position Activity Restriction  Sternal Precautions: Move in the Tube  Other Position/Activity Restrictions: LAURIE      Social/Functional History:  Lives With: Son, Other (Comment) (sons girlfriend, sons girlfriend mother, grandchildren (2))  Type of Home: House  Home Layout: One level  Home Access: Stairs to enter without rails  Entrance Stairs - Number of Steps: 6-7  Home Equipment: None   Bathroom Shower/Tub: Tub/Shower unit  Bathroom Toilet: Standard  Bathroom Equipment: Grab bars in shower       Prior Level of Assist for ADLs: Independent  Prior Level of Assist for Homemaking: Independent  Prior Level of Assist for Transfers: Independent  Prior Level of Assist for Ambulation: Independent household ambulator, with or without device  Has the patient had two or more falls in the past year or any fall with injury in the past year?: No    Active : No  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

## 2025-04-29 NOTE — PROGRESS NOTES
CT/CV Surgery Progress Note    2025 7:50 AM  Surgeon:  Dr. Price    Procedure: 3V CABG   POD #4    Subjective:  Ms. Schulz is resting comfortably in chair on RA, alert, and in no acute distress. Pt denies chest pressure, SOB, fever,chills, N/V/D.     Intake/Output Summary (Last 24 hours) at 2025 0750  Last data filed at 2025 0650  Gross per 24 hour   Intake 500 ml   Output 1300 ml   Net -800 ml     Vital Signs: /75   Pulse 67   Temp 98.3 °F (36.8 °C) (Oral)   Resp 18   Ht 1.702 m (5' 7\")   Wt 71.6 kg (157 lb 14.4 oz)   LMP  (LMP Unknown)   SpO2 97%   BMI 24.73 kg/m²    Temp (24hrs), Av °F (36.7 °C), Min:97.6 °F (36.4 °C), Max:98.5 °F (36.9 °C)    Labs:   CBC:  Recent Labs     25  0444 25  0419 25  0423   WBC 6.8 6.0 5.5   HGB 9.3* 10.0* 9.9*   HCT 31.5* 33.8* 33.2*   MCV 94.6 93.9 96.0   PLT 91* 109* 129*     BMP:   Recent Labs     25  0444 25  0827 25  0419 25  1130 25  0423     --  137  --  139   K 5.9*  --  5.4*  --  5.5*     --  104  --  105   CO2 20*  --  23  --  26   BUN 23  --  28*  --  27*   CREATININE 1.7*  --  2.0*  --  1.8*   POCGLU  --    < >  --  105  --     < > = values in this interval not displayed.     Last HgA1C:   Lab Results   Component Value Date    LABA1C 4.9 01/15/2025     Imaging:  CXR: 2025  1. Mildly improved aeration of the right lung base. Some residual   densities of the right lung base with tiny residual right basilar effusion.        Scheduled Meds:    metoprolol tartrate  12.5 mg Oral BID    predniSONE  4 mg Oral QAM    predniSONE  5 mg Oral Dinner    guaiFENesin  600 mg Oral BID    sodium chloride flush  5-40 mL IntraVENous 2 times per day    enoxaparin  40 mg SubCUTAneous Daily    aspirin  325 mg Oral Daily    multivitamin  1 tablet Oral Daily with breakfast    sennosides-docusate sodium  1 tablet Oral BID    atorvastatin  40 mg Oral Nightly    famotidine  20 mg Oral Daily     ROS:

## 2025-04-29 NOTE — PROGRESS NOTES
[facility]  INPATIENT PHYSICAL THERAPY  DAILY NOTE  STRZ ICU STEPDOWN TELEMETRY 4K - 4K-13/013-A      Discharge Recommendations: 24 hour assistance or supervision and Patient would benefit from continued PT at discharge (patient plans for home with family with HH PT)  Equipment Recommendations: No  cont to monitor for needs            Time In: 08  Time Out: 08  Timed Code Treatment Minutes: 38 Minutes  Minutes: 38          Date: 2025  Patient Name: Talya Schulz,  Gender:  female        MRN: [id number]  : 1970  (54 y.o.)     Referring Practitioner: Dr. SAFIA Price  Diagnosis: CAD (coronary artery disease)  Additional Pertinent Hx: 54 year-old female with SLE/RA and associated stage 3 CRI, on chronic immunosuppression/steroid Tx, referred with stable exertional angina due to 3v CAD. Patient reports non-radiating exertional substernal chest pain, relieved by rest, associated with chronic fatigue and dyspnea on exertion. s/p CABGx3 on 25.     Prior Level of Function:  Lives With: Son, Other (Comment) (sons girlfriend, sons girlfriend mother, grandchildren (2))  Type of Home: House  Home Layout: One level  Home Access: Stairs to enter without rails  Entrance Stairs - Number of Steps: 6-7  Home Equipment: None   Bathroom Shower/Tub: Tub/Shower unit  Bathroom Toilet: Standard  Bathroom Equipment: Grab bars in shower    Prior Level of Assist for ADLs: Independent  Prior Level of Assist for Homemaking: Independent  Prior Level of Assist for Transfers: Independent  Active : No  Additional Comments: independent with no use of AD  Prior Level of Assist for Ambulation: Independent household ambulator, with or without device  Has the patient had two or more falls in the past year or any fall with injury in the past year?: No    Restrictions/Precautions:  Restrictions/Precautions: Fall Risk, Cardiac  Position Activity Restriction  Sternal Precautions: Move in the Tube  Other

## 2025-04-30 ENCOUNTER — TELEPHONE (OUTPATIENT)
Dept: PHYSICAL MEDICINE AND REHAB | Age: 55
End: 2025-04-30

## 2025-04-30 ENCOUNTER — APPOINTMENT (OUTPATIENT)
Dept: GENERAL RADIOLOGY | Age: 55
DRG: 166 | End: 2025-04-30
Attending: THORACIC SURGERY (CARDIOTHORACIC VASCULAR SURGERY)
Payer: COMMERCIAL

## 2025-04-30 VITALS
RESPIRATION RATE: 18 BRPM | OXYGEN SATURATION: 96 % | TEMPERATURE: 98.2 F | BODY MASS INDEX: 24.78 KG/M2 | DIASTOLIC BLOOD PRESSURE: 85 MMHG | SYSTOLIC BLOOD PRESSURE: 138 MMHG | HEIGHT: 67 IN | WEIGHT: 157.9 LBS | HEART RATE: 71 BPM

## 2025-04-30 LAB
ANION GAP SERPL CALC-SCNC: 10 MEQ/L (ref 8–16)
BUN SERPL-MCNC: 28 MG/DL (ref 8–23)
CALCIUM SERPL-MCNC: 9.2 MG/DL (ref 8.6–10)
CHLORIDE SERPL-SCNC: 104 MEQ/L (ref 98–111)
CO2 SERPL-SCNC: 27 MEQ/L (ref 22–29)
CREAT SERPL-MCNC: 1.7 MG/DL (ref 0.5–0.9)
DEPRECATED RDW RBC AUTO: 53.4 FL (ref 35–45)
ERYTHROCYTE [DISTWIDTH] IN BLOOD BY AUTOMATED COUNT: 15.4 % (ref 11.5–14.5)
GFR SERPL CREATININE-BSD FRML MDRD: 36 ML/MIN/1.73M2
GLUCOSE SERPL-MCNC: 95 MG/DL (ref 74–109)
HCT VFR BLD AUTO: 33.8 % (ref 37–47)
HGB BLD-MCNC: 10.1 GM/DL (ref 12–16)
MCH RBC QN AUTO: 28.1 PG (ref 26–33)
MCHC RBC AUTO-ENTMCNC: 29.9 GM/DL (ref 32.2–35.5)
MCV RBC AUTO: 94.2 FL (ref 81–99)
PLATELET # BLD AUTO: 165 THOU/MM3 (ref 130–400)
PMV BLD AUTO: 12.1 FL (ref 9.4–12.4)
POTASSIUM SERPL-SCNC: 5.4 MEQ/L (ref 3.5–5.2)
RBC # BLD AUTO: 3.59 MILL/MM3 (ref 4.2–5.4)
SODIUM SERPL-SCNC: 141 MEQ/L (ref 135–145)
WBC # BLD AUTO: 6.5 THOU/MM3 (ref 4.8–10.8)

## 2025-04-30 PROCEDURE — 97110 THERAPEUTIC EXERCISES: CPT

## 2025-04-30 PROCEDURE — 6370000000 HC RX 637 (ALT 250 FOR IP): Performed by: THORACIC SURGERY (CARDIOTHORACIC VASCULAR SURGERY)

## 2025-04-30 PROCEDURE — 97530 THERAPEUTIC ACTIVITIES: CPT

## 2025-04-30 PROCEDURE — 97116 GAIT TRAINING THERAPY: CPT

## 2025-04-30 PROCEDURE — 85027 COMPLETE CBC AUTOMATED: CPT

## 2025-04-30 PROCEDURE — 6360000002 HC RX W HCPCS: Performed by: THORACIC SURGERY (CARDIOTHORACIC VASCULAR SURGERY)

## 2025-04-30 PROCEDURE — 97535 SELF CARE MNGMENT TRAINING: CPT

## 2025-04-30 PROCEDURE — 2500000003 HC RX 250 WO HCPCS: Performed by: THORACIC SURGERY (CARDIOTHORACIC VASCULAR SURGERY)

## 2025-04-30 PROCEDURE — 71045 X-RAY EXAM CHEST 1 VIEW: CPT

## 2025-04-30 PROCEDURE — APPSS60 APP SPLIT SHARED TIME 46-60 MINUTES: Performed by: PHYSICIAN ASSISTANT

## 2025-04-30 PROCEDURE — 80048 BASIC METABOLIC PNL TOTAL CA: CPT

## 2025-04-30 PROCEDURE — 36415 COLL VENOUS BLD VENIPUNCTURE: CPT

## 2025-04-30 RX ORDER — ONDANSETRON 4 MG/1
4 TABLET, ORALLY DISINTEGRATING ORAL EVERY 8 HOURS PRN
Qty: 10 TABLET | Refills: 1 | Status: SHIPPED | OUTPATIENT
Start: 2025-04-30

## 2025-04-30 RX ORDER — ASPIRIN 325 MG
325 TABLET, DELAYED RELEASE (ENTERIC COATED) ORAL DAILY
Qty: 30 TABLET | Refills: 3 | Status: SHIPPED | OUTPATIENT
Start: 2025-05-01

## 2025-04-30 RX ORDER — MYCOPHENOLATE MOFETIL 500 MG/1
TABLET ORAL
Qty: 120 TABLET | Refills: 0 | Status: SHIPPED
Start: 2025-05-14

## 2025-04-30 RX ORDER — GABAPENTIN 400 MG/1
CAPSULE ORAL
Qty: 90 CAPSULE | Refills: 0 | Status: SHIPPED | OUTPATIENT
Start: 2025-04-30 | End: 2025-07-03

## 2025-04-30 RX ORDER — MULTIVITAMIN WITH IRON
1 TABLET ORAL
Qty: 30 TABLET | Refills: 1 | Status: SHIPPED | OUTPATIENT
Start: 2025-05-01

## 2025-04-30 RX ORDER — METOPROLOL TARTRATE 25 MG/1
12.5 TABLET, FILM COATED ORAL 2 TIMES DAILY
Qty: 60 TABLET | Refills: 3 | Status: SHIPPED | OUTPATIENT
Start: 2025-04-30

## 2025-04-30 RX ORDER — ATORVASTATIN CALCIUM 40 MG/1
40 TABLET, FILM COATED ORAL NIGHTLY
Qty: 30 TABLET | Refills: 3 | Status: SHIPPED | OUTPATIENT
Start: 2025-04-30

## 2025-04-30 RX ADMIN — METOPROLOL TARTRATE 12.5 MG: 25 TABLET, FILM COATED ORAL at 08:20

## 2025-04-30 RX ADMIN — PREDNISONE 4 MG: 1 TABLET ORAL at 08:20

## 2025-04-30 RX ADMIN — OXYCODONE 10 MG: 5 TABLET ORAL at 08:18

## 2025-04-30 RX ADMIN — SODIUM CHLORIDE, PRESERVATIVE FREE 10 ML: 5 INJECTION INTRAVENOUS at 08:22

## 2025-04-30 RX ADMIN — ENOXAPARIN SODIUM 40 MG: 100 INJECTION SUBCUTANEOUS at 08:19

## 2025-04-30 RX ADMIN — GUAIFENESIN 600 MG: 600 TABLET, EXTENDED RELEASE ORAL at 08:20

## 2025-04-30 RX ADMIN — OXYCODONE 10 MG: 5 TABLET ORAL at 14:32

## 2025-04-30 RX ADMIN — FAMOTIDINE 20 MG: 20 TABLET, FILM COATED ORAL at 08:19

## 2025-04-30 RX ADMIN — Medication 1 TABLET: at 08:20

## 2025-04-30 RX ADMIN — ONDANSETRON 4 MG: 2 INJECTION INTRAMUSCULAR; INTRAVENOUS at 08:18

## 2025-04-30 RX ADMIN — OXYCODONE 5 MG: 5 TABLET ORAL at 03:48

## 2025-04-30 RX ADMIN — ASPIRIN 325 MG: 325 TABLET, COATED ORAL at 08:19

## 2025-04-30 ASSESSMENT — PAIN DESCRIPTION - ONSET
ONSET: ON-GOING
ONSET: ON-GOING

## 2025-04-30 ASSESSMENT — PAIN DESCRIPTION - PAIN TYPE
TYPE: SURGICAL PAIN
TYPE: SURGICAL PAIN

## 2025-04-30 ASSESSMENT — PAIN DESCRIPTION - LOCATION
LOCATION: CHEST
LOCATION: CHEST;INCISION

## 2025-04-30 ASSESSMENT — PAIN SCALES - GENERAL
PAINLEVEL_OUTOF10: 0
PAINLEVEL_OUTOF10: 8
PAINLEVEL_OUTOF10: 8
PAINLEVEL_OUTOF10: 5
PAINLEVEL_OUTOF10: 4

## 2025-04-30 ASSESSMENT — PAIN - FUNCTIONAL ASSESSMENT
PAIN_FUNCTIONAL_ASSESSMENT: ACTIVITIES ARE NOT PREVENTED
PAIN_FUNCTIONAL_ASSESSMENT: ACTIVITIES ARE NOT PREVENTED

## 2025-04-30 ASSESSMENT — PAIN DESCRIPTION - DESCRIPTORS
DESCRIPTORS: ACHING;DISCOMFORT
DESCRIPTORS: ACHING;BURNING

## 2025-04-30 ASSESSMENT — PAIN DESCRIPTION - ORIENTATION
ORIENTATION: MID
ORIENTATION: MID

## 2025-04-30 ASSESSMENT — PAIN DESCRIPTION - FREQUENCY
FREQUENCY: CONTINUOUS
FREQUENCY: INTERMITTENT

## 2025-04-30 NOTE — PROGRESS NOTES
Discharge teaching and instructions for diagnosis/procedure of CAD/CABG completed with patient using teachback method. AVS reviewed. Printed prescriptions given to patient. Patient voiced understanding regarding prescriptions, follow up appointments, and care of self at home. Discharged in a wheelchair to  home with support per family

## 2025-04-30 NOTE — TELEPHONE ENCOUNTER
Patient is calling and asking if talita can refill her norco prescription because she is leaving the hospital? I didn't understand but when I went to pend the med, the below alert popped up.     HYDROcodone-acetaminophen (NORCO) 5-325 MG per tablet           Pharmacy verified:  .pv  [facility] [address] [place] [address] - P [phone] - F [phone]     Date of last visit: 02/19/2025  Date of next visit (if applicable): 5/12/2025

## 2025-04-30 NOTE — PROGRESS NOTES
[facility]  INPATIENT PHYSICAL THERAPY  DAILY NOTE  STRZ ICU STEPDOWN TELEMETRY 4K - 4K-13/013-A      Discharge Recommendations: 24 hour assistance or supervision and Home with Home Health PT  Equipment Recommendations: No  cont to monitor for needs            Time In: 0657  Time Out: 0737  Timed Code Treatment Minutes: 40 Minutes  Minutes: 40          Date: 2025  Patient Name: Talya Schulz,  Gender:  female        MRN: [id number]  : 1970  (54 y.o.)     Referring Practitioner: Dr. SAFIA Price  Diagnosis: CAD (coronary artery disease)  Additional Pertinent Hx: 54 year-old female with SLE/RA and associated stage 3 CRI, on chronic immunosuppression/steroid Tx, referred with stable exertional angina due to 3v CAD. Patient reports non-radiating exertional substernal chest pain, relieved by rest, associated with chronic fatigue and dyspnea on exertion. s/p CABGx3 on 25.     Prior Level of Function:  Lives With: Son, Other (Comment) (sons girlfriend, sons girlfriend mother, grandchildren (2))  Type of Home: House  Home Layout: One level  Home Access: Stairs to enter without rails  Entrance Stairs - Number of Steps: 6-7  Home Equipment: None   Bathroom Shower/Tub: Tub/Shower unit  Bathroom Toilet: Standard  Bathroom Equipment: Grab bars in shower    Prior Level of Assist for ADLs: Independent  Prior Level of Assist for Homemaking: Independent  Prior Level of Assist for Transfers: Independent  Active : No  Additional Comments: independent with no use of AD  Prior Level of Assist for Ambulation: Independent household ambulator, with or without device  Has the patient had two or more falls in the past year or any fall with injury in the past year?: No    Restrictions/Precautions:  Restrictions/Precautions: Fall Risk, Cardiac  Position Activity Restriction  Sternal Precautions: Move in the Tube  Other Position/Activity Restrictions: LAURIE     SUBJECTIVE: RN approved session. Patient in bed

## 2025-04-30 NOTE — TELEPHONE ENCOUNTER
Pt is currently admitted can you still send in script or do you have to wait until after pt is discharged?

## 2025-04-30 NOTE — CARE COORDINATION
4/30/25, 12:03 PM EDT    DISCHARGE ON GOING EVALUATION    Talya MAYER [facility] day: 5  Location: -13/013-A Reason for admit: CAD (coronary artery disease) [I25.10]  CAD, multiple vessel [I25.10]     Procedures:   4/25 3v CABG  4/25 Intubated - 4/25 Extubated  4/27 Chest tubes & pacer wires removed    Imaging since last note:   4/30 CXR: Continued improved aeration of the right lung base. Near-complete   resolution of right pleural effusion.    Barriers to Discharge: POD #5. +BM. Creat back to baseline. Now on room air.  Afebrile. NSR. Ox4. CR/PT/OT. Dietitian consulted. +MRSA nares. Telemetry, I&O, daily weight, IS, sternal precautions, wound care, SCDs, ambulate. Asa, lipitor, lovenox, pepcid, mucinex, lopressor, prn IV zofran, prn roxicodone, prednisone, prn IV compazine, senokot, senna prn, electrolyte replacement protocols. K+ 5.4, BUN 28, Creat 1.7, hgb 10.1.     PCP: Gopal Boone APRN - CNP  Readmission Risk Score: 16.3    Patient Goals/Plan/Treatment Preferences: Home with son, his girlfriend, and grandkids with new Karen Olman Winters ordered. SW on case.

## 2025-04-30 NOTE — PROGRESS NOTES
[facility]  STRZ ICU STEPDOWN TELEMETRY 4K  Occupational Therapy  Daily Note    Discharge Recommendations: Home with Home Health OT  Equipment Recommendations: No continue to monitor       Time In: 1141  Time Out: 1220  Timed Code Treatment Minutes: 39 Minutes  Minutes: 39          Date: 2025  Patient Name: Talya Schulz,   Gender: female      Room: [address] [place] [address]  MRN: [id number]  : 1970  (54 y.o.)  Referring Practitioner: Bernard Price MD  Diagnosis: CAD (coronary artery disease)  Additional Pertinent Hx: 54 year-old female with SLE/RA and associated stage 3 CRI, on chronic immunosuppression/steroid Tx, referred with stable exertional angina due to 3v CAD. Patient reports non-radiating exertional substernal chest pain, relieved by rest, associated with chronic fatigue and dyspnea on exertion. Denies presyncope, orthopnea, edema.  Coronary artery bypass grafting x 3    Restrictions/Precautions:  Restrictions/Precautions: Fall Risk, Cardiac  Position Activity Restriction  Sternal Precautions: Move in the Tube  Other Position/Activity Restrictions: LAURIE      Social/Functional History:  Lives With: Son, Other (Comment) (sons girlfriend, sons girlfriend mother, grandchildren (2))  Type of Home: House  Home Layout: One level  Home Access: Stairs to enter without rails  Entrance Stairs - Number of Steps: 6-7  Home Equipment: None   Bathroom Shower/Tub: Tub/Shower unit  Bathroom Toilet: Standard  Bathroom Equipment: Grab bars in shower       Prior Level of Assist for ADLs: Independent  Prior Level of Assist for Homemaking: Independent  Prior Level of Assist for Transfers: Independent  Prior Level of Assist for Ambulation: Independent household ambulator, with or without device  Has the patient had two or more falls in the past year or any fall with injury in the past year?: No    Active : No  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

## 2025-04-30 NOTE — DISCHARGE SUMMARY
CT/CV Surgery Discharge Summary     Pt Name: Talya Schulz  MRN: [id number]  YOB: 1970  Primary Care Physician: Gopal Boone APRN - CNP    Admit date:  4/25/2025  5:15 AM     Discharge date:  04/30/25     Disposition: Home    Admitting Diagnosis: Coronary artery disease     Discharge Diagnosis: Coronary artery disease     Condition: Stable    Problem List:   Patient Active Problem List   Diagnosis Code    Hypothyroid E03.9    Systemic lupus erythematosus (HCC) M32.9    Proteinuria R80.9    Anemia D64.9    Stage 3a chronic kidney disease (CKD) (HCC) N18.31    Dyslipidemia E78.5    Spinal stenosis of lumbar region without neurogenic claudication M48.061    Renal insufficiency N28.9    Cervical radiculopathy M54.12    Cervical spinal stenosis M48.02    CAD (coronary artery disease) I25.10    Hx of type 2 diabetes mellitus Z86.39    CAD, multiple vessel I25.10    S/P CABG x 3 Z95.1       Procedures:  4/25/25   1) Coronary artery bypass grafting x 3, with a reversed greater saphenous aortocoronary vein graft to the left anterior descending artery, a reversed greater saphenous aortocoronary vein graft to the first diagonal artery, and a reversed greater saphenous aortocoronary vein graft to the posterior descending artery  2) Endoscopic greater saphenous vein harvest     Reason for Admission: \" The patient is a 54 y.o. year-old female who presents with crescendo angina in the context of triple vessel coronary disease.     FINDNGS: The patient has a history of rheumatoid arthritis and SLE, on extensive chronic immunosuppressant therapy. There were moderate intrapericardial adhesions.  The mammary artery was tiny, and although patent, presented an unacceptable size mismatch with the LAD and thus was unusable. The saphenous vein was of good caliber.  The coronary targets were approximately 2 mm in diameter. There extensive adhesions of both lungs to the parietal pleura,\" per Dr. Price's note.     [facility]

## 2025-04-30 NOTE — CARE COORDINATION
4/30/25, 12:57 PM EDT    Patient goals/plan/ treatment preferences discussed by  and .  Patient goals/plan/ treatment preferences reviewed with patient/ family.  Patient/ family verbalize understanding of discharge plan and are in agreement with goal/plan/treatment preferences.  Understanding was demonstrated using the teach back method.  AVS provided by RN at time of discharge, which includes all necessary medical information pertaining to the patients current course of illness, treatment, post-discharge goals of care, and treatment preferences.     Services At/After Discharge: Home Health  Planning home today. SW notified Olman SCHULTZ

## 2025-04-30 NOTE — CARE COORDINATION
Talya Schulz was evaluated today and a DME order was entered for a wheeled walker because she requires this to successfully complete daily living tasks of ambulating.  A wheeled walker is necessary due to the patient's unsteady gait, upper body weakness, and inability to  an ambulation device; and she can ambulate only by pushing a walker instead of a lesser assistive device such as a cane, crutch, or standard walker.  The need for this equipment was discussed with the patient and she understands and is in agreement.

## 2025-04-30 NOTE — DISCHARGE INSTRUCTIONS
Discharge Instructions Following Cardiac Surgery for  [facility] Cardiothoracic and Vascular Surgery  Pt Name: Talya Schulz  Medical Record Number: [id number]  Today's Date: 4/30/2025    ACTIVITY/EXERCISE   ?    It will take 2 to 3 months to feel like you did before surgery in terms of energy and strength.  ?    Slowly increase your activity after you go home. Pace yourself, listen to your body. If it hurts, stop.    During the first 2 months, no digging, outside bike riding, or using arm movement on  a stationary bike for sternal precautions.   ?   The limit on how much you can lift, push or pull is 10 pounds.    For the first 4 weeks after surgery, you must not lift anything over 10 pounds. (You cannot lift anything heavier than a gallon of  milk).   Beginning the 5th week after surgery, you may lift, push or pull up to 20 pounds.   ?    Begin your walking program on the first day you are home and record how many times per day and number of minutes on your log. You may climb stairs; there are no limits to stair climbing.   ?    Continue to do your cough and deep breathing exercises and the incentive spirometer 6 times a day as you did in the hospital.   ?    Do not use arms to get up from a seated position. Instead, rock in your chair to give yourself momentum to sit up.   ?    You may begin light house hold chores, washing a few dishes, dusting, setting the table or folding laundry.    ?    You can have sex when it is comfortable for you.  The amount of stress on the heart during sex is about the same as climbing 2 flights of stairs.    APPETITE   ?   Your appetite might be decreased at first.  It should slowly improve.   ?   Small, frequent meals, as well as cold foods, may help.   ?   The dietitian will assist you with a low fat, low cholesterol, low salt diet.   ?   Consult your open heart binder for diet instructions.    TEMPERATURE   ?   Take your temperature at the same time each day.  Take your  temperature at 8 a.m. (morning) and 8 p.m. (evening).     WEIGHT   ?   Weigh yourself when you awaken in the morning and record in your daily log.    PAIN   ?   You may have pain at the incision.  Shoulder, neck, back and upper chest discomfort are common.  Take your pain medications as ordered.   ?   You may use a heating pad on your neck and shoulders if you have soreness.  Never place it on your incision.      BOWEL HABITS   ?   Constipation is common due to Pain medications and inactivity.   ?   Try drinking prune juice, eating a well balanced diet including fruits, vegetables and whole grains.   ?   If constipation persists, you may use any over-the-counter laxative, suppository or enema.    DRIVING AND RIDING IN A CAR INSTRUCTIONS  ?   You may ride in a car, NO DRIVING until seen by your surgeon.     ?   Your surgeon will tell you when you can resume driving at your postoperative office visit, normally 4 weeks after you are discharged, so he can check your sternal incision.  ?   No DRIVING while on Prescription pain medication!  ?   You should always wear the seat belt.  It is OK to sit in the front passenger seat.  If you are in an accident that causes the air bag to go off...it is better to have the seat belt on and the air bag to protect yourself.    INCISIONS  ?   Warning signs of infection: redness, warmth to touch, green colored pus, tender to touch.  Notify surgeon's office right away if any warnings occur.  ?   Ok to shower daily, no tub baths for the first month following procedure.  ?   Be sure to rinse the incision with water and pat dry with clean towels.  ?   Wash your hands before beginning to clean your incisions.    ?   Wash each of your incisions with Exidine soap and water 2 times a day using a clean wash cloth and towel for each incision each time. Carefully pat incision dry with a towel.   ?   Female patients, wear a bra 24 hours/day for 2 weeks.  Change your bra daily.  You may place a

## 2025-04-30 NOTE — CARE COORDINATION
04/30/25 12:57 PM    JAMIN called and left Orly at [facility] message that patient is discharging home today, has order for 2 wheel folding walker that needs to be delivered to her room.

## 2025-04-30 NOTE — PROGRESS NOTES
Cardiovascular Surgery Progress Note    Comfortable on RA  Stable, SR  Labs noted   CXR ok  Probable d/c home tomorrow

## 2025-04-30 NOTE — TELEPHONE ENCOUNTER
Talya Schulz called requesting a refill on the following medications:  Requested Prescriptions     Pending Prescriptions Disp Refills    gabapentin (NEURONTIN) 400 MG capsule 90 capsule 0     Sig: take 1 capsule by mouth TID a day     Pharmacy verified:  [email or web address] [address] [place] [address] [phone] - F [phone]     Date of last visit: 02/19/2025  Date of next visit (if applicable): 5/12/2025

## 2025-05-01 ENCOUNTER — TELEPHONE (OUTPATIENT)
Dept: FAMILY MEDICINE CLINIC | Age: 55
End: 2025-05-01

## 2025-05-01 DIAGNOSIS — G89.4 CHRONIC PAIN SYNDROME: ICD-10-CM

## 2025-05-01 RX ORDER — HYDROCODONE BITARTRATE AND ACETAMINOPHEN 5; 325 MG/1; MG/1
1 TABLET ORAL EVERY 8 HOURS PRN
Qty: 42 TABLET | Refills: 0 | Status: SHIPPED | OUTPATIENT
Start: 2025-05-02 | End: 2025-05-16

## 2025-05-01 NOTE — TELEPHONE ENCOUNTER
[facility] Transitions Initial Follow Up Call    Outreach made within 2 business days of discharge: Yes    Patient: Talya Schulz Patient : 1970   MRN: [id number]  Reason for Admission: There are no discharge diagnoses documented for the most recent discharge.  Discharge Date: 25       Spoke with: UMAIR with Talya    Discharge department/facility: [facility] Interactive Patient Contact:  Was patient able to fill all prescriptions:   Was patient instructed to bring all medications to the follow-up visit:   Is patient taking all medications as directed in the discharge summary?   Does patient understand their discharge instructions:  Does patient have questions or concerns that need addressed prior to 7 day follow up office visit:     Scheduled appointment with PCP within 7-14 days    Follow Up  Future Appointments   Date Time Provider Department Center   2025 11:40 AM Gopal Boone APRN - CNP Fam Med [facility] DEP   2025  1:20 PM Milena Wheeler APRN - CNP N [facility] Pain [facility] [place]   2025 10:15 AM Christian Viramontes PA-C SRPX CT SURG [facility] [place]   6/3/2025  9:45 AM Sekou Perkins PA-C N [facility] Heart [facility] [place]   6/3/2025 10:40 AM Conrad Maldonado MD N ES Kidney [facility] [place]   7/15/2025 11:20 AM Gopal Boone APRN - CNP Fam Med [facility] DEP   2025 10:00 AM Jareth Rodriguez DO [facility] RHEUM [facility] [place]   9/3/2025  1:00 PM Guillermo Street MD N [facility] Heart [facility] [place]       Luz Marina Oh MA

## 2025-05-01 NOTE — TELEPHONE ENCOUNTER
OARRS reviewed. UDS: + for  Hydrocodone.     - for: Gabapentin    Last seen: 2/19/2025. Follow-up:   Future Appointments   Date Time Provider Department Center   5/5/2025 11:40 AM Gopal Boone APRN - CNP [facility] Med [facility] ECC DEP   5/12/2025  1:20 PM Milena Wheeler APRN - CNP N [facility] Pain [facility] [place]   5/20/2025 10:15 AM Christian Viramontes PA-C SRPX CT SURG [facility] [place]   6/3/2025  9:45 AM Sekou Perkins PA-C N [facility] Heart [facility] [place]   6/3/2025 10:40 AM Conrad Maldonado MD N ES Kidney [facility] [place]   7/15/2025 11:20 AM Gopal Boone APRN - CNP [facility] Med [facility] ECC DEP   8/4/2025 10:00 AM Jareth Rodriguez DO [facility] RHEUM [facility] [place]   9/3/2025  1:00 PM Guillermo Street MD N SRPX Heart P - [place]

## 2025-05-01 NOTE — TELEPHONE ENCOUNTER
Talya is requesting a refill of their   Requested Prescriptions     Pending Prescriptions Disp Refills    HYDROcodone-acetaminophen (NORCO) 5-325 MG per tablet 90 tablet 0     Sig: Take 1 tablet by mouth every 8 hours as needed for Pain for up to 30 days. Max Daily Amount: 3 tablets   . Please advise.      Last Appt:  Visit date not found  Next Appt:   Visit date not found  Preferred pharmacy:     [facility] [address] [place] [address] [phone] - F [phone]       Patient states she did not get sent home with any medication and is in pain

## 2025-05-02 NOTE — TELEPHONE ENCOUNTER
[facility] Transitions Initial Follow Up Call    Outreach made within 2 business days of discharge: Yes    Patient: Talya Schulz Patient : 1970   MRN: [id number]  Reason for Admission: There are no discharge diagnoses documented for the most recent discharge.  Discharge Date: 25       Spoke with: Talya    Discharge department/facility: [facility] Interactive Patient Contact:  Was patient able to fill all prescriptions: yes  Was patient instructed to bring all medications to the follow-up visit: yes  Is patient taking all medications as directed in the discharge summary? yes  Does patient understand their discharge instructions: yes  Does patient have questions or concerns that need addressed prior to 7 day follow up office visit: no    Scheduled appointment with PCP within 7-14 days    Follow Up  Future Appointments   Date Time Provider Department Center   2025 11:40 AM Gopal Boone APRN - CNP Fam Med [facility] ECC DEP   2025  1:20 PM Milena Wheeler APRN - CNP N [facility] Pain [facility] [place]   2025 10:15 AM Christian Viramontes PA-C SRPX CT SURG [facility] [place]   6/3/2025  9:45 AM Sekou Perkins PA-C N [facility] Heart [facility] [place]   6/3/2025 10:40 AM Conrad Maldonado MD N ES Kidney [facility] [place]   7/15/2025 11:20 AM Gopal Boone APRN - CNP Fam Med [facility] BS ECC DEP   2025 10:00 AM Jareth Rodriguez DO [facility] RHEUM [facility] [place]   9/3/2025  1:00 PM Guillermo Street MD N [facility] Heart [facility] [place]       Luz Marina Oh MA

## 2025-05-05 ENCOUNTER — OFFICE VISIT (OUTPATIENT)
Dept: FAMILY MEDICINE CLINIC | Age: 55
End: 2025-05-05
Payer: COMMERCIAL

## 2025-05-05 VITALS
BODY MASS INDEX: 25.43 KG/M2 | WEIGHT: 162 LBS | RESPIRATION RATE: 14 BRPM | TEMPERATURE: 98.3 F | DIASTOLIC BLOOD PRESSURE: 70 MMHG | HEIGHT: 67 IN | HEART RATE: 75 BPM | OXYGEN SATURATION: 98 % | SYSTOLIC BLOOD PRESSURE: 136 MMHG

## 2025-05-05 DIAGNOSIS — Z86.39 HX OF TYPE 2 DIABETES MELLITUS: ICD-10-CM

## 2025-05-05 DIAGNOSIS — I25.10 CAD, MULTIPLE VESSEL: ICD-10-CM

## 2025-05-05 DIAGNOSIS — N18.31 STAGE 3A CHRONIC KIDNEY DISEASE (CKD) (HCC): ICD-10-CM

## 2025-05-05 DIAGNOSIS — Z95.1 S/P CABG X 3: ICD-10-CM

## 2025-05-05 DIAGNOSIS — Z09 HOSPITAL DISCHARGE FOLLOW-UP: Primary | ICD-10-CM

## 2025-05-05 DIAGNOSIS — H61.20 IMPACTED CERUMEN, UNSPECIFIED LATERALITY: ICD-10-CM

## 2025-05-05 LAB — HBA1C MFR BLD: 5.4 %

## 2025-05-05 PROCEDURE — 1036F TOBACCO NON-USER: CPT | Performed by: NURSE PRACTITIONER

## 2025-05-05 PROCEDURE — G8417 CALC BMI ABV UP PARAM F/U: HCPCS | Performed by: NURSE PRACTITIONER

## 2025-05-05 PROCEDURE — 99215 OFFICE O/P EST HI 40 MIN: CPT | Performed by: NURSE PRACTITIONER

## 2025-05-05 PROCEDURE — 83036 HEMOGLOBIN GLYCOSYLATED A1C: CPT | Performed by: NURSE PRACTITIONER

## 2025-05-05 PROCEDURE — G8427 DOCREV CUR MEDS BY ELIG CLIN: HCPCS | Performed by: NURSE PRACTITIONER

## 2025-05-05 PROCEDURE — 1111F DSCHRG MED/CURRENT MED MERGE: CPT | Performed by: NURSE PRACTITIONER

## 2025-05-05 PROCEDURE — 3017F COLORECTAL CA SCREEN DOC REV: CPT | Performed by: NURSE PRACTITIONER

## 2025-05-05 NOTE — PROGRESS NOTES
Post-Discharge Transitional Care  Follow Up      Talya Schulz   YOB: 1970    Date of Office Visit:  5/5/2025  Date of Hospital Admission: 4/25/25  Date of Hospital Discharge: 4/30/25  Risk of hospital readmission (high >=14%. Medium >=10%) :Readmission Risk Score: 17.6      Care management risk score Rising risk (score 2-5) and Complex Care (Scores >=6): No Risk Score On File     Non face to face  following discharge, date last encounter closed (first attempt may have been earlier): 05/01/2025    Call initiated 2 business days of discharge: Yes    ASSESSMENT/PLAN:   Hospital discharge follow-up  -     [place] DISCHARGE MEDS RECONCILED W/ CURRENT OUTPATIENT MED LIST  S/P CABG x 3  Stable healing  F/u with cardiology and CV as scheduled   Continue current meds  CAD, multiple vessel  Hx of type 2 diabetes mellitus  Stable continue farxiga 5 mg daily y  Low carb diet low calorie diet   -     POCT glycosylated hemoglobin (Hb A1C) 5.4  -     HM DIABETES FOOT EXAM  Stage 3a chronic kidney disease (CKD) ([facility])  Nephrology managing   Impacted cerumen, unspecified laterality  -     carbamide peroxide (DEBROX) 6.5 % otic solution; Place 5 drops in ear(s) 2 times daily, Disp-1 each, R-3Normal      Medical Decision Making: high complexity  No follow-ups on file.    On this date 5/5/2025 I have spent 18 minutes reviewing previous notes, test results and face to face with the patient discussing the diagnosis and importance of compliance with the treatment plan as well as documenting on the day of the visit.       Subjective:   HPI:  Follow up of Hospital problems/diagnosis(es): S/P CABG X 3, CAD    Inpatient course: Discharge summary reviewed- see chart.    Pt had developed chest pain and SOB and reported to ER and Dx with CAD     Pt states while admitted she devleoped a red painful itchy rash on right posterior flank area, dx with shingles rash is drying up and no longer TTP or painful or itching     Hx of Diabetes

## 2025-05-06 ENCOUNTER — TELEPHONE (OUTPATIENT)
Dept: CARDIOTHORACIC SURGERY | Age: 55
End: 2025-05-06

## 2025-05-06 NOTE — TELEPHONE ENCOUNTER
Cherry with [facility] left a message stating that patient refused home health.     Notified Enmanuel Viramontes PA-C, per Enmanuel, that is patient's right, nothing further needed.

## 2025-05-07 NOTE — TELEPHONE ENCOUNTER
S/P CABG X3 w/ Dr. Price 04/25/2025  Follow up 05/20/2025 with CT surgery.      Patient called today stating that she is having chest pain that is a 7/10 when she takes a deep breath, even after taking Tylenol, Norco, and gabapentin.    Patient states she hasn't fallen, or had any trauma to area since surgery.     Denies shortness of breath.   Denies light headedness, or dizziness.   Denies incisional dehiscence.   Denies drainage from incision site.       Patient has a hard time finding rides to appointments, and states she will be at [facility] on 05/12/2025, and will have her cxr, and blood work completed while she is here.     She is wondering what she can do for the pain.     Ok to suggest alternating Ibuprofen, with tylenol, or Norco if she still has some, and warm compress for 10-15 min, with 30 minute breaks before next warm compress is applied?     Please advise.

## 2025-05-12 ENCOUNTER — HOSPITAL ENCOUNTER (OUTPATIENT)
Age: 55
Discharge: HOME | End: 2025-05-12
Payer: COMMERCIAL

## 2025-05-12 ENCOUNTER — HOSPITAL ENCOUNTER (OUTPATIENT)
Dept: GENERAL RADIOLOGY | Age: 55
Discharge: HOME | End: 2025-05-12
Payer: COMMERCIAL

## 2025-05-12 ENCOUNTER — LAB (OUTPATIENT)
Dept: LAB | Age: 55
End: 2025-05-12

## 2025-05-12 ENCOUNTER — OFFICE VISIT (OUTPATIENT)
Dept: PHYSICAL MEDICINE AND REHAB | Age: 55
End: 2025-05-12
Payer: COMMERCIAL

## 2025-05-12 VITALS
SYSTOLIC BLOOD PRESSURE: 130 MMHG | DIASTOLIC BLOOD PRESSURE: 80 MMHG | WEIGHT: 162 LBS | BODY MASS INDEX: 25.43 KG/M2 | HEIGHT: 67 IN

## 2025-05-12 DIAGNOSIS — Z95.1 S/P CABG X 3: ICD-10-CM

## 2025-05-12 DIAGNOSIS — M46.1 BILATERAL SACROILIITIS: ICD-10-CM

## 2025-05-12 DIAGNOSIS — M46.1 SI (SACROILIAC) JOINT INFLAMMATION: ICD-10-CM

## 2025-05-12 DIAGNOSIS — G62.9 NEUROPATHY: ICD-10-CM

## 2025-05-12 DIAGNOSIS — R80.9 PROTEINURIA, UNSPECIFIED TYPE: ICD-10-CM

## 2025-05-12 DIAGNOSIS — G89.4 CHRONIC PAIN SYNDROME: ICD-10-CM

## 2025-05-12 DIAGNOSIS — M47.816 LUMBAR SPONDYLOSIS: ICD-10-CM

## 2025-05-12 DIAGNOSIS — N18.32 STAGE 3B CHRONIC KIDNEY DISEASE (HCC): ICD-10-CM

## 2025-05-12 DIAGNOSIS — E55.9 VITAMIN D DEFICIENCY: ICD-10-CM

## 2025-05-12 DIAGNOSIS — M47.812 CERVICAL SPONDYLOSIS: Primary | ICD-10-CM

## 2025-05-12 DIAGNOSIS — M54.12 CERVICAL RADICULITIS: ICD-10-CM

## 2025-05-12 DIAGNOSIS — M54.16 LUMBAR RADICULITIS: ICD-10-CM

## 2025-05-12 DIAGNOSIS — N25.81 HYPERPARATHYROIDISM, SECONDARY RENAL: ICD-10-CM

## 2025-05-12 LAB
25(OH)D3 SERPL-MCNC: 45 NG/ML (ref 30–100)
ANION GAP SERPL CALC-SCNC: 9 MEQ/L (ref 8–16)
BUN SERPL-MCNC: 29 MG/DL (ref 8–23)
CALCIUM SERPL-MCNC: 9 MG/DL (ref 8.6–10)
CHLORIDE SERPL-SCNC: 106 MEQ/L (ref 98–111)
CO2 SERPL-SCNC: 26 MEQ/L (ref 22–29)
CREAT SERPL-MCNC: 1.6 MG/DL (ref 0.5–0.9)
GFR SERPL CREATININE-BSD FRML MDRD: 38 ML/MIN/1.73M2
GLUCOSE SERPL-MCNC: 85 MG/DL (ref 74–109)
POTASSIUM SERPL-SCNC: 5.5 MEQ/L (ref 3.5–5.2)
PTH-INTACT SERPL-MCNC: 202 PG/ML (ref 15–65)
SODIUM SERPL-SCNC: 141 MEQ/L (ref 135–145)

## 2025-05-12 PROCEDURE — 1036F TOBACCO NON-USER: CPT | Performed by: NURSE PRACTITIONER

## 2025-05-12 PROCEDURE — 1111F DSCHRG MED/CURRENT MED MERGE: CPT | Performed by: NURSE PRACTITIONER

## 2025-05-12 PROCEDURE — 99214 OFFICE O/P EST MOD 30 MIN: CPT | Performed by: NURSE PRACTITIONER

## 2025-05-12 PROCEDURE — G8427 DOCREV CUR MEDS BY ELIG CLIN: HCPCS | Performed by: NURSE PRACTITIONER

## 2025-05-12 PROCEDURE — 71046 X-RAY EXAM CHEST 2 VIEWS: CPT

## 2025-05-12 PROCEDURE — 3017F COLORECTAL CA SCREEN DOC REV: CPT | Performed by: NURSE PRACTITIONER

## 2025-05-12 PROCEDURE — G8417 CALC BMI ABV UP PARAM F/U: HCPCS | Performed by: NURSE PRACTITIONER

## 2025-05-12 RX ORDER — HYDROCODONE BITARTRATE AND ACETAMINOPHEN 5; 325 MG/1; MG/1
1 TABLET ORAL EVERY 8 HOURS PRN
Qty: 90 TABLET | Refills: 0 | Status: SHIPPED | OUTPATIENT
Start: 2025-05-16 | End: 2025-06-15

## 2025-05-12 ASSESSMENT — ENCOUNTER SYMPTOMS: BACK PAIN: 1

## 2025-05-12 NOTE — PROGRESS NOTES
Functionality Assessment/Goals Worksheet     On a scale of 0 (Does not Interfere) to 10 (Completely Interferes)     1.  Which number describes how during the past week pain has interfered with           the following:  A.  General Activity:  5  B.  Mood: 6  C.  Walking Ability:  5  D.  Normal Work (Includes both work outside the home and housework):  6  E.  Relations with Other People:   6  F.  Sleep:   8  G.  Enjoyment of Life:   7    2.  Patient Prefers to Take their Pain Medications:     [x]  On a regular basis   []  Only when necessary    []  Does not take pain medications    3.  What are the Patient's Goals/Expectations for Visiting Pain Management?     [x]  Learn about my pain    []  Receive Medication   []  Physical Therapy     []  Treat Depression   []  Receive Injections    []  Treat Sleep   []  Deal with Anxiety and Stress   []  Treat Opoid Dependence/Addiction   []  Other:                                
knee: Normal.      Right ankle: Tenderness present.      Right foot: Decreased range of motion. Swelling, tenderness and bony tenderness present.      Comments: 2 right broken toes has walker shoe on    Skin:     General: Skin is warm and dry.      Coloration: Skin is not pale.      Findings: No erythema or rash.          Neurological:      Mental Status: She is alert and oriented to person, place, and time. She is not disoriented.      Cranial Nerves: Cranial nerves 2-12 are intact. No cranial nerve deficit.      Sensory: Sensation is intact. No sensory deficit.      Motor: Motor function is intact. No atrophy or abnormal muscle tone.      Coordination: Coordination is intact. Coordination normal.      Gait: Gait abnormal.      Comments: Motor  4/5 RUE  5/5 BLE   Psychiatric:         Attention and Perception: Attention and perception normal. She is attentive.         Mood and Affect: Mood and affect normal. Mood is not anxious or depressed. Affect is not labile, blunt, angry or inappropriate.         Speech: Speech normal. She is communicative. Speech is not rapid and pressured, delayed, slurred or tangential.         Behavior: Behavior normal. Behavior is not agitated, slowed, aggressive, withdrawn, hyperactive or combative. Behavior is cooperative.         Thought Content: Thought content normal. Thought content is not paranoid or delusional. Thought content does not include homicidal or suicidal ideation. Thought content does not include homicidal or suicidal plan.         Cognition and Memory: Cognition and memory normal. Memory is not impaired. She does not exhibit impaired recent memory or impaired remote memory.         Judgment: Judgment normal. Judgment is not impulsive or inappropriate.       VONDA  Patricks test  positive  Yeoman's  or Gaenslen's positive  Kemps  positive  Spurlings  positive  Todd's na         Assessment:     1. Cervical spondylosis    2. Chronic pain syndrome    3. Lumbar

## 2025-05-13 ENCOUNTER — RESULTS FOLLOW-UP (OUTPATIENT)
Dept: NEPHROLOGY | Age: 55
End: 2025-05-13

## 2025-05-13 DIAGNOSIS — N18.32 STAGE 3B CHRONIC KIDNEY DISEASE (HCC): Primary | ICD-10-CM

## 2025-05-13 DIAGNOSIS — E87.0 HYPERNATREMIA: ICD-10-CM

## 2025-05-13 NOTE — TELEPHONE ENCOUNTER
Problem: General Rehab Plan of Care  Goal: Occupational Therapy Goals  Description  The patient and/or their representative will achieve their patient-specific goals related to the plan of care.  The patient-specific goals include:    Interventions to focus on pt exploring and practicing coping skills to reduce stress in daily life. Encourage feelings identification and expression in healthy ways. Pt will engage in goal directed tasks to enhance concentration, organization, and problem solving. Encourage attendance and participation in scheduled Occupational Therapy sessions. Continue to assess and document progress.    Outcome: Improving  Note:   Pt attended and participated in a structured occupational therapy group session with an emphasis on attention to task, social skills and frustration tolerance.  Pt was provided a demonstration of how to trace simple or complex pictures using tracing paper and a pencil.  The rationale for the task was also provided.  Pt was motivated by this task.  Attended to the task for 45 minutes.  Pt handled frustration appropriately.  Pt asked staff and peers for supplies as needed. She showed improvement with this today.          ----- Message from Dr. Conrad Maldonado MD sent at 5/13/2025  7:38 AM EDT -----  Serum potassium is very slightly high.  Low potassium diet.  Hold multivitamin for now.  Repeat potassium level in about 4 to 5 days.

## 2025-05-13 NOTE — TELEPHONE ENCOUNTER
Patients mailbox is full.  Patients esme Amato Mailbox is also full. Will try again. Potassium is ordered to be done in 5 days.

## 2025-05-19 ENCOUNTER — TELEPHONE (OUTPATIENT)
Dept: CARDIOTHORACIC SURGERY | Age: 55
End: 2025-05-19

## 2025-05-19 NOTE — TELEPHONE ENCOUNTER
Patient had recent BMP completed, and chest XRAY, but not CBC. Trying to reach patient to inform her she needs to have this done prior to her follow up.     Tried to call her phone 3 times, and also her son's phone, voicemail is full for patient, and voicemail is not set up.

## 2025-05-20 ENCOUNTER — OFFICE VISIT (OUTPATIENT)
Dept: CARDIOTHORACIC SURGERY | Age: 55
End: 2025-05-20

## 2025-05-20 ENCOUNTER — HOSPITAL ENCOUNTER (OUTPATIENT)
Age: 55
Discharge: HOME | End: 2025-05-20
Payer: COMMERCIAL

## 2025-05-20 VITALS
HEIGHT: 67 IN | HEART RATE: 84 BPM | SYSTOLIC BLOOD PRESSURE: 119 MMHG | DIASTOLIC BLOOD PRESSURE: 77 MMHG | BODY MASS INDEX: 24.01 KG/M2 | WEIGHT: 153 LBS

## 2025-05-20 DIAGNOSIS — Z95.1 S/P CABG X 3: ICD-10-CM

## 2025-05-20 DIAGNOSIS — Z95.1 S/P CABG X 3: Primary | ICD-10-CM

## 2025-05-20 LAB
ANION GAP SERPL CALC-SCNC: 14 MEQ/L (ref 8–16)
BUN SERPL-MCNC: 33 MG/DL (ref 8–23)
CALCIUM SERPL-MCNC: 9.3 MG/DL (ref 8.6–10)
CHLORIDE SERPL-SCNC: 102 MEQ/L (ref 98–111)
CO2 SERPL-SCNC: 23 MEQ/L (ref 22–29)
CREAT SERPL-MCNC: 1.9 MG/DL (ref 0.5–0.9)
DEPRECATED RDW RBC AUTO: 53.8 FL (ref 35–45)
ERYTHROCYTE [DISTWIDTH] IN BLOOD BY AUTOMATED COUNT: 15.5 % (ref 11.5–14.5)
GFR SERPL CREATININE-BSD FRML MDRD: 31 ML/MIN/1.73M2
GLUCOSE SERPL-MCNC: 93 MG/DL (ref 74–109)
HCT VFR BLD AUTO: 35.2 % (ref 37–47)
HGB BLD-MCNC: 10.2 GM/DL (ref 12–16)
MCH RBC QN AUTO: 27.6 PG (ref 26–33)
MCHC RBC AUTO-ENTMCNC: 29 GM/DL (ref 32.2–35.5)
MCV RBC AUTO: 95.1 FL (ref 81–99)
PLATELET # BLD AUTO: 173 THOU/MM3 (ref 130–400)
PMV BLD AUTO: 11.8 FL (ref 9.4–12.4)
POTASSIUM SERPL-SCNC: 4.3 MEQ/L (ref 3.5–5.2)
RBC # BLD AUTO: 3.7 MILL/MM3 (ref 4.2–5.4)
SODIUM SERPL-SCNC: 139 MEQ/L (ref 135–145)
WBC # BLD AUTO: 6.3 THOU/MM3 (ref 4.8–10.8)

## 2025-05-20 PROCEDURE — 36415 COLL VENOUS BLD VENIPUNCTURE: CPT

## 2025-05-20 PROCEDURE — 99024 POSTOP FOLLOW-UP VISIT: CPT | Performed by: PHYSICIAN ASSISTANT

## 2025-05-20 PROCEDURE — 85027 COMPLETE CBC AUTOMATED: CPT

## 2025-05-20 PROCEDURE — 80048 BASIC METABOLIC PNL TOTAL CA: CPT

## 2025-05-20 NOTE — PROGRESS NOTES
CT/CV Surgery Follow Up Office Visit      Patient's Name/Date of Birth: Talya Schulz / 1970 (54 y.o.)    CC:   Chief Complaint   Patient presents with    Follow-up     Post op CABG X3      PCP: Gopal Boone APRN - CNP    Date: May 20, 2025     HPI:   We had the pleasure of seeing Talya Schulz in the office today, as you know this is a very pleasant 54 y.o. year old female with a history of Coronary artery disease and is s/p  1) Coronary artery bypass grafting x 3, with a reversed greater saphenous aortocoronary vein graft to the left anterior descending artery, a reversed greater saphenous aortocoronary vein graft to the first diagonal artery, and a reversed greater saphenous aortocoronary vein graft to the posterior descending artery 2) Endoscopic greater saphenous vein harvest on 4/25/25 with Dr. Price.    The pt denies chest pressure, SOB, fever, chills, N/V/D. She states she is slowly increasing her activity around the house. Recent call to the office reporting symptoms of muscle pain with too much activity. This has since improved.     CXR PA & LATERAL:   IMPRESSION:  1. Moderate cardiomegaly. Metallic sternotomy sutures and vascular clips from  prior surgery.  2. Nodular density projected over left apex which has been present on a few  recent chest x-rays but not seen on recent CT thorax dated 4/7/2025, possibly  overlying pleural thickening or tiny multiloculated fluid.  3. Minimal residual atelectatic/fibrotic stranding both lung bases. Overall  appearance of chest improved from prior.           Past Medical History:  Talya  has a past medical history of Anemia, CAD (coronary artery disease), CKD (chronic kidney disease), stage III (HCC), Dyslipidemia, Hematuria, Hemodialysis patient, History of blood transfusion, Hyperlipidemia, Hypertension, Hyperuricemia, Hypothyroidism, Kidney disease, Lupus, Lupus nephritis (HCC), Proteinuria, Seizures (HCC), and Systemic lupus erythematosus

## 2025-06-03 ENCOUNTER — OFFICE VISIT (OUTPATIENT)
Dept: NEPHROLOGY | Age: 55
End: 2025-06-03
Payer: COMMERCIAL

## 2025-06-03 ENCOUNTER — TELEPHONE (OUTPATIENT)
Dept: CARDIAC REHAB | Age: 55
End: 2025-06-03

## 2025-06-03 VITALS
DIASTOLIC BLOOD PRESSURE: 72 MMHG | SYSTOLIC BLOOD PRESSURE: 128 MMHG | HEART RATE: 76 BPM | OXYGEN SATURATION: 98 % | HEIGHT: 67 IN | WEIGHT: 152.8 LBS | BODY MASS INDEX: 23.98 KG/M2

## 2025-06-03 DIAGNOSIS — E55.9 VITAMIN D DEFICIENCY: ICD-10-CM

## 2025-06-03 DIAGNOSIS — N25.81 HYPERPARATHYROIDISM, SECONDARY RENAL: ICD-10-CM

## 2025-06-03 DIAGNOSIS — M32.14 SYSTEMIC LUPUS ERYTHEMATOSUS WITH GLOMERULAR DISEASE, UNSPECIFIED SLE TYPE (HCC): ICD-10-CM

## 2025-06-03 DIAGNOSIS — I10 PRIMARY HYPERTENSION: ICD-10-CM

## 2025-06-03 DIAGNOSIS — N18.32 STAGE 3B CHRONIC KIDNEY DISEASE (HCC): Primary | ICD-10-CM

## 2025-06-03 PROCEDURE — 1036F TOBACCO NON-USER: CPT | Performed by: INTERNAL MEDICINE

## 2025-06-03 PROCEDURE — 3017F COLORECTAL CA SCREEN DOC REV: CPT | Performed by: INTERNAL MEDICINE

## 2025-06-03 PROCEDURE — G8420 CALC BMI NORM PARAMETERS: HCPCS | Performed by: INTERNAL MEDICINE

## 2025-06-03 PROCEDURE — 99214 OFFICE O/P EST MOD 30 MIN: CPT | Performed by: INTERNAL MEDICINE

## 2025-06-03 PROCEDURE — 3078F DIAST BP <80 MM HG: CPT | Performed by: INTERNAL MEDICINE

## 2025-06-03 PROCEDURE — G8427 DOCREV CUR MEDS BY ELIG CLIN: HCPCS | Performed by: INTERNAL MEDICINE

## 2025-06-03 PROCEDURE — 3074F SYST BP LT 130 MM HG: CPT | Performed by: INTERNAL MEDICINE

## 2025-06-03 RX ORDER — CALCITRIOL 0.25 UG/1
0.25 CAPSULE, LIQUID FILLED ORAL DAILY
Qty: 90 CAPSULE | Refills: 1 | Status: SHIPPED | OUTPATIENT
Start: 2025-06-03

## 2025-06-03 RX ORDER — PREDNISONE 1 MG/1
4 TABLET ORAL EVERY MORNING
COMMUNITY
Start: 2025-05-15

## 2025-06-03 NOTE — PROGRESS NOTES
Renal Progress Note    Assessment and Plan:      Diagnosis Orders   1. Stage 3b chronic kidney disease (HCC)  Basic Metabolic Panel      2. Primary hypertension        3. Vitamin D deficiency  Vitamin D 25 Hydroxy      4. Hyperparathyroidism, secondary renal  PTH, Intact      5. Systemic lupus erythematosus with glomerular disease, unspecified SLE type ([facility])                  PLAN:  Serum creatinine slightly increased to 1.9 mg/dL from 1.6 mg/dL but is still within her baseline.  Her baseline has ranged between 1.3 mg/dL up to 2.4 mg/dL since 2017.  Hypertension is well-controlled  Vitamin D level is good at 45 and improvement from 16.5 about a year ago  Parathyroid hormone level is increased to 202.0 from 86.1 before.  Will start calcitriol 0.25 mcg once a day.  This was discussed with her.  She was receptive.  Systemic lupus is managed by rheumatology.  Medications reviewed  No other changes  Return visit in 3 months with labs          Patient Active Problem List   Diagnosis    Hypothyroid    Systemic lupus erythematosus ([facility])    Proteinuria    Anemia    Stage 3a chronic kidney disease (CKD) ([facility])    Dyslipidemia    Spinal stenosis of lumbar region without neurogenic claudication    Renal insufficiency    Cervical radiculopathy    Cervical spinal stenosis    Hx of type 2 diabetes mellitus    CAD, multiple vessel    S/P CABG x 3           Subjective:   Chief complaint:  Chief Complaint   Patient presents with    Follow-up     FU 6 months CKD 3b      HPI:This is a follow up visit for Ms. Talya Schulz here today for return appointment.  I see her for chronic kidney disease among other things.  She was last seen about 3 months ago.  Since then she has  had open-heart surgery according to her.  Doing well since the surgery.  Appetite is good.  She urinates well.  No recent chest pain or shortness of breath.  She does have chronic joint ache and joint pain exacerbated by activity but partially relieved by

## 2025-06-03 NOTE — PROGRESS NOTES
Had open heart surgery 4/25/25. She follows with Dr. Reyes. She feels as if she is recovering well. Will start cardiac rehab soon.

## 2025-06-03 NOTE — TELEPHONE ENCOUNTER
Attempted to call for second time to schedule cardiac rehab evaluation. Patient has calling restrictions, no ring, unable to leave message. Referral closed.

## 2025-06-04 DIAGNOSIS — G89.4 CHRONIC PAIN SYNDROME: ICD-10-CM

## 2025-06-04 NOTE — TELEPHONE ENCOUNTER
OARRS reviewed. UDS: + for  Gabapentin, Hydrocodone.   Last seen: 5/12/2025. Follow-up:   Future Appointments   Date Time Provider Department Center   6/10/2025  3:30 PM Sekou Perkins PA-C N [facility] - [place]   7/15/2025 11:20 AM Gopal Boone APRN - CNP [facility]   7/22/2025  1:00 PM Milena Wheeler APRN - CNP N [facility] Pain [facility] - [place]   8/4/2025 10:00 AM Jareth Rodriguez DO [facility] RHE[facility] - [place]   9/2/2025 10:00 AM Conrad Maldonado MD N [facility] Kidney [facility] - [place]   9/3/2025  1:00 PM Guillermo Street MD N [facility] Heart [facility] - [place]

## 2025-06-04 NOTE — TELEPHONE ENCOUNTER
Talya Schulz called requesting a refill on the following medications:  Requested Prescriptions     Pending Prescriptions Disp Refills    HYDROcodone-acetaminophen (NORCO) 5-325 MG per tablet 90 tablet 0     Sig: Take 1 tablet by mouth every 8 hours as needed for Pain for up to 30 days. Max Daily Amount: 3 tablets     Pharmacy verified:     [address] -  [phone] - F [phone]      Date of last visit: 05/12/2025  Date of next visit (if applicable): 7/22/2025

## 2025-06-09 RX ORDER — HYDROCODONE BITARTRATE AND ACETAMINOPHEN 5; 325 MG/1; MG/1
1 TABLET ORAL EVERY 8 HOURS PRN
Qty: 90 TABLET | Refills: 0 | Status: SHIPPED | OUTPATIENT
Start: 2025-06-16 | End: 2025-07-16

## 2025-06-10 ENCOUNTER — OFFICE VISIT (OUTPATIENT)
Dept: CARDIOLOGY CLINIC | Age: 55
End: 2025-06-10
Payer: COMMERCIAL

## 2025-06-10 VITALS
HEIGHT: 67 IN | DIASTOLIC BLOOD PRESSURE: 78 MMHG | SYSTOLIC BLOOD PRESSURE: 140 MMHG | BODY MASS INDEX: 23.79 KG/M2 | WEIGHT: 151.6 LBS | HEART RATE: 80 BPM

## 2025-06-10 DIAGNOSIS — E78.00 PURE HYPERCHOLESTEROLEMIA: Primary | ICD-10-CM

## 2025-06-10 DIAGNOSIS — Z95.1 S/P CABG (CORONARY ARTERY BYPASS GRAFT): ICD-10-CM

## 2025-06-10 PROCEDURE — 1036F TOBACCO NON-USER: CPT | Performed by: PHYSICIAN ASSISTANT

## 2025-06-10 PROCEDURE — G8420 CALC BMI NORM PARAMETERS: HCPCS | Performed by: PHYSICIAN ASSISTANT

## 2025-06-10 PROCEDURE — 3017F COLORECTAL CA SCREEN DOC REV: CPT | Performed by: PHYSICIAN ASSISTANT

## 2025-06-10 PROCEDURE — G8427 DOCREV CUR MEDS BY ELIG CLIN: HCPCS | Performed by: PHYSICIAN ASSISTANT

## 2025-06-10 PROCEDURE — 99214 OFFICE O/P EST MOD 30 MIN: CPT | Performed by: PHYSICIAN ASSISTANT

## 2025-06-10 NOTE — PROGRESS NOTES
[facility] [place] [facility]  [facility] CARDIOLOGY  [address]  [address]  [place] [address]  Dept: [phone]  Dept Fax: [phone]  Loc: [phone]    Chief Complaint   Patient presents with    Follow-up     6 week CAGB X2. No cardiac complaints        History of Present Illness  The patient is a 54-year-old female who underwent three-vessel coronary bypass grafting with reverse saphenous vein grafts to the LAD, diagonal #1, and PDA on 04/25/2025. She presents for a follow-up visit.    She reports a satisfactory recovery post-surgery, with no significant chest pain. However, she experiences nightly discomfort upon palpation of the chest area. Her blood pressure readings at home have been consistently low. She is currently on a regimen of aspirin 325 mg daily, atorvastatin 40 mg nightly, and metoprolol half a tablet twice daily.      SOCIAL HISTORY  Tobacco: Does not smoke.       Cardiologist:  Dr. Ramirez        General:   No fever, no chills, No fatigue or weight loss  Pulmonary:    No dyspnea, no wheezing  Cardiac:    Occasional incisional pain  GI:     No nausea or vomiting, no abdominal pain  Neuro:    No dizziness or light headedness  Musculoskeletal:  No recent active issues  Extremities:   No edema, good peripheral pulses      Past Medical History:   Diagnosis Date    Anemia     CAD (coronary artery disease)     CKD (chronic kidney disease), stage III (HCC)     Dyslipidemia     Hematuria     Hemodialysis patient     History of blood transfusion     Hyperlipidemia     Hypertension     Hyperuricemia     Hypothyroidism     Kidney disease     as a result from lupus    Lupus     Lupus nephritis (HCC)     Proteinuria     Seizures (HCC)     Systemic lupus erythematosus (HCC)        Allergies   Allergen Reactions    Codeine Hives, Nausea And Vomiting and Rash       Current Outpatient Medications   Medication Sig Dispense Refill    [START ON 6/16/2025] HYDROcodone-acetaminophen

## 2025-07-10 RX ORDER — GABAPENTIN 400 MG/1
400 CAPSULE ORAL 3 TIMES DAILY
Qty: 90 CAPSULE | Refills: 0 | Status: SHIPPED | OUTPATIENT
Start: 2025-07-10 | End: 2025-08-09

## 2025-07-10 NOTE — TELEPHONE ENCOUNTER
OARRS reviewed. UDS: + for  Gabapentin, Hydrocodone.   Last seen: 5/12/2025. Follow-up:   Future Appointments   Date Time Provider Department Center   7/15/2025 11:20 AM Gopal Boone APRN - CNP Fam Med [facility]   7/22/2025  1:00 PM Milena Wheeler APRN - CNP N [facility] Pain [facility] - [place]   8/4/2025 10:00 AM Jareth Rodriguez DO [facility] RHEUM [facility] - [place]   9/2/2025 10:00 AM Conrad Maldonado MD N ES Kidney [facility] [place]   9/3/2025  1:00 PM Guillermo Street MD N [facility] Heart [facility]

## 2025-07-15 ENCOUNTER — OFFICE VISIT (OUTPATIENT)
Dept: FAMILY MEDICINE CLINIC | Age: 55
End: 2025-07-15
Payer: COMMERCIAL

## 2025-07-15 VITALS
RESPIRATION RATE: 16 BRPM | TEMPERATURE: 98 F | DIASTOLIC BLOOD PRESSURE: 78 MMHG | HEART RATE: 66 BPM | WEIGHT: 158 LBS | HEIGHT: 67 IN | SYSTOLIC BLOOD PRESSURE: 134 MMHG | OXYGEN SATURATION: 99 % | BODY MASS INDEX: 24.8 KG/M2

## 2025-07-15 DIAGNOSIS — D64.9 ANEMIA, UNSPECIFIED TYPE: ICD-10-CM

## 2025-07-15 DIAGNOSIS — M32.15 SYSTEMIC LUPUS ERYTHEMATOSUS WITH TUBULO-INTERSTITIAL NEPHROPATHY, UNSPECIFIED SLE TYPE (HCC): ICD-10-CM

## 2025-07-15 DIAGNOSIS — M48.061 SPINAL STENOSIS OF LUMBAR REGION WITHOUT NEUROGENIC CLAUDICATION: ICD-10-CM

## 2025-07-15 DIAGNOSIS — Z86.39 HX OF TYPE 2 DIABETES MELLITUS: Primary | ICD-10-CM

## 2025-07-15 DIAGNOSIS — Z95.1 S/P CABG X 3: ICD-10-CM

## 2025-07-15 DIAGNOSIS — E03.9 HYPOTHYROIDISM, UNSPECIFIED TYPE: ICD-10-CM

## 2025-07-15 DIAGNOSIS — E78.5 DYSLIPIDEMIA: ICD-10-CM

## 2025-07-15 DIAGNOSIS — R80.9 PROTEINURIA, UNSPECIFIED TYPE: ICD-10-CM

## 2025-07-15 DIAGNOSIS — N18.31 STAGE 3A CHRONIC KIDNEY DISEASE (CKD) (HCC): ICD-10-CM

## 2025-07-15 DIAGNOSIS — G89.4 CHRONIC PAIN SYNDROME: ICD-10-CM

## 2025-07-15 PROCEDURE — 3017F COLORECTAL CA SCREEN DOC REV: CPT | Performed by: NURSE PRACTITIONER

## 2025-07-15 PROCEDURE — 99214 OFFICE O/P EST MOD 30 MIN: CPT | Performed by: NURSE PRACTITIONER

## 2025-07-15 PROCEDURE — G8427 DOCREV CUR MEDS BY ELIG CLIN: HCPCS | Performed by: NURSE PRACTITIONER

## 2025-07-15 PROCEDURE — 1036F TOBACCO NON-USER: CPT | Performed by: NURSE PRACTITIONER

## 2025-07-15 PROCEDURE — G8420 CALC BMI NORM PARAMETERS: HCPCS | Performed by: NURSE PRACTITIONER

## 2025-07-15 RX ORDER — FERROUS SULFATE 325(65) MG
1 TABLET ORAL
Qty: 90 TABLET | Refills: 4 | Status: SHIPPED | OUTPATIENT
Start: 2025-07-15

## 2025-07-15 RX ORDER — LEVOTHYROXINE SODIUM 175 UG/1
175 TABLET ORAL DAILY
Qty: 90 TABLET | Refills: 4 | Status: SHIPPED | OUTPATIENT
Start: 2025-07-15

## 2025-07-15 ASSESSMENT — ENCOUNTER SYMPTOMS
PHOTOPHOBIA: 0
RESPIRATORY NEGATIVE: 1
GASTROINTESTINAL NEGATIVE: 1

## 2025-07-15 NOTE — TELEPHONE ENCOUNTER
Talya Schulz called requesting a refill on the following medications:  Requested Prescriptions     Pending Prescriptions Disp Refills    HYDROcodone-acetaminophen (NORCO) 5-325 MG per tablet 90 tablet 0     Sig: Take 1 tablet by mouth every 8 hours as needed for Pain for up to 30 days. Max Daily Amount: 3 tablets    gabapentin (NEURONTIN) 400 MG capsule 90 capsule 0     Sig: Take 1 capsule by mouth 3 times daily for 30 days.     Pharmacy verified: [facility]   .pv      Date of last visit: 5/12/25   Date of next visit (if applicable): 7/22/2025

## 2025-07-15 NOTE — PROGRESS NOTES
[facility] PROFESSIONAL [facility]  [address]  [place] [address]  Dept: [phone]  Dept Fax: [phone]  Loc: [phone]    Talya Schulz is a 54 y.o. femalewho presents today for her medical conditions/complaints as noted below.  Talya Schulzis c/o of Follow-up (No concerns ) and Health Maintenance (No recent eye exam )      :     HPI    PMH: T2DM, spinal stenosis, hypothyroidism, SLE, proteinuria, CKD,CAD, CABG X 3, hyperlipidemia,        Pt eats a wide variety of foods, protein, intake  2 servings of dairy daily  Denies anxiety or MDD  No formal exercise program   Sleeping well most night, no snoring    Had a CABG X 3 4/2025  -follows with cardiology   Completing cardiac rehab  Follows with CV and Cardiology     Hx of Diabetes Type 2    Glucose control:   Does patient check blood glucoses at home?  No  Report of hypoglycemia: no  Not Taking farxiga 5 mg  Hemoglobin A1C POC   Date Value Ref Range Status   01/15/2025 4.9 4.3 - 5.7 % Final     Comment:     Performed at [facility] Office under CLIA #  [id number]      Lab Results   Component Value Date     10/26/2023       Symptoms  Polyuria, Polydipsia or Polyphagia?   No  Chest Pain, SOB, or Palpitations? -  No  New Vision complaints? No  Paresthesias of the extremities?  Yes - suffers from chronic pain from lupus, RA and spinal stenosis   Sees Rheumatology for this  -follows with Nephrology for CKD     Medications  Current medication were reviewed.  Compliant with medications?  yes  Medication side effects?  No  On ACE-I or ARB?  No  On antiplatelet therapy?  Yes  On Statin?  Yes    Last Diabetic Eye Exam: in the last year, advised to schedule one       Allergies    Symptoms:  clear nasal drainage  Current treatment:  claritin working well   started on flonase .     Triggers:  season changes,   Pets:unknown  Smoker: non-smoker  Other smokers in the home:at times    Hypothyroidism  -taking synthroid 125 mcg

## 2025-07-15 NOTE — TELEPHONE ENCOUNTER
OARRS reviewed. UDS: + for  gabapentin and hydrocodone.   Last seen: 5/12/2025. Follow-up: 7/22/2025     Performing Laboratory: 0

## 2025-07-16 RX ORDER — GABAPENTIN 400 MG/1
400 CAPSULE ORAL 3 TIMES DAILY
Qty: 90 CAPSULE | Refills: 0 | Status: SHIPPED | OUTPATIENT
Start: 2025-07-16 | End: 2025-08-15

## 2025-07-16 RX ORDER — HYDROCODONE BITARTRATE AND ACETAMINOPHEN 5; 325 MG/1; MG/1
1 TABLET ORAL EVERY 8 HOURS PRN
Qty: 90 TABLET | Refills: 0 | Status: SHIPPED | OUTPATIENT
Start: 2025-07-17 | End: 2025-08-16

## 2025-07-22 ENCOUNTER — OFFICE VISIT (OUTPATIENT)
Dept: PHYSICAL MEDICINE AND REHAB | Age: 55
End: 2025-07-22
Payer: COMMERCIAL

## 2025-07-22 ENCOUNTER — TELEPHONE (OUTPATIENT)
Dept: FAMILY MEDICINE CLINIC | Age: 55
End: 2025-07-22

## 2025-07-22 ENCOUNTER — LAB (OUTPATIENT)
Dept: LAB | Age: 55
End: 2025-07-22

## 2025-07-22 VITALS
SYSTOLIC BLOOD PRESSURE: 128 MMHG | DIASTOLIC BLOOD PRESSURE: 78 MMHG | WEIGHT: 158 LBS | HEIGHT: 67 IN | BODY MASS INDEX: 24.8 KG/M2

## 2025-07-22 DIAGNOSIS — M54.16 LUMBAR RADICULITIS: ICD-10-CM

## 2025-07-22 DIAGNOSIS — M54.12 CERVICAL RADICULITIS: ICD-10-CM

## 2025-07-22 DIAGNOSIS — G62.9 NEUROPATHY: ICD-10-CM

## 2025-07-22 DIAGNOSIS — E03.9 HYPOTHYROIDISM, UNSPECIFIED TYPE: ICD-10-CM

## 2025-07-22 DIAGNOSIS — Z51.81 MEDICATION MONITORING ENCOUNTER: ICD-10-CM

## 2025-07-22 DIAGNOSIS — M48.02 CERVICAL SPINAL STENOSIS: ICD-10-CM

## 2025-07-22 DIAGNOSIS — M32.14 SYSTEMIC LUPUS ERYTHEMATOSUS WITH GLOMERULAR DISEASE, UNSPECIFIED SLE TYPE (HCC): ICD-10-CM

## 2025-07-22 DIAGNOSIS — E78.00 PURE HYPERCHOLESTEROLEMIA: ICD-10-CM

## 2025-07-22 DIAGNOSIS — M46.1 BILATERAL SACROILIITIS: ICD-10-CM

## 2025-07-22 DIAGNOSIS — M47.812 CERVICAL SPONDYLOSIS: ICD-10-CM

## 2025-07-22 DIAGNOSIS — M47.816 LUMBAR SPONDYLOSIS: Primary | ICD-10-CM

## 2025-07-22 DIAGNOSIS — M46.1 SI (SACROILIAC) JOINT INFLAMMATION: ICD-10-CM

## 2025-07-22 DIAGNOSIS — G89.4 CHRONIC PAIN SYNDROME: ICD-10-CM

## 2025-07-22 LAB
ALBUMIN SERPL BCG-MCNC: 3.9 G/DL (ref 3.4–4.9)
ALBUMIN SERPL BCG-MCNC: 3.9 G/DL (ref 3.4–4.9)
ALP SERPL-CCNC: 91 U/L (ref 38–126)
ALP SERPL-CCNC: 91 U/L (ref 38–126)
ALT SERPL W/O P-5'-P-CCNC: 10 U/L (ref 10–35)
ALT SERPL W/O P-5'-P-CCNC: 12 U/L (ref 10–35)
ANION GAP SERPL CALC-SCNC: 10 MEQ/L (ref 8–16)
AST SERPL-CCNC: 14 U/L (ref 10–35)
AST SERPL-CCNC: 15 U/L (ref 10–35)
BASOPHILS ABSOLUTE: 0 THOU/MM3 (ref 0–0.1)
BASOPHILS NFR BLD AUTO: 0.3 %
BILIRUB CONJ SERPL-MCNC: < 0.1 MG/DL (ref 0–0.2)
BILIRUB SERPL-MCNC: < 0.2 MG/DL (ref 0.3–1.2)
BILIRUB SERPL-MCNC: < 0.2 MG/DL (ref 0.3–1.2)
BUN SERPL-MCNC: 31 MG/DL (ref 8–23)
C3C SERPL-MCNC: 115 MG/DL (ref 90–180)
C4 SERPL-MCNC: 17 MG/DL (ref 10–40)
CALCIUM SERPL-MCNC: 9.2 MG/DL (ref 8.6–10)
CHLORIDE SERPL-SCNC: 110 MEQ/L (ref 98–111)
CHOLEST SERPL-MCNC: 177 MG/DL (ref 100–199)
CO2 SERPL-SCNC: 21 MEQ/L (ref 22–29)
CREAT SERPL-MCNC: 1.7 MG/DL (ref 0.5–0.9)
CRP SERPL-MCNC: < 0.3 MG/DL (ref 0–0.5)
DEPRECATED RDW RBC AUTO: 54.4 FL (ref 35–45)
EOSINOPHIL NFR BLD AUTO: 0.4 %
EOSINOPHILS ABSOLUTE: 0 THOU/MM3 (ref 0–0.4)
ERYTHROCYTE [DISTWIDTH] IN BLOOD BY AUTOMATED COUNT: 15.9 % (ref 11.5–14.5)
ERYTHROCYTE [SEDIMENTATION RATE] IN BLOOD BY WESTERGREN METHOD: 17 MM/HR (ref 0–20)
GFR SERPL CREATININE-BSD FRML MDRD: 36 ML/MIN/1.73M2
GLUCOSE SERPL-MCNC: 94 MG/DL (ref 74–109)
HCT VFR BLD AUTO: 30.1 % (ref 37–47)
HDLC SERPL-MCNC: 37 MG/DL
HGB BLD-MCNC: 8.9 GM/DL (ref 12–16)
IMM GRANULOCYTES # BLD AUTO: 0.05 THOU/MM3 (ref 0–0.07)
IMM GRANULOCYTES NFR BLD AUTO: 0.7 %
LDLC SERPL CALC-MCNC: 105 MG/DL
LYMPHOCYTES ABSOLUTE: 1.1 THOU/MM3 (ref 1–4.8)
LYMPHOCYTES NFR BLD AUTO: 15.5 %
MCH RBC QN AUTO: 28.3 PG (ref 26–33)
MCHC RBC AUTO-ENTMCNC: 29.6 GM/DL (ref 32.2–35.5)
MCV RBC AUTO: 95.6 FL (ref 81–99)
MONOCYTES ABSOLUTE: 0.5 THOU/MM3 (ref 0.4–1.3)
MONOCYTES NFR BLD AUTO: 6.5 %
NEUTROPHILS ABSOLUTE: 5.7 THOU/MM3 (ref 1.8–7.7)
NEUTROPHILS NFR BLD AUTO: 76.6 %
NRBC BLD AUTO-RTO: 0 /100 WBC
PLATELET # BLD AUTO: 193 THOU/MM3 (ref 130–400)
PMV BLD AUTO: 11.6 FL (ref 9.4–12.4)
POTASSIUM SERPL-SCNC: 5.7 MEQ/L (ref 3.5–5.2)
PROT SERPL-MCNC: 6.3 G/DL (ref 6.4–8.3)
PROT SERPL-MCNC: 6.3 G/DL (ref 6.4–8.3)
RBC # BLD AUTO: 3.15 MILL/MM3 (ref 4.2–5.4)
SODIUM SERPL-SCNC: 141 MEQ/L (ref 135–145)
T4 FREE SERPL-MCNC: 1 NG/DL (ref 0.92–1.68)
TRIGL SERPL-MCNC: 177 MG/DL (ref 0–199)
TSH SERPL DL<=0.05 MIU/L-ACNC: 27.6 UIU/ML (ref 0.27–4.2)
WBC # BLD AUTO: 7.4 THOU/MM3 (ref 4.8–10.8)

## 2025-07-22 PROCEDURE — G8420 CALC BMI NORM PARAMETERS: HCPCS | Performed by: NURSE PRACTITIONER

## 2025-07-22 PROCEDURE — 99214 OFFICE O/P EST MOD 30 MIN: CPT | Performed by: NURSE PRACTITIONER

## 2025-07-22 PROCEDURE — 3017F COLORECTAL CA SCREEN DOC REV: CPT | Performed by: NURSE PRACTITIONER

## 2025-07-22 PROCEDURE — 1036F TOBACCO NON-USER: CPT | Performed by: NURSE PRACTITIONER

## 2025-07-22 PROCEDURE — G8427 DOCREV CUR MEDS BY ELIG CLIN: HCPCS | Performed by: NURSE PRACTITIONER

## 2025-07-22 ASSESSMENT — ENCOUNTER SYMPTOMS: BACK PAIN: 1

## 2025-07-22 NOTE — TELEPHONE ENCOUNTER
Gopal Boone, APRN - CNP  [facility] Clinical Staff  Let pt know her TSH values are above goal,  as she been taking the synthroid 175 mcg daily for at least 6 weeks, and 1 hour apart from other foods and meds? If so we will need to adjust her dosage.

## 2025-07-22 NOTE — PROGRESS NOTES
Functionality Assessment/Goals Worksheet     On a scale of 0 (Does not Interfere) to 10 (Completely Interferes)     1.  Which number describes how during the past week pain has interfered with           the following:  A.  General Activity:  6  B.  Mood: 7  C.  Walking Ability:  5  D.  Normal Work (Includes both work outside the home and housework):  6  E.  Relations with Other People:   7  F.  Sleep:   5  G.  Enjoyment of Life:   5    2.  Patient Prefers to Take their Pain Medications:     [x]  On a regular basis   []  Only when necessary    []  Does not take pain medications    3.  What are the Patient's Goals/Expectations for Visiting Pain Management?     []  Learn about my pain    []  Receive Medication   []  Physical Therapy     []  Treat Depression   []  Receive Injections    []  Treat Sleep   []  Deal with Anxiety and Stress   []  Treat Opoid Dependence/Addiction   []  Other:     HPI:   Talya Schulz is a 54 y.o. female is here today for    Chief Complaint: neck thoracic  lumbar hip Si knee pain.  BLE BUE       F/U   Pending  blood work for thyroid and rheumatology labs   Had recent CXR  Becoming more active since CABG. Not lifting heavy items but doing some housework and light lifting.  She has stage 3 CKD follows Dr Maldonado  She is a new grandma.   No assistive devices today.  Neck pain is doing better than usual. She has left shoulder pain more when she sleeps on left side. NO injury that she knows of to shoulder   She continues to have moderate lumbar pain but tolerable with her medications. More pain with bending over lifting  grand kids.   Has not been able to complete PT for cervical and lumbar pain due to cardiac issues, no endurance unable to tolerate, she did start  for 1 week 2 visits but has not completed.  All injections have been denied until she completes PT or they discharge her for inability to tolerate or no progress.     Had CABG 4/25//2025.    Her low back pain is her main pain complaint,

## 2025-07-22 NOTE — PROGRESS NOTES
Functionality Assessment/Goals Worksheet     On a scale of 0 (Does not Interfere) to 10 (Completely Interferes)     1.  Which number describes how during the past week pain has interfered with           the following:  A.  General Activity:  5  B.  Mood: 7  C.  Walking Ability:  6  D.  Normal Work (Includes both work outside the home and housework):  7  E.  Relations with Other People:   7  F.  Sleep:   7  G.  Enjoyment of Life:   5    2.  Patient Prefers to Take their Pain Medications:     [x]  On a regular basis   []  Only when necessary    []  Does not take pain medications    3.  What are the Patient's Goals/Expectations for Visiting Pain Management?     []  Learn about my pain    []  Receive Medication   []  Physical Therapy     []  Treat Depression   []  Receive Injections    []  Treat Sleep   []  Deal with Anxiety and Stress   []  Treat Opoid Dependence/Addiction   []  Other:

## 2025-07-22 NOTE — TELEPHONE ENCOUNTER
Tried to reach patient, \" the number you are calling has restrictions\"  Unable to leave a message

## 2025-08-04 ENCOUNTER — LAB (OUTPATIENT)
Dept: LAB | Age: 55
End: 2025-08-04

## 2025-08-04 ENCOUNTER — OFFICE VISIT (OUTPATIENT)
Age: 55
End: 2025-08-04
Payer: COMMERCIAL

## 2025-08-04 VITALS
OXYGEN SATURATION: 98 % | HEART RATE: 72 BPM | HEIGHT: 67 IN | DIASTOLIC BLOOD PRESSURE: 84 MMHG | WEIGHT: 152.4 LBS | BODY MASS INDEX: 23.92 KG/M2 | SYSTOLIC BLOOD PRESSURE: 130 MMHG

## 2025-08-04 DIAGNOSIS — Z51.81 MEDICATION MONITORING ENCOUNTER: ICD-10-CM

## 2025-08-04 DIAGNOSIS — Z87.39 H/O LUPUS NEPHRITIS: ICD-10-CM

## 2025-08-04 DIAGNOSIS — E55.9 VITAMIN D DEFICIENCY: ICD-10-CM

## 2025-08-04 DIAGNOSIS — M32.14 SYSTEMIC LUPUS ERYTHEMATOSUS WITH GLOMERULAR DISEASE, UNSPECIFIED SLE TYPE (HCC): ICD-10-CM

## 2025-08-04 DIAGNOSIS — N18.9 CHRONIC KIDNEY DISEASE, UNSPECIFIED CKD STAGE: ICD-10-CM

## 2025-08-04 DIAGNOSIS — N18.32 STAGE 3B CHRONIC KIDNEY DISEASE (HCC): ICD-10-CM

## 2025-08-04 DIAGNOSIS — D64.9 NORMOCYTIC ANEMIA: ICD-10-CM

## 2025-08-04 DIAGNOSIS — R80.1 PERSISTENT PROTEINURIA: ICD-10-CM

## 2025-08-04 DIAGNOSIS — N25.81 HYPERPARATHYROIDISM, SECONDARY RENAL: ICD-10-CM

## 2025-08-04 DIAGNOSIS — M32.14 SYSTEMIC LUPUS ERYTHEMATOSUS WITH GLOMERULAR DISEASE, UNSPECIFIED SLE TYPE (HCC): Primary | ICD-10-CM

## 2025-08-04 LAB
25(OH)D3 SERPL-MCNC: 47 NG/ML (ref 30–100)
ANION GAP SERPL CALC-SCNC: 12 MEQ/L (ref 8–16)
BACTERIA URNS QL MICRO: ABNORMAL /HPF
BILIRUB UR QL STRIP.AUTO: NEGATIVE
BUN SERPL-MCNC: 31 MG/DL (ref 8–23)
CALCIUM SERPL-MCNC: 9.4 MG/DL (ref 8.6–10)
CASTS #/AREA URNS LPF: ABNORMAL /LPF
CASTS 2: ABNORMAL /LPF
CHARACTER UR: CLEAR
CHLORIDE SERPL-SCNC: 112 MEQ/L (ref 98–111)
CO2 SERPL-SCNC: 18 MEQ/L (ref 22–29)
COLOR, UA: YELLOW
CREAT SERPL-MCNC: 1.8 MG/DL (ref 0.5–0.9)
CREAT UR-MCNC: 103 MG/DL
CRYSTALS URNS MICRO: ABNORMAL
EPITHELIAL CELLS, UA: ABNORMAL /HPF
GFR SERPL CREATININE-BSD FRML MDRD: 33 ML/MIN/1.73M2
GLUCOSE SERPL-MCNC: 91 MG/DL (ref 74–109)
GLUCOSE UR QL STRIP.AUTO: NEGATIVE MG/DL
HAPTOGLOB SERPL-MCNC: 123 MG/DL (ref 30–200)
HGB UR QL STRIP.AUTO: NEGATIVE
IRON SATN MFR SERPL: 21 % (ref 20–50)
IRON SERPL-MCNC: 46 UG/DL (ref 37–145)
KETONES UR QL STRIP.AUTO: NEGATIVE
LDH SERPL L TO P-CCNC: 243 U/L (ref 135–214)
MISCELLANEOUS 2: ABNORMAL
NITRITE UR QL STRIP: NEGATIVE
PH UR STRIP.AUTO: 5 [PH] (ref 5–9)
POTASSIUM SERPL-SCNC: 5.4 MEQ/L (ref 3.5–5.2)
PROT UR STRIP.AUTO-MCNC: 100 MG/DL
PROT UR-MCNC: 114 MG/DL
PROT/CREAT 24H UR: 1.11 MG/G{CREAT}
PTH-INTACT SERPL-MCNC: 84 PG/ML (ref 15–65)
RBC URINE: ABNORMAL /HPF
RENAL EPI CELLS #/AREA URNS HPF: ABNORMAL /[HPF]
SODIUM SERPL-SCNC: 142 MEQ/L (ref 135–145)
SP GR UR REFRACT.AUTO: 1.02 (ref 1–1.03)
TIBC SERPL-MCNC: 221 UG/DL (ref 171–450)
UROBILINOGEN, URINE: 0.2 EU/DL (ref 0–1)
WBC #/AREA URNS HPF: ABNORMAL /HPF
WBC #/AREA URNS HPF: ABNORMAL /[HPF]
YEAST LIKE FUNGI URNS QL MICRO: ABNORMAL

## 2025-08-04 PROCEDURE — 1036F TOBACCO NON-USER: CPT | Performed by: INTERNAL MEDICINE

## 2025-08-04 PROCEDURE — 99215 OFFICE O/P EST HI 40 MIN: CPT | Performed by: INTERNAL MEDICINE

## 2025-08-04 PROCEDURE — G8420 CALC BMI NORM PARAMETERS: HCPCS | Performed by: INTERNAL MEDICINE

## 2025-08-04 PROCEDURE — 99214 OFFICE O/P EST MOD 30 MIN: CPT | Performed by: INTERNAL MEDICINE

## 2025-08-04 PROCEDURE — 3017F COLORECTAL CA SCREEN DOC REV: CPT | Performed by: INTERNAL MEDICINE

## 2025-08-04 PROCEDURE — G8427 DOCREV CUR MEDS BY ELIG CLIN: HCPCS | Performed by: INTERNAL MEDICINE

## 2025-08-04 ASSESSMENT — ENCOUNTER SYMPTOMS
EYES NEGATIVE: 1
RESPIRATORY NEGATIVE: 1
GASTROINTESTINAL NEGATIVE: 1

## 2025-08-06 DIAGNOSIS — E03.9 HYPOTHYROIDISM, UNSPECIFIED TYPE: ICD-10-CM

## 2025-08-06 RX ORDER — LEVOTHYROXINE SODIUM 200 UG/1
200 TABLET ORAL DAILY
Qty: 90 TABLET | Refills: 1 | Status: SHIPPED | OUTPATIENT
Start: 2025-08-06

## 2025-08-11 ENCOUNTER — RESULTS FOLLOW-UP (OUTPATIENT)
Dept: FAMILY MEDICINE CLINIC | Age: 55
End: 2025-08-11

## 2025-08-11 ENCOUNTER — LAB (OUTPATIENT)
Dept: LAB | Age: 55
End: 2025-08-11

## 2025-08-11 DIAGNOSIS — E87.5 HYPERKALEMIA: ICD-10-CM

## 2025-08-11 DIAGNOSIS — E03.9 HYPOTHYROIDISM, UNSPECIFIED TYPE: Primary | ICD-10-CM

## 2025-08-11 DIAGNOSIS — N18.32 STAGE 3B CHRONIC KIDNEY DISEASE (HCC): ICD-10-CM

## 2025-08-11 DIAGNOSIS — D63.8 ANEMIA OF CHRONIC DISEASE: ICD-10-CM

## 2025-08-11 DIAGNOSIS — E03.9 HYPOTHYROIDISM, UNSPECIFIED TYPE: ICD-10-CM

## 2025-08-11 LAB
FERRITIN SERPL IA-MCNC: 1067 NG/ML (ref 13–150)
FOLATE SERPL-MCNC: > 20 NG/ML (ref 4.6–34.8)
HCT VFR BLD AUTO: 36.4 % (ref 37–47)
HGB BLD-MCNC: 10.9 GM/DL (ref 12–16)
POTASSIUM SERPL-SCNC: 3.9 MEQ/L (ref 3.5–5.2)
T4 FREE SERPL-MCNC: 0.6 NG/DL (ref 0.92–1.68)
TSH SERPL DL<=0.05 MIU/L-ACNC: 24.8 UIU/ML (ref 0.27–4.2)
VIT B12 SERPL-MCNC: 563 PG/ML (ref 232–1245)

## 2025-08-14 DIAGNOSIS — G89.4 CHRONIC PAIN SYNDROME: ICD-10-CM

## 2025-08-14 RX ORDER — HYDROCODONE BITARTRATE AND ACETAMINOPHEN 5; 325 MG/1; MG/1
1 TABLET ORAL EVERY 8 HOURS PRN
Qty: 90 TABLET | Refills: 0 | Status: SHIPPED | OUTPATIENT
Start: 2025-08-16 | End: 2025-09-15

## 2025-08-14 RX ORDER — GABAPENTIN 400 MG/1
400 CAPSULE ORAL 3 TIMES DAILY
Qty: 90 CAPSULE | Refills: 0 | Status: SHIPPED | OUTPATIENT
Start: 2025-08-14 | End: 2025-09-13

## 2025-08-28 ENCOUNTER — HOSPITAL ENCOUNTER (OUTPATIENT)
Dept: WOMENS IMAGING | Age: 55
Discharge: HOME | End: 2025-08-28
Payer: COMMERCIAL

## 2025-08-28 VITALS — HEIGHT: 67 IN | BODY MASS INDEX: 23.86 KG/M2 | WEIGHT: 152 LBS

## 2025-08-28 DIAGNOSIS — Z12.31 ENCOUNTER FOR SCREENING MAMMOGRAM FOR MALIGNANT NEOPLASM OF BREAST: ICD-10-CM

## 2025-08-28 PROCEDURE — 77063 BREAST TOMOSYNTHESIS BI: CPT

## 2025-09-02 ENCOUNTER — OFFICE VISIT (OUTPATIENT)
Dept: NEPHROLOGY | Age: 55
End: 2025-09-02
Payer: COMMERCIAL

## 2025-09-02 VITALS
SYSTOLIC BLOOD PRESSURE: 132 MMHG | OXYGEN SATURATION: 97 % | RESPIRATION RATE: 16 BRPM | WEIGHT: 153.2 LBS | BODY MASS INDEX: 23.99 KG/M2 | DIASTOLIC BLOOD PRESSURE: 80 MMHG | HEART RATE: 79 BPM

## 2025-09-02 DIAGNOSIS — E87.20 METABOLIC ACIDOSIS: ICD-10-CM

## 2025-09-02 DIAGNOSIS — E55.9 VITAMIN D DEFICIENCY: ICD-10-CM

## 2025-09-02 DIAGNOSIS — I10 PRIMARY HYPERTENSION: ICD-10-CM

## 2025-09-02 DIAGNOSIS — M32.14 SYSTEMIC LUPUS ERYTHEMATOSUS WITH GLOMERULAR DISEASE, UNSPECIFIED SLE TYPE (HCC): ICD-10-CM

## 2025-09-02 DIAGNOSIS — N25.81 HYPERPARATHYROIDISM, SECONDARY RENAL: ICD-10-CM

## 2025-09-02 DIAGNOSIS — R80.9 PROTEINURIA, UNSPECIFIED TYPE: ICD-10-CM

## 2025-09-02 DIAGNOSIS — N18.32 STAGE 3B CHRONIC KIDNEY DISEASE (HCC): Primary | ICD-10-CM

## 2025-09-02 DIAGNOSIS — E87.5 HYPERKALEMIA: ICD-10-CM

## 2025-09-02 PROCEDURE — 3079F DIAST BP 80-89 MM HG: CPT | Performed by: INTERNAL MEDICINE

## 2025-09-02 PROCEDURE — G8427 DOCREV CUR MEDS BY ELIG CLIN: HCPCS | Performed by: INTERNAL MEDICINE

## 2025-09-02 PROCEDURE — 1036F TOBACCO NON-USER: CPT | Performed by: INTERNAL MEDICINE

## 2025-09-02 PROCEDURE — 99213 OFFICE O/P EST LOW 20 MIN: CPT | Performed by: INTERNAL MEDICINE

## 2025-09-02 PROCEDURE — 3075F SYST BP GE 130 - 139MM HG: CPT | Performed by: INTERNAL MEDICINE

## 2025-09-02 PROCEDURE — G8420 CALC BMI NORM PARAMETERS: HCPCS | Performed by: INTERNAL MEDICINE

## 2025-09-02 PROCEDURE — 3017F COLORECTAL CA SCREEN DOC REV: CPT | Performed by: INTERNAL MEDICINE

## 2025-09-02 RX ORDER — SODIUM BICARBONATE 650 MG/1
650 TABLET ORAL 2 TIMES DAILY
Qty: 180 TABLET | Refills: 1 | Status: SHIPPED | OUTPATIENT
Start: 2025-09-02 | End: 2026-09-02

## (undated) DEVICE — GLOVE ORANGE PI 7 1/2   MSG9075

## (undated) DEVICE — GLOVE SURG SZ 65 THK91MIL LTX FREE SYN POLYISOPRENE

## (undated) DEVICE — 6 ML SYRINGE LUER-LOCK TIP: Brand: MONOJECT

## (undated) DEVICE — Device

## (undated) DEVICE — BANDAGE ADH W1XL3IN NAT FAB WVN FLX DURABLE N ADH PD SEAL

## (undated) DEVICE — NEEDLE SYR 18GA L1.5IN RED PLAS HUB S STL BLNT FILL W/O

## (undated) DEVICE — NEEDLE SPNL 22GA L3.5IN BLK HUB S STL REG WALL FIT STYL W/

## (undated) DEVICE — SET ADMIN 25ML L117IN PMP MOD CK VLV RLER CLMP 2 SMRTSITE

## (undated) DEVICE — SHEET,DRAPE,3/4,53X77,STERILE: Brand: MEDLINE

## (undated) DEVICE — CATHETER 5FR JR4 CORDIS 100CM

## (undated) DEVICE — HYPODERMIC SAFETY NEEDLE: Brand: MAGELLAN

## (undated) DEVICE — ATTACHMENT SMK EVAC 15 FTX3/8 IN

## (undated) DEVICE — CANNULA PVC RCSP 15FR SLD STYL W/ HNDL OVERALL LEN 11 IN

## (undated) DEVICE — EZ GLIDE AORTIC CANNULA: Brand: EDWARDS LIFESCIENCES EZ GLIDE AORTIC CANNULA

## (undated) DEVICE — KIT VEN DRNGE VAC ACCSRY PERF VAVD STOCK W/ SPEC TRAP

## (undated) DEVICE — DEVICE ES L3M EDGE COAT HEX LOK BLDE ELECTRD HOLSTER FORC

## (undated) DEVICE — TOWEL,OR,DSP,ST,BLUE,DLX,4/PK,20PK/CS: Brand: MEDLINE

## (undated) DEVICE — PROBE VASC STD 1-1.5 MM 15 CM OLV

## (undated) DEVICE — SOLUTION IV 1000ML 0.9% SOD CHL PH 5 INJ USP VIAFLX PLAS

## (undated) DEVICE — KIT BLWR MISTER 5P 15L W/ TBNG SET IRRIG MIST TO IMPROVE

## (undated) DEVICE — CONNECTOR PERF 3/8X3/8X3/8IN EQL WYE

## (undated) DEVICE — APPLIER LIG CLP M L11IN TI STR RNG HNDL FOR 20 CLP DISP

## (undated) DEVICE — AGENT HEMSTAT W2XL4IN OXIDIZED REGENERATED CELOS ABSRB SFT

## (undated) DEVICE — 3 ML SYRINGE LUER-LOCK TIP: Brand: MONOJECT

## (undated) DEVICE — DECANTER FLD 9IN ST BG FOR ASEP TRNSF OF FLD

## (undated) DEVICE — THORACIC CATHETER,RIGHT ANGLE: Brand: ARGYLE

## (undated) DEVICE — SET AUTOTRNS C175ML BOWL BTM OUTLT RESERVOIRXTRA

## (undated) DEVICE — CHLORAPREP 26ML CLEAR

## (undated) DEVICE — GLIDESHEATH SLENDER NITINOL HYDROPHILIC COATED INTRODUCER SHEATH: Brand: GLIDESHEATH SLENDER

## (undated) DEVICE — GAUZE,SPONGE,4"X4",12PLY,STERILE,LF,2'S: Brand: MEDLINE

## (undated) DEVICE — SUTURE PROL 3-0 36IN NONABSORB BLU 26MM SH 1/2 CIR P8522H

## (undated) DEVICE — GUIDEWIRE VASC L260CM DIA0.035IN RAD 3MM J TIP L7CM PTFE

## (undated) DEVICE — DRAPE KIT RAMAPR RADIATION SHIELD

## (undated) DEVICE — SYSTEM ENDOSCP VES HARV W/ TOOL CANN SEAL SHT PRT BLNT TIP

## (undated) DEVICE — MEDIA CONTRAST IOPAMIDOL 61% ISOVUE 15 ML VL

## (undated) DEVICE — APPLIER CLP L9.375IN APER 2.1MM CLS L3.8MM 20 SM TI CLP

## (undated) DEVICE — ON - ST. RITAS VASC: Brand: MEDLINE INDUSTRIES, INC.

## (undated) DEVICE — SUTURE VICRYL + SZ 3-0 L36IN ABSRB UD L36MM CT-1 1/2 CIR VCP944H

## (undated) DEVICE — CONNECTOR STR 3/8IN ST 050506000 375STRCONN

## (undated) DEVICE — CANNULA PERF L5.5IN DIA9FR AORT ROOT AG STD TIP W/ VENT LN

## (undated) DEVICE — SURGICAL PROCEDURE PACK OXGNTR ST MARYS

## (undated) DEVICE — SYRINGE MED 10ML LUERLOCK TIP W/O SFTY DISP

## (undated) DEVICE — CLIP LIG SM TI 6 BLU HNDL FOR OPN AND ENDOSCP SGL APPL

## (undated) DEVICE — TUBING INSUFFLATION SMK EVAC HI FLO SET PNEUMOCLEAR

## (undated) DEVICE — TRAY NRV BLK PARACERVICAL PUDEN W/ 10ML CTRL SYR

## (undated) DEVICE — SUTURE VICRYL + SZ 0 L36IN ABSRB VLT L36MM CT-1 1/2 CIR VCP346H

## (undated) DEVICE — CATHETER DIAG 5FR L100CM LUMN ID0.047IN JL3.5 CRV 0 SIDE H

## (undated) DEVICE — CLIP LIG M TI 6 SIL HNDL FOR OPN AND ENDOSCP SGL APPL

## (undated) DEVICE — DUAL STAGE VENOUS RETURN CANNULA: Brand: THIN-FLEX DUAL STAGE VENOUS DRAINAGE CANNULA

## (undated) DEVICE — OPEN HEART SUPPLEMENT: Brand: MEDLINE INDUSTRIES, INC.